# Patient Record
Sex: MALE | Race: WHITE | NOT HISPANIC OR LATINO | Employment: OTHER | URBAN - METROPOLITAN AREA
[De-identification: names, ages, dates, MRNs, and addresses within clinical notes are randomized per-mention and may not be internally consistent; named-entity substitution may affect disease eponyms.]

---

## 2018-02-01 ENCOUNTER — APPOINTMENT (OUTPATIENT)
Dept: GENERAL RADIOLOGY | Facility: HOSPITAL | Age: 73
End: 2018-02-01

## 2018-02-01 PROCEDURE — 73562 X-RAY EXAM OF KNEE 3: CPT | Performed by: GENERAL PRACTICE

## 2018-03-16 ENCOUNTER — ANESTHESIA (OUTPATIENT)
Dept: PERIOP | Facility: HOSPITAL | Age: 73
End: 2018-03-16

## 2018-03-16 ENCOUNTER — ANESTHESIA EVENT (OUTPATIENT)
Dept: PERIOP | Facility: HOSPITAL | Age: 73
End: 2018-03-16

## 2018-03-16 ENCOUNTER — HOSPITAL ENCOUNTER (OUTPATIENT)
Facility: HOSPITAL | Age: 73
Discharge: HOME | End: 2018-03-18
Attending: EMERGENCY MEDICINE | Admitting: INTERNAL MEDICINE

## 2018-03-16 ENCOUNTER — APPOINTMENT (OUTPATIENT)
Dept: GENERAL RADIOLOGY | Facility: HOSPITAL | Age: 73
End: 2018-03-16

## 2018-03-16 DIAGNOSIS — S42.91XA CLOSED FRACTURE DISLOCATION OF RIGHT SHOULDER, INITIAL ENCOUNTER: Primary | ICD-10-CM

## 2018-03-16 PROBLEM — Z66 DNR (DO NOT RESUSCITATE): Status: ACTIVE | Noted: 2018-03-16

## 2018-03-16 PROBLEM — D72.829 LEUKOCYTOSIS: Status: ACTIVE | Noted: 2018-03-16
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
## 2018-03-16 PROBLEM — R73.9 HYPERGLYCEMIA: Status: ACTIVE | Noted: 2018-03-16

## 2018-03-16 PROBLEM — K21.9 GASTROESOPHAGEAL REFLUX DISEASE WITHOUT ESOPHAGITIS: Status: ACTIVE | Noted: 2018-03-16

## 2018-03-16 LAB
ALBUMIN SERPL-MCNC: 3.7 G/DL (ref 3.5–5.2)
ALBUMIN/GLOB SERPL: 1.1 G/DL
ALP SERPL-CCNC: 89 U/L (ref 39–117)
ALT SERPL W P-5'-P-CCNC: 22 U/L (ref 1–41)
ANION GAP SERPL CALCULATED.3IONS-SCNC: 11.2 MMOL/L
ANION GAP SERPL CALCULATED.3IONS-SCNC: 14.1 MMOL/L
APTT PPP: 28.2 SECONDS (ref 22.7–35.4)
AST SERPL-CCNC: 26 U/L (ref 1–40)
BASOPHILS # BLD AUTO: 0.01 10*3/MM3 (ref 0–0.2)
BASOPHILS NFR BLD AUTO: 0.1 % (ref 0–1.5)
BILIRUB SERPL-MCNC: 0.2 MG/DL (ref 0.1–1.2)
BUN BLD-MCNC: 16 MG/DL (ref 8–23)
BUN BLD-MCNC: 20 MG/DL (ref 8–23)
BUN/CREAT SERPL: 17.8 (ref 7–25)
BUN/CREAT SERPL: 19.4 (ref 7–25)
CALCIUM SPEC-SCNC: 8.1 MG/DL (ref 8.6–10.5)
CALCIUM SPEC-SCNC: 9.1 MG/DL (ref 8.6–10.5)
CHLORIDE SERPL-SCNC: 103 MMOL/L (ref 98–107)
CHLORIDE SERPL-SCNC: 106 MMOL/L (ref 98–107)
CO2 SERPL-SCNC: 23.8 MMOL/L (ref 22–29)
CO2 SERPL-SCNC: 23.9 MMOL/L (ref 22–29)
CREAT BLD-MCNC: 0.9 MG/DL (ref 0.76–1.27)
CREAT BLD-MCNC: 1.03 MG/DL (ref 0.76–1.27)
DEPRECATED RDW RBC AUTO: 47.9 FL (ref 37–54)
EOSINOPHIL # BLD AUTO: 0 10*3/MM3 (ref 0–0.7)
EOSINOPHIL NFR BLD AUTO: 0 % (ref 0.3–6.2)
ERYTHROCYTE [DISTWIDTH] IN BLOOD BY AUTOMATED COUNT: 13.5 % (ref 11.5–14.5)
GFR SERPL CREATININE-BSD FRML MDRD: 70 ML/MIN/1.73
GFR SERPL CREATININE-BSD FRML MDRD: 82 ML/MIN/1.73
GLOBULIN UR ELPH-MCNC: 3.5 GM/DL
GLUCOSE BLD-MCNC: 168 MG/DL (ref 65–99)
GLUCOSE BLD-MCNC: 86 MG/DL (ref 65–99)
HBA1C MFR BLD: 5.58 % (ref 4.8–5.6)
HCT VFR BLD AUTO: 41.8 % (ref 40.4–52.2)
HGB BLD-MCNC: 13.6 G/DL (ref 13.7–17.6)
IMM GRANULOCYTES # BLD: 0.12 10*3/MM3 (ref 0–0.03)
IMM GRANULOCYTES NFR BLD: 1.1 % (ref 0–0.5)
INR PPP: 1.1 (ref 0.9–1.1)
LYMPHOCYTES # BLD AUTO: 1.52 10*3/MM3 (ref 0.9–4.8)
LYMPHOCYTES NFR BLD AUTO: 14.1 % (ref 19.6–45.3)
MAGNESIUM SERPL-MCNC: 1.8 MG/DL (ref 1.6–2.4)
MCH RBC QN AUTO: 31.9 PG (ref 27–32.7)
MCHC RBC AUTO-ENTMCNC: 32.5 G/DL (ref 32.6–36.4)
MCV RBC AUTO: 98.1 FL (ref 79.8–96.2)
MONOCYTES # BLD AUTO: 0.93 10*3/MM3 (ref 0.2–1.2)
MONOCYTES NFR BLD AUTO: 8.6 % (ref 5–12)
NEUTROPHILS # BLD AUTO: 8.19 10*3/MM3 (ref 1.9–8.1)
NEUTROPHILS NFR BLD AUTO: 76.1 % (ref 42.7–76)
PLATELET # BLD AUTO: 233 10*3/MM3 (ref 140–500)
PMV BLD AUTO: 10.3 FL (ref 6–12)
POTASSIUM BLD-SCNC: 4.2 MMOL/L (ref 3.5–5.2)
POTASSIUM BLD-SCNC: 4.3 MMOL/L (ref 3.5–5.2)
PROT SERPL-MCNC: 7.2 G/DL (ref 6–8.5)
PROTHROMBIN TIME: 14 SECONDS (ref 11.7–14.2)
RBC # BLD AUTO: 4.26 10*6/MM3 (ref 4.6–6)
SODIUM BLD-SCNC: 141 MMOL/L (ref 136–145)
SODIUM BLD-SCNC: 141 MMOL/L (ref 136–145)
WBC NRBC COR # BLD: 10.77 10*3/MM3 (ref 4.5–10.7)

## 2018-03-16 PROCEDURE — 99204 OFFICE O/P NEW MOD 45 MIN: CPT | Performed by: INTERNAL MEDICINE

## 2018-03-16 PROCEDURE — 63710000001: Performed by: ORTHOPAEDIC SURGERY

## 2018-03-16 PROCEDURE — 25010000002 MIDAZOLAM PER 1 MG: Performed by: ANESTHESIOLOGY

## 2018-03-16 PROCEDURE — A9270 NON-COVERED ITEM OR SERVICE: HCPCS | Performed by: ORTHOPAEDIC SURGERY

## 2018-03-16 PROCEDURE — 85025 COMPLETE CBC W/AUTO DIFF WBC: CPT | Performed by: PHYSICIAN ASSISTANT

## 2018-03-16 PROCEDURE — 25010000002 HYDROMORPHONE PER 4 MG: Performed by: HOSPITALIST

## 2018-03-16 PROCEDURE — 73030 X-RAY EXAM OF SHOULDER: CPT

## 2018-03-16 PROCEDURE — 93010 ELECTROCARDIOGRAM REPORT: CPT | Performed by: INTERNAL MEDICINE

## 2018-03-16 PROCEDURE — 96375 TX/PRO/DX INJ NEW DRUG ADDON: CPT

## 2018-03-16 PROCEDURE — 63710000001 DOCUSATE SODIUM 100 MG CAPSULE: Performed by: ORTHOPAEDIC SURGERY

## 2018-03-16 PROCEDURE — G0378 HOSPITAL OBSERVATION PER HR: HCPCS

## 2018-03-16 PROCEDURE — 96376 TX/PRO/DX INJ SAME DRUG ADON: CPT

## 2018-03-16 PROCEDURE — 96374 THER/PROPH/DIAG INJ IV PUSH: CPT

## 2018-03-16 PROCEDURE — 99284 EMERGENCY DEPT VISIT MOD MDM: CPT

## 2018-03-16 PROCEDURE — 99220 PR INITIAL OBSERVATION CARE/DAY 70 MINUTES: CPT | Performed by: ORTHOPAEDIC SURGERY

## 2018-03-16 PROCEDURE — 25010000002 PROPOFOL 10 MG/ML EMULSION: Performed by: ANESTHESIOLOGY

## 2018-03-16 PROCEDURE — 80053 COMPREHEN METABOLIC PANEL: CPT | Performed by: PHYSICIAN ASSISTANT

## 2018-03-16 PROCEDURE — 93005 ELECTROCARDIOGRAM TRACING: CPT | Performed by: PHYSICIAN ASSISTANT

## 2018-03-16 PROCEDURE — 85610 PROTHROMBIN TIME: CPT | Performed by: PHYSICIAN ASSISTANT

## 2018-03-16 PROCEDURE — 63710000001 OXYCODONE-ACETAMINOPHEN 7.5-325 MG TABLET: Performed by: ORTHOPAEDIC SURGERY

## 2018-03-16 PROCEDURE — 85730 THROMBOPLASTIN TIME PARTIAL: CPT | Performed by: PHYSICIAN ASSISTANT

## 2018-03-16 PROCEDURE — 83735 ASSAY OF MAGNESIUM: CPT | Performed by: INTERNAL MEDICINE

## 2018-03-16 PROCEDURE — 25010000002 ONDANSETRON PER 1 MG: Performed by: PHYSICIAN ASSISTANT

## 2018-03-16 PROCEDURE — 23605 CLTX PRX HMRL FX MNPJ+-TRACT: CPT | Performed by: ORTHOPAEDIC SURGERY

## 2018-03-16 PROCEDURE — 25010000002 HYDROMORPHONE HCL PF 500 MG/50ML SOLUTION: Performed by: HOSPITALIST

## 2018-03-16 PROCEDURE — 83036 HEMOGLOBIN GLYCOSYLATED A1C: CPT | Performed by: INTERNAL MEDICINE

## 2018-03-16 PROCEDURE — 25010000002 ONDANSETRON PER 1 MG: Performed by: HOSPITALIST

## 2018-03-16 PROCEDURE — 73020 X-RAY EXAM OF SHOULDER: CPT

## 2018-03-16 PROCEDURE — 25010000002 HYDROMORPHONE PER 4 MG: Performed by: EMERGENCY MEDICINE

## 2018-03-16 RX ORDER — FLUMAZENIL 0.1 MG/ML
0.2 INJECTION INTRAVENOUS AS NEEDED
Status: DISCONTINUED | OUTPATIENT
Start: 2018-03-16 | End: 2018-03-16 | Stop reason: HOSPADM

## 2018-03-16 RX ORDER — OXYCODONE AND ACETAMINOPHEN 7.5; 325 MG/1; MG/1
2 TABLET ORAL EVERY 4 HOURS PRN
Status: DISCONTINUED | OUTPATIENT
Start: 2018-03-16 | End: 2018-03-18 | Stop reason: HOSPADM

## 2018-03-16 RX ORDER — FAMOTIDINE 10 MG/ML
20 INJECTION, SOLUTION INTRAVENOUS ONCE
Status: COMPLETED | OUTPATIENT
Start: 2018-03-16 | End: 2018-03-16

## 2018-03-16 RX ORDER — NALOXONE HCL 0.4 MG/ML
0.1 VIAL (ML) INJECTION
Status: DISCONTINUED | OUTPATIENT
Start: 2018-03-16 | End: 2018-03-18 | Stop reason: HOSPADM

## 2018-03-16 RX ORDER — FENTANYL CITRATE 50 UG/ML
50 INJECTION, SOLUTION INTRAMUSCULAR; INTRAVENOUS
Status: DISCONTINUED | OUTPATIENT
Start: 2018-03-16 | End: 2018-03-16 | Stop reason: HOSPADM

## 2018-03-16 RX ORDER — SODIUM CHLORIDE 9 MG/ML
50 INJECTION, SOLUTION INTRAVENOUS CONTINUOUS
Status: DISCONTINUED | OUTPATIENT
Start: 2018-03-16 | End: 2018-03-18 | Stop reason: HOSPADM

## 2018-03-16 RX ORDER — DIFLUPREDNATE OPHTHALMIC 0.5 MG/ML
1 EMULSION OPHTHALMIC 4 TIMES DAILY
COMMUNITY
End: 2018-04-09

## 2018-03-16 RX ORDER — SODIUM CHLORIDE 0.9 % (FLUSH) 0.9 %
1-10 SYRINGE (ML) INJECTION AS NEEDED
Status: DISCONTINUED | OUTPATIENT
Start: 2018-03-16 | End: 2018-03-16 | Stop reason: HOSPADM

## 2018-03-16 RX ORDER — SODIUM CHLORIDE, SODIUM LACTATE, POTASSIUM CHLORIDE, CALCIUM CHLORIDE 600; 310; 30; 20 MG/100ML; MG/100ML; MG/100ML; MG/100ML
9 INJECTION, SOLUTION INTRAVENOUS CONTINUOUS
Status: DISCONTINUED | OUTPATIENT
Start: 2018-03-16 | End: 2018-03-18 | Stop reason: HOSPADM

## 2018-03-16 RX ORDER — FAMOTIDINE 10 MG/ML
20 INJECTION, SOLUTION INTRAVENOUS EVERY 12 HOURS SCHEDULED
Status: DISCONTINUED | OUTPATIENT
Start: 2018-03-16 | End: 2018-03-17

## 2018-03-16 RX ORDER — ONDANSETRON 2 MG/ML
4 INJECTION INTRAMUSCULAR; INTRAVENOUS ONCE
Status: COMPLETED | OUTPATIENT
Start: 2018-03-16 | End: 2018-03-16

## 2018-03-16 RX ORDER — OXYCODONE HYDROCHLORIDE AND ACETAMINOPHEN 5; 325 MG/1; MG/1
1 TABLET ORAL EVERY 4 HOURS PRN
Status: DISCONTINUED | OUTPATIENT
Start: 2018-03-16 | End: 2018-03-18 | Stop reason: HOSPADM

## 2018-03-16 RX ORDER — LABETALOL HYDROCHLORIDE 5 MG/ML
5 INJECTION, SOLUTION INTRAVENOUS
Status: DISCONTINUED | OUTPATIENT
Start: 2018-03-16 | End: 2018-03-16 | Stop reason: HOSPADM

## 2018-03-16 RX ORDER — PANTOPRAZOLE SODIUM 40 MG/1
40 TABLET, DELAYED RELEASE ORAL EVERY MORNING
Status: DISCONTINUED | OUTPATIENT
Start: 2018-03-17 | End: 2018-03-18 | Stop reason: HOSPADM

## 2018-03-16 RX ORDER — DIPHENHYDRAMINE HYDROCHLORIDE 50 MG/ML
12.5 INJECTION INTRAMUSCULAR; INTRAVENOUS
Status: DISCONTINUED | OUTPATIENT
Start: 2018-03-16 | End: 2018-03-16 | Stop reason: HOSPADM

## 2018-03-16 RX ORDER — NITROGLYCERIN 0.4 MG/1
0.4 TABLET SUBLINGUAL
Status: DISCONTINUED | OUTPATIENT
Start: 2018-03-16 | End: 2018-03-18 | Stop reason: HOSPADM

## 2018-03-16 RX ORDER — ONDANSETRON 2 MG/ML
4 INJECTION INTRAMUSCULAR; INTRAVENOUS ONCE AS NEEDED
Status: DISCONTINUED | OUTPATIENT
Start: 2018-03-16 | End: 2018-03-16 | Stop reason: HOSPADM

## 2018-03-16 RX ORDER — ACETAMINOPHEN 325 MG/1
650 TABLET ORAL EVERY 4 HOURS PRN
Status: DISCONTINUED | OUTPATIENT
Start: 2018-03-16 | End: 2018-03-18 | Stop reason: HOSPADM

## 2018-03-16 RX ORDER — HYDROMORPHONE HYDROCHLORIDE 1 MG/ML
0.5 INJECTION, SOLUTION INTRAMUSCULAR; INTRAVENOUS; SUBCUTANEOUS
Status: DISCONTINUED | OUTPATIENT
Start: 2018-03-16 | End: 2018-03-18 | Stop reason: HOSPADM

## 2018-03-16 RX ORDER — BISACODYL 10 MG
10 SUPPOSITORY, RECTAL RECTAL DAILY PRN
Status: DISCONTINUED | OUTPATIENT
Start: 2018-03-16 | End: 2018-03-18 | Stop reason: HOSPADM

## 2018-03-16 RX ORDER — DOCUSATE SODIUM 100 MG/1
100 CAPSULE, LIQUID FILLED ORAL 2 TIMES DAILY PRN
Status: DISCONTINUED | OUTPATIENT
Start: 2018-03-16 | End: 2018-03-18 | Stop reason: HOSPADM

## 2018-03-16 RX ORDER — MIDAZOLAM HYDROCHLORIDE 1 MG/ML
2 INJECTION INTRAMUSCULAR; INTRAVENOUS
Status: DISCONTINUED | OUTPATIENT
Start: 2018-03-16 | End: 2018-03-16 | Stop reason: HOSPADM

## 2018-03-16 RX ORDER — SODIUM CHLORIDE 9 MG/ML
100 INJECTION, SOLUTION INTRAVENOUS CONTINUOUS
Status: DISCONTINUED | OUTPATIENT
Start: 2018-03-16 | End: 2018-03-18 | Stop reason: HOSPADM

## 2018-03-16 RX ORDER — HYDROCODONE BITARTRATE AND ACETAMINOPHEN 7.5; 325 MG/1; MG/1
1 TABLET ORAL ONCE AS NEEDED
Status: DISCONTINUED | OUTPATIENT
Start: 2018-03-16 | End: 2018-03-16 | Stop reason: HOSPADM

## 2018-03-16 RX ORDER — HYDROMORPHONE HYDROCHLORIDE 1 MG/ML
0.5 INJECTION, SOLUTION INTRAMUSCULAR; INTRAVENOUS; SUBCUTANEOUS ONCE
Status: COMPLETED | OUTPATIENT
Start: 2018-03-16 | End: 2018-03-16

## 2018-03-16 RX ORDER — CYCLOPENTOLATE HYDROCHLORIDE 10 MG/ML
1 SOLUTION/ DROPS OPHTHALMIC 2 TIMES DAILY
COMMUNITY
End: 2018-03-27

## 2018-03-16 RX ORDER — MIDAZOLAM HYDROCHLORIDE 1 MG/ML
1 INJECTION INTRAMUSCULAR; INTRAVENOUS
Status: DISCONTINUED | OUTPATIENT
Start: 2018-03-16 | End: 2018-03-16 | Stop reason: HOSPADM

## 2018-03-16 RX ORDER — ONDANSETRON 4 MG/1
4 TABLET, ORALLY DISINTEGRATING ORAL EVERY 6 HOURS PRN
Status: DISCONTINUED | OUTPATIENT
Start: 2018-03-16 | End: 2018-03-18 | Stop reason: HOSPADM

## 2018-03-16 RX ORDER — SODIUM CHLORIDE 0.9 % (FLUSH) 0.9 %
10 SYRINGE (ML) INJECTION AS NEEDED
Status: DISCONTINUED | OUTPATIENT
Start: 2018-03-16 | End: 2018-03-18 | Stop reason: HOSPADM

## 2018-03-16 RX ORDER — ACETAMINOPHEN 325 MG/1
325 TABLET ORAL EVERY 4 HOURS PRN
Status: DISCONTINUED | OUTPATIENT
Start: 2018-03-16 | End: 2018-03-18 | Stop reason: HOSPADM

## 2018-03-16 RX ORDER — PROMETHAZINE HYDROCHLORIDE 25 MG/1
12.5 TABLET ORAL ONCE AS NEEDED
Status: DISCONTINUED | OUTPATIENT
Start: 2018-03-16 | End: 2018-03-16 | Stop reason: HOSPADM

## 2018-03-16 RX ORDER — FENTANYL CITRATE 50 UG/ML
INJECTION, SOLUTION INTRAMUSCULAR; INTRAVENOUS
Status: DISCONTINUED
Start: 2018-03-16 | End: 2018-03-16 | Stop reason: WASHOUT

## 2018-03-16 RX ORDER — HYDRALAZINE HYDROCHLORIDE 20 MG/ML
5 INJECTION INTRAMUSCULAR; INTRAVENOUS
Status: DISCONTINUED | OUTPATIENT
Start: 2018-03-16 | End: 2018-03-16 | Stop reason: HOSPADM

## 2018-03-16 RX ORDER — PROMETHAZINE HYDROCHLORIDE 25 MG/ML
12.5 INJECTION, SOLUTION INTRAMUSCULAR; INTRAVENOUS ONCE AS NEEDED
Status: DISCONTINUED | OUTPATIENT
Start: 2018-03-16 | End: 2018-03-16 | Stop reason: HOSPADM

## 2018-03-16 RX ORDER — HYDROMORPHONE HCL 110MG/55ML
0.5 PATIENT CONTROLLED ANALGESIA SYRINGE INTRAVENOUS
Status: DISCONTINUED | OUTPATIENT
Start: 2018-03-16 | End: 2018-03-16 | Stop reason: HOSPADM

## 2018-03-16 RX ORDER — ONDANSETRON 2 MG/ML
4 INJECTION INTRAMUSCULAR; INTRAVENOUS EVERY 6 HOURS PRN
Status: DISCONTINUED | OUTPATIENT
Start: 2018-03-16 | End: 2018-03-18 | Stop reason: HOSPADM

## 2018-03-16 RX ORDER — HYDROMORPHONE HCL 110MG/55ML
0.5 PATIENT CONTROLLED ANALGESIA SYRINGE INTRAVENOUS
Status: DISCONTINUED | OUTPATIENT
Start: 2018-03-16 | End: 2018-03-18 | Stop reason: HOSPADM

## 2018-03-16 RX ORDER — OXYCODONE AND ACETAMINOPHEN 7.5; 325 MG/1; MG/1
1 TABLET ORAL ONCE AS NEEDED
Status: DISCONTINUED | OUTPATIENT
Start: 2018-03-16 | End: 2018-03-16 | Stop reason: HOSPADM

## 2018-03-16 RX ORDER — PROMETHAZINE HYDROCHLORIDE 25 MG/1
25 SUPPOSITORY RECTAL ONCE AS NEEDED
Status: DISCONTINUED | OUTPATIENT
Start: 2018-03-16 | End: 2018-03-16 | Stop reason: HOSPADM

## 2018-03-16 RX ORDER — ONDANSETRON 4 MG/1
4 TABLET, FILM COATED ORAL EVERY 6 HOURS PRN
Status: DISCONTINUED | OUTPATIENT
Start: 2018-03-16 | End: 2018-03-18 | Stop reason: HOSPADM

## 2018-03-16 RX ORDER — PROPOFOL 10 MG/ML
VIAL (ML) INTRAVENOUS AS NEEDED
Status: DISCONTINUED | OUTPATIENT
Start: 2018-03-16 | End: 2018-03-16 | Stop reason: SURG

## 2018-03-16 RX ORDER — LIDOCAINE HYDROCHLORIDE 10 MG/ML
0.5 INJECTION, SOLUTION EPIDURAL; INFILTRATION; INTRACAUDAL; PERINEURAL ONCE AS NEEDED
Status: DISCONTINUED | OUTPATIENT
Start: 2018-03-16 | End: 2018-03-16 | Stop reason: HOSPADM

## 2018-03-16 RX ORDER — PREDNISONE 10 MG/1
40 TABLET ORAL DAILY
COMMUNITY
End: 2018-06-27

## 2018-03-16 RX ORDER — EPHEDRINE SULFATE 50 MG/ML
5 INJECTION, SOLUTION INTRAVENOUS ONCE AS NEEDED
Status: DISCONTINUED | OUTPATIENT
Start: 2018-03-16 | End: 2018-03-16 | Stop reason: HOSPADM

## 2018-03-16 RX ORDER — CYCLOPENTOLATE HYDROCHLORIDE 10 MG/ML
1 SOLUTION/ DROPS OPHTHALMIC 2 TIMES DAILY
Status: DISCONTINUED | OUTPATIENT
Start: 2018-03-16 | End: 2018-03-18 | Stop reason: HOSPADM

## 2018-03-16 RX ORDER — NALOXONE HCL 0.4 MG/ML
0.2 VIAL (ML) INJECTION AS NEEDED
Status: DISCONTINUED | OUTPATIENT
Start: 2018-03-16 | End: 2018-03-16 | Stop reason: HOSPADM

## 2018-03-16 RX ORDER — CEFAZOLIN SODIUM 2 G/100ML
2 INJECTION, SOLUTION INTRAVENOUS EVERY 8 HOURS
Status: DISCONTINUED | OUTPATIENT
Start: 2018-03-16 | End: 2018-03-16

## 2018-03-16 RX ORDER — PROMETHAZINE HYDROCHLORIDE 25 MG/1
25 TABLET ORAL ONCE AS NEEDED
Status: DISCONTINUED | OUTPATIENT
Start: 2018-03-16 | End: 2018-03-16 | Stop reason: HOSPADM

## 2018-03-16 RX ORDER — LIDOCAINE HYDROCHLORIDE 20 MG/ML
INJECTION, SOLUTION INFILTRATION; PERINEURAL AS NEEDED
Status: DISCONTINUED | OUTPATIENT
Start: 2018-03-16 | End: 2018-03-16 | Stop reason: SURG

## 2018-03-16 RX ADMIN — HYDROMORPHONE HYDROCHLORIDE 0.5 MG: 1 INJECTION, SOLUTION INTRAMUSCULAR; INTRAVENOUS; SUBCUTANEOUS at 03:12

## 2018-03-16 RX ADMIN — SODIUM CHLORIDE 50 ML/HR: 9 INJECTION, SOLUTION INTRAVENOUS at 05:16

## 2018-03-16 RX ADMIN — OXYCODONE HYDROCHLORIDE AND ACETAMINOPHEN 2 TABLET: 7.5; 325 TABLET ORAL at 21:44

## 2018-03-16 RX ADMIN — MIDAZOLAM 1 MG: 1 INJECTION INTRAMUSCULAR; INTRAVENOUS at 16:26

## 2018-03-16 RX ADMIN — OXYCODONE HYDROCHLORIDE AND ACETAMINOPHEN 1 TABLET: 5; 325 TABLET ORAL at 10:26

## 2018-03-16 RX ADMIN — ONDANSETRON 4 MG: 2 INJECTION INTRAMUSCULAR; INTRAVENOUS at 01:47

## 2018-03-16 RX ADMIN — SODIUM CHLORIDE, POTASSIUM CHLORIDE, SODIUM LACTATE AND CALCIUM CHLORIDE 9 ML/HR: 600; 310; 30; 20 INJECTION, SOLUTION INTRAVENOUS at 16:26

## 2018-03-16 RX ADMIN — HYDROMORPHONE HYDROCHLORIDE 0.5 MG: 1 INJECTION, SOLUTION INTRAMUSCULAR; INTRAVENOUS; SUBCUTANEOUS at 22:16

## 2018-03-16 RX ADMIN — HYDROMORPHONE HYDROCHLORIDE 0.5 MG: 1 INJECTION, SOLUTION INTRAMUSCULAR; INTRAVENOUS; SUBCUTANEOUS at 14:32

## 2018-03-16 RX ADMIN — LIDOCAINE HYDROCHLORIDE 100 MG: 20 INJECTION, SOLUTION INFILTRATION; PERINEURAL at 20:18

## 2018-03-16 RX ADMIN — HYDROMORPHONE HYDROCHLORIDE 0.5 MG: 1 INJECTION, SOLUTION INTRAMUSCULAR; INTRAVENOUS; SUBCUTANEOUS at 08:08

## 2018-03-16 RX ADMIN — ONDANSETRON 4 MG: 2 INJECTION INTRAMUSCULAR; INTRAVENOUS at 05:16

## 2018-03-16 RX ADMIN — HYDROMORPHONE HYDROCHLORIDE 0.5 MG: 1 INJECTION, SOLUTION INTRAMUSCULAR; INTRAVENOUS; SUBCUTANEOUS at 01:47

## 2018-03-16 RX ADMIN — FAMOTIDINE 20 MG: 10 INJECTION, SOLUTION INTRAVENOUS at 23:12

## 2018-03-16 RX ADMIN — FAMOTIDINE 20 MG: 10 INJECTION INTRAVENOUS at 16:26

## 2018-03-16 RX ADMIN — PROPOFOL 150 MG: 10 INJECTION, EMULSION INTRAVENOUS at 20:18

## 2018-03-16 RX ADMIN — DOCUSATE SODIUM 100 MG: 100 CAPSULE, LIQUID FILLED ORAL at 21:44

## 2018-03-16 RX ADMIN — OXYCODONE HYDROCHLORIDE AND ACETAMINOPHEN 1 TABLET: 5; 325 TABLET ORAL at 06:39

## 2018-03-16 RX ADMIN — HYDROMORPHONE HYDROCHLORIDE 0.5 MG: 1 INJECTION, SOLUTION INTRAMUSCULAR; INTRAVENOUS; SUBCUTANEOUS at 05:16

## 2018-03-16 RX ADMIN — CYCLOPENTOLATE HYDROCHLORIDE 1 DROP: 10 SOLUTION/ DROPS OPHTHALMIC at 22:28

## 2018-03-16 NOTE — ED PROVIDER NOTES
" EMERGENCY DEPARTMENT ENCOUNTER    CHIEF COMPLAINT  Chief Complaint: R shoulder injury  History given by: patient  History limited by: nothing  Room Number: 36/36  PMD: Domenica Merida MD      HPI:  Pt is a 73 y.o. male who presents complaining of R shoulder injury s/p fall down the stairs PTA. He denies hitting his head or numbness to his R hand.    Duration:  PTA  Onset: sudden  Timing: constant  Location: R shoulder  Radiation: none stated  Quality: \"pain\"  Intensity/Severity: moderate  Progression: unchanged  Associated Symptoms: none stated  Aggravating Factors: none stated  Alleviating Factors: none stated  Previous Episodes: Pt does not report previous episodes.  Treatment before arrival: Pt does not report treatment PTA.    PAST MEDICAL HISTORY  Active Ambulatory Problems     Diagnosis Date Noted   • Acute blood loss anemia 10/07/2014   • Arthritis 10/07/2014   • Fracture of fifth metacarpal bone of right hand 01/22/2016   • GI bleed 10/07/2014   • Melena 09/12/2014     Resolved Ambulatory Problems     Diagnosis Date Noted   • No Resolved Ambulatory Problems     Past Medical History:   Diagnosis Date   • GERD (gastroesophageal reflux disease)        PAST SURGICAL HISTORY  Past Surgical History:   Procedure Laterality Date   • HERNIA REPAIR         FAMILY HISTORY  History reviewed. No pertinent family history.    SOCIAL HISTORY  Social History     Social History   • Marital status:      Spouse name: N/A   • Number of children: N/A   • Years of education: N/A     Occupational History   • Not on file.     Social History Main Topics   • Smoking status: Never Smoker   • Smokeless tobacco: Never Used   • Alcohol use No   • Drug use: Unknown   • Sexual activity: Defer     Other Topics Concern   • Not on file     Social History Narrative   • No narrative on file       ALLERGIES  Review of patient's allergies indicates no known allergies.    REVIEW OF SYSTEMS  Review of Systems   Constitutional: Negative for " fever.   Respiratory: Negative for shortness of breath.    Cardiovascular: Negative for chest pain.   Musculoskeletal: Positive for arthralgias (R shoulder).   Neurological: Negative for numbness.       PHYSICAL EXAM  ED Triage Vitals   Temp Heart Rate Resp BP SpO2   03/16/18 0044 03/16/18 0044 03/16/18 0044 03/16/18 0055 03/16/18 0044   96.4 °F (35.8 °C) 61 18 (!) 192/80 98 %      Temp src Heart Rate Source Patient Position BP Location FiO2 (%)   03/16/18 0044 03/16/18 0044 -- -- --   Tympanic Monitor          Physical Exam   Constitutional: He is oriented to person, place, and time and well-developed, well-nourished, and in no distress. No distress.   HENT:   Head: Normocephalic and atraumatic.   Eyes: EOM are normal. Pupils are equal, round, and reactive to light.   Neck: Normal range of motion. Neck supple.   Cardiovascular: Normal rate, regular rhythm, normal heart sounds and intact distal pulses.    Normal radial pulses   Pulmonary/Chest: Effort normal and breath sounds normal. No respiratory distress.   Abdominal: Soft. There is no tenderness. There is no rebound and no guarding.   Musculoskeletal: Normal range of motion. He exhibits no edema.   Tenderness to R shoulder with decreased ROM secondary to pain.   Neurological: He is alert and oriented to person, place, and time. He has normal sensation and normal strength.   Skin: Skin is warm and dry.   Psychiatric: Mood and affect normal.   Nursing note and vitals reviewed.      LAB RESULTS  Lab Results (last 24 hours)     Procedure Component Value Units Date/Time    Comprehensive Metabolic Panel [[id number]]  (Abnormal) Collected:  03/16/18 0156    Specimen:  Blood from Arm, Left Updated:  03/16/18 0230     Glucose 168 (H) mg/dL      BUN 20 mg/dL      Creatinine 1.03 mg/dL      Sodium 141 mmol/L      Potassium 4.3 mmol/L      Chloride 103 mmol/L      CO2 23.9 mmol/L      Calcium 9.1 mg/dL      Total Protein 7.2 g/dL      Albumin 3.70 g/dL      ALT (SGPT) 22 U/L       AST (SGOT) 26 U/L      Comment: Specimen hemolyzed.  Results may be affected.        Alkaline Phosphatase 89 U/L      Total Bilirubin 0.2 mg/dL      eGFR Non African Amer 70 mL/min/1.73      Globulin 3.5 gm/dL      A/G Ratio 1.1 g/dL      BUN/Creatinine Ratio 19.4     Anion Gap 14.1 mmol/L     Narrative:       The MDRD GFR formula is only valid for adults with stable renal function between ages 18 and 70.    CBC & Differential [[id number]] Collected:  03/16/18 0213    Specimen:  Blood Updated:  03/16/18 0233    Narrative:       The following orders were created for panel order CBC & Differential.  Procedure                               Abnormality         Status                     ---------                               -----------         ------                     CBC Auto Differential[[id number]]        Abnormal            Final result                 Please view results for these tests on the individual orders.    Protime-INR [[id number]]  (Normal) Collected:  03/16/18 0213    Specimen:  Blood Updated:  03/16/18 0256     Protime 14.0 Seconds      INR 1.10    aPTT [[id number]]  (Normal) Collected:  03/16/18 0213    Specimen:  Blood Updated:  03/16/18 0256     PTT 28.2 seconds     CBC Auto Differential [[id number]]  (Abnormal) Collected:  03/16/18 0213    Specimen:  Blood Updated:  03/16/18 0233     WBC 10.77 (H) 10*3/mm3      RBC 4.26 (L) 10*6/mm3      Hemoglobin 13.6 (L) g/dL      Hematocrit 41.8 %      MCV 98.1 (H) fL      MCH 31.9 pg      MCHC 32.5 (L) g/dL      RDW 13.5 %      RDW-SD 47.9 fl      MPV 10.3 fL      Platelets 233 10*3/mm3      Neutrophil % 76.1 (H) %      Lymphocyte % 14.1 (L) %      Monocyte % 8.6 %      Eosinophil % 0.0 (L) %      Basophil % 0.1 %      Immature Grans % 1.1 (H) %      Neutrophils, Absolute 8.19 (H) 10*3/mm3      Lymphocytes, Absolute 1.52 10*3/mm3      Monocytes, Absolute 0.93 10*3/mm3      Eosinophils, Absolute 0.00 10*3/mm3      Basophils, Absolute 0.01 10*3/mm3       Immature Grans, Absolute 0.12 (H) 10*3/mm3           I ordered the above labs and reviewed the results    RADIOLOGY  XR Shoulder 2+ View Right   Final Result   1. Fracture dislocation as above.       This report was finalized on 3/16/2018 12:56 AM by Arash Granados MD.               I ordered the above noted radiological studies. Interpreted by radiologist. Reviewed by me in PACS.       PROCEDURES  Procedures  EKG           EKG time: 0159  Rhythm/Rate: NSR, 65  P waves and NH: 1st degree AV block  QRS, axis: normal   ST and T waves: normal     Interpreted Contemporaneously by me, independently viewed  No prior for comparison.      PROGRESS AND CONSULTS  ED Course   0120  Notified pt and family of XR R shoulder results, which show fractures and dislocation. Discussed plan to consult ortho and admit. Pt understands and agrees with the plan, all questions answered.    0128  Ordered dilaudid and zofran for pain, blood work, and EKG after bedside evaluation.    0214  Placed call to ortho for consult.    0228  Discussed pt's case and imaging results with Dr. Garcia (ortho) who says that he would like to replace pt's shoulder. Dr. Garcia states pt can be d/c home and will see pt in his office later today.    0231  Rechecked pt who is resting comfortably. Notified pt and wife of plan discussed with Dr. Garcia (ortho). Pt and wife report feeling more comfortable if pt could stay in the hospital until it is time to see Dr. Garcia. Pt and wife understands and agrees with the plan, all questions answered.    0241  Ordered sling for R arm.    0250  Ordered dilaudid for pain.    0347  After speaking with both Dr. Garcia (ortho) and Dr. Sierra ([facility]), Dr. Sierra agrees to admit pt.    0354  Discussed pt's case with Dr. Swan who agrees with plan of care.    MEDICAL DECISION MAKING  Results were reviewed/discussed with the patient and they were also made aware of online access. Pt also made aware that some labs, such as  cultures, will not be resulted during ER visit and follow up with PMD is necessary.     MDM  Number of Diagnoses or Management Options  Closed fracture dislocation of right shoulder, initial encounter:      Amount and/or Complexity of Data Reviewed  Clinical lab tests: ordered and reviewed (Glucose=168)  Tests in the radiology section of CPT®: ordered and reviewed (XR R shoulder shows fracture dislocation.)  Tests in the medicine section of CPT®: ordered and reviewed (See procedure note.)  Discuss the patient with other providers: yes (Dr. Garcia (ortho))  Independent visualization of images, tracings, or specimens: yes    Patient Progress  Patient progress: stable         DIAGNOSIS  Final diagnoses:   Closed fracture dislocation of right shoulder, initial encounter       DISPOSITION  ADMISSION    Discussed treatment plan and reason for admission with pt/family and admitting physician.  Pt/family voiced understanding of the plan for admission for further testing/treatment as needed.       Latest Documented Vital Signs:  As of 4:11 AM  BP- 157/70 HR- 69 Temp- 96.4 °F (35.8 °C) (Tympanic) O2 sat- 91%    --  Documentation assistance provided by rohini Naylor for Jordan Lewis PA-C.  Information recorded by the rohini was done at my direction and has been verified and validated by me.     Shelbi Naylor  03/16/18 0357       CALVIN Rosado  03/16/18 0411

## 2018-03-16 NOTE — ED PROVIDER NOTES
The patient presents complaining of right shoulder injury after falling down stairs. The pt states he was reaching for a light switch in the dark when a door hit him from behind, and he fell forward. The pt denies hitting his head during the fall. The pt is not taking blood thinners.    Physical Exam:  The pt is resting comfortably, in no distress, and without gross neurological deficit.  Back/extremities: The pt has mild tenderness to the right shoulder with a closed deformity present. The pt has palpable distal pulses and intact deltoid sensation.    Discussed the XR results with the pt that show a fracture and dislocation of the right shoulder. Discussed the plan to consult with an orthopedist for further evaluation. The pt understands and agrees with the plan.    MD ATTESTATION NOTE    The MANDI and I have discussed this patient's history, physical exam, and treatment plan.  I have reviewed the documentation and personally had a face to face interaction with the patient. I affirm the documentation and agree with the treatment and plan.  The attached note describes my personal findings.    Documentation assistance provided by rohini Zarate for Dr. Zarate. Information recorded by the rohini was done at my direction and has been verified and validated by me.                 Mikel Zarate  03/16/18 6075       Laurent Zarate MD  03/18/18 8946

## 2018-03-16 NOTE — ED NOTES
Pt awaiting bed assignment. Wife at bedside. Pt medicated for pain 5/10.      Ceci Gonzalez, RN  03/16/18 3496

## 2018-03-16 NOTE — CONSULTS
Orthopedic Consult      Patient: Rodolfo Grover    Date of Admission: 3/16/2018  1:14 AM    YOB: 1945    Medical Record Number: [id number]    Attending Physician: Arash Stoddard MD    Consulting Physician: Arash Stoddard MD    Chief Complaints: Right shoulder injury    History of Present Illness: 73 y.o. male admitted to [facility] to services of Arash Stoddard MD with a right shoulder injury.  He slipped and fell in his garage yesterday, falling down several stairs and landing on his right shoulder.  He was fairly doing some work in the garage when his wife accidentally turned off the lights causing him to stumble and fall.  He noticed immediate pain and deformity of the shoulder.  He was seen in the emergency room and diagnosed with a proximal humerus fracture dislocation.  He describes his current pain as moderate, constant, and aching.  The pain is better at rest.  He is right-hand-dominant.  He had good use and function of the shoulder prior to the injury.    Allergies: No Known Allergies    Home Medications:    Current Facility-Administered Medications:   •  acetaminophen (TYLENOL) tablet 650 mg, 650 mg, Oral, Q4H PRN, Ronaldo Sierra MD  •  famotidine (PEPCID) injection 20 mg, 20 mg, Intravenous, Q12H, Arash Stoddard MD  •  HYDROmorphone (DILAUDID) injection 0.5 mg, 0.5 mg, Intravenous, Q2H PRN, Ronaldo Sierra MD, 0.5 mg at 03/16/18 0808  •  ondansetron (ZOFRAN) tablet 4 mg, 4 mg, Oral, Q6H PRN **OR** ondansetron ODT (ZOFRAN-ODT) disintegrating tablet 4 mg, 4 mg, Oral, Q6H PRN **OR** ondansetron (ZOFRAN) injection 4 mg, 4 mg, Intravenous, Q6H PRN, Ronaldo Sierra MD, 4 mg at 03/16/18 0516  •  oxyCODONE-acetaminophen (PERCOCET) 5-325 MG per tablet 1 tablet, 1 tablet, Oral, Q4H PRN, Ronaldo Sierra MD, 1 tablet at 03/16/18 1026  •  pneumococcal polysaccharide 23-valent (PNEUMOVAX-23) vaccine 0.5 mL, 0.5 mL, Intramuscular, Once, Ronaldo Sierra MD  •  Insert  peripheral IV, , , Once **AND** sodium chloride 0.9 % flush 10 mL, 10 mL, Intravenous, PRN, CALVIN Rosado  •  sodium chloride 0.9 % infusion, 50 mL/hr, Intravenous, Continuous, Ronaldo Sierra MD, Last Rate: 50 mL/hr at 03/16/18 0516, 50 mL/hr at 03/16/18 0516    Current Medications:  Scheduled Meds:  famotidine 20 mg Intravenous Q12H   pneumococcal polysaccharide 23-valent 0.5 mL Intramuscular Once     Continuous Infusions:  sodium chloride 50 mL/hr Last Rate: 50 mL/hr (03/16/18 0516)     PRN Meds:.•  acetaminophen  •  HYDROmorphone  •  ondansetron **OR** ondansetron ODT **OR** ondansetron  •  oxyCODONE-acetaminophen  •  Insert peripheral IV **AND** sodium chloride    Past Medical History:   Diagnosis Date   • GERD (gastroesophageal reflux disease)        Past Surgical History:   Procedure Laterality Date   • HERNIA REPAIR         Social History     Occupational History   • Not on file.     Social History Main Topics   • Smoking status: Never Smoker   • Smokeless tobacco: Never Used   • Alcohol use No   • Drug use: Unknown   • Sexual activity: Defer    Social History     Social History Narrative   • No narrative on file       History reviewed. No pertinent family history.      Review of Systems:     Constitutional:  Denies fever, shaking or chills   Eyes:  Denies change in visual acuity   HEENT:  Denies nasal congestion or sore throat   Respiratory:  Denies cough or shortness of breath   Cardiovascular:  Denies chest pain or edema  Endocrine: Denies tremors, palpitations, intolerance of heat or cold, polyuria, polydipsia.  GI:  Denies abdominal pain, nausea, vomiting, bloody stools or diarrhea  :  Denies frequency, urgency, incontinence, retention, or nocturia.  Musculoskeletal:  Denies numbness tingling or loss of motor function except as above  Integument:  Denies rash, lesion or ulceration   Neurologic:  Denies headache or focal weakness, deficits  Heme:  Denies epistaxis, spontaneous or excessive  bleeding, epistaxis, hematuria, melena, fatigue, enlarged or tender lymph nodes.      All other pertinent positives and negatives as noted above in HPI.    Physical Exam: 73 y.o. male    General:  Awake, alert. No acute distress.      Head/Neck:  Normocephalic, atraumatic.  Conjunctiva and sclera clear.  Hearing adequate for the exam.  Neck is supple with normal ROM.    Psych:  Affect and demeanor appropriate.    CV:  Regular rate and rhythm.  Hemodynamically stable.    Lungs:  Good chest expansion, breathing unlabored.    Abdomen:  Soft.  Non-tender, non-distended.    Extremities:      Right upper extremity:  Skin appears benign without obvious lacerations, ulcerations or lesions.  There is anterior edema and prominence as compared to the left. Compartments soft without evidence for DVT or compartment syndrome.  No atrophy.  No palpable masses or adenopathy.  Focal tenderness over the anterior aspect of the shoulder and proximal humerus.  ROM of the shoulder is extremely limited and painful.   Absolutely could not assess stability of the shoulder due to his discomfort.  Strength well-preserved distally including wrist flexion and extension, , pinch, finger and thumb abduction.  Sensation to light touch grossly intact distally.  Axillary nerve sensation is intact and subjectively normal although I could not get him to fire his deltoid on exam due to discomfort.  Good skin turgor, brisk cap refill and good pulses distally.    All other extremities atraumatic without gross abnormality.       Diagnostic Tests:    Admission on 03/16/2018   Component Date Value Ref Range Status   • Glucose 03/16/2018 168* 65 - 99 mg/dL Final   • BUN 03/16/2018 20  8 - 23 mg/dL Final   • Creatinine 03/16/2018 1.03  0.76 - 1.27 mg/dL Final   • Sodium 03/16/2018 141  136 - 145 mmol/L Final   • Potassium 03/16/2018 4.3  3.5 - 5.2 mmol/L Final   • Chloride 03/16/2018 103  98 - 107 mmol/L Final   • CO2 03/16/2018 23.9  22.0 - 29.0 mmol/L  Final   • Calcium 03/16/2018 9.1  8.6 - 10.5 mg/dL Final   • Total Protein 03/16/2018 7.2  6.0 - 8.5 g/dL Final   • Albumin 03/16/2018 3.70  3.50 - 5.20 g/dL Final   • ALT (SGPT) 03/16/2018 22  1 - 41 U/L Final   • AST (SGOT) 03/16/2018 26  1 - 40 U/L Final   • Alkaline Phosphatase 03/16/2018 89  39 - 117 U/L Final   • Total Bilirubin 03/16/2018 0.2  0.1 - 1.2 mg/dL Final   • eGFR Non African Amer 03/16/2018 70  >60 mL/min/1.73 Final   • Globulin 03/16/2018 3.5  gm/dL Final   • A/G Ratio 03/16/2018 1.1  g/dL Final   • BUN/Creatinine Ratio 03/16/2018 19.4  7.0 - 25.0 Final   • Anion Gap 03/16/2018 14.1  mmol/L Final   • Protime 03/16/2018 14.0  11.7 - 14.2 Seconds Final   • INR 03/16/2018 1.10  0.90 - 1.10 Final   • PTT 03/16/2018 28.2  22.7 - 35.4 seconds Final   • WBC 03/16/2018 10.77* 4.50 - 10.70 10*3/mm3 Final   • RBC 03/16/2018 4.26* 4.60 - 6.00 10*6/mm3 Final   • Hemoglobin 03/16/2018 13.6* 13.7 - 17.6 g/dL Final   • Hematocrit 03/16/2018 41.8  40.4 - 52.2 % Final   • MCV 03/16/2018 98.1* 79.8 - 96.2 fL Final   • MCH 03/16/2018 31.9  27.0 - 32.7 pg Final   • MCHC 03/16/2018 32.5* 32.6 - 36.4 g/dL Final   • RDW 03/16/2018 13.5  11.5 - 14.5 % Final   • RDW-SD 03/16/2018 47.9  37.0 - 54.0 fl Final   • MPV 03/16/2018 10.3  6.0 - 12.0 fL Final   • Platelets 03/16/2018 233  140 - 500 10*3/mm3 Final   • Neutrophil % 03/16/2018 76.1* 42.7 - 76.0 % Final   • Lymphocyte % 03/16/2018 14.1* 19.6 - 45.3 % Final   • Monocyte % 03/16/2018 8.6  5.0 - 12.0 % Final   • Eosinophil % 03/16/2018 0.0* 0.3 - 6.2 % Final   • Basophil % 03/16/2018 0.1  0.0 - 1.5 % Final   • Immature Grans % 03/16/2018 1.1* 0.0 - 0.5 % Final   • Neutrophils, Absolute 03/16/2018 8.19* 1.90 - 8.10 10*3/mm3 Final   • Lymphocytes, Absolute 03/16/2018 1.52  0.90 - 4.80 10*3/mm3 Final   • Monocytes, Absolute 03/16/2018 0.93  0.20 - 1.20 10*3/mm3 Final   • Eosinophils, Absolute 03/16/2018 0.00  0.00 - 0.70 10*3/mm3 Final   • Basophils, Absolute 03/16/2018  0.01  0.00 - 0.20 10*3/mm3 Final   • Immature Grans, Absolute 03/16/2018 0.12* 0.00 - 0.03 10*3/mm3 Final   • Hemoglobin A1C 03/16/2018 5.58  4.80 - 5.60 % Final     Lab Results (last 24 hours)     Procedure Component Value Units Date/Time    Hemoglobin A1c [[id number]]  (Normal) Collected:  03/16/18 0213    Specimen:  Blood Updated:  03/16/18 1044     Hemoglobin A1C 5.58 %     Narrative:       Hemoglobin A1C Ranges:    Increased Risk for Diabetes  5.7% to 6.4%  Diabetes                     >= 6.5%  Diabetic Goal                < 7.0%    Protime-INR [[id number]]  (Normal) Collected:  03/16/18 0213    Specimen:  Blood Updated:  03/16/18 0256     Protime 14.0 Seconds      INR 1.10    aPTT [[id number]]  (Normal) Collected:  03/16/18 0213    Specimen:  Blood Updated:  03/16/18 0256     PTT 28.2 seconds     CBC & Differential [[id number]] Collected:  03/16/18 0213    Specimen:  Blood Updated:  03/16/18 0233    Narrative:       The following orders were created for panel order CBC & Differential.  Procedure                               Abnormality         Status                     ---------                               -----------         ------                     CBC Auto Differential[[id number]]        Abnormal            Final result                 Please view results for these tests on the individual orders.    CBC Auto Differential [[id number]]  (Abnormal) Collected:  03/16/18 0213    Specimen:  Blood Updated:  03/16/18 0233     WBC 10.77 (H) 10*3/mm3      RBC 4.26 (L) 10*6/mm3      Hemoglobin 13.6 (L) g/dL      Hematocrit 41.8 %      MCV 98.1 (H) fL      MCH 31.9 pg      MCHC 32.5 (L) g/dL      RDW 13.5 %      RDW-SD 47.9 fl      MPV 10.3 fL      Platelets 233 10*3/mm3      Neutrophil % 76.1 (H) %      Lymphocyte % 14.1 (L) %      Monocyte % 8.6 %      Eosinophil % 0.0 (L) %      Basophil % 0.1 %      Immature Grans % 1.1 (H) %      Neutrophils, Absolute 8.19 (H) 10*3/mm3      Lymphocytes, Absolute 1.52 10*3/mm3       Monocytes, Absolute 0.93 10*3/mm3      Eosinophils, Absolute 0.00 10*3/mm3      Basophils, Absolute 0.01 10*3/mm3      Immature Grans, Absolute 0.12 (H) 10*3/mm3     Comprehensive Metabolic Panel [[id number]]  (Abnormal) Collected:  03/16/18 0156    Specimen:  Blood from Arm, Left Updated:  03/16/18 0230     Glucose 168 (H) mg/dL      BUN 20 mg/dL      Creatinine 1.03 mg/dL      Sodium 141 mmol/L      Potassium 4.3 mmol/L      Chloride 103 mmol/L      CO2 23.9 mmol/L      Calcium 9.1 mg/dL      Total Protein 7.2 g/dL      Albumin 3.70 g/dL      ALT (SGPT) 22 U/L      AST (SGOT) 26 U/L      Comment: Specimen hemolyzed.  Results may be affected.        Alkaline Phosphatase 89 U/L      Total Bilirubin 0.2 mg/dL      eGFR Non African Amer 70 mL/min/1.73      Globulin 3.5 gm/dL      A/G Ratio 1.1 g/dL      BUN/Creatinine Ratio 19.4     Anion Gap 14.1 mmol/L     Narrative:       The MDRD GFR formula is only valid for adults with stable renal function between ages 18 and 70.          Imaging: AP and lateral views of the right shoulder are reviewed on the [employer] system along with the associated report.  He has what appears to be a comminuted fracture dislocation of the right proximal humerus.  This appears to be a valgus impacted 4 part fracture although the characterization is limited by the suboptimal positioning and limited views.    Assessment: Right shoulder fracture dislocation    Plan:  I had a long discussion with the patient and his wife.  I recommended an attempted closed reduction.  Although it is possible that we may not be to get the shoulder to reduce, this would hopefully minimize his risk of any axillary nerve injury and improve his comfort thereby enabling us to potentially delay any surgical intervention until next week.  I think it is likely that he will require an arthroplasty for this injury.  I discussed this with him and his wife in detail.    If we cannot get the shoulder to reduce, I would plan for  an arthroplasty tomorrow.  If we can get things reduced then I would hope to delay any intervention until next week.  The risks, benefits, and alternatives to a closed reduction were discussed with him and his wife in detail.  Specifically, we discussed the risk of increased displacement of the fracture fragments and potential need for an arthroplasty as well as iatrogenic injury to nearby nerves and/or blood vessels with the manipulation.  They acknowledged understanding of this information and consented to proceed.  We will plan to do that later today.    Date: 3/16/2018    Kj Garcia MD    CC: Arash Stoddard MD

## 2018-03-16 NOTE — H&P
Name: Rodolfo Grover ADMIT: 3/16/2018   : 1945  PCP: Domenica Merida MD    MRN: [id number] LOS: 0 days   AGE/SEX: 73 y.o. male  ROOM: P886/1     Chief Complaint   Patient presents with   • Fall   • Shoulder Injury       Subjective   Mr. Grover is a 73 y.o. fairly healthy male that presents to [facility] complaining of right shoulder pain following a fall yesterday evening.  He states he was walking into his garage in the dark and feeling for the light switch when he lost his balance and fell to the floor, hitting his right shoulder.  He was found to have a displaced right humeral head fracture in the ER.  He denies any sort of numbness, weakness, or tingling in the right hand.  There was no loss of consciousness or head injury.  He denies recent falls or fractures.  Overall the patient is quite healthy for his age.  He states he has been walking about 2 miles each day for several months without issues.  He denies any sort of chest pain or dyspnea with exertion.      History of Present Illness    Past Medical History:   Diagnosis Date   • GERD (gastroesophageal reflux disease)      Past Surgical History:   Procedure Laterality Date   • HERNIA REPAIR       History reviewed. No pertinent family history.  Social History   Substance Use Topics   • Smoking status: Never Smoker   • Smokeless tobacco: Never Used   • Alcohol use No     Prescriptions Prior to Admission   Medication Sig Dispense Refill Last Dose   • esomeprazole (nexIUM) 20 MG capsule Take 20 mg by mouth Every Morning Before Breakfast.   3/15/2018 at 0900     Allergies:  No Known Allergies    Review of Systems   Constitutional: Negative for appetite change, chills, fatigue and fever.   HENT: Negative for congestion, nosebleeds and trouble swallowing.    Eyes: Negative for redness and visual disturbance.   Respiratory: Negative for cough, chest tightness and shortness of breath.    Cardiovascular: Negative for chest pain, palpitations and leg  swelling.   Gastrointestinal: Negative for abdominal pain, blood in stool, constipation, diarrhea, nausea and vomiting.   Endocrine: Negative for cold intolerance and heat intolerance.   Genitourinary: Negative for difficulty urinating, dysuria and hematuria.   Musculoskeletal: Negative for arthralgias, gait problem, myalgias, neck pain and neck stiffness.   Skin: Negative for pallor and rash.   Neurological: Negative for dizziness, syncope, weakness, light-headedness, numbness and headaches.   Hematological: Negative for adenopathy. Does not bruise/bleed easily.   Psychiatric/Behavioral: Negative for confusion and decreased concentration.        Objective    Vital Signs  Temp:  [96.4 °F (35.8 °C)-97.8 °F (36.6 °C)] 97.4 °F (36.3 °C)  Heart Rate:  [61-81] 81  Resp:  [16-19] 16  BP: (137-196)/() 137/72  SpO2:  [91 %-98 %] 95 %  on   ;   Device (Oxygen Therapy): room air  Body mass index is 27.44 kg/m².    Physical Exam   Constitutional: He is oriented to person, place, and time. No distress.   HENT:   Head: Normocephalic and atraumatic.   Mouth/Throat: Mucous membranes are dry.   Eyes: Conjunctivae and EOM are normal. Pupils are equal, round, and reactive to light.   Neck: Normal range of motion. Neck supple. No JVD present.   Cardiovascular: Normal rate, regular rhythm and intact distal pulses.    No murmur heard.  Pulmonary/Chest: Effort normal and breath sounds normal. He has no wheezes. He has no rales.   Abdominal: Soft. Bowel sounds are normal. There is no tenderness.   Musculoskeletal: He exhibits no edema.   RUE internally rotated, neurovascularly intact   Neurological: He is alert and oriented to person, place, and time.   Skin: Skin is warm and dry. He is not diaphoretic.   Psychiatric: He has a normal mood and affect. His behavior is normal.   Nursing note and vitals reviewed.      Results Review:   I reviewed the patient's new clinical results.    Lab Results (last 24 hours)     Procedure Component  Value Units Date/Time    Comprehensive Metabolic Panel [[id number]]  (Abnormal) Collected:  03/16/18 0156    Specimen:  Blood from Arm, Left Updated:  03/16/18 0230     Glucose 168 (H) mg/dL      BUN 20 mg/dL      Creatinine 1.03 mg/dL      Sodium 141 mmol/L      Potassium 4.3 mmol/L      Chloride 103 mmol/L      CO2 23.9 mmol/L      Calcium 9.1 mg/dL      Total Protein 7.2 g/dL      Albumin 3.70 g/dL      ALT (SGPT) 22 U/L      AST (SGOT) 26 U/L      Comment: Specimen hemolyzed.  Results may be affected.        Alkaline Phosphatase 89 U/L      Total Bilirubin 0.2 mg/dL      eGFR Non African Amer 70 mL/min/1.73      Globulin 3.5 gm/dL      A/G Ratio 1.1 g/dL      BUN/Creatinine Ratio 19.4     Anion Gap 14.1 mmol/L     Narrative:       The MDRD GFR formula is only valid for adults with stable renal function between ages 18 and 70.    CBC & Differential [[id number]] Collected:  03/16/18 0213    Specimen:  Blood Updated:  03/16/18 0233    Narrative:       The following orders were created for panel order CBC & Differential.  Procedure                               Abnormality         Status                     ---------                               -----------         ------                     CBC Auto Differential[[id number]]        Abnormal            Final result                 Please view results for these tests on the individual orders.    Protime-INR [[id number]]  (Normal) Collected:  03/16/18 0213    Specimen:  Blood Updated:  03/16/18 0256     Protime 14.0 Seconds      INR 1.10    aPTT [[id number]]  (Normal) Collected:  03/16/18 0213    Specimen:  Blood Updated:  03/16/18 0256     PTT 28.2 seconds     CBC Auto Differential [[id number]]  (Abnormal) Collected:  03/16/18 0213    Specimen:  Blood Updated:  03/16/18 0233     WBC 10.77 (H) 10*3/mm3      RBC 4.26 (L) 10*6/mm3      Hemoglobin 13.6 (L) g/dL      Hematocrit 41.8 %      MCV 98.1 (H) fL      MCH 31.9 pg      MCHC 32.5 (L) g/dL      RDW 13.5 %      RDW-SD 47.9  fl      MPV 10.3 fL      Platelets 233 10*3/mm3      Neutrophil % 76.1 (H) %      Lymphocyte % 14.1 (L) %      Monocyte % 8.6 %      Eosinophil % 0.0 (L) %      Basophil % 0.1 %      Immature Grans % 1.1 (H) %      Neutrophils, Absolute 8.19 (H) 10*3/mm3      Lymphocytes, Absolute 1.52 10*3/mm3      Monocytes, Absolute 0.93 10*3/mm3      Eosinophils, Absolute 0.00 10*3/mm3      Basophils, Absolute 0.01 10*3/mm3      Immature Grans, Absolute 0.12 (H) 10*3/mm3           XR Shoulder 2+ View Right   Final Result   1. Fracture dislocation as above.       This report was finalized on 3/16/2018 12:56 AM by Arash Granados MD.            Assessment/Plan      Active Hospital Problems (** Indicates Principal Problem)    Diagnosis Date Noted   • Closed fracture dislocation of right shoulder [S42.91XA] 03/16/2018   • Gastroesophageal reflux disease without esophagitis [K21.9] 03/16/2018   • Leukocytosis [D72.829] 03/16/2018   • Hyperglycemia [R73.9] 03/16/2018   • Fall at home [W19.XXXA, Y92.099] 03/16/2018   • DNR (do not resuscitate) [Z66] 03/16/2018      Resolved Hospital Problems    Diagnosis Date Noted Date Resolved   No resolved problems to display.   Mechanical fall with Right shoulder fracture  - no syncope or head injury  - no evidence of neurovascular compromise of the RUE  - keep RUE immobile, provide pain control, IS  - NPO, consult orthopedic surgery    Cardiac Risk  - Able to achieve at least 4 mets  - no physical evidence of heart failure or valvular dysfunction  - RCRI is 0, corresponding to 0.4% risk of major cardiac event, patient would be a very reasonable surgical candidate from cardiac standpoint    Leukocytosis  - likely reactive, no fever, no pulmonary symptoms, no urinary symptoms  - will follow blood counts in AM    Hyperglycemia  - again likely reactive  - check A1C    GERD  - on Nexium at home  - will give IV famotidine while NPO    DVT Prophylaxis  - SCDs    Code Status  I discussed with the patient  and he is DNR with exception of intubation for surgery.  Healthcare surrogate is his daughter.    I discussed the patients findings and my recommendations with patient and nursing staff.      Arash Stoddard MD  [facility]  03/16/18  10:27 AM

## 2018-03-16 NOTE — NURSING NOTE
Patient having episodes of intermittent bigeminy.  Anesthesiologist Dr. Cesar notified.  Cardiac consult ordered and Dr. Jimena Singer paged.  Labs ordered per Dr. Singer drawn and sent to lab.  Awaiting Dr. Singer to see patient pre-op.

## 2018-03-16 NOTE — ED NOTES
"Pt declines sling application. Also declines taking off jeans. Pt has hospital gown on. States \"it doesn't hurt as long as I don't move. It hurts if I move it.\"     Ceci Gonzalez RN  03/16/18 0323    "

## 2018-03-16 NOTE — CONSULTS
[facility]  Consult Note                                                                              3/16/2018  Ronaldo Sierra MD    Patient Identification:  Rodolfo FRANCO Ray:   73 y.o.  male  1945     Date of Admission:3/16/2018    CC:  Consult requested by Dr. Stoddard for evaluation of ventricular bigeminy    History of Present Illness:  Patient is a 73-year-old gentleman with no prior cardiac history who fell and landed on his right shoulder with subsequent proximal humeral fracture with dislocation.  He was subsequently noted to have ventricular bigeminy in the preop holding area.  He has a vague history of arrhythmia in the past.  He is not on any medications for this at this time.  Potassium was 4.3 in the emergency room.  Magnesium is pending.  EKG reveals no acute changes.  He was hypertensive on arrival but since then with better pain control his pressures have improved significantly.    He says usually fairly active and denies any chest pain palpitations syncope near syncope prior to the fall.      Past Medical History:  Past Medical History:   Diagnosis Date   • GERD (gastroesophageal reflux disease)        Past Surgical History:  Past Surgical History:   Procedure Laterality Date   • HERNIA REPAIR         Allergies:  No Known Allergies    Home Meds:  Prescriptions Prior to Admission   Medication Sig Dispense Refill Last Dose   • difluprednate (DUREZOL) 0.05 % ophthalmic emulsion 1 drop 4 (Four) Times a Day.      • esomeprazole (nexIUM) 20 MG capsule Take 20 mg by mouth Every Morning Before Breakfast.   3/15/2018 at 0900   • predniSONE (DELTASONE) 10 MG tablet Take 60 mg by mouth Daily.          Current Meds  Scheduled Meds:  famotidine 20 mg Intravenous Q12H   [MAR Hold] pneumococcal polysaccharide 23-valent 0.5 mL Intramuscular Once     Continuous Infusions:  lactated ringers 9 mL/hr Last Rate: 9 mL/hr (03/16/18 1626)   sodium chloride 50 mL/hr Last Rate: 50 mL/hr (03/16/18 2798)     PRN  Meds:.•  [MAR Hold] acetaminophen  •  fentanyl  •  [MAR Hold] HYDROmorphone  •  lidocaine PF 1%  •  midazolam **OR** midazolam  •  [MAR Hold] ondansetron **OR** [MAR Hold] ondansetron ODT **OR** [MAR Hold] ondansetron  •  [MAR Hold] oxyCODONE-acetaminophen  •  sodium chloride  •  Insert peripheral IV **AND** [MAR Hold] sodium chloride    Social History:   Social History     Social History   • Marital status:      Spouse name: N/A   • Number of children: N/A   • Years of education: N/A     Occupational History   • Not on file.     Social History Main Topics   • Smoking status: Never Smoker   • Smokeless tobacco: Never Used   • Alcohol use No   • Drug use: Unknown   • Sexual activity: Defer     Other Topics Concern   • Not on file     Social History Narrative   • No narrative on file       Family History:  History reviewed. No pertinent family history.    REVIEW OF SYSTEMS:   CONSTITUTIONAL: No weight loss, fever, chills, weakness or fatigue.   HEENT: Eyes: No visual loss, blurred vision, double vision or yellow sclerae. Ears, Nose, Throat: No hearing loss, sneezing, congestion, runny nose or sore throat.   SKIN: No rash or itching.     RESPIRATORY: No shortness of breath, hemoptysis, cough or sputum.   GASTROINTESTINAL: No anorexia, nausea, vomiting or diarrhea. No abdominal pain, bright red blood per rectum or melena.  GENITOURINARY: No burning on urination, hematuria or increased frequency.  NEUROLOGICAL: No headache, dizziness, syncope, paralysis, ataxia, numbness or tingling in the extremities. No change in bowel or bladder control.   MUSCULOSKELETAL: No muscle, back pain, joint pain or stiffness.   HEMATOLOGIC: No anemia, bleeding or bruising.   LYMPHATICS: No enlarged nodes. No history of splenectomy.   PSYCHIATRIC: No history of depression, anxiety, hallucinations.   ENDOCRINOLOGIC: No reports of sweating, cold or heat intolerance. No polyuria or polydipsia.     Physical Exam    /60 (BP Location:  "Left arm, Patient Position: Lying)   Pulse 68   Temp 97.8 °F (36.6 °C) (Oral)   Resp 20   Ht 167.6 cm (66\")   Wt 77.1 kg (170 lb)   SpO2 94%   BMI 27.44 kg/m²     General Appearance Well developed, cooperative and well nourished and no acute distress   Head Normocephalic, without abnormality, atraumatic   Ears Ears appear intact with no abnormalities noted   Throat No oral lesions, no thrush, oral mucosa moist   Neck No adenopathy, supple, trachea midline, no thyromegaly, no carotid bruit, no JVD   Back No skin lesions, erythema or scars, no tenderness to percussion or palptaion,range of motion is normal   Lungs Clear to auscultation,respirations regular, even and unlabored   Heart Regular rhythm and normal rate, normal S1 and S2, no murmur, no gallop, no rub, no click   Chest wall No abnormalities observed   Abdomen Normal bowel sounds, no masses, no hepatomegaly,    Extremities Right upper extremity in a sling, no edema, no cyanosis, no redness   Pulses Pulses palpable and equal bilaterally. Normal radial, carotid, femoral, dorsalis pedis and posterior tibial pulses bilaterally. Normal abdominal aorta   Skin No bleeding, bruising or rash   Psyhiatric Alert and oriented x 3, normal mood and affect      Results from last 7 days  Lab Units 03/16/18  0156   SODIUM mmol/L 141   POTASSIUM mmol/L 4.3   CHLORIDE mmol/L 103   CO2 mmol/L 23.9   BUN mg/dL 20   CREATININE mg/dL 1.03   CALCIUM mg/dL 9.1   BILIRUBIN mg/dL 0.2   ALK PHOS U/L 89   ALT (SGPT) U/L 22   AST (SGOT) U/L 26   GLUCOSE mg/dL 168*         )  Results from last 7 days  Lab Units 03/16/18  0213   WBC 10*3/mm3 10.77*   HEMOGLOBIN g/dL 13.6*   HEMATOCRIT % 41.8   PLATELETS 10*3/mm3 233       Results from last 7 days  Lab Units 03/16/18  0213   INR  1.10   APTT seconds 28.2           I personally viewed and interpreted the patient's EKG/Telemetry data    Assessment and Plan    1.  Ventricular bigeminy.  Clinically stable with no intensive angina or heart " failure.  Would check an echocardiogram.  He is cleared to proceed with nonelective surgery.  Eventually would favor Lexiscan Cardiolite stress test as an outpatient but he will need to have his shoulder better healed prior to that time.  2.  Mechanical fall with fracture to the right shoulder, status post repair        Mini Singer  3/16/2018  6:01 PM    60min spent in reviewing records, discussion and examination of the patient and discussion with other members of the patient's medical team.     Dictated utilizing Dragon dictation

## 2018-03-16 NOTE — ED TRIAGE NOTES
"PT is alert and oriented in apparent discomfort. PT reports that he was in the garage and wife accidentally opened the door and knocked him down the steps injuring R shoulder. PT reports that he feels \"like its dislocated\". Distal CMS intact with deformity noted on posterior shoulder. PT vitals are stable at this time. Triage complete. Will continue to monitor.  "

## 2018-03-16 NOTE — NURSING NOTE
Dr. Singer seeing patient at bedside. No new orders given.  States patient is OK to go to procedure tonight and will need cardiac monitored floor post-op.

## 2018-03-16 NOTE — PLAN OF CARE
Problem: Patient Care Overview  Goal: Plan of Care Review  Outcome: Ongoing (interventions implemented as appropriate)   03/16/18 5226   Coping/Psychosocial   Plan of Care Reviewed With patient   OTHER   Outcome Summary PAIN WELL CONTROLLED WITH PO AND IV PAIN MEDS. HOME MEDS PUT IN AFTER FAMILY KRISTIAN THEM IN. PT AWAITING OR, IN NEED OF CARDIAC CLEARANCE.     Goal: Individualization and Mutuality  Outcome: Ongoing (interventions implemented as appropriate)    Goal: Discharge Needs Assessment  Outcome: Ongoing (interventions implemented as appropriate)    Goal: Interprofessional Rounds/Family Conf  Outcome: Ongoing (interventions implemented as appropriate)      Problem: Fall Risk (Adult)  Goal: Absence of Fall  Outcome: Ongoing (interventions implemented as appropriate)      Problem: Fracture Orthopaedic (Adult)  Goal: Signs and Symptoms of Listed Potential Problems Will be Absent, Minimized or Managed (Fracture Orthopaedic)  Outcome: Ongoing (interventions implemented as appropriate)

## 2018-03-16 NOTE — ANESTHESIA PREPROCEDURE EVALUATION
Anesthesia Evaluation     Patient summary reviewed and Nursing notes reviewed   NPO Solid Status: > 8 hours  NPO Liquid Status: > 8 hours           Airway   Mallampati: II  TM distance: >3 FB  Neck ROM: full  Dental - normal exam     Pulmonary - negative pulmonary ROS and normal exam   Cardiovascular - negative cardio ROS and normal exam    ECG reviewed        Neuro/Psych- negative ROS  GI/Hepatic/Renal/Endo    (+)  GERD well controlled, GI bleeding,     Musculoskeletal     Abdominal    Substance History      OB/GYN          Other   (+) arthritis                     Anesthesia Plan    ASA 2     general     Anesthetic plan and risks discussed with patient.

## 2018-03-16 NOTE — PLAN OF CARE
Problem: Patient Care Overview  Goal: Plan of Care Review  Outcome: Ongoing (interventions implemented as appropriate)   03/16/18 0531   Coping/Psychosocial   Plan of Care Reviewed With patient;friend   Plan of Care Review   Progress no change   OTHER   Outcome Summary client admit to unit from ED at 0445, friend accompanied. pain alleviated with prn dilaudid, intermittent nausea alleviated with prn zofran. client refuses to wear sling to R arm. urinal at bedside. orientation to unit, room, and plan of care discussed with client. discussed s/s GERD with client as it r/t nausea and HOB elevation r/t hx GERD      Goal: Individualization and Mutuality  Outcome: Ongoing (interventions implemented as appropriate)    Goal: Discharge Needs Assessment  Outcome: Ongoing (interventions implemented as appropriate)      Problem: Fall Risk (Adult)  Goal: Identify Related Risk Factors and Signs and Symptoms  Outcome: Outcome(s) achieved Date Met: 03/16/18    Goal: Absence of Fall  Outcome: Ongoing (interventions implemented as appropriate)      Problem: Fracture Orthopaedic (Adult)  Goal: Signs and Symptoms of Listed Potential Problems Will be Absent, Minimized or Managed (Fracture Orthopaedic)  Outcome: Ongoing (interventions implemented as appropriate)

## 2018-03-17 PROBLEM — I97.89 BRADYCARDIA FOLLOWING SURGERY: Status: ACTIVE | Noted: 2018-03-17

## 2018-03-17 PROBLEM — I49.3 FREQUENT PVCS: Status: ACTIVE | Noted: 2018-03-17

## 2018-03-17 PROBLEM — R00.1 BRADYCARDIA: Status: ACTIVE | Noted: 2018-03-17

## 2018-03-17 LAB
ANION GAP SERPL CALCULATED.3IONS-SCNC: 11.2 MMOL/L
BASOPHILS # BLD AUTO: 0.01 10*3/MM3 (ref 0–0.2)
BASOPHILS NFR BLD AUTO: 0.1 % (ref 0–1.5)
BUN BLD-MCNC: 16 MG/DL (ref 8–23)
BUN/CREAT SERPL: 14.5 (ref 7–25)
CALCIUM SPEC-SCNC: 8 MG/DL (ref 8.6–10.5)
CHLORIDE SERPL-SCNC: 104 MMOL/L (ref 98–107)
CO2 SERPL-SCNC: 25.8 MMOL/L (ref 22–29)
CREAT BLD-MCNC: 1.1 MG/DL (ref 0.76–1.27)
DEPRECATED RDW RBC AUTO: 50.6 FL (ref 37–54)
EOSINOPHIL # BLD AUTO: 0 10*3/MM3 (ref 0–0.7)
EOSINOPHIL NFR BLD AUTO: 0 % (ref 0.3–6.2)
ERYTHROCYTE [DISTWIDTH] IN BLOOD BY AUTOMATED COUNT: 13.8 % (ref 11.5–14.5)
GFR SERPL CREATININE-BSD FRML MDRD: 65 ML/MIN/1.73
GLUCOSE BLD-MCNC: 115 MG/DL (ref 65–99)
GLUCOSE BLDC GLUCOMTR-MCNC: 117 MG/DL (ref 70–130)
HCT VFR BLD AUTO: 39.3 % (ref 40.4–52.2)
HGB BLD-MCNC: 12.3 G/DL (ref 13.7–17.6)
IMM GRANULOCYTES # BLD: 0.1 10*3/MM3 (ref 0–0.03)
IMM GRANULOCYTES NFR BLD: 0.9 % (ref 0–0.5)
LYMPHOCYTES # BLD AUTO: 1.15 10*3/MM3 (ref 0.9–4.8)
LYMPHOCYTES NFR BLD AUTO: 10.5 % (ref 19.6–45.3)
MCH RBC QN AUTO: 31.4 PG (ref 27–32.7)
MCHC RBC AUTO-ENTMCNC: 31.3 G/DL (ref 32.6–36.4)
MCV RBC AUTO: 100.3 FL (ref 79.8–96.2)
MONOCYTES # BLD AUTO: 1.06 10*3/MM3 (ref 0.2–1.2)
MONOCYTES NFR BLD AUTO: 9.6 % (ref 5–12)
NEUTROPHILS # BLD AUTO: 8.67 10*3/MM3 (ref 1.9–8.1)
NEUTROPHILS NFR BLD AUTO: 78.9 % (ref 42.7–76)
PLATELET # BLD AUTO: 192 10*3/MM3 (ref 140–500)
PMV BLD AUTO: 10.1 FL (ref 6–12)
POTASSIUM BLD-SCNC: 4.6 MMOL/L (ref 3.5–5.2)
RBC # BLD AUTO: 3.92 10*6/MM3 (ref 4.6–6)
SODIUM BLD-SCNC: 141 MMOL/L (ref 136–145)
WBC NRBC COR # BLD: 10.99 10*3/MM3 (ref 4.5–10.7)

## 2018-03-17 PROCEDURE — A9270 NON-COVERED ITEM OR SERVICE: HCPCS | Performed by: ORTHOPAEDIC SURGERY

## 2018-03-17 PROCEDURE — 63710000001 OXYCODONE-ACETAMINOPHEN 7.5-325 MG TABLET: Performed by: ORTHOPAEDIC SURGERY

## 2018-03-17 PROCEDURE — 85025 COMPLETE CBC W/AUTO DIFF WBC: CPT | Performed by: INTERNAL MEDICINE

## 2018-03-17 PROCEDURE — G0378 HOSPITAL OBSERVATION PER HR: HCPCS

## 2018-03-17 PROCEDURE — 80048 BASIC METABOLIC PNL TOTAL CA: CPT | Performed by: INTERNAL MEDICINE

## 2018-03-17 PROCEDURE — 82962 GLUCOSE BLOOD TEST: CPT

## 2018-03-17 PROCEDURE — 63710000001 PREDNISONE PER 5 MG: Performed by: ORTHOPAEDIC SURGERY

## 2018-03-17 PROCEDURE — 99213 OFFICE O/P EST LOW 20 MIN: CPT | Performed by: INTERNAL MEDICINE

## 2018-03-17 PROCEDURE — 63710000001 PANTOPRAZOLE 40 MG TABLET DELAYED-RELEASE: Performed by: ORTHOPAEDIC SURGERY

## 2018-03-17 PROCEDURE — 25010000002 HYDROMORPHONE PER 4 MG: Performed by: HOSPITALIST

## 2018-03-17 PROCEDURE — 63710000001 DOCUSATE SODIUM 100 MG CAPSULE: Performed by: ORTHOPAEDIC SURGERY

## 2018-03-17 PROCEDURE — 25010000002 MAGNESIUM SULFATE IN D5W 1G/100ML (PREMIX) 1-5 GM/100ML-% SOLUTION: Performed by: INTERNAL MEDICINE

## 2018-03-17 RX ORDER — OXYCODONE AND ACETAMINOPHEN 7.5; 325 MG/1; MG/1
1-2 TABLET ORAL EVERY 6 HOURS PRN
Qty: 56 TABLET | Refills: 0 | Status: SHIPPED | OUTPATIENT
Start: 2018-03-17 | End: 2018-03-23 | Stop reason: HOSPADM

## 2018-03-17 RX ORDER — MAGNESIUM SULFATE 1 G/100ML
1 INJECTION INTRAVENOUS ONCE
Status: COMPLETED | OUTPATIENT
Start: 2018-03-17 | End: 2018-03-17

## 2018-03-17 RX ADMIN — FAMOTIDINE 20 MG: 10 INJECTION, SOLUTION INTRAVENOUS at 08:51

## 2018-03-17 RX ADMIN — DOCUSATE SODIUM 100 MG: 100 CAPSULE, LIQUID FILLED ORAL at 21:42

## 2018-03-17 RX ADMIN — CYCLOPENTOLATE HYDROCHLORIDE 1 DROP: 10 SOLUTION/ DROPS OPHTHALMIC at 08:51

## 2018-03-17 RX ADMIN — PANTOPRAZOLE SODIUM 40 MG: 40 TABLET, DELAYED RELEASE ORAL at 08:51

## 2018-03-17 RX ADMIN — OXYCODONE HYDROCHLORIDE AND ACETAMINOPHEN 2 TABLET: 7.5; 325 TABLET ORAL at 10:43

## 2018-03-17 RX ADMIN — OXYCODONE HYDROCHLORIDE AND ACETAMINOPHEN 2 TABLET: 7.5; 325 TABLET ORAL at 23:00

## 2018-03-17 RX ADMIN — MAGNESIUM SULFATE IN DEXTROSE 1 G: 10 INJECTION, SOLUTION INTRAVENOUS at 18:50

## 2018-03-17 RX ADMIN — PREDNISONE 60 MG: 50 TABLET ORAL at 08:51

## 2018-03-17 RX ADMIN — HYDROMORPHONE HYDROCHLORIDE 0.5 MG: 1 INJECTION, SOLUTION INTRAMUSCULAR; INTRAVENOUS; SUBCUTANEOUS at 00:53

## 2018-03-17 NOTE — PROGRESS NOTES
[facility]  Progress note: 3/17/2018    Patient Identification:  Name:Rodolfo Grover  Age:73 y.o.  Sex: male  :  1945  MRN: [id number]           CC:  Follow-up of PVCs.    Interval history:  Intermittent PVCs during the night.  He denies any chest pain.  Underwent deflation of right central shoulder with plans for probable shoulder replacement in the next several weeks    Vital Signs:   Temp:  [97.4 °F (36.3 °C)-98.7 °F (37.1 °C)] 98.5 °F (36.9 °C)  Heart Rate:  [48-79] 79  Resp:  [16-20] 16  BP: (110-163)/(60-94) 163/83    Intake/Output Summary (Last 24 hours) at 18 1707  Last data filed at 18 1100   Gross per 24 hour   Intake             1480 ml   Output              300 ml   Net             1180 ml       Physical Examination:    General Appearance No acute distress   Neck No adenopathy, supple, trachea midline, no thyromegaly, no carotid bruit, no JVD   Lungs Clear to auscultation,respirations regular, even and unlabored   Heart Regular rhythm and normal rate, normal S1 and S2, no murmur, no gallop, no rub, no click   Chest wall No abnormalities observed   Abdomen Normal bowel sounds, no masses, no hepatomegaly, soft   Extremities Moves all extremities well, no edema, no cyanosis, no redness   Neurological Alert and oriented x 3     Lab Review:  Personally reviewed the labs, radiology imaging and other cardiac procedures.   Results from last 7 days  Lab Units 18  0718  18  0156   SODIUM mmol/L 141  < > 141   POTASSIUM mmol/L 4.6  < > 4.3   CHLORIDE mmol/L 104  < > 103   CO2 mmol/L 25.8  < > 23.9   BUN mg/dL 16  < > 20   CREATININE mg/dL 1.10  < > 1.03   CALCIUM mg/dL 8.0*  < > 9.1   BILIRUBIN mg/dL  --   --  0.2   ALK PHOS U/L  --   --  89   ALT (SGPT) U/L  --   --  22   AST (SGOT) U/L  --   --  26   GLUCOSE mg/dL 115*  < > 168*   < > = values in this interval not displayed.      )  Results from last 7 days  Lab Units 18  0718 18  0213   WBC 10*3/mm3 10.99*  10.77*   HEMOGLOBIN g/dL 12.3* 13.6*   HEMATOCRIT % 39.3* 41.8   PLATELETS 10*3/mm3 192 233       Results from last 7 days  Lab Units 03/16/18  0213   INR  1.10   APTT seconds 28.2       Medication Review:   Meds reviewed  Scheduled Meds:  cyclopentolate 1 drop Both Eyes BID   magnesium sulfate 1 g Intravenous Once   pantoprazole 40 mg Oral QAM   pneumococcal polysaccharide 23-valent 0.5 mL Intramuscular Once   predniSONE 60 mg Oral Daily     Continuous Infusions:  lactated ringers 9 mL/hr Last Rate: 9 mL/hr (03/16/18 1626)   sodium chloride 50 mL/hr Last Rate: 50 mL/hr (03/16/18 0516)   sodium chloride 100 mL/hr Last Rate: 100 mL/hr (03/16/18 7649)       I personally viewed and interpreted the patient's EKG/Telemetry data    Assessment and Plan  1.  Frequent PVCs and echocardiogram pending.  Discussed possible stress testing with patient and may be able to do a Lexiscan perfusion study with right arm by his side.  We'll talk with radiology tomorrow and tentatively hold nothing by mouth after midnight  2.  Mechanical fall with humeral fracture  3.  Borderline hypokalemia and magnesium borderline low.  Magnesium to be given.  4.  Probable obstructive sleep apnea.  Consider outpatient home sleep study  5.  Hypertension.  If blood pressure remains elevated consider low-dose amlodipine at 2.5 mg daily      Mini Singer  3/17/71681:07 PM  25min spent in reviewing records, discussion and examination of the patient and discussion with other members of the patient's medical team.     Dictated utilizing Dragon dictation

## 2018-03-17 NOTE — OP NOTE
Orthopaedic Operative Note    Facility: [facility]    Patient: Rodolfo Grover    Medical Record Number: [id number]    YOB: 1945    Dictating Surgeon: jK Garcia M.D.*    Primary Care Physician: Domenica Merida MD    Date of Operation: 3/16/2018    Pre-Operative Diagnosis:  Right proximal humerus fracture dislocation    Post-Operative Diagnosis:  Right proximal humerus fracture dislocation     Procedure Performed:  Closed reduction under anesthesia of right proximal humerus fracture dislocation    Surgeon: Kj Garcia MD     Assistant: None    Anesthesia: Monitored anesthesia care    Complications: None.     Estimated Blood Loss: Less than 50 mL.     Implants: None    Specimens:   Order Name Source Comment Collection Info Order Time   MAGNESIUM Arm, Left  Collected By: Lynn Melendez RN 3/16/2018  5:15 PM   BASIC METABOLIC PANEL   Collected By: Lynn Melendez RN 3/16/2018  5:16 PM     Brief Operative Indication:  The patient sustained a fracture dislocation of the right proximal humerus in a fall.  The risks, benefits, and alternatives to a closed reduction under anesthesia were discussed with him in detail.  He acknowledged understanding of the information and consented to proceed.    Description of the procedure in detail:  The patient and operative site were identified in the preoperative holding area.  The surgical site was marked.  Following this, the patient was taken to the operating room.  Adequate monitored anesthesia care was administered.  A closed reduction of the right shoulder was performed without difficulty.  Postreduction x-rays show the head reduced back to the glenoid.  He did have a four-part valgus impacted fracture with the head rotated over 100° and facing superiorly and the comminuted greater tuberosity displaced posteriorly.  The arm was placed in a sling.  The patient was awakened and taken to the recovery room.  He tolerated the procedure well.  There  were no complications.    Kj Garcia MD  03/16/18

## 2018-03-17 NOTE — PROGRESS NOTES
Name: Rodolfo Grover ADMIT: 3/16/2018   : 1945  PCP: Domenica Merida MD    MRN: [id number] LOS: 0 days   AGE/SEX: 73 y.o. male  ROOM: [facility]   Subjective   CC: right shoulder pain  Patient had bradycardia to the 30s and frequent PVCs following surgery.  Transferred to telemetry.  No palpitations, CP, or light-headedness.  Pain is well-controlled.  Taking PO well, no n/v/d.      Objective   Vital Signs  Temp:  [97.4 °F (36.3 °C)-98.7 °F (37.1 °C)] 98.5 °F (36.9 °C)  Heart Rate:  [48-79] 79  Resp:  [16-20] 16  BP: (110-163)/(60-94) 163/83  SpO2:  [94 %-99 %] 96 %  on  Flow (L/min):  [4] 4;   Device (Oxygen Therapy): room air  Body mass index is 27.44 kg/m².    Physical Exam   Constitutional: He is oriented to person, place, and time. No distress.   HENT:   Head: Normocephalic and atraumatic.   Mouth/Throat: Oropharynx is clear and moist.   Eyes: Conjunctivae and EOM are normal. Pupils are equal, round, and reactive to light.   Neck: Normal range of motion. Neck supple.   Cardiovascular: Normal rate, regular rhythm and intact distal pulses.    Pulmonary/Chest: Effort normal and breath sounds normal. He has no rales.   Abdominal: Soft. Bowel sounds are normal.   Musculoskeletal: He exhibits no edema.   RUE in sling, neurovascularly intact   Neurological: He is alert and oriented to person, place, and time.   Skin: Skin is warm and dry. He is not diaphoretic.   Psychiatric: He has a normal mood and affect. His behavior is normal.   Nursing note and vitals reviewed.      Results Review:       I reviewed the patient's new clinical results.    Results from last 7 days  Lab Units 18  0718 18  0213   WBC 10*3/mm3 10.99* 10.77*   HEMOGLOBIN g/dL 12.3* 13.6*   PLATELETS 10*3/mm3 192 233     Results from last 7 days  Lab Units 18  0718 18  1718 18  0156   SODIUM mmol/L 141 141 141   POTASSIUM mmol/L 4.6 4.2 4.3   CHLORIDE mmol/L 104 106 103   CO2 mmol/L 25.8 23.8 23.9   BUN mg/dL 16 16 20    CREATININE mg/dL 1.10 0.90 1.03   GLUCOSE mg/dL 115* 86 168*   Estimated Creatinine Clearance: 56.7 mL/min (by C-G formula based on SCr of 1.1 mg/dL).  Results from last 7 days  Lab Units 03/17/18  0718 03/16/18  1718 03/16/18  0156   CALCIUM mg/dL 8.0* 8.1* 9.1   ALBUMIN g/dL  --   --  3.70   MAGNESIUM mg/dL  --  1.8  --          cyclopentolate 1 drop Both Eyes BID   famotidine 20 mg Intravenous Q12H   pantoprazole 40 mg Oral QAM   pneumococcal polysaccharide 23-valent 0.5 mL Intramuscular Once   predniSONE 60 mg Oral Daily       lactated ringers 9 mL/hr Last Rate: 9 mL/hr (03/16/18 1626)   sodium chloride 50 mL/hr Last Rate: 50 mL/hr (03/16/18 0516)   sodium chloride 100 mL/hr Last Rate: 100 mL/hr (03/16/18 2309)   Diet Regular      Assessment/Plan      Active Hospital Problems (** Indicates Principal Problem)    Diagnosis Date Noted   • Closed fracture dislocation of right shoulder [S42.91XA] 03/16/2018   • Gastroesophageal reflux disease without esophagitis [K21.9] 03/16/2018   • Leukocytosis [D72.829] 03/16/2018   • Hyperglycemia [R73.9] 03/16/2018   • Fall at home [W19.XXXA, Y92.099] 03/16/2018   • DNR (do not resuscitate) [Z66] 03/16/2018      Resolved Hospital Problems    Diagnosis Date Noted Date Resolved   No resolved problems to display.   Mechanical fall with Right shoulder fracture  - no syncope or head injury  - no evidence of neurovascular compromise of the RUE  - s/p closed reduction under anesthesia 3/16/18  - continue pain control, IS, short burst of steroids  - continue china, VIVEK RUE  - f/u with Dr. Garcia next week     Bradycardia/Frequent PVCs  - asymptomatic  - Mg is borderline low, will give MgSO4 1g  - check Echo, will need stress test eventually  - D/w Dr. Singer, appreciate recs    Leukocytosis  - likely reactive and now on steroids, no fever, no pulmonary symptoms, no urinary symptoms  - stable     Hyperglycemia  - again likely reactive  - A1C 5.58%     GERD  -continue nexium     DVT  Prophylaxis  - SCDs    Disposition  -home later today or tomorrow pending echo and further cardiology recs    Arash Stoddard MD  [facility]  03/17/18  3:34 PM

## 2018-03-17 NOTE — BRIEF OP NOTE
SHOULDER CLOSED REDUCTION  Progress Note    Rodolfo Grover  3/16/2018    Pre-op Diagnosis:   Right proximal humerus fracture dislocation       Post-Op Diagnosis Codes:   Same    Procedure/CPT® Codes:      Procedure(s):  RIGHT SHOULDER CLOSED REDUCTION    Surgeon(s):  Kj Garcia MD    Anesthesia: General    Staff:   Circulator: Shannon Spurling, RN  Radiology Technologist: Myrna Akhtar    Estimated Blood Loss: none    Urine Voided: * No values recorded between 3/16/2018  8:08 PM and 3/16/2018  8:24 PM *    Specimens:                None      Drains:      Findings: see dictation    Complications: none      Kj Garcia MD     Date: 3/16/2018  Time: 8:31 PM

## 2018-03-17 NOTE — PLAN OF CARE
Problem: Patient Care Overview  Goal: Plan of Care Review  Outcome: Ongoing (interventions implemented as appropriate)   03/17/18 5184   Coping/Psychosocial   Plan of Care Reviewed With patient;spouse   OTHER   Outcome Summary Medicated for pain X1 with dilaudid, goom CMS checks to right hand, right arm in sling, ice applied to shoulder per physician order, VSS, patient came to floor from ortho due to HR dropping into the 30s, patient has remained consistently in the 60s - 70s, patient in and out of bigeminy, physician aware, will continue to monitor.     Goal: Individualization and Mutuality  Outcome: Ongoing (interventions implemented as appropriate)    Goal: Discharge Needs Assessment  Outcome: Ongoing (interventions implemented as appropriate)      Problem: Fall Risk (Adult)  Goal: Absence of Fall  Outcome: Ongoing (interventions implemented as appropriate)      Problem: Fracture Orthopaedic (Adult)  Goal: Signs and Symptoms of Listed Potential Problems Will be Absent, Minimized or Managed (Fracture Orthopaedic)  Outcome: Ongoing (interventions implemented as appropriate)      Problem: Pain, Acute (Adult)  Goal: Identify Related Risk Factors and Signs and Symptoms  Outcome: Ongoing (interventions implemented as appropriate)    Goal: Acceptable Pain Control/Comfort Level  Outcome: Ongoing (interventions implemented as appropriate)

## 2018-03-17 NOTE — PROGRESS NOTES
Orthopedic Progress Note        Patient: Rodolfo Grover    Date of Admission: 3/16/2018  1:14 AM    YOB: 1945    Medical Record Number: [id number]    Attending Physician: Arash Stoddard MD    POD : 1    Systemic or Specific Complaints: Reports pain adequately controlled.  Tolerating po well.  No complaints or problems.  Patient underwent closed reduction of right shoulder fracture dislocation last night and is currently without complaints regarding pain other than some mild achiness in the shoulder.  He denies any numbness or tingling       No Known Allergies    Current Medications:  Scheduled Meds:  cyclopentolate 1 drop Both Eyes BID   famotidine 20 mg Intravenous Q12H   pantoprazole 40 mg Oral QAM   pneumococcal polysaccharide 23-valent 0.5 mL Intramuscular Once   predniSONE 60 mg Oral Daily     Continuous Infusions:  lactated ringers 9 mL/hr Last Rate: 9 mL/hr (03/16/18 1626)   sodium chloride 50 mL/hr Last Rate: 50 mL/hr (03/16/18 0516)   sodium chloride 100 mL/hr Last Rate: 100 mL/hr (03/16/18 2309)     PRN Meds:.•  acetaminophen  •  acetaminophen  •  bisacodyl  •  docusate sodium  •  HYDROmorphone  •  HYDROmorphone **AND** naloxone  •  nitroglycerin  •  ondansetron **OR** ondansetron ODT **OR** ondansetron  •  ondansetron **OR** ondansetron ODT **OR** ondansetron  •  oxyCODONE-acetaminophen  •  oxyCODONE-acetaminophen  •  Insert peripheral IV **AND** sodium chloride      Physical Exam: 73 y.o. male    General Appearance:   Awake and alert.  NAD.His wife is at bedside   Vitals:    03/16/18 2110 03/16/18 2144 03/17/18 0035 03/17/18 0710   BP: 148/78 163/72 145/75 157/94   BP Location:  Left arm Left arm Left arm   Patient Position:  Lying Lying Lying   Pulse: 61 (!) 48  76   Resp: 20 20 18 16   Temp: 98 °F (36.7 °C) 98.2 °F (36.8 °C) 97.4 °F (36.3 °C) 97.8 °F (36.6 °C)   TempSrc: Oral  Oral Oral   SpO2: 99%   99%   Weight:       Height:              Extremities:  Right upper extremity is in a  sling.  There is only mild swelling about the right shoulder and there is no clinical deformity to the right shoulder.  He was nontender at the wrist.  He was able to flex and extend the wrist and thumb well.  Palpable right radial pulse.  Grossly sensate light touch throughout all fingers of the right hand              Diagnostic Tests:   Lab Results (last 24 hours)     Procedure Component Value Units Date/Time    Basic Metabolic Panel [[id number]]  (Abnormal) Collected:  03/17/18 0718    Specimen:  Blood Updated:  03/17/18 0756     Glucose 115 (H) mg/dL      BUN 16 mg/dL      Creatinine 1.10 mg/dL      Sodium 141 mmol/L      Potassium 4.6 mmol/L      Chloride 104 mmol/L      CO2 25.8 mmol/L      Calcium 8.0 (L) mg/dL      eGFR Non African Amer 65 mL/min/1.73      BUN/Creatinine Ratio 14.5     Anion Gap 11.2 mmol/L     Narrative:       The MDRD GFR formula is only valid for adults with stable renal function between ages 18 and 70.    CBC & Differential [[id number]] Collected:  03/17/18 0718    Specimen:  Blood Updated:  03/17/18 0731    Narrative:       The following orders were created for panel order CBC & Differential.  Procedure                               Abnormality         Status                     ---------                               -----------         ------                     CBC Auto Differential[[id number]]        Abnormal            Final result                 Please view results for these tests on the individual orders.    CBC Auto Differential [[id number]]  (Abnormal) Collected:  03/17/18 0718    Specimen:  Blood Updated:  03/17/18 0731     WBC 10.99 (H) 10*3/mm3      RBC 3.92 (L) 10*6/mm3      Hemoglobin 12.3 (L) g/dL      Hematocrit 39.3 (L) %      .3 (H) fL      MCH 31.4 pg      MCHC 31.3 (L) g/dL      RDW 13.8 %      RDW-SD 50.6 fl      MPV 10.1 fL      Platelets 192 10*3/mm3      Neutrophil % 78.9 (H) %      Lymphocyte % 10.5 (L) %      Monocyte % 9.6 %      Eosinophil % 0.0 (L) %       Basophil % 0.1 %      Immature Grans % 0.9 (H) %      Neutrophils, Absolute 8.67 (H) 10*3/mm3      Lymphocytes, Absolute 1.15 10*3/mm3      Monocytes, Absolute 1.06 10*3/mm3      Eosinophils, Absolute 0.00 10*3/mm3      Basophils, Absolute 0.01 10*3/mm3      Immature Grans, Absolute 0.10 (H) 10*3/mm3     Magnesium [[id number]]  (Normal) Collected:  03/16/18 1718    Specimen:  Blood from Arm, Left Updated:  03/16/18 1804     Magnesium 1.8 mg/dL     Basic Metabolic Panel [[id number]]  (Abnormal) Collected:  03/16/18 1718    Specimen:  Blood Updated:  03/16/18 1804     Glucose 86 mg/dL      BUN 16 mg/dL      Creatinine 0.90 mg/dL      Sodium 141 mmol/L      Potassium 4.2 mmol/L      Chloride 106 mmol/L      CO2 23.8 mmol/L      Calcium 8.1 (L) mg/dL      eGFR Non African Amer 82 mL/min/1.73      BUN/Creatinine Ratio 17.8     Anion Gap 11.2 mmol/L     Narrative:       The MDRD GFR formula is only valid for adults with stable renal function between ages 18 and 70.    Hemoglobin A1c [[id number]]  (Normal) Collected:  03/16/18 0213    Specimen:  Blood Updated:  03/16/18 1044     Hemoglobin A1C 5.58 %     Narrative:       Hemoglobin A1C Ranges:    Increased Risk for Diabetes  5.7% to 6.4%  Diabetes                     >= 6.5%  Diabetic Goal                < 7.0%                  Assessment:Right shoulder fracture dislocation  Plan:    Patient underwent closed reduction last night and I spoke with Dr. Garcia.  Plans will be for him to be discharged when he is medically stable and he will continue with sling and will follow-up with Dr. Garcia this week regarding definitive treatment of his right shoulder.    Date: 3/17/2018  Time: 9:09 AM    Nik Burgess MD

## 2018-03-17 NOTE — PLAN OF CARE
Problem: Patient Care Overview  Goal: Plan of Care Review  Outcome: Ongoing (interventions implemented as appropriate)   03/17/18 1801   Coping/Psychosocial   Plan of Care Reviewed With patient   Plan of Care Review   Progress no change   OTHER   Outcome Summary c/o pain norco 7.5 2 tabs, pt up to bathroom, NPO after midnight, waiting 2D echo     Goal: Individualization and Mutuality  Outcome: Ongoing (interventions implemented as appropriate)    Goal: Discharge Needs Assessment  Outcome: Ongoing (interventions implemented as appropriate)    Goal: Interprofessional Rounds/Family Conf  Outcome: Ongoing (interventions implemented as appropriate)      Problem: Fall Risk (Adult)  Goal: Absence of Fall  Outcome: Ongoing (interventions implemented as appropriate)      Problem: Fracture Orthopaedic (Adult)  Goal: Signs and Symptoms of Listed Potential Problems Will be Absent, Minimized or Managed (Fracture Orthopaedic)  Outcome: Ongoing (interventions implemented as appropriate)      Problem: Pain, Acute (Adult)  Goal: Identify Related Risk Factors and Signs and Symptoms  Outcome: Ongoing (interventions implemented as appropriate)    Goal: Acceptable Pain Control/Comfort Level  Outcome: Ongoing (interventions implemented as appropriate)

## 2018-03-18 ENCOUNTER — APPOINTMENT (OUTPATIENT)
Dept: NUCLEAR MEDICINE | Facility: HOSPITAL | Age: 73
End: 2018-03-18

## 2018-03-18 ENCOUNTER — APPOINTMENT (OUTPATIENT)
Dept: CARDIOLOGY | Facility: HOSPITAL | Age: 73
End: 2018-03-18
Attending: INTERNAL MEDICINE

## 2018-03-18 VITALS
WEIGHT: 170 LBS | DIASTOLIC BLOOD PRESSURE: 73 MMHG | RESPIRATION RATE: 16 BRPM | HEART RATE: 79 BPM | TEMPERATURE: 97.9 F | OXYGEN SATURATION: 95 % | BODY MASS INDEX: 27.32 KG/M2 | SYSTOLIC BLOOD PRESSURE: 145 MMHG | HEIGHT: 66 IN

## 2018-03-18 DIAGNOSIS — I27.20 PULMONARY HYPERTENSION (HCC): ICD-10-CM

## 2018-03-18 DIAGNOSIS — R06.83 SNORING: Primary | ICD-10-CM

## 2018-03-18 DIAGNOSIS — G47.10 HYPERSOMNIA: ICD-10-CM

## 2018-03-18 DIAGNOSIS — R40.0 DAYTIME SOMNOLENCE: ICD-10-CM

## 2018-03-18 LAB
BH CV ECHO MEAS - ACS: 1.9 CM
BH CV ECHO MEAS - AI DEC SLOPE: 182.5 CM/SEC^2
BH CV ECHO MEAS - AI MAX PG: 22.1 MMHG
BH CV ECHO MEAS - AI MAX VEL: 235 CM/SEC
BH CV ECHO MEAS - AI P1/2T: 377.2 MSEC
BH CV ECHO MEAS - AO MAX PG (FULL): 3.6 MMHG
BH CV ECHO MEAS - AO MAX PG: 7.3 MMHG
BH CV ECHO MEAS - AO MEAN PG (FULL): 1 MMHG
BH CV ECHO MEAS - AO MEAN PG: 3 MMHG
BH CV ECHO MEAS - AO ROOT AREA (BSA CORRECTED): 1.8
BH CV ECHO MEAS - AO ROOT AREA: 8.6 CM^2
BH CV ECHO MEAS - AO ROOT DIAM: 3.3 CM
BH CV ECHO MEAS - AO V2 MAX: 135 CM/SEC
BH CV ECHO MEAS - AO V2 MEAN: 84.9 CM/SEC
BH CV ECHO MEAS - AO V2 VTI: 25.9 CM
BH CV ECHO MEAS - AVA(I,A): 2.6 CM^2
BH CV ECHO MEAS - AVA(I,D): 2.6 CM^2
BH CV ECHO MEAS - AVA(V,A): 2.2 CM^2
BH CV ECHO MEAS - AVA(V,D): 2.2 CM^2
BH CV ECHO MEAS - BSA(HAYCOCK): 1.9 M^2
BH CV ECHO MEAS - BSA: 1.9 M^2
BH CV ECHO MEAS - BZI_BMI: 27.4 KILOGRAMS/M^2
BH CV ECHO MEAS - BZI_METRIC_HEIGHT: 167.6 CM
BH CV ECHO MEAS - BZI_METRIC_WEIGHT: 77.1 KG
BH CV ECHO MEAS - CONTRAST EF (2CH): 57.5 ML/M^2
BH CV ECHO MEAS - CONTRAST EF 4CH: 65.8 ML/M^2
BH CV ECHO MEAS - EDV(CUBED): 110.6 ML
BH CV ECHO MEAS - EDV(MOD-SP2): 80 ML
BH CV ECHO MEAS - EDV(MOD-SP4): 114 ML
BH CV ECHO MEAS - EDV(TEICH): 107.5 ML
BH CV ECHO MEAS - EF(CUBED): 64.5 %
BH CV ECHO MEAS - EF(MOD-SP2): 57.5 %
BH CV ECHO MEAS - EF(MOD-SP4): 65.8 %
BH CV ECHO MEAS - EF(TEICH): 55.9 %
BH CV ECHO MEAS - ESV(CUBED): 39.3 ML
BH CV ECHO MEAS - ESV(MOD-SP2): 34 ML
BH CV ECHO MEAS - ESV(MOD-SP4): 39 ML
BH CV ECHO MEAS - ESV(TEICH): 47.4 ML
BH CV ECHO MEAS - FS: 29.2 %
BH CV ECHO MEAS - IVS/LVPW: 1.1
BH CV ECHO MEAS - IVSD: 1.2 CM
BH CV ECHO MEAS - LAT PEAK E' VEL: 7.7 CM/SEC
BH CV ECHO MEAS - LV DIASTOLIC VOL/BSA (35-75): 61.1 ML/M^2
BH CV ECHO MEAS - LV MASS(C)D: 206.4 GRAMS
BH CV ECHO MEAS - LV MASS(C)DI: 110.6 GRAMS/M^2
BH CV ECHO MEAS - LV MAX PG: 3.7 MMHG
BH CV ECHO MEAS - LV MEAN PG: 2 MMHG
BH CV ECHO MEAS - LV SYSTOLIC VOL/BSA (12-30): 20.9 ML/M^2
BH CV ECHO MEAS - LV V1 MAX: 96.1 CM/SEC
BH CV ECHO MEAS - LV V1 MEAN: 58.9 CM/SEC
BH CV ECHO MEAS - LV V1 VTI: 21.1 CM
BH CV ECHO MEAS - LVIDD: 4.8 CM
BH CV ECHO MEAS - LVIDS: 3.4 CM
BH CV ECHO MEAS - LVLD AP2: 7.1 CM
BH CV ECHO MEAS - LVLD AP4: 7.9 CM
BH CV ECHO MEAS - LVLS AP2: 5.8 CM
BH CV ECHO MEAS - LVLS AP4: 6.1 CM
BH CV ECHO MEAS - LVOT AREA (M): 3.1 CM^2
BH CV ECHO MEAS - LVOT AREA: 3.1 CM^2
BH CV ECHO MEAS - LVOT DIAM: 2 CM
BH CV ECHO MEAS - LVPWD: 1.1 CM
BH CV ECHO MEAS - MED PEAK E' VEL: 6 CM/SEC
BH CV ECHO MEAS - MV A DUR: 0.2 SEC
BH CV ECHO MEAS - MV A MAX VEL: 52.4 CM/SEC
BH CV ECHO MEAS - MV DEC SLOPE: 324 CM/SEC^2
BH CV ECHO MEAS - MV DEC TIME: 0.22 SEC
BH CV ECHO MEAS - MV E MAX VEL: 55.8 CM/SEC
BH CV ECHO MEAS - MV E/A: 1.1
BH CV ECHO MEAS - MV MAX PG: 2.1 MMHG
BH CV ECHO MEAS - MV MEAN PG: 1 MMHG
BH CV ECHO MEAS - MV P1/2T MAX VEL: 69.2 CM/SEC
BH CV ECHO MEAS - MV P1/2T: 62.6 MSEC
BH CV ECHO MEAS - MV V2 MAX: 71.7 CM/SEC
BH CV ECHO MEAS - MV V2 MEAN: 42.5 CM/SEC
BH CV ECHO MEAS - MV V2 VTI: 22.7 CM
BH CV ECHO MEAS - MVA P1/2T LCG: 3.2 CM^2
BH CV ECHO MEAS - MVA(P1/2T): 3.5 CM^2
BH CV ECHO MEAS - MVA(VTI): 2.9 CM^2
BH CV ECHO MEAS - PA ACC TIME: 0.07 SEC
BH CV ECHO MEAS - PA MAX PG (FULL): 2.1 MMHG
BH CV ECHO MEAS - PA MAX PG: 3.7 MMHG
BH CV ECHO MEAS - PA PR(ACCEL): 47.5 MMHG
BH CV ECHO MEAS - PA V2 MAX: 96.4 CM/SEC
BH CV ECHO MEAS - PI END-D VEL: 76 CM/SEC
BH CV ECHO MEAS - PULM A REVS DUR: 0.15 SEC
BH CV ECHO MEAS - PULM A REVS VEL: 22.8 CM/SEC
BH CV ECHO MEAS - PULM DIAS VEL: 22.8 CM/SEC
BH CV ECHO MEAS - PULM S/D: 1.3
BH CV ECHO MEAS - PULM SYS VEL: 29.1 CM/SEC
BH CV ECHO MEAS - PVA(V,A): 2.5 CM^2
BH CV ECHO MEAS - PVA(V,D): 2.5 CM^2
BH CV ECHO MEAS - QP/QS: 0.96
BH CV ECHO MEAS - RAP SYSTOLE: 3 MMHG
BH CV ECHO MEAS - RV MAX PG: 1.7 MMHG
BH CV ECHO MEAS - RV MEAN PG: 1 MMHG
BH CV ECHO MEAS - RV V1 MAX: 64.3 CM/SEC
BH CV ECHO MEAS - RV V1 MEAN: 46.2 CM/SEC
BH CV ECHO MEAS - RV V1 VTI: 16.7 CM
BH CV ECHO MEAS - RVOT AREA: 3.8 CM^2
BH CV ECHO MEAS - RVOT DIAM: 2.2 CM
BH CV ECHO MEAS - RVSP: 42.4 MMHG
BH CV ECHO MEAS - SI(AO): 118.7 ML/M^2
BH CV ECHO MEAS - SI(CUBED): 38.2 ML/M^2
BH CV ECHO MEAS - SI(LVOT): 35.5 ML/M^2
BH CV ECHO MEAS - SI(MOD-SP2): 24.6 ML/M^2
BH CV ECHO MEAS - SI(MOD-SP4): 40.2 ML/M^2
BH CV ECHO MEAS - SI(TEICH): 32.2 ML/M^2
BH CV ECHO MEAS - SV(AO): 221.5 ML
BH CV ECHO MEAS - SV(CUBED): 71.3 ML
BH CV ECHO MEAS - SV(LVOT): 66.3 ML
BH CV ECHO MEAS - SV(MOD-SP2): 46 ML
BH CV ECHO MEAS - SV(MOD-SP4): 75 ML
BH CV ECHO MEAS - SV(RVOT): 63.5 ML
BH CV ECHO MEAS - SV(TEICH): 60.1 ML
BH CV ECHO MEAS - TAPSE (>1.6): 1.9 CM2
BH CV ECHO MEAS - TR MAX VEL: 314 CM/SEC
BH CV XLRA - RV BASE: 4.5 CM
BH CV XLRA - RV LENGTH: 7.4 CM
BH CV XLRA - RV MID: 3.2 CM
BH CV XLRA - TDI S': 10.2 CM/SEC
E/E' RATIO: 8.3
LEFT ATRIUM VOLUME INDEX: 31 ML/M2
LV EF 2D ECHO EST: 66 %
MAXIMAL PREDICTED HEART RATE: 145 BPM
STRESS TARGET HR: 123 BPM

## 2018-03-18 PROCEDURE — 78452 HT MUSCLE IMAGE SPECT MULT: CPT

## 2018-03-18 PROCEDURE — 25010000002 REGADENOSON 0.4 MG/5ML SOLUTION: Performed by: INTERNAL MEDICINE

## 2018-03-18 PROCEDURE — 99213 OFFICE O/P EST LOW 20 MIN: CPT | Performed by: INTERNAL MEDICINE

## 2018-03-18 PROCEDURE — 93018 CV STRESS TEST I&R ONLY: CPT | Performed by: INTERNAL MEDICINE

## 2018-03-18 PROCEDURE — 63710000001 PANTOPRAZOLE 40 MG TABLET DELAYED-RELEASE: Performed by: ORTHOPAEDIC SURGERY

## 2018-03-18 PROCEDURE — 93306 TTE W/DOPPLER COMPLETE: CPT | Performed by: INTERNAL MEDICINE

## 2018-03-18 PROCEDURE — A9270 NON-COVERED ITEM OR SERVICE: HCPCS | Performed by: ORTHOPAEDIC SURGERY

## 2018-03-18 PROCEDURE — 93306 TTE W/DOPPLER COMPLETE: CPT

## 2018-03-18 PROCEDURE — G0378 HOSPITAL OBSERVATION PER HR: HCPCS

## 2018-03-18 PROCEDURE — 93017 CV STRESS TEST TRACING ONLY: CPT

## 2018-03-18 PROCEDURE — A9500 TC99M SESTAMIBI: HCPCS | Performed by: INTERNAL MEDICINE

## 2018-03-18 PROCEDURE — 78452 HT MUSCLE IMAGE SPECT MULT: CPT | Performed by: INTERNAL MEDICINE

## 2018-03-18 PROCEDURE — 63710000001: Performed by: ORTHOPAEDIC SURGERY

## 2018-03-18 PROCEDURE — 63710000001 OXYCODONE-ACETAMINOPHEN 7.5-325 MG TABLET: Performed by: ORTHOPAEDIC SURGERY

## 2018-03-18 PROCEDURE — 0 TECHNETIUM SESTAMIBI: Performed by: INTERNAL MEDICINE

## 2018-03-18 PROCEDURE — 25010000002 PERFLUTREN (DEFINITY) 8.476 MG IN SODIUM CHLORIDE 0.9 % 10 ML INJECTION: Performed by: INTERNAL MEDICINE

## 2018-03-18 RX ADMIN — TECHNETIUM TC 99M SESTAMIBI 1 DOSE: 1 INJECTION INTRAVENOUS at 13:29

## 2018-03-18 RX ADMIN — PERFLUTREN 3.5 ML: 6.52 INJECTION, SUSPENSION INTRAVENOUS at 17:21

## 2018-03-18 RX ADMIN — OXYCODONE HYDROCHLORIDE AND ACETAMINOPHEN 2 TABLET: 7.5; 325 TABLET ORAL at 14:37

## 2018-03-18 RX ADMIN — CYCLOPENTOLATE HYDROCHLORIDE 1 DROP: 10 SOLUTION/ DROPS OPHTHALMIC at 09:26

## 2018-03-18 RX ADMIN — REGADENOSON 0.4 MG: 0.08 INJECTION, SOLUTION INTRAVENOUS at 15:00

## 2018-03-18 RX ADMIN — PANTOPRAZOLE SODIUM 40 MG: 40 TABLET, DELAYED RELEASE ORAL at 06:47

## 2018-03-18 RX ADMIN — TECHNETIUM TC 99M SESTAMIBI 1 DOSE: 1 INJECTION INTRAVENOUS at 15:00

## 2018-03-18 NOTE — DISCHARGE SUMMARY
Date of Admission: 3/16/2018  Date of Discharge:  3/18/2018  Primary Care Physician: Domenica Merida MD     Discharge Diagnosis:  Principal Problem:    Closed fracture dislocation of right shoulder  Active Problems:    Gastroesophageal reflux disease without esophagitis    Leukocytosis    Hyperglycemia    Fall at home    DNR (do not resuscitate)    Bradycardia following surgery    Frequent PVCs      DETAILS OF HOSPITAL STAY     Pertinent Test Results and Procedures Performed    Right shoulder xray:  3 views of the right shoulder were obtained. There is a highly  comminuted fracture involving the humeral head and neck. There is mild  impaction. Fracture fragments are only mildly displaced. The humeral  head is dislocated anteriorly and slightly subluxed inferiorly also. The  glenoid process appears to be intact. There are some arthritic changes  in the acromioclavicular joint.  Soft tissue swelling noted with large  joint effusion.    Transthoracic ECHO:  · Mild tricuspid valve regurgitation is present.  · Left ventricular systolic function is normal. Calculated EF = 65.8%. Estimated EF was in agreement with the calculated EF. Estimated EF = 66%. Normal left ventricular cavity size and wall thickness noted. All left ventricular wall segments contract normally. Left ventricular diastolic function is normal.  · Left atrial volume is borderline increased. Saline study was performed and is suboptimal to assess for intracardiac shunting.  · There is mild thickening of the aortic valve. Mild aortic valve regurgitation is present.  · Trace mitral valve regurgitation is present.  · Mild tricuspid valve regurgitation is present. Estimated right ventricular systolic pressure from tricuspid regurgitation is mildly elevated (35-45 mmHg). Calculated right ventricular systolic pressure from tricuspid regurgitation is 42.4 mmHg.    Stress Testing negative    HPI  Mr. Grover is a 73 y.o. fairly healthy male that presents to [facility] complaining of right shoulder pain following a fall yesterday evening.  He states he was walking into his garage in the dark and feeling for the light switch when he lost his balance and fell to the floor, hitting his right shoulder.  He was found to have a displaced right humeral head fracture in the ER.  He denies any sort of numbness, weakness, or tingling in the right hand.  There was no loss of consciousness or head injury.  He denies recent falls or fractures.  Overall the patient is quite healthy for his age.  He states he has been walking about 2 miles each day for several months without issues.  He denies any sort of chest pain or dyspnea with exertion.      Hospital Course  The patient was admitted and evaluated by orthopedic surgery.  He underwent right shoulder reduction under general anesthesia and the arm was placed in a sling.  His pain is controlled and he will follow up with Dr. Garcia of ortho in 1 week.  Post-procedure he developed bradycardia and PVCs.  He was evaluated by cardiology who conducted additional testing with ECHO and cardiolite stress testing.  This workup was negative and they have cleared him for discharge.  The patient is feeling well today and anxious for release.  He will return home today in stable condition.      Physical Exam at Discharge:  General: No acute distress, AAOx3  HEENT: EOMI, PERRL  Cardiovascular: +s1 and s2, RRR  Lungs: No rhonchi or wheezing  Abdomen: soft, nontender  Ext:  Right arm in a sling    Consults:   Consults     Date and Time Order Name Status Description    3/16/2018 0423 Inpatient Orthopedic Surgery Consult      3/16/2018 0307 [facility] (on-call MD unless specified) Completed     3/16/2018 0213 Ortho (on-call MD unless specified) Completed             Condition on Discharge: Stable    Discharge Disposition  Home or Self Care    Discharge Medications   Rodolfo Grover   Home Medication Instructions LUCILA:[id number]    Printed on:03/18/18  1837   Medication Information                      cyclopentolate (CYCLOGYL) 1 % ophthalmic solution  Administer 1 drop to both eyes 2 (Two) Times a Day.             difluprednate (DUREZOL) 0.05 % ophthalmic emulsion  1 drop 4 (Four) Times a Day.             esomeprazole (nexIUM) 20 MG capsule  Take 20 mg by mouth Every Morning Before Breakfast.             oxyCODONE-acetaminophen (PERCOCET) 7.5-325 MG per tablet  Take 1-2 tablets by mouth Every 6 (Six) Hours As Needed for Moderate Pain  or Severe Pain  for up to 7 days.             predniSONE (DELTASONE) 10 MG tablet  Take 60 mg by mouth Daily.                 Discharge Diet:   Diet Instructions     Diet: Regular; Thin       Discharge Diet:  Regular    Fluid Consistency:  Thin          Activity at Discharge:   Activity Instructions     Activity as Tolerated         Keep right arm in sling until seen by Dr. Garcia    Follow-up Appointments  Future Appointments  Date Time Provider Department Center   3/27/2018 2:45 PM Domenica Merida MD [facility] None     Additional Instructions for the Follow-ups that You Need to Schedule     Discharge Follow-up with PCP    As directed      Follow Up Details:  1 week         Discharge Follow-up with Specified Provider: Dr. Garcia; 1 Week    As directed      To:  Dr. Garcia    Follow Up:  1 Week                 I have examined and discussed discharge planning with the patient today.     Edwardo Borrego MD  03/18/18  6:37 PM    Time: Discharge 25 min

## 2018-03-18 NOTE — PROGRESS NOTES
Orthopedic Progress Note        Patient: Rodolfo Grover    Date of Admission: 3/16/2018  1:14 AM    YOB: 1945    Medical Record Number: [id number]    Attending Physician: Edwardo Borrego*    POD : 2    Systemic or Specific Complaints: Reports pain adequately controlled.  Tolerating po well.  No complaints or problems other than some mild achiness in his right shoulder.  She denies any numbness or tingling       No Known Allergies    Current Medications:  Scheduled Meds:  cyclopentolate 1 drop Both Eyes BID   pantoprazole 40 mg Oral QAM   pneumococcal polysaccharide 23-valent 0.5 mL Intramuscular Once   predniSONE 60 mg Oral Daily     Continuous Infusions:  lactated ringers 9 mL/hr Last Rate: 9 mL/hr (03/16/18 1626)   sodium chloride 50 mL/hr Last Rate: 50 mL/hr (03/16/18 0516)   sodium chloride 100 mL/hr Last Rate: 100 mL/hr (03/16/18 2309)     PRN Meds:.•  acetaminophen  •  acetaminophen  •  bisacodyl  •  docusate sodium  •  HYDROmorphone  •  HYDROmorphone **AND** naloxone  •  influenza vaccine  •  nitroglycerin  •  ondansetron **OR** ondansetron ODT **OR** ondansetron  •  ondansetron **OR** ondansetron ODT **OR** ondansetron  •  oxyCODONE-acetaminophen  •  oxyCODONE-acetaminophen  •  Insert peripheral IV **AND** sodium chloride      Physical Exam: 73 y.o. male    General Appearance:   Awake and alert.  NAD.He is at bedside   Vitals:    03/17/18 1454 03/17/18 2035 03/17/18 2346 03/18/18 0736   BP: 163/83 148/70 137/68 129/79   BP Location: Left arm Left arm Left arm Left arm   Patient Position: Lying Lying Lying Lying   Pulse: 79 92 64 57   Resp: 16 16 16 16   Temp: 98.5 °F (36.9 °C) 98.7 °F (37.1 °C) 98.6 °F (37 °C) 97.4 °F (36.3 °C)   TempSrc: Oral Oral Oral Oral   SpO2: 96% 93% 93% 96%   Weight:       Height:              Extremities:   Right shoulder shows only mild swelling and no clinical deformity.  His sling was intact and I adjusted this to fit him more properly he was able to flex  and extend his right wrist well and was grossly sensate light touch throughout the right hand              Diagnostic Tests:   Lab Results (last 24 hours)     Procedure Component Value Units Date/Time    POC Glucose Once [[id number]]  (Normal) Collected:  03/17/18 1419    Specimen:  Blood Updated:  03/17/18 1422     Glucose 117 mg/dL     Narrative:       Meter: [id number] : [id number] Jah Barnes RN                  Assessment:Status post right proximal humerus fracture with a shoulder dislocation status post closed reduction by Dr. Garcia  Plan:    Continue efforts to mobilize  Continue pain control measures    Patient will continue with sling for now.  He may be discharged after cardiac workup is complete and cleared by cardiac and admitting services.  He will follow up with Dr. Garcia regarding definitive treatment    Date: 3/18/2018  Time: 9:26 AM    Nik Burgess MD

## 2018-03-18 NOTE — PROGRESS NOTES
Lexiscan Cardiolite completed. Ventricular bigeminy -> less frequent PVCs. Transient chest tightness at peak injection ,resolving within 1 min. Radionuclide images pending

## 2018-03-18 NOTE — PLAN OF CARE
Problem: Patient Care Overview  Goal: Plan of Care Review  Outcome: Ongoing (interventions implemented as appropriate)   03/18/18 0644   Coping/Psychosocial   Plan of Care Reviewed With patient   Plan of Care Review   Progress improving   OTHER   Outcome Summary Medicated for pain. Remain NPO for stress test and 2D echo. Sling to right arm patent. Nursing will continue to monitor.     Goal: Individualization and Mutuality  Outcome: Ongoing (interventions implemented as appropriate)    Goal: Discharge Needs Assessment  Outcome: Ongoing (interventions implemented as appropriate)    Goal: Interprofessional Rounds/Family Conf  Outcome: Ongoing (interventions implemented as appropriate)      Problem: Fall Risk (Adult)  Goal: Absence of Fall  Outcome: Ongoing (interventions implemented as appropriate)      Problem: Fracture Orthopaedic (Adult)  Goal: Signs and Symptoms of Listed Potential Problems Will be Absent, Minimized or Managed (Fracture Orthopaedic)  Outcome: Outcome(s) achieved Date Met: 03/18/18      Problem: Pain, Acute (Adult)  Goal: Identify Related Risk Factors and Signs and Symptoms  Outcome: Outcome(s) achieved Date Met: 03/18/18    Goal: Acceptable Pain Control/Comfort Level  Outcome: Ongoing (interventions implemented as appropriate)

## 2018-03-18 NOTE — PROGRESS NOTES
" LOS: 0 days     Name: Rodolfo Grover  Age: 73 y.o.  Sex: male  :  1945  MRN: [id number]         Primary Care Physician: Domenica Merida MD    Subjective   Subjective  Right shoulder pain controlled.  Denies chest pain or shortness of breath.  No new complaints today.    Objective   Vital Signs  Temp:  [97.4 °F (36.3 °C)-98.7 °F (37.1 °C)] 97.4 °F (36.3 °C)  Heart Rate:  [57-92] 57  Resp:  [16] 16  BP: (129-163)/(68-83) 129/79  Body mass index is 27.44 kg/m².    Objective:  General Appearance:  Comfortable and in no acute distress.    Vital signs: (most recent): Blood pressure 129/79, pulse 57, temperature 97.4 °F (36.3 °C), temperature source Oral, resp. rate 16, height 167.6 cm (66\"), weight 77.1 kg (170 lb), SpO2 96 %.    Lungs:  Normal effort and normal respiratory rate.    Heart: Normal rate.  Regular rhythm.    Abdomen: Abdomen is soft.  Bowel sounds are normal.   There is no abdominal tenderness.     Extremities: There is no dependent edema or local swelling.  (Right arm is in a sling)  Neurological: Patient is alert and oriented to person, place and time.    Skin:  Warm and dry.              Results Review:       I reviewed the patient's new clinical results.      Results from last 7 days  Lab Units 18  0718 18  0213   WBC 10*3/mm3 10.99* 10.77*   HEMOGLOBIN g/dL 12.3* 13.6*   PLATELETS 10*3/mm3 192 233       Results from last 7 days  Lab Units 18  0718 18  1718 18  0156   SODIUM mmol/L 141 141 141   POTASSIUM mmol/L 4.6 4.2 4.3   CHLORIDE mmol/L 104 106 103   CO2 mmol/L 25.8 23.8 23.9   BUN mg/dL 16 16 20   CREATININE mg/dL 1.10 0.90 1.03   CALCIUM mg/dL 8.0* 8.1* 9.1   GLUCOSE mg/dL 115* 86 168*       Results from last 7 days  Lab Units 18  0213   INR  1.10             Scheduled Meds:     cyclopentolate 1 drop Both Eyes BID   pantoprazole 40 mg Oral QAM   pneumococcal polysaccharide 23-valent 0.5 mL Intramuscular Once   predniSONE 60 mg Oral Daily     PRN Meds:   •  " acetaminophen  •  acetaminophen  •  bisacodyl  •  docusate sodium  •  HYDROmorphone  •  HYDROmorphone **AND** naloxone  •  influenza vaccine  •  nitroglycerin  •  ondansetron **OR** ondansetron ODT **OR** ondansetron  •  ondansetron **OR** ondansetron ODT **OR** ondansetron  •  oxyCODONE-acetaminophen  •  oxyCODONE-acetaminophen  •  Insert peripheral IV **AND** sodium chloride  Continuous Infusions:    lactated ringers 9 mL/hr Last Rate: 9 mL/hr (03/16/18 1626)   sodium chloride 50 mL/hr Last Rate: 50 mL/hr (03/16/18 0516)   sodium chloride 100 mL/hr Last Rate: 100 mL/hr (03/16/18 2309)       Assessment/Plan   Active Problems:    Closed fracture dislocation of right shoulder    Gastroesophageal reflux disease without esophagitis    Leukocytosis    Hyperglycemia    Fall at home    DNR (do not resuscitate)    Bradycardia following surgery    Frequent PVCs      Assessment & Plan    Mechanical fall with Right shoulder fracture  - no syncope or head injury  - no evidence of neurovascular compromise of the RUE  - s/p closed reduction under anesthesia 3/16/18  - continue pain control, IS, on short burst of steroids  - continue sling, NWB RUE  - f/u with Dr. Garcia next week     Bradycardia/Frequent PVCs  - asymptomatic  - check Echo, will need stress test   - Cardiology following     Leukocytosis  - likely reactive and now on steroids, no fever, no pulmonary symptoms, no urinary symptoms  - stable     Hyperglycemia  - again likely reactive  - A1C 5.58%     GERD  -continue nexium     DVT Prophylaxis  - SCDs     Disposition  -home later today or tomorrow pending echo and further cardiology recs    Edwardo Borrego MD  [facility]  03/18/18  12:14 PM

## 2018-03-18 NOTE — PROGRESS NOTES
[facility]  Progress note: 3/18/2018    Patient Identification:  Name:Rodolfo Grover  Age:73 y.o.  Sex: male  :  1945  MRN: [id number]           CC:  Follow-up of PVCs    Interval history:  For nuclear stress test today.  Telemetry with predominant sinus rhythm with occasional ventricular bigeminy.  Echocardiogram shows normal systolic function with mild aortic valve regurgitation without stenosis mild pulmonary hypertension with an RV systolic pressure of 42 mmHg. He denies any chest pain or shortness of breath.  Vital Signs:   Temp:  [97.4 °F (36.3 °C)-98.7 °F (37.1 °C)] 97.4 °F (36.3 °C)  Heart Rate:  [57-92] 57  Resp:  [16] 16  BP: (129-148)/(68-79) 129/79    Intake/Output Summary (Last 24 hours) at 18 1810  Last data filed at 18 0500   Gross per 24 hour   Intake                0 ml   Output              450 ml   Net             -450 ml       Physical Examination:    General Appearance No acute distress   Neck No adenopathy, supple, trachea midline, no thyromegaly, no carotid bruit, no JVD   Lungs Clear to auscultation,respirations regular, even and unlabored   Heart Regular rhythm and normal rate, normal S1 and S2, no murmur, no gallop, no rub, no click   Chest wall No abnormalities observed   Abdomen Normal bowel sounds, no masses, no hepatomegaly, soft   Extremities Moves all extremities well, no edema, no cyanosis, no redness   Neurological Alert and oriented x 3     Lab Review:  Personally reviewed the labs, radiology imaging and other cardiac procedures.   Results from last 7 days  Lab Units 18  0718  18  0156   SODIUM mmol/L 141  < > 141   POTASSIUM mmol/L 4.6  < > 4.3   CHLORIDE mmol/L 104  < > 103   CO2 mmol/L 25.8  < > 23.9   BUN mg/dL 16  < > 20   CREATININE mg/dL 1.10  < > 1.03   CALCIUM mg/dL 8.0*  < > 9.1   BILIRUBIN mg/dL  --   --  0.2   ALK PHOS U/L  --   --  89   ALT (SGPT) U/L  --   --  22   AST (SGOT) U/L  --   --  26   GLUCOSE mg/dL 115*  < > 168*   < >  = values in this interval not displayed.      )    Results from last 7 days  Lab Units 03/17/18  0718 03/16/18  0213   WBC 10*3/mm3 10.99* 10.77*   HEMOGLOBIN g/dL 12.3* 13.6*   HEMATOCRIT % 39.3* 41.8   PLATELETS 10*3/mm3 192 233       Results from last 7 days  Lab Units 03/16/18  0213   INR  1.10   APTT seconds 28.2       Medication Review:   Meds reviewed  Scheduled Meds:    cyclopentolate 1 drop Both Eyes BID   pantoprazole 40 mg Oral QAM   pneumococcal polysaccharide 23-valent 0.5 mL Intramuscular Once   [START ON 3/19/2018] predniSONE 60 mg Oral Daily     Continuous Infusions:    lactated ringers 9 mL/hr Last Rate: 9 mL/hr (03/16/18 1626)   sodium chloride 50 mL/hr Last Rate: 50 mL/hr (03/16/18 0516)   sodium chloride 100 mL/hr Last Rate: 100 mL/hr (03/16/18 2309)     I personally viewed and interpreted the patient's EKG/Telemetry data    Assessment and Plan  1.  Frequent PVCs and echocardiogram pending. Nuclear stress today was normal.  2.  Mechanical fall with humeral fracture  3.  Borderline hypokalemia and magnesium borderline low.  Magnesium to be given.  4.  Probable obstructive sleep apnea.  Consider outpatient home sleep study is recovered from shoulder surgery  5.  Hypertension.  Better since admission.  He will follow-up at home.     Okay to dismiss from CV standpoint.  We'll arrange for 6 week follow-up with me as well as a home sleep study in the interim    Mini Singer  3/18/51542:48 PM  25min spent in reviewing records, discussion and examination of the patient and discussion with other members of the patient's medical team.     Dictated utilizing Dragon dictation

## 2018-03-18 NOTE — PLAN OF CARE
Problem: Patient Care Overview  Goal: Plan of Care Review  Outcome: Ongoing (interventions implemented as appropriate)   03/18/18 1700   Coping/Psychosocial   Plan of Care Reviewed With patient   Plan of Care Review   Progress no change   OTHER   Outcome Summary c/o R shoulder pain, percocet x2 tabs, arm in sling, stress test today, still awaiting 2D echo, monitoring     Goal: Individualization and Mutuality  Outcome: Ongoing (interventions implemented as appropriate)    Goal: Discharge Needs Assessment  Outcome: Ongoing (interventions implemented as appropriate)    Goal: Interprofessional Rounds/Family Conf  Outcome: Ongoing (interventions implemented as appropriate)      Problem: Fall Risk (Adult)  Goal: Absence of Fall  Outcome: Ongoing (interventions implemented as appropriate)      Problem: Pain, Acute (Adult)  Goal: Acceptable Pain Control/Comfort Level  Outcome: Ongoing (interventions implemented as appropriate)

## 2018-03-19 LAB
BH CV NUCLEAR PRIOR STUDY: 3
BH CV STRESS COMMENTS STAGE 1: NORMAL
BH CV STRESS DOSE REGADENOSON STAGE 1: 0.4
BH CV STRESS DURATION MIN STAGE 1: 0
BH CV STRESS DURATION SEC STAGE 1: 10
BH CV STRESS PROTOCOL 1: NORMAL
BH CV STRESS RECOVERY BP: NORMAL MMHG
BH CV STRESS RECOVERY HR: 89 BPM
BH CV STRESS STAGE 1: 1
LV EF NUC BP: 50 %
MAXIMAL PREDICTED HEART RATE: 145 BPM
PERCENT MAX PREDICTED HR: 64.14 %
STRESS BASELINE BP: NORMAL MMHG
STRESS BASELINE HR: 72 BPM
STRESS O2 SAT REST: 99 %
STRESS PERCENT HR: 75 %
STRESS POST ESTIMATED WORKLOAD: 1 METS
STRESS POST EXERCISE DUR MIN: 4 MIN
STRESS POST EXERCISE DUR SEC: 0 SEC
STRESS POST PEAK BP: NORMAL MMHG
STRESS POST PEAK HR: 93 BPM
STRESS TARGET HR: 123 BPM

## 2018-03-19 RX ORDER — CEFAZOLIN SODIUM 2 G/100ML
2 INJECTION, SOLUTION INTRAVENOUS ONCE
Status: CANCELLED | OUTPATIENT
Start: 2018-03-22 | End: 2018-03-22

## 2018-03-19 RX ORDER — SODIUM CHLORIDE 0.9 % (FLUSH) 0.9 %
1-10 SYRINGE (ML) INJECTION AS NEEDED
Status: CANCELLED | OUTPATIENT
Start: 2018-03-22

## 2018-03-19 NOTE — PROGRESS NOTES
Case Management Discharge Note    Final Note: Plan home per MD notes.   RERE Dai    Destination     No service coordination in this encounter.      Durable Medical Equipment     No service coordination in this encounter.      Dialysis/Infusion     No service coordination in this encounter.      Home Medical Care     No service coordination in this encounter.      Social Care     No service coordination in this encounter.        Other: Other ([employer])    Final Discharge Disposition Code: 01 - home or self-care

## 2018-03-21 ENCOUNTER — APPOINTMENT (OUTPATIENT)
Dept: PREADMISSION TESTING | Facility: HOSPITAL | Age: 73
End: 2018-03-21

## 2018-03-21 VITALS
BODY MASS INDEX: 28.93 KG/M2 | RESPIRATION RATE: 16 BRPM | SYSTOLIC BLOOD PRESSURE: 140 MMHG | HEART RATE: 71 BPM | WEIGHT: 180 LBS | DIASTOLIC BLOOD PRESSURE: 54 MMHG | HEIGHT: 66 IN | TEMPERATURE: 97.8 F | OXYGEN SATURATION: 97 %

## 2018-03-21 LAB
BILIRUB UR QL STRIP: NEGATIVE
CLARITY UR: CLEAR
COLOR UR: YELLOW
GLUCOSE UR STRIP-MCNC: NEGATIVE MG/DL
HGB UR QL STRIP.AUTO: NEGATIVE
KETONES UR QL STRIP: NEGATIVE
LEUKOCYTE ESTERASE UR QL STRIP.AUTO: NEGATIVE
NITRITE UR QL STRIP: NEGATIVE
PH UR STRIP.AUTO: 6 [PH] (ref 5–8)
PROT UR QL STRIP: NEGATIVE
SP GR UR STRIP: 1.02 (ref 1–1.03)
UROBILINOGEN UR QL STRIP: NORMAL

## 2018-03-21 PROCEDURE — 81003 URINALYSIS AUTO W/O SCOPE: CPT | Performed by: ORTHOPAEDIC SURGERY

## 2018-03-21 RX ORDER — CHLORHEXIDINE GLUCONATE 500 MG/1
CLOTH TOPICAL
COMMUNITY
End: 2018-03-23 | Stop reason: HOSPADM

## 2018-03-21 NOTE — DISCHARGE INSTRUCTIONS
Take the following medications the morning of surgery with a small sip of water:  NEXIUM  PERCOCET    ARRIVE AT 0830.        General Instructions:  • Do not eat solid food after midnight the night before surgery.  • You may drink clear liquids day of surgery but must stop at least one hour before your hospital arrival time.  • It is beneficial for you to have a clear drink that contains carbohydrates the day of surgery.  We suggest a 12 to 20 ounce bottle of Gatorade or Powerade for non-diabetic patients or a 12 to 20 ounce bottle of G2 or Powerade Zero for diabetic patients. (Pediatric patients, are not advised to drink a 12 to 20 ounce carbohydrate drink)    Clear liquids are liquids you can see through.  Nothing red in color.     Plain water                               Sports drinks  Sodas                                   Gelatin (Jell-O)  Fruit juices without pulp such as white grape juice and apple juice  Popsicles that contain no fruit or yogurt  Tea or coffee (no cream or milk added)  Gatorade / Powerade  G2 / Powerade Zero    • Infants may have breast milk up to four hours before surgery.  • Infants drinking formula may drink formula up to six hours before surgery.   • Patients who avoid smoking, chewing tobacco and alcohol for 4 weeks prior to surgery have a reduced risk of post-operative complications.  Quit smoking as many days before surgery as you can.  • Do not smoke, use chewing tobacco or drink alcohol the day of surgery.   • If applicable bring your C-PAP/ BI-PAP machine.  • Bring any papers given to you in the doctor’s office.  • Wear clean comfortable clothes and socks.  • Do not wear contact lenses or make-up.  Bring a case for your glasses.   • Bring crutches or walker if applicable.  • Remove all piercings.  Leave jewelry and any other valuables at home.  • Hair extensions with metal clips must be removed prior to surgery.  • The Pre-Admission Testing nurse will instruct you to bring  medications if unable to obtain an accurate list in Pre-Admission Testing.        If you were given a blood bank ID arm band remember to bring it with you the day of surgery.    Preventing a Surgical Site Infection:  • For 2 to 3 days before surgery, avoid shaving with a razor because the razor can irritate skin and make it easier to develop an infection.  • The night prior to surgery sleep in a clean bed with clean clothing.  Do not allow pets to sleep with you.  • Shower on the morning of surgery using a fresh bar of anti-bacterial soap (such as Dial) and clean washcloth.  Dry with a clean towel and dress in clean clothing.  • Ask your surgeon if you will be receiving antibiotics prior to surgery.  • Make sure you, your family, and all healthcare providers clean their hands with soap and water or an alcohol based hand  before caring for you or your wound.    Day of surgery:  Upon arrival, a Pre-op nurse and Anesthesiologist will review your health history, obtain vital signs, and answer questions you may have.  The only belongings needed at this time will be your home medications and if applicable your C-PAP/BI-PAP machine.  If you are staying overnight your family can leave the rest of your belongings in the car and bring them to your room later.  A Pre-op nurse will start an IV and you may receive medication in preparation for surgery, including something to help you relax.  Your family will be able to see you in the Pre-op area.  While you are in surgery your family should notify the waiting room  if they leave the waiting room area and provide a contact phone number.    Please be aware that surgery does come with discomfort.  We want to make every effort to control your discomfort so please discuss any uncontrolled symptoms with your nurse.   Your doctor will most likely have prescribed pain medications.      If you are going home after surgery you will receive individualized written care  instructions before being discharged.  A responsible adult must drive you to and from the hospital on the day of your surgery and stay with you for 24 hours.    If you are staying overnight following surgery, you will be transported to your hospital room following the recovery period.  [facility] has all private rooms.    If you have any questions please call Pre-Admission Testing at [phone].  Deductibles and co-payments are collected on the day of service. Please be prepared to pay the required co-pay, deductible or deposit on the day of service as defined by your plan.    2% CHLORAHEXIDINE GLUCONATE* CLOTH  Preparing or “prepping” skin before surgery can reduce the risk of infection at the surgical site. To make the process easier, [facility] has chosen disposable cloths moistened with a rinse-free, 2% Chlorhexidine Gluconate (CHG) antiseptic solution. The steps below outline the prepping process and should be carefully followed.        Use the prep cloth on the area that is circled in the diagram             Directions Night before Surgery  1) Shower using a fresh bar of anti-bacterial soap (such as Dial) and clean washcloth.  Use a clean towel to completely dry your skin.  2) Do not use any lotions, oils or creams on your skin.  3) Open the package and remove 1 cloth, wipe your skin for 30 seconds in a circular motion.  Allow to dry for 3 minutes.  4) Repeat #3 with second cloth.  5) Do not touch your eyes, ears, or mouth with the prep cloth.  6) Allow the wet prep solution to air dry.  7) Discard the prep cloth and wash your hands with soap and water.   8) Dress in clean bed clothes and sleep on fresh clean bed sheets.   9) You may experience some temporary itching after the prep.    Directions Day of Surgery  1) Repeat steps 1,2,3,4,5,6,7, and 9.   2) Dress in clean clothes before coming to the hospital.    BACTROBAN NASAL OINTMENT  There are many germs normally in your nose.  Bactroban is an ointment that will help reduce these germs. Please follow these instructions for Bactroban use:        ____The day before surgery in the morning  Date________    ____The day before surgery in the evening              Date________    ____The day of surgery in the morning    Date________    **Squirt ½ package of Bactroban Ointment onto a cotton applicator and apply to inside of 1st nostril.  Squirt the remaining Bactroban and apply to the inside of the other nostril.

## 2018-03-22 ENCOUNTER — HOSPITAL ENCOUNTER (INPATIENT)
Facility: HOSPITAL | Age: 73
LOS: 1 days | Discharge: HOME | End: 2018-03-23
Attending: ORTHOPAEDIC SURGERY | Admitting: ORTHOPAEDIC SURGERY

## 2018-03-22 ENCOUNTER — APPOINTMENT (OUTPATIENT)
Dept: GENERAL RADIOLOGY | Facility: HOSPITAL | Age: 73
End: 2018-03-22

## 2018-03-22 ENCOUNTER — ANESTHESIA EVENT (OUTPATIENT)
Dept: PERIOP | Facility: HOSPITAL | Age: 73
End: 2018-03-22

## 2018-03-22 ENCOUNTER — ANESTHESIA (OUTPATIENT)
Dept: PERIOP | Facility: HOSPITAL | Age: 73
End: 2018-03-22

## 2018-03-22 DIAGNOSIS — S42.91XA CLOSED FRACTURE DISLOCATION OF RIGHT SHOULDER, INITIAL ENCOUNTER: Primary | ICD-10-CM

## 2018-03-22 PROBLEM — M19.90 OSTEOARTHRITIS: Status: ACTIVE | Noted: 2018-03-22

## 2018-03-22 PROBLEM — S42.209A PROXIMAL HUMERUS FRACTURE: Status: ACTIVE | Noted: 2018-03-22

## 2018-03-22 PROCEDURE — 25010000002 DEXAMETHASONE PER 1 MG: Performed by: NURSE ANESTHETIST, CERTIFIED REGISTERED

## 2018-03-22 PROCEDURE — C1776 JOINT DEVICE (IMPLANTABLE): HCPCS | Performed by: ORTHOPAEDIC SURGERY

## 2018-03-22 PROCEDURE — 25010000002 MIDAZOLAM PER 1 MG: Performed by: ANESTHESIOLOGY

## 2018-03-22 PROCEDURE — 25010000002 FENTANYL CITRATE (PF) 100 MCG/2ML SOLUTION: Performed by: ANESTHESIOLOGY

## 2018-03-22 PROCEDURE — 73020 X-RAY EXAM OF SHOULDER: CPT

## 2018-03-22 PROCEDURE — 25010000002 VANCOMYCIN 10 G RECONSTITUTED SOLUTION: Performed by: ORTHOPAEDIC SURGERY

## 2018-03-22 PROCEDURE — 25010000002 PROPOFOL 10 MG/ML EMULSION: Performed by: NURSE ANESTHETIST, CERTIFIED REGISTERED

## 2018-03-22 PROCEDURE — L3670 SO ACRO/CLAV CAN WEB PRE OTS: HCPCS | Performed by: ORTHOPAEDIC SURGERY

## 2018-03-22 PROCEDURE — 25010000003 CEFAZOLIN IN DEXTROSE 2-4 GM/100ML-% SOLUTION: Performed by: ORTHOPAEDIC SURGERY

## 2018-03-22 PROCEDURE — 25010000002 PHENYLEPHRINE PER 1 ML: Performed by: NURSE ANESTHETIST, CERTIFIED REGISTERED

## 2018-03-22 PROCEDURE — 63710000001 PREDNISONE PER 1 MG: Performed by: ORTHOPAEDIC SURGERY

## 2018-03-22 PROCEDURE — 23472 RECONSTRUCT SHOULDER JOINT: CPT | Performed by: ORTHOPAEDIC SURGERY

## 2018-03-22 PROCEDURE — 25010000002 ONDANSETRON PER 1 MG: Performed by: NURSE ANESTHETIST, CERTIFIED REGISTERED

## 2018-03-22 PROCEDURE — 0RRJ00Z REPLACEMENT OF RIGHT SHOULDER JOINT WITH REVERSE BALL AND SOCKET SYNTHETIC SUBSTITUTE, OPEN APPROACH: ICD-10-PCS | Performed by: ORTHOPAEDIC SURGERY

## 2018-03-22 DEVICE — IMPLANTABLE DEVICE: Type: IMPLANTABLE DEVICE | Site: SHOULDER | Status: FUNCTIONAL

## 2018-03-22 DEVICE — IMPLANTABLE DEVICE
Type: IMPLANTABLE DEVICE | Site: SHOULDER | Status: FUNCTIONAL
Brand: COMPREHENSIVE® REVERSE SHOULDER

## 2018-03-22 DEVICE — IMPLANTABLE DEVICE
Type: IMPLANTABLE DEVICE | Site: SHOULDER | Status: FUNCTIONAL
Brand: COMPREHENSIVE REVERSE SHOULDER

## 2018-03-22 DEVICE — IMPLANTABLE DEVICE
Type: IMPLANTABLE DEVICE | Site: SHOULDER | Status: FUNCTIONAL
Brand: COMPREHENSIVE SHOULDER SYSTEM

## 2018-03-22 DEVICE — IMPLANTABLE DEVICE
Type: IMPLANTABLE DEVICE | Site: SHOULDER | Status: FUNCTIONAL
Brand: INTRAMEDULLARY BONE PLUG

## 2018-03-22 DEVICE — IMPLANTABLE DEVICE
Type: IMPLANTABLE DEVICE | Site: SHOULDER | Status: FUNCTIONAL
Brand: RINGLOC + HIP SYSTEM

## 2018-03-22 DEVICE — CMT BONE PALACOS 120001: Type: IMPLANTABLE DEVICE | Site: SHOULDER | Status: FUNCTIONAL

## 2018-03-22 RX ORDER — EPHEDRINE SULFATE 50 MG/ML
INJECTION, SOLUTION INTRAVENOUS AS NEEDED
Status: DISCONTINUED | OUTPATIENT
Start: 2018-03-22 | End: 2018-03-22 | Stop reason: SURG

## 2018-03-22 RX ORDER — EPHEDRINE SULFATE 50 MG/ML
5 INJECTION, SOLUTION INTRAVENOUS ONCE AS NEEDED
Status: DISCONTINUED | OUTPATIENT
Start: 2018-03-22 | End: 2018-03-22 | Stop reason: HOSPADM

## 2018-03-22 RX ORDER — SODIUM CHLORIDE 0.9 % (FLUSH) 0.9 %
1-10 SYRINGE (ML) INJECTION AS NEEDED
Status: DISCONTINUED | OUTPATIENT
Start: 2018-03-22 | End: 2018-03-22 | Stop reason: HOSPADM

## 2018-03-22 RX ORDER — ROCURONIUM BROMIDE 10 MG/ML
INJECTION, SOLUTION INTRAVENOUS AS NEEDED
Status: DISCONTINUED | OUTPATIENT
Start: 2018-03-22 | End: 2018-03-22 | Stop reason: SURG

## 2018-03-22 RX ORDER — PROMETHAZINE HYDROCHLORIDE 25 MG/ML
12.5 INJECTION, SOLUTION INTRAMUSCULAR; INTRAVENOUS ONCE AS NEEDED
Status: DISCONTINUED | OUTPATIENT
Start: 2018-03-22 | End: 2018-03-22 | Stop reason: HOSPADM

## 2018-03-22 RX ORDER — MIDAZOLAM HYDROCHLORIDE 1 MG/ML
2 INJECTION INTRAMUSCULAR; INTRAVENOUS
Status: DISCONTINUED | OUTPATIENT
Start: 2018-03-22 | End: 2018-03-22 | Stop reason: HOSPADM

## 2018-03-22 RX ORDER — CYCLOPENTOLATE HYDROCHLORIDE 10 MG/ML
1 SOLUTION/ DROPS OPHTHALMIC 2 TIMES DAILY
Status: DISCONTINUED | OUTPATIENT
Start: 2018-03-22 | End: 2018-03-23 | Stop reason: HOSPADM

## 2018-03-22 RX ORDER — ONDANSETRON 2 MG/ML
INJECTION INTRAMUSCULAR; INTRAVENOUS AS NEEDED
Status: DISCONTINUED | OUTPATIENT
Start: 2018-03-22 | End: 2018-03-22 | Stop reason: SURG

## 2018-03-22 RX ORDER — CEFAZOLIN SODIUM 2 G/100ML
2 INJECTION, SOLUTION INTRAVENOUS ONCE
Status: COMPLETED | OUTPATIENT
Start: 2018-03-22 | End: 2018-03-22

## 2018-03-22 RX ORDER — DOCUSATE SODIUM 100 MG/1
100 CAPSULE, LIQUID FILLED ORAL 2 TIMES DAILY PRN
Status: DISCONTINUED | OUTPATIENT
Start: 2018-03-22 | End: 2018-03-23 | Stop reason: HOSPADM

## 2018-03-22 RX ORDER — FENTANYL CITRATE 50 UG/ML
50 INJECTION, SOLUTION INTRAMUSCULAR; INTRAVENOUS
Status: DISCONTINUED | OUTPATIENT
Start: 2018-03-22 | End: 2018-03-22 | Stop reason: HOSPADM

## 2018-03-22 RX ORDER — SODIUM CHLORIDE 9 MG/ML
100 INJECTION, SOLUTION INTRAVENOUS CONTINUOUS
Status: DISCONTINUED | OUTPATIENT
Start: 2018-03-22 | End: 2018-03-23 | Stop reason: HOSPADM

## 2018-03-22 RX ORDER — PROPOFOL 10 MG/ML
VIAL (ML) INTRAVENOUS AS NEEDED
Status: DISCONTINUED | OUTPATIENT
Start: 2018-03-22 | End: 2018-03-22 | Stop reason: SURG

## 2018-03-22 RX ORDER — NALOXONE HCL 0.4 MG/ML
0.2 VIAL (ML) INJECTION AS NEEDED
Status: DISCONTINUED | OUTPATIENT
Start: 2018-03-22 | End: 2018-03-22 | Stop reason: HOSPADM

## 2018-03-22 RX ORDER — HYDROMORPHONE HYDROCHLORIDE 1 MG/ML
0.5 INJECTION, SOLUTION INTRAMUSCULAR; INTRAVENOUS; SUBCUTANEOUS
Status: DISCONTINUED | OUTPATIENT
Start: 2018-03-22 | End: 2018-03-23 | Stop reason: HOSPADM

## 2018-03-22 RX ORDER — LABETALOL HYDROCHLORIDE 5 MG/ML
5 INJECTION, SOLUTION INTRAVENOUS
Status: DISCONTINUED | OUTPATIENT
Start: 2018-03-22 | End: 2018-03-22 | Stop reason: HOSPADM

## 2018-03-22 RX ORDER — CEFAZOLIN SODIUM 2 G/100ML
2 INJECTION, SOLUTION INTRAVENOUS EVERY 8 HOURS
Status: COMPLETED | OUTPATIENT
Start: 2018-03-22 | End: 2018-03-23

## 2018-03-22 RX ORDER — DEXAMETHASONE SODIUM PHOSPHATE 10 MG/ML
INJECTION INTRAMUSCULAR; INTRAVENOUS AS NEEDED
Status: DISCONTINUED | OUTPATIENT
Start: 2018-03-22 | End: 2018-03-22 | Stop reason: SURG

## 2018-03-22 RX ORDER — DIPHENHYDRAMINE HYDROCHLORIDE 50 MG/ML
12.5 INJECTION INTRAMUSCULAR; INTRAVENOUS
Status: DISCONTINUED | OUTPATIENT
Start: 2018-03-22 | End: 2018-03-22 | Stop reason: HOSPADM

## 2018-03-22 RX ORDER — PROMETHAZINE HYDROCHLORIDE 25 MG/ML
6.25 INJECTION, SOLUTION INTRAMUSCULAR; INTRAVENOUS
Status: DISCONTINUED | OUTPATIENT
Start: 2018-03-22 | End: 2018-03-22 | Stop reason: HOSPADM

## 2018-03-22 RX ORDER — LIDOCAINE HYDROCHLORIDE 20 MG/ML
INJECTION, SOLUTION INFILTRATION; PERINEURAL AS NEEDED
Status: DISCONTINUED | OUTPATIENT
Start: 2018-03-22 | End: 2018-03-22 | Stop reason: SURG

## 2018-03-22 RX ORDER — FENTANYL CITRATE 50 UG/ML
25 INJECTION, SOLUTION INTRAMUSCULAR; INTRAVENOUS
Status: DISCONTINUED | OUTPATIENT
Start: 2018-03-22 | End: 2018-03-22 | Stop reason: HOSPADM

## 2018-03-22 RX ORDER — PROMETHAZINE HYDROCHLORIDE 25 MG/1
12.5 TABLET ORAL ONCE AS NEEDED
Status: DISCONTINUED | OUTPATIENT
Start: 2018-03-22 | End: 2018-03-22 | Stop reason: HOSPADM

## 2018-03-22 RX ORDER — PANTOPRAZOLE SODIUM 40 MG/1
40 TABLET, DELAYED RELEASE ORAL EVERY MORNING
Status: DISCONTINUED | OUTPATIENT
Start: 2018-03-23 | End: 2018-03-23 | Stop reason: HOSPADM

## 2018-03-22 RX ORDER — ACETAMINOPHEN 325 MG/1
325 TABLET ORAL EVERY 4 HOURS PRN
Status: DISCONTINUED | OUTPATIENT
Start: 2018-03-22 | End: 2018-03-23 | Stop reason: HOSPADM

## 2018-03-22 RX ORDER — PROMETHAZINE HYDROCHLORIDE 25 MG/1
25 TABLET ORAL ONCE AS NEEDED
Status: DISCONTINUED | OUTPATIENT
Start: 2018-03-22 | End: 2018-03-22 | Stop reason: HOSPADM

## 2018-03-22 RX ORDER — PREDNISONE 20 MG/1
40 TABLET ORAL DAILY
Status: DISCONTINUED | OUTPATIENT
Start: 2018-03-22 | End: 2018-03-23 | Stop reason: HOSPADM

## 2018-03-22 RX ORDER — HYDRALAZINE HYDROCHLORIDE 20 MG/ML
5 INJECTION INTRAMUSCULAR; INTRAVENOUS
Status: DISCONTINUED | OUTPATIENT
Start: 2018-03-22 | End: 2018-03-22 | Stop reason: HOSPADM

## 2018-03-22 RX ORDER — FLUMAZENIL 0.1 MG/ML
0.2 INJECTION INTRAVENOUS AS NEEDED
Status: DISCONTINUED | OUTPATIENT
Start: 2018-03-22 | End: 2018-03-22 | Stop reason: HOSPADM

## 2018-03-22 RX ORDER — LIDOCAINE HYDROCHLORIDE 10 MG/ML
0.5 INJECTION, SOLUTION EPIDURAL; INFILTRATION; INTRACAUDAL; PERINEURAL ONCE AS NEEDED
Status: DISCONTINUED | OUTPATIENT
Start: 2018-03-22 | End: 2018-03-22 | Stop reason: HOSPADM

## 2018-03-22 RX ORDER — PROMETHAZINE HYDROCHLORIDE 25 MG/1
25 SUPPOSITORY RECTAL ONCE AS NEEDED
Status: DISCONTINUED | OUTPATIENT
Start: 2018-03-22 | End: 2018-03-22 | Stop reason: HOSPADM

## 2018-03-22 RX ORDER — TRANEXAMIC ACID 100 MG/ML
INJECTION, SOLUTION INTRAVENOUS AS NEEDED
Status: DISCONTINUED | OUTPATIENT
Start: 2018-03-22 | End: 2018-03-22 | Stop reason: SURG

## 2018-03-22 RX ORDER — NALOXONE HCL 0.4 MG/ML
0.1 VIAL (ML) INJECTION
Status: DISCONTINUED | OUTPATIENT
Start: 2018-03-22 | End: 2018-03-23 | Stop reason: HOSPADM

## 2018-03-22 RX ORDER — HYDROMORPHONE HCL 110MG/55ML
0.2 PATIENT CONTROLLED ANALGESIA SYRINGE INTRAVENOUS
Status: DISCONTINUED | OUTPATIENT
Start: 2018-03-22 | End: 2018-03-22 | Stop reason: HOSPADM

## 2018-03-22 RX ORDER — MIDAZOLAM HYDROCHLORIDE 1 MG/ML
1 INJECTION INTRAMUSCULAR; INTRAVENOUS
Status: DISCONTINUED | OUTPATIENT
Start: 2018-03-22 | End: 2018-03-22 | Stop reason: HOSPADM

## 2018-03-22 RX ORDER — ONDANSETRON 4 MG/1
4 TABLET, FILM COATED ORAL EVERY 6 HOURS PRN
Status: DISCONTINUED | OUTPATIENT
Start: 2018-03-22 | End: 2018-03-23 | Stop reason: HOSPADM

## 2018-03-22 RX ORDER — FAMOTIDINE 10 MG/ML
20 INJECTION, SOLUTION INTRAVENOUS ONCE
Status: COMPLETED | OUTPATIENT
Start: 2018-03-22 | End: 2018-03-22

## 2018-03-22 RX ORDER — MAGNESIUM HYDROXIDE 1200 MG/15ML
LIQUID ORAL AS NEEDED
Status: DISCONTINUED | OUTPATIENT
Start: 2018-03-22 | End: 2018-03-22 | Stop reason: HOSPADM

## 2018-03-22 RX ORDER — ONDANSETRON 2 MG/ML
4 INJECTION INTRAMUSCULAR; INTRAVENOUS ONCE AS NEEDED
Status: DISCONTINUED | OUTPATIENT
Start: 2018-03-22 | End: 2018-03-22 | Stop reason: HOSPADM

## 2018-03-22 RX ORDER — BISACODYL 10 MG
10 SUPPOSITORY, RECTAL RECTAL DAILY PRN
Status: DISCONTINUED | OUTPATIENT
Start: 2018-03-22 | End: 2018-03-23 | Stop reason: HOSPADM

## 2018-03-22 RX ORDER — SODIUM CHLORIDE, SODIUM LACTATE, POTASSIUM CHLORIDE, CALCIUM CHLORIDE 600; 310; 30; 20 MG/100ML; MG/100ML; MG/100ML; MG/100ML
9 INJECTION, SOLUTION INTRAVENOUS CONTINUOUS
Status: DISCONTINUED | OUTPATIENT
Start: 2018-03-22 | End: 2018-03-22 | Stop reason: HOSPADM

## 2018-03-22 RX ORDER — ONDANSETRON 2 MG/ML
4 INJECTION INTRAMUSCULAR; INTRAVENOUS EVERY 6 HOURS PRN
Status: DISCONTINUED | OUTPATIENT
Start: 2018-03-22 | End: 2018-03-23 | Stop reason: HOSPADM

## 2018-03-22 RX ORDER — OXYCODONE AND ACETAMINOPHEN 7.5; 325 MG/1; MG/1
1 TABLET ORAL ONCE AS NEEDED
Status: DISCONTINUED | OUTPATIENT
Start: 2018-03-22 | End: 2018-03-22 | Stop reason: HOSPADM

## 2018-03-22 RX ORDER — OXYCODONE AND ACETAMINOPHEN 7.5; 325 MG/1; MG/1
2 TABLET ORAL EVERY 4 HOURS PRN
Status: DISCONTINUED | OUTPATIENT
Start: 2018-03-22 | End: 2018-03-23 | Stop reason: HOSPADM

## 2018-03-22 RX ORDER — HYDROCODONE BITARTRATE AND ACETAMINOPHEN 7.5; 325 MG/1; MG/1
1 TABLET ORAL ONCE AS NEEDED
Status: DISCONTINUED | OUTPATIENT
Start: 2018-03-22 | End: 2018-03-22 | Stop reason: HOSPADM

## 2018-03-22 RX ORDER — ONDANSETRON 4 MG/1
4 TABLET, ORALLY DISINTEGRATING ORAL EVERY 6 HOURS PRN
Status: DISCONTINUED | OUTPATIENT
Start: 2018-03-22 | End: 2018-03-23 | Stop reason: HOSPADM

## 2018-03-22 RX ADMIN — ROCURONIUM BROMIDE 30 MG: 10 INJECTION INTRAVENOUS at 11:08

## 2018-03-22 RX ADMIN — VANCOMYCIN HYDROCHLORIDE 1250 MG: 10 INJECTION, POWDER, LYOPHILIZED, FOR SOLUTION INTRAVENOUS at 21:20

## 2018-03-22 RX ADMIN — CEFAZOLIN SODIUM 2 G: 2 INJECTION, SOLUTION INTRAVENOUS at 18:13

## 2018-03-22 RX ADMIN — OXYCODONE HYDROCHLORIDE AND ACETAMINOPHEN 2 TABLET: 7.5; 325 TABLET ORAL at 14:47

## 2018-03-22 RX ADMIN — PHENYLEPHRINE HYDROCHLORIDE 100 MCG: 10 INJECTION INTRAVENOUS at 11:55

## 2018-03-22 RX ADMIN — SUGAMMADEX 200 MG: 100 INJECTION, SOLUTION INTRAVENOUS at 12:39

## 2018-03-22 RX ADMIN — PHENYLEPHRINE HYDROCHLORIDE 100 MCG: 10 INJECTION INTRAVENOUS at 12:15

## 2018-03-22 RX ADMIN — SODIUM CHLORIDE, POTASSIUM CHLORIDE, SODIUM LACTATE AND CALCIUM CHLORIDE 9 ML/HR: 600; 310; 30; 20 INJECTION, SOLUTION INTRAVENOUS at 09:47

## 2018-03-22 RX ADMIN — FENTANYL CITRATE 50 MCG: 50 INJECTION, SOLUTION INTRAMUSCULAR; INTRAVENOUS at 10:23

## 2018-03-22 RX ADMIN — PHENYLEPHRINE HYDROCHLORIDE 100 MCG: 10 INJECTION INTRAVENOUS at 12:35

## 2018-03-22 RX ADMIN — PHENYLEPHRINE HYDROCHLORIDE 100 MCG: 10 INJECTION INTRAVENOUS at 11:48

## 2018-03-22 RX ADMIN — CYCLOPENTOLATE HYDROCHLORIDE 1 DROP: 10 SOLUTION/ DROPS OPHTHALMIC at 21:20

## 2018-03-22 RX ADMIN — MIDAZOLAM 1 MG: 1 INJECTION INTRAMUSCULAR; INTRAVENOUS at 10:23

## 2018-03-22 RX ADMIN — TRANEXAMIC ACID 1000 MG: 100 INJECTION, SOLUTION INTRAVENOUS at 12:00

## 2018-03-22 RX ADMIN — CEFAZOLIN SODIUM 2 G: 2 INJECTION, SOLUTION INTRAVENOUS at 11:09

## 2018-03-22 RX ADMIN — LIDOCAINE HYDROCHLORIDE 50 MG: 20 INJECTION, SOLUTION INFILTRATION; PERINEURAL at 11:08

## 2018-03-22 RX ADMIN — SODIUM CHLORIDE 100 ML/HR: 9 INJECTION, SOLUTION INTRAVENOUS at 18:13

## 2018-03-22 RX ADMIN — VANCOMYCIN HYDROCHLORIDE 1250 MG: 10 INJECTION, POWDER, LYOPHILIZED, FOR SOLUTION INTRAVENOUS at 09:47

## 2018-03-22 RX ADMIN — DEXAMETHASONE SODIUM PHOSPHATE 8 MG: 10 INJECTION INTRAMUSCULAR; INTRAVENOUS at 11:15

## 2018-03-22 RX ADMIN — EPHEDRINE SULFATE 10 MG: 50 INJECTION INTRAMUSCULAR; INTRAVENOUS; SUBCUTANEOUS at 11:14

## 2018-03-22 RX ADMIN — FAMOTIDINE 20 MG: 10 INJECTION INTRAVENOUS at 09:47

## 2018-03-22 RX ADMIN — PREDNISONE 40 MG: 20 TABLET ORAL at 18:12

## 2018-03-22 RX ADMIN — OXYCODONE HYDROCHLORIDE AND ACETAMINOPHEN 2 TABLET: 7.5; 325 TABLET ORAL at 23:02

## 2018-03-22 RX ADMIN — PROPOFOL 200 MG: 10 INJECTION, EMULSION INTRAVENOUS at 11:08

## 2018-03-22 RX ADMIN — TRANEXAMIC ACID 1000 MG: 100 INJECTION, SOLUTION INTRAVENOUS at 11:32

## 2018-03-22 RX ADMIN — MUPIROCIN: 20 OINTMENT TOPICAL at 21:20

## 2018-03-22 RX ADMIN — MIDAZOLAM 2 MG: 1 INJECTION INTRAMUSCULAR; INTRAVENOUS at 09:47

## 2018-03-22 RX ADMIN — ONDANSETRON 4 MG: 2 INJECTION INTRAMUSCULAR; INTRAVENOUS at 11:50

## 2018-03-22 NOTE — ANESTHESIA PROCEDURE NOTES
Airway  Urgency: elective    Airway not difficult    General Information and Staff    Patient location during procedure: OR  Anesthesiologist: CONNER HERNANDEZ  CRNA: ARTURO YOUNG    Indications and Patient Condition  Indications for airway management: airway protection    Preoxygenated: yes  MILS maintained throughout  Mask difficulty assessment: 1 - vent by mask    Final Airway Details  Final airway type: endotracheal airway      Successful airway: ETT  Cuffed: yes   Successful intubation technique: direct laryngoscopy  Facilitating devices/methods: intubating stylet  Endotracheal tube insertion site: oral  Blade: Zarate  Blade size: #2  ETT size: 7.5 mm  Cormack-Lehane Classification: grade I - full view of glottis  Placement verified by: chest auscultation   Measured from: lips  ETT to lips (cm): 21  Number of attempts at approach: 1

## 2018-03-22 NOTE — H&P (VIEW-ONLY)
Orthopedic Consult      Patient: Rodolfo Grover    Date of Admission: 3/16/2018  1:14 AM    YOB: 1945    Medical Record Number: [id number]    Attending Physician: Arash Stoddard MD    Consulting Physician: Arash Stoddard MD    Chief Complaints: Right shoulder injury    History of Present Illness: 73 y.o. male admitted to [facility] to services of Arash Stoddard MD with a right shoulder injury.  He slipped and fell in his garage yesterday, falling down several stairs and landing on his right shoulder.  He was fairly doing some work in the garage when his wife accidentally turned off the lights causing him to stumble and fall.  He noticed immediate pain and deformity of the shoulder.  He was seen in the emergency room and diagnosed with a proximal humerus fracture dislocation.  He describes his current pain as moderate, constant, and aching.  The pain is better at rest.  He is right-hand-dominant.  He had good use and function of the shoulder prior to the injury.    Allergies: No Known Allergies    Home Medications:    Current Facility-Administered Medications:   •  acetaminophen (TYLENOL) tablet 650 mg, 650 mg, Oral, Q4H PRN, Ronaldo Sierra MD  •  famotidine (PEPCID) injection 20 mg, 20 mg, Intravenous, Q12H, Arash Stoddard MD  •  HYDROmorphone (DILAUDID) injection 0.5 mg, 0.5 mg, Intravenous, Q2H PRN, Ronaldo Sierra MD, 0.5 mg at 03/16/18 0808  •  ondansetron (ZOFRAN) tablet 4 mg, 4 mg, Oral, Q6H PRN **OR** ondansetron ODT (ZOFRAN-ODT) disintegrating tablet 4 mg, 4 mg, Oral, Q6H PRN **OR** ondansetron (ZOFRAN) injection 4 mg, 4 mg, Intravenous, Q6H PRN, Ronaldo Sierra MD, 4 mg at 03/16/18 0516  •  oxyCODONE-acetaminophen (PERCOCET) 5-325 MG per tablet 1 tablet, 1 tablet, Oral, Q4H PRN, Ronaldo Sierra MD, 1 tablet at 03/16/18 1026  •  pneumococcal polysaccharide 23-valent (PNEUMOVAX-23) vaccine 0.5 mL, 0.5 mL, Intramuscular, Once, Ronaldo Sierra MD  •  Insert  peripheral IV, , , Once **AND** sodium chloride 0.9 % flush 10 mL, 10 mL, Intravenous, PRN, CALVIN Rosado  •  sodium chloride 0.9 % infusion, 50 mL/hr, Intravenous, Continuous, Ronaldo Sierra MD, Last Rate: 50 mL/hr at 03/16/18 0516, 50 mL/hr at 03/16/18 0516    Current Medications:  Scheduled Meds:  famotidine 20 mg Intravenous Q12H   pneumococcal polysaccharide 23-valent 0.5 mL Intramuscular Once     Continuous Infusions:  sodium chloride 50 mL/hr Last Rate: 50 mL/hr (03/16/18 0516)     PRN Meds:.•  acetaminophen  •  HYDROmorphone  •  ondansetron **OR** ondansetron ODT **OR** ondansetron  •  oxyCODONE-acetaminophen  •  Insert peripheral IV **AND** sodium chloride    Past Medical History:   Diagnosis Date   • GERD (gastroesophageal reflux disease)        Past Surgical History:   Procedure Laterality Date   • HERNIA REPAIR         Social History     Occupational History   • Not on file.     Social History Main Topics   • Smoking status: Never Smoker   • Smokeless tobacco: Never Used   • Alcohol use No   • Drug use: Unknown   • Sexual activity: Defer    Social History     Social History Narrative   • No narrative on file       History reviewed. No pertinent family history.      Review of Systems:     Constitutional:  Denies fever, shaking or chills   Eyes:  Denies change in visual acuity   HEENT:  Denies nasal congestion or sore throat   Respiratory:  Denies cough or shortness of breath   Cardiovascular:  Denies chest pain or edema  Endocrine: Denies tremors, palpitations, intolerance of heat or cold, polyuria, polydipsia.  GI:  Denies abdominal pain, nausea, vomiting, bloody stools or diarrhea  :  Denies frequency, urgency, incontinence, retention, or nocturia.  Musculoskeletal:  Denies numbness tingling or loss of motor function except as above  Integument:  Denies rash, lesion or ulceration   Neurologic:  Denies headache or focal weakness, deficits  Heme:  Denies epistaxis, spontaneous or excessive  bleeding, epistaxis, hematuria, melena, fatigue, enlarged or tender lymph nodes.      All other pertinent positives and negatives as noted above in HPI.    Physical Exam: 73 y.o. male    General:  Awake, alert. No acute distress.      Head/Neck:  Normocephalic, atraumatic.  Conjunctiva and sclera clear.  Hearing adequate for the exam.  Neck is supple with normal ROM.    Psych:  Affect and demeanor appropriate.    CV:  Regular rate and rhythm.  Hemodynamically stable.    Lungs:  Good chest expansion, breathing unlabored.    Abdomen:  Soft.  Non-tender, non-distended.    Extremities:      Right upper extremity:  Skin appears benign without obvious lacerations, ulcerations or lesions.  There is anterior edema and prominence as compared to the left. Compartments soft without evidence for DVT or compartment syndrome.  No atrophy.  No palpable masses or adenopathy.  Focal tenderness over the anterior aspect of the shoulder and proximal humerus.  ROM of the shoulder is extremely limited and painful.   Absolutely could not assess stability of the shoulder due to his discomfort.  Strength well-preserved distally including wrist flexion and extension, , pinch, finger and thumb abduction.  Sensation to light touch grossly intact distally.  Axillary nerve sensation is intact and subjectively normal although I could not get him to fire his deltoid on exam due to discomfort.  Good skin turgor, brisk cap refill and good pulses distally.    All other extremities atraumatic without gross abnormality.       Diagnostic Tests:    Admission on 03/16/2018   Component Date Value Ref Range Status   • Glucose 03/16/2018 168* 65 - 99 mg/dL Final   • BUN 03/16/2018 20  8 - 23 mg/dL Final   • Creatinine 03/16/2018 1.03  0.76 - 1.27 mg/dL Final   • Sodium 03/16/2018 141  136 - 145 mmol/L Final   • Potassium 03/16/2018 4.3  3.5 - 5.2 mmol/L Final   • Chloride 03/16/2018 103  98 - 107 mmol/L Final   • CO2 03/16/2018 23.9  22.0 - 29.0 mmol/L  Final   • Calcium 03/16/2018 9.1  8.6 - 10.5 mg/dL Final   • Total Protein 03/16/2018 7.2  6.0 - 8.5 g/dL Final   • Albumin 03/16/2018 3.70  3.50 - 5.20 g/dL Final   • ALT (SGPT) 03/16/2018 22  1 - 41 U/L Final   • AST (SGOT) 03/16/2018 26  1 - 40 U/L Final   • Alkaline Phosphatase 03/16/2018 89  39 - 117 U/L Final   • Total Bilirubin 03/16/2018 0.2  0.1 - 1.2 mg/dL Final   • eGFR Non African Amer 03/16/2018 70  >60 mL/min/1.73 Final   • Globulin 03/16/2018 3.5  gm/dL Final   • A/G Ratio 03/16/2018 1.1  g/dL Final   • BUN/Creatinine Ratio 03/16/2018 19.4  7.0 - 25.0 Final   • Anion Gap 03/16/2018 14.1  mmol/L Final   • Protime 03/16/2018 14.0  11.7 - 14.2 Seconds Final   • INR 03/16/2018 1.10  0.90 - 1.10 Final   • PTT 03/16/2018 28.2  22.7 - 35.4 seconds Final   • WBC 03/16/2018 10.77* 4.50 - 10.70 10*3/mm3 Final   • RBC 03/16/2018 4.26* 4.60 - 6.00 10*6/mm3 Final   • Hemoglobin 03/16/2018 13.6* 13.7 - 17.6 g/dL Final   • Hematocrit 03/16/2018 41.8  40.4 - 52.2 % Final   • MCV 03/16/2018 98.1* 79.8 - 96.2 fL Final   • MCH 03/16/2018 31.9  27.0 - 32.7 pg Final   • MCHC 03/16/2018 32.5* 32.6 - 36.4 g/dL Final   • RDW 03/16/2018 13.5  11.5 - 14.5 % Final   • RDW-SD 03/16/2018 47.9  37.0 - 54.0 fl Final   • MPV 03/16/2018 10.3  6.0 - 12.0 fL Final   • Platelets 03/16/2018 233  140 - 500 10*3/mm3 Final   • Neutrophil % 03/16/2018 76.1* 42.7 - 76.0 % Final   • Lymphocyte % 03/16/2018 14.1* 19.6 - 45.3 % Final   • Monocyte % 03/16/2018 8.6  5.0 - 12.0 % Final   • Eosinophil % 03/16/2018 0.0* 0.3 - 6.2 % Final   • Basophil % 03/16/2018 0.1  0.0 - 1.5 % Final   • Immature Grans % 03/16/2018 1.1* 0.0 - 0.5 % Final   • Neutrophils, Absolute 03/16/2018 8.19* 1.90 - 8.10 10*3/mm3 Final   • Lymphocytes, Absolute 03/16/2018 1.52  0.90 - 4.80 10*3/mm3 Final   • Monocytes, Absolute 03/16/2018 0.93  0.20 - 1.20 10*3/mm3 Final   • Eosinophils, Absolute 03/16/2018 0.00  0.00 - 0.70 10*3/mm3 Final   • Basophils, Absolute 03/16/2018  0.01  0.00 - 0.20 10*3/mm3 Final   • Immature Grans, Absolute 03/16/2018 0.12* 0.00 - 0.03 10*3/mm3 Final   • Hemoglobin A1C 03/16/2018 5.58  4.80 - 5.60 % Final     Lab Results (last 24 hours)     Procedure Component Value Units Date/Time    Hemoglobin A1c [[id number]]  (Normal) Collected:  03/16/18 0213    Specimen:  Blood Updated:  03/16/18 1044     Hemoglobin A1C 5.58 %     Narrative:       Hemoglobin A1C Ranges:    Increased Risk for Diabetes  5.7% to 6.4%  Diabetes                     >= 6.5%  Diabetic Goal                < 7.0%    Protime-INR [[id number]]  (Normal) Collected:  03/16/18 0213    Specimen:  Blood Updated:  03/16/18 0256     Protime 14.0 Seconds      INR 1.10    aPTT [[id number]]  (Normal) Collected:  03/16/18 0213    Specimen:  Blood Updated:  03/16/18 0256     PTT 28.2 seconds     CBC & Differential [[id number]] Collected:  03/16/18 0213    Specimen:  Blood Updated:  03/16/18 0233    Narrative:       The following orders were created for panel order CBC & Differential.  Procedure                               Abnormality         Status                     ---------                               -----------         ------                     CBC Auto Differential[[id number]]        Abnormal            Final result                 Please view results for these tests on the individual orders.    CBC Auto Differential [[id number]]  (Abnormal) Collected:  03/16/18 0213    Specimen:  Blood Updated:  03/16/18 0233     WBC 10.77 (H) 10*3/mm3      RBC 4.26 (L) 10*6/mm3      Hemoglobin 13.6 (L) g/dL      Hematocrit 41.8 %      MCV 98.1 (H) fL      MCH 31.9 pg      MCHC 32.5 (L) g/dL      RDW 13.5 %      RDW-SD 47.9 fl      MPV 10.3 fL      Platelets 233 10*3/mm3      Neutrophil % 76.1 (H) %      Lymphocyte % 14.1 (L) %      Monocyte % 8.6 %      Eosinophil % 0.0 (L) %      Basophil % 0.1 %      Immature Grans % 1.1 (H) %      Neutrophils, Absolute 8.19 (H) 10*3/mm3      Lymphocytes, Absolute 1.52 10*3/mm3       Monocytes, Absolute 0.93 10*3/mm3      Eosinophils, Absolute 0.00 10*3/mm3      Basophils, Absolute 0.01 10*3/mm3      Immature Grans, Absolute 0.12 (H) 10*3/mm3     Comprehensive Metabolic Panel [[id number]]  (Abnormal) Collected:  03/16/18 0156    Specimen:  Blood from Arm, Left Updated:  03/16/18 0230     Glucose 168 (H) mg/dL      BUN 20 mg/dL      Creatinine 1.03 mg/dL      Sodium 141 mmol/L      Potassium 4.3 mmol/L      Chloride 103 mmol/L      CO2 23.9 mmol/L      Calcium 9.1 mg/dL      Total Protein 7.2 g/dL      Albumin 3.70 g/dL      ALT (SGPT) 22 U/L      AST (SGOT) 26 U/L      Comment: Specimen hemolyzed.  Results may be affected.        Alkaline Phosphatase 89 U/L      Total Bilirubin 0.2 mg/dL      eGFR Non African Amer 70 mL/min/1.73      Globulin 3.5 gm/dL      A/G Ratio 1.1 g/dL      BUN/Creatinine Ratio 19.4     Anion Gap 14.1 mmol/L     Narrative:       The MDRD GFR formula is only valid for adults with stable renal function between ages 18 and 70.          Imaging: AP and lateral views of the right shoulder are reviewed on the [employer] system along with the associated report.  He has what appears to be a comminuted fracture dislocation of the right proximal humerus.  This appears to be a valgus impacted 4 part fracture although the characterization is limited by the suboptimal positioning and limited views.    Assessment: Right shoulder fracture dislocation    Plan:  I had a long discussion with the patient and his wife.  I recommended an attempted closed reduction.  Although it is possible that we may not be to get the shoulder to reduce, this would hopefully minimize his risk of any axillary nerve injury and improve his comfort thereby enabling us to potentially delay any surgical intervention until next week.  I think it is likely that he will require an arthroplasty for this injury.  I discussed this with him and his wife in detail.    If we cannot get the shoulder to reduce, I would plan for  an arthroplasty tomorrow.  If we can get things reduced then I would hope to delay any intervention until next week.  The risks, benefits, and alternatives to a closed reduction were discussed with him and his wife in detail.  Specifically, we discussed the risk of increased displacement of the fracture fragments and potential need for an arthroplasty as well as iatrogenic injury to nearby nerves and/or blood vessels with the manipulation.  They acknowledged understanding of this information and consented to proceed.  We will plan to do that later today.    Date: 3/16/2018    Kj Garcia MD    CC: Arash Stoddard MD

## 2018-03-22 NOTE — ANESTHESIA PROCEDURE NOTES
Peripheral Block    Patient location during procedure: holding area  Start time: 3/22/2018 10:19 AM  Stop time: 3/22/2018 10:32 AM  Reason for block: at surgeon's request and post-op pain management  Performed by  Anesthesiologist: STEVENSON FUENTES  Preanesthetic Checklist  Completed: patient identified, site marked, surgical consent, pre-op evaluation, timeout performed, IV checked, risks and benefits discussed and monitors and equipment checked  Prep:  Pt Position: sitting  Sterile barriers:gloves  Prep: ChloraPrep  Patient monitoring: continuous pulse oximetry, blood pressure monitoring and EKG  Procedure  Sedation:yes  Performed under: PNB  Guidance:ultrasound guided  ULTRASOUND INTERPRETATION.  Using ultrasound guidance a 20 G gauge needle was placed in close proximity to the brachial plexus nerve, at which point, under ultrasound guidance anesthetic was injected in the area of the nerve and spread of the anesthesia was seen on ultrasound in close proximity thereto.  There were no abnormalities seen on ultrasound; a digital image was taken; and the patient tolerated the procedure with no complications. Images:still images obtained    Laterality:left  Block Type:interscalene  Needle Gauge:20 G    Medications  Depomedrol:40 mg  Local Injected:ropivacaine 0.5% and lidocaine 2% with epinephrine Local Amount Injected:30mL  Post Assessment  Injection Assessment: negative aspiration for heme, no paresthesia on injection and incremental injection  Patient Tolerance:comfortable throughout block  Complications:no

## 2018-03-22 NOTE — PLAN OF CARE
Problem: Patient Care Overview  Goal: Plan of Care Review  Outcome: Ongoing (interventions implemented as appropriate)  Pt arrived to unit from PACU. C/O right shoulder pain. PO pain medication given x2. Pain is controlled. Family at bedside. Sling in place. Cap refill WNL. Pt is DTV by 2100.  IS block in effect, numbness noted to right arm. Pt able to wiggle fingers. IS demonstrated. Educated pt on the importance of IS use, and deep breathing with the block in effect. Pt verbalized understanding. Will monitor.    03/22/18 1410 03/22/18 1501   Plan of Care Review   Progress --  improving   Coping/Psychosocial   Plan of Care Reviewed With patient --      Goal: Individualization and Mutuality  Outcome: Ongoing (interventions implemented as appropriate)    Goal: Discharge Needs Assessment  Outcome: Ongoing (interventions implemented as appropriate)    Goal: Interprofessional Rounds/Family Conf  Outcome: Ongoing (interventions implemented as appropriate)      Problem: Fall Risk (Adult)  Goal: Absence of Fall  Outcome: Ongoing (interventions implemented as appropriate)    Goal: Identify Related Risk Factors and Signs and Symptoms  Outcome: Ongoing (interventions implemented as appropriate)    Goal: Absence of Fall  Outcome: Ongoing (interventions implemented as appropriate)      Problem: Shoulder Arthroplasty (Adult)  Goal: Signs and Symptoms of Listed Potential Problems Will be Absent, Minimized or Managed (Shoulder Arthroplasty)  Outcome: Ongoing (interventions implemented as appropriate)    Goal: Anesthesia/Sedation Recovery  Outcome: Ongoing (interventions implemented as appropriate)

## 2018-03-22 NOTE — BRIEF OP NOTE
TOTAL SHOULDER REVERSE ARTHROPLASTY  Progress Note    Rodolfo Grover  3/22/2018    Pre-op Diagnosis:   Closed fracture dislocation of right shoulder, initial encounter [S42.91XA]       Post-Op Diagnosis Codes:     * Closed fracture dislocation of right shoulder, initial encounter [S42.91XA]    Procedure/CPT® Codes:      Procedure(s):  Reverse Total Shoulder Arthroplasty, biceps tenodesis    Surgeon(s):  Kj Garcia MD    Anesthesia: General with Block    Staff:   Circulator: Jean Claude Wong RN; Destinee Rausch RN  Scrub Person: Neetu Hou  Assistant: Fercho Carrizales CSA  [place]: Sara Nelson RN    Estimated Blood Loss: 100ml    Urine Voided: * No values recorded between 3/22/2018 10:55 AM and 3/22/2018 11:20 AM *    Specimens:                None      Drains:      Findings: see dictation    Complications: none      Kj Garcia MD     Date: 3/22/2018  Time: 11:20 AM

## 2018-03-22 NOTE — SIGNIFICANT NOTE
03/22/18 1540   Rehab Time/Intention   Evaluation Not Performed other (see comments)  (Instructed pt. on TSR protocol (H.E.P. given in written, pictorial format; Pend. 5-6x's/day); Educated pt. on UE sling use and importance of ice.  Pt. with no further questions/concerns regarding HEP or functional mobility. Will sign off.)   Rehab Treatment   Discipline physical therapist

## 2018-03-22 NOTE — ANESTHESIA POSTPROCEDURE EVALUATION
"Patient: Rodolfo Grover    Procedure Summary     Date:  03/22/18 Room / Location:  [facility] OR [address] [place] [address] MAIN OR    Anesthesia Start:  1100 Anesthesia Stop:  1255    Procedure:  Reverse Total Shoulder Arthroplsty (Right Shoulder) Diagnosis:       Closed fracture dislocation of right shoulder, initial encounter      (Closed fracture dislocation of right shoulder, initial encounter [S42.91XA])    Surgeon:  Kj Garcia MD Provider:  Jose Cruz Huang MD    Anesthesia Type:  general ASA Status:  3          Anesthesia Type: general  Last vitals  BP   148/77 (03/22/18 1252)   Temp   36.7 °C (98 °F) (03/22/18 0911)   Pulse   96 (03/22/18 1252)   Resp   14 (03/22/18 1252)     SpO2   98 % (03/22/18 1252)     Post Anesthesia Care and Evaluation    Patient location during evaluation: bedside  Patient participation: complete - patient participated  Level of consciousness: awake  Pain score: 2  Pain management: adequate  Airway patency: patent  Anesthetic complications: No anesthetic complications    Cardiovascular status: acceptable  Respiratory status: acceptable  Hydration status: acceptable    Comments: /77 (BP Location: Left arm, Patient Position: Lying)   Pulse 96   Temp 36.7 °C (98 °F) (Oral)   Resp 14   Ht 167.6 cm (66\")   Wt 79.9 kg (176 lb 2 oz)   SpO2 98%   BMI 28.43 kg/m²         "

## 2018-03-22 NOTE — ANESTHESIA PREPROCEDURE EVALUATION
Anesthesia Evaluation                  Airway   Mallampati: II  TM distance: >3 FB  Neck ROM: full  No difficulty expected  Dental      Comment: Upper bridge-  All caps in front      Pulmonary - normal exam   Cardiovascular - normal exam      ROS comment: Bradycardia after surgery  Had cardiac workup      Neuro/Psych  GI/Hepatic/Renal/Endo    (+)  GERD, GI bleeding,     Musculoskeletal     Abdominal    Substance History      OB/GYN          Other   (+) arthritis                     Anesthesia Plan    ASA 3     general     intravenous induction   Anesthetic plan and risks discussed with patient.

## 2018-03-22 NOTE — OP NOTE
Orthopaedic Operative Note    Facility: [facility]    Patient: Rodolfo Grover    Medical Record Number: [id number]    YOB: 1945    Dictating Surgeon: Kj Garcia M.D.*    Primary Care Physician: Domenica Merida MD    Date of Operation: 3/22/2018    Pre-Operative Diagnosis:  Right proximal humerus fracture dislocation    Post-Operative Diagnosis:  Right proximal humerus fracture dislocation     Procedure Performed:  1.  Right reverse total shoulder arthroplasty  2.  Tenodesis of long head of biceps tendon    Surgeon: Kj Garcia MD     Assistant: Larry Carrizales    Anesthesia: Regional followed by Gen.    Complications: None.     Estimated Blood Loss: Less than 150 mL.     Implants: El Biomet size 6 fracture stem with 44 standard tray and 36+3 mm poly; 25 mm mini glenoid tray with 6.5 mm central compression screw and 4 peripheral 4.75 mm locking screws, size 36 standard glenosphere    Specimens: * No orders in the log *    Brief Operative Indication:  Mr. Grover sustained a right proximal humerus fracture dislocation.  A closed reduction was performed but given the significant displacement, I felt that surgical intervention was more predicable option for him to hopefully offer him optimal long-term function.  We had a thorough discussion regarding the risks, benefits and alternatives to an arthroplasty and non-surgical management versus surgery.  I explained that surgical risks include infection, hematoma, hardware related complications including failure of fixation, loosening, fracture, persistent pain and/or loss of motion, iatrogenic nerve and/or blood vessel injury resulting in permanent weakness, numbness or dysfunction, DVT, PE, positioning related neuropraxia, and anesthesia related complications resulting in death.  We discussed the indication for a reverse as opposed to a standard total shoulder and the risks inherent to the reverse including notching, glenoid loosening,  instability, and traction related neuropraxia, any of which could result in persistent pain or problems requiring further surgery.  Lastly, we discussed the rehab and all that will be expected of the patient post operatively to ensure an optimal outcome.  The patient voiced understanding of the risks, benefits, and alternative forms of treatment that were discussed and Mr. rGover consented to proceed with the reverse arthroplasty.      Description of the procedure in detail:  The patient and operative site were identified in the preoperative holding area.  Surgical site was marked.  Adequate regional anesthesia of the right upper extremity was administered.  The patient was then taken to the operating room.  He was placed in the supine position.  Adequate general anesthesia was administered and then he was repositioned on the operating table in the modified beach chair position.  The head and neck were placed in neutral alignment.  All bony prominences were carefully padded and protected.  Once we had him appropriately positioned, a timeout was taken and preoperative antibiotics administered.    The extremity was cleaned with an alcohol solution.  A Hibiclens scrub was then performed.  Last, the extremity was prepped with 2 ChloraPrep preps.  I allowed those to dry for 3 minutes before the draping procedure was carried out.  I began the procedure by fashioning an approximately 8 cm incision over the anterior aspect of the shoulder centered over the deltopectoral interval.  I dissected out the cephalic vein and retracted this structure laterally.  This structure was kept protected throughout the duration of the case.  The clavipectoral fascia was divided.  At this point, the humeral head extruded from the wound.  This was completely devoid of soft tissue attachment.  There was some comminution anteriorly of the lesser tuberosity as well as significant comminution of the greater tuberosity.  Some of the anterior  comminution was removed.  I did carefully dissect out the axillary nerve and made sure I had that structure identified and protected throughout the case.    2 #5 max braid sutures were placed at the bone tendon junction of a large fragment of the greater tuberosity was still had some posterior rotator cuff attached.  Once I had good control of this structure, I irrigated out the vault and axillary pouch with sterile saline mixed with bacitracin via pulsatile lavage.  I removed all the comminution as best as possible and then directed my attention to the glenoid sided preparations.    Again, I made sure the axillary nerve was dissected out and protected.  Anterior, posterior, and inferior glenoid retractors were then positioned.  The long head of the biceps was released from its attachment to the supraglenoid tubercle.  The intra-articular portion of biceps was frayed and pathologic.  This was removed.  The remaining portion of the long head of the biceps was tenodesis to the pectoralis tendon using 2 #2 Max braid sutures.  The centering guide for the baseplate was positioned.  The anterior, posterior, and inferior glenoid labral tissue were removed to allow for adequate access of the caudal rim of the glenoid.  The centering guide for the baseplate was placed in appropriate position and then the center pin drilled.  I checked the position and then reamed the glenoid in the typical fashion.  I did take care to tilt the glenoid inferiorly.  Once I completed the reaming process, the baseplate was impacted.  It seated well.  This was secured with a central compression screw which got excellent purchase followed by the 4 locking screws, all of which locked into the plate.  The baseplate seemed to be well fixed.  I trialed with a 36 standard glenosphere.  This fit well.  The trial component was removed.  The final component was impacted.  I did adjust the offset to allow for sufficient inferior overhang to hopefully  minimize his risk of notching.    I checked to make sure the glenosphere was secure.  I used a right angle clamp to pull up on the implant circumferentially.  When doing so, the entire scapula moved.  Next, I directed my attention back to the humerus.  The humerus was reamed and broached up to a 7.  He had a very tight distal canal and so I elected to undersize to a 6 mm implant.  A distal cement restrictor was placed.  The canal was irrigated out and cleaned in the typical fashion.  My assistant mixed one batch of Pal[place]os bone cement on the back table using current generation cement mixing technique and a centrifuge.  While she was preparing the cement, I made sure the canal was fully irrigated out and cleaned.  Once the cement was prepared, this was pressurized down into the canal and then the implant placed.  I held the implant in appropriate retroversion as referenced off of the forearm, approximately 15-20°.  The excess, extruded cement was removed with a [place] elevator.  The implant was held in position until the cement had fully cured.    At this point, the tuberosity was repaired to the stem using the 2 #5 max braid sutures.  I was able to get an excellent repair of the tuberosity.  Next I trialed off of the stem with multiple trial implants.  The +3 seemed to fit the best and restored good motion and stability.  The trial component was removed.  The final component was impacted.  I took care to make sure that the Sanchez taper was fully engaged before reducing the shoulder and caring the shoulder through range of motion.  Again, the shoulder demonstrated excellent motion and stability without any impingement.      At this point, the wound was irrigated out with 500 cc of a Betadine-containing saline solution followed by 3 L of sterile saline mixed with bacitracin via pulsatile lavage.  I made sure that we had good hemostasis.  A 10 Spanish Hemovac drain was placed.  The wound was sequentially closed in a  layered fashion using Vicryl for the deep tissues, a running subcuticular Monocryl stitch for the skin and then Dermabond.  Sterile dressings were applied.  The arm was placed in a shoulder immobilizer.  Mr. Grover was awakened and taken to the recovery room in good condition.    Kj Garcia MD  03/22/18

## 2018-03-23 VITALS
BODY MASS INDEX: 28.32 KG/M2 | SYSTOLIC BLOOD PRESSURE: 141 MMHG | OXYGEN SATURATION: 95 % | RESPIRATION RATE: 16 BRPM | HEIGHT: 66 IN | TEMPERATURE: 98.2 F | DIASTOLIC BLOOD PRESSURE: 69 MMHG | HEART RATE: 85 BPM | WEIGHT: 176.2 LBS

## 2018-03-23 LAB
HCT VFR BLD AUTO: 37.1 % (ref 40.4–52.2)
HGB BLD-MCNC: 11.6 G/DL (ref 13.7–17.6)

## 2018-03-23 PROCEDURE — 85014 HEMATOCRIT: CPT | Performed by: ORTHOPAEDIC SURGERY

## 2018-03-23 PROCEDURE — 63710000001 PREDNISONE PER 1 MG: Performed by: ORTHOPAEDIC SURGERY

## 2018-03-23 PROCEDURE — 85018 HEMOGLOBIN: CPT | Performed by: ORTHOPAEDIC SURGERY

## 2018-03-23 PROCEDURE — 25010000003 CEFAZOLIN IN DEXTROSE 2-4 GM/100ML-% SOLUTION: Performed by: ORTHOPAEDIC SURGERY

## 2018-03-23 RX ORDER — PSEUDOEPHEDRINE HCL 30 MG
100 TABLET ORAL 2 TIMES DAILY
Start: 2018-03-23 | End: 2018-06-27

## 2018-03-23 RX ORDER — OXYCODONE AND ACETAMINOPHEN 7.5; 325 MG/1; MG/1
TABLET ORAL
Qty: 60 TABLET | Refills: 0
Start: 2018-03-23 | End: 2018-06-27

## 2018-03-23 RX ADMIN — MUPIROCIN: 20 OINTMENT TOPICAL at 08:44

## 2018-03-23 RX ADMIN — PREDNISONE 40 MG: 20 TABLET ORAL at 08:44

## 2018-03-23 RX ADMIN — OXYCODONE HYDROCHLORIDE AND ACETAMINOPHEN 2 TABLET: 7.5; 325 TABLET ORAL at 11:26

## 2018-03-23 RX ADMIN — PANTOPRAZOLE SODIUM 40 MG: 40 TABLET, DELAYED RELEASE ORAL at 06:41

## 2018-03-23 RX ADMIN — OXYCODONE HYDROCHLORIDE AND ACETAMINOPHEN 2 TABLET: 7.5; 325 TABLET ORAL at 07:27

## 2018-03-23 RX ADMIN — OXYCODONE HYDROCHLORIDE AND ACETAMINOPHEN 2 TABLET: 7.5; 325 TABLET ORAL at 03:07

## 2018-03-23 RX ADMIN — CEFAZOLIN SODIUM 2 G: 2 INJECTION, SOLUTION INTRAVENOUS at 03:07

## 2018-03-23 NOTE — DISCHARGE SUMMARY
Date of Admission:  3/22/2018    Date of Discharge:  3/23/2018    Discharge Diagnosis: s/p Right reverse total shoulder arthroplasty    Admitting Physician: Kj Garcia    Consults: none    DETAILS OF HOSPITAL STAY:  Patient is a 73 y.o. male was admitted to the floor following a reverse total shoulder arthroplasty.  Post-operatively the patient was transferred to the post-operative floor where the patient underwent physical therapy including both active and passive ROM exercises. Opioids were titrated to achieve appropriate pain management to allow for participation in mobilization exercises. Vital signs are now stable. The incision is benign without signs or symptoms of infection. Operative extremity neurovascular status remains intact. Appropriate education re: incision care, activity levels, medications, and follow up visits was completed and all questions were answered. The patient is now deemed stable for discharge to home.    Condition on Discharge:  Stable    Discharge Medications   Rodolfo Grover   Home Medication Instructions LUCILA:[id number]    Printed on:03/23/18 3424   Medication Information                      cyclopentolate (CYCLOGYL) 1 % ophthalmic solution  Administer 1 drop to both eyes 2 (Two) Times a Day.             difluprednate (DUREZOL) 0.05 % ophthalmic emulsion  1 drop 4 (Four) Times a Day.             docusate sodium 100 MG capsule  Take 100 mg by mouth 2 (Two) Times a Day.             esomeprazole (nexIUM) 20 MG capsule  Take 20 mg by mouth Every Morning Before Breakfast.             oxyCODONE-acetaminophen (PERCOCET) 7.5-325 MG per tablet  Take 1-2 tabs po q 4 hours prn pain             predniSONE (DELTASONE) 10 MG tablet  Take 40 mg by mouth Daily.                 Discharge Diet: regular    Activity at Discharge: as tolerated    Discharge Instructions:   1)  Patient is to continue with physical therapy exercises daily and continue working with the physical therapist as ordered.  2)   Continue to follow precautions as instructed.   3)  Patient is instructed to continue use of the sling except when performing their physical therapy exercising and while dressing or showering.  4)  Continue NORBERTO hose daily and ice regularly.  Patient also instructed on incentive spirometer during hospitalization and encouraged to continue to use at home regularly.   5)  The dressing should be left in place. If waterproof dressing is intact the patient may shower immediately following discharge. If the dressing becomes disloged or saturated it should be changed and patient must wait until POD #5 to shower. If dressing is changed, apply dry sterile dressing after showering.  6)  Follow up appointment in 2 weeks - patient to call the office at [phone] to schedule.     Follow-up Appointments  2 weeks with Dr Radha Garcia MD  03/23/18  1:05 PM

## 2018-03-23 NOTE — PLAN OF CARE
Problem: Patient Care Overview  Goal: Plan of Care Review  Outcome: Ongoing (interventions implemented as appropriate)   03/23/18 0140   Plan of Care Review   Progress improving   Coping/Psychosocial   Plan of Care Reviewed With patient   OTHER   Outcome Summary Pt is a post op of a rt total shoulder. Dressing is clean dry and intact. Pt continues with the bulky dressing. Pt continues with PO pain meds that provide relief. Pt has been voiding via the urinal, but has refused to get out of bed. Pt is very needy and particular. Pt continues with the do not change dressing. Pt educated on the importance of monitoring his heart rate related to history of bradycardia., Pt voiced understanding. Pt using the incentive spirometer effectively. Pt possibly going home in AM. Will continue to monitor.     Goal: Individualization and Mutuality  Outcome: Ongoing (interventions implemented as appropriate)    Goal: Discharge Needs Assessment  Outcome: Ongoing (interventions implemented as appropriate)    Goal: Interprofessional Rounds/Family Conf  Outcome: Ongoing (interventions implemented as appropriate)      Problem: Fall Risk (Adult)  Goal: Absence of Fall  Outcome: Ongoing (interventions implemented as appropriate)    Goal: Absence of Fall  Outcome: Ongoing (interventions implemented as appropriate)      Problem: Shoulder Arthroplasty (Adult)  Goal: Signs and Symptoms of Listed Potential Problems Will be Absent, Minimized or Managed (Shoulder Arthroplasty)  Outcome: Ongoing (interventions implemented as appropriate)    Goal: Anesthesia/Sedation Recovery  Outcome: Ongoing (interventions implemented as appropriate)

## 2018-03-23 NOTE — DISCHARGE INSTR - ACTIVITY
You are non weight bearing to your right arm  Keep sling in place, able to remove to shower  No lifting, pulling with RUE

## 2018-03-23 NOTE — PLAN OF CARE
Problem: Patient Care Overview  Goal: Plan of Care Review  Outcome: Ongoing (interventions implemented as appropriate)  Pt doing well. Refuses to get up and ambulate. Worked with therapy and they have signed off. Sling in place. VSS. No Change Dressing CDI. HV D/C'd.  NVI. Pain is controlled with PO Pain medication. Uses IS, demonstrated understanding. Educated on the importance of infection prevention with surgery. Verbalized understanding. No co-morbidities to report at this time. Pt to be d/c'd today home. Will monitor.    03/23/18 0140 03/23/18 0844   Plan of Care Review   Progress improving --    Coping/Psychosocial   Plan of Care Reviewed With --  patient      Goal: Discharge Needs Assessment  Outcome: Ongoing (interventions implemented as appropriate)    Goal: Interprofessional Rounds/Family Conf  Outcome: Ongoing (interventions implemented as appropriate)      Problem: Fall Risk (Adult)  Goal: Identify Related Risk Factors and Signs and Symptoms  Outcome: Ongoing (interventions implemented as appropriate)    Goal: Absence of Fall  Outcome: Ongoing (interventions implemented as appropriate)      Problem: Shoulder Arthroplasty (Adult)  Goal: Signs and Symptoms of Listed Potential Problems Will be Absent, Minimized or Managed (Shoulder Arthroplasty)  Outcome: Ongoing (interventions implemented as appropriate)

## 2018-03-27 ENCOUNTER — OFFICE VISIT (OUTPATIENT)
Dept: FAMILY MEDICINE CLINIC | Facility: CLINIC | Age: 73
End: 2018-03-27

## 2018-03-27 VITALS
HEIGHT: 66 IN | WEIGHT: 176 LBS | HEART RATE: 88 BPM | SYSTOLIC BLOOD PRESSURE: 138 MMHG | BODY MASS INDEX: 28.28 KG/M2 | DIASTOLIC BLOOD PRESSURE: 78 MMHG | OXYGEN SATURATION: 98 %

## 2018-03-27 DIAGNOSIS — B37.0 ORAL THRUSH: ICD-10-CM

## 2018-03-27 DIAGNOSIS — I49.3 FREQUENT PVCS: ICD-10-CM

## 2018-03-27 DIAGNOSIS — M19.90 ARTHRITIS: ICD-10-CM

## 2018-03-27 DIAGNOSIS — K21.9 GASTROESOPHAGEAL REFLUX DISEASE WITHOUT ESOPHAGITIS: ICD-10-CM

## 2018-03-27 DIAGNOSIS — Z09 HOSPITAL DISCHARGE FOLLOW-UP: Primary | ICD-10-CM

## 2018-03-27 PROCEDURE — 99214 OFFICE O/P EST MOD 30 MIN: CPT | Performed by: FAMILY MEDICINE

## 2018-03-27 NOTE — PROGRESS NOTES
Subjective   Rodolfo Grover is a 73 y.o. male.     Chief Complaint   Patient presents with   • Establish Care        History of Present Illness    Recent right shoulder/humerus fracture this month, after fall on shoulder in garage, had replacement with ortho Dr. Garcia.    Has not been to PCP for 3 years.   He has hx of HLA -B27, and along with that inflatmmation. He does have arthritis. Affects most joints intermittently, get hot to the touch, even his chest bone. 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 Mason for this problem and is being treated. Taking oral prednisone, one more day and eye drops. Also sees Dr. Hill for his retina. Noted better well being with exercise, weaker, not feeling as good     Cards  Following his shoulder surgery he had bradycardia and was noted to have frequent PVCs. He was seen by cardiology in the hospital for further evaluation and clearance prior to the next surgery. Echo stress was performed and noted overall normal, just mild TVR and RV pressure at upper limits of normal. Cardiology cleared for surgery and recommended he have sleep study.    GI  Sees Rafael with GI, has had scopes. GERD for 25 years has been on therapy with PPI with good results. Does poorly with significant symptoms when off of the PPI. Lots of discomfort.    Vertigo  Does recur but less often and much less intense. He does have vomiting with episodes. Helps to have meclizine then sleeps.     The following portions of the patient's history were reviewed and updated as appropriate: allergies, current medications, past family history, past medical history, past social history, past surgical history and problem list.    Review of Systems   Constitutional: Negative for activity change, appetite change, fever and unexpected weight change.   HENT: Negative for sore throat and trouble swallowing.    Respiratory: Negative for cough and shortness of breath.    Cardiovascular: Positive for leg swelling.  "Negative for chest pain and palpitations.        Mild leg swelling improves overnight   Gastrointestinal: Negative for constipation and diarrhea.   Musculoskeletal: Negative for gait problem and joint swelling.       Objective   Blood pressure 138/78, pulse 88, height 167.6 cm (66\"), weight 79.8 kg (176 lb), SpO2 98 %.  Physical Exam   Constitutional: He is oriented to person, place, and time. He appears well-nourished. No distress.   HENT:   Right Ear: External ear normal.   Left Ear: External ear normal.   Nose: Nose normal.   Has white, reticulated plaques that do not scrape off on oral mucosa from right side of uvula, oropharynx across to buccal mucosa on the right side only. Over erythematous base.    Eyes: Conjunctivae are normal. Right eye exhibits no discharge. Left eye exhibits no discharge. No scleral icterus.   Neck: Neck supple. No thyromegaly present.   Cardiovascular: Normal rate, regular rhythm, normal heart sounds and intact distal pulses.  Exam reveals no gallop and no friction rub.    No murmur heard.  Pulmonary/Chest: Effort normal and breath sounds normal. No respiratory distress. He has no wheezes.   Musculoskeletal: He exhibits no edema.   Lymphadenopathy:     He has no cervical adenopathy.   Neurological: He is alert and oriented to person, place, and time.   Psychiatric: He has a normal mood and affect. His behavior is normal.   Vitals reviewed.      Assessment/Plan   Rodolfo was seen today for establish care.    Diagnoses and all orders for this visit:    Hospital discharge follow-up  Doing well, no longer on narcotics and pain is controlled. Still moving slowly but overall well.  Frequent PVCs  Being evaluated by cardiology, echo done, going to have sleep study.  Gastroesophageal reflux disease without esophagitis  Controlled on PPI, continue current therapy  Arthritis  Unchanged, HLA-B27 genotype affecting condition, seen by Rheum  Oral thrush  Probable given pt on long course of steroids, " unique in that it does not scrape off and unilateral. Will treat like thrush but also consider other etiology if not improved.  Other orders  -     nystatin (MYCOSTATIN) 820114 UNIT/ML suspension; Swish and swallow 5 mL 4 (Four) Times a Day.  -     esomeprazole (nexIUM) 20 MG capsule; Take 1 capsule by mouth Every Morning Before Breakfast.

## 2018-04-09 ENCOUNTER — OFFICE VISIT (OUTPATIENT)
Dept: ORTHOPEDIC SURGERY | Facility: CLINIC | Age: 73
End: 2018-04-09

## 2018-04-09 VITALS — HEIGHT: 67 IN | WEIGHT: 176 LBS | BODY MASS INDEX: 27.62 KG/M2 | TEMPERATURE: 97.6 F

## 2018-04-09 DIAGNOSIS — Z09 SURGERY FOLLOW-UP: ICD-10-CM

## 2018-04-09 DIAGNOSIS — Z96.611 STATUS POST REPLACEMENT OF RIGHT SHOULDER JOINT: Primary | ICD-10-CM

## 2018-04-09 PROCEDURE — 99024 POSTOP FOLLOW-UP VISIT: CPT | Performed by: ORTHOPAEDIC SURGERY

## 2018-04-09 PROCEDURE — 73030 X-RAY EXAM OF SHOULDER: CPT | Performed by: ORTHOPAEDIC SURGERY

## 2018-04-09 RX ORDER — OXYCODONE AND ACETAMINOPHEN 7.5; 325 MG/1; MG/1
1 TABLET ORAL EVERY 4 HOURS PRN
Qty: 50 TABLET | Refills: 0 | Status: SHIPPED | OUTPATIENT
Start: 2018-04-09 | End: 2018-06-27

## 2018-04-10 NOTE — PROGRESS NOTES
"Rodolfo Grover : 1945 MRN: [id number] DATE: 2018    DIAGNOSIS:  2 week follow up right shoulder arthroplasty      SUBJECTIVE:  Patient returns today for 2 week follow up of right shoulder replacement. Patient reports doing well with no unusual complaints.      OBJECTIVE:    Temp 97.6 °F (36.4 °C) (Temporal Artery )   Ht 170.2 cm (67\")   Wt 79.8 kg (176 lb)   BMI 27.57 kg/m²     Exam:. The incision is well approximated. No erythema or drainage. Shoulder moves fluidly with pendulums . The arm is soft and nontender.  Good strength in hand and wrist.  Distal sensation intact.  Palpable radial pulse.    DIAGNOSTIC STUDIES    Xrays: 2 views of the right shoulder (AP, scapular Y) were ordered and reviewed for evaluation of shoulder replacement.  They demonstrate a well positioned, well aligned replacement without complicating factors noted.  In comparison with previous films, there has been no change.    ASSESSMENT: 2 week follow up right shoulder replacement.    PLAN:   1.  Begin PT per protocol--prescription given along with 2 copies of my protocol.  2.  Continue sling until next visit.  3.  Counseled patient about appropriate activity modifications and restrictions, including no driving at this point.  4.  I did agree to refill his Percocet and risks were discussed.  I will see him back in 1 month.    Kj Garcia MD    "

## 2018-04-11 ENCOUNTER — HOSPITAL ENCOUNTER (OUTPATIENT)
Dept: PHYSICAL THERAPY | Facility: HOSPITAL | Age: 73
Setting detail: THERAPIES SERIES
Discharge: HOME | End: 2018-04-11

## 2018-04-11 DIAGNOSIS — Z47.89 ORTHOPEDIC AFTERCARE: Primary | ICD-10-CM

## 2018-04-11 DIAGNOSIS — Z74.09 IMPAIRED MOBILITY: ICD-10-CM

## 2018-04-11 DIAGNOSIS — Z47.1 AFTERCARE FOLLOWING RIGHT SHOULDER JOINT REPLACEMENT SURGERY: ICD-10-CM

## 2018-04-11 DIAGNOSIS — Z96.611 AFTERCARE FOLLOWING RIGHT SHOULDER JOINT REPLACEMENT SURGERY: ICD-10-CM

## 2018-04-11 PROCEDURE — 97161 PT EVAL LOW COMPLEX 20 MIN: CPT | Performed by: PHYSICAL THERAPIST

## 2018-04-11 PROCEDURE — G8984 CARRY CURRENT STATUS: HCPCS | Performed by: PHYSICAL THERAPIST

## 2018-04-11 PROCEDURE — 97140 MANUAL THERAPY 1/> REGIONS: CPT | Performed by: PHYSICAL THERAPIST

## 2018-04-11 PROCEDURE — 97110 THERAPEUTIC EXERCISES: CPT | Performed by: PHYSICAL THERAPIST

## 2018-04-11 PROCEDURE — G8985 CARRY GOAL STATUS: HCPCS | Performed by: PHYSICAL THERAPIST

## 2018-04-11 NOTE — THERAPY EVALUATION
Outpatient Physical Therapy Ortho Initial Evaluation  [facility]     Patient Name: Rodolfo Grover  : 1945  MRN: [id number]  Today's Date: 2018      Visit Date: 2018    Patient Active Problem List   Diagnosis   • Acute blood loss anemia   • Arthritis   • Fracture of fifth metacarpal bone of right hand   • GI bleed   • Melena   • Closed fracture dislocation of right shoulder   • Gastroesophageal reflux disease without esophagitis   • Leukocytosis   • Hyperglycemia   • Fall at home   • DNR (do not resuscitate)   • Bradycardia following surgery   • Frequent PVCs   • Proximal humerus fracture   • Osteoarthritis        Past Medical History:   Diagnosis Date   • GERD (gastroesophageal reflux disease)    • Iritis of right eye    • Rheumatic fever    • Shoulder fracture, right         Past Surgical History:   Procedure Laterality Date   • HERNIA REPAIR     • SHOULDER CLOSED REDUCTION Right 3/16/2018    Procedure: RIGHT SHOULDER CLOSED REDUCTION;  Surgeon: Kj Garcia MD;  Location: [facility];  Service: Orthopedics   • TONSILLECTOMY     • TOTAL SHOULDER ARTHROPLASTY W/ DISTAL CLAVICLE EXCISION Right 3/22/2018    Procedure: Reverse Total Shoulder Arthroplsty;  Surgeon: Kj Garcia MD;  Location: [facility];  Service: Orthopedics       Visit Dx:     ICD-10-CM ICD-9-CM   1. Orthopedic aftercare Z47.89 V54.9   2. Aftercare following right shoulder joint replacement surgery Z47.1 V54.81    Z96.611 V43.61   3. Impaired mobility Z74.09 799.89             Patient History     Row Name 18 1000             History    Chief Complaint Difficulty with daily activities;Pain  -GJ      Type of Pain Shoulder pain   R  -GJ      Date Current Problem(s) Began 18  -GJ      Brief Description of Current Complaint Mr. Grover is a 74 y/o R handed male.  He reports falling in his garage after losing his balance 3 days prior to his reverse R shoulder TSA (3/26/18).  He is 3 weeks s/p R shoulder reverse  TSA.  Of note, he reports dizziness when laying down and turning his head, resolves in </= 2 min (will have him set up for BPPV eval).  He denies pain, denies N/T of RUE, denies sleep difficulty.  He has been wearing sling.   -GJ      Previous treatment for THIS PROBLEM Surgery  -GJ      Surgery Date: 03/22/18  -GJ      Patient/Caregiver Goals Relieve pain;Return to prior level of function;Improve mobility;Know what to do to help the symptoms;Improve strength  -GJ      Hand Dominance right-handed  -GJ      Occupation/sports/leisure activities real estate, traveling  -GJ         Pain     Pain Location Shoulder   R  -GJ      Pain at Present 0  -GJ      Pain at Best 0  -GJ      Pain at Worst 0  -GJ         Fall Risk Assessment    Any falls in the past year: Yes  -GJ      Number of falls reported in the last 12 months 1  -GJ      Factors that contributed to the fall: Lost balance  -GJ         Daily Activities    Primary Language English  -GJ      How does patient learn best? Reading  -GJ      Teaching needs identified Home Exercise Program;Management of Condition  -GJ      Barriers to learning None  -GJ      Recommended Referrals --   Erik for eval for BPPV  -GJ      Pt Participated in POC and Goals Yes  -GJ         Safety    Are you being hurt, hit, or frightened by anyone at home or in your life? No  -GJ      Are you being neglected by a caregiver No  -GJ        User Key  (r) = Recorded By, (t) = Taken By, (c) = Cosigned By    Initials Name Provider Type    KURTIS Palencia PT Physical Therapist                PT Ortho     Row Name 04/11/18 1300       Posture/Observations    Alignment Options Forward head;Rounded shoulders;Scapular winging  -GJ    Forward Head Mild;Moderate  -GJ    Rounded Shoulders Moderate  -GJ    Scapular winging Right:;Mild  -GJ    Observations Ecchymosis/bruising;Incision healing  -GJ       General ROM    Head/Neck/Trunk --   c spine AROM WFL all planes  -GJ    RT Upper Ext Rt Shoulder  ABduction;Rt Shoulder Flexion;Rt Shoulder External Rotation;Rt Shoulder Internal Rotation;Rt Elbow Supination;Rt Elbow Pronation;Rt Wrist Flexion;Rt Wrist Extension  -GJ    LT Upper Ext Lt Shoulder ABduction;Lt Shoulder Flexion;Lt Shoulder External Rotation;Lt Shoulder Internal Rotation  -GJ       Right Upper Ext    Rt Shoulder Abduction AROM NT secondary to surgery  -GJ    Rt Shoulder Abduction PROM grossly 40 degrees  -GJ    Rt Shoulder Flexion AROM NT secondary to surgery  -GJ    Rt Shoulder Flexion PROM grossly 90  -GJ    Rt Shoulder External Rotation AROM NT secondary to surgery  -GJ    Rt Shoulder External Rotation PROM grossly 25  -GJ    Rt Elbow Supination AROM grossly full  -GJ    Rt Elbow Pronation AROM grossly full  -GJ    Rt Wrist Flexion AROM grossly WFL  -GJ    Rt Wrist Extension AROM grossly WFL  -GJ       Left Upper Ext    Lt Shoulder Abduction AROM 160 seated  -GJ    Lt Shoulder Flexion AROM 140 seated  -GJ    Lt Shoulder Internal Rotation AROM FIR Midline T5  -GJ       MMT (Manual Muscle Testing)    Additional Documentation --   RUE not tested, noted difficulty with R elbow flexion.    -GJ       Flexibility    Flexibility Tested? Upper Extremity  -GJ      User Key  (r) = Recorded By, (t) = Taken By, (c) = Cosigned By    Initials Name Provider Type    KURTIS Palencia, PT Physical Therapist                      Therapy Education  Education Details: discussed dx, px, poc, discussed anatomy of the shoulder girdle, and physiology of healing, radha. after fall/reverse TSA.  Discussed realistic time frames and expectations including ROM excpectations.  Reviewed activity modifications per surgeon.  Discussed not getting in pool/hot tub/bath until 6 weeks out for infeciton protection (pt. going to [place] this week).  Also discussed taking care of incision and avoiding sun on his incision.  Cautioned him about his trip and his shoulder.   Given: HEP, Symptoms/condition management, Pain management, Posture/body  mechanics, Mobility training, Edema management, Fall prevention and home safety  Program: New  How Provided: Verbal, Written, Demonstration  Provided to: Patient, Other (comment)  Level of Understanding: Teach back education performed, Verbalized, Demonstrated           PT OP Goals     Row Name 04/11/18 1200          PT Short Term Goals    STG Date to Achieve 05/16/18  -     STG 1 pt. to be I with initial HEP to facilitate self management of their condition  -GJ     STG 1 Progress New  -GJ     STG 2 pt. to be educated in/verbalize understanding of the importance of posture/ergonomics in association with their condition to facilitate self management of their condition  -GJ     STG 2 Progress New  -GJ     STG 3 pt to demonstrate R shoulder PROM flexion >/= 120 to facilitate ease of performing self care activities  -GJ     STG 3 Progress New  -        Long Term Goals    LTG Date to Achieve 07/11/18  -     LTG 1 pt. to be I with advanced HEP to facilitate self management of their condition  -GJ     LTG 1 Progress New  -     LTG 2 pt. to report a DASH score </= 30 to demonstrate decreased level of perceived disability  -GJ     LTG 2 Progress New  -     LTG 3 pt. to demonstrate R shoulder flexion AROM >/= 0-120 to facilitate ease of performing self care/dressing activities  -GJ     LTG 3 Progress New  -     LTG 4 pt. to demonstrate placing/retrieving small object from an above the shoulder level shelf with her LUE to facilitate ease of performing household/work related activities  -     LTG 4 Progress New  [facility]        Time Calculation    PT Goal Re-Cert Due Date 07/11/18  -       User Key  (r) = Recorded By, (t) = Taken By, (c) = Cosigned By    Initials Name Provider Type    KURTIS Palencia, PT Physical Therapist                PT Assessment/Plan     Row Name 04/11/18 1255          PT Assessment    Functional Limitations Limitation in home management;Limitations in community activities;Performance in work  activities;Performance in self-care ADL;Performance in leisure activities;Limitations in functional capacity and performance  -     Impairments Range of motion;Impaired muscle endurance;Muscle strength;Impaired muscle length;Pain;Edema;Impaired flexibility;Joint mobility  -GJ     Assessment Comments Mr. Grover is a 72 y/o R handed male.  He reports falling in his garage after losing his balance 3 days prior to his reverse R shoulder TSA (3/26/18).  He is 3 weeks s/p R shoulder reverse TSA.  Of note, he reports dizziness when laying down and turning his head, resolves in </= 2 min (will have him set up for BPPV eval).  He denies pain, denies N/T of RUE, denies sleep difficulty.  He has been wearing sling.  Mr. Grover presents to the clinic today wearing sling on RUE.  He demonstrates well healing incision no s/s of infection, and guarding of his RUE (ecchymosis noted).  He has difficulty with AROM elbow flexion (however full range), full elbow supination/pronation and wrist AROM. He demonstrates PROM R shoulder to 90 degrees.  He reports DASH score of 75% (scored 0-100, 0 represents no perceived disability).  Mr. Grover demonstrates stable s/s consistent with the above injury, limiting his participation in self care, household mobility, community and work related activities. He will benefit from skilled physical therapy intervention to address the above impairments.   -GJ     Please refer to paper survey for additional self-reported information Yes  -GJ     Rehab Potential Excellent  -GJ     Patient/caregiver participated in establishment of treatment plan and goals Yes  -GJ     Patient would benefit from skilled therapy intervention Yes  -GJ        PT Plan    PT Frequency 2x/week;3x/week  -GJ     Predicted Duration of Therapy Intervention (OT Eval) 6-8 weeks  -GJ     Planned CPT's? PT EVAL LOW COMPLEXITY: 70568;PT RE-EVAL: 11754;PT THER PROC EA 15 MIN: 00249;PT MANUAL THERAPY EA 15 MIN: 71045;PT HOT OR COLD PACK TREAT  MERRICK;PT ELECTRICAL STIM UNATTEND: ;PT ULTRASOUND EA 15 MIN: 73313  -GJ     PT Plan Comments per protocol in chart, progress as appropriate.  Pt. is going out of town for about a week, will resume when he returns.  warm up with shrugs, scap retraction.  Depending when he returns, may initiate gentle deltoid isometrics.  PROM R shoulder, SL scapular work.  Modalities prn.   -GJ       User Key  (r) = Recorded By, (t) = Taken By, (c) = Cosigned By    Initials Name Provider Type    KURTIS Palencia, PT Physical Therapist                  Exercises     Row Name 04/11/18 1000             Exercise 1    Exercise Name 1 shoulder shrugs  -GJ      Cueing 1 Demo;Verbal  -GJ      Reps 1 15  -GJ         Exercise 2    Exercise Name 2 scap retraction  -GJ      Cueing 2 Verbal;Demo  -GJ      Reps 2 15  -GJ      Time 2 5 seconds  -GJ         Exercise 3    Exercise Name 3 AROM elbow flexion/ext  -GJ      Cueing 3 Demo  -GJ      Reps 3 15  -GJ         Exercise 4    Exercise Name 4 AROM supintaion/pronation, squeeze ball  -GJ      Cueing 4 Demo;Verbal  -GJ      Reps 4 15  -GJ         Exercise 5    Exercise Name 5 supine AAROM flexion (to 90) with self  -GJ      Cueing 5 Verbal;Tactile  -GJ      Reps 5 15  -GJ        User Key  (r) = Recorded By, (t) = Taken By, (c) = Cosigned By    Initials Name Provider Type    KURTIS Palencia, PT Physical Therapist           Manual Rx (last 36 hours)      Manual Treatments     Row Name 04/11/18 0900             Manual Rx 1    Manual Rx 1 Location PROM R shoulder into flexion (90), ER (20). Pt suupine  -GJ         Manual Rx 2    Manual Rx 2 Location gentle oscillations for relaxation  -GJ        User Key  (r) = Recorded By, (t) = Taken By, (c) = Cosigned By    Initials Name Provider Type    KURTIS Palencia PT Physical Therapist                      Outcome Measure Options: Disabilities of the Arm, Shoulder, and Hand (DASH)         Time Calculation:   Start Time: 0915  Stop Time: 1000  Time  Calculation (min): 45 min     Therapy Charges for Today     Code Description Service Date Service Provider Modifiers Qty    33547972931 HC PT CARRY MOV HAND OBJ CURRENT 4/11/2018 Silvano Palencia, PT GP, CL 1    64612603126 HC PT CARRY MOV HAND OBJ PROJECTED 4/11/2018 Silvano Palencia, PT GP, CJ 1    77946451359 HC PT MANUAL THERAPY EA 15 MIN 4/11/2018 Silvano Palencia, PT GP 1    69225339620 HC PT THER PROC EA 15 MIN 4/11/2018 Silvano Palencia, PT GP 1    90625513753 HC PT EVAL LOW COMPLEXITY 1 4/11/2018 Silvano Palencia, PT GP 1          PT G-Codes  PT Professional Judgement Used?: Yes  Outcome Measure Options: Disabilities of the Arm, Shoulder, and Hand (DASH)  Score: 75  Functional Limitation: Carrying, moving and handling objects  Carrying, Moving and Handling Objects Current Status (): At least 60 percent but less than 80 percent impaired, limited or restricted  Carrying, Moving and Handling Objects Goal Status (): At least 20 percent but less than 40 percent impaired, limited or restricted         Silvano Palencia, PT  4/11/2018

## 2018-04-27 ENCOUNTER — HOSPITAL ENCOUNTER (OUTPATIENT)
Dept: PHYSICAL THERAPY | Facility: HOSPITAL | Age: 73
Setting detail: THERAPIES SERIES
Discharge: HOME | End: 2018-04-27

## 2018-04-27 DIAGNOSIS — Z47.89 ORTHOPEDIC AFTERCARE: Primary | ICD-10-CM

## 2018-04-27 DIAGNOSIS — Z74.09 IMPAIRED MOBILITY: ICD-10-CM

## 2018-04-27 DIAGNOSIS — Z96.611 AFTERCARE FOLLOWING RIGHT SHOULDER JOINT REPLACEMENT SURGERY: ICD-10-CM

## 2018-04-27 DIAGNOSIS — Z47.1 AFTERCARE FOLLOWING RIGHT SHOULDER JOINT REPLACEMENT SURGERY: ICD-10-CM

## 2018-04-27 PROCEDURE — 97140 MANUAL THERAPY 1/> REGIONS: CPT | Performed by: PHYSICAL THERAPIST

## 2018-04-27 PROCEDURE — 97110 THERAPEUTIC EXERCISES: CPT | Performed by: PHYSICAL THERAPIST

## 2018-04-27 NOTE — THERAPY TREATMENT NOTE
Outpatient Physical Therapy Ortho Treatment Note  [facility]     Patient Name: Rodolfo Grover  : 1945  MRN: [id number]  Today's Date: 2018      Visit Date: 2018    Visit Dx:    ICD-10-CM ICD-9-CM   1. Orthopedic aftercare Z47.89 V54.9   2. Aftercare following right shoulder joint replacement surgery Z47.1 V54.81    Z96.611 V43.61   3. Impaired mobility Z74.09 799.89       Patient Active Problem List   Diagnosis   • Acute blood loss anemia   • Arthritis   • Fracture of fifth metacarpal bone of right hand   • GI bleed   • Melena   • Closed fracture dislocation of right shoulder   • Gastroesophageal reflux disease without esophagitis   • Leukocytosis   • Hyperglycemia   • Fall at home   • DNR (do not resuscitate)   • Bradycardia following surgery   • Frequent PVCs   • Proximal humerus fracture   • Osteoarthritis        Past Medical History:   Diagnosis Date   • GERD (gastroesophageal reflux disease)    • Iritis of right eye    • Rheumatic fever    • Shoulder fracture, right         Past Surgical History:   Procedure Laterality Date   • HERNIA REPAIR     • SHOULDER CLOSED REDUCTION Right 3/16/2018    Procedure: RIGHT SHOULDER CLOSED REDUCTION;  Surgeon: Kj Garcia MD;  Location: [facility];  Service: Orthopedics   • TONSILLECTOMY     • TOTAL SHOULDER ARTHROPLASTY W/ DISTAL CLAVICLE EXCISION Right 3/22/2018    Procedure: Reverse Total Shoulder Arthroplsty;  Surgeon: Kj Garcia MD;  Location: [facility];  Service: Orthopedics                             PT Assessment/Plan     Row Name 18 1106          PT Assessment    Assessment Comments Patient noncompliant with sling use. Despite this, his pain is well controlled and ROM progressing within guidelines of post op protocol.  Patient may be able to add isometrics to home next week pending his carryover from today's session.    -GR        PT Plan    PT Plan Comments Continue per MD protocol.  At patient's request will evaluate  "vertigo if and when scipt received.  -GR       User Key  (r) = Recorded By, (t) = Taken By, (c) = Cosigned By    Initials Name Provider Type    JOVITA Cortez, [place] Physical Therapist                Modalities     Row Name 04/27/18 1000             Ice    Ice Applied Yes  -GR      Location R shoulder supine (wedge)  -GR      Rx Minutes 10 mins  -GR      Ice S/P Rx Yes  -GR        User Key  (r) = Recorded By, (t) = Taken By, (c) = Cosigned By    Initials Name Provider Type    JOVITA Cortez, [place] Physical Therapist                Exercises     Row Name 04/27/18 1000             Subjective Comments    Subjective Comments Patient arrives out of ing. States he discontinued on his own because it \"got in the way.\" Does not see Dr. Garcia till 5/9.  Reports compliance to HEP daily while away in [place], returned this past Sunday.  Denies pain and states he has not had to use his prescription pain medication.  -GR         Subjective Pain    Able to rate subjective pain? yes  -GR      Pre-Treatment Pain Level 0  -GR      Post-Treatment Pain Level 0  -GR         Exercise 1    Exercise Name 1 shoulder shrugs  -GR      Cueing 1 Demo;Verbal  -GR      Sets 1 2  -GR      Reps 1 10  -GR         Exercise 2    Exercise Name 2 scap retraction  -GR      Cueing 2 Verbal;Demo  -GR      Sets 2 2  -GR      Reps 2 10  -GR      Time 2 5 seconds  -GR         Exercise 3    Exercise Name 3 AROM elbow flexion/ext  -GR      Cueing 3 Demo  -GR      Sets 3 2  -GR      Reps 3 10  -GR         Exercise 4    Exercise Name 4 AROM supination/pronation, squeeze ball  -GR      Cueing 4 Demo;Verbal  -GR      Sets 4 2  -GR      Reps 4 10  -GR      Additional Comments elbow supported  -GR         Exercise 5    Exercise Name 5 supine AAROM flexion (to 90) with self  -GR      Cueing 5 Verbal;Tactile  -GR      Sets 5 2  -GR      Reps 5 10  -GR        User Key  (r) = Recorded By, (t) = Taken By, (c) = Cosigned By    Initials Name Provider Type    JOVITA CADE" Diego, PT Physical Therapist                        Manual Rx (last 36 hours)      Manual Treatments     Row Name 04/27/18 0900             Manual Rx 1    Manual Rx 1 Location PROM R shoulder into flexion (140), ER (20). Pt supine with wedge  -GR      Manual Rx 1 Type Intermittent gentle oscillations/distraction  -GR      Manual Rx 1 Duration 20 minutes  -GR         Manual Rx 2    Manual Rx 2 Location manually resisted isometrics - 4 way -  -GR      Manual Rx 2 Grade 5 second holds 10 reps each direction  -GR      Manual Rx 2 Duration no active shoulder extension   -GR        User Key  (r) = Recorded By, (t) = Taken By, (c) = Cosigned By    Initials Name Provider Type    GR Gaurav CADE Diego, PT Physical Therapist                PT OP Goals     Row Name 04/27/18 1100          PT Short Term Goals    STG Date to Achieve 05/16/18  -GR     STG 1 pt. to be I with initial HEP to facilitate self management of their condition  -GR     STG 1 Progress Ongoing  -GR     STG 2 pt. to be educated in/verbalize understanding of the importance of posture/ergonomics in association with their condition to facilitate self management of their condition  -GR     STG 2 Progress Ongoing  -GR     STG 3 pt to demonstrate R shoulder PROM flexion >/= 120 to facilitate ease of performing self care activities  -GR     STG 3 Progress Ongoing  -GR        Long Term Goals    LTG Date to Achieve 07/11/18  -GR     LTG 1 pt. to be I with advanced HEP to facilitate self management of their condition  -GR     LTG 1 Progress Ongoing  -GR     LTG 2 pt. to report a DASH score </= 30 to demonstrate decreased level of perceived disability  -GR     LTG 2 Progress Ongoing  -GR     LTG 3 pt. to demonstrate R shoulder flexion AROM >/= 0-120 to facilitate ease of performing self care/dressing activities  -GR     LTG 3 Progress Ongoing  -GR     LTG 4 pt. to demonstrate placing/retrieving small object from an above the shoulder level shelf with her LUE to facilitate  ease of performing household/work related activities  -GR     LTG 4 Progress Ongoing  -GR       User Key  (r) = Recorded By, (t) = Taken By, (c) = Cosigned By    Initials Name Provider Type    GR Gaurav Cortez PT Physical Therapist          Therapy Education  Education Details: reviewed post op protocol including no AROM until 6 weeks out and sling recommendation  Given: Symptoms/condition management  Program: Reinforced  How Provided: Verbal  Provided to: Patient  Level of Understanding: Teach back education performed              Time Calculation:   Start Time: 1005  Stop Time: 1055  Time Calculation (min): 50 min  Total Timed Code Minutes- PT: 40 minute(s)    Therapy Charges for Today     Code Description Service Date Service Provider Modifiers Qty    07582110754 HC PT HOT OR COLD PACK TREAT MCARE 4/27/2018 Gaurav Cortez, PT GP 1    56773242595 HC PT MANUAL THERAPY EA 15 MIN 4/27/2018 Gaurav Cortze, PT GP 2    22105165349 HC PT THER PROC EA 15 MIN 4/27/2018 Gaurav Cortez, PT GP 1                    Gaurav Cortez PT  4/27/2018

## 2018-05-01 ENCOUNTER — HOSPITAL ENCOUNTER (OUTPATIENT)
Dept: PHYSICAL THERAPY | Facility: HOSPITAL | Age: 73
Setting detail: THERAPIES SERIES
Discharge: HOME | End: 2018-05-01

## 2018-05-01 DIAGNOSIS — Z47.1 AFTERCARE FOLLOWING RIGHT SHOULDER JOINT REPLACEMENT SURGERY: ICD-10-CM

## 2018-05-01 DIAGNOSIS — Z74.09 IMPAIRED MOBILITY: ICD-10-CM

## 2018-05-01 DIAGNOSIS — Z96.611 AFTERCARE FOLLOWING RIGHT SHOULDER JOINT REPLACEMENT SURGERY: ICD-10-CM

## 2018-05-01 DIAGNOSIS — Z47.89 ORTHOPEDIC AFTERCARE: Primary | ICD-10-CM

## 2018-05-01 PROCEDURE — 97140 MANUAL THERAPY 1/> REGIONS: CPT | Performed by: PHYSICAL THERAPIST

## 2018-05-01 PROCEDURE — 97110 THERAPEUTIC EXERCISES: CPT | Performed by: PHYSICAL THERAPIST

## 2018-05-01 NOTE — THERAPY TREATMENT NOTE
Outpatient Physical Therapy Ortho Treatment Note   [facility]     Patient Name: Rodolfo Grover  : 1945  MRN: [id number]  Today's Date: 2018      Visit Date: 2018    Visit Dx:    ICD-10-CM ICD-9-CM   1. Orthopedic aftercare Z47.89 V54.9   2. Aftercare following right shoulder joint replacement surgery Z47.1 V54.81    Z96.611 V43.61   3. Impaired mobility Z74.09 799.89       Patient Active Problem List   Diagnosis   • Acute blood loss anemia   • Arthritis   • Fracture of fifth metacarpal bone of right hand   • GI bleed   • Melena   • Closed fracture dislocation of right shoulder   • Gastroesophageal reflux disease without esophagitis   • Leukocytosis   • Hyperglycemia   • Fall at home   • DNR (do not resuscitate)   • Bradycardia following surgery   • Frequent PVCs   • Proximal humerus fracture   • Osteoarthritis        Past Medical History:   Diagnosis Date   • GERD (gastroesophageal reflux disease)    • Iritis of right eye    • Rheumatic fever    • Shoulder fracture, right         Past Surgical History:   Procedure Laterality Date   • HERNIA REPAIR     • SHOULDER CLOSED REDUCTION Right 3/16/2018    Procedure: RIGHT SHOULDER CLOSED REDUCTION;  Surgeon: Kj Garcia MD;  Location: [facility];  Service: Orthopedics   • TONSILLECTOMY     • TOTAL SHOULDER ARTHROPLASTY W/ DISTAL CLAVICLE EXCISION Right 3/22/2018    Procedure: Reverse Total Shoulder Arthroplsty;  Surgeon: Kj Garcia MD;  Location: [facility];  Service: Orthopedics             PT Ortho     Row Name 18 1000       Right Upper Ext    Rt Shoulder Flexion PROM 130  -GJ      User Key  (r) = Recorded By, (t) = Taken By, (c) = Cosigned By    Initials Name Provider Type    KURTIS Palencia PT Physical Therapist                            PT Assessment/Plan     Row Name 18 1998          PT Assessment    Assessment Comments Mr. Grover is 6 weeks s/p R shoulder TSA.  He has attended 3 total sessions.  He is wearing his  sling today. He is able to progress PROm to 140 flexin/scaption and we were able to obtain 130 degrees of R shoulder flexion PROM. We issued 3 way R deltoid isometrics (flexion/abd/ext) for home.  He is progressing appropriately per protocol and is to see MD next week.   -GJ        PT Plan    PT Plan Comments continue to progress per protocol.  monitor form. may consider SL trunk scapula rhythmic initiation/slow reversal (elevation/depression, protraction/retraction)  -GJ       User Key  (r) = Recorded By, (t) = Taken By, (c) = Cosigned By    Initials Name Provider Type    KURTIS Palencia, PT Physical Therapist                Modalities     Row Name 05/01/18 1000             Ice    Ice Applied Yes  -GJ      Location R shoulder, pt. seated, RUE rested on pillow in lose pack  -GJ      Rx Minutes 10 mins  -GJ      Ice S/P Rx Yes  -GJ        User Key  (r) = Recorded By, (t) = Taken By, (c) = Cosigned By    Initials Name Provider Type    KURTIS Palencia, PT Physical Therapist                Exercises     Row Name 05/01/18 1000             Subjective Comments    Subjective Comments my arm is a little sore.  I think I can see where this therapy is helping  -GJ         Exercise 1    Exercise Name 1 shoulder shrugs  -GJ      Cueing 1 Demo;Verbal  -GJ      Sets 1 2  -GJ      Reps 1 10  -GJ      Additional Comments 1#  -GJ         Exercise 2    Exercise Name 2 scap retraction  -GJ      Cueing 2 Verbal;Demo  -GJ      Sets 2 2  -GJ      Reps 2 10  -GJ      Time 2 5 seconds  -GJ         Exercise 3    Exercise Name 3 AROM elbow flexion/ext  -GJ      Cueing 3 Demo  -GJ      Sets 3 2  -GJ      Reps 3 10  -GJ      Additional Comments 1#  -GJ         Exercise 4    Exercise Name 4 AROM supination/pronation, squeeze ball  -GJ      Cueing 4 Demo;Verbal  -GJ      Sets 4 2  -GJ      Reps 4 10  -GJ         Exercise 5    Exercise Name 5 supine AAROM flexion (to 140 with self  -GJ      Sets 5 2  -GJ      Reps 5 10  -GJ      Additional  Comments .  -GJ         Exercise 6    Exercise Name 6 chest press with cane  -GJ      Cueing 6 Demo  -GJ      Reps 6 15  -GJ         Exercise 7    Exercise Name 7 R shoulder isometrics (flexion, abd, ext)  -GJ      Cueing 7 Verbal;Tactile;Demo  -GJ      Reps 7 10  -GJ      Time 7 5  -GJ         Exercise 8    Exercise Name 8 roll ball out (AAROM flexion) (blue ball on elevated mat table, to grossly 90 degrees flexion)  -GJ      Cueing 8 Verbal;Demo  -GJ      Reps 8 15  -GJ        User Key  (r) = Recorded By, (t) = Taken By, (c) = Cosigned By    Initials Name Provider Type    KURTIS Palencia, PT Physical Therapist                        Manual Rx (last 36 hours)      Manual Treatments     Row Name 05/01/18 1100             Manual Rx 1    Manual Rx 1 Location PROM R shoulder into flexion (140), ER (20). Pt supine with wedge  -GJ      Manual Rx 1 Type Intermittent gentle oscillations/distraction  -GJ      Manual Rx 1 Duration total manual time 17 min  -GJ        User Key  (r) = Recorded By, (t) = Taken By, (c) = Cosigned By    Initials Name Provider Type     Silvano Palencia, PT Physical Therapist                PT OP Goals     Row Name 05/01/18 1000          PT Short Term Goals    STG Date to Achieve 05/16/18  -     STG 1 pt. to be I with initial HEP to facilitate self management of their condition  -GJ     STG 1 Progress Partially Met  -GJ     STG 1 Progress Comments reviwed,, cues given (both physical/verbal)  -     STG 2 pt. to be educated in/verbalize understanding of the importance of posture/ergonomics in association with their condition to facilitate self management of their condition  -GJ     STG 2 Progress Ongoing  -GJ     STG 2 Progress Comments reviwed protocol/restrictions  -GJ     STG 3 pt to demonstrate R shoulder PROM flexion >/= 120 to facilitate ease of performing self care activities  -GJ     STG 3 Progress Met  -GJ     STG 3 Progress Comments PROM R shoulder flexion 130  -GJ        Long Term  Goals    LTG Date to Achieve 07/11/18  -     LTG 1 pt. to be I with advanced HEP to facilitate self management of their condition  -GJ     LTG 1 Progress Ongoing  -GJ     LTG 2 pt. to report a DASH score </= 30 to demonstrate decreased level of perceived disability  -GJ     LTG 2 Progress Ongoing  -GJ     LTG 3 pt. to demonstrate R shoulder flexion AROM >/= 0-120 to facilitate ease of performing self care/dressing activities  -GJ     LTG 3 Progress Ongoing  -GJ     LTG 4 pt. to demonstrate placing/retrieving small object from an above the shoulder level shelf with her LUE to facilitate ease of performing household/work related activities  -     LTG 4 Progress Ongoing  -       User Key  (r) = Recorded By, (t) = Taken By, (c) = Cosigned By    Initials Name Provider Type    KURTIS Palencia PT Physical Therapist          Therapy Education  Education Details: reviewed restrictions per protocol (ok for AAROM to 140, no AROM), reviewed activity modifications.    Given: HEP, Symptoms/condition management, Pain management, Posture/body mechanics, Edema management, Mobility training  Program: New  How Provided: Verbal, Demonstration, Written  Provided to: Patient  Level of Understanding: Teach back education performed, Verbalized, Demonstrated              Time Calculation:   Start Time: 1046  Stop Time: 1130  Time Calculation (min): 44 min    Therapy Charges for Today     Code Description Service Date Service Provider Modifiers Qty    03802497975 HC PT THER PROC EA 15 MIN 5/1/2018 Silvano Palencia, PT GP 1    50680314802 HC PT MANUAL THERAPY EA 15 MIN 5/1/2018 Silvano Palencia, PT GP 1    56707152081 HC PT HOT OR COLD PACK TREAT MCARE 5/1/2018 Silvano Palencia, PT GP 1                    Silvano Palencia, PT  5/1/2018

## 2018-05-03 ENCOUNTER — HOSPITAL ENCOUNTER (OUTPATIENT)
Dept: PHYSICAL THERAPY | Facility: HOSPITAL | Age: 73
Setting detail: THERAPIES SERIES
Discharge: HOME | End: 2018-05-03

## 2018-05-03 DIAGNOSIS — Z47.1 AFTERCARE FOLLOWING RIGHT SHOULDER JOINT REPLACEMENT SURGERY: ICD-10-CM

## 2018-05-03 DIAGNOSIS — Z74.09 IMPAIRED MOBILITY: ICD-10-CM

## 2018-05-03 DIAGNOSIS — Z47.89 ORTHOPEDIC AFTERCARE: Primary | ICD-10-CM

## 2018-05-03 DIAGNOSIS — Z96.611 AFTERCARE FOLLOWING RIGHT SHOULDER JOINT REPLACEMENT SURGERY: ICD-10-CM

## 2018-05-03 PROCEDURE — 97140 MANUAL THERAPY 1/> REGIONS: CPT | Performed by: PHYSICAL THERAPIST

## 2018-05-03 PROCEDURE — 97110 THERAPEUTIC EXERCISES: CPT | Performed by: PHYSICAL THERAPIST

## 2018-05-03 NOTE — THERAPY TREATMENT NOTE
Outpatient Physical Therapy Ortho Treatment Note   [facility]     Patient Name: Rodolfo Grover  : 1945  MRN: [id number]  Today's Date: 5/3/2018      Visit Date: 2018    Visit Dx:    ICD-10-CM ICD-9-CM   1. Orthopedic aftercare Z47.89 V54.9   2. Aftercare following right shoulder joint replacement surgery Z47.1 V54.81    Z96.611 V43.61   3. Impaired mobility Z74.09 799.89       Patient Active Problem List   Diagnosis   • Acute blood loss anemia   • Arthritis   • Fracture of fifth metacarpal bone of right hand   • GI bleed   • Melena   • Closed fracture dislocation of right shoulder   • Gastroesophageal reflux disease without esophagitis   • Leukocytosis   • Hyperglycemia   • Fall at home   • DNR (do not resuscitate)   • Bradycardia following surgery   • Frequent PVCs   • Proximal humerus fracture   • Osteoarthritis        Past Medical History:   Diagnosis Date   • GERD (gastroesophageal reflux disease)    • Iritis of right eye    • Rheumatic fever    • Shoulder fracture, right         Past Surgical History:   Procedure Laterality Date   • HERNIA REPAIR     • SHOULDER CLOSED REDUCTION Right 3/16/2018    Procedure: RIGHT SHOULDER CLOSED REDUCTION;  Surgeon: Kj Garcai MD;  Location: [facility];  Service: Orthopedics   • TONSILLECTOMY     • TOTAL SHOULDER ARTHROPLASTY W/ DISTAL CLAVICLE EXCISION Right 3/22/2018    Procedure: Reverse Total Shoulder Arthroplsty;  Surgeon: Kj Garcia MD;  Location: [facility];  Service: Orthopedics             PT Ortho     Row Name 18 1000       Right Upper Ext    Rt Shoulder Flexion PROM 130  -GJ      User Key  (r) = Recorded By, (t) = Taken By, (c) = Cosigned By    Initials Name Provider Type    KURTIS Palencia PT Physical Therapist                            PT Assessment/Plan     Row Name 18 1340          PT Assessment    Assessment Comments Patient with c/o DOMS today, receptive to education.  He continues with compensatory patterns  and requires tactile cueing for correct return of shoulder shrugs and scapular retraction.  ROM progressing as expected post operatively. Encouraged home icing regimen.  -GR        PT Plan    PT Plan Comments Continue as per protocol. SL scapular rhythmic initiation PRN.  -GR       User Key  (r) = Recorded By, (t) = Taken By, (c) = Cosigned By    Initials Name Provider Type    JOVITA Cortez, PT Physical Therapist                Modalities     Row Name 05/03/18 0900             Ice    Ice Applied Yes  -GR      Location R shoulder, pt. supine, shoulder rested in loose pack  -GR      Rx Minutes 10 mins  -GR      Ice S/P Rx Yes  -GR        User Key  (r) = Recorded By, (t) = Taken By, (c) = Cosigned By    Initials Name Provider Type    JOVITA Cortez, PT Physical Therapist                Exercises     Row Name 05/03/18 0900             Exercise 1    Exercise Name 1 shoulder shrugs  -GR      Cueing 1 Demo;Verbal;Tactile  -GR      Sets 1 2  -GR      Reps 1 10  -GR      Time 1 --  -GR      Additional Comments 1#  -GR         Exercise 2    Exercise Name 2 scap retraction  -GR      Cueing 2 Verbal;Demo;Tactile  -GR      Sets 2 2  -GR      Reps 2 10  -GR      Time 2 5 seconds  -GR         Exercise 3    Exercise Name 3 AROM elbow flexion/ext  -GR      Cueing 3 Demo  -GR      Sets 3 2  -GR      Reps 3 10  -GR      Additional Comments 1#  -GR         Exercise 4    Exercise Name 4 AROM supination/pronation, squeeze ball  -GR      Cueing 4 Demo;Verbal  -GR      Sets 4 2  -GR      Reps 4 10  -GR      Additional Comments standing elbow supported  -GR         Exercise 5    Exercise Name 5 supine AAROM flexion (to 140 with dowel)  -GR      Sets 5 2  -GR      Reps 5 10  -GR         Exercise 6    Exercise Name 6 chest press with cane supine  -GR      Cueing 6 Demo  -GR      Reps 6 15  -GR         Exercise 7    Exercise Name 7 R shoulder isometrics (flexion, abd, ext)  -GR      Cueing 7 Verbal;Tactile;Demo  -GR      Reps 7 10   -GR      Time 7 5  -GR         Exercise 8    Exercise Name 8 roll ball out (AAROM flexion) (blue ball on elevated mat table, to grossly 90 degrees flexion)  -GR      Cueing 8 Verbal;Demo  -GR      Reps 8 15  -GR         Exercise 9    Exercise Name 9 R shoulder isometrics IR, ER  -GR      Cueing 9 Demo;Tactile  -GR      Sets 9 1  -GR      Reps 9 10  -GR      Time 9 5 seconds  -GR         Exercise 10    Exercise Name 10 Reverse shoulder rolls  -GR      Cueing 10 Verbal;Tactile;Demo  -GR      Sets 10 2  -GR      Reps 10 10  -GR      Additional Comments 1# focus on elevation retraction depression  -GR        User Key  (r) = Recorded By, (t) = Taken By, (c) = Cosigned By    Initials Name Provider Type    JOVITA Cortez PT Physical Therapist                        Manual Rx (last 36 hours)      Manual Treatments     Row Name 05/03/18 0900             Manual Rx 1    Manual Rx 1 Location PROM R shoulder into flexion (140), ER (20). Pt supine with wedge  -GR      Manual Rx 1 Type Intermittent gentle oscillations/distraction  -GR      Manual Rx 1 Duration total manual time 15 min  -GR        User Key  (r) = Recorded By, (t) = Taken By, (c) = Cosigned By    Initials Name Provider Type    JOVITA Cortez PT Physical Therapist                PT OP Goals     Row Name 05/03/18 1000          PT Short Term Goals    STG Date to Achieve 05/16/18  -GR     STG 1 pt. to be I with initial HEP to facilitate self management of their condition  -GR     STG 1 Progress Partially Met  -GR     STG 2 pt. to be educated in/verbalize understanding of the importance of posture/ergonomics in association with their condition to facilitate self management of their condition  -GR     STG 2 Progress Ongoing  -GR     STG 3 pt to demonstrate R shoulder PROM flexion >/= 120 to facilitate ease of performing self care activities  -GR     STG 3 Progress Met  -GR        Long Term Goals    LTG Date to Achieve 07/11/18  -GR     LTG 1 pt. to be I with  advanced HEP to facilitate self management of their condition  -GR     LTG 1 Progress Ongoing  -GR     LTG 2 pt. to report a DASH score </= 30 to demonstrate decreased level of perceived disability  -GR     LTG 2 Progress Ongoing  -GR     LTG 3 pt. to demonstrate R shoulder flexion AROM >/= 0-120 to facilitate ease of performing self care/dressing activities  -GR     LTG 3 Progress Ongoing  -GR     LTG 4 pt. to demonstrate placing/retrieving small object from an above the shoulder level shelf with her LUE to facilitate ease of performing household/work related activities  -GR     LTG 4 Progress Ongoing  -GR       User Key  (r) = Recorded By, (t) = Taken By, (c) = Cosigned By    Initials Name Provider Type    GR Gaurav Cortez PT Physical Therapist          Therapy Education  Education Details: importance of posturing/body mechanics; wk 6 today; can begin weaning out of sling (again) wk 7  Given: HEP, Symptoms/condition management  Program: Reinforced  How Provided: Verbal  Provided to: Patient  Level of Understanding: Teach back education performed              Time Calculation:   Start Time: 0930  Stop Time: 1025  Time Calculation (min): 55 min  Total Timed Code Minutes- PT: 45 minute(s)    Therapy Charges for Today     Code Description Service Date Service Provider Modifiers Qty    30307359625 HC PT MANUAL THERAPY EA 15 MIN 5/3/2018 Gaurav Cortez, PT GP 1    32851134231 HC PT THER PROC EA 15 MIN 5/3/2018 Gaurav Cortez, PT GP 2                    Gaurav Cortez PT  5/3/2018

## 2018-05-06 ENCOUNTER — TELEPHONE (OUTPATIENT)
Dept: CARDIOLOGY | Facility: CLINIC | Age: 73
End: 2018-05-06

## 2018-05-07 DIAGNOSIS — G47.33 OSA (OBSTRUCTIVE SLEEP APNEA): Primary | ICD-10-CM

## 2018-05-08 ENCOUNTER — HOSPITAL ENCOUNTER (OUTPATIENT)
Dept: PHYSICAL THERAPY | Facility: HOSPITAL | Age: 73
Setting detail: THERAPIES SERIES
Discharge: HOME | End: 2018-05-08

## 2018-05-08 DIAGNOSIS — Z96.611 AFTERCARE FOLLOWING RIGHT SHOULDER JOINT REPLACEMENT SURGERY: ICD-10-CM

## 2018-05-08 DIAGNOSIS — Z47.89 ORTHOPEDIC AFTERCARE: Primary | ICD-10-CM

## 2018-05-08 DIAGNOSIS — Z74.09 IMPAIRED MOBILITY: ICD-10-CM

## 2018-05-08 DIAGNOSIS — Z47.1 AFTERCARE FOLLOWING RIGHT SHOULDER JOINT REPLACEMENT SURGERY: ICD-10-CM

## 2018-05-08 PROCEDURE — 97140 MANUAL THERAPY 1/> REGIONS: CPT | Performed by: PHYSICAL THERAPIST

## 2018-05-08 PROCEDURE — 97110 THERAPEUTIC EXERCISES: CPT | Performed by: PHYSICAL THERAPIST

## 2018-05-08 NOTE — THERAPY TREATMENT NOTE
Outpatient Physical Therapy Ortho Treatment Note   [facility]     Patient Name: Rodolfo Grover  : 1945  MRN: [id number]  Today's Date: 2018      Visit Date: 2018    Visit Dx:    ICD-10-CM ICD-9-CM   1. Orthopedic aftercare Z47.89 V54.9   2. Aftercare following right shoulder joint replacement surgery Z47.1 V54.81    Z96.611 V43.61   3. Impaired mobility Z74.09 799.89       Patient Active Problem List   Diagnosis   • Acute blood loss anemia   • Arthritis   • Fracture of fifth metacarpal bone of right hand   • GI bleed   • Melena   • Closed fracture dislocation of right shoulder   • Gastroesophageal reflux disease without esophagitis   • Leukocytosis   • Hyperglycemia   • Fall at home   • DNR (do not resuscitate)   • Bradycardia following surgery   • Frequent PVCs   • Proximal humerus fracture   • Osteoarthritis        Past Medical History:   Diagnosis Date   • GERD (gastroesophageal reflux disease)    • Iritis of right eye    • Rheumatic fever    • Shoulder fracture, right         Past Surgical History:   Procedure Laterality Date   • HERNIA REPAIR     • SHOULDER CLOSED REDUCTION Right 3/16/2018    Procedure: RIGHT SHOULDER CLOSED REDUCTION;  Surgeon: Kj Garcia MD;  Location: [facility];  Service: Orthopedics   • TONSILLECTOMY     • TOTAL SHOULDER ARTHROPLASTY W/ DISTAL CLAVICLE EXCISION Right 3/22/2018    Procedure: Reverse Total Shoulder Arthroplsty;  Surgeon: Kj Garcia MD;  Location: [facility];  Service: Orthopedics             PT Ortho     Row Name 18 1000       Right Upper Ext    Rt Shoulder Flexion PROM 135  -GJ    Rt Shoulder External Rotation PROM 45  -GJ      User Key  (r) = Recorded By, (t) = Taken By, (c) = Cosigned By    Initials Name Provider Type    KURTIS Palencia PT Physical Therapist                            PT Assessment/Plan     Row Name 18 1526          PT Assessment    Assessment Comments Mr. Grover is ~ 7 weeks s/p R reverse TSA.  He  reports soreness of R shoulder and presents not wearing sling.  He demonstrated improved performance/awareness of scapular mobility exercises today requiring significantly less tactile/verbal cuing.  Continues to demonstrate easily fatigued scapular girdle tissues with AAROM activities. We will see him one additional session prior to him leaving for vacation.  He is to see MD this week as well.   -GJ        PT Plan    PT Plan Comments Assess MD visit.  warm up on pulleys, consider wash the banister, continue manual work.  May consider prone shoulder ext to neutral.   -GJ       User Key  (r) = Recorded By, (t) = Taken By, (c) = Cosigned By    Initials Name Provider Type    KURTIS Palencia, PT Physical Therapist                Modalities     Row Name 05/08/18 1000             Ice    Location ice bag to go for R luxler.   -GJ        User Key  (r) = Recorded By, (t) = Taken By, (c) = Cosigned By    Initials Name Provider Type    KURTIS Palencia, PT Physical Therapist                Exercises     Row Name 05/08/18 1000             Subjective Comments    Subjective Comments I'm doing ok. My shoulder is sore, is that normal?  I see the doctor this week.  I leave for vacation   -GJ         Exercise 1    Exercise Name 1 shoulder shrugs  -GJ      Cueing 1 Demo;Verbal;Tactile  -GJ      Sets 1 2  -GJ      Reps 1 10  -GJ      Additional Comments 2#  -GJ         Exercise 2    Exercise Name 2 scap retraction  -GJ      Cueing 2 Verbal;Demo;Tactile  -GJ      Sets 2 2  -GJ      Reps 2 10  -GJ      Time 2 5 seconds  -GJ         Exercise 3    Exercise Name 3 AROM elbow flexion/ext  -GJ      Cueing 3 Demo  -GJ      Sets 3 2  -GJ      Reps 3 10  -GJ      Additional Comments 2#  -GJ         Exercise 4    Exercise Name 4 AROM supination/pronation, squeeze ball  -GJ      Cueing 4 Demo;Verbal  -GJ      Sets 4 2  -GJ      Reps 4 10  -GJ         Exercise 5    Exercise Name 5 supine AAROM flexion wth self  -GJ      Cueing 5 Verbal  -GJ       Reps 5 20  -GJ         Exercise 6    Exercise Name 6 chest press with cane supine  -GJ      Cueing 6 Verbal  -GJ      Reps 6 20  -GJ         Exercise 7    Exercise Name 7 performed isometrics manually today  -GJ         Exercise 8    Exercise Name 8 roll ball out (AAROM flexion) (blue ball on elevated mat table, to grossly 90 degrees flexion)  -GJ      Cueing 8 Verbal;Demo  -GJ      Reps 8 20  -GJ         Exercise 9    Exercise Name 9 isometrics performed manually  -GJ         Exercise 10    Exercise Name 10 Reverse shoulder rolls  -GJ      Cueing 10 Verbal;Tactile;Demo  -GJ      Sets 10 2  -GJ      Reps 10 10  -GJ      Additional Comments 2#  -GJ         Exercise 11    Exercise Name 11 pulley, scaption  -GJ      Cueing 11 Demo;Verbal  -GJ      Reps 11 20  -GJ        User Key  (r) = Recorded By, (t) = Taken By, (c) = Cosigned By    Initials Name Provider Type    KURTIS Palencia, PT Physical Therapist                        Manual Rx (last 36 hours)      Manual Treatments     Row Name 05/08/18 1100             Manual Rx 1    Manual Rx 1 Location PROM R shoulder into flexion (140), ER (45). Pt supine with wedge  -GJ      Manual Rx 1 Type Intermittent gentle oscillations/distraction  -GJ         Manual Rx 2    Manual Rx 2 Location gentle rhythmic stabilization R shoulder, pt. supine, ,RUE flexed to 90  -GJ         Manual Rx 3    Manual Rx 3 Location gentle isometrics R shoulder ER/IR, POS,   -GJ      Manual Rx 3 Duration total manual time 18 min  -GJ        User Key  (r) = Recorded By, (t) = Taken By, (c) = Cosigned By    Initials Name Provider Type    KURTIS Palencia, PT Physical Therapist                PT OP Goals     Row Name 05/08/18 1000          PT Short Term Goals    STG Date to Achieve 05/16/18  -     STG 1 pt. to be I with initial HEP to facilitate self management of their condition  -GJ     STG 1 Progress Met  -GJ     STG 2 pt. to be educated in/verbalize understanding of the importance of  posture/ergonomics in association with their condition to facilitate self management of their condition  -     STG 2 Progress Met  -     STG 3 pt to demonstrate R shoulder PROM flexion >/= 120 to facilitate ease of performing self care activities  -     STG 3 Progress Met  -        Long Term Goals    LTG Date to Achieve 07/11/18  -     LTG 1 pt. to be I with advanced HEP to facilitate self management of their condition  -     LTG 1 Progress Ongoing  -     LTG 2 pt. to report a DASH score </= 30 to demonstrate decreased level of perceived disability  -     LTG 2 Progress Ongoing  -     LTG 3 pt. to demonstrate R shoulder flexion AROM >/= 0-120 to facilitate ease of performing self care/dressing activities  -     LTG 3 Progress Ongoing  -     LTG 4 pt. to demonstrate placing/retrieving small object from an above the shoulder level shelf with her LUE to facilitate ease of performing household/work related activities  -     LTG 4 Progress Ongoing  -       User Key  (r) = Recorded By, (t) = Taken By, (c) = Cosigned By    Initials Name Provider Type     Silvano Palencia, PT Physical Therapist          Therapy Education  Education Details: reviewed precatuions per protocol, realisitic expectations following his procedure  Given: HEP, Symptoms/condition management, Pain management, Posture/body mechanics, Mobility training, Edema management  Program: Progressed  How Provided: Verbal  Provided to: Patient  Level of Understanding: Verbalized              Time Calculation:   Start Time: 1047  Stop Time: 1127  Time Calculation (min): 40 min    Therapy Charges for Today     Code Description Service Date Service Provider Modifiers Qty    80884080486  PT THER PROC EA 15 MIN 5/8/2018 Silvano Palencia PT GP 2    [id number]  PT MANUAL THERAPY EA 15 MIN 5/8/2018 Silvano Palencia PT GP 1                    Silvano Palencia, PT  5/8/2018

## 2018-05-09 ENCOUNTER — OFFICE VISIT (OUTPATIENT)
Dept: ORTHOPEDIC SURGERY | Facility: CLINIC | Age: 73
End: 2018-05-09

## 2018-05-09 VITALS — TEMPERATURE: 97.7 F | BODY MASS INDEX: 27.47 KG/M2 | HEIGHT: 67 IN | WEIGHT: 175 LBS

## 2018-05-09 DIAGNOSIS — Z96.611 STATUS POST REPLACEMENT OF RIGHT SHOULDER JOINT: Primary | ICD-10-CM

## 2018-05-09 DIAGNOSIS — Z09 SURGERY FOLLOW-UP: ICD-10-CM

## 2018-05-09 PROCEDURE — 99024 POSTOP FOLLOW-UP VISIT: CPT | Performed by: ORTHOPAEDIC SURGERY

## 2018-05-09 PROCEDURE — 73030 X-RAY EXAM OF SHOULDER: CPT | Performed by: ORTHOPAEDIC SURGERY

## 2018-05-10 ENCOUNTER — HOSPITAL ENCOUNTER (OUTPATIENT)
Dept: PHYSICAL THERAPY | Facility: HOSPITAL | Age: 73
Setting detail: THERAPIES SERIES
Discharge: HOME | End: 2018-05-10

## 2018-05-10 DIAGNOSIS — Z47.89 ORTHOPEDIC AFTERCARE: Primary | ICD-10-CM

## 2018-05-10 DIAGNOSIS — Z96.611 AFTERCARE FOLLOWING RIGHT SHOULDER JOINT REPLACEMENT SURGERY: ICD-10-CM

## 2018-05-10 DIAGNOSIS — Z74.09 IMPAIRED MOBILITY: ICD-10-CM

## 2018-05-10 DIAGNOSIS — Z47.1 AFTERCARE FOLLOWING RIGHT SHOULDER JOINT REPLACEMENT SURGERY: ICD-10-CM

## 2018-05-10 PROCEDURE — 97140 MANUAL THERAPY 1/> REGIONS: CPT

## 2018-05-10 PROCEDURE — 97110 THERAPEUTIC EXERCISES: CPT

## 2018-05-10 NOTE — PROGRESS NOTES
"Rodolfo Grover : 1945 MRN: [id number] DATE: 5/10/2018    DIAGNOSIS: 6 week follow up right shoulder arthroplasty      SUBJECTIVE:  Patient returns today for 6 week follow up of right shoulder replacement. Patient reports doing well with no unusual complaints.     OBJECTIVE:    Temp 97.7 °F (36.5 °C) (Temporal Artery )   Ht 170.2 cm (67\")   Wt 79.4 kg (175 lb)   BMI 27.41 kg/m²     Exam:. The incision is well healed. No erythema or drainage. Shoulder moves fluidly with pendulums.  Motion is on track per protocol.  The arm is soft and nontender.  Good motor and sensory function.  Palpable distal pulses.     DIAGNOSTIC STUDIES    Xrays: 2 views of the right shoulder (AP, scapular Y) were ordered and reviewed for evaluation of shoulder replacement. They demonstrate a well positioned, well aligned replacement without complicating factors noted. In comparison with previous films there has been no change.    ASSESSMENT: 6 week follow up right shoulder replacement.    PLAN:   1.  Continue PT per protocol.  2.  Discontinue sling and begin working on progressing ROM as tolerated.  3.  Counseled patient about appropriate activity modifications and restrictions--released to drive at this point.  4.  Follow up in 6 months    Kj Garcia MD    "

## 2018-05-10 NOTE — THERAPY TREATMENT NOTE
Outpatient Physical Therapy Ortho Treatment Note   [facility]     Patient Name: Rodolfo Grover  : 1945  MRN: [id number]  Today's Date: 5/10/2018      Visit Date: 05/10/2018    Visit Dx:    ICD-10-CM ICD-9-CM   1. Orthopedic aftercare Z47.89 V54.9   2. Aftercare following right shoulder joint replacement surgery Z47.1 V54.81    Z96.611 V43.61   3. Impaired mobility Z74.09 799.89       Patient Active Problem List   Diagnosis   • Acute blood loss anemia   • Arthritis   • Fracture of fifth metacarpal bone of right hand   • GI bleed   • Melena   • Closed fracture dislocation of right shoulder   • Gastroesophageal reflux disease without esophagitis   • Leukocytosis   • Hyperglycemia   • Fall at home   • DNR (do not resuscitate)   • Bradycardia following surgery   • Frequent PVCs   • Proximal humerus fracture   • Osteoarthritis        Past Medical History:   Diagnosis Date   • GERD (gastroesophageal reflux disease)    • Iritis of right eye    • Rheumatic fever    • Shoulder fracture, right         Past Surgical History:   Procedure Laterality Date   • HERNIA REPAIR     • SHOULDER CLOSED REDUCTION Right 3/16/2018    Procedure: RIGHT SHOULDER CLOSED REDUCTION;  Surgeon: Kj Garcia MD;  Location: [facility];  Service: Orthopedics   • TONSILLECTOMY     • TOTAL SHOULDER ARTHROPLASTY W/ DISTAL CLAVICLE EXCISION Right 3/22/2018    Procedure: Reverse Total Shoulder Arthroplsty;  Surgeon: Kj Garcia MD;  Location: [facility];  Service: Orthopedics             PT Ortho     Row Name 18 1000       Right Upper Ext    Rt Shoulder Flexion PROM 135  -GJ    Rt Shoulder External Rotation PROM 45  -GJ      User Key  (r) = Recorded By, (t) = Taken By, (c) = Cosigned By    Initials Name Provider Type    KURTIS Palencia PT Physical Therapist                            PT Assessment/Plan     Row Name 05/10/18 0958          PT Assessment    Assessment Comments Patient not wearing sling. Reminded patient if  shoulder gets too sore he may put it in the sling to rest. Added banister flexion with towel today. Strength/ROM progressing well.  -WS       User Key  (r) = Recorded By, (t) = Taken By, (c) = Cosigned By    Initials Name Provider Type    WS Oumar AyalaMARIBEL valladares Physical Therapy Assistant                Modalities     Row Name 05/10/18 0930             Ice    Ice Applied Yes  -WS      Location right shoulder supine  -WS      Rx Minutes 10 mins  -WS        User Key  (r) = Recorded By, (t) = Taken By, (c) = Cosigned By    Initials Name Provider Type    WS Oumar Ayalajacquelyn MARIBEL Physical Therapy Assistant                Exercises     Row Name 05/10/18 0930             Subjective Comments    Subjective Comments Shoulder a little sore.   -WS         Subjective Pain    Subjective Pain Comment MD d/c sling  -WS         Exercise 1    Exercise Name 1 shoulder shrugs  -WS      Cueing 1 Demo;Verbal;Tactile  -WS      Sets 1 2  -WS      Reps 1 10  -WS      Additional Comments 2#  -WS         Exercise 2    Exercise Name 2 scap retraction  -WS      Cueing 2 Verbal;Demo;Tactile  -WS      Sets 2 2  -WS      Reps 2 10  -WS      Time 2 5 seconds  -WS         Exercise 3    Exercise Name 3 AROM elbow flexion/ext  -WS      Cueing 3 Demo  -WS      Sets 3 2  -WS      Reps 3 10  -WS      Additional Comments 2#  -WS         Exercise 4    Exercise Name 4 AROM supination/pronation, squeeze ball  -WS      Cueing 4 Demo;Verbal  -WS      Sets 4 2  -WS      Reps 4 10  -WS         Exercise 5    Exercise Name 5 supine AAROM flexion wth self  -WS      Cueing 5 Verbal  -WS      Reps 5 20  -WS         Exercise 6    Exercise Name 6 chest press with cane supine  -WS      Cueing 6 Verbal  -WS      Reps 6 20  -WS         Exercise 7    Exercise Name 7 performed isometrics manually today  -WS      Reps 7 10  -WS      Time 7 5  -WS         Exercise 8    Exercise Name 8 banister slide  -WS      Reps 8 10  -WS         Exercise 9    Exercise Name 9 isometrics  performed manually  -WS      Reps 9 10  -WS         Exercise 10    Exercise Name 10 Reverse shoulder rolls  -WS      Cueing 10 Verbal;Tactile;Demo  -WS      Sets 10 2  -WS      Reps 10 10  -WS      Additional Comments 2#  -WS         Exercise 11    Exercise Name 11 pulley, scaption  -WS      Cueing 11 Demo;Verbal  -WS      Reps 11 20  -WS        User Key  (r) = Recorded By, (t) = Taken By, (c) = Cosigned By    Initials Name Provider Type     Oumar Rg PTA Physical Therapy Assistant                        Manual Rx (last 36 hours)      Manual Treatments     Row Name 05/10/18 0900             Manual Rx 1    Manual Rx 1 Location PROM R shoulder into flexion (140), ER (45). Pt supine with wedge  -      Manual Rx 1 Type Intermittent gentle oscillations/distraction  -         Manual Rx 2    Manual Rx 2 Location gentle rhythmic stabilization R shoulder, pt. supine, ,RUE flexed to 90  -         Manual Rx 3    Manual Rx 3 Location gentle isometrics R shoulder ER/IR, POS,   -WS        User Key  (r) = Recorded By, (t) = Taken By, (c) = Cosigned By    Initials Name Provider Type     Oumar Rg PTA Physical Therapy Assistant                PT OP Goals     Row Name 05/10/18 0900          PT Short Term Goals    STG Date to Achieve 05/16/18  -     STG 1 pt. to be I with initial HEP to facilitate self management of their condition  -WS     STG 1 Progress Met  -     STG 2 pt. to be educated in/verbalize understanding of the importance of posture/ergonomics in association with their condition to facilitate self management of their condition  -     STG 2 Progress Met  -     STG 3 pt to demonstrate R shoulder PROM flexion >/= 120 to facilitate ease of performing self care activities  -     STG 3 Progress Met  -        Long Term Goals    LTG Date to Achieve 07/11/18  -     LTG 1 pt. to be I with advanced HEP to facilitate self management of their condition  -     LTG 1 Progress Ongoing  -      LTG 2 pt. to report a DASH score </= 30 to demonstrate decreased level of perceived disability  -     LTG 2 Progress Ongoing  -     LTG 3 pt. to demonstrate R shoulder flexion AROM >/= 0-120 to facilitate ease of performing self care/dressing activities  -     LTG 3 Progress Ongoing  -     LTG 4 pt. to demonstrate placing/retrieving small object from an above the shoulder level shelf with her LUE to facilitate ease of performing household/work related activities  -     LTG 4 Progress Ongoing  -       User Key  (r) = Recorded By, (t) = Taken By, (c) = Cosigned By    Initials Name Provider Type    TAY Rg PTA Physical Therapy Assistant          Therapy Education  Given: HEP, Symptoms/condition management, Pain management, Edema management  Program: Reinforced  How Provided: Verbal  Provided to: Patient              Time Calculation:   Start Time: 0930  Stop Time: 1010  Time Calculation (min): 40 min    Therapy Charges for Today     Code Description Service Date Service Provider Modifiers Qty    34062933220 HC PT MANUAL THERAPY EA 15 MIN 5/10/2018 Oumar Rg PTA GP 1    25862038270 HC PT HOT OR COLD PACK TREAT MCARE 5/10/2018 Oumar Rg PTA GP 1    98058214921 HC PT THER PROC EA 15 MIN 5/10/2018 Oumar Rg PTA GP 1                    Oumar Rg PTA  5/10/2018

## 2018-05-15 ENCOUNTER — APPOINTMENT (OUTPATIENT)
Dept: PHYSICAL THERAPY | Facility: HOSPITAL | Age: 73
End: 2018-05-15

## 2018-05-29 ENCOUNTER — HOSPITAL ENCOUNTER (OUTPATIENT)
Dept: PHYSICAL THERAPY | Facility: HOSPITAL | Age: 73
Setting detail: THERAPIES SERIES
Discharge: HOME | End: 2018-05-29

## 2018-05-29 DIAGNOSIS — Z96.611 AFTERCARE FOLLOWING RIGHT SHOULDER JOINT REPLACEMENT SURGERY: ICD-10-CM

## 2018-05-29 DIAGNOSIS — Z47.1 AFTERCARE FOLLOWING RIGHT SHOULDER JOINT REPLACEMENT SURGERY: ICD-10-CM

## 2018-05-29 DIAGNOSIS — Z47.89 ORTHOPEDIC AFTERCARE: Primary | ICD-10-CM

## 2018-05-29 DIAGNOSIS — Z74.09 IMPAIRED MOBILITY: ICD-10-CM

## 2018-05-29 PROCEDURE — 97110 THERAPEUTIC EXERCISES: CPT | Performed by: PHYSICAL THERAPIST

## 2018-05-29 PROCEDURE — 97140 MANUAL THERAPY 1/> REGIONS: CPT | Performed by: PHYSICAL THERAPIST

## 2018-05-29 NOTE — THERAPY PROGRESS REPORT/RE-CERT
Outpatient Physical Therapy Ortho Progress Note   [facility]     Patient Name: Rodolfo Grover  : 1945  MRN: [id number]  Today's Date: 2018      Visit Date: 2018    Visit Dx:    ICD-10-CM ICD-9-CM   1. Orthopedic aftercare Z47.89 V54.9   2. Aftercare following right shoulder joint replacement surgery Z47.1 V54.81    Z96.611 V43.61   3. Impaired mobility Z74.09 799.89       Patient Active Problem List   Diagnosis   • Acute blood loss anemia   • Arthritis   • Fracture of fifth metacarpal bone of right hand   • GI bleed   • Melena   • Closed fracture dislocation of right shoulder   • Gastroesophageal reflux disease without esophagitis   • Leukocytosis   • Hyperglycemia   • Fall at home   • DNR (do not resuscitate)   • Bradycardia following surgery   • Frequent PVCs   • Proximal humerus fracture   • Osteoarthritis        Past Medical History:   Diagnosis Date   • GERD (gastroesophageal reflux disease)    • Iritis of right eye    • Rheumatic fever    • Shoulder fracture, right         Past Surgical History:   Procedure Laterality Date   • HERNIA REPAIR     • SHOULDER CLOSED REDUCTION Right 3/16/2018    Procedure: RIGHT SHOULDER CLOSED REDUCTION;  Surgeon: Kj Garcia MD;  Location: [facility];  Service: Orthopedics   • TONSILLECTOMY     • TOTAL SHOULDER ARTHROPLASTY W/ DISTAL CLAVICLE EXCISION Right 3/22/2018    Procedure: Reverse Total Shoulder Arthroplsty;  Surgeon: Kj Garcia MD;  Location: [facility];  Service: Orthopedics             PT Ortho     Row Name 18 1400       Right Upper Ext    Rt Shoulder Abduction AROM 70  -GJ    Rt Shoulder Abduction PROM 130  -GJ    Rt Shoulder Flexion AROM 80  -GJ    Rt Shoulder Flexion PROM 135  -GJ    Rt Shoulder External Rotation PROM 70  -GJ      User Key  (r) = Recorded By, (t) = Taken By, (c) = Cosigned By    Initials Name Provider Type    KURTIS Palencia PT Physical Therapist                            PT Assessment/Plan     Row  Name 05/29/18 1559          PT Assessment    Functional Limitations Limitation in home management;Limitations in community activities;Performance in work activities;Performance in self-care ADL;Performance in leisure activities;Limitations in functional capacity and performance  -GJ     Impairments Range of motion;Impaired muscle endurance;Muscle strength;Impaired muscle length;Pain;Edema;Impaired flexibility;Joint mobility  -GJ     Assessment Comments Mr. Grover is ~ 10 weeks s/p R reverse TSA. HE has attended 7 sessions of physical therapy to date.  He returns to the clinic following brief vacation. He is out of the sling. He demonstrates limited AROM R shoulder, mild improvements in PROM L shoulder (see specific joints). Today, we progressed his program, working on scapular girdle strengthening/ROM.  He is progressing appropriately per protocol, especially given that he has been out of town twice during his recovery.  Mr. Grover demonstrates stable s/s consistent with Reverse TSA which limit his full paricipation in household, community , leisure, work activities.  He will benenfit from continued skilled physical therapy to address current impairments.   -GJ     Please refer to paper survey for additional self-reported information Yes  -GJ     Rehab Potential Excellent  -GJ     Patient would benefit from skilled therapy intervention Yes  -GJ        PT Plan    PT Frequency 2x/week  -GJ     Predicted Duration of Therapy Intervention (OT Eval) 4-6 weeks  -GJ     Planned CPT's? PT RE-EVAL: 16018;PT THER PROC EA 15 MIN: 63017;PT MANUAL THERAPY EA 15 MIN: 67643;PT HOT OR COLD PACK TREAT MCARE;PT ELECTRICAL STIM UNATTEND: ;PT ULTRASOUND EA 15 MIN: 42429  -GJ     PT Plan Comments assess response to progression of exercises.  Warm up on UBE, prone shoulder ext to neutral. Work on scapular girdle strengthening/ROM.    -GJ       User Key  (r) = Recorded By, (t) = Taken By, (c) = Cosigned By    Initials Name Provider Type     GJ Silvano Palencia, PT Physical Therapist                Modalities     Row Name 05/29/18 1400             Ice    Ice Applied Yes  -GJ      Location R shoulder, pt. seated, RUE rested on pillows  -GJ      Rx Minutes 10 mins  -GJ      Ice S/P Rx Yes  -GJ        User Key  (r) = Recorded By, (t) = Taken By, (c) = Cosigned By    Initials Name Provider Type    GJ Silvano Palencia, PT Physical Therapist                Exercises     Row Name 05/29/18 1400             Subjective Comments    Subjective Comments I know my shoulder is there  -GJ         Exercise 1    Exercise Name 1 shoulder shrugs  -GJ      Cueing 1 Demo;Verbal;Tactile  -GJ      Sets 1 2  -GJ      Reps 1 10  -GJ      Additional Comments 3#  -GJ         Exercise 2    Exercise Name 2 scap retraction  -GJ      Cueing 2 Verbal;Demo;Tactile  -GJ      Sets 2 2  -GJ      Reps 2 10  -GJ      Additional Comments RTB  -GJ         Exercise 3    Exercise Name 3 AROM elbow flexion/ext  -GJ      Cueing 3 Demo  -GJ      Sets 3 2  -GJ      Reps 3 10  -GJ      Additional Comments 3#  -GJ         Exercise 4    Exercise Name 4 home  -GJ         Exercise 5    Exercise Name 5 AAROM flexion with self, HOB 45 degrees  -GJ      Cueing 5 Verbal;Demo  -GJ      Reps 5 20  -GJ         Exercise 6    Exercise Name 6 chest press with cane supine  -GJ      Cueing 6 Verbal  -GJ      Reps 6 20  -GJ      Additional Comments 2#  -GJ         Exercise 8    Exercise Name 8 wash the wall, both hands  -GJ      Cueing 8 Verbal;Demo  -GJ      Reps 8 15  -GJ         Exercise 10    Exercise Name 10 supine alphabet  -GJ      Cueing 10 Verbal  -GJ      Sets 10 --  -GJ      Reps 10 1  -GJ      Additional Comments --  -GJ         Exercise 11    Exercise Name 11 pulley, scaption  -GJ      Cueing 11 Demo;Verbal  -GJ      Reps 11 20  -GJ         Exercise 12    Exercise Name 12 UBE  -GJ      Cueing 12 Verbal  -GJ      Time 12 4 min  -GJ         Exercise 13    Exercise Name 13 supine horizonantal abd  -GJ       Cueing 13 Demo;Verbal;Tactile  -GJ      Reps 13 12  -GJ      Additional Comments RTB  -GJ        User Key  (r) = Recorded By, (t) = Taken By, (c) = Cosigned By    Initials Name Provider Type    KURTIS Palencia, PT Physical Therapist                        Manual Rx (last 36 hours)      Manual Treatments     Row Name 05/29/18 1500             Manual Rx 1    Manual Rx 1 Location PROM R shoulder into flexion (140), ER (45). Pt supine with wedge  -GJ         Manual Rx 2    Manual Rx 2 Location rhythmic stabilization R shoulder, pt. supine, ,RUE flexed to 90  -GJ      Manual Rx 2 Duration total manual time 13 min   -GJ        User Key  (r) = Recorded By, (t) = Taken By, (c) = Cosigned By    Initials Name Provider Type    KURTIS Palencia, PT Physical Therapist                PT OP Goals     Row Name 05/29/18 1400          PT Short Term Goals    STG Date to Achieve 05/16/18  -GJ     STG 1 pt. to be I with initial HEP to facilitate self management of their condition  -GJ     STG 1 Progress Met  -GJ     STG 2 pt. to be educated in/verbalize understanding of the importance of posture/ergonomics in association with their condition to facilitate self management of their condition  -GJ     STG 2 Progress Met  -GJ     STG 3 pt to demonstrate R shoulder PROM flexion >/= 120 to facilitate ease of performing self care activities  -GJ     STG 3 Progress Met  -GJ        Long Term Goals    LTG Date to Achieve 07/11/18  -GJ     LTG 1 pt. to be I with advanced HEP to facilitate self management of their condition  -GJ     LTG 1 Progress Ongoing  -GJ     LTG 2 pt. to report a DASH score </= 30 to demonstrate decreased level of perceived disability  -GJ     LTG 2 Progress Ongoing  -GJ     LTG 3 pt. to demonstrate R shoulder flexion AROM >/= 0-120 to facilitate ease of performing self care/dressing activities  -GJ     LTG 3 Progress Ongoing  -GJ     LTG 3 Progress Comments 00-80  -GJ     LTG 4 pt. to demonstrate placing/retrieving small  object from an above the shoulder level shelf with her LUE to facilitate ease of performing household/work related activities  -GJ     LTG 4 Progress Ongoing  -GJ     LTG 4 Progress Comments unable  -GJ       User Key  (r) = Recorded By, (t) = Taken By, (c) = Cosigned By    Initials Name Provider Type    KURTIS Palencia, PT Physical Therapist          Therapy Education  Education Details: reviewed precautions per protocol, discussed activity modifications  Given: HEP, Symptoms/condition management, Pain management, Posture/body mechanics, Mobility training  Program: New  How Provided: Verbal, Demonstration, Written  Provided to: Patient  Level of Understanding: Teach back education performed, Verbalized, Demonstrated              Time Calculation:   Start Time: 1408  Stop Time: 1505  Time Calculation (min): 57 min    Therapy Charges for Today     Code Description Service Date Service Provider Modifiers Qty    61839815066 HC PT HOT OR COLD PACK TREAT MCARE 5/29/2018 Silvano Palencia, PT GP 1    01819419972 HC PT MANUAL THERAPY EA 15 MIN 5/29/2018 Silvano Palencia, PT GP 1    39370914067 HC PT THER PROC EA 15 MIN 5/29/2018 Silvano Palencia, PT GP 2                    Silvano Palencia, PT  5/29/2018

## 2018-05-31 ENCOUNTER — HOSPITAL ENCOUNTER (OUTPATIENT)
Dept: PHYSICAL THERAPY | Facility: HOSPITAL | Age: 73
Setting detail: THERAPIES SERIES
Discharge: HOME | End: 2018-05-31

## 2018-05-31 DIAGNOSIS — Z74.09 IMPAIRED MOBILITY: ICD-10-CM

## 2018-05-31 DIAGNOSIS — Z47.1 AFTERCARE FOLLOWING RIGHT SHOULDER JOINT REPLACEMENT SURGERY: ICD-10-CM

## 2018-05-31 DIAGNOSIS — Z96.611 AFTERCARE FOLLOWING RIGHT SHOULDER JOINT REPLACEMENT SURGERY: ICD-10-CM

## 2018-05-31 DIAGNOSIS — Z47.89 ORTHOPEDIC AFTERCARE: Primary | ICD-10-CM

## 2018-05-31 PROCEDURE — 97140 MANUAL THERAPY 1/> REGIONS: CPT

## 2018-05-31 PROCEDURE — 97110 THERAPEUTIC EXERCISES: CPT

## 2018-06-05 ENCOUNTER — HOSPITAL ENCOUNTER (OUTPATIENT)
Dept: PHYSICAL THERAPY | Facility: HOSPITAL | Age: 73
Setting detail: THERAPIES SERIES
Discharge: HOME | End: 2018-06-05

## 2018-06-05 DIAGNOSIS — Z47.89 ORTHOPEDIC AFTERCARE: ICD-10-CM

## 2018-06-05 DIAGNOSIS — Z96.611 AFTERCARE FOLLOWING RIGHT SHOULDER JOINT REPLACEMENT SURGERY: Primary | ICD-10-CM

## 2018-06-05 DIAGNOSIS — Z47.1 AFTERCARE FOLLOWING RIGHT SHOULDER JOINT REPLACEMENT SURGERY: Primary | ICD-10-CM

## 2018-06-05 DIAGNOSIS — Z74.09 IMPAIRED MOBILITY: ICD-10-CM

## 2018-06-05 PROCEDURE — 97140 MANUAL THERAPY 1/> REGIONS: CPT | Performed by: PHYSICAL THERAPIST

## 2018-06-05 PROCEDURE — 97110 THERAPEUTIC EXERCISES: CPT | Performed by: PHYSICAL THERAPIST

## 2018-06-05 NOTE — THERAPY TREATMENT NOTE
Outpatient Physical Therapy Ortho Treatment Note  [facility]     Patient Name: Rodolfo Grover  : 1945  MRN: [id number]  Today's Date: 2018      Visit Date: 2018    Visit Dx:    ICD-10-CM ICD-9-CM   1. Aftercare following right shoulder joint replacement surgery Z47.1 V54.81    Z96.611 V43.61   2. Orthopedic aftercare Z47.89 V54.9   3. Impaired mobility Z74.09 799.89       Patient Active Problem List   Diagnosis   • Acute blood loss anemia   • Arthritis   • Fracture of fifth metacarpal bone of right hand   • GI bleed   • Melena   • Closed fracture dislocation of right shoulder   • Gastroesophageal reflux disease without esophagitis   • Leukocytosis   • Hyperglycemia   • Fall at home   • DNR (do not resuscitate)   • Bradycardia following surgery   • Frequent PVCs   • Proximal humerus fracture   • Osteoarthritis        Past Medical History:   Diagnosis Date   • GERD (gastroesophageal reflux disease)    • Iritis of right eye    • Rheumatic fever    • Shoulder fracture, right         Past Surgical History:   Procedure Laterality Date   • HERNIA REPAIR     • SHOULDER CLOSED REDUCTION Right 3/16/2018    Procedure: RIGHT SHOULDER CLOSED REDUCTION;  Surgeon: Kj Garcia MD;  Location: [facility];  Service: Orthopedics   • TONSILLECTOMY     • TOTAL SHOULDER ARTHROPLASTY W/ DISTAL CLAVICLE EXCISION Right 3/22/2018    Procedure: Reverse Total Shoulder Arthroplsty;  Surgeon: Kj Garcia MD;  Location: [facility];  Service: Orthopedics             PT Ortho     Row Name 18 1300       Right Upper Ext    Rt Shoulder Abduction AROM 75 seated  -GJ    Rt Shoulder Flexion AROM 85 seated  -GJ      User Key  (r) = Recorded By, (t) = Taken By, (c) = Cosigned By    Initials Name Provider Type    KURTIS Palencia PT Physical Therapist                            PT Assessment/Plan     Row Name 18 8880          PT Assessment    Assessment Comments Mr. Grover returns to the clinic today 11  weeks s/p R reverse TSA.  We reviewed realistic expectations of rehab concerning reverse TSA particularly FIR ROM.  He demonstrates slowly progressing ROM (see specific joints) with noted UT/trunk compensation.  We progressed AAROM flexoin with HOB to allow pt. to perform eccentric phase indepndently.  He continues to be a good candidate for [facility] physical therapy to address ROM/strength.   -GJ        PT Plan    PT Plan Comments warm up on UBE, continue to work on scapular girdle strengthening/ROM, consider SL abduction if appropriate.  Modalities prn  -GJ       User Key  (r) = Recorded By, (t) = Taken By, (c) = Cosigned By    Initials Name Provider Type    KURTIS Palencia, PT Physical Therapist                Modalities     Row Name 06/05/18 1300             Ice    Ice Applied Yes  -GJ      Location R shoulder, pt. seated, RUE rested on pillows  -GJ      Rx Minutes 10 mins  -GJ      Ice S/P Rx Yes  -GJ        User Key  (r) = Recorded By, (t) = Taken By, (c) = Cosigned By    Initials Name Provider Type    KURTIS Palencia, PT Physical Therapist                Exercises     Row Name 06/05/18 1300             Subjective Comments    Subjective Comments appt time 1315, arrival 1323.  I only have one more appt. scheduled., I think I need more. The hardest thing I do is reaching for my back pocket, and shaving withy my R hand  -GJ         Exercise 1    Exercise Name 1 shoulder shrugs  -GJ      Cueing 1 Verbal  -GJ      Sets 1 2  -GJ      Reps 1 10  -GJ      Additional Comments 3#  -GJ         Exercise 2    Exercise Name 2 scap retraction  -GJ      Cueing 2 Verbal;Demo;Tactile  -GJ      Sets 2 2  -GJ      Reps 2 10  -GJ      Additional Comments GTB  -GJ         Exercise 3    Exercise Name 3 AROM elbow flexion/ext  -GJ      Cueing 3 Demo  -GJ      Sets 3 2  -GJ      Reps 3 10  -GJ      Additional Comments 3#  -GJ         Exercise 4    Exercise Name 4 prone ext to neutral  -GJ      Sets 4 --  -GJ      Reps 4 15  -GJ       Additional Comments 1#  -GJ         Exercise 5    Exercise Name 5 AAROM flexion with self, HOB 45 degrees  -GJ      Cueing 5 Verbal;Demo  -GJ      Reps 5 20  -GJ      Additional Comments independent control of eccentric phase, no assistance from LUE  -GJ         Exercise 6    Exercise Name 6 chest press with cane supine  -GJ      Cueing 6 Verbal  -GJ      Reps 6 20  -GJ      Additional Comments 4#  -GJ         Exercise 8    Exercise Name 8 wash the wall, both hands  -GJ      Cueing 8 Verbal;Demo  -GJ      Reps 8 20  -GJ         Exercise 9    Exercise Name 9 lat pull, tuff stuff  -GJ      Cueing 9 Verbal;Demo;Tactile  -GJ      Reps 9 15  -GJ      Additional Comments 3 plates  -GJ         Exercise 10    Exercise Name 10 supine alphabet  -GJ      Cueing 10 Verbal  -GJ      Reps 10 1  -GJ         Exercise 11    Exercise Name 11 pulley, scaption  -GJ      Cueing 11 Demo;Verbal  -GJ      Reps 11 20  -GJ         Exercise 12    Exercise Name 12 UBE  -GJ      Time 12 4 min  -GJ         Exercise 13    Exercise Name 13 supine horizonantal abd  -GJ      Cueing 13 Demo;Verbal;Tactile  -GJ      Reps 13 20  -GJ      Additional Comments RTB  -GJ        User Key  (r) = Recorded By, (t) = Taken By, (c) = Cosigned By    Initials Name Provider Type    KURTIS Palencia, PT Physical Therapist                        Manual Rx (last 36 hours)      Manual Treatments     Row Name 06/05/18 1300             Manual Rx 1    Manual Rx 1 Location PROM R shoulder flexion, abd  -GJ         Manual Rx 2    Manual Rx 2 Location rhythmic stabilization R shoulder, pt. supine, ,RUE flexed to 90  -GJ      Manual Rx 2 Duration total manual time 12 min.   -GJ        User Key  (r) = Recorded By, (t) = Taken By, (c) = Cosigned By    Initials Name Provider Type    KURTIS Palencia, PT Physical Therapist                PT OP Goals     Row Name 06/05/18 1300          PT Short Term Goals    STG Date to Achieve 05/16/18  -GJ     STG 1 pt. to be I with initial  HEP to facilitate self management of their condition  -GJ     STG 1 Progress Met  -GJ     STG 2 pt. to be educated in/verbalize understanding of the importance of posture/ergonomics in association with their condition to facilitate self management of their condition  -GJ     STG 2 Progress Met  -GJ     STG 3 pt to demonstrate R shoulder PROM flexion >/= 120 to facilitate ease of performing self care activities  -GJ     STG 3 Progress Met  -GJ        Long Term Goals    LTG Date to Achieve 07/11/18  -GJ     LTG 1 pt. to be I with advanced HEP to facilitate self management of their condition  -GJ     LTG 1 Progress Ongoing  -GJ     LTG 2 pt. to report a DASH score </= 30 to demonstrate decreased level of perceived disability  -GJ     LTG 2 Progress Ongoing  -GJ     LTG 3 pt. to demonstrate R shoulder flexion AROM >/= 0-120 to facilitate ease of performing self care/dressing activities  -GJ     LTG 3 Progress Ongoing;Progressing  -GJ     LTG 3 Progress Comments see specific joints for updated ROM, pt. is progressing toward goal  -GJ     LTG 4 pt. to demonstrate placing/retrieving small object from an above the shoulder level shelf with her LUE to facilitate ease of performing household/work related activities  -GJ     LTG 4 Progress Ongoing  -GJ     LTG 4 Progress Comments unable to at this time  -       User Key  (r) = Recorded By, (t) = Taken By, (c) = Cosigned By    Initials Name Provider Type    KURTIS Palencia, PT Physical Therapist          Therapy Education  Education Details: reviewed expectations post reverse TSA, including FIR ROM.   Given: HEP, Symptoms/condition management, Pain management, Posture/body mechanics, Mobility training  Program: New  How Provided: Verbal, Demonstration  Provided to: Patient  Level of Understanding: Teach back education performed, Verbalized              Time Calculation:   Start Time: 1323  Stop Time: 1415  Time Calculation (min): 52 min    Therapy Charges for Today     Code  Description Service Date Service Provider Modifiers Qty    11471172681 HC PT HOT OR COLD PACK TREAT MCARE 6/5/2018 Silvano Palencia, PT GP 1    56863673071 HC PT MANUAL THERAPY EA 15 MIN 6/5/2018 Silvano Palencia, PT GP 1    01095465968 HC PT THER PROC EA 15 MIN 6/5/2018 Silvano Palencia, PT GP 2                    Silvano Palencia, PT  6/5/2018

## 2018-06-07 ENCOUNTER — HOSPITAL ENCOUNTER (OUTPATIENT)
Dept: PHYSICAL THERAPY | Facility: HOSPITAL | Age: 73
Setting detail: THERAPIES SERIES
Discharge: HOME | End: 2018-06-07

## 2018-06-07 DIAGNOSIS — Z96.611 AFTERCARE FOLLOWING RIGHT SHOULDER JOINT REPLACEMENT SURGERY: Primary | ICD-10-CM

## 2018-06-07 DIAGNOSIS — Z47.1 AFTERCARE FOLLOWING RIGHT SHOULDER JOINT REPLACEMENT SURGERY: Primary | ICD-10-CM

## 2018-06-07 DIAGNOSIS — Z47.89 ORTHOPEDIC AFTERCARE: ICD-10-CM

## 2018-06-07 PROCEDURE — 97140 MANUAL THERAPY 1/> REGIONS: CPT

## 2018-06-07 PROCEDURE — 97110 THERAPEUTIC EXERCISES: CPT

## 2018-06-07 NOTE — THERAPY TREATMENT NOTE
Outpatient Physical Therapy Ortho Treatment Note  [facility]     Patient Name: Rodolfo Grover  : 1945  MRN: [id number]  Today's Date: 2018      Visit Date: 2018    Visit Dx:    ICD-10-CM ICD-9-CM   1. Aftercare following right shoulder joint replacement surgery Z47.1 V54.81    Z96.611 V43.61   2. Orthopedic aftercare Z47.89 V54.9       Patient Active Problem List   Diagnosis   • Acute blood loss anemia   • Arthritis   • Fracture of fifth metacarpal bone of right hand   • GI bleed   • Melena   • Closed fracture dislocation of right shoulder   • Gastroesophageal reflux disease without esophagitis   • Leukocytosis   • Hyperglycemia   • Fall at home   • DNR (do not resuscitate)   • Bradycardia following surgery   • Frequent PVCs   • Proximal humerus fracture   • Osteoarthritis        Past Medical History:   Diagnosis Date   • GERD (gastroesophageal reflux disease)    • Iritis of right eye    • Rheumatic fever    • Shoulder fracture, right         Past Surgical History:   Procedure Laterality Date   • HERNIA REPAIR     • SHOULDER CLOSED REDUCTION Right 3/16/2018    Procedure: RIGHT SHOULDER CLOSED REDUCTION;  Surgeon: Kj Garcia MD;  Location: [facility];  Service: Orthopedics   • TONSILLECTOMY     • TOTAL SHOULDER ARTHROPLASTY W/ DISTAL CLAVICLE EXCISION Right 3/22/2018    Procedure: Reverse Total Shoulder Arthroplsty;  Surgeon: Kj Garcia MD;  Location: [facility];  Service: Orthopedics             PT Ortho     Row Name 18 1300       Right Upper Ext    Rt Shoulder Abduction AROM 75 seated  -GJ    Rt Shoulder Flexion AROM 85 seated  -GJ      User Key  (r) = Recorded By, (t) = Taken By, (c) = Cosigned By    Initials Name Provider Type    KURTIS Palencia PT Physical Therapist                            PT Assessment/Plan     Row Name 18 9499          PT Assessment    Assessment Comments Pain is well controlled. Added and tolerated S/L abd with 1#. Progressing with  ROM/strength. May consider adding S/L ER no weight if appropriate  -WS       User Key  (r) = Recorded By, (t) = Taken By, (c) = Cosigned By    Initials Name Provider Type    WS Oumar Rg PTA Physical Therapy Assistant                    Exercises     Row Name 06/07/18 4274             Subjective Comments    Subjective Comments No pain starting therapy  -WS         Subjective Pain    Able to rate subjective pain? yes  -WS      Pre-Treatment Pain Level 0  -WS         Exercise 1    Exercise Name 1 shoulder shrugs  -WS      Cueing 1 Verbal  -WS      Sets 1 2  -WS      Reps 1 10  -WS      Additional Comments 3#  -WS         Exercise 2    Exercise Name 2 scap retraction  -WS      Cueing 2 Verbal;Demo;Tactile  -WS      Sets 2 2  -WS      Reps 2 10  -WS      Additional Comments GTB  -WS         Exercise 3    Exercise Name 3 AROM elbow flexion/ext  -WS      Cueing 3 Demo  -WS      Sets 3 2  -WS      Reps 3 10  -WS      Additional Comments 3#  -WS         Exercise 4    Exercise Name 4 prone ext to neutral  -WS      Reps 4 15  -WS      Additional Comments 1#  -WS         Exercise 5    Exercise Name 5 AAROM flexion with self, HOB 45 degrees  -WS      Cueing 5 Verbal;Demo  -WS      Reps 5 20  -WS         Exercise 6    Exercise Name 6 chest press with cane supine  -WS      Cueing 6 Verbal  -WS      Reps 6 20  -WS      Additional Comments 4#  -WS         Exercise 8    Exercise Name 8 wash the wall, both hands  -WS      Cueing 8 Verbal;Demo  -WS      Reps 8 20  -WS         Exercise 9    Exercise Name 9 lat pull, tuff stuff  -WS      Cueing 9 Verbal;Demo;Tactile  -WS      Reps 9 15  -WS      Additional Comments 3 plates  -WS         Exercise 10    Exercise Name 10 supine alphabet  -WS      Reps 10 1  -WS         Exercise 11    Exercise Name 11 pulley, scaption  -WS      Cueing 11 Demo;Verbal  -WS      Reps 11 20  -WS         Exercise 12    Exercise Name 12 UBE  -WS      Time 12 4 min  -WS         Exercise 13    Exercise Name  13 supine horizonantal abd  -WS      Cueing 13 Demo;Verbal;Tactile  -WS      Reps 13 20  -WS      Additional Comments RTB  -WS         Exercise 14    Exercise Name 14 S/L shoulder abd  -WS      Reps 14 10  -WS      Additional Comments 1#  -WS        User Key  (r) = Recorded By, (t) = Taken By, (c) = Cosigned By    Initials Name Provider Type     Oumar RgMARIBEL Physical Therapy Assistant                        Manual Rx (last 36 hours)      Manual Treatments     Row Name 06/07/18 1300             Manual Rx 1    Manual Rx 1 Location PROM R shoulder flexion, abd  -WS         Manual Rx 2    Manual Rx 2 Location rhythmic stabilization R shoulder, pt. supine, ,RUE flexed to 90  -WS         Manual Rx 3    Manual Rx 3 Location manual gentle ER/IR isometric  x 10  -WS        User Key  (r) = Recorded By, (t) = Taken By, (c) = Cosigned By    Initials Name Provider Type     Oumar DonovanMARIBEL pierre Physical Therapy Assistant                PT OP Goals     Row Name 06/07/18 1300          PT Short Term Goals    STG Date to Achieve 05/16/18  -     STG 1 pt. to be I with initial HEP to facilitate self management of their condition  -WS     STG 1 Progress Met  -WS     STG 2 pt. to be educated in/verbalize understanding of the importance of posture/ergonomics in association with their condition to facilitate self management of their condition  -WS     STG 2 Progress Met  -WS     STG 3 pt to demonstrate R shoulder PROM flexion >/= 120 to facilitate ease of performing self care activities  -     STG 3 Progress Met  -        Long Term Goals    LTG Date to Achieve 07/11/18  -     LTG 1 pt. to be I with advanced HEP to facilitate self management of their condition  -WS     LTG 1 Progress Ongoing  -WS     LTG 2 pt. to report a DASH score </= 30 to demonstrate decreased level of perceived disability  -WS     LTG 2 Progress Ongoing  -WS     LTG 3 pt. to demonstrate R shoulder flexion AROM >/= 0-120 to facilitate ease of  performing self care/dressing activities  -     LTG 3 Progress Ongoing;Progressing  -     LTG 4 pt. to demonstrate placing/retrieving small object from an above the shoulder level shelf with her LUE to facilitate ease of performing household/work related activities  -     LTG 4 Progress Ongoing  -       User Key  (r) = Recorded By, (t) = Taken By, (c) = Cosigned By    Initials Name Provider Type     Oumar Rg PTA Physical Therapy Assistant          Therapy Education  Given: Symptoms/condition management, HEP, Pain management, Posture/body mechanics, Edema management  Program: Reinforced  How Provided: Verbal  Provided to: Patient              Time Calculation:   Start Time: 1325  Stop Time: 1415  Time Calculation (min): 50 min    Therapy Charges for Today     Code Description Service Date Service Provider Modifiers Qty    63573958738 HC PT THER PROC EA 15 MIN 6/7/2018 Oumar Rg PTA GP 2    72982304120 HC PT HOT OR COLD PACK TREAT MCARE 6/7/2018 Oumar Rg PTA GP 1    58568350625 HC PT MANUAL THERAPY EA 15 MIN 6/7/2018 Oumar Rg PTA GP 1                    Oumar Rg PTA  6/7/2018

## 2018-06-13 ENCOUNTER — HOSPITAL ENCOUNTER (OUTPATIENT)
Dept: PHYSICAL THERAPY | Facility: HOSPITAL | Age: 73
Setting detail: THERAPIES SERIES
Discharge: HOME | End: 2018-06-13

## 2018-06-13 DIAGNOSIS — Z96.611 AFTERCARE FOLLOWING RIGHT SHOULDER JOINT REPLACEMENT SURGERY: Primary | ICD-10-CM

## 2018-06-13 DIAGNOSIS — Z47.1 AFTERCARE FOLLOWING RIGHT SHOULDER JOINT REPLACEMENT SURGERY: Primary | ICD-10-CM

## 2018-06-13 DIAGNOSIS — Z74.09 IMPAIRED MOBILITY: ICD-10-CM

## 2018-06-13 DIAGNOSIS — Z47.89 ORTHOPEDIC AFTERCARE: ICD-10-CM

## 2018-06-13 PROCEDURE — 97110 THERAPEUTIC EXERCISES: CPT | Performed by: PHYSICAL THERAPIST

## 2018-06-13 PROCEDURE — G8985 CARRY GOAL STATUS: HCPCS | Performed by: PHYSICAL THERAPIST

## 2018-06-13 PROCEDURE — G8984 CARRY CURRENT STATUS: HCPCS | Performed by: PHYSICAL THERAPIST

## 2018-06-13 PROCEDURE — 97140 MANUAL THERAPY 1/> REGIONS: CPT | Performed by: PHYSICAL THERAPIST

## 2018-06-13 NOTE — THERAPY RE-EVALUATION
Outpatient Physical Therapy Ortho Re-Assessment  [facility]     Patient Name: Rodolfo Grover  : 1945  MRN: [id number]  Today's Date: 2018      Visit Date: 2018    Patient Active Problem List   Diagnosis   • Acute blood loss anemia   • Arthritis   • Fracture of fifth metacarpal bone of right hand   • GI bleed   • Melena   • Closed fracture dislocation of right shoulder   • Gastroesophageal reflux disease without esophagitis   • Leukocytosis   • Hyperglycemia   • Fall at home   • DNR (do not resuscitate)   • Bradycardia following surgery   • Frequent PVCs   • Proximal humerus fracture   • Osteoarthritis        Past Medical History:   Diagnosis Date   • GERD (gastroesophageal reflux disease)    • Iritis of right eye    • Rheumatic fever    • Shoulder fracture, right         Past Surgical History:   Procedure Laterality Date   • HERNIA REPAIR     • SHOULDER CLOSED REDUCTION Right 3/16/2018    Procedure: RIGHT SHOULDER CLOSED REDUCTION;  Surgeon: Kj Garcia MD;  Location: [facility];  Service: Orthopedics   • TONSILLECTOMY     • TOTAL SHOULDER ARTHROPLASTY W/ DISTAL CLAVICLE EXCISION Right 3/22/2018    Procedure: Reverse Total Shoulder Arthroplsty;  Surgeon: Kj Garcia MD;  Location: [facility];  Service: Orthopedics       Visit Dx:     ICD-10-CM ICD-9-CM   1. Aftercare following right shoulder joint replacement surgery Z47.1 V54.81    Z96.611 V43.61   2. Orthopedic aftercare Z47.89 V54.9   3. Impaired mobility Z74.09 799.89                 PT Ortho     Row Name 18 1400       Right Upper Ext    Rt Shoulder Abduction AROM 80seated  -GJ    Rt Shoulder Abduction PROM 140 supine  -GJ    Rt Shoulder Flexion AROM 105  seated  -GJ    Rt Shoulder Flexion PROM 145 supine  -GJ    Rt Shoulder Internal Rotation AROM FIR ipsi hip pocket/belt line  -      User Key  (r) = Recorded By, (t) = Taken By, (c) = Cosigned By    Initials Name Provider Type    KURTIS Palencia PT Physical  Therapist                      Therapy Education  Education Details: reviewed realistic expectations following reverse TSA procedure, radha. FIR.   Given: HEP, Symptoms/condition management, Pain management, Posture/body mechanics, Mobility training  Program: New  How Provided: Verbal, Demonstration, Written  Provided to: Patient  Level of Understanding: Teach back education performed, Verbalized, Demonstrated           PT OP Goals     Row Name 06/13/18 1400          PT Short Term Goals    STG Date to Achieve 05/16/18  -GJ     STG 1 pt. to be I with initial HEP to facilitate self management of their condition  -GJ     STG 1 Progress Met  -GJ     STG 2 pt. to be educated in/verbalize understanding of the importance of posture/ergonomics in association with their condition to facilitate self management of their condition  -GJ     STG 2 Progress Met  -GJ     STG 3 pt to demonstrate R shoulder PROM flexion >/= 120 to facilitate ease of performing self care activities  -GJ     STG 3 Progress Met  -GJ        Long Term Goals    LTG Date to Achieve 07/11/18  -GJ     LTG 1 pt. to be I with advanced HEP to facilitate self management of their condition  -GJ     LTG 1 Progress Progressing  -GJ     LTG 2 pt. to report a DASH score </= 30 to demonstrate decreased level of perceived disability  -GJ     LTG 2 Progress Ongoing  -GJ     LTG 2 Progress Comments 41%  -GJ     LTG 3 pt. to demonstrate R shoulder flexion AROM >/= 0-120 to facilitate ease of performing self care/dressing activities  -GJ     LTG 3 Progress Ongoing;Progressing  -GJ     LTG 3 Progress Comments see specific joints  -GJ     LTG 4 pt. to demonstrate placing/retrieving small object from an above the shoulder level shelf with her LUE to facilitate ease of performing household/work related activities  -GJ     LTG 4 Progress Ongoing  -GJ     LTG 4 Progress Comments demosntrates compenstion  -       User Key  (r) = Recorded By, (t) = Taken By, (c) = Cosigned By     Initials Name Provider Type     Silvano Palencia, PT Physical Therapist                PT Assessment/Plan     Row Name 06/13/18 5326          PT Assessment    Functional Limitations Limitation in home management;Limitations in community activities;Performance in work activities;Performance in self-care ADL;Performance in leisure activities;Limitations in functional capacity and performance  -     Impairments Range of motion;Impaired muscle endurance;Muscle strength;Impaired muscle length;Pain;Edema;Impaired flexibility;Joint mobility  -     Assessment Comments Mr. Grover is a 74 y/o R handed male.  He is 12 weeks s/p R reverse TSA.  Mr. Grover has attended 11 sessions of physical therapy to date, several weeks absence during vacation periods.  He demonstrates improving A/PROM in all planes, he reports between 80-90% improvement in his condiition, with primary complaint being FIR (we discussed expected ROM retrun).  See specific joints for ROM.  Mr. Grover reports a DASH score of 41%, scored 0-100, 100 represents no perceived disability.  Mr. Grover demonstraes stable and improving s/s following R shoulder reverse TSA which limits his full participation in household, work, leisure activities  HE continues to be  a good candidate for skilled physical therapy to address ROM/strength/return to functional mobility.    -GJ     Please refer to paper survey for additional self-reported information Yes  -GJ     Rehab Potential Excellent  -GJ     Patient/caregiver participated in establishment of treatment plan and goals Yes  -GJ     Patient would benefit from skilled therapy intervention Yes  -GJ        PT Plan    PT Frequency 2x/week  -GJ     Predicted Duration of Therapy Intervention (Therapy Eval) 4-6 weeks  -GJ     Planned CPT's? PT RE-EVAL: 63977;PT THER PROC EA 15 MIN: 87632;PT MANUAL THERAPY EA 15 MIN: 04853;PT HOT OR COLD PACK TREAT MCARE  -GJ     PT Plan Comments warm up on UBE, continue to work on scapular  girdle/strengtheing/ROM, conisder AAROM at 45 abd with independent eccentric phase, ? very gentle D2 slow reversal in upper range  -GJ       User Key  (r) = Recorded By, (t) = Taken By, (c) = Cosigned By    Initials Name Provider Type    GJ Silvano Palencia, PT Physical Therapist                Modalities     Row Name 06/13/18 1400             Ice    Ice Applied Yes  -GJ      Location R shoulder, pt. seated, RUE rested on pillows  -GJ      Rx Minutes 10 mins  -GJ      Ice S/P Rx Yes  -GJ        User Key  (r) = Recorded By, (t) = Taken By, (c) = Cosigned By    Initials Name Provider Type    GJ Silvano Palencia, PT Physical Therapist              Exercises     Row Name 06/13/18 1446 06/13/18 1400          Subjective Comments    Subjective Comments  -- doing well, shoulder is getting better, no pain  -GJ        Subjective Pain    Pre-Treatment Pain Level  -- 0  -GJ        Total Minutes    73977 - PT Therapeutic Exercise Minutes 32  -GJ  --     39901 - PT Manual Therapy Minutes 14  -GJ  --        Exercise 1    Exercise Name 1  -- shoulder shrugs  -GJ     Cueing 1  -- Verbal  -GJ     Sets 1  -- 2  -GJ     Reps 1  -- 10  -GJ     Additional Comments  -- 3#  -GJ        Exercise 2    Exercise Name 2  -- scap retraction  -GJ     Cueing 2  -- Verbal;Demo;Tactile  -GJ     Sets 2  -- 2  -GJ     Reps 2  -- 10  -GJ     Additional Comments  -- GTB  -GJ        Exercise 3    Exercise Name 3  -- AROM elbow flexion/ext  -GJ     Cueing 3  -- Demo  -GJ     Sets 3  -- 2  -GJ     Reps 3  -- 10  -GJ     Additional Comments  -- 3#  -GJ        Exercise 4    Exercise Name 4  -- prone ext to neutral  -GJ     Cueing 4  -- Demo;Verbal  -GJ     Reps 4  -- 15  -GJ     Additional Comments  -- 1#  -GJ        Exercise 5    Exercise Name 5  -- AAROM flexion with self, HOB 45 degrees  -GJ     Cueing 5  -- Verbal;Demo  -GJ     Reps 5  -- 20  -GJ        Exercise 6    Exercise Name 6  -- chest press with cane supine  -GJ     Cueing 6  -- Verbal  -GJ     Reps 6   -- 20  -GJ     Additional Comments  -- 4#  -GJ        Exercise 8    Exercise Name 8  -- wash the wall, both hands  -GJ     Cueing 8  -- Verbal;Demo  -GJ     Reps 8  -- 20  -GJ        Exercise 9    Exercise Name 9  -- lat pull, tuff stuff  -GJ     Cueing 9  -- Verbal;Demo;Tactile  -GJ     Sets 9  -- 2  -GJ     Reps 9  -- 10  -GJ     Additional Comments  -- 4 plates  -GJ        Exercise 10    Exercise Name 10  -- supine alphabet  -GJ     Cueing 10  -- Verbal  -GJ     Reps 10  -- 1  -GJ        Exercise 11    Exercise Name 11  -- pulley, scaption  -GJ     Cueing 11  -- Demo;Verbal  -GJ     Reps 11  -- 20  -GJ        Exercise 12    Exercise Name 12  -- UBE  -GJ     Time 12  -- 4 min  -GJ        Exercise 13    Exercise Name 13  -- supine horizonantal abd  -GJ     Cueing 13  -- Demo;Verbal;Tactile  -GJ     Reps 13  -- 20  -GJ     Additional Comments  -- RTB  -GJ        Exercise 14    Exercise Name 14  -- S/L shoulder abd  -GJ     Cueing 14  -- Verbal;Demo  -GJ     Sets 14  -- 2  -GJ     Reps 14  -- 10  -GJ     Additional Comments  -- 1#  -GJ        Exercise 15    Exercise Name 15  -- prone rows, RUE  -GJ     Cueing 15  -- Verbal;Demo  -GJ     Reps 15  -- 20  -GJ     Additional Comments  -- 1#  -GJ        Exercise 16    Exercise Name 16  -- standing FIR with cane stretch  -GJ     Cueing 16  -- Demo;Verbal  -GJ     Reps 16  -- 5  -GJ     Time 16  -- 10 s  -GJ       User Key  (r) = Recorded By, (t) = Taken By, (c) = Cosigned By    Initials Name Provider Type    KURTIS Palencia, PT Physical Therapist           Manual Rx (last 36 hours)      Manual Treatments     Row Name 06/13/18 1500             Manual Rx 1    Manual Rx 1 Location PROM R shoulder flexion, abd  -GJ         Manual Rx 2    Manual Rx 2 Location rhythmic stabilization R shoulder, pt. supine, ,RUE flexed to 90  -GJ         Manual Rx 3    Manual Rx 3 Location manual gentle ER/IR isometric  x 10  -GJ        User Key  (r) = Recorded By, (t) = Taken By, (c) =  Cosigned By    Initials Name Provider Type    GJ Silvano Palencia, PT Physical Therapist                      Outcome Measure Options: Disabilities of the Arm, Shoulder, and Hand (DASH) (41%)         Time Calculation:     Therapy Suggested Charges     Code   Minutes Charges    29913 (CPT®) Hc Pt Neuromusc Re Education Ea 15 Min      02465 (CPT®) Hc Pt Ther Proc Ea 15 Min 32 2    87336 (CPT®) Hc Gait Training Ea 15 Min      95753 (CPT®) Hc Pt Therapeutic Act Ea 15 Min      22184 (CPT®) Hc Pt Manual Therapy Ea 15 Min 14 1    36729 (CPT®) Hc Pt Ther Massage- Per 15 Min      04747 (CPT®) Hc Pt Iontophoresis Ea 15 Min      48398 (CPT®) Hc Pt Elec Stim Ea-Per 15 Min      14954 (CPT®) Hc Pt Ultrasound Ea 15 Min      85687 (CPT®) Hc Pt Self Care/Mgmt/Train Ea 15 Min      Total  46 3          Start Time: 1446  Stop Time: 1545  Time Calculation (min): 59 min     Therapy Charges for Today     Code Description Service Date Service Provider Modifiers Qty    19123643133 HC PT THER PROC EA 15 MIN 6/13/2018 Silvano Palencia, PT GP 2    25224314011 HC PT MANUAL THERAPY EA 15 MIN 6/13/2018 Silvano Palencia, PT GP 1    04144738114 HC PT HOT OR COLD PACK TREAT MCARE 6/13/2018 Silvano Palencia, PT GP 1    14339614733 HC PT CARRY MOV HAND OBJ CURRENT 6/13/2018 Silvano Palencia, PT GP, CJ 1    92589283559 HC PT CARRY MOV HAND OBJ PROJECTED 6/13/2018 Silvano Palencia, PT GP, CI 1    18742541568 HC PT HOT OR COLD PACK TREAT MCARE 6/13/2018 Silvano Palencia, PT GP 1    48461168127 HC PT MANUAL THERAPY EA 15 MIN 6/13/2018 Silvano Palencia, PT GP 1    53009138084 HC PT THER PROC EA 15 MIN 6/13/2018 Silvano Palencia, PT GP 2          PT G-Codes  Outcome Measure Options: Disabilities of the Arm, Shoulder, and Hand (DASH) (41%)  Score: 75  Functional Limitation: Carrying, moving and handling objects  Carrying, Moving and Handling Objects Current Status (): At least 20 percent but less than 40 percent impaired, limited or restricted  Carrying, Moving and  Handling Objects Goal Status (): At least 1 percent but less than 20 percent impaired, limited or restricted         Silvano Palencia, PT  6/13/2018

## 2018-06-15 ENCOUNTER — HOSPITAL ENCOUNTER (OUTPATIENT)
Dept: PHYSICAL THERAPY | Facility: HOSPITAL | Age: 73
Setting detail: THERAPIES SERIES
Discharge: HOME | End: 2018-06-15

## 2018-06-15 DIAGNOSIS — Z74.09 IMPAIRED MOBILITY: ICD-10-CM

## 2018-06-15 DIAGNOSIS — Z47.89 ORTHOPEDIC AFTERCARE: ICD-10-CM

## 2018-06-15 DIAGNOSIS — Z47.1 AFTERCARE FOLLOWING RIGHT SHOULDER JOINT REPLACEMENT SURGERY: Primary | ICD-10-CM

## 2018-06-15 DIAGNOSIS — Z96.611 AFTERCARE FOLLOWING RIGHT SHOULDER JOINT REPLACEMENT SURGERY: Primary | ICD-10-CM

## 2018-06-15 PROCEDURE — 97140 MANUAL THERAPY 1/> REGIONS: CPT | Performed by: PHYSICAL THERAPIST

## 2018-06-15 PROCEDURE — 97110 THERAPEUTIC EXERCISES: CPT | Performed by: PHYSICAL THERAPIST

## 2018-06-15 NOTE — THERAPY TREATMENT NOTE
Outpatient Physical Therapy Ortho Treatment Note  [facility]     Patient Name: Rodolfo Grover  : 1945  MRN: [id number]  Today's Date: 6/15/2018      Visit Date: 06/15/2018    Visit Dx:    ICD-10-CM ICD-9-CM   1. Aftercare following right shoulder joint replacement surgery Z47.1 V54.81    Z96.611 V43.61   2. Orthopedic aftercare Z47.89 V54.9   3. Impaired mobility Z74.09 799.89       Patient Active Problem List   Diagnosis   • Acute blood loss anemia   • Arthritis   • Fracture of fifth metacarpal bone of right hand   • GI bleed   • Melena   • Closed fracture dislocation of right shoulder   • Gastroesophageal reflux disease without esophagitis   • Leukocytosis   • Hyperglycemia   • Fall at home   • DNR (do not resuscitate)   • Bradycardia following surgery   • Frequent PVCs   • Proximal humerus fracture   • Osteoarthritis        Past Medical History:   Diagnosis Date   • GERD (gastroesophageal reflux disease)    • Iritis of right eye    • Rheumatic fever    • Shoulder fracture, right         Past Surgical History:   Procedure Laterality Date   • HERNIA REPAIR     • SHOULDER CLOSED REDUCTION Right 3/16/2018    Procedure: RIGHT SHOULDER CLOSED REDUCTION;  Surgeon: Kj Garcia MD;  Location: [facility];  Service: Orthopedics   • TONSILLECTOMY     • TOTAL SHOULDER ARTHROPLASTY W/ DISTAL CLAVICLE EXCISION Right 3/22/2018    Procedure: Reverse Total Shoulder Arthroplsty;  Surgeon: Kj Garcia MD;  Location: [facility];  Service: Orthopedics             PT Ortho     Row Name 18 1400       Right Upper Ext    Rt Shoulder Abduction AROM 80seated  -GJ    Rt Shoulder Abduction PROM 140 supine  -GJ    Rt Shoulder Flexion AROM 105  seated  -GJ    Rt Shoulder Flexion PROM 145 supine  -GJ    Rt Shoulder Internal Rotation AROM FIR ipsi hip pocket/belt line  -      User Key  (r) = Recorded By, (t) = Taken By, (c) = Cosigned By    Initials Name Provider Type    KURTIS Palencia PT Physical  Therapist                            PT Assessment/Plan     Row Name 06/15/18 1222          PT Assessment    Assessment Comments Patient continues to require extensive tactile cueing for decreased UT compensation and quickly loses focus of form importance. His ROM is progressing fairly but with substitution.  -GR        PT Plan    PT Plan Comments Continue POC.  -GR       User Key  (r) = Recorded By, (t) = Taken By, (c) = Cosigned By    Initials Name Provider Type    JOVITA Cortez PT Physical Therapist                Modalities     Row Name 06/15/18 0800             Ice    Ice Applied Yes  -GR      Location R shoulder, pt. seated, RUE rested on pillows  -GR      Rx Minutes 10 mins  -GR      Ice S/P Rx Yes  -GR        User Key  (r) = Recorded By, (t) = Taken By, (c) = Cosigned By    Initials Name Provider Type    JOVITA Cortez, PT Physical Therapist                Exercises     Row Name 06/15/18 0900 06/15/18 0800          Subjective Comments    Subjective Comments  -- Shoulder still stiff but no complaints. Gets a little better each day.  -GR        Subjective Pain    Able to rate subjective pain?  -- yes  -GR     Pre-Treatment Pain Level  -- 0  -GR        Total Minutes    38935 - PT Therapeutic Exercise Minutes  -- 30  -GR     75722 - PT Manual Therapy Minutes 15  -GR  --        Exercise 1    Exercise Name 1  -- shoulder shrugs  -GR     Cueing 1  -- Verbal  -GR     Sets 1  -- 2  -GR     Reps 1  -- 10  -GR     Additional Comments  -- 4#  -GR        Exercise 2    Exercise Name 2  -- scap retraction - rows and extension  -GR     Cueing 2  -- Verbal;Demo;Tactile  -GR     Sets 2  -- 2  -GR     Reps 2  -- 10  -GR     Additional Comments  -- BlueTB  -GR        Exercise 3    Exercise Name 3  -- AROM elbow flexion/ext  -GR     Cueing 3  -- Demo  -GR     Sets 3  -- 2  -GR     Reps 3  -- 10  -GR     Additional Comments  -- 4#  -GR        Exercise 4    Exercise Name 4  -- prone ext to neutral  -GR     Cueing 4  --  Demo;Verbal  -GR     Sets 4  -- 2  -GR     Reps 4  -- 10  -GR     Additional Comments  -- 1#  -GR        Exercise 5    Exercise Name 5  -- AAROM flexion with self, HOB 45 degrees  -GR     Cueing 5  -- Verbal;Demo  -GR     Sets 5  -- 1  -GR     Reps 5  -- 10  -GR     Time 5  -- 5 seconds  -GR        Exercise 6    Exercise Name 6  -- chest press with cane supine  -GR     Cueing 6  -- Verbal  -GR     Sets 6  -- 2  -GR     Reps 6  -- 10  -GR     Additional Comments  -- 4#  -GR        Exercise 8    Exercise Name 8  -- wash the wall, both hands  -GR     Cueing 8  -- Verbal;Demo  -GR     Reps 8  -- 20  -GR        Exercise 9    Exercise Name 9  -- lat pull, tuff stuff  -GR     Cueing 9  -- Verbal;Demo;Tactile  -GR     Sets 9  -- 2  -GR     Reps 9  -- 10  -GR     Additional Comments  -- 4 plates  -GR        Exercise 10    Exercise Name 10  -- supine alphabet  -GR     Cueing 10  -- Verbal  -GR     Reps 10  -- 1  -GR     Additional Comments  -- 1#  -GR        Exercise 11    Exercise Name 11  -- pulley, scaption  -GR     Cueing 11  -- Demo;Verbal  -GR     Reps 11  -- 20  -GR        Exercise 12    Exercise Name 12  -- UBE  -GR     Time 12  -- 4 min  -GR     Additional Comments  -- L1  -GR        Exercise 13    Exercise Name 13  -- supine horizonantal abd  -GR     Cueing 13  -- Demo;Verbal;Tactile  -GR     Reps 13  -- 20  -GR     Additional Comments  -- RTB  -GR        Exercise 15    Exercise Name 15  -- prone rows, RUE  -GR     Cueing 15  -- Verbal;Demo  -GR     Sets 15  -- 2  -GR     Reps 15  -- 10  -GR     Additional Comments  -- 1#  -GR        Exercise 16    Exercise Name 16  -- standing FIR with cane stretch  -GR     Cueing 16  -- Demo;Verbal  -GR     Reps 16  -- 5  -GR     Time 16  -- 10 s  -GR       User Key  (r) = Recorded By, (t) = Taken By, (c) = Cosigned By    Initials Name Provider Type    GR Gaurav Cortez PT Physical Therapist                        Manual Rx (last 36 hours)      Manual Treatments     Row Name  06/15/18 0900             Total Minutes    40506 - PT Manual Therapy Minutes 15  -GR         Manual Rx 1    Manual Rx 1 Location PROM R shoulder flexion, abd  -GR      Manual Rx 1 Duration 10 min  -GR         Manual Rx 2    Manual Rx 2 Location rhythmic stabilization R shoulder, pt. supine, ,RUE flexed to 90  -GR      Manual Rx 2 Grade 3 reps  -GR         Manual Rx 3    Manual Rx 3 Location manual gentle ER/IR isometric  x 10  -GR      Manual Rx 3 Grade 5 reps  -GR        User Key  (r) = Recorded By, (t) = Taken By, (c) = Cosigned By    Initials Name Provider Type    GR Gaurav CADE Diego, PT Physical Therapist                PT OP Goals     Row Name 06/15/18 0900          PT Short Term Goals    STG Date to Achieve 05/16/18  -GR     STG 1 pt. to be I with initial HEP to facilitate self management of their condition  -GR     STG 1 Progress Met  -GR     STG 2 pt. to be educated in/verbalize understanding of the importance of posture/ergonomics in association with their condition to facilitate self management of their condition  -GR     STG 2 Progress Met  -GR     STG 3 pt to demonstrate R shoulder PROM flexion >/= 120 to facilitate ease of performing self care activities  -GR     STG 3 Progress Met  -GR        Long Term Goals    LTG Date to Achieve 07/11/18  -GR     LTG 1 pt. to be I with advanced HEP to facilitate self management of their condition  -GR     LTG 1 Progress Progressing  -GR     LTG 2 pt. to report a DASH score </= 30 to demonstrate decreased level of perceived disability  -GR     LTG 2 Progress Ongoing  -GR     LTG 3 pt. to demonstrate R shoulder flexion AROM >/= 0-120 to facilitate ease of performing self care/dressing activities  -GR     LTG 3 Progress Ongoing;Progressing  -GR     LTG 4 pt. to demonstrate placing/retrieving small object from an above the shoulder level shelf with her LUE to facilitate ease of performing household/work related activities  -GR     LTG 4 Progress Ongoing  -GR       User  Key  (r) = Recorded By, (t) = Taken By, (c) = Cosigned By    Initials Name Provider Type    GR Gaurav Cortez PT Physical Therapist          Therapy Education  Education Details: progress thus far   Given: Posture/body mechanics  Program: Reinforced  How Provided: Verbal  Provided to: Patient  Level of Understanding: Teach back education performed              Time Calculation:   Start Time: 0830  Stop Time: 0925  Time Calculation (min): 55 min  Total Timed Code Minutes- PT: 45 minute(s)  Therapy Suggested Charges     Code   Minutes Charges    92751 (CPT®) Hc Pt Neuromusc Re Education Ea 15 Min      18446 (CPT®) Hc Pt Ther Proc Ea 15 Min 30 2    84538 (CPT®) Hc Gait Training Ea 15 Min      33388 (CPT®) Hc Pt Therapeutic Act Ea 15 Min      76423 (CPT®) Hc Pt Manual Therapy Ea 15 Min 15 1    58663 (CPT®) Hc Pt Ther Massage- Per 15 Min      49279 (CPT®) Hc Pt Iontophoresis Ea 15 Min      71904 (CPT®) Hc Pt Elec Stim Ea-Per 15 Min      48294 (CPT®) Hc Pt Ultrasound Ea 15 Min      93492 (CPT®) Hc Pt Self Care/Mgmt/Train Ea 15 Min      Total  45 3        Therapy Charges for Today     Code Description Service Date Service Provider Modifiers Qty    12911882591 HC PT THER PROC EA 15 MIN 6/15/2018 Gaurav Cortez, PT GP 2    56986153324 HC PT MANUAL THERAPY EA 15 MIN 6/15/2018 Gaurav Cortez, PT GP 1    41005153620 HC PT HOT OR COLD PACK TREAT MCARE 6/15/2018 Gaurav Cortez, PT GP 1                    Gaurav Cortez PT  6/15/2018

## 2018-06-20 ENCOUNTER — HOSPITAL ENCOUNTER (OUTPATIENT)
Dept: PHYSICAL THERAPY | Facility: HOSPITAL | Age: 73
Setting detail: THERAPIES SERIES
Discharge: HOME | End: 2018-06-20

## 2018-06-20 DIAGNOSIS — Z47.1 AFTERCARE FOLLOWING RIGHT SHOULDER JOINT REPLACEMENT SURGERY: Primary | ICD-10-CM

## 2018-06-20 DIAGNOSIS — Z96.611 AFTERCARE FOLLOWING RIGHT SHOULDER JOINT REPLACEMENT SURGERY: Primary | ICD-10-CM

## 2018-06-20 DIAGNOSIS — Z74.09 IMPAIRED MOBILITY: ICD-10-CM

## 2018-06-20 DIAGNOSIS — Z47.89 ORTHOPEDIC AFTERCARE: ICD-10-CM

## 2018-06-20 PROCEDURE — 97140 MANUAL THERAPY 1/> REGIONS: CPT | Performed by: PHYSICAL THERAPIST

## 2018-06-20 PROCEDURE — 97110 THERAPEUTIC EXERCISES: CPT | Performed by: PHYSICAL THERAPIST

## 2018-06-20 NOTE — THERAPY TREATMENT NOTE
Outpatient Physical Therapy Ortho Treatment Note  [facility]     Patient Name: Rodolfo Grover  : 1945  MRN: [id number]  Today's Date: 2018      Visit Date: 2018    Visit Dx:    ICD-10-CM ICD-9-CM   1. Aftercare following right shoulder joint replacement surgery Z47.1 V54.81    Z96.611 V43.61   2. Orthopedic aftercare Z47.89 V54.9   3. Impaired mobility Z74.09 799.89       Patient Active Problem List   Diagnosis   • Acute blood loss anemia   • Arthritis   • Fracture of fifth metacarpal bone of right hand   • GI bleed   • Melena   • Closed fracture dislocation of right shoulder   • Gastroesophageal reflux disease without esophagitis   • Leukocytosis   • Hyperglycemia   • Fall at home   • DNR (do not resuscitate)   • Bradycardia following surgery   • Frequent PVCs   • Proximal humerus fracture   • Osteoarthritis        Past Medical History:   Diagnosis Date   • GERD (gastroesophageal reflux disease)    • Iritis of right eye    • Rheumatic fever    • Shoulder fracture, right         Past Surgical History:   Procedure Laterality Date   • HERNIA REPAIR     • SHOULDER CLOSED REDUCTION Right 3/16/2018    Procedure: RIGHT SHOULDER CLOSED REDUCTION;  Surgeon: Kj Garcia MD;  Location: [facility];  Service: Orthopedics   • TONSILLECTOMY     • TOTAL SHOULDER ARTHROPLASTY W/ DISTAL CLAVICLE EXCISION Right 3/22/2018    Procedure: Reverse Total Shoulder Arthroplsty;  Surgeon: Kj Garcia MD;  Location: [facility];  Service: Orthopedics             PT Ortho     Row Name 18 0930       Subjective Comments    Subjective Comments Continued soreness R shoulder. Notes decreased strength.  -JS       Subjective Pain    Able to rate subjective pain? yes  -JS    Pre-Treatment Pain Level 2  -JS    Post-Treatment Pain Level 1  -JS      User Key  (r) = Recorded By, (t) = Taken By, (c) = Cosigned By    Initials Name Provider Type    YADIRA Travis PT Physical Therapist                            PT  Assessment/Plan     Row Name 06/20/18 0930          PT Assessment    Assessment Comments Continue to emphasize postural awareness & scapular stabilization during exercises.  Pain present end-range PROM.  Difficulty maintaining neutral position during rhythmic stabilization exercises.  Pt responds with decreased pain following application of ice.  -JS        PT Plan    PT Plan Comments Continue to progress ROM, strengthening, Continue ice for pain management.  -JS       User Key  (r) = Recorded By, (t) = Taken By, (c) = Cosigned By    Initials Name Provider Type    YADIRA Travis PT Physical Therapist                Modalities     Row Name 06/20/18 0930             Ice    Ice Applied Yes  -JS      Location R shoulder, pt. seated, RUE rested on pillows  -JS      Rx Minutes 10 mins  -JS      Ice S/P Rx Yes  -JS        User Key  (r) = Recorded By, (t) = Taken By, (c) = Cosigned By    Initials Name Provider Type    YADIRA Travis PT Physical Therapist                Exercises     Row Name 06/20/18 0930             Subjective Comments    Subjective Comments Continued soreness R shoulder. Notes decreased strength.  -JS         Subjective Pain    Able to rate subjective pain? yes  -JS      Pre-Treatment Pain Level 2  -JS      Post-Treatment Pain Level 1  -JS         Total Minutes    99542 - PT Therapeutic Exercise Minutes 25  -JS      27095 - PT Manual Therapy Minutes 10  -JS         Exercise 1    Exercise Name 1 shoulder shrugs  -JS      Cueing 1 Verbal  -JS      Sets 1 2  -JS      Reps 1 10  -JS      Additional Comments 4#  -JS         Exercise 2    Exercise Name 2 scap retraction - rows and extension  -JS      Cueing 2 Verbal;Demo;Tactile  -JS      Sets 2 2  -JS      Reps 2 10  -JS      Additional Comments Blue  -JS         Exercise 3    Exercise Name 3 AROM elbow flexion/ext  -JS      Cueing 3 Demo  -JS      Sets 3 2  -JS      Reps 3 10  -JS      Additional Comments 4#  -JS         Exercise 4    Exercise Name 4 prone  ext to neutral  -JS      Cueing 4 Demo;Verbal  -JS      Sets 4 2  -JS      Reps 4 10  -JS         Exercise 5    Exercise Name 5 AAROM flexion with self, HOB 45 degrees  -JS      Cueing 5 Verbal;Demo  -JS      Sets 5 1  -JS      Reps 5 10  -JS      Time 5 5 seconds  -JS         Exercise 6    Exercise Name 6 chest press with cane supine  -JS      Cueing 6 Verbal  -JS      Sets 6 2  -JS      Reps 6 10  -JS      Additional Comments 4#  -JS         Exercise 8    Exercise Name 8 wash the wall, both hands  -JS      Cueing 8 Verbal;Demo  -JS      Reps 8 20  -JS         Exercise 10    Exercise Name 10 supine alphabet  -JS      Cueing 10 Verbal  -JS      Reps 10 1  -JS      Additional Comments 1#  -JS         Exercise 12    Exercise Name 12 UBE  -JS      Time 12 4 min  -JS         Exercise 13    Exercise Name 13 supine horizonantal abd  -JS      Cueing 13 Demo;Verbal;Tactile  -JS      Reps 13 20  -JS      Additional Comments RTB  -JS        User Key  (r) = Recorded By, (t) = Taken By, (c) = Cosigned By    Initials Name Provider Type    YADIRA Travis PT Physical Therapist                        Manual Rx (last 36 hours)      Manual Treatments     Row Name 06/20/18 0930             Total Minutes    62467 - PT Manual Therapy Minutes 10  -JS         Manual Rx 1    Manual Rx 1 Location PROM R shoulder flexion, abd  -JS         Manual Rx 2    Manual Rx 2 Location rhythmic stabilization R shoulder, pt. supine, ,RUE flexed to 90  -JS      Manual Rx 2 Grade 5 reps  -JS         Manual Rx 3    Manual Rx 3 Location manual gentle ER/IR isometric  x 10  -JS      Manual Rx 3 Grade 5 reps  -JS        User Key  (r) = Recorded By, (t) = Taken By, (c) = Cosigned By    Initials Name Provider Type    YADIRA Travis PT Physical Therapist                             Time Calculation:   Start Time: 0930  Stop Time: 1015  Time Calculation (min): 45 min  Therapy Suggested Charges     Code   Minutes Charges    82342 (CPT®)  Pt Neuromusc Re Education  Ea 15 Min      08343 (CPT®) Hc Pt Ther Proc Ea 15 Min 25 2    62838 (CPT®) Hc Gait Training Ea 15 Min      31834 (CPT®) Hc Pt Therapeutic Act Ea 15 Min      18360 (CPT®) Hc Pt Manual Therapy Ea 15 Min 10     27935 (CPT®) Hc Pt Ther Massage- Per 15 Min      40362 (CPT®) Hc Pt Iontophoresis Ea 15 Min      88608 (CPT®) Hc Pt Elec Stim Ea-Per 15 Min      26412 (CPT®) Hc Pt Ultrasound Ea 15 Min      84098 (CPT®) Hc Pt Self Care/Mgmt/Train Ea 15 Min      Total  35 2        Therapy Charges for Today     Code Description Service Date Service Provider Modifiers Qty    30642654983 HC PT THER PROC EA 15 MIN 6/20/2018 Nancy Travis, PT GP 1    62307738008 HC PT MANUAL THERAPY EA 15 MIN 6/20/2018 Nancy Travis, PT GP 1    87358321830 HC PT HOT OR COLD PACK TREAT MCARE 6/20/2018 Nancy Travis PT GP 1                    Nancy Travis PT  6/20/2018

## 2018-06-22 ENCOUNTER — HOSPITAL ENCOUNTER (OUTPATIENT)
Dept: PHYSICAL THERAPY | Facility: HOSPITAL | Age: 73
Setting detail: THERAPIES SERIES
Discharge: HOME | End: 2018-06-22

## 2018-06-22 DIAGNOSIS — Z47.89 ORTHOPEDIC AFTERCARE: ICD-10-CM

## 2018-06-22 DIAGNOSIS — Z74.09 IMPAIRED MOBILITY: ICD-10-CM

## 2018-06-22 DIAGNOSIS — Z47.1 AFTERCARE FOLLOWING RIGHT SHOULDER JOINT REPLACEMENT SURGERY: Primary | ICD-10-CM

## 2018-06-22 DIAGNOSIS — Z96.611 AFTERCARE FOLLOWING RIGHT SHOULDER JOINT REPLACEMENT SURGERY: Primary | ICD-10-CM

## 2018-06-22 PROCEDURE — 97110 THERAPEUTIC EXERCISES: CPT | Performed by: PHYSICAL THERAPIST

## 2018-06-22 PROCEDURE — 97140 MANUAL THERAPY 1/> REGIONS: CPT | Performed by: PHYSICAL THERAPIST

## 2018-06-22 NOTE — THERAPY TREATMENT NOTE
Outpatient Physical Therapy Ortho Treatment Note  [facility]     Patient Name: Rodolfo Grover  : 1945  MRN: [id number]  Today's Date: 2018      Visit Date: 2018    Visit Dx:    ICD-10-CM ICD-9-CM   1. Aftercare following right shoulder joint replacement surgery Z47.1 V54.81    Z96.611 V43.61   2. Orthopedic aftercare Z47.89 V54.9   3. Impaired mobility Z74.09 799.89       Patient Active Problem List   Diagnosis   • Acute blood loss anemia   • Arthritis   • Fracture of fifth metacarpal bone of right hand   • GI bleed   • Melena   • Closed fracture dislocation of right shoulder   • Gastroesophageal reflux disease without esophagitis   • Leukocytosis   • Hyperglycemia   • Fall at home   • DNR (do not resuscitate)   • Bradycardia following surgery   • Frequent PVCs   • Proximal humerus fracture   • Osteoarthritis        Past Medical History:   Diagnosis Date   • GERD (gastroesophageal reflux disease)    • Iritis of right eye    • Rheumatic fever    • Shoulder fracture, right         Past Surgical History:   Procedure Laterality Date   • HERNIA REPAIR     • SHOULDER CLOSED REDUCTION Right 3/16/2018    Procedure: RIGHT SHOULDER CLOSED REDUCTION;  Surgeon: Kj Garcia MD;  Location: [facility];  Service: Orthopedics   • TONSILLECTOMY     • TOTAL SHOULDER ARTHROPLASTY W/ DISTAL CLAVICLE EXCISION Right 3/22/2018    Procedure: Reverse Total Shoulder Arthroplsty;  Surgeon: Kj Garcia MD;  Location: [facility];  Service: Orthopedics             PT Ortho     Row Name 18 0930       Subjective Comments    Subjective Comments Continued soreness R shoulder. Notes decreased strength.  -JS       Subjective Pain    Able to rate subjective pain? yes  -JS    Pre-Treatment Pain Level 2  -JS    Post-Treatment Pain Level 1  -JS      User Key  (r) = Recorded By, (t) = Taken By, (c) = Cosigned By    Initials Name Provider Type    YADIRA Travis PT Physical Therapist                            PT  Assessment/Plan     Row Name 06/22/18 1241          PT Assessment    Assessment Comments Improved scapular positioning in open chain today.   Continues to c/o OH reaching limitations although functional range >120 degrees observed.  -GR        PT Plan    PT Plan Comments Continue POC.  -GR       User Key  (r) = Recorded By, (t) = Taken By, (c) = Cosigned By    Initials Name Provider Type    JOVITA Cortez, PT Physical Therapist                Modalities     Row Name 06/22/18 0900             Ice    Location R shoulder, pt. seated, RUE rested on pillows  -GR      Rx Minutes 10 mins  -GR      Ice S/P Rx Yes  -GR        User Key  (r) = Recorded By, (t) = Taken By, (c) = Cosigned By    Initials Name Provider Type    JOVITA Cortez, PT Physical Therapist                Exercises     Row Name 06/22/18 1100 06/22/18 0900          Subjective Comments    Subjective Comments  -- Reports no new changes, getting stronger. C/o difficulty reaching into OH cabinet  -GR        Subjective Pain    Able to rate subjective pain?  -- yes  -GR     Pre-Treatment Pain Level  -- 0  -GR        Total Minutes    13575 - PT Therapeutic Exercise Minutes  -- 25  -GR     02138 - PT Manual Therapy Minutes 15  -GR  --        Exercise 1    Exercise Name 1  -- shoulder shrugs  -GR     Cueing 1  -- Verbal  -GR     Sets 1  -- 2  -GR     Reps 1  -- 10  -GR     Additional Comments  -- 4#  -GR        Exercise 2    Exercise Name 2  -- scap retraction - rows and extension  -GR     Cueing 2  -- Verbal;Demo;Tactile  -GR     Sets 2  -- 2  -GR     Reps 2  -- 10  -GR     Additional Comments  -- BlueTB  -GR        Exercise 3    Exercise Name 3  -- AROM elbow flexion/ext  -GR     Cueing 3  -- Demo  -GR     Sets 3  -- 2  -GR     Reps 3  -- 10  -GR     Additional Comments  -- 4#  -GR        Exercise 4    Exercise Name 4  -- prone ext to neutral  -GR     Cueing 4  -- Demo;Verbal  -GR     Sets 4  -- 2  -GR     Reps 4  -- 10  -GR     Additional Comments  -- 1#   -GR        Exercise 5    Exercise Name 5  -- AAROM flexion with self, HOB 45 degrees  -GR     Cueing 5  -- Verbal;Demo  -GR     Sets 5  -- 1  -GR     Reps 5  -- 10  -GR     Time 5  -- 5 seconds  -GR        Exercise 6    Exercise Name 6  -- chest press with cane supine  -GR     Cueing 6  -- Verbal  -GR     Sets 6  -- 2  -GR     Reps 6  -- 10  -GR     Additional Comments  -- 4#  -GR        Exercise 8    Exercise Name 8  -- wash the wall, both hands  -GR     Cueing 8  -- Verbal;Demo  -GR     Reps 8  -- 20  -GR        Exercise 10    Exercise Name 10  -- supine alphabet  -GR     Cueing 10  -- Verbal  -GR     Reps 10  -- 1  -GR     Additional Comments  -- 1#  -GR        Exercise 11    Exercise Name 11  -- pulley, scaption  -GR     Cueing 11  -- Demo;Verbal  -GR     Reps 11  -- 20  -GR        Exercise 12    Exercise Name 12  -- UBE  -GR     Time 12  -- 5 min  -GR     Additional Comments  -- L2  -GR        Exercise 13    Exercise Name 13  -- supine horizonantal abd  -GR     Cueing 13  -- Demo;Verbal;Tactile  -GR     Reps 13  -- 20  -GR     Additional Comments  -- GTB  -GR        Exercise 15    Exercise Name 15  -- prone rows, RUE  -GR     Cueing 15  -- Verbal;Demo  -GR     Sets 15  -- 2  -GR     Reps 15  -- 10  -GR     Additional Comments  -- 1#  -GR       User Key  (r) = Recorded By, (t) = Taken By, (c) = Cosigned By    Initials Name Provider Type    GR Gaurav Cortez, PT Physical Therapist                        Manual Rx (last 36 hours)      Manual Treatments     Row Name 06/22/18 1100             Total Minutes    08294 - PT Manual Therapy Minutes 15  -GR         Manual Rx 1    Manual Rx 1 Location PROM R shoulder flexion, abd  -GR      Manual Rx 1 Type gr I-II PA mob GHJ  -GR      Manual Rx 1 Duration 10 min  -GR         Manual Rx 2    Manual Rx 2 Location rhythmic stabilization R shoulder, pt. supine, ,RUE flexed to 90  -GR      Manual Rx 2 Grade 5 reps  -GR         Manual Rx 4    Manual Rx 4 Location MRE PNF D2  supine  -GR      Manual Rx 4 Grade 5 reps  -GR        User Key  (r) = Recorded By, (t) = Taken By, (c) = Cosigned By    Initials Name Provider Type    JOVITA Cortez PT Physical Therapist                PT OP Goals     Row Name 06/22/18 1200          PT Short Term Goals    STG Date to Achieve 05/16/18  -GR     STG 1 pt. to be I with initial HEP to facilitate self management of their condition  -GR     STG 1 Progress Met  -GR     STG 2 pt. to be educated in/verbalize understanding of the importance of posture/ergonomics in association with their condition to facilitate self management of their condition  -GR     STG 2 Progress Met  -GR     STG 3 pt to demonstrate R shoulder PROM flexion >/= 120 to facilitate ease of performing self care activities  -GR     STG 3 Progress Met  -GR        Long Term Goals    LTG Date to Achieve 07/11/18  -GR     LTG 1 pt. to be I with advanced HEP to facilitate self management of their condition  -GR     LTG 1 Progress Progressing  -GR     LTG 2 pt. to report a DASH score </= 30 to demonstrate decreased level of perceived disability  -GR     LTG 2 Progress Ongoing  -GR     LTG 3 pt. to demonstrate R shoulder flexion AROM >/= 0-120 to facilitate ease of performing self care/dressing activities  -GR     LTG 3 Progress Ongoing;Progressing  -GR     LTG 4 pt. to demonstrate placing/retrieving small object from an above the shoulder level shelf with her LUE to facilitate ease of performing household/work related activities  -GR     LTG 4 Progress Ongoing  -GR       User Key  (r) = Recorded By, (t) = Taken By, (c) = Cosigned By    Initials Name Provider Type    JOVITA Cortez PT Physical Therapist          Therapy Education  Education Details: 1# cabinet raises at home  Given: HEP  Program: New  How Provided: Verbal  Provided to: Patient  Level of Understanding: Teach back education performed, Verbalized              Time Calculation:   Start Time: 0915  Stop Time: 1005  Time  Calculation (min): 50 min  Total Timed Code Minutes- PT: 40 minute(s)  Therapy Suggested Charges     Code   Minutes Charges    72988 (CPT®) Hc Pt Neuromusc Re Education Ea 15 Min      03006 (CPT®) Hc Pt Ther Proc Ea 15 Min      23675 (CPT®) Hc Gait Training Ea 15 Min      73366 (CPT®) Hc Pt Therapeutic Act Ea 15 Min      18329 (CPT®) Hc Pt Manual Therapy Ea 15 Min 15 1    92719 (CPT®) Hc Pt Ther Massage- Per 15 Min      31713 (CPT®) Hc Pt Iontophoresis Ea 15 Min      90159 (CPT®) Hc Pt Elec Stim Ea-Per 15 Min      70857 (CPT®) Hc Pt Ultrasound Ea 15 Min      94758 (CPT®) Hc Pt Self Care/Mgmt/Train Ea 15 Min      Total  15 1        Therapy Charges for Today     Code Description Service Date Service Provider Modifiers Qty    64181700886 HC PT MANUAL THERAPY EA 15 MIN 6/22/2018 Gaurav Cortez, PT GP 1    99626302121 HC PT THER PROC EA 15 MIN 6/22/2018 Gaurav Cortez, PT GP 2                    Gaurav Cortez, PT  6/22/2018

## 2018-06-27 ENCOUNTER — OFFICE VISIT (OUTPATIENT)
Dept: FAMILY MEDICINE CLINIC | Facility: CLINIC | Age: 73
End: 2018-06-27

## 2018-06-27 ENCOUNTER — HOSPITAL ENCOUNTER (OUTPATIENT)
Dept: PHYSICAL THERAPY | Facility: HOSPITAL | Age: 73
Setting detail: THERAPIES SERIES
Discharge: HOME | End: 2018-06-27

## 2018-06-27 VITALS
DIASTOLIC BLOOD PRESSURE: 70 MMHG | WEIGHT: 180 LBS | HEART RATE: 88 BPM | OXYGEN SATURATION: 98 % | BODY MASS INDEX: 28.25 KG/M2 | HEIGHT: 67 IN | SYSTOLIC BLOOD PRESSURE: 132 MMHG

## 2018-06-27 DIAGNOSIS — E78.5 HYPERLIPIDEMIA, UNSPECIFIED HYPERLIPIDEMIA TYPE: ICD-10-CM

## 2018-06-27 DIAGNOSIS — Z96.611 AFTERCARE FOLLOWING RIGHT SHOULDER JOINT REPLACEMENT SURGERY: Primary | ICD-10-CM

## 2018-06-27 DIAGNOSIS — Z47.89 ORTHOPEDIC AFTERCARE: ICD-10-CM

## 2018-06-27 DIAGNOSIS — Z74.09 IMPAIRED MOBILITY: ICD-10-CM

## 2018-06-27 DIAGNOSIS — Z12.5 SCREENING FOR PROSTATE CANCER: ICD-10-CM

## 2018-06-27 DIAGNOSIS — R73.9 HYPERGLYCEMIA: ICD-10-CM

## 2018-06-27 DIAGNOSIS — I49.3 FREQUENT PVCS: ICD-10-CM

## 2018-06-27 DIAGNOSIS — Z47.1 AFTERCARE FOLLOWING RIGHT SHOULDER JOINT REPLACEMENT SURGERY: Primary | ICD-10-CM

## 2018-06-27 DIAGNOSIS — M19.90 ARTHRITIS: Primary | ICD-10-CM

## 2018-06-27 DIAGNOSIS — D64.9 ANEMIA, UNSPECIFIED TYPE: ICD-10-CM

## 2018-06-27 DIAGNOSIS — Z20.2 SEXUALLY TRANSMITTED DISEASE EXPOSURE: ICD-10-CM

## 2018-06-27 PROCEDURE — 99214 OFFICE O/P EST MOD 30 MIN: CPT | Performed by: FAMILY MEDICINE

## 2018-06-27 PROCEDURE — 97140 MANUAL THERAPY 1/> REGIONS: CPT | Performed by: PHYSICAL THERAPIST

## 2018-06-27 PROCEDURE — 97110 THERAPEUTIC EXERCISES: CPT | Performed by: PHYSICAL THERAPIST

## 2018-06-27 NOTE — PROGRESS NOTES
Subjective   Rodolfo Grover is a 73 y.o. male.     Chief Complaint   Patient presents with   • Arthritis        History of Present Illness    Arthritis   Ongoing, had for years, moves around. Aggravated by trauma, stress, decrease in sleep and being tired. Joint pain is in every location, changes time to time. Feels it in his back. Takes an aspirin occasionally for pain relief and this helps, 325 mg. He has seen rheumatology during a flare and was having pain. Was going to get shots if did not clear up but did not sound like something pt wanted to do. Not really interested in going back to rheumatology.   Has also had uveitis and was treated and improved. Was on vacation and acted up again in the eye. Called doctor and got script, this happened 4/2018 and he improved again. He is seeing a retina specialist and given prescription to keep on hand for his eye symptoms because pt can tell when going to start before outward symptoms.     Severe COLEMAN  Recent sleep study. Will see sleep 6/29. Does not have his equipment at this time. He is keeping a log as recommended. He does have long spells of apnea. Tracking how often he is waking.    Concern for STI  New problem to me. Reported that his female sex partner was sick in May and had testing done was told she had an infection that was passed on by having sex. She had a UTI and the culture grew out an infection she got from sex. She was treated. Pt does not know what the bacteria was or the abx that she took. He is wondering if he needs to be tested.     The following portions of the patient's history were reviewed and updated as appropriate: allergies, current medications and problem list.    Review of Systems   Constitutional: Positive for fatigue. Negative for activity change and appetite change.   Genitourinary: Negative for discharge, genital sores, penile swelling, scrotal swelling and testicular pain.   Musculoskeletal: Positive for arthralgias.   Psychiatric/Behavioral:  "Positive for sleep disturbance.       Objective   Blood pressure 132/70, pulse 88, height 170.2 cm (67\"), weight 81.6 kg (180 lb), SpO2 98 %.  Physical Exam   Constitutional: He is oriented to person, place, and time. He appears well-nourished. No distress.   Pulmonary/Chest: Effort normal. No respiratory distress.   Musculoskeletal:   Has limited ROM of RUE cannot reach all the way back full range limited to about 45 degrees and cannot reach up the middle of the back.   Neurological: He is alert and oriented to person, place, and time.   Psychiatric: He has a normal mood and affect. His behavior is normal.   Vitals reviewed.      Assessment/Plan   Rodolfo was seen today for arthritis.    Diagnoses and all orders for this visit:    Arthritis    Sexually transmitted disease exposure  -     Chlamydia trachomatis, Neisseria gonorrhoeae, PCR - Urine, Urine, Clean Catch    Screening for prostate cancer  -     PSA SCREENING; Future    Anemia, unspecified type  -     CBC & Differential; Future    Frequent PVCs  -     Comprehensive Metabolic Panel; Future    Hyperglycemia  -     Comprehensive Metabolic Panel; Future    Hyperlipidemia, unspecified hyperlipidemia type  -     Lipid Panel; Future    will get labs and next visit will be for physical           "

## 2018-06-27 NOTE — THERAPY TREATMENT NOTE
Outpatient Physical Therapy Ortho Treatment Note  [facility]     Patient Name: Rodolfo Grover  : 1945  MRN: [id number]  Today's Date: 2018      Visit Date: 2018    Visit Dx:    ICD-10-CM ICD-9-CM   1. Aftercare following right shoulder joint replacement surgery Z47.1 V54.81    Z96.611 V43.61   2. Orthopedic aftercare Z47.89 V54.9   3. Impaired mobility Z74.09 799.89       Patient Active Problem List   Diagnosis   • Acute blood loss anemia   • Arthritis   • Fracture of fifth metacarpal bone of right hand   • GI bleed   • Melena   • Closed fracture dislocation of right shoulder   • Gastroesophageal reflux disease without esophagitis   • Leukocytosis   • Hyperglycemia   • Fall at home   • DNR (do not resuscitate)   • Bradycardia following surgery   • Frequent PVCs   • Proximal humerus fracture   • Osteoarthritis        Past Medical History:   Diagnosis Date   • GERD (gastroesophageal reflux disease)    • Iritis of right eye    • Rheumatic fever    • Shoulder fracture, right         Past Surgical History:   Procedure Laterality Date   • HERNIA REPAIR     • SHOULDER CLOSED REDUCTION Right 3/16/2018    Procedure: RIGHT SHOULDER CLOSED REDUCTION;  Surgeon: Kj Garcia MD;  Location: [facility];  Service: Orthopedics   • TONSILLECTOMY     • TOTAL SHOULDER ARTHROPLASTY W/ DISTAL CLAVICLE EXCISION Right 3/22/2018    Procedure: Reverse Total Shoulder Arthroplsty;  Surgeon: Kj Garcia MD;  Location: [facility];  Service: Orthopedics                             PT Assessment/Plan     Row Name 18 1401          PT Assessment    Assessment Comments Progressing with ROM and strengthening with llittle pain.  Still reports some functional limitations but can see improvement from day to day.    -RA        PT Plan    PT Plan Comments Continue skilled therapy s/p  R reverse TSA for progression of ROM, strength, and function.  -RA       User Key  (r) = Recorded By, (t) = Taken By, (c) =  Cosigned By    Initials Name Provider Type    RA Arleth Sun, PT Physical Therapist                Modalities     Row Name 06/27/18 1300             Subjective Pain    Pre-Treatment Pain Level 0  -RA         Ice    Patient denies application of Ice Yes   has an appt. across town, will ice at home later prn  -RA        User Key  (r) = Recorded By, (t) = Taken By, (c) = Cosigned By    Initials Name Provider Type    IMELDA Sun, PT Physical Therapist                Exercises     Row Name 06/27/18 1300             Subjective Comments    Subjective Comments Reports soreness/tightness and weakness more than pain - reports noticing little things day to day that are getting a little easier.  Still difficulty with OH/reaching activity but was able to put hange up on bar in closet - was a reach but did it.  Also still limited with reaching behind back like with putting belt on but I know with a reverse shoulder, that it may not get a lot better.  Still have to put R arm in shirts/jackets first with dressing.    -RA         Subjective Pain    Able to rate subjective pain? yes  -RA      Pre-Treatment Pain Level 0  -RA         Total Minutes    10357 - PT Therapeutic Exercise Minutes 25  -RA      95055 - PT Manual Therapy Minutes 16  -RA         Exercise 1    Exercise Name 1 shoulder shrugs  -RA      Cueing 1 Verbal  -RA      Sets 1 2  -RA      Reps 1 10  -RA      Additional Comments 4#  -RA         Exercise 2    Exercise Name 2 scap retraction - rows and extension  -RA      Cueing 2 Verbal;Demo;Tactile  -RA      Sets 2 2  -RA      Reps 2 10  -RA      Additional Comments blue TB  -RA         Exercise 3    Exercise Name 3 AROM elbow flexion/ext  -RA      Cueing 3 Demo  -RA      Sets 3 2  -RA      Reps 3 10  -RA      Additional Comments 4#  -RA         Exercise 4    Exercise Name 4 prone ext to neutral  -RA      Cueing 4 Demo;Verbal  -RA      Sets 4 2  -RA      Reps 4 10  -RA      Additional Comments 1#  -RA          Exercise 5    Exercise Name 5 AAROM flexion with self, HOB 45 degrees  -RA      Cueing 5 Verbal;Demo  -RA      Sets 5 1  -RA      Reps 5 10  -RA      Time 5 5 seconds  -RA         Exercise 6    Exercise Name 6 chest press with cane supine  -RA      Cueing 6 Verbal  -RA      Sets 6 2  -RA      Reps 6 10  -RA      Additional Comments 4#  -RA         Exercise 8    Exercise Name 8 wash the wall, both hands  -RA      Cueing 8 Verbal;Demo  -RA      Reps 8 20  -RA         Exercise 10    Exercise Name 10 supine alphabet  -RA      Cueing 10 Verbal  -RA      Reps 10 1  -RA      Additional Comments 1#  -RA         Exercise 11    Exercise Name 11 pulley, scaption  -RA      Cueing 11 Demo;Verbal  -RA      Reps 11 20  -RA         Exercise 12    Exercise Name 12 UBE  -RA      Time 12 5 min  -RA      Additional Comments L2  -RA         Exercise 13    Exercise Name 13 supine horizonantal abd  -RA      Cueing 13 Demo;Verbal;Tactile  -RA      Reps 13 20  -RA      Additional Comments green TB  -RA         Exercise 15    Exercise Name 15 prone rows, RUE  -RA      Cueing 15 Verbal;Demo  -RA      Sets 15 2  -RA      Reps 15 10  -RA      Additional Comments 1#  -RA        User Key  (r) = Recorded By, (t) = Taken By, (c) = Cosigned By    Initials Name Provider Type    RA Arleth Sun, PT Physical Therapist                        Manual Rx (last 36 hours)      Manual Treatments     Row Name 06/27/18 1300             Total Minutes    12727 - PT Manual Therapy Minutes 16  -RA         Manual Rx 1    Manual Rx 1 Location PROM R shoulder flexion, abd  -RA      Manual Rx 1 Type gr I-II PA mob GHJ  -RA      Manual Rx 1 Duration 10 min  -RA         Manual Rx 2    Manual Rx 2 Location rhythmic stabilization R shoulder, pt. supine, ,RUE flexed to 90  -RA      Manual Rx 2 Grade 5 reps  -RA         Manual Rx 4    Manual Rx 4 Location MRE PNF D2 supine  -RA      Manual Rx 4 Grade 8 reps  -RA        User Key  (r) = Recorded By, (t) = Taken By, (c) =  Cosigned By    Initials Name Provider Type    RA Arleth Sun, PT Physical Therapist              Therapy Education  Given: Symptoms/condition management, Posture/body mechanics  Program: Reinforced  How Provided: Verbal, Demonstration  Provided to: Patient  Level of Understanding: Teach back education performed, Verbalized              Time Calculation:   Start Time: 1315  Stop Time: 1356  Time Calculation (min): 41 min  Therapy Suggested Charges     Code   Minutes Charges    49185 (CPT®) Hc Pt Neuromusc Re Education Ea 15 Min      37201 (CPT®) Hc Pt Ther Proc Ea 15 Min 25 2    38338 (CPT®) Hc Gait Training Ea 15 Min      68856 (CPT®) Hc Pt Therapeutic Act Ea 15 Min      91785 (CPT®) Hc Pt Manual Therapy Ea 15 Min 16 1    91492 (CPT®) Hc Pt Ther Massage- Per 15 Min      48351 (CPT®) Hc Pt Iontophoresis Ea 15 Min      64153 (CPT®) Hc Pt Elec Stim Ea-Per 15 Min      94832 (CPT®) Hc Pt Ultrasound Ea 15 Min      38505 (CPT®) Hc Pt Self Care/Mgmt/Train Ea 15 Min      Total  41 3        Therapy Charges for Today     Code Description Service Date Service Provider Modifiers Qty    59113902805 HC PT THER PROC EA 15 MIN 6/27/2018 Arleth Sun, PT GP 2    57481015492 HC PT MANUAL THERAPY EA 15 MIN 6/27/2018 Arleth Sun, PT GP 1                    Arleth Sun, PT  6/27/2018

## 2018-06-27 NOTE — THERAPY TREATMENT NOTE
Outpatient Physical Therapy Ortho Treatment Note  [facility]     Patient Name: Rodolfo Grover  : 1945  MRN: [id number]  Today's Date: 2018      Visit Date: 2018    Visit Dx:    ICD-10-CM ICD-9-CM   1. Aftercare following right shoulder joint replacement surgery Z47.1 V54.81    Z96.611 V43.61   2. Orthopedic aftercare Z47.89 V54.9   3. Impaired mobility Z74.09 799.89       Patient Active Problem List   Diagnosis   • Acute blood loss anemia   • Arthritis   • Fracture of fifth metacarpal bone of right hand   • GI bleed   • Melena   • Closed fracture dislocation of right shoulder   • Gastroesophageal reflux disease without esophagitis   • Leukocytosis   • Hyperglycemia   • Fall at home   • DNR (do not resuscitate)   • Bradycardia following surgery   • Frequent PVCs   • Proximal humerus fracture   • Osteoarthritis        Past Medical History:   Diagnosis Date   • GERD (gastroesophageal reflux disease)    • Iritis of right eye    • Rheumatic fever    • Shoulder fracture, right         Past Surgical History:   Procedure Laterality Date   • HERNIA REPAIR     • SHOULDER CLOSED REDUCTION Right 3/16/2018    Procedure: RIGHT SHOULDER CLOSED REDUCTION;  Surgeon: Kj Garcia MD;  Location: [facility];  Service: Orthopedics   • TONSILLECTOMY     • TOTAL SHOULDER ARTHROPLASTY W/ DISTAL CLAVICLE EXCISION Right 3/22/2018    Procedure: Reverse Total Shoulder Arthroplsty;  Surgeon: Kj Garcia MD;  Location: [facility];  Service: Orthopedics                             PT Assessment/Plan     Row Name 18 1401          PT Assessment    Assessment Comments Progressing with ROM and strengthening with llittle pain.  Still reports some functional limitations but can see improvement from day to day.    -RA        PT Plan    PT Plan Comments Continue skilled therapy s/p  R reverse TSA for progression of ROM, strength, and function.  -RA       User Key  (r) = Recorded By, (t) = Taken By, (c) =  Cosigned By    Initials Name Provider Type    RA Arleth Sun, PT Physical Therapist                Modalities     Row Name 06/27/18 1300             Subjective Pain    Pre-Treatment Pain Level 0  -RA         Ice    Patient denies application of Ice Yes   has an appt. across town, will ice at home later prn  -RA        User Key  (r) = Recorded By, (t) = Taken By, (c) = Cosigned By    Initials Name Provider Type    IMELDA Sun, PT Physical Therapist                Exercises     Row Name 06/27/18 1300             Subjective Comments    Subjective Comments Reports soreness/tightness and weakness more than pain - reports noticing little things day to day that are getting a little easier.  Still difficulty with OH/reaching activity but was able to put hange up on bar in closet - was a reach but did it.  Also still limited with reaching behind back like with putting belt on but I know with a reverse shoulder, that it may not get a lot better.  Still have to put R arm in shirts/jackets first with dressing.    -RA         Subjective Pain    Able to rate subjective pain? yes  -RA      Pre-Treatment Pain Level 0  -RA         Total Minutes    83631 - PT Therapeutic Exercise Minutes 25  -RA      53760 - PT Manual Therapy Minutes 16  -RA         Exercise 1    Exercise Name 1 shoulder shrugs  -RA      Cueing 1 Verbal  -RA      Sets 1 2  -RA      Reps 1 10  -RA      Additional Comments 4#  -RA         Exercise 2    Exercise Name 2 scap retraction - rows and extension  -RA      Cueing 2 Verbal;Demo;Tactile  -RA      Sets 2 2  -RA      Reps 2 10  -RA      Additional Comments blue TB  -RA         Exercise 3    Exercise Name 3 AROM elbow flexion/ext  -RA      Cueing 3 Demo  -RA      Sets 3 2  -RA      Reps 3 10  -RA      Additional Comments 4#  -RA         Exercise 4    Exercise Name 4 prone ext to neutral  -RA      Cueing 4 Demo;Verbal  -RA      Sets 4 2  -RA      Reps 4 10  -RA      Additional Comments 1#  -RA          Exercise 5    Exercise Name 5 AAROM flexion with self, HOB 45 degrees  -RA      Cueing 5 Verbal;Demo  -RA      Sets 5 1  -RA      Reps 5 10  -RA      Time 5 5 seconds  -RA         Exercise 6    Exercise Name 6 chest press with cane supine  -RA      Cueing 6 Verbal  -RA      Sets 6 2  -RA      Reps 6 10  -RA      Additional Comments 4#  -RA         Exercise 8    Exercise Name 8 wash the wall, both hands  -RA      Cueing 8 Verbal;Demo  -RA      Reps 8 20  -RA         Exercise 10    Exercise Name 10 supine alphabet  -RA      Cueing 10 Verbal  -RA      Reps 10 1  -RA      Additional Comments 1#  -RA         Exercise 11    Exercise Name 11 pulley, scaption  -RA      Cueing 11 Demo;Verbal  -RA      Reps 11 20  -RA         Exercise 12    Exercise Name 12 UBE  -RA      Time 12 5 min  -RA      Additional Comments L2  -RA         Exercise 13    Exercise Name 13 supine horizonantal abd  -RA      Cueing 13 Demo;Verbal;Tactile  -RA      Reps 13 20  -RA      Additional Comments green TB  -RA         Exercise 15    Exercise Name 15 prone rows, RUE  -RA      Cueing 15 Verbal;Demo  -RA      Sets 15 2  -RA      Reps 15 10  -RA      Additional Comments 1#  -RA        User Key  (r) = Recorded By, (t) = Taken By, (c) = Cosigned By    Initials Name Provider Type    RA Arleth Sun, PT Physical Therapist                        Manual Rx (last 36 hours)      Manual Treatments     Row Name 06/27/18 1300             Total Minutes    15992 - PT Manual Therapy Minutes 16  -RA         Manual Rx 1    Manual Rx 1 Location PROM R shoulder flexion, abd  -RA      Manual Rx 1 Type gr I-II PA mob GHJ  -RA      Manual Rx 1 Duration 10 min  -RA         Manual Rx 2    Manual Rx 2 Location rhythmic stabilization R shoulder, pt. supine, ,RUE flexed to 90  -RA      Manual Rx 2 Grade 5 reps  -RA         Manual Rx 4    Manual Rx 4 Location MRE PNF D2 supine  -RA      Manual Rx 4 Grade 8 reps  -RA        User Key  (r) = Recorded By, (t) = Taken By, (c) =  Cosigned By    Initials Name Provider Type    RA Arleth Sun, PT Physical Therapist              Therapy Education  Given: Symptoms/condition management, Posture/body mechanics  How Provided: Verbal, Demonstration  Provided to: Patient  Level of Understanding: Teach back education performed, Verbalized              Time Calculation:   Start Time: 1315  Stop Time: 1356  Time Calculation (min): 41 min  Therapy Suggested Charges     Code   Minutes Charges    89936 (CPT®) Hc Pt Neuromusc Re Education Ea 15 Min      78859 (CPT®) Hc Pt Ther Proc Ea 15 Min 25 2    32661 (CPT®) Hc Gait Training Ea 15 Min      05244 (CPT®) Hc Pt Therapeutic Act Ea 15 Min      71546 (CPT®) Hc Pt Manual Therapy Ea 15 Min 16 1    77018 (CPT®) Hc Pt Ther Massage- Per 15 Min      09355 (CPT®) Hc Pt Iontophoresis Ea 15 Min      18947 (CPT®) Hc Pt Elec Stim Ea-Per 15 Min      58539 (CPT®) Hc Pt Ultrasound Ea 15 Min      95521 (CPT®) Hc Pt Self Care/Mgmt/Train Ea 15 Min      Total  41 3        Therapy Charges for Today     Code Description Service Date Service Provider Modifiers Qty    96703590269 HC PT THER PROC EA 15 MIN 6/27/2018 Arleth Sun, PT GP 2    61287633363 HC PT MANUAL THERAPY EA 15 MIN 6/27/2018 Arleth Sun, PT GP 1                    Arleth Sun, PT  6/27/2018

## 2018-06-29 ENCOUNTER — HOSPITAL ENCOUNTER (OUTPATIENT)
Dept: PHYSICAL THERAPY | Facility: HOSPITAL | Age: 73
Setting detail: THERAPIES SERIES
Discharge: HOME | End: 2018-06-29

## 2018-06-29 ENCOUNTER — TELEPHONE (OUTPATIENT)
Dept: SLEEP MEDICINE | Facility: HOSPITAL | Age: 73
End: 2018-06-29

## 2018-06-29 ENCOUNTER — OFFICE VISIT (OUTPATIENT)
Dept: SLEEP MEDICINE | Facility: HOSPITAL | Age: 73
End: 2018-06-29
Attending: INTERNAL MEDICINE

## 2018-06-29 VITALS
DIASTOLIC BLOOD PRESSURE: 78 MMHG | OXYGEN SATURATION: 96 % | BODY MASS INDEX: 28.06 KG/M2 | WEIGHT: 178.8 LBS | SYSTOLIC BLOOD PRESSURE: 151 MMHG | HEIGHT: 67 IN | HEART RATE: 70 BPM

## 2018-06-29 DIAGNOSIS — Z74.09 IMPAIRED MOBILITY: ICD-10-CM

## 2018-06-29 DIAGNOSIS — Z96.611 AFTERCARE FOLLOWING RIGHT SHOULDER JOINT REPLACEMENT SURGERY: Primary | ICD-10-CM

## 2018-06-29 DIAGNOSIS — Z47.1 AFTERCARE FOLLOWING RIGHT SHOULDER JOINT REPLACEMENT SURGERY: Primary | ICD-10-CM

## 2018-06-29 DIAGNOSIS — Z47.89 ORTHOPEDIC AFTERCARE: ICD-10-CM

## 2018-06-29 DIAGNOSIS — G47.34 NOCTURNAL HYPOXEMIA: ICD-10-CM

## 2018-06-29 DIAGNOSIS — G47.33 OSA (OBSTRUCTIVE SLEEP APNEA): Primary | ICD-10-CM

## 2018-06-29 PROCEDURE — 97110 THERAPEUTIC EXERCISES: CPT | Performed by: PHYSICAL THERAPIST

## 2018-06-29 PROCEDURE — G0463 HOSPITAL OUTPT CLINIC VISIT: HCPCS

## 2018-06-29 PROCEDURE — 97140 MANUAL THERAPY 1/> REGIONS: CPT | Performed by: PHYSICAL THERAPIST

## 2018-06-29 NOTE — PROGRESS NOTES
"[facility]  Telephone: [phone] / Fax: [phone] [place]  Telephone: [phone] / Fax: [phone] Lauren Ruiz    Referring Physician: Dr. Singer  PCP: Domenica Merida MD    Reason for consult:  sleep apnea    Rodolfo Grover is a 73 y.o.male  was seen in the [facility] today for evaluation of sleep apnea.  He reports EDS and fatigue for many years. He has PVCs and recently had [facility] HST. It showed severe COLEMAN with AHI 40 and lowest desaturation to 70%. He is here today to discuss COLEMAN treatment.    He reports loud snoring, witnessed apneas and EDS for many years. He also has PVCs.  His sleep schedule is 9:30pm-6am. He falls asleep within 10min and wakes up feeling tired.     SH-2 coffee a day, no tobacco, no alc    ROS- post nasal gtt, myalgias, swollen ankles, dizziness. Rest is negative.      Rodolfo Grover  has a past medical history of GERD (gastroesophageal reflux disease); Iritis of right eye; Rheumatic fever; and Shoulder fracture, right.    Current Medications:    Current Outpatient Prescriptions:   •  esomeprazole (nexIUM) 20 MG capsule, Take 1 capsule by mouth Every Morning Before Breakfast., Disp: 90 capsule, Rfl: 1    I have reviewed Past Medical History, Past Surgical History, Medication List, Social History and Family History as entered in Sleep Questionnaire and EPIC.    ESS  9   Vital Signs /78 (BP Location: Left arm, Patient Position: Sitting)   Pulse 70   Ht 170.2 cm (67\")   Wt 81.1 kg (178 lb 12.8 oz)   SpO2 96%   BMI 28.00 kg/m²  Body mass index is 28 kg/m².    General Alert and oriented. No acute distress noted   Pharynx/Throat Class IV Mallampati airway, large tongue, no evidence of redundant lateral pharyngeal tissue. No oral lesions. No thrush. Moist mucous membranes..   Head Normocephalic. Symmetrical. Atraumatic.    Nose No septal deviation. No drainage   Chest Wall Normal shape. Symmetric expansion with respiration. No tenderness.   Neck " Trachea midline, no thyromegaly or adenopathy    Lungs Clear to auscultation bilaterally. No wheezes. No rhonchi. No rales. Respirations regular, even and unlabored.   Heart Regular rhythm and normal rate. Normal S1 and S2. No murmur   Abdomen Soft, non-tender and non-distended. Normal bowel sounds. No masses.   Extremities Moves all extremities well. No edema   Psychiatric Normal mood and affect.      Testing    [facility] HST- AHI 40, lowest desaturation 70s; 68min of sats <90%             Impression:  1. COLEMAN (obstructive sleep apnea)    2. Nocturnal hypoxemia          Plan:  I discussed the pathophysiology of obstructive sleep apnea with the patient.  We discussed the adverse outcomes associated with untreated sleep-disordered breathing.  We discussed treatment modalities of obstructive sleep apnea including CPAP device. I reviewed results of HST together with the patient today. He agrees to start COLEMAN treatment with CPAP device. I will send order to DME to get him set up. I will also order overnight oximetry on CPAP RA by DME to be done few weeks after set up to make sure CPAP corrects the desaturations.    Thank you for allowing me to participate in your patient's care.    The patient will follow up with Dr. Mendiola in 6-8 weeks.    NKECHI Swanson  [facility]  Phone: [phone]      Part of this note may be an electronic transcription/translation of spoken language to printed text using the Dragon Dictation System. Some errors may exist even though the document was edited.

## 2018-06-29 NOTE — TELEPHONE ENCOUNTER
----- Message from Mely Randall sent at 6/29/2018  1:02 PM EDT -----  Regarding: RE: F/u appt  appt made at checkout for 08/17/18 and informed pt about ovo that will be done 2 weeks after starting cpap. He acknowledge understanding.  ----- Message -----  From: NKECHI Swanson  Sent: 6/29/2018  12:13 PM  To: Mely Randall  Subject: F/u appt                                         Alicia please ask patient to call us and schedule f/u appt with Dr. Mendiola day 31-90 of set up with CPAP machine. Please also let him know that I ordered an overnight oximetry on CPAP RA to be done by his DME few weeks after set up to make sure his O2 levels are within normal range on CPAP device.

## 2018-06-29 NOTE — THERAPY TREATMENT NOTE
Outpatient Physical Therapy Ortho Treatment Note  Psychiatric     Patient Name: Rodolfo Grover  : 1945  MRN: [id number]  Today's Date: 2018      Visit Date: 2018    Visit Dx:    ICD-10-CM ICD-9-CM   1. Aftercare following right shoulder joint replacement surgery Z47.1 V54.81    Z96.611 V43.61   2. Orthopedic aftercare Z47.89 V54.9   3. Impaired mobility Z74.09 799.89       Patient Active Problem List   Diagnosis   • Acute blood loss anemia   • Arthritis   • Fracture of fifth metacarpal bone of right hand   • GI bleed   • Melena   • Closed fracture dislocation of right shoulder   • Gastroesophageal reflux disease without esophagitis   • Leukocytosis   • Hyperglycemia   • Fall at home   • DNR (do not resuscitate)   • Bradycardia following surgery   • Frequent PVCs   • Proximal humerus fracture   • Osteoarthritis        Past Medical History:   Diagnosis Date   • GERD (gastroesophageal reflux disease)    • Iritis of right eye    • Rheumatic fever    • Shoulder fracture, right         Past Surgical History:   Procedure Laterality Date   • HERNIA REPAIR     • SHOULDER CLOSED REDUCTION Right 3/16/2018    Procedure: RIGHT SHOULDER CLOSED REDUCTION;  Surgeon: Kj Garcia MD;  Location: [facility];  Service: Orthopedics   • TONSILLECTOMY     • TOTAL SHOULDER ARTHROPLASTY W/ DISTAL CLAVICLE EXCISION Right 3/22/2018    Procedure: Reverse Total Shoulder Arthroplsty;  Surgeon: Kj Garcia MD;  Location: [facility];  Service: Orthopedics                             PT Assessment/Plan     Row Name 18 1111          PT Assessment    Assessment Comments Progressed weights reaching fatigue and denying pain.  Encouraged rest over weekend secondary to likely DOMS.  Continues with [employer] compensation although is improved from start of care.  Reaching end range passive restrictions with manual.  -GR       User Key  (r) = Recorded By, (t) = Taken By, (c) = Cosigned By    Initials Name Provider Type     GR Gaurav Cortez, PT Physical Therapist                Modalities     Row Name 06/29/18 1000             Ice    Ice Applied Yes  -GR      Location R shoulder, pt. seated, RUE rested on pillows  -GR      Rx Minutes 10 mins  -GR      Ice S/P Rx Yes  -GR        User Key  (r) = Recorded By, (t) = Taken By, (c) = Cosigned By    Initials Name Provider Type    GR Gaurav CADE Diego, PT Physical Therapist                Exercises     Row Name 06/29/18 1100 06/29/18 1000          Subjective Comments    Subjective Comments  -- No new changes, soreness and stiffness remains present especially in AM.  -GR        Subjective Pain    Able to rate subjective pain?  -- yes  -GR     Pre-Treatment Pain Level  -- 0  -GR        Total Minutes    97777 - PT Therapeutic Exercise Minutes  -- 27  -GR     26889 - PT Manual Therapy Minutes 13  -GR  --        Exercise 1    Exercise Name 1  -- shoulder shrugs  -GR     Cueing 1  -- Verbal  -GR     Sets 1  -- 2  -GR     Reps 1  -- 10  -GR     Additional Comments  -- 5#  -GR        Exercise 2    Exercise Name 2  -- scap retraction - rows and extension  -GR     Cueing 2  -- Verbal;Demo;Tactile  -GR     Sets 2  -- 2  -GR     Reps 2  -- 10  -GR     Additional Comments  -- tuff stuff 2 plates  -GR        Exercise 3    Exercise Name 3  -- AROM elbow flexion/ext  -GR     Cueing 3  -- Demo  -GR     Sets 3  -- 2  -GR     Reps 3  -- 10  -GR     Additional Comments  -- 5#  -GR        Exercise 4    Exercise Name 4  -- prone ext to neutral  -GR     Cueing 4  -- Demo;Verbal  -GR     Sets 4  -- 2  -GR     Reps 4  -- 10  -GR     Additional Comments  -- 2#  -GR        Exercise 5    Exercise Name 5  -- --  -GR     Cueing 5  -- --  -GR     Sets 5  -- --  -GR     Reps 5  -- --  -GR     Time 5  -- --  -GR        Exercise 6    Exercise Name 6  -- --  -GR     Cueing 6  -- --  -GR     Sets 6  -- --  -GR     Reps 6  -- --  -GR     Additional Comments  -- --  -GR        Exercise 8    Exercise Name 8  -- wash the wall,  both hands  -GR     Cueing 8  -- Verbal;Demo  -GR     Reps 8  -- 20  -GR        Exercise 9    Exercise Name 9  -- lat pull, tuff stuff  -GR     Cueing 9  -- Verbal;Demo;Tactile  -GR     Sets 9  -- 2  -GR     Reps 9  -- 10  -GR     Additional Comments  -- 4 plates  -GR        Exercise 10    Exercise Name 10  -- supine alphabet  -GR     Cueing 10  -- Verbal  -GR     Reps 10  -- 1  -GR     Additional Comments  -- 2#  -GR        Exercise 11    Exercise Name 11  -- pulley, scaption  -GR     Cueing 11  -- Demo;Verbal  -GR     Reps 11  -- 20  -GR        Exercise 12    Exercise Name 12  -- UBE  -GR     Time 12  -- 5 min  -GR     Additional Comments  -- L2  -GR        Exercise 13    Exercise Name 13  -- supine horizonantal abd  -GR     Cueing 13  -- Demo;Verbal;Tactile  -GR     Reps 13  -- 20  -GR     Additional Comments  -- GTB  -GR        Exercise 14    Exercise Name 14  -- S/L shoulder ER and abd  -GR     Cueing 14  -- Verbal;Demo  -GR     Sets 14  -- 2  -GR     Reps 14  -- 10  -GR     Additional Comments  -- 1#  -GR        Exercise 15    Exercise Name 15  -- prone rows, RUE  -GR     Cueing 15  -- Verbal;Demo  -GR     Sets 15  -- 2  -GR     Reps 15  -- 10  -GR     Additional Comments  -- 2#  -GR       User Key  (r) = Recorded By, (t) = Taken By, (c) = Cosigned By    Initials Name Provider Type    JOVITA Cortez, PT Physical Therapist                        Manual Rx (last 36 hours)      Manual Treatments     Row Name 06/29/18 1100             Total Minutes    29397 - PT Manual Therapy Minutes 13  -GR         Manual Rx 1    Manual Rx 1 Location PROM R shoulder flexion, abd  -GR      Manual Rx 1 Type gr I-II PA mob GHJ  -GR      Manual Rx 1 Duration 13 min  -GR        User Key  (r) = Recorded By, (t) = Taken By, (c) = Cosigned By    Initials Name Provider Type    JOVITA Cortez, PT Physical Therapist              Therapy Education  Education Details: progression of weights and rest over weekend  Given:  Symptoms/condition management  Program: Reinforced  How Provided: Verbal  Provided to: Patient  Level of Understanding: Teach back education performed, Verbalized              Time Calculation:   Start Time: 1030  Stop Time: 1120  Time Calculation (min): 50 min  Total Timed Code Minutes- PT: 40 minute(s)  Therapy Suggested Charges     Code   Minutes Charges    43481 (CPT®) Hc Pt Neuromusc Re Education Ea 15 Min      69605 (CPT®) Hc Pt Ther Proc Ea 15 Min 27 2    75003 (CPT®) Hc Gait Training Ea 15 Min      96658 (CPT®) Hc Pt Therapeutic Act Ea 15 Min      58251 (CPT®) Hc Pt Manual Therapy Ea 15 Min 13 1    17834 (CPT®) Hc Pt Ther Massage- Per 15 Min      36503 (CPT®) Hc Pt Iontophoresis Ea 15 Min      44900 (CPT®) Hc Pt Elec Stim Ea-Per 15 Min      95637 (CPT®) Hc Pt Ultrasound Ea 15 Min      39145 (CPT®) Hc Pt Self Care/Mgmt/Train Ea 15 Min      Total  40 3        Therapy Charges for Today     Code Description Service Date Service Provider Modifiers Qty    16820914847 HC PT THER PROC EA 15 MIN 6/29/2018 Gaurav Cortez, PT GP 2    17971614889 HC PT MANUAL THERAPY EA 15 MIN 6/29/2018 Gaurav Cortez, PT GP 1    14472104271 HC PT HOT OR COLD PACK TREAT MCARE 6/29/2018 Gaurav Cortez, PT GP 1                    Gaurav Cortez, PT  6/29/2018

## 2018-06-30 LAB
C TRACH RRNA SPEC QL NAA+PROBE: NEGATIVE
N GONORRHOEA RRNA SPEC QL NAA+PROBE: NEGATIVE

## 2018-07-02 ENCOUNTER — TELEPHONE (OUTPATIENT)
Dept: SLEEP MEDICINE | Facility: HOSPITAL | Age: 73
End: 2018-07-02

## 2018-07-02 ENCOUNTER — RESULTS ENCOUNTER (OUTPATIENT)
Dept: FAMILY MEDICINE CLINIC | Facility: CLINIC | Age: 73
End: 2018-07-02

## 2018-07-02 DIAGNOSIS — Z12.5 SCREENING FOR PROSTATE CANCER: ICD-10-CM

## 2018-07-02 DIAGNOSIS — D64.9 ANEMIA, UNSPECIFIED TYPE: ICD-10-CM

## 2018-07-02 DIAGNOSIS — R73.9 HYPERGLYCEMIA: ICD-10-CM

## 2018-07-02 DIAGNOSIS — I49.3 FREQUENT PVCS: ICD-10-CM

## 2018-07-02 DIAGNOSIS — E78.5 HYPERLIPIDEMIA, UNSPECIFIED HYPERLIPIDEMIA TYPE: ICD-10-CM

## 2018-07-02 LAB
ALBUMIN SERPL-MCNC: 3.9 G/DL (ref 3.5–5.2)
ALBUMIN/GLOB SERPL: 1.5 G/DL
ALP SERPL-CCNC: 75 U/L (ref 39–117)
ALT SERPL-CCNC: 17 U/L (ref 1–41)
AST SERPL-CCNC: 19 U/L (ref 1–40)
BASOPHILS # BLD AUTO: 0.03 10*3/MM3 (ref 0–0.2)
BASOPHILS NFR BLD AUTO: 0.4 % (ref 0–1.5)
BILIRUB SERPL-MCNC: 0.4 MG/DL (ref 0.1–1.2)
BUN SERPL-MCNC: 11 MG/DL (ref 8–23)
BUN/CREAT SERPL: 8.9 (ref 7–25)
CALCIUM SERPL-MCNC: 9.3 MG/DL (ref 8.6–10.5)
CHLORIDE SERPL-SCNC: 105 MMOL/L (ref 98–107)
CHOLEST SERPL-MCNC: 232 MG/DL (ref 0–200)
CO2 SERPL-SCNC: 25.5 MMOL/L (ref 22–29)
CREAT SERPL-MCNC: 1.24 MG/DL (ref 0.76–1.27)
EOSINOPHIL # BLD AUTO: 0.23 10*3/MM3 (ref 0–0.7)
EOSINOPHIL NFR BLD AUTO: 3.1 % (ref 0.3–6.2)
ERYTHROCYTE [DISTWIDTH] IN BLOOD BY AUTOMATED COUNT: 13.9 % (ref 11.5–14.5)
GLOBULIN SER CALC-MCNC: 2.6 GM/DL
GLUCOSE SERPL-MCNC: 104 MG/DL (ref 65–99)
HCT VFR BLD AUTO: 44.3 % (ref 40.4–52.2)
HDLC SERPL-MCNC: 64 MG/DL (ref 40–60)
HGB BLD-MCNC: 14.2 G/DL (ref 13.7–17.6)
IMM GRANULOCYTES # BLD: 0.11 10*3/MM3 (ref 0–0.03)
IMM GRANULOCYTES NFR BLD: 1.5 % (ref 0–0.5)
LDLC SERPL CALC-MCNC: 151 MG/DL (ref 0–100)
LYMPHOCYTES # BLD AUTO: 1.94 10*3/MM3 (ref 0.9–4.8)
LYMPHOCYTES NFR BLD AUTO: 25.7 % (ref 19.6–45.3)
MCH RBC QN AUTO: 31.8 PG (ref 27–32.7)
MCHC RBC AUTO-ENTMCNC: 32.1 G/DL (ref 32.6–36.4)
MCV RBC AUTO: 99.1 FL (ref 79.8–96.2)
MONOCYTES # BLD AUTO: 0.64 10*3/MM3 (ref 0.2–1.2)
MONOCYTES NFR BLD AUTO: 8.5 % (ref 5–12)
NEUTROPHILS # BLD AUTO: 4.7 10*3/MM3 (ref 1.9–8.1)
NEUTROPHILS NFR BLD AUTO: 62.3 % (ref 42.7–76)
PLATELET # BLD AUTO: 221 10*3/MM3 (ref 140–500)
POTASSIUM SERPL-SCNC: 4.2 MMOL/L (ref 3.5–5.2)
PROT SERPL-MCNC: 6.5 G/DL (ref 6–8.5)
PSA SERPL-MCNC: 1.16 NG/ML (ref 0–4)
RBC # BLD AUTO: 4.47 10*6/MM3 (ref 4.6–6)
SODIUM SERPL-SCNC: 143 MMOL/L (ref 136–145)
TRIGL SERPL-MCNC: 86 MG/DL (ref 0–150)
VLDLC SERPL CALC-MCNC: 17.2 MG/DL (ref 5–40)
WBC # BLD AUTO: 7.54 10*3/MM3 (ref 4.5–10.7)

## 2018-07-02 NOTE — TELEPHONE ENCOUNTER
Pt was in clinic last Friday to review HST from [facility] with nurse practitioner.  Pt is amenable to CPAP trial.  Pt will have NocOx testing on PAP after set up.  Set up sent to [facility]. puma

## 2018-07-03 ENCOUNTER — HOSPITAL ENCOUNTER (OUTPATIENT)
Dept: PHYSICAL THERAPY | Facility: HOSPITAL | Age: 73
Setting detail: THERAPIES SERIES
Discharge: HOME | End: 2018-07-03

## 2018-07-03 DIAGNOSIS — Z74.09 IMPAIRED MOBILITY: ICD-10-CM

## 2018-07-03 DIAGNOSIS — Z47.1 AFTERCARE FOLLOWING RIGHT SHOULDER JOINT REPLACEMENT SURGERY: Primary | ICD-10-CM

## 2018-07-03 DIAGNOSIS — Z47.89 ORTHOPEDIC AFTERCARE: ICD-10-CM

## 2018-07-03 DIAGNOSIS — Z96.611 AFTERCARE FOLLOWING RIGHT SHOULDER JOINT REPLACEMENT SURGERY: Primary | ICD-10-CM

## 2018-07-03 PROCEDURE — 97140 MANUAL THERAPY 1/> REGIONS: CPT | Performed by: PHYSICAL THERAPIST

## 2018-07-03 PROCEDURE — 97110 THERAPEUTIC EXERCISES: CPT | Performed by: PHYSICAL THERAPIST

## 2018-07-03 NOTE — THERAPY TREATMENT NOTE
Outpatient Physical Therapy Ortho Treatment Note  [facility]     Patient Name: Rodolfo Grover  : 1945  MRN: [id number]  Today's Date: 7/3/2018      Visit Date: 2018    Visit Dx:    ICD-10-CM ICD-9-CM   1. Aftercare following right shoulder joint replacement surgery Z47.1 V54.81    Z96.611 V43.61   2. Orthopedic aftercare Z47.89 V54.9   3. Impaired mobility Z74.09 799.89       Patient Active Problem List   Diagnosis   • Acute blood loss anemia   • Arthritis   • Fracture of fifth metacarpal bone of right hand   • GI bleed   • Melena   • Closed fracture dislocation of right shoulder   • Gastroesophageal reflux disease without esophagitis   • Leukocytosis   • Hyperglycemia   • Fall at home   • DNR (do not resuscitate)   • Bradycardia following surgery   • Frequent PVCs   • Proximal humerus fracture   • Osteoarthritis        Past Medical History:   Diagnosis Date   • GERD (gastroesophageal reflux disease)    • Iritis of right eye    • Rheumatic fever    • Shoulder fracture, right         Past Surgical History:   Procedure Laterality Date   • HERNIA REPAIR     • SHOULDER CLOSED REDUCTION Right 3/16/2018    Procedure: RIGHT SHOULDER CLOSED REDUCTION;  Surgeon: Kj Garcia MD;  Location: [facility];  Service: Orthopedics   • TONSILLECTOMY     • TOTAL SHOULDER ARTHROPLASTY W/ DISTAL CLAVICLE EXCISION Right 3/22/2018    Procedure: Reverse Total Shoulder Arthroplsty;  Surgeon: Kj Garcia MD;  Location: [facility];  Service: Orthopedics                             PT Assessment/Plan     Row Name 18 0957          PT Assessment    Assessment Comments Adapting to increased weights, decreased [employer] compensation, improved scapular stability. Plan to reassess next visit for additional needs.  -GR        PT Plan    PT Plan Comments Reassess. Possible decrease in frequency to 1x/week.  -GR       User Key  (r) = Recorded By, (t) = Taken By, (c) = Cosigned By    Initials Name Provider Type    GR  "Gaurav Cortez, PT Physical Therapist                Modalities     Row Name 07/03/18 0900             Ice    Ice Applied Yes  -GR      Location R shoulder, pt. seated, RUE rested on pillows  -GR      Rx Minutes 10 mins  -GR      Ice S/P Rx Yes  -GR        User Key  (r) = Recorded By, (t) = Taken By, (c) = Cosigned By    Initials Name Provider Type    GR Gaurav Cortez, PT Physical Therapist                Exercises     Row Name 07/03/18 0900             Subjective Comments    Subjective Comments Was sore after last visit \"a good sore.\"  States he likes  being pushed some in therapy.  -GR         Subjective Pain    Able to rate subjective pain? yes  -GR      Pre-Treatment Pain Level 0  -GR         Total Minutes    23377 - PT Therapeutic Exercise Minutes 35  -GR      62075 - PT Manual Therapy Minutes 10  -GR         Exercise 1    Exercise Name 1 shoulder shrugs  -GR      Cueing 1 Verbal  -GR      Sets 1 2  -GR      Reps 1 10  -GR      Additional Comments 5#  -GR         Exercise 2    Exercise Name 2 scap retraction - rows and extension  -GR      Cueing 2 Verbal;Demo;Tactile  -GR      Sets 2 2  -GR      Reps 2 10  -GR      Additional Comments tuff stuff 4 plates rows; 2 plates extension  -GR         Exercise 3    Exercise Name 3 AROM elbow flexion/ext  -GR      Cueing 3 Demo  -GR      Sets 3 2  -GR      Reps 3 10  -GR      Additional Comments 5#  -GR         Exercise 4    Exercise Name 4 prone ext to neutral  -GR      Cueing 4 Demo;Verbal  -GR      Sets 4 2  -GR      Reps 4 10  -GR      Additional Comments 2#  -GR         Exercise 8    Exercise Name 8 wash the wall, both hands  -GR      Cueing 8 Verbal;Demo  -GR      Reps 8 20  -GR         Exercise 9    Exercise Name 9 lat pull, tuff stuff  -GR      Cueing 9 Verbal;Demo;Tactile  -GR      Sets 9 2  -GR      Reps 9 10  -GR      Additional Comments 4 plates  -GR         Exercise 10    Exercise Name 10 supine alphabet  -GR      Cueing 10 Verbal  -GR      Reps 10 1  " -GR      Additional Comments 2#  -GR         Exercise 11    Exercise Name 11 pulley, scaption  -GR      Cueing 11 Demo;Verbal  -GR      Reps 11 20  -GR         Exercise 12    Exercise Name 12 UBE  -GR      Time 12 5 min  -GR      Additional Comments L2  -GR         Exercise 13    Exercise Name 13 supine horizonantal abd  -GR      Cueing 13 Demo;Verbal;Tactile  -GR      Reps 13 20  -GR      Additional Comments GTB  -GR         Exercise 14    Exercise Name 14 S/L shoulder ER and abd  -GR      Cueing 14 Verbal;Demo  -GR      Sets 14 2  -GR      Reps 14 10  -GR      Additional Comments 1#  -GR         Exercise 15    Exercise Name 15 prone rows, RUE  -GR      Cueing 15 Verbal;Demo  -GR      Sets 15 2  -GR      Reps 15 10  -GR      Additional Comments 2#  -GR        User Key  (r) = Recorded By, (t) = Taken By, (c) = Cosigned By    Initials Name Provider Type    JOVITA Cortez, PT Physical Therapist                        Manual Rx (last 36 hours)      Manual Treatments     Row Name 07/03/18 0900             Total Minutes    86179 - PT Manual Therapy Minutes 10  -GR         Manual Rx 1    Manual Rx 1 Location PROM R shoulder flexion, abd  -GR      Manual Rx 1 Type gr I-II PA mob GHJ  -GR      Manual Rx 1 Duration 10 min  -GR        User Key  (r) = Recorded By, (t) = Taken By, (c) = Cosigned By    Initials Name Provider Type    JOVITA Cortez, PT Physical Therapist                PT OP Goals     Row Name 07/03/18 0900          PT Short Term Goals    STG Date to Achieve 05/16/18  -GR     STG 1 pt. to be I with initial HEP to facilitate self management of their condition  -GR     STG 1 Progress Met  -GR     STG 2 pt. to be educated in/verbalize understanding of the importance of posture/ergonomics in association with their condition to facilitate self management of their condition  -GR     STG 2 Progress Met  -GR     STG 3 pt to demonstrate R shoulder PROM flexion >/= 120 to facilitate ease of performing self care  activities  -GR     STG 3 Progress Met  -GR        Long Term Goals    LTG Date to Achieve 07/11/18  -GR     LTG 1 pt. to be I with advanced HEP to facilitate self management of their condition  -GR     LTG 1 Progress Progressing  -GR     LTG 2 pt. to report a DASH score </= 30 to demonstrate decreased level of perceived disability  -GR     LTG 2 Progress Ongoing  -GR     LTG 3 pt. to demonstrate R shoulder flexion AROM >/= 0-120 to facilitate ease of performing self care/dressing activities  -GR     LTG 3 Progress Ongoing;Progressing  -GR     LTG 4 pt. to demonstrate placing/retrieving small object from an above the shoulder level shelf with her LUE to facilitate ease of performing household/work related activities  -GR     LTG 4 Progress Ongoing  -GR       User Key  (r) = Recorded By, (t) = Taken By, (c) = Cosigned By    Initials Name Provider Type    JOVITA Cortez, PT Physical Therapist          Therapy Education  Education Details: plan to reassess next visit and likely decrease frequency to 1x/week  Given: Symptoms/condition management  Program: Reinforced  How Provided: Verbal  Provided to: Patient  Level of Understanding: Verbalized              Time Calculation:   Start Time: 0930  Stop Time: 1025  Time Calculation (min): 55 min  Total Timed Code Minutes- PT: 45 minute(s)  Therapy Suggested Charges     Code   Minutes Charges    14520 (CPT®) Hc Pt Neuromusc Re Education Ea 15 Min      44732 (CPT®) Hc Pt Ther Proc Ea 15 Min 35 2    86820 (CPT®) Hc Gait Training Ea 15 Min      68287 (CPT®) Hc Pt Therapeutic Act Ea 15 Min      94633 (CPT®) Hc Pt Manual Therapy Ea 15 Min 10 1    46433 (CPT®) Hc Pt Ther Massage- Per 15 Min      04586 (CPT®) Hc Pt Iontophoresis Ea 15 Min      52842 (CPT®) Hc Pt Elec Stim Ea-Per 15 Min      22751 (CPT®) Hc Pt Ultrasound Ea 15 Min      64529 (CPT®) Hc Pt Self Care/Mgmt/Train Ea 15 Min      Total  45 3        Therapy Charges for Today     Code Description Service Date Service  Provider Modifiers Qty    15754830155  PT THER PROC EA 15 MIN 7/3/2018 Gaurav Cortez, PT GP 2    51211892546 HC PT MANUAL THERAPY EA 15 MIN 7/3/2018 Gaurav Cortez, PT GP 1    57564068010  PT HOT OR COLD PACK TREAT MCARE 7/3/2018 Gaurav Cortez, PT GP 1                    Gaurav Cortez, PT  7/3/2018

## 2018-07-06 ENCOUNTER — HOSPITAL ENCOUNTER (OUTPATIENT)
Dept: PHYSICAL THERAPY | Facility: HOSPITAL | Age: 73
Setting detail: THERAPIES SERIES
Discharge: HOME | End: 2018-07-06

## 2018-07-06 DIAGNOSIS — Z96.611 AFTERCARE FOLLOWING RIGHT SHOULDER JOINT REPLACEMENT SURGERY: Primary | ICD-10-CM

## 2018-07-06 DIAGNOSIS — Z47.1 AFTERCARE FOLLOWING RIGHT SHOULDER JOINT REPLACEMENT SURGERY: Primary | ICD-10-CM

## 2018-07-06 DIAGNOSIS — Z47.89 ORTHOPEDIC AFTERCARE: ICD-10-CM

## 2018-07-06 DIAGNOSIS — Z74.09 IMPAIRED MOBILITY: ICD-10-CM

## 2018-07-06 PROCEDURE — 97110 THERAPEUTIC EXERCISES: CPT | Performed by: PHYSICAL THERAPIST

## 2018-07-06 PROCEDURE — 97140 MANUAL THERAPY 1/> REGIONS: CPT | Performed by: PHYSICAL THERAPIST

## 2018-07-06 NOTE — THERAPY PROGRESS REPORT/RE-CERT
Outpatient Physical Therapy Ortho Re-Assessment  [facility]     Patient Name: Rodolfo Grover  : 1945  MRN: [id number]  Today's Date: 2018      Visit Date: 2018    Patient Active Problem List   Diagnosis   • Acute blood loss anemia   • Arthritis   • Fracture of fifth metacarpal bone of right hand   • GI bleed   • Melena   • Closed fracture dislocation of right shoulder   • Gastroesophageal reflux disease without esophagitis   • Leukocytosis   • Hyperglycemia   • Fall at home   • DNR (do not resuscitate)   • Bradycardia following surgery   • Frequent PVCs   • Proximal humerus fracture   • Osteoarthritis        Past Medical History:   Diagnosis Date   • GERD (gastroesophageal reflux disease)    • Iritis of right eye    • Rheumatic fever    • Shoulder fracture, right         Past Surgical History:   Procedure Laterality Date   • HERNIA REPAIR     • SHOULDER CLOSED REDUCTION Right 3/16/2018    Procedure: RIGHT SHOULDER CLOSED REDUCTION;  Surgeon: Kj Garcia MD;  Location: [facility];  Service: Orthopedics   • TONSILLECTOMY     • TOTAL SHOULDER ARTHROPLASTY W/ DISTAL CLAVICLE EXCISION Right 3/22/2018    Procedure: Reverse Total Shoulder Arthroplsty;  Surgeon: Kj Garcia MD;  Location: [facility];  Service: Orthopedics       Visit Dx:     ICD-10-CM ICD-9-CM   1. Aftercare following right shoulder joint replacement surgery Z47.1 V54.81    Z96.611 V43.61   2. Orthopedic aftercare Z47.89 V54.9   3. Impaired mobility Z74.09 799.89                 PT Ortho     Row Name 18 0900       Right Upper Ext    Rt Shoulder Abduction AROM 103  -GR    Rt Shoulder Flexion AROM 120  -GR    Rt Shoulder External Rotation AROM functional C7  -GR    Rt Shoulder Internal Rotation AROM functional L3  -GR       MMT (Manual Muscle Testing)    Additional Documentation General Assessment (Manual Muscle Testing) (Group)  -GR       General Assessment (Manual Muscle Testing)    General Manual Muscle Testing  (MMT) Assessment upper extremity strength deficits identified  -GR       Upper Extremity (Manual Muscle Testing)    Upper Extremity: Manual Muscle Testing (MMT) right shoulder strength deficit  -GR       Right Shoulder (Manual Muscle Testing)    Right Shoulder Manual Muscle Testing (MMT) flexion;abduction;external rotation;internal rotation  -GR    MMT: Flexion, Right Shoulder flexion  -GR    MMT, Gross Movement: Right Shoulder Flexion (4/5) good  -GR    MMT: ABduction, Right Shoulder abduction  -GR    MMT, Gross Movement: Right Shoulder ABduction (4-/5) good minus  -GR    MMT: Internal Rotation, Right Shoulder internal rotation  -GR    MMT, Gross Movement: Right Shoulder Internal Rotation (4-/5) good minus  -GR    MMT: External Rotation, Right Shoulder external rotation  -GR    MMT, Gross Movement: Right Shoulder External Rotation (4-/5) good minus  -GR      User Key  (r) = Recorded By, (t) = Taken By, (c) = Cosigned By    Initials Name Provider Type    GR Gaurav Cortez, PT Physical Therapist                      Therapy Education  Education Details: update on progress and POC, issued updated handouts  Given: HEP, Symptoms/condition management  Program: Progressed  How Provided: Verbal  Provided to: Patient  Level of Understanding: Teach back education performed, Verbalized           PT OP Goals     Row Name 07/06/18 0900          PT Short Term Goals    STG Date to Achieve 05/16/18  -GR     STG 1 pt. to be I with initial HEP to facilitate self management of their condition  -GR     STG 1 Progress Met  -GR     STG 2 pt. to be educated in/verbalize understanding of the importance of posture/ergonomics in association with their condition to facilitate self management of their condition  -GR     STG 2 Progress Met  -GR     STG 3 pt to demonstrate R shoulder PROM flexion >/= 120 to facilitate ease of performing self care activities  -GR     STG 3 Progress Met  -GR        Long Term Goals    LTG Date to Achieve  07/11/18  -GR     LTG 1 pt. to be I with advanced HEP to facilitate self management of their condition  -GR     LTG 1 Progress Progressing  -GR     LTG 2 pt. to report a DASH score </= 30 to demonstrate decreased level of perceived disability  -GR     LTG 2 Progress Ongoing  -GR     LTG 2 Progress Comments 45%  -GR     LTG 3 pt. to demonstrate R shoulder flexion AROM >/= 0-120 to facilitate ease of performing self care/dressing activities  -GR     LTG 3 Progress Ongoing;Progressing  -GR     LTG 4 pt. to demonstrate placing/retrieving small object from an above the shoulder level shelf with her LUE to facilitate ease of performing household/work related activities  -GR     LTG 4 Progress Met  -GR     LTG 4 Progress Comments placing clothes on  at home  -GR       User Key  (r) = Recorded By, (t) = Taken By, (c) = Cosigned By    Initials Name Provider Type    JOVITA Cortez PT Physical Therapist                PT Assessment/Plan     Row Name 07/06/18 0951          PT Assessment    Assessment Comments Mr. Grover has attended 18 visits of skilled PT s/p reverse R TSA.  Perceived disability as per the DASH is 45% where 100% = complete disability.  AROM of R shoulder has improved all directions and strength is progressing but with continued limitation and compensatory motion  Patient would benefit from continuing PT. Will trial decreasing frequency to 1x/week and increasing home program for further strength gains which are expected to occur over the course of the post operative year.  -GR        PT Plan    PT Plan Comments Decrease to 1x/week and home strengthening 2 days, stretches daily.  -GR       User Key  (r) = Recorded By, (t) = Taken By, (c) = Cosigned By    Initials Name Provider Type    JOVITA Cortez PT Physical Therapist                Modalities     Row Name 07/06/18 0900             Ice    Ice Applied Yes  -GR      Location R shoulder, pt. seated, RUE rested on pillows  -GR      Rx Minutes 10  mins  -GR      Ice S/P Rx Yes  -GR        User Key  (r) = Recorded By, (t) = Taken By, (c) = Cosigned By    Initials Name Provider Type    GR Gaurav Cortez, PT Physical Therapist              Exercises     Row Name 07/06/18 0900             Subjective Comments    Subjective Comments Doing better.  Wants to continue with PT.  -GR         Subjective Pain    Able to rate subjective pain? yes  -GR      Pre-Treatment Pain Level 0  -GR         Total Minutes    87097 - PT Therapeutic Exercise Minutes 35  -GR      43220 - PT Manual Therapy Minutes 10  -GR         Exercise 1    Exercise Name 1 shoulder shrugs  -GR      Cueing 1 Verbal  -GR      Sets 1 2  -GR      Reps 1 10  -GR      Additional Comments 5#  -GR         Exercise 2    Exercise Name 2 tuff stuff - rows and extension  -GR      Cueing 2 Verbal;Demo;Tactile  -GR      Sets 2 2  -GR      Reps 2 10  -GR      Additional Comments 4 plates rows, 2 plates ext  -GR         Exercise 3    Exercise Name 3 AROM elbow flexion/ext  -GR      Cueing 3 Demo  -GR      Sets 3 2  -GR      Reps 3 10  -GR      Additional Comments 5#  -GR         Exercise 8    Exercise Name 8 wash the wall, both hands  -GR      Cueing 8 Verbal;Demo  -GR      Reps 8 20  -GR         Exercise 9    Exercise Name 9 lat pull, tuff stuff  -GR      Cueing 9 Verbal;Demo;Tactile  -GR      Sets 9 2  -GR      Reps 9 10  -GR      Additional Comments  3 plates  -GR         Exercise 10    Exercise Name 10 supine alphabet  -GR      Cueing 10 Verbal  -GR      Reps 10 1  -GR      Additional Comments 2#  -GR         Exercise 11    Exercise Name 11 pulley, scaption  -GR      Cueing 11 Demo;Verbal  -GR      Reps 11 20  -GR         Exercise 12    Exercise Name 12 UBE  -GR      Time 12 5 min  -GR         Exercise 13    Exercise Name 13 supine horizonantal abd  -GR      Cueing 13 Demo;Verbal;Tactile  -GR      Reps 13 20  -GR      Additional Comments GTB  -GR         Exercise 14    Exercise Name 14 S/L shoulder ER and abd   -GR      Cueing 14 Verbal;Demo  -GR      Sets 14 2  -GR      Reps 14 10  -GR      Additional Comments 1#  -GR        User Key  (r) = Recorded By, (t) = Taken By, (c) = Cosigned By    Initials Name Provider Type    GR Gaurav Cortez PT Physical Therapist           Manual Rx (last 36 hours)      Manual Treatments     Row Name 07/06/18 0900             Total Minutes    92039 - PT Manual Therapy Minutes 10  -GR         Manual Rx 1    Manual Rx 1 Location PROM R shoulder flexion, abd  -GR      Manual Rx 1 Type gr I-II PA mob GHJ  -GR      Manual Rx 1 Duration 8 min  -GR         Manual Rx 2    Manual Rx 2 Location rhythmic stabilization R shoulder, pt. supine, ,RUE flexed to 90  -GR      Manual Rx 2 Grade 5 reps  -GR        User Key  (r) = Recorded By, (t) = Taken By, (c) = Cosigned By    Initials Name Provider Type    GR Gaurav Cortez PT Physical Therapist                      Outcome Measure Options: Disabilities of the Arm, Shoulder, and Hand (DASH) (45%)         Time Calculation:     Therapy Suggested Charges     Code   Minutes Charges    05339 (CPT®) Hc Pt Neuromusc Re Education Ea 15 Min      14197 (CPT®) Hc Pt Ther Proc Ea 15 Min 35 2    25345 (CPT®) Hc Gait Training Ea 15 Min      30971 (CPT®) Hc Pt Therapeutic Act Ea 15 Min      35542 (CPT®) Hc Pt Manual Therapy Ea 15 Min 10 1    67357 (CPT®) Hc Pt Ther Massage- Per 15 Min      23364 (CPT®) Hc Pt Iontophoresis Ea 15 Min      77512 (CPT®) Hc Pt Elec Stim Ea-Per 15 Min      47263 (CPT®) Hc Pt Ultrasound Ea 15 Min      92770 (CPT®) Hc Pt Self Care/Mgmt/Train Ea 15 Min      Total  45 3          Start Time: 0915  Stop Time: 1010  Time Calculation (min): 55 min     Therapy Charges for Today     Code Description Service Date Service Provider Modifiers Qty    64265355826 HC PT THER PROC EA 15 MIN 7/6/2018 Gaurav Cortez PT GP 2    05963641421 HC PT MANUAL THERAPY EA 15 MIN 7/6/2018 Gaurav Cortez PT GP 1          PT G-Codes  Outcome Measure Options:  Disabilities of the Arm, Shoulder, and Hand (DASH) (45%)         Gaurav Cortez, PT  7/6/2018

## 2018-07-12 ENCOUNTER — HOSPITAL ENCOUNTER (OUTPATIENT)
Dept: PHYSICAL THERAPY | Facility: HOSPITAL | Age: 73
Setting detail: THERAPIES SERIES
Discharge: HOME | End: 2018-07-12

## 2018-07-12 DIAGNOSIS — Z47.89 ORTHOPEDIC AFTERCARE: ICD-10-CM

## 2018-07-12 DIAGNOSIS — Z47.1 AFTERCARE FOLLOWING RIGHT SHOULDER JOINT REPLACEMENT SURGERY: Primary | ICD-10-CM

## 2018-07-12 DIAGNOSIS — Z96.611 AFTERCARE FOLLOWING RIGHT SHOULDER JOINT REPLACEMENT SURGERY: Primary | ICD-10-CM

## 2018-07-12 DIAGNOSIS — Z74.09 IMPAIRED MOBILITY: ICD-10-CM

## 2018-07-12 PROCEDURE — 97140 MANUAL THERAPY 1/> REGIONS: CPT | Performed by: PHYSICAL THERAPIST

## 2018-07-12 PROCEDURE — 97110 THERAPEUTIC EXERCISES: CPT | Performed by: PHYSICAL THERAPIST

## 2018-07-12 NOTE — THERAPY TREATMENT NOTE
Outpatient Physical Therapy Ortho Treatment Note  [facility]     Patient Name: Rodolfo Grover  : 1945  MRN: [id number]  Today's Date: 2018      Visit Date: 2018    Visit Dx:    ICD-10-CM ICD-9-CM   1. Aftercare following right shoulder joint replacement surgery Z47.1 V54.81    Z96.611 V43.61   2. Orthopedic aftercare Z47.89 V54.9   3. Impaired mobility Z74.09 799.89       Patient Active Problem List   Diagnosis   • Acute blood loss anemia   • Arthritis   • Fracture of fifth metacarpal bone of right hand   • GI bleed   • Melena   • Closed fracture dislocation of right shoulder   • Gastroesophageal reflux disease without esophagitis   • Leukocytosis   • Hyperglycemia   • Fall at home   • DNR (do not resuscitate)   • Bradycardia following surgery   • Frequent PVCs   • Proximal humerus fracture   • Osteoarthritis        Past Medical History:   Diagnosis Date   • GERD (gastroesophageal reflux disease)    • Iritis of right eye    • Rheumatic fever    • Shoulder fracture, right         Past Surgical History:   Procedure Laterality Date   • HERNIA REPAIR     • SHOULDER CLOSED REDUCTION Right 3/16/2018    Procedure: RIGHT SHOULDER CLOSED REDUCTION;  Surgeon: Kj Garcia MD;  Location: [facility];  Service: Orthopedics   • TONSILLECTOMY     • TOTAL SHOULDER ARTHROPLASTY W/ DISTAL CLAVICLE EXCISION Right 3/22/2018    Procedure: Reverse Total Shoulder Arthroplsty;  Surgeon: Kj Garcia MD;  Location: [facility];  Service: Orthopedics                             PT Assessment/Plan     Row Name 18 1506          PT Assessment    Assessment Comments Improved vigor with manually resisted PNF patterns and increased end range flexion with manual today.  -GR        PT Plan    PT Plan Comments Continue with current POC.  -GR       User Key  (r) = Recorded By, (t) = Taken By, (c) = Cosigned By    Initials Name Provider Type    JOVITA Cortez PT Physical Therapist               "  Modalities     Row Name 07/12/18 1400             Ice    Ice Applied Yes  -GR      Location R shoulder, pt. seated, RUE rested on pillows  -GR      Rx Minutes 10 mins  -GR      Ice S/P Rx Yes  -GR        User Key  (r) = Recorded By, (t) = Taken By, (c) = Cosigned By    Initials Name Provider Type    GR Gaurav Cortez, PT Physical Therapist                Exercises     Row Name 07/12/18 1500 07/12/18 1400          Subjective Comments    Subjective Comments  -- Lifted a case of 40 water bottle Monday or Tuesday and was sore after. \" I probably shouldn't have done that.\"  -GR        Subjective Pain    Able to rate subjective pain?  -- yes  -GR     Pre-Treatment Pain Level  -- 0  -GR        Total Minutes    66833 - PT Therapeutic Exercise Minutes  -- 35  -GR     15400 - PT Manual Therapy Minutes 15  -GR  --        Exercise 1    Exercise Name 1  -- shoulder shrugs  -GR     Cueing 1  -- Verbal  -GR     Sets 1  -- 2  -GR     Reps 1  -- 10  -GR     Additional Comments  -- 6#  -GR        Exercise 2    Exercise Name 2  -- tuff stuff - rows and extension  -GR     Cueing 2  -- Verbal;Demo;Tactile  -GR     Sets 2  -- 2  -GR     Reps 2  -- 10  -GR     Additional Comments  -- 4 plates rows, 2 plates extension  -GR        Exercise 3    Exercise Name 3  -- AROM elbow flexion/ext  -GR     Cueing 3  -- Demo  -GR     Sets 3  -- 2  -GR     Reps 3  -- 10  -GR     Additional Comments  -- 6#  -GR        Exercise 8    Exercise Name 8  -- wash the wall, both hands  -GR     Cueing 8  -- Verbal;Demo  -GR     Reps 8  -- 20  -GR        Exercise 9    Exercise Name 9  -- lat pull, tuff stuff  -GR     Cueing 9  -- Verbal;Demo;Tactile  -GR     Sets 9  -- 2  -GR     Reps 9  -- 10  -GR     Additional Comments  -- 3 plates  -GR        Exercise 10    Exercise Name 10  -- supine alphabet  -GR     Cueing 10  -- Verbal  -GR     Reps 10  -- 1  -GR     Additional Comments  -- 2#  -GR        Exercise 11    Exercise Name 11  -- pulley, scaption  -GR     " Cueing 11  -- Demo;Verbal  -GR     Reps 11  -- 20  -GR        Exercise 12    Exercise Name 12  -- UBE  -GR     Time 12  -- 5 min  -GR     Additional Comments  -- L3  -GR        Exercise 13    Exercise Name 13  -- supine horizonantal abd  -GR     Cueing 13  -- Demo;Verbal;Tactile  -GR     Reps 13  -- 15  -GR     Additional Comments  -- BlueTB  -GR        Exercise 14    Exercise Name 14  -- S/L shoulder ER and abd  -GR     Cueing 14  -- Verbal;Demo  -GR     Sets 14  -- 1  -GR     Reps 14  -- 15  -GR     Additional Comments  -- 2#  -GR       User Key  (r) = Recorded By, (t) = Taken By, (c) = Cosigned By    Initials Name Provider Type    JOVITA Cortez, PT Physical Therapist                        Manual Rx (last 36 hours)      Manual Treatments     Row Name 07/12/18 1500             Total Minutes    23351 - PT Manual Therapy Minutes 15  -GR         Manual Rx 1    Manual Rx 1 Location PROM R shoulder flexion, abd  -GR      Manual Rx 1 Type gr I-II PA mob GHJ  -GR      Manual Rx 1 Duration 10 min  -GR         Manual Rx 2    Manual Rx 2 Location rhythmic stabilization R shoulder, pt. supine, ,RUE flexed to 90  -GR      Manual Rx 2 Grade 5 reps  -GR         Manual Rx 4    Manual Rx 4 Location MRE PNF D2 supine  -GR      Manual Rx 4 Grade 5 reps  -GR        User Key  (r) = Recorded By, (t) = Taken By, (c) = Cosigned By    Initials Name Provider Type    JOVITA Cortez, PT Physical Therapist                PT OP Goals     Row Name 07/12/18 1500          PT Short Term Goals    STG Date to Achieve 05/16/18  -GR     STG 1 pt. to be I with initial HEP to facilitate self management of their condition  -GR     STG 1 Progress Met  -GR     STG 2 pt. to be educated in/verbalize understanding of the importance of posture/ergonomics in association with their condition to facilitate self management of their condition  -GR     STG 2 Progress Met  -GR     STG 3 pt to demonstrate R shoulder PROM flexion >/= 120 to facilitate ease  of performing self care activities  -GR     STG 3 Progress Met  -GR        Long Term Goals    LTG Date to Achieve 07/11/18  -GR     LTG 1 pt. to be I with advanced HEP to facilitate self management of their condition  -GR     LTG 1 Progress Progressing  -GR     LTG 2 pt. to report a DASH score </= 30 to demonstrate decreased level of perceived disability  -GR     LTG 2 Progress Ongoing  -GR     LTG 3 pt. to demonstrate R shoulder flexion AROM >/= 0-120 to facilitate ease of performing self care/dressing activities  -GR     LTG 3 Progress Ongoing;Progressing  -GR     LTG 4 pt. to demonstrate placing/retrieving small object from an above the shoulder level shelf with her LUE to facilitate ease of performing household/work related activities  -GR     LTG 4 Progress Met  -GR       User Key  (r) = Recorded By, (t) = Taken By, (c) = Cosigned By    Initials Name Provider Type    GR Gaurav Cortez, PT Physical Therapist                         Time Calculation:   Start Time: 1415  Stop Time: 1510  Time Calculation (min): 55 min  Total Timed Code Minutes- PT: 45 minute(s)  Therapy Suggested Charges     Code   Minutes Charges    67433 (CPT®) Hc Pt Neuromusc Re Education Ea 15 Min      80515 (CPT®) Hc Pt Ther Proc Ea 15 Min 35 2    14350 (CPT®) Hc Gait Training Ea 15 Min      99941 (CPT®) Hc Pt Therapeutic Act Ea 15 Min      81974 (CPT®) Hc Pt Manual Therapy Ea 15 Min 15 1    45629 (CPT®) Hc Pt Ther Massage- Per 15 Min      01259 (CPT®) Hc Pt Iontophoresis Ea 15 Min      26331 (CPT®) Hc Pt Elec Stim Ea-Per 15 Min      43098 (CPT®) Hc Pt Ultrasound Ea 15 Min      94512 (CPT®) Hc Pt Self Care/Mgmt/Train Ea 15 Min      Total  50 3        Therapy Charges for Today     Code Description Service Date Service Provider Modifiers Qty    27898482248 HC PT THER PROC EA 15 MIN 7/12/2018 Gaurav Cortez, PT GP 2    69250070961 HC PT MANUAL THERAPY EA 15 MIN 7/12/2018 Gaurav Cortez PT GP 1    43121623021 HC PT HOT OR COLD PACK TREAT  MERRICK 7/12/2018 Gaurav Cortez, PT GP 1                    Gaurav Cortez, PT  7/12/2018

## 2018-07-20 ENCOUNTER — APPOINTMENT (OUTPATIENT)
Dept: PHYSICAL THERAPY | Facility: HOSPITAL | Age: 73
End: 2018-07-20

## 2018-07-27 ENCOUNTER — HOSPITAL ENCOUNTER (OUTPATIENT)
Dept: PHYSICAL THERAPY | Facility: HOSPITAL | Age: 73
Setting detail: THERAPIES SERIES
Discharge: HOME | End: 2018-07-27

## 2018-07-27 DIAGNOSIS — Z47.1 AFTERCARE FOLLOWING RIGHT SHOULDER JOINT REPLACEMENT SURGERY: Primary | ICD-10-CM

## 2018-07-27 DIAGNOSIS — Z74.09 IMPAIRED MOBILITY: ICD-10-CM

## 2018-07-27 DIAGNOSIS — Z96.611 AFTERCARE FOLLOWING RIGHT SHOULDER JOINT REPLACEMENT SURGERY: Primary | ICD-10-CM

## 2018-07-27 DIAGNOSIS — Z47.89 ORTHOPEDIC AFTERCARE: ICD-10-CM

## 2018-07-27 PROCEDURE — 97110 THERAPEUTIC EXERCISES: CPT | Performed by: PHYSICAL THERAPIST

## 2018-07-27 PROCEDURE — 97140 MANUAL THERAPY 1/> REGIONS: CPT | Performed by: PHYSICAL THERAPIST

## 2018-07-27 NOTE — THERAPY TREATMENT NOTE
Outpatient Physical Therapy Ortho Treatment Note  [facility]     Patient Name: Rodolfo Grover  : 1945  MRN: [id number]  Today's Date: 2018      Visit Date: 2018    Visit Dx:    ICD-10-CM ICD-9-CM   1. Aftercare following right shoulder joint replacement surgery Z47.1 V54.81    Z96.611 V43.61   2. Orthopedic aftercare Z47.89 V54.9   3. Impaired mobility Z74.09 799.89       Patient Active Problem List   Diagnosis   • Acute blood loss anemia   • Arthritis   • Fracture of fifth metacarpal bone of right hand   • GI bleed   • Melena   • Closed fracture dislocation of right shoulder   • Gastroesophageal reflux disease without esophagitis   • Leukocytosis   • Hyperglycemia   • Fall at home   • DNR (do not resuscitate)   • Bradycardia following surgery   • Frequent PVCs   • Proximal humerus fracture   • Osteoarthritis        Past Medical History:   Diagnosis Date   • GERD (gastroesophageal reflux disease)    • Iritis of right eye    • Rheumatic fever    • Shoulder fracture, right         Past Surgical History:   Procedure Laterality Date   • HERNIA REPAIR     • SHOULDER CLOSED REDUCTION Right 3/16/2018    Procedure: RIGHT SHOULDER CLOSED REDUCTION;  Surgeon: Kj Garcia MD;  Location: [facility];  Service: Orthopedics   • TONSILLECTOMY     • TOTAL SHOULDER ARTHROPLASTY W/ DISTAL CLAVICLE EXCISION Right 3/22/2018    Procedure: Reverse Total Shoulder Arthroplsty;  Surgeon: Kj Garcia MD;  Location: [facility];  Service: Orthopedics                             PT Assessment/Plan     Row Name 18 0945          PT Assessment    Assessment Comments Patient required regression in weights and demonstrated quicker fatigue today. Questionable HEP compliance secondary to copious cueing required for previously familiar therex.  -GR        PT Plan    PT Plan Comments Continue x 1 visit then plan to d/c to I HEP.  -GR       User Key  (r) = Recorded By, (t) = Taken By, (c) = Cosigned By     Initials Name Provider Type    GR Gaurav ALYSSIA Cortez, PT Physical Therapist                Modalities     Row Name 07/27/18 0900             Ice    Ice Applied Yes  -GR      Location R shoulder, pt. 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  -GR         Subjective Pain    Able to rate subjective pain? yes  -GR      Pre-Treatment Pain Level 0  -GR         Total Minutes    23802 - PT Therapeutic Exercise Minutes 30  -GR      73155 - PT Manual Therapy Minutes 15  -GR         Exercise 1    Exercise Name 1 shoulder shrugs  -GR      Cueing 1 Verbal  -GR      Sets 1 2  -GR      Reps 1 10  -GR      Additional Comments 6#  -GR         Exercise 2    Exercise Name 2 tuff stuff - rows and extension  -GR      Cueing 2 Verbal;Demo;Tactile  -GR      Sets 2 2  -GR      Reps 2 10  -GR      Additional Comments 4 plates/3 plates  -GR         Exercise 3    Exercise Name 3 AROM elbow flexion/ext  -GR      Cueing 3 Demo  -GR      Sets 3 2  -GR      Reps 3 10  -GR      Additional Comments 6#  -GR         Exercise 8    Exercise Name 8 wash the wall, both hands  -GR      Cueing 8 Verbal;Demo  -GR      Reps 8 20  -GR         Exercise 9    Exercise Name 9 lat pull, tuff stuff  -GR      Cueing 9 Verbal;Demo;Tactile  -GR      Sets 9 2  -GR      Reps 9 10  -GR      Additional Comments 3 plates  -GR         Exercise 10    Exercise Name 10 wall alphabet  -GR      Cueing 10 Verbal  -GR      Reps 10 1  -GR      Additional Comments small ball  -GR         Exercise 11    Exercise Name 11 pulley, scaption  -GR      Cueing 11 Demo;Verbal  -GR      Reps 11 20  -GR         Exercise 12     Exercise Name 12 UBE  -GR      Time 12 5 min  -GR      Additional Comments L3  -GR         Exercise 13    Exercise Name 13 supine horizonantal abd  -GR      Cueing 13 Demo;Verbal;Tactile  -GR      Reps 13 20  -GR      Additional Comments BlueTB  -GR         Exercise 14    Exercise Name 14 S/L shoulder ER and abd  -GR      Cueing 14 Verbal;Demo  -GR      Sets 14 2  -GR      Reps 14 10  -GR      Additional Comments 2#  -GR         Exercise 15    Exercise Name 15 OH AAROM flex supine wand  -GR      Cueing 15 Demo  -GR      Sets 15 1  -GR      Reps 15 10  -GR      Time 15 10 sec  -GR        User Key  (r) = Recorded By, (t) = Taken By, (c) = Cosigned By    Initials Name Provider Type    JOVITA Cortez, PT Physical Therapist                        Manual Rx (last 36 hours)      Manual Treatments     Row Name 07/27/18 0900             Total Minutes    28059 - PT Manual Therapy Minutes 15  -GR         Manual Rx 1    Manual Rx 1 Location PROM R shoulder flexion, abd  -GR      Manual Rx 1 Type gr I-II PA mob GHJ  -GR      Manual Rx 1 Duration 15 min  -GR        User Key  (r) = Recorded By, (t) = Taken By, (c) = Cosigned By    Initials Name Provider Type    GR Gaurav Cortez, PT Physical Therapist                PT OP Goals     Row Name 07/27/18 0900          PT Short Term Goals    STG Date to Achieve 05/16/18  -GR     STG 1 pt. to be I with initial HEP to facilitate self management of their condition  -GR     STG 1 Progress Met  -GR     STG 2 pt. to be educated in/verbalize understanding of the importance of posture/ergonomics in association with their condition to facilitate self management of their condition  -GR     STG 2 Progress Met  -GR     STG 3 pt to demonstrate R shoulder PROM flexion >/= 120 to facilitate ease of performing self care activities  -GR     STG 3 Progress Met  -GR        Long Term Goals    LTG Date to Achieve 07/11/18  -GR     LTG 1 pt. to be I with advanced HEP to facilitate self management of  their condition  -GR     LTG 1 Progress Progressing  -GR     LTG 2 pt. to report a DASH score </= 30 to demonstrate decreased level of perceived disability  -GR     LTG 2 Progress Ongoing  -GR     LTG 3 pt. to demonstrate R shoulder flexion AROM >/= 0-120 to facilitate ease of performing self care/dressing activities  -GR     LTG 3 Progress Ongoing;Progressing  -GR     LTG 4 pt. to demonstrate placing/retrieving small object from an above the shoulder level shelf with her LUE to facilitate ease of performing household/work related activities  -GR     LTG 4 Progress Met  -GR       User Key  (r) = Recorded By, (t) = Taken By, (c) = Cosigned By    Initials Name Provider Type    GR Gaurav Cortez PT Physical Therapist          Therapy Education  Education Details: posture and HEp compliance  Given: HEP, Posture/body mechanics  Program: Reinforced  How Provided: Verbal  Provided to: Patient  Level of Understanding: Teach back education performed, Verbalized              Time Calculation:   Start Time: 0945  Stop Time: 1040  Time Calculation (min): 55 min  Total Timed Code Minutes- PT: 45 minute(s)  Therapy Suggested Charges     Code   Minutes Charges    29735 (CPT®) Hc Pt Neuromusc Re Education Ea 15 Min      39922 (CPT®) Hc Pt Ther Proc Ea 15 Min 30 2    63347 (CPT®) Hc Gait Training Ea 15 Min      38211 (CPT®) Hc Pt Therapeutic Act Ea 15 Min      20328 (CPT®) Hc Pt Manual Therapy Ea 15 Min 15 1    32281 (CPT®) Hc Pt Ther Massage- Per 15 Min      61820 (CPT®) Hc Pt Iontophoresis Ea 15 Min      26932 (CPT®) Hc Pt Elec Stim Ea-Per 15 Min      66733 (CPT®) Hc Pt Ultrasound Ea 15 Min      03161 (CPT®) Hc Pt Self Care/Mgmt/Train Ea 15 Min      Total  45 3        Therapy Charges for Today     Code Description Service Date Service Provider Modifiers Qty    86569274310 HC PT THER PROC EA 15 MIN 7/27/2018 Gaurav Cortez, PT GP 2    15311795311 HC PT MANUAL THERAPY EA 15 MIN 7/27/2018 Gaurav Cortez, PT GP 1    42234209269  HC PT HOT OR COLD PACK TREAT MCARE 7/27/2018 Gaurav Cortez, PT GP 1                    Gaurav Cortez, PT  7/27/2018

## 2018-08-03 ENCOUNTER — HOSPITAL ENCOUNTER (OUTPATIENT)
Dept: PHYSICAL THERAPY | Facility: HOSPITAL | Age: 73
Setting detail: THERAPIES SERIES
Discharge: HOME | End: 2018-08-03

## 2018-08-03 DIAGNOSIS — Z47.1 AFTERCARE FOLLOWING RIGHT SHOULDER JOINT REPLACEMENT SURGERY: Primary | ICD-10-CM

## 2018-08-03 DIAGNOSIS — Z47.89 ORTHOPEDIC AFTERCARE: ICD-10-CM

## 2018-08-03 DIAGNOSIS — Z96.611 AFTERCARE FOLLOWING RIGHT SHOULDER JOINT REPLACEMENT SURGERY: Primary | ICD-10-CM

## 2018-08-03 DIAGNOSIS — Z74.09 IMPAIRED MOBILITY: ICD-10-CM

## 2018-08-03 PROCEDURE — 97110 THERAPEUTIC EXERCISES: CPT | Performed by: PHYSICAL THERAPIST

## 2018-08-03 PROCEDURE — G8985 CARRY GOAL STATUS: HCPCS | Performed by: PHYSICAL THERAPIST

## 2018-08-03 PROCEDURE — 97140 MANUAL THERAPY 1/> REGIONS: CPT | Performed by: PHYSICAL THERAPIST

## 2018-08-03 PROCEDURE — G8984 CARRY CURRENT STATUS: HCPCS | Performed by: PHYSICAL THERAPIST

## 2018-08-03 PROCEDURE — G8986 CARRY D/C STATUS: HCPCS | Performed by: PHYSICAL THERAPIST

## 2018-08-03 NOTE — THERAPY DISCHARGE NOTE
Outpatient Physical Therapy Ortho Treatment Note/Discharge Summary  [facility]     Patient Name: Rodolfo Grover  : 1945  MRN: [id number]  Today's Date: 8/3/2018      Visit Date: 2018    Visit Dx:    ICD-10-CM ICD-9-CM   1. Aftercare following right shoulder joint replacement surgery Z47.1 V54.81    Z96.611 V43.61   2. Orthopedic aftercare Z47.89 V54.9   3. Impaired mobility Z74.09 799.89       Patient Active Problem List   Diagnosis   • Acute blood loss anemia   • Arthritis   • Fracture of fifth metacarpal bone of right hand   • GI bleed   • Melena   • Closed fracture dislocation of right shoulder   • Gastroesophageal reflux disease without esophagitis   • Leukocytosis   • Hyperglycemia   • Fall at home   • DNR (do not resuscitate)   • Bradycardia following surgery   • Frequent PVCs   • Proximal humerus fracture   • Osteoarthritis        Past Medical History:   Diagnosis Date   • GERD (gastroesophageal reflux disease)    • Iritis of right eye    • Rheumatic fever    • Shoulder fracture, right         Past Surgical History:   Procedure Laterality Date   • HERNIA REPAIR     • SHOULDER CLOSED REDUCTION Right 3/16/2018    Procedure: RIGHT SHOULDER CLOSED REDUCTION;  Surgeon: Kj Garcia MD;  Location: [facility];  Service: Orthopedics   • TONSILLECTOMY     • TOTAL SHOULDER ARTHROPLASTY W/ DISTAL CLAVICLE EXCISION Right 3/22/2018    Procedure: Reverse Total Shoulder Arthroplsty;  Surgeon: Kj Garcia MD;  Location: [facility];  Service: Orthopedics             PT Ortho     Row Name 18 0900       Subjective Pain    Able to rate subjective pain? yes  -GR    Pre-Treatment Pain Level 0  -GR       Right Upper Ext    Rt Shoulder Abduction AROM 105  -GR    Rt Shoulder Flexion AROM 130  -GR    Rt Shoulder External Rotation AROM functional C7  -GR    Rt Shoulder Internal Rotation AROM functional L1  -GR       Right Shoulder (Manual Muscle Testing)    MMT: Flexion, Right Shoulder flexion   "-GR    MMT, Gross Movement: Right Shoulder Flexion (4/5) good  -GR    MMT: ABduction, Right Shoulder abduction  -GR    MMT, Gross Movement: Right Shoulder ABduction (4/5) good  -GR    MMT: Internal Rotation, Right Shoulder internal rotation  -GR    MMT, Gross Movement: Right Shoulder Internal Rotation (4/5) good  -GR    MMT: External Rotation, Right Shoulder external rotation  -GR    MMT, Gross Movement: Right Shoulder External Rotation (4-/5) good minus  -GR      User Key  (r) = Recorded By, (t) = Taken By, (c) = Cosigned By    Initials Name Provider Type    Gaurav Borges, PT Physical Therapist                            PT Assessment/Plan     Row Name 08/03/18 0945          PT Assessment    Assessment Comments Mr. Grover has attended 21 sessions of skilled PT s/p reverse R TSA April 2018.  He has progressed to functional range and improved strength appropriate for basic ADL completion and light object management. He has reached a plateau with skilled PT and is equipped with the ability to continue an independent home program. Perceived disability as per the DASH is 40% where 100% = complete disabililty and was 75% at time of IE. Thank you for this referral.d  -GR        PT Plan    PT Plan Comments D/C to I HEP.  -GR       User Key  (r) = Recorded By, (t) = Taken By, (c) = Cosigned By    Initials Name Provider Type    Gaurav Borges, PT Physical Therapist                Modalities     Row Name 08/03/18 0900             Ice    Ice Applied Yes  -GR      Location R shoulder, pt. seated, RUE rested on pillows  -GR      Rx Minutes 10 mins  -GR      Ice S/P Rx Yes  -GR        User Key  (r) = Recorded By, (t) = Taken By, (c) = Cosigned By    Initials Name Provider Type    Gaurav Borges, PT Physical Therapist                Exercises     Row Name 08/03/18 0900             Subjective Comments    Subjective Comments \"I'm just impatient.\"  -GR         Subjective Pain    Able to rate subjective pain? yes  -GR  "     Pre-Treatment Pain Level 0  -GR         Total Minutes    64158 - PT Therapeutic Exercise Minutes 30  -GR      96867 - PT Manual Therapy Minutes 10  -GR         Exercise 1    Exercise Name 1 shoulder shrugs  -GR      Cueing 1 Verbal  -GR      Sets 1 2  -GR      Reps 1 10  -GR      Additional Comments 6#  -GR         Exercise 2    Exercise Name 2 tuff stuff - rows and extension  -GR      Cueing 2 Verbal;Demo;Tactile  -GR      Sets 2 2  -GR      Reps 2 10  -GR      Additional Comments 4 plates  -GR         Exercise 3    Exercise Name 3 AROM elbow flexion/ext  -GR      Cueing 3 Demo  -GR      Sets 3 2  -GR      Reps 3 10  -GR      Additional Comments 6#  -GR         Exercise 8    Exercise Name 8 wash the wall, both hands  -GR      Cueing 8 Verbal;Demo  -GR      Reps 8 20  -GR         Exercise 9    Exercise Name 9 lat pull, tuff stuff  -GR      Cueing 9 Verbal;Demo;Tactile  -GR      Sets 9 2  -GR      Reps 9 10  -GR      Additional Comments 3 plates  -GR         Exercise 10    Exercise Name 10 wall alphabet  -GR      Cueing 10 Verbal  -GR      Reps 10 1  -GR      Additional Comments small ball  -GR         Exercise 11    Exercise Name 11 pulley, scaption  -GR      Cueing 11 Demo;Verbal  -GR      Reps 11 20  -GR         Exercise 12    Exercise Name 12 UBE  -GR      Time 12 5 min  -GR      Additional Comments L3  -GR         Exercise 13    Exercise Name 13 supine horizonantal abd  -GR      Cueing 13 Demo;Verbal;Tactile  -GR      Reps 13 20  -GR      Additional Comments BlueTB  -GR         Exercise 14    Exercise Name 14 S/L shoulder ER and abd  -GR      Cueing 14 Verbal;Demo  -GR      Sets 14 2  -GR      Reps 14 10  -GR      Additional Comments 2#  -GR        User Key  (r) = Recorded By, (t) = Taken By, (c) = Cosigned By    Initials Name Provider Type    GR Gaurav Cortez, PT Physical Therapist                        Manual Rx (last 36 hours)      Manual Treatments     Row Name 08/03/18 0900             Total  Minutes    79635 - PT Manual Therapy Minutes 10  -GR         Manual Rx 1    Manual Rx 1 Location PROM R shoulder flexion, abd  -GR      Manual Rx 1 Type gr I-II PA mob GHJ  -GR      Manual Rx 1 Duration 10 min  -GR        User Key  (r) = Recorded By, (t) = Taken By, (c) = Cosigned By    Initials Name Provider Type    Gaurav Borges, PT Physical Therapist                PT OP Goals     Row Name 08/03/18 1000          PT Short Term Goals    STG Date to Achieve 05/16/18  -GR     STG 1 pt. to be I with initial HEP to facilitate self management of their condition  -GR     STG 1 Progress Met  -GR     STG 2 pt. to be educated in/verbalize understanding of the importance of posture/ergonomics in association with their condition to facilitate self management of their condition  -GR     STG 2 Progress Met  -GR     STG 3 pt to demonstrate R shoulder PROM flexion >/= 120 to facilitate ease of performing self care activities  -GR     STG 3 Progress Met  -GR        Long Term Goals    LTG Date to Achieve 07/11/18  -GR     LTG 1 pt. to be I with advanced HEP to facilitate self management of their condition  -GR     LTG 1 Progress Met  -GR     LTG 2 pt. to report a DASH score </= 30 to demonstrate decreased level of perceived disability  -GR     LTG 2 Progress Not Met  -GR     LTG 2 Progress Comments 40%  -GR     LTG 3 pt. to demonstrate R shoulder flexion AROM >/= 0-120 to facilitate ease of performing self care/dressing activities  -GR     LTG 3 Progress Met  -GR     LTG 3 Progress Comments 0-130  -GR     LTG 4 pt. to demonstrate placing/retrieving small object from an above the shoulder level shelf with her LUE to facilitate ease of performing household/work related activities  -GR     LTG 4 Progress Met  -GR       User Key  (r) = Recorded By, (t) = Taken By, (c) = Cosigned By    Initials Name Provider Type    Gaurav Borges, PT Physical Therapist               Outcome Measure Options: Disabilities of the Arm,  Shoulder, and Hand (DASH)  DASH  Open a tight or new jar.: Unable  Write: No Difficulty  Turn a key: No Difficulty  Prepare a meal: Mild Difficulty  Push open a heavy door: Severe Difficulty  Place an object on a shelf above your head: Moderate Difficulty  Do heavy household chores (e.g., wash walls, wash floors): Moderate Difficulty  Garden or do yard work: Moderate Difficulty  Make a bed: Mild Difficulty  Carry a shopping bag or briefcase: Mild Difficulty  Carry a heavy object (over 10 lbs): Severe Difficulty  Change a lightbulb overhead: Severe Difficulty  Wash or blow dry your hair: No Difficulty  Wash your back: Moderate Difficulty  Put on a pullover sweater: Moderate Difficulty  Use a knife to cut food: Moderate Difficulty  Recreational activities in which require little effort (e.g., cardplaying, knitting, etc.): No Difficulty  Recreational activities in which you take some force or impact through your arm, should or hand (e.g. golf, hammering, tennis, etc.): Moderate Difficulty  Recreational Activities in which you move your arm freely (e.g., frisbee, badminton, etc.): Severe Difficulty  Manage transportation needs (getting from one place to another): No Difficulty  Sexual Activities: No Difficulty  During the past week, to what extent has your arm, shoulder, or hand problem interfered with your normal social activites with family, friends, neighbors or groups?: Not at all  During the past week, were you limited in your work or other regular daily activities as a result of your arm, shoulder or hand problem?: Moderately Limited  Arm, Shoulder, or hand pain: Moderate  Arm, shoulder or hand pain when you performed any specific activity: Moderate  Tingling (pins and needles) in your arm, shoulder, or hand: None  Weakness in your arm, shoulder or hand: Severe  Stiffness in your arm, shoulder or hand: Severe  During the past week, how much difficulty have you had sleeping because of the pain in your arm, shoulder  or hand?: Mild Difficulty  I feel less capable, less confident or less useful because of my arm, shoulder or hand problem: Agree  DASH Sum : 79  Number of Questions Answered: 30  DASH Score: 40.83         Time Calculation:      Therapy Suggested Charges     Code   Minutes Charges    63859 (CPT®) Hc Pt Neuromusc Re Education Ea 15 Min      99652 (CPT®) Hc Pt Ther Proc Ea 15 Min 30 2    35490 (CPT®) Hc Gait Training Ea 15 Min      47319 (CPT®) Hc Pt Therapeutic Act Ea 15 Min      94817 (CPT®) Hc Pt Manual Therapy Ea 15 Min 10 1    99134 (CPT®) Hc Pt Ther Massage- Per 15 Min      89688 (CPT®) Hc Pt Iontophoresis Ea 15 Min      99186 (CPT®) Hc Pt Elec Stim Ea-Per 15 Min      40435 (CPT®) Hc Pt Ultrasound Ea 15 Min      67890 (CPT®) Hc Pt Self Care/Mgmt/Train Ea 15 Min      Total  40 3        Therapy Charges for Today     Code Description Service Date Service Provider Modifiers Qty    22659060922 HC PT CARRY MOV HAND OBJ CURRENT 8/3/2018 Gaurav Cortez, PT GP, CJ 1    76155133586 HC PT CARRY MOV HAND OBJ PROJECTED 8/3/2018 Gaurav Cortez, PT GP, CI 1    26854751925 HC PT CARRY MOV HAND OBJ DISCHARGE 8/3/2018 Gaurav Cortez, PT GP, CJ 1    75942520567 HC PT MANUAL THERAPY EA 15 MIN 8/3/2018 Gaurav Cortez, PT GP, KX 1    03581287882 HC PT THER PROC EA 15 MIN 8/3/2018 Gaurav Cortez, PT GP, KX 2          PT G-Codes  PT Professional Judgement Used?: Yes  Outcome Measure Options: Disabilities of the Arm, Shoulder, and Hand (DASH)  Score: 40%  Functional Limitation: Carrying, moving and handling objects  Carrying, Moving and Handling Objects Current Status (): At least 20 percent but less than 40 percent impaired, limited or restricted  Carrying, Moving and Handling Objects Goal Status (): At least 1 percent but less than 20 percent impaired, limited or restricted  Carrying, Moving and Handling Objects Discharge Status (): At least 20 percent but less than 40 percent impaired, limited or  restricted     OP PT Discharge Summary  Date of Discharge: 08/03/18  Reason for Discharge: Maximum functional potential achieved  Outcomes Achieved: Patient able to partially acheive established goals  Discharge Destination: Home with home program      Gaurav Cortez, PT  8/3/2018

## 2018-08-08 ENCOUNTER — OFFICE VISIT (OUTPATIENT)
Dept: ORTHOPEDIC SURGERY | Facility: CLINIC | Age: 73
End: 2018-08-08

## 2018-08-08 VITALS — BODY MASS INDEX: 27.47 KG/M2 | HEIGHT: 67 IN | WEIGHT: 175 LBS

## 2018-08-08 DIAGNOSIS — Z96.611 S/P SHOULDER REPLACEMENT, RIGHT: Primary | ICD-10-CM

## 2018-08-08 PROCEDURE — 73030 X-RAY EXAM OF SHOULDER: CPT | Performed by: ORTHOPAEDIC SURGERY

## 2018-08-08 PROCEDURE — 99212 OFFICE O/P EST SF 10 MIN: CPT | Performed by: ORTHOPAEDIC SURGERY

## 2018-08-08 NOTE — PROGRESS NOTES
"Rodolfo Grover : 1945 MRN: [id number] DATE: 2018    DIAGNOSIS: 4 month follow up right shoulder arthroplasty      SUBJECTIVE:  Patient returns today for 3 month follow up of right shoulder replacement. Patient reports doing well with no unusual complaints.  Denies any pain.  He is happy with his progress.    OBJECTIVE:    Ht 170.2 cm (67\")   Wt 79.4 kg (175 lb)   BMI 27.41 kg/m²     Review of Systems negative except as above    Exam:. The incision is well healed. No effusion.  Range of motion is measured at °, external rotation 30°, internal rotation to back pocket. The arm is soft and nontender.  Good strength with elevation and abduction.  Sensation intact distally.  Brisk cap refill.    DIAGNOSTIC STUDIES    Xrays: 2 views of the right shoulder (AP, scapular Y) were ordered and reviewed for evaluation of shoulder replacement. They demonstrate a well positioned, well aligned replacement without complicating factors noted. In comparison with previous films there has been no change.    ASSESSMENT: 4 month follow up right shoulder replacement.    PLAN:   1. Continue activities as tolerated.  2.  Continue PT per protocol.  3.  I explained that one can expect continued improvement in motion, function, and any residual discomfort for up to 1 year after surgery.  4.  Follow up at 1 year unless experiencing any new or concerning symptoms.    Kj Garcia MD    "

## 2018-08-15 ENCOUNTER — HOSPITAL ENCOUNTER (OUTPATIENT)
Facility: HOSPITAL | Age: 73
Discharge: HOME | End: 2018-08-17
Attending: EMERGENCY MEDICINE | Admitting: NURSE PRACTITIONER

## 2018-08-15 DIAGNOSIS — K92.2 GASTROINTESTINAL HEMORRHAGE, UNSPECIFIED GASTROINTESTINAL HEMORRHAGE TYPE: Primary | ICD-10-CM

## 2018-08-15 DIAGNOSIS — K92.1 MELENA: ICD-10-CM

## 2018-08-15 LAB
ABO GROUP BLD: NORMAL
ALBUMIN SERPL-MCNC: 3.8 G/DL (ref 3.5–5.2)
ALBUMIN/GLOB SERPL: 1.4 G/DL
ALP SERPL-CCNC: 68 U/L (ref 39–117)
ALT SERPL W P-5'-P-CCNC: 15 U/L (ref 1–41)
ANION GAP SERPL CALCULATED.3IONS-SCNC: 10.8 MMOL/L
AST SERPL-CCNC: 14 U/L (ref 1–40)
BASOPHILS # BLD AUTO: 0.02 10*3/MM3 (ref 0–0.2)
BASOPHILS NFR BLD AUTO: 0.2 % (ref 0–1.5)
BILIRUB SERPL-MCNC: 0.2 MG/DL (ref 0.1–1.2)
BLD GP AB SCN SERPL QL: NEGATIVE
BUN BLD-MCNC: 18 MG/DL (ref 8–23)
BUN/CREAT SERPL: 15.8 (ref 7–25)
CALCIUM SPEC-SCNC: 8.7 MG/DL (ref 8.6–10.5)
CHLORIDE SERPL-SCNC: 104 MMOL/L (ref 98–107)
CO2 SERPL-SCNC: 29.2 MMOL/L (ref 22–29)
CREAT BLD-MCNC: 1.14 MG/DL (ref 0.76–1.27)
DEPRECATED RDW RBC AUTO: 48.6 FL (ref 37–54)
EOSINOPHIL # BLD AUTO: 0.24 10*3/MM3 (ref 0–0.7)
EOSINOPHIL NFR BLD AUTO: 2.5 % (ref 0.3–6.2)
ERYTHROCYTE [DISTWIDTH] IN BLOOD BY AUTOMATED COUNT: 13.5 % (ref 11.5–14.5)
GFR SERPL CREATININE-BSD FRML MDRD: 63 ML/MIN/1.73
GLOBULIN UR ELPH-MCNC: 2.7 GM/DL
GLUCOSE BLD-MCNC: 132 MG/DL (ref 65–99)
HCT VFR BLD AUTO: 39.4 % (ref 40.4–52.2)
HGB BLD-MCNC: 12.5 G/DL (ref 13.7–17.6)
IMM GRANULOCYTES # BLD: 0.14 10*3/MM3 (ref 0–0.03)
IMM GRANULOCYTES NFR BLD: 1.4 % (ref 0–0.5)
INR PPP: 1.11 (ref 0.9–1.1)
LYMPHOCYTES # BLD AUTO: 2.16 10*3/MM3 (ref 0.9–4.8)
LYMPHOCYTES NFR BLD AUTO: 22.2 % (ref 19.6–45.3)
MCH RBC QN AUTO: 31.4 PG (ref 27–32.7)
MCHC RBC AUTO-ENTMCNC: 31.7 G/DL (ref 32.6–36.4)
MCV RBC AUTO: 99 FL (ref 79.8–96.2)
MONOCYTES # BLD AUTO: 0.75 10*3/MM3 (ref 0.2–1.2)
MONOCYTES NFR BLD AUTO: 7.7 % (ref 5–12)
NEUTROPHILS # BLD AUTO: 6.54 10*3/MM3 (ref 1.9–8.1)
NEUTROPHILS NFR BLD AUTO: 67.4 % (ref 42.7–76)
PLATELET # BLD AUTO: 200 10*3/MM3 (ref 140–500)
PMV BLD AUTO: 10.1 FL (ref 6–12)
POTASSIUM BLD-SCNC: 3.9 MMOL/L (ref 3.5–5.2)
PROT SERPL-MCNC: 6.5 G/DL (ref 6–8.5)
PROTHROMBIN TIME: 14.1 SECONDS (ref 11.7–14.2)
RBC # BLD AUTO: 3.98 10*6/MM3 (ref 4.6–6)
RH BLD: POSITIVE
SODIUM BLD-SCNC: 144 MMOL/L (ref 136–145)
T&S EXPIRATION DATE: NORMAL
WBC NRBC COR # BLD: 9.71 10*3/MM3 (ref 4.5–10.7)

## 2018-08-15 PROCEDURE — 96374 THER/PROPH/DIAG INJ IV PUSH: CPT

## 2018-08-15 PROCEDURE — 85610 PROTHROMBIN TIME: CPT | Performed by: NURSE PRACTITIONER

## 2018-08-15 PROCEDURE — 80053 COMPREHEN METABOLIC PANEL: CPT | Performed by: NURSE PRACTITIONER

## 2018-08-15 PROCEDURE — 99284 EMERGENCY DEPT VISIT MOD MDM: CPT

## 2018-08-15 PROCEDURE — 86901 BLOOD TYPING SEROLOGIC RH(D): CPT | Performed by: NURSE PRACTITIONER

## 2018-08-15 PROCEDURE — 85025 COMPLETE CBC W/AUTO DIFF WBC: CPT | Performed by: NURSE PRACTITIONER

## 2018-08-15 PROCEDURE — 96360 HYDRATION IV INFUSION INIT: CPT

## 2018-08-15 PROCEDURE — 86850 RBC ANTIBODY SCREEN: CPT | Performed by: NURSE PRACTITIONER

## 2018-08-15 PROCEDURE — 36415 COLL VENOUS BLD VENIPUNCTURE: CPT

## 2018-08-15 PROCEDURE — 85014 HEMATOCRIT: CPT | Performed by: INTERNAL MEDICINE

## 2018-08-15 PROCEDURE — G0378 HOSPITAL OBSERVATION PER HR: HCPCS

## 2018-08-15 PROCEDURE — 86900 BLOOD TYPING SEROLOGIC ABO: CPT | Performed by: NURSE PRACTITIONER

## 2018-08-15 PROCEDURE — 85018 HEMOGLOBIN: CPT | Performed by: INTERNAL MEDICINE

## 2018-08-15 RX ORDER — HYDROCODONE BITARTRATE AND ACETAMINOPHEN 7.5; 325 MG/1; MG/1
1 TABLET ORAL EVERY 4 HOURS PRN
Status: DISCONTINUED | OUTPATIENT
Start: 2018-08-15 | End: 2018-08-17 | Stop reason: HOSPADM

## 2018-08-15 RX ORDER — ASPIRIN 325 MG
325 TABLET ORAL AS NEEDED
COMMUNITY
End: 2018-08-17 | Stop reason: HOSPADM

## 2018-08-15 RX ORDER — SODIUM CHLORIDE 0.9 % (FLUSH) 0.9 %
1-10 SYRINGE (ML) INJECTION AS NEEDED
Status: DISCONTINUED | OUTPATIENT
Start: 2018-08-15 | End: 2018-08-17 | Stop reason: HOSPADM

## 2018-08-15 RX ORDER — PANTOPRAZOLE SODIUM 40 MG/10ML
40 INJECTION, POWDER, LYOPHILIZED, FOR SOLUTION INTRAVENOUS EVERY 12 HOURS SCHEDULED
Status: DISCONTINUED | OUTPATIENT
Start: 2018-08-15 | End: 2018-08-16

## 2018-08-15 RX ORDER — ACETAMINOPHEN 325 MG/1
650 TABLET ORAL EVERY 4 HOURS PRN
Status: DISCONTINUED | OUTPATIENT
Start: 2018-08-15 | End: 2018-08-17 | Stop reason: HOSPADM

## 2018-08-15 RX ADMIN — PANTOPRAZOLE SODIUM 40 MG: 40 INJECTION, POWDER, FOR SOLUTION INTRAVENOUS at 21:28

## 2018-08-15 RX ADMIN — SODIUM CHLORIDE 1000 ML: 9 INJECTION, SOLUTION INTRAVENOUS at 14:58

## 2018-08-15 NOTE — ED PROVIDER NOTES
EMERGENCY DEPARTMENT ENCOUNTER    CHIEF COMPLAINT  Chief Complaint: Black stools  History given by: Patient  History limited by: Nothing  Time Seen: 1425  Room Number: 29/29  PMD: Domenica Merida MD      HPI:  Pt is a 73 y.o. male who presents with dark stools and fatigue ×2 days.  Patient denies abdominal pain, back pain, fever, chills, nausea, vomiting, diarrhea, constipation or any other concerns.  Patient states that he had similar symptoms 2 years ago was diagnosed with an ulcer secondary to Indocin and aspirin use.    Duration: x 2 days  Location:rectal  Radiation:denies  Quality:dark stools  Intensity/Severity:mild  Progression:consistent  Associated Symptoms:fatigue  Aggravating Factors:denies  Alleviating Factors:denies  Previous Episodes:ulcer 4 years ago  Treatment before arrival:none    PAST MEDICAL HISTORY  Active Ambulatory Problems     Diagnosis Date Noted   • Acute blood loss anemia 10/07/2014   • Arthritis 10/07/2014   • Fracture of fifth metacarpal bone of right hand 01/22/2016   • GI bleed 10/07/2014   • Melena 09/12/2014   • Closed fracture dislocation of right shoulder 03/16/2018   • Gastroesophageal reflux disease without esophagitis 03/16/2018   • Leukocytosis 03/16/2018   • Hyperglycemia 03/16/2018   • Fall at home 03/16/2018   • DNR (do not resuscitate) 03/16/2018   • Bradycardia following surgery 03/17/2018   • Frequent PVCs 03/17/2018   • Proximal humerus fracture 03/22/2018   • Osteoarthritis 03/22/2018     Resolved Ambulatory Problems     Diagnosis Date Noted   • No Resolved Ambulatory Problems     Past Medical History:   Diagnosis Date   • GERD (gastroesophageal reflux disease)    • Iritis of right eye    • Rheumatic fever    • Shoulder fracture, right        PAST SURGICAL HISTORY  Past Surgical History:   Procedure Laterality Date   • HERNIA REPAIR     • SHOULDER CLOSED REDUCTION Right 3/16/2018    Procedure: RIGHT SHOULDER CLOSED REDUCTION;  Surgeon: Kj Garcia MD;  Location:   [facility];  Service: Orthopedics   • TONSILLECTOMY     • TOTAL SHOULDER ARTHROPLASTY W/ DISTAL CLAVICLE EXCISION Right 3/22/2018    Procedure: Reverse Total Shoulder Arthroplsty;  Surgeon: Kj Garcia MD;  Location:  [facility];  Service: Orthopedics       FAMILY HISTORY  Family History   Problem Relation Age of Onset   • Malig Hyperthermia Neg Hx        SOCIAL HISTORY  Social History     Social History   • Marital status:      Spouse name: N/A   • Number of children: N/A   • Years of education: N/A     Occupational History   • Not on file.     Social History Main Topics   • Smoking status: Never Smoker   • Smokeless tobacco: Never Used   • Alcohol use Yes      Comment: occasional   • Drug use: No   • Sexual activity: Defer     Other Topics Concern   • Not on file     Social History Narrative   • No narrative on file         ALLERGIES  Patient has no known allergies.    REVIEW OF SYSTEMS  Review of Systems   Constitutional: Negative.  Negative for activity change, appetite change ( decreased), chills and fever.   HENT: Negative for congestion, ear pain, rhinorrhea, sinus pressure and sore throat.    Eyes: Negative.    Respiratory: Negative.  Negative for cough and shortness of breath.    Cardiovascular: Negative.  Negative for chest pain, palpitations and leg swelling ( pedal).   Gastrointestinal: Positive for blood in stool. Negative for abdominal pain, diarrhea, nausea and vomiting.   Endocrine: Negative.    Genitourinary: Negative.  Negative for decreased urine volume, difficulty urinating, dysuria, frequency and urgency.   Musculoskeletal: Negative.  Negative for back pain.   Skin: Negative.  Negative for rash.   Allergic/Immunologic: Negative.    Neurological: Negative.  Negative for dizziness, weakness, light-headedness, numbness and headaches.   Hematological: Negative.    Psychiatric/Behavioral: Negative.  The patient is not nervous/anxious.    All other systems reviewed and are  negative.      PHYSICAL EXAM  ED Triage Vitals [08/15/18 1417]   Temp Heart Rate Resp BP SpO2   98 °F (36.7 °C) 79 18 -- 98 %      Temp src Heart Rate Source Patient Position BP Location FiO2 (%)   Tympanic -- -- -- --       Physical Exam   Constitutional: He is well-developed, well-nourished, and in no distress. No distress.   HENT:   Head: Normocephalic.   Mouth/Throat: Oropharynx is clear and moist and mucous membranes are normal.   Eyes: Pupils are equal, round, and reactive to light.   Neck: Normal range of motion.   Cardiovascular: Normal rate, regular rhythm and normal heart sounds.    Pulmonary/Chest: Effort normal and breath sounds normal. He has no wheezes.   Abdominal: Soft. Bowel sounds are normal. There is no tenderness.   Genitourinary: Rectal exam shows guaiac positive stool.   Musculoskeletal: Normal range of motion. He exhibits no edema.   Neurological: He is alert.   Skin: Skin is warm and dry. No rash noted.   Psychiatric: Mood, memory, affect and judgment normal.   Nursing note and vitals reviewed.      LAB RESULTS  Recent Results (from the past 24 hour(s))   Comprehensive Metabolic Panel    Collection Time: 08/15/18  2:52 PM   Result Value Ref Range    Glucose 132 (H) 65 - 99 mg/dL    BUN 18 8 - 23 mg/dL    Creatinine 1.14 0.76 - 1.27 mg/dL    Sodium 144 136 - 145 mmol/L    Potassium 3.9 3.5 - 5.2 mmol/L    Chloride 104 98 - 107 mmol/L    CO2 29.2 (H) 22.0 - 29.0 mmol/L    Calcium 8.7 8.6 - 10.5 mg/dL    Total Protein 6.5 6.0 - 8.5 g/dL    Albumin 3.80 3.50 - 5.20 g/dL    ALT (SGPT) 15 1 - 41 U/L    AST (SGOT) 14 1 - 40 U/L    Alkaline Phosphatase 68 39 - 117 U/L    Total Bilirubin 0.2 0.1 - 1.2 mg/dL    eGFR Non African Amer 63 >60 mL/min/1.73    Globulin 2.7 gm/dL    A/G Ratio 1.4 g/dL    BUN/Creatinine Ratio 15.8 7.0 - 25.0    Anion Gap 10.8 mmol/L   CBC Auto Differential    Collection Time: 08/15/18  2:52 PM   Result Value Ref Range    WBC 9.71 4.50 - 10.70 10*3/mm3    RBC 3.98 (L) 4.60 -  "6.00 10*6/mm3    Hemoglobin 12.5 (L) 13.7 - 17.6 g/dL    Hematocrit 39.4 (L) 40.4 - 52.2 %    MCV 99.0 (H) 79.8 - 96.2 fL    MCH 31.4 27.0 - 32.7 pg    MCHC 31.7 (L) 32.6 - 36.4 g/dL    RDW 13.5 11.5 - 14.5 %    RDW-SD 48.6 37.0 - 54.0 fl    MPV 10.1 6.0 - 12.0 fL    Platelets 200 140 - 500 10*3/mm3    Neutrophil % 67.4 42.7 - 76.0 %    Lymphocyte % 22.2 19.6 - 45.3 %    Monocyte % 7.7 5.0 - 12.0 %    Eosinophil % 2.5 0.3 - 6.2 %    Basophil % 0.2 0.0 - 1.5 %    Immature Grans % 1.4 (H) 0.0 - 0.5 %    Neutrophils, Absolute 6.54 1.90 - 8.10 10*3/mm3    Lymphocytes, Absolute 2.16 0.90 - 4.80 10*3/mm3    Monocytes, Absolute 0.75 0.20 - 1.20 10*3/mm3    Eosinophils, Absolute 0.24 0.00 - 0.70 10*3/mm3    Basophils, Absolute 0.02 0.00 - 0.20 10*3/mm3    Immature Grans, Absolute 0.14 (H) 0.00 - 0.03 10*3/mm3   Protime-INR    Collection Time: 08/15/18  2:52 PM   Result Value Ref Range    Protime 14.1 11.7 - 14.2 Seconds    INR 1.11 (H) 0.90 - 1.10       I ordered the above labs and reviewed the results    PROGRESS AND CONSULTS    Reviewed pt's history and workup with Dr. Soler.   After a bedside evaluation; Dr Soler agrees with the plan of care    CONSULTS  Time: 1600  Discussed case with Dr. Romero  Reviewed history, exam, results and treatments.  Discussed concerns and plan of care.  Dr. Romero accepts pt to be admitted to med/surg      COURSE & MEDICAL DECISION MAKING  Pertinent Labs and Imaging studies that were ordered and reviewed are noted above.  Results were reviewed/discussed with the patient and they were also made aware of online assess.   Pt also made aware that some labs, such as cultures, will not be resulted during ER visit and follow up with PMD is necessary.     MEDICATIONS GIVEN IN ER  Medications   sodium chloride 0.9 % bolus 1,000 mL (1,000 mL Intravenous New Bag 8/15/18 1458)       /74   Pulse 64   Temp 98 °F (36.7 °C) (Tympanic)   Resp 18   Ht 170.2 cm (67\")   Wt 81.6 kg (180 lb)   SpO2 96%  "  BMI 28.19 kg/m²     ADMISSION    Discussed treatment plan and reason for admission with pt/family and admitting physician.  Pt/family voiced understanding of the plan for admission for further testing/treatment as needed.      DIAGNOSIS  Final diagnoses:   Gastrointestinal hemorrhage, unspecified gastrointestinal hemorrhage type        Brandy Duncan, APRN  08/15/18 1607

## 2018-08-15 NOTE — ED PROVIDER NOTES
MD ATTESTATION NOTE    Pt with a history of gastric ulcers, presents to the ED complaining of fatigue and dark stools for the last 2 days. Pt's hemoglobin has decreased to 12.5 from 14.2 one month ago. On exam, the pt's abdomen is soft and non-tender. Pt had heme positive stool on rectal exam performed by ALFONSO Duncan.  I agree with the plan to admit the pt for further evaluation.     The MANDI and I have discussed this patient's history, physical exam, and treatment plan.  I have reviewed the documentation and personally had a face to face interaction with the patient. I affirm the documentation and agree with the treatment and plan.  The attached note describes my personal findings.    Documentation assistance provided by rohini Mena for Dr. Soler.  Information recorded by the rohini was done at my direction and has been verified and validated by me.       Tiffany Mena  08/15/18 2067       Vern Soler MD  08/15/18 9993

## 2018-08-15 NOTE — PLAN OF CARE
Problem: Patient Care Overview  Goal: Plan of Care Review  Outcome: Ongoing (interventions implemented as appropriate)   08/15/18 7717   Coping/Psychosocial   Plan of Care Reviewed With patient   Plan of Care Review   Progress no change   OTHER   Outcome Summary Pt admitted from ER with dx of GI bleed. Per pt he noted black stools today and knew he needed to have it evaluated. Up ad yaritza. Wallet and money sent to security. Awaiting admission orders. Will continue to monitor     Goal: Individualization and Mutuality  Outcome: Ongoing (interventions implemented as appropriate)      Problem: Gastrointestinal Bleeding (Adult)  Goal: Signs and Symptoms of Listed Potential Problems Will be Absent, Minimized or Managed (Gastrointestinal Bleeding)  Outcome: Ongoing (interventions implemented as appropriate)

## 2018-08-16 ENCOUNTER — ANESTHESIA EVENT (OUTPATIENT)
Dept: GASTROENTEROLOGY | Facility: HOSPITAL | Age: 73
End: 2018-08-16

## 2018-08-16 ENCOUNTER — ANESTHESIA (OUTPATIENT)
Dept: GASTROENTEROLOGY | Facility: HOSPITAL | Age: 73
End: 2018-08-16

## 2018-08-16 ENCOUNTER — HOSPITAL ENCOUNTER (OUTPATIENT)
Facility: HOSPITAL | Age: 73
Setting detail: HOSPITAL OUTPATIENT SURGERY
End: 2018-08-16
Attending: INTERNAL MEDICINE | Admitting: INTERNAL MEDICINE

## 2018-08-16 PROBLEM — K29.01 GASTROINTESTINAL HEMORRHAGE ASSOCIATED WITH ACUTE GASTRITIS: Status: ACTIVE | Noted: 2018-08-15

## 2018-08-16 LAB
ALBUMIN SERPL-MCNC: 3.5 G/DL (ref 3.5–5.2)
ALBUMIN/GLOB SERPL: 1.3 G/DL
ALP SERPL-CCNC: 66 U/L (ref 39–117)
ALT SERPL W P-5'-P-CCNC: 15 U/L (ref 1–41)
ANION GAP SERPL CALCULATED.3IONS-SCNC: 10.9 MMOL/L
AST SERPL-CCNC: 14 U/L (ref 1–40)
BASOPHILS # BLD AUTO: 0.04 10*3/MM3 (ref 0–0.2)
BASOPHILS NFR BLD AUTO: 0.5 % (ref 0–1.5)
BILIRUB SERPL-MCNC: 0.5 MG/DL (ref 0.1–1.2)
BUN BLD-MCNC: 14 MG/DL (ref 8–23)
BUN/CREAT SERPL: 14 (ref 7–25)
CALCIUM SPEC-SCNC: 8.4 MG/DL (ref 8.6–10.5)
CHLORIDE SERPL-SCNC: 107 MMOL/L (ref 98–107)
CO2 SERPL-SCNC: 26.1 MMOL/L (ref 22–29)
CREAT BLD-MCNC: 1 MG/DL (ref 0.76–1.27)
D-LACTATE SERPL-SCNC: 1.3 MMOL/L (ref 0.5–2)
DEPRECATED RDW RBC AUTO: 48 FL (ref 37–54)
EOSINOPHIL # BLD AUTO: 0.31 10*3/MM3 (ref 0–0.7)
EOSINOPHIL NFR BLD AUTO: 4.1 % (ref 0.3–6.2)
ERYTHROCYTE [DISTWIDTH] IN BLOOD BY AUTOMATED COUNT: 13.4 % (ref 11.5–14.5)
GFR SERPL CREATININE-BSD FRML MDRD: 73 ML/MIN/1.73
GLOBULIN UR ELPH-MCNC: 2.6 GM/DL
GLUCOSE BLD-MCNC: 97 MG/DL (ref 65–99)
HBA1C MFR BLD: 5.5 % (ref 4.8–5.6)
HCT VFR BLD AUTO: 38.6 % (ref 40.4–52.2)
HCT VFR BLD AUTO: 40.4 % (ref 40.4–52.2)
HCT VFR BLD AUTO: 40.4 % (ref 40.4–52.2)
HCT VFR BLD AUTO: 42.3 % (ref 40.4–52.2)
HGB BLD-MCNC: 11.9 G/DL (ref 13.7–17.6)
HGB BLD-MCNC: 12.6 G/DL (ref 13.7–17.6)
HGB BLD-MCNC: 12.6 G/DL (ref 13.7–17.6)
HGB BLD-MCNC: 13.4 G/DL (ref 13.7–17.6)
IMM GRANULOCYTES # BLD: 0.08 10*3/MM3 (ref 0–0.03)
IMM GRANULOCYTES NFR BLD: 1.1 % (ref 0–0.5)
LYMPHOCYTES # BLD AUTO: 1.62 10*3/MM3 (ref 0.9–4.8)
LYMPHOCYTES NFR BLD AUTO: 21.7 % (ref 19.6–45.3)
MCH RBC QN AUTO: 30.8 PG (ref 27–32.7)
MCHC RBC AUTO-ENTMCNC: 31.2 G/DL (ref 32.6–36.4)
MCV RBC AUTO: 98.8 FL (ref 79.8–96.2)
MONOCYTES # BLD AUTO: 0.61 10*3/MM3 (ref 0.2–1.2)
MONOCYTES NFR BLD AUTO: 8.2 % (ref 5–12)
NEUTROPHILS # BLD AUTO: 4.81 10*3/MM3 (ref 1.9–8.1)
NEUTROPHILS NFR BLD AUTO: 64.4 % (ref 42.7–76)
PLATELET # BLD AUTO: 197 10*3/MM3 (ref 140–500)
PMV BLD AUTO: 10.3 FL (ref 6–12)
POTASSIUM BLD-SCNC: 3.8 MMOL/L (ref 3.5–5.2)
PROT SERPL-MCNC: 6.1 G/DL (ref 6–8.5)
RBC # BLD AUTO: 4.09 10*6/MM3 (ref 4.6–6)
SODIUM BLD-SCNC: 144 MMOL/L (ref 136–145)
TROPONIN T SERPL-MCNC: <0.01 NG/ML (ref 0–0.03)
TSH SERPL DL<=0.05 MIU/L-ACNC: 4.96 MIU/ML (ref 0.27–4.2)
WBC NRBC COR # BLD: 7.47 10*3/MM3 (ref 4.5–10.7)

## 2018-08-16 PROCEDURE — 88305 TISSUE EXAM BY PATHOLOGIST: CPT | Performed by: INTERNAL MEDICINE

## 2018-08-16 PROCEDURE — 85014 HEMATOCRIT: CPT | Performed by: INTERNAL MEDICINE

## 2018-08-16 PROCEDURE — 83605 ASSAY OF LACTIC ACID: CPT | Performed by: INTERNAL MEDICINE

## 2018-08-16 PROCEDURE — 96376 TX/PRO/DX INJ SAME DRUG ADON: CPT

## 2018-08-16 PROCEDURE — 88312 SPECIAL STAINS GROUP 1: CPT | Performed by: INTERNAL MEDICINE

## 2018-08-16 PROCEDURE — 84484 ASSAY OF TROPONIN QUANT: CPT | Performed by: INTERNAL MEDICINE

## 2018-08-16 PROCEDURE — G0378 HOSPITAL OBSERVATION PER HR: HCPCS

## 2018-08-16 PROCEDURE — 85025 COMPLETE CBC W/AUTO DIFF WBC: CPT | Performed by: INTERNAL MEDICINE

## 2018-08-16 PROCEDURE — A9270 NON-COVERED ITEM OR SERVICE: HCPCS | Performed by: INTERNAL MEDICINE

## 2018-08-16 PROCEDURE — 25010000002 PROPOFOL 10 MG/ML EMULSION: Performed by: ANESTHESIOLOGY

## 2018-08-16 PROCEDURE — 43239 EGD BIOPSY SINGLE/MULTIPLE: CPT | Performed by: INTERNAL MEDICINE

## 2018-08-16 PROCEDURE — 83036 HEMOGLOBIN GLYCOSYLATED A1C: CPT | Performed by: INTERNAL MEDICINE

## 2018-08-16 PROCEDURE — 99203 OFFICE O/P NEW LOW 30 MIN: CPT | Performed by: INTERNAL MEDICINE

## 2018-08-16 PROCEDURE — 63710000001 PANTOPRAZOLE 40 MG TABLET DELAYED-RELEASE: Performed by: INTERNAL MEDICINE

## 2018-08-16 PROCEDURE — 80053 COMPREHEN METABOLIC PANEL: CPT | Performed by: INTERNAL MEDICINE

## 2018-08-16 PROCEDURE — 85018 HEMOGLOBIN: CPT | Performed by: INTERNAL MEDICINE

## 2018-08-16 PROCEDURE — 84443 ASSAY THYROID STIM HORMONE: CPT | Performed by: INTERNAL MEDICINE

## 2018-08-16 RX ORDER — SODIUM CHLORIDE 0.9 % (FLUSH) 0.9 %
3 SYRINGE (ML) INJECTION AS NEEDED
Status: DISCONTINUED | OUTPATIENT
Start: 2018-08-16 | End: 2018-08-16

## 2018-08-16 RX ORDER — LIDOCAINE HYDROCHLORIDE 20 MG/ML
INJECTION, SOLUTION INFILTRATION; PERINEURAL AS NEEDED
Status: DISCONTINUED | OUTPATIENT
Start: 2018-08-16 | End: 2018-08-16 | Stop reason: SURG

## 2018-08-16 RX ORDER — PROPOFOL 10 MG/ML
VIAL (ML) INTRAVENOUS AS NEEDED
Status: DISCONTINUED | OUTPATIENT
Start: 2018-08-16 | End: 2018-08-16 | Stop reason: SURG

## 2018-08-16 RX ORDER — SODIUM CHLORIDE, SODIUM LACTATE, POTASSIUM CHLORIDE, CALCIUM CHLORIDE 600; 310; 30; 20 MG/100ML; MG/100ML; MG/100ML; MG/100ML
1000 INJECTION, SOLUTION INTRAVENOUS CONTINUOUS
Status: DISCONTINUED | OUTPATIENT
Start: 2018-08-16 | End: 2018-08-16

## 2018-08-16 RX ORDER — PANTOPRAZOLE SODIUM 40 MG/1
40 TABLET, DELAYED RELEASE ORAL
Status: DISCONTINUED | OUTPATIENT
Start: 2018-08-16 | End: 2018-08-17 | Stop reason: HOSPADM

## 2018-08-16 RX ADMIN — SODIUM CHLORIDE, POTASSIUM CHLORIDE, SODIUM LACTATE AND CALCIUM CHLORIDE 1000 ML: 600; 310; 30; 20 INJECTION, SOLUTION INTRAVENOUS at 18:37

## 2018-08-16 RX ADMIN — PANTOPRAZOLE SODIUM 40 MG: 40 INJECTION, POWDER, FOR SOLUTION INTRAVENOUS at 08:22

## 2018-08-16 RX ADMIN — LIDOCAINE HYDROCHLORIDE 40 MG: 20 INJECTION, SOLUTION INFILTRATION; PERINEURAL at 15:34

## 2018-08-16 RX ADMIN — SODIUM CHLORIDE, POTASSIUM CHLORIDE, SODIUM LACTATE AND CALCIUM CHLORIDE 1000 ML: 600; 310; 30; 20 INJECTION, SOLUTION INTRAVENOUS at 13:52

## 2018-08-16 RX ADMIN — PROPOFOL 150 MG: 10 INJECTION, EMULSION INTRAVENOUS at 15:34

## 2018-08-16 RX ADMIN — PANTOPRAZOLE SODIUM 40 MG: 40 TABLET, DELAYED RELEASE ORAL at 17:30

## 2018-08-16 RX ADMIN — SODIUM CHLORIDE, POTASSIUM CHLORIDE, SODIUM LACTATE AND CALCIUM CHLORIDE: 600; 310; 30; 20 INJECTION, SOLUTION INTRAVENOUS at 15:34

## 2018-08-16 NOTE — CONSULTS
[facility]  Initial Inpatient Consult Note    Referring Provider: Dr. SHAYY Grover    Reason for Consultation: melena, h/o gastritis in past    Subjective     History of present illness:    73 y.o. male who was admitted 8/15/2018 with a 2 day history of darker bowel movements.  His stool was found to be heme positive in the emergency room.  The patient has no abdominal pain or chest pain.  He has no nausea or vomiting.  He does have a history of gastroesophageal reflux disease and does take one over-the-counter Nexium tablet per day.  He has no rectal bleeding.  He's had no diarrhea or constipation.  The patient has been on aspirin 3-4 at a time a few times a day for arthritis.  4-years ago (10/14) the patient was on Indocin and had similar complaints.  He went to [facility] at that time and Dr. Edmund Hall did an EGD that showed irritation in the stomach.  He also did a colonoscopy.  Patient does have a history of colon polyps.  Dr. Edmund Hall told him to come back for another colonoscopy in approximately 3 years.  Patient states he is it is time for him to have another colonoscopy.  The patient is a nonsmoker.  He denies alcohol use.  Patient is .  He has 2 children.  His weight is increasing.  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  He has no dysphagia.    Past Medical History:  Past Medical History:   Diagnosis Date   • GERD (gastroesophageal reflux disease)    • Iritis of right eye    • Rheumatic fever    • Shoulder fracture, right      Past Surgical History:  Past Surgical History:   Procedure Laterality Date   • HERNIA REPAIR     • SHOULDER CLOSED REDUCTION Right 3/16/2018    Procedure: RIGHT SHOULDER CLOSED REDUCTION;  Surgeon: Kj Garcia MD;  Location: [facility];  Service: Orthopedics   • TONSILLECTOMY     • TOTAL SHOULDER ARTHROPLASTY W/ DISTAL CLAVICLE EXCISION Right 3/22/2018    Procedure: Reverse Total Shoulder Arthroplsty;  Surgeon: Kj Garcia  MD;  Location: [facility];  Service: Orthopedics      Social History:   Social History   Substance Use Topics   • Smoking status: Never Smoker   • Smokeless tobacco: Never Used   • Alcohol use Yes      Comment: occasional      Family History:  Family History   Problem Relation Age of Onset   • Malig Hyperthermia Neg Hx        Home Meds:  Prescriptions Prior to Admission   Medication Sig Dispense Refill Last Dose   • aspirin 325 MG tablet Take 325 mg by mouth As Needed for Mild Pain .   Past Week at Unknown time   • esomeprazole (nexIUM) 20 MG capsule Take 1 capsule by mouth Every Morning Before Breakfast. 90 capsule 1 8/15/2018 at Unknown time     Current Meds:     pantoprazole 40 mg Intravenous Q12H     Allergies:  No Known Allergies  Review of Systems  The following systems were reviewed and negative;  respiratory, cardiovascular, musculoskeletal and neurological     Objective     Vital Signs  Temp:  [96.7 °F (35.9 °C)-98.7 °F (37.1 °C)] 97.3 °F (36.3 °C)  Heart Rate:  [64-79] 74  Resp:  [16-20] 18  BP: (126-151)/(55-78) 149/77  Physical Exam:  General Appearance:    Alert, cooperative, in no acute distress   Head:    Normocephalic, without obvious abnormality, atraumatic   Eyes:            Lids and lashes normal, conjunctivae and sclerae normal, no   icterus   Throat:   No oral lesions, no thrush, oral mucosa moist   Neck:   No adenopathy, supple, trachea midline, no thyromegaly, no   carotid bruit, no JVD   Lungs:     Clear to auscultation,respirations regular, even and                   unlabored    Heart:    Regular rhythm and normal rate, normal S1 and S2, no            murmur, no gallop, no rub, no click   Chest Wall:    No abnormalities observed   Abdomen:     Normal bowel sounds, no masses, no organomegaly, soft        non-tender, non-distended, no guarding, no rebound                 tenderness   Rectal:     Deferred   Extremities:   no edema, no cyanosis, no redness   Skin:   No bleeding, bruising or  rash   Lymph nodes:   No palpable adenopathy   Psychiatric:  Judgement and insight: normal   Orientation to person place and time: normal   Mood and affect: normal   Results Review:   I reviewed the patient's new clinical results.      Results from last 7 days  Lab Units 08/16/18  0752 08/15/18  2351 08/15/18  1452   WBC 10*3/mm3 7.47  --  9.71   HEMOGLOBIN g/dL 12.6*  12.6* 11.9* 12.5*   HEMATOCRIT % 40.4  40.4 38.6* 39.4*   PLATELETS 10*3/mm3 197  --  200       Results from last 7 days  Lab Units 08/16/18  0752 08/15/18  1452   SODIUM mmol/L 144 144   POTASSIUM mmol/L 3.8 3.9   CHLORIDE mmol/L 107 104   CO2 mmol/L 26.1 29.2*   BUN mg/dL 14 18   CREATININE mg/dL 1.00 1.14   CALCIUM mg/dL 8.4* 8.7   BILIRUBIN mg/dL 0.5 0.2   ALK PHOS U/L 66 68   ALT (SGPT) U/L 15 15   AST (SGOT) U/L 14 14   GLUCOSE mg/dL 97 132*       Results from last 7 days  Lab Units 08/15/18  1452   INR  1.11*     No results found for: LIPASE    Radiology:  No orders to display       Assessment/Plan   Patient Active Problem List   Diagnosis   • Acute blood loss anemia   • Arthritis   • Fracture of fifth metacarpal bone of right hand   • GI bleed   • Melena   • Closed fracture dislocation of right shoulder   • Gastroesophageal reflux disease without esophagitis   • Leukocytosis   • Hyperglycemia   • Fall at home   • DNR (do not resuscitate)   • Bradycardia following surgery   • Frequent PVCs   • Proximal humerus fracture   • Osteoarthritis   • Gastrointestinal hemorrhage       I discussed the patients findings and my recommendations with patient.    Assessment:  1) this 73-year-old gentleman has a 2 day history of recurrent melena with heme positive stool.  This is similar to what happened in 10 of 2014 when on EGD was found to have a focal area of gastritis.  The patient's H/H has remained stable.   2.  Patient does have a history of colon polyps.  His last colonoscopy was in 10 of 2014.  It was recommended by Dr. Edmund Hall at that time that  he have a repeat colonoscopy in 3 years.    Recommendations:  1.  I agree with continuing checking serial H&H's and transfuse as necessary.  2.  I agree with continuing IV Protonix.  3.  Dr. Radhika Márquez will perform an EGD on him today.  4.  He should have a colonoscopy eventually.  This could be done as an outpatient.    Edgar Allen MD

## 2018-08-16 NOTE — PROGRESS NOTES
Discharge Planning Assessment  [facility]     Patient Name: Rodolfo Grover  MRN: [id number]  Today's Date: 8/16/2018    Admit Date: 8/15/2018          Discharge Needs Assessment     Row Name 08/16/18 1111       Living Environment    Lives With alone    Current Living Arrangements home/apartment/condo       Discharge Needs Assessment    Readmission Within the Last 30 Days no previous admission in last 30 days    Concerns to be Addressed denies needs/concerns at this time            Discharge Plan     Row Name 08/16/18 1117       Plan    Plan TBD/Home with no needs    Patient/Family in Agreement with Plan yes    Plan Comments Spoke with pt, verified correct information on facesheet and explained the role of CCP. Pt states he lives in a multi level home alone. Pt reports there are 3 steps to enter the house and 12-16 step to the 2nd floor. Pt states he is completely independent and does not use any DME. Pt states his wife passed away recently and she had some DME so if needed he knows she had a walker. Pt confirmed PCP and states his pharmacy is the [facility] in [place]. Pts daughter is a realtor and he states it is easiest to reach her by text if needed. Christina (daughter cell) [phone]. At this time pt denies the need for RH/HH/DME. CCP to follow for any additional needs.        Destination     No service coordination in this encounter.      Durable Medical Equipment     No service coordination in this encounter.      Dialysis/Infusion     No service coordination in this encounter.      Home Medical Care     No service coordination in this encounter.      Social Care     No service coordination in this encounter.                Demographic Summary    No documentation.           Functional Status     Row Name 08/16/18 1117       Functional Status, IADL    Medications independent    Meal Preparation independent    Housekeeping independent    Laundry independent    Shopping independent            Psychosocial    No  documentation.           Abuse/Neglect    No documentation.           Legal    No documentation.           Substance Abuse    No documentation.           Patient Forms    No documentation.         Dennise Verma RN

## 2018-08-16 NOTE — PLAN OF CARE
Problem: Patient Care Overview  Goal: Plan of Care Review  Outcome: Ongoing (interventions implemented as appropriate)   08/16/18 6562   Coping/Psychosocial   Plan of Care Reviewed With patient   Plan of Care Review   Progress improving   OTHER   Outcome Summary Pt had a good day. Had an EGD done in ENDO that went well. H&H being collected every 8 hours. Up ad yaritza. VSS.      Goal: Individualization and Mutuality  Outcome: Ongoing (interventions implemented as appropriate)      Problem: Fall Risk (Adult)  Goal: Absence of Fall  Outcome: Ongoing (interventions implemented as appropriate)      Problem: Gastrointestinal Bleeding (Adult)  Goal: Signs and Symptoms of Listed Potential Problems Will be Absent, Minimized or Managed (Gastrointestinal Bleeding)  Outcome: Ongoing (interventions implemented as appropriate)

## 2018-08-16 NOTE — H&P
Internal medicine history and physical    INTERNAL MEDICINE   [facility]       Patient Identification:  Name: Rodolfo Grover  Age: 73 y.o.  Sex: male  :  1945  MRN: [id number]                   Primary Care Physician: Domenica Merida MD                                   Chief Complaint:  Significant dark stool for the last 2 days but has change in the color of stool for the last 1 week.    History of Present Illness:   Patient is a 73-year-old male with past medical history as noted below has history of GI bleed 2 years ago when he had similar symptoms of passing dark stools and at that time he was using NSAIDs for his rheumatoid arthritis.  Patient was noted to have NSAID-induced gastritis and after avoiding NSAIDs and using proton pump inhibitors his symptoms resolved.  He has not have any more melenic stool.  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  He denies any nausea vomiting.  Denies any dizziness and weakness.      Past Medical History:  Past Medical History:   Diagnosis Date   • GERD (gastroesophageal reflux disease)    • Iritis of right eye    • Rheumatic fever    • Shoulder fracture, right      Past Surgical History:  Past Surgical History:   Procedure Laterality Date   • HERNIA REPAIR     • SHOULDER CLOSED REDUCTION Right 3/16/2018    Procedure: RIGHT SHOULDER CLOSED REDUCTION;  Surgeon: Kj Garcia MD;  Location: [facility];  Service: Orthopedics   • TONSILLECTOMY     • TOTAL SHOULDER ARTHROPLASTY W/ DISTAL CLAVICLE EXCISION Right 3/22/2018    Procedure: Reverse Total Shoulder Arthroplsty;  Surgeon: Kj Garcia MD;  Location: [facility];  Service: Orthopedics      Home Meds:  Prescriptions Prior to Admission   Medication Sig Dispense Refill Last Dose   • aspirin 325 MG tablet Take 325 mg by mouth As Needed for Mild Pain .   Past Week at Unknown time   • esomeprazole (nexIUM) 20 MG capsule Take 1 capsule by mouth Every Morning Before Breakfast. 90  "capsule 1 8/15/2018 at Unknown time     Current Meds:     Current Facility-Administered Medications:   •  pantoprazole (PROTONIX) injection 40 mg, 40 mg, Intravenous, Q12H, Pam Romero MD, 40 mg at 08/15/18 2121  Allergies:  No Known Allergies  Social History:   Social History   Substance Use Topics   • Smoking status: Never Smoker   • Smokeless tobacco: Never Used   • Alcohol use Yes      Comment: occasional      Family History:  Family History   Problem Relation Age of Onset   • Malig Hyperthermia Neg Hx           Review of Systems  See history of present illness and past medical history.  Constitutional: Negative.  Negative for activity change, appetite change ( decreased), chills and fever.   HENT: Negative for congestion, ear pain, rhinorrhea, sinus pressure and sore throat.    Eyes: Negative.    Respiratory: Negative.  Negative for cough and shortness of breath.    Cardiovascular: Negative.  Negative for chest pain, palpitations and leg swelling ( pedal).   Gastrointestinal: Positive for blood in stool. Negative for abdominal pain, diarrhea, nausea and vomiting.   Endocrine: Negative.    Genitourinary: Negative.  Negative for decreased urine volume, difficulty urinating, dysuria, frequency and urgency.   Musculoskeletal: Negative.  Negative for back pain.   Skin: Negative.  Negative for rash.   Allergic/Immunologic: Negative.    Neurological: Negative.  Negative for dizziness, weakness, light-headedness, numbness and headaches.   Hematological: Negative.    Psychiatric/Behavioral: Negative.  The patient is not nervous/anxious.      Vitals:   /63 (BP Location: Left arm, Patient Position: Lying)   Pulse 65   Temp 98.7 °F (37.1 °C) (Oral)   Resp 18   Ht 170.2 cm (67\")   Wt 81.6 kg (180 lb)   SpO2 100%   BMI 28.19 kg/m²   I/O: No intake or output data in the 24 hours ending 08/15/18 2318  Exam:  General Appearance:    Alert, cooperative, no distress, appears stated age   Head:    Normocephalic, " without obvious abnormality, atraumatic   Eyes:    PERRL, conjunctiva/corneas clear, EOM's intact, both eyes   Ears:    Normal external ear canals, both ears   Nose:   Nares normal, septum midline, mucosa normal, no drainage    or sinus tenderness   Throat:   Lips, tongue, gums normal; oral mucosa pink and moist   Neck:   Supple, symmetrical, trachea midline, no adenopathy;     thyroid:  no enlargement/tenderness/nodules; no carotid    bruit or JVD   Back:     Symmetric, no curvature, ROM normal, no CVA tenderness   Lungs:     Clear to auscultation bilaterally, respirations unlabored   Chest Wall:    No tenderness or deformity    Heart:    Regular rate and rhythm, S1 and S2 normal, no murmur, rub   or gallop   Abdomen:     Soft, non-tender, bowel sounds active all four quadrants,     no masses, no hepatomegaly, no splenomegaly, in the ER patient had a rectal examination which revealed guaiac positive stool.     Extremities:   Extremities normal, atraumatic, no cyanosis or edema   Pulses:   Pulses palpable in all extremities; symmetric all extremities   Skin:   Skin color normal, Skin is warm and dry,  no rashes or palpable lesions   Neurologic:   CNII-XII intact, motor strength grossly intact, sensation grossly intact to light touch, no focal deficits noted       Data Review:      I reviewed the patient's new clinical results.    Results from last 7 days  Lab Units 08/15/18  1452   WBC 10*3/mm3 9.71   HEMOGLOBIN g/dL 12.5*   PLATELETS 10*3/mm3 200       Results from last 7 days  Lab Units 08/15/18  1452   SODIUM mmol/L 144   POTASSIUM mmol/L 3.9   CHLORIDE mmol/L 104   CO2 mmol/L 29.2*   BUN mg/dL 18   CREATININE mg/dL 1.14   CALCIUM mg/dL 8.7   GLUCOSE mg/dL 132*     Xr Shoulder 2+ View Right    Result Date: 8/8/2018  Ordering physician's impression is located in the Encounter Note dated 08/08/18. X-ray performed in the DR room.       Assessment:  Active Hospital Problems    Diagnosis Date Noted   •  Gastrointestinal hemorrhage [K92.2] 08/15/2018   • Proximal humerus fracture [S42.209A] 03/22/2018   • DNR (do not resuscitate) [Z66] 03/16/2018   • Hyperglycemia [R73.9] 03/16/2018   • Leukocytosis [D72.829] 03/16/2018   • Melena [K92.1] 09/12/2014       Plan:  Melena likely due to upper GI bleed.  Continue with IV Protonix and GI consultation.  Avoid agents, medications and NSAIDs class.  Transfuse packed RBCs if hemoglobin is 7 or less  Mild anemia likely due to above patient is currently maintained with serial H&H and transfusion as needed.          Pam Romero MD   8/15/2018  9:45 PM  Much of this encounter note is an electronic transcription/translation of spoken language to printed text. The electronic translation of spoken language may permit erroneous, or at times, nonsensical words or phrases to be inadvertently transcribed; Although I have reviewed the note for such errors, some may still exist

## 2018-08-16 NOTE — PLAN OF CARE
Problem: Patient Care Overview  Goal: Plan of Care Review  Outcome: Ongoing (interventions implemented as appropriate)   08/16/18 0511   Coping/Psychosocial   Plan of Care Reviewed With patient   Plan of Care Review   Progress no change   OTHER   Outcome Summary Hemoglobin continues to decrease, next level at 0800; GI consult called; denies pain; protonix IV intermittent initiated       Problem: Fall Risk (Adult)  Goal: Absence of Fall  Outcome: Ongoing (interventions implemented as appropriate)      Problem: Gastrointestinal Bleeding (Adult)  Goal: Signs and Symptoms of Listed Potential Problems Will be Absent, Minimized or Managed (Gastrointestinal Bleeding)  Outcome: Ongoing (interventions implemented as appropriate)

## 2018-08-16 NOTE — ANESTHESIA POSTPROCEDURE EVALUATION
Patient: Rodolfo Grover    Procedure Summary     Date:  08/16/18 Room / Location:  [facility] ENDOSCOPY 1 / [facility] ENDOSCOPY    Anesthesia Start:  1534 Anesthesia Stop:  1559    Procedure:  ESOPHAGOGASTRODUODENOSCOPY with bx (N/A Esophagus) Diagnosis:       Melena      (Melena [K92.1])    Surgeon:  Radhika Valadez MD Provider:  Bry Esparza MD    Anesthesia Type:  MAC ASA Status:  1          Anesthesia Type: MAC  Last vitals  BP   140/75 (08/16/18 1558)   Temp   36.7 °C (98 °F) (08/16/18 1558)   Pulse   53 (08/16/18 1558)   Resp   18 (08/16/18 1558)     SpO2   99 % (08/16/18 1558)     Post Anesthesia Care and Evaluation    Patient location during evaluation: PACU  Patient participation: complete - patient participated  Level of consciousness: awake and alert  Pain management: adequate  Airway patency: patent  Anesthetic complications: No anesthetic complications    Cardiovascular status: acceptable  Respiratory status: acceptable  Hydration status: acceptable    Comments: -------------------------              08/16/18 1558        -------------------------   BP:         140/75        Pulse:        53          Resp:         18          Temp:   36.7 °C (98 °F)   SpO2:         99%        -------------------------

## 2018-08-17 ENCOUNTER — OFFICE VISIT (OUTPATIENT)
Dept: SLEEP MEDICINE | Facility: HOSPITAL | Age: 73
End: 2018-08-17
Attending: INTERNAL MEDICINE

## 2018-08-17 VITALS
WEIGHT: 180 LBS | RESPIRATION RATE: 16 BRPM | HEART RATE: 66 BPM | DIASTOLIC BLOOD PRESSURE: 68 MMHG | BODY MASS INDEX: 28.25 KG/M2 | HEIGHT: 67 IN | OXYGEN SATURATION: 96 % | TEMPERATURE: 97.2 F | SYSTOLIC BLOOD PRESSURE: 118 MMHG

## 2018-08-17 VITALS
BODY MASS INDEX: 28.25 KG/M2 | SYSTOLIC BLOOD PRESSURE: 146 MMHG | HEART RATE: 47 BPM | WEIGHT: 180 LBS | DIASTOLIC BLOOD PRESSURE: 67 MMHG | HEIGHT: 67 IN

## 2018-08-17 DIAGNOSIS — G47.33 OSA (OBSTRUCTIVE SLEEP APNEA): Primary | ICD-10-CM

## 2018-08-17 DIAGNOSIS — E66.3 OVERWEIGHT (BMI 25.0-29.9): ICD-10-CM

## 2018-08-17 PROBLEM — T39.395A ADVERSE EFFECT OF NON-STEROIDAL ANTI-INFLAMMATORY DRUG (NSAID): Status: ACTIVE | Noted: 2018-08-17

## 2018-08-17 PROBLEM — K29.01 GASTROINTESTINAL HEMORRHAGE ASSOCIATED WITH ACUTE GASTRITIS: Status: RESOLVED | Noted: 2018-08-15 | Resolved: 2018-08-17

## 2018-08-17 PROBLEM — T39.395A ADVERSE EFFECT OF NON-STEROIDAL ANTI-INFLAMMATORY DRUG (NSAID): Status: RESOLVED | Noted: 2018-08-17 | Resolved: 2018-08-17

## 2018-08-17 PROBLEM — R73.9 HYPERGLYCEMIA: Status: RESOLVED | Noted: 2018-03-16 | Resolved: 2018-08-17

## 2018-08-17 LAB
BASOPHILS # BLD AUTO: 0.02 10*3/MM3 (ref 0–0.2)
BASOPHILS NFR BLD AUTO: 0.2 % (ref 0–1.5)
CYTO UR: NORMAL
DEPRECATED RDW RBC AUTO: 48.7 FL (ref 37–54)
EOSINOPHIL # BLD AUTO: 0.34 10*3/MM3 (ref 0–0.7)
EOSINOPHIL NFR BLD AUTO: 4.1 % (ref 0.3–6.2)
ERYTHROCYTE [DISTWIDTH] IN BLOOD BY AUTOMATED COUNT: 13.3 % (ref 11.5–14.5)
HCT VFR BLD AUTO: 38 % (ref 40.4–52.2)
HCT VFR BLD AUTO: 40.2 % (ref 40.4–52.2)
HGB BLD-MCNC: 12.1 G/DL (ref 13.7–17.6)
HGB BLD-MCNC: 12.4 G/DL (ref 13.7–17.6)
IMM GRANULOCYTES # BLD: 0.07 10*3/MM3 (ref 0–0.03)
IMM GRANULOCYTES NFR BLD: 0.8 % (ref 0–0.5)
LAB AP CASE REPORT: NORMAL
LYMPHOCYTES # BLD AUTO: 1.85 10*3/MM3 (ref 0.9–4.8)
LYMPHOCYTES NFR BLD AUTO: 22.1 % (ref 19.6–45.3)
MCH RBC QN AUTO: 30.6 PG (ref 27–32.7)
MCHC RBC AUTO-ENTMCNC: 30.8 G/DL (ref 32.6–36.4)
MCV RBC AUTO: 99.3 FL (ref 79.8–96.2)
MONOCYTES # BLD AUTO: 0.82 10*3/MM3 (ref 0.2–1.2)
MONOCYTES NFR BLD AUTO: 9.8 % (ref 5–12)
NEUTROPHILS # BLD AUTO: 5.27 10*3/MM3 (ref 1.9–8.1)
NEUTROPHILS NFR BLD AUTO: 63 % (ref 42.7–76)
PATH REPORT.FINAL DX SPEC: NORMAL
PATH REPORT.GROSS SPEC: NORMAL
PLATELET # BLD AUTO: 187 10*3/MM3 (ref 140–500)
PMV BLD AUTO: 10.3 FL (ref 6–12)
RBC # BLD AUTO: 4.05 10*6/MM3 (ref 4.6–6)
WBC NRBC COR # BLD: 8.37 10*3/MM3 (ref 4.5–10.7)

## 2018-08-17 PROCEDURE — A9270 NON-COVERED ITEM OR SERVICE: HCPCS | Performed by: INTERNAL MEDICINE

## 2018-08-17 PROCEDURE — 85025 COMPLETE CBC W/AUTO DIFF WBC: CPT | Performed by: INTERNAL MEDICINE

## 2018-08-17 PROCEDURE — 63710000001 PANTOPRAZOLE 40 MG TABLET DELAYED-RELEASE: Performed by: INTERNAL MEDICINE

## 2018-08-17 PROCEDURE — G0463 HOSPITAL OUTPT CLINIC VISIT: HCPCS

## 2018-08-17 PROCEDURE — G0378 HOSPITAL OBSERVATION PER HR: HCPCS

## 2018-08-17 PROCEDURE — 99214 OFFICE O/P EST MOD 30 MIN: CPT | Performed by: INTERNAL MEDICINE

## 2018-08-17 RX ORDER — PANTOPRAZOLE SODIUM 40 MG/1
40 TABLET, DELAYED RELEASE ORAL
Qty: 60 TABLET | Refills: 1 | Status: SHIPPED | OUTPATIENT
Start: 2018-08-17 | End: 2019-08-07

## 2018-08-17 RX ORDER — ACETAMINOPHEN 500 MG
1000 TABLET ORAL 3 TIMES DAILY
Start: 2018-08-17

## 2018-08-17 RX ADMIN — PANTOPRAZOLE SODIUM 40 MG: 40 TABLET, DELAYED RELEASE ORAL at 06:43

## 2018-08-17 NOTE — PROGRESS NOTES
[facility] [place], operated by [facility]  Inpatient Progress Note    Reason for Follow Up:  Melena    Subjective     Interval History:   No new issues.  Reviewed results of EGD from yesterday with pt.      Current Facility-Administered Medications:   •  acetaminophen (TYLENOL) tablet 650 mg, 650 mg, Oral, Q4H PRN, Pam Romero MD  •  HYDROcodone-acetaminophen (NORCO) 7.5-325 MG per tablet 1 tablet, 1 tablet, Oral, Q4H PRN, Pam Romero MD  •  pantoprazole (PROTONIX) EC tablet 40 mg, 40 mg, Oral, BID Allyson HAQUE Lauren C., MD, 40 mg at 08/17/18 0643  •  sodium chloride 0.9 % flush 1-10 mL, 1-10 mL, Intravenous, PRN, Pam Romero MD  Review of Systems:    The following systems were reviewed and negative;  constitution and gastrointestinal    Objective     Vital Signs  Temp:  [96.8 °F (36 °C)-98 °F (36.7 °C)] 97.2 °F (36.2 °C)  Heart Rate:  [53-72] 66  Resp:  [12-18] 16  BP: (101-157)/(51-77) 118/68  Body mass index is 28.19 kg/m².    Intake/Output Summary (Last 24 hours) at 08/17/18 1026  Last data filed at 08/16/18 1551   Gross per 24 hour   Intake              300 ml   Output                0 ml   Net              300 ml     No intake/output data recorded.     Physical Exam:   General: patient awake, alert and cooperative   Abdomen: soft, nontender, nondistended; normal bowel sounds   Rectal: deferred   Extremities: no rash or edema   Psychiatric: Normal mood and behavior; memory intact     Results Review:     I reviewed the patient's new clinical results.      Results from last 7 days  Lab Units 08/17/18  0704 08/16/18  2357 08/16/18  1955 08/16/18  0752  08/15/18  1452   WBC 10*3/mm3 8.37  --   --  7.47  --  9.71   HEMOGLOBIN g/dL 12.4* 12.1* 13.4* 12.6*  12.6*  < > 12.5*   HEMATOCRIT % 40.2* 38.0* 42.3 40.4  40.4  < > 39.4*   PLATELETS 10*3/mm3 187  --   --  197  --  200   < > = values in this interval not displayed.    Results from last 7 days  Lab Units 08/16/18  0752 08/15/18  1452   SODIUM mmol/L 144 144    POTASSIUM mmol/L 3.8 3.9   CHLORIDE mmol/L 107 104   CO2 mmol/L 26.1 29.2*   BUN mg/dL 14 18   CREATININE mg/dL 1.00 1.14   CALCIUM mg/dL 8.4* 8.7   BILIRUBIN mg/dL 0.5 0.2   ALK PHOS U/L 66 68   ALT (SGPT) U/L 15 15   AST (SGOT) U/L 14 14   GLUCOSE mg/dL 97 132*       Results from last 7 days  Lab Units 08/15/18  1452   INR  1.11*     No results found for: LIPASE    Assessment/Plan   Assessment:   1.  Melena  2.  Erosive gastritis    Plan:   Await EGD path  Hb stable  Continue protonix 40mg BID x 8 weeks  Will need outpt colonoscopy in near future given hx of polyps  Pt given office information to call for f/u appointment with Dr. Allen or nurse practitioner.  OK for DC from GI standpoint    I discussed the patients findings and my recommendations with patient.         Jono Laboy M.D.  [facility]  [address] [place] [address]  Office: [phone]

## 2018-08-17 NOTE — DISCHARGE SUMMARY
Name: Rodolfo Grover  Age: 73 y.o.  Sex: male  :  1945  MRN: [id number]         Primary Care Physician: Domenica Merida MD      Date of Admission:  8/15/2018  Date of Discharge:  2018      CHIEF COMPLAINT  Black or Bloody Stool (DARK STOOLS FOR THE PAST 2 DAYS)      DISCHARGE DIAGNOSIS  Active Hospital Problems    Diagnosis Date Noted   • **Gastrointestinal hemorrhage associated with acute gastritis [K29.01] 08/15/2018   • Adverse effect of non-steroidal anti-inflammatory drug (NSAID) [T39.395A] 2018   • DNR (do not resuscitate) [Z66] 2018   • Hyperglycemia [R73.9] 2018   • Melena [K92.1] 2014      Resolved Hospital Problems    Diagnosis Date Noted Date Resolved   No resolved problems to display.       SECONDARY DIAGNOSES  Past Medical History:   Diagnosis Date   • GERD (gastroesophageal reflux disease)    • Iritis of right eye    • Rheumatic fever    • Shoulder fracture, right        CONSULTS   Consult Orders (all)     Start     Ordered    08/15/18 2147  Inpatient Gastroenterology Consult  Once     Specialty:  Gastroenterology  Provider:  Jono Michael MD    08/15/18 2147            PROCEDURES PERFORMED  EGD   2018  Impression:  - Normal esophagus.  - Erosive gastritis. Biopsied.  - Non-bleeding duodenal diverticulum.  - The examination was otherwise normal.  Recommendation:  - Await pathology results.  - If you have not received your pathology results in 14 days, please call the office at [phone] to discuss your results.  - Use Protonix (pantoprazole) 40 mg PO BID for 8 weeks.  - Avoid NSAIDs.  - If hemoglobin stable overnight, could be discharged in am. Will need outpatient colonoscopy due to history of polyps          HOSPITAL COURSE  Mr. Grover is a 73 y.o. non-smoker with a history of GERD and prior GI bleed due to gastritis who presented to [facility] initially complaining of dark stools. Please see the admitting history and physical for further  details. He was found to have melena and was admitted to the hospital for further evaluation and treatment. He was started on IV Protonix and had serial H&H monitoring. He was seen in consultation by gastroenterology and underwent EGD with results outlined above. Hemoglobin is remained relatively stable. He has been cleared for discharge by GI who recommends twice daily Protonix for 8 weeks. They will follow up with him in regards to pathology results and scheduling outpatient colonoscopy. He was taking multiple full dose aspirin daily prior to admission for generalized osteoarthritis. He was instructed to discontinue taking this and instead try Tylenol.           PHYSICAL EXAM  Temp:  [96.8 °F (36 °C)-98 °F (36.7 °C)] 97.2 °F (36.2 °C)  Heart Rate:  [53-72] 66  Resp:  [12-18] 16  BP: (101-157)/(51-77) 118/68  Body mass index is 28.19 kg/m².  Physical Exam   Constitutional: He is oriented to person, place, and time. No distress.   Cardiovascular: Normal rate and regular rhythm.    No murmur heard.  Pulmonary/Chest: Effort normal and breath sounds normal.   Abdominal: Soft. Bowel sounds are normal. He exhibits no distension. There is no tenderness.   Musculoskeletal: Normal range of motion. He exhibits no edema.   Neurological: He is alert and oriented to person, place, and time.   Skin: Skin is warm and dry. He is not diaphoretic.         CONDITION ON DISCHARGE  Stable.      DISCHARGE DISPOSITION   Home      ALLERGIES  No Known Allergies      DISCHARGE MEDICATIONS     Your medication list      START taking these medications      Instructions Last Dose Given Next Dose Due   acetaminophen 500 MG tablet  Commonly known as:  TYLENOL      Take 2 tablets by mouth 3 (Three) Times a Day.       pantoprazole 40 MG EC tablet  Commonly known as:  PROTONIX      Take 1 tablet by mouth 2 (Two) Times a Day Before Meals.          STOP taking these medications    aspirin 325 MG tablet        esomeprazole 20 MG capsule  Commonly known  as:  nexIUM              Where to Get Your Medications      These medications were sent to [facility] [address] [place] [address] - [address] [facility] - [phone]  - [phone]   [address] [facility] [address]    Phone:  [phone]   · pantoprazole 40 MG EC tablet     Information about where to get these medications is not yet available    Ask your nurse or doctor about these medications  · acetaminophen 500 MG tablet       Diet Instructions     Diet: Regular       Discharge Diet:  Regular       Activity Instructions     Activity as Tolerated         Future Appointments  Date Time Provider Department Center   8/17/2018 2:30 PM Mitch Mendiola MD [facility] None   1/17/2019 8:30 AM Domenica Merida MD [facility]  [facility] None   8/7/2019 8:30 AM Kj Garcia MD [facility]     Follow-up Information     Domenica Merida MD Follow up in 2 week(s).    Specialty:  Family Medicine  Contact information:  [address] [facility]  [address]  [place] [id number] [phone]             Edgar Allen MD Follow up.    Specialty:  Gastroenterology  Contact information:  [address] [facility] [address]  [facility] [id number] [phone]                   TEST  RESULTS PENDING AT DISCHARGE   Order Current Status    Tissue Pathology Exam In process             CODE STATUS  Code Status and Medical Interventions:   Ordered at: 08/15/18 2148     Limited Support to NOT Include:    Intubation    Cardioversion/Defibrillation     Code Status:    No CPR     Medical Interventions (Level of Support Prior to Arrest):    Limited           Martin Grover MD  [facility]  08/17/18  10:52 AM

## 2018-08-17 NOTE — PLAN OF CARE
Problem: Patient Care Overview  Goal: Plan of Care Review  Outcome: Ongoing (interventions implemented as appropriate)   08/17/18 0528   Coping/Psychosocial   Plan of Care Reviewed With patient   Plan of Care Review   Progress improving   OTHER   Outcome Summary no c/o pain; hemoglobin stable; probable discharge 8/17       Problem: Fall Risk (Adult)  Goal: Absence of Fall  Outcome: Ongoing (interventions implemented as appropriate)      Problem: Gastrointestinal Bleeding (Adult)  Goal: Signs and Symptoms of Listed Potential Problems Will be Absent, Minimized or Managed (Gastrointestinal Bleeding)  Outcome: Ongoing (interventions implemented as appropriate)

## 2018-08-17 NOTE — PROGRESS NOTES
"[facility] SLEEP MEDICINE  [address] [facility]  3rd Floor  [facility] [address]  [phone]    PCP: Domenica Merida MD    Reason for visit:  Sleep disorders: COLEMAN    Rodolfo is a 73 y.o.male who was seen in the Sleep Disorders Center today. He was setup with CPAP after HST done through [facility] showing severe COLEMAN. He feels that the CPAP device is helping him. He is using FFM.  He has occasional air leaks but is able to adjust the mask. Has some dry mouth. Sleeps from 10p to 6a. He reports nasal congestion. He is not taking meds for same. He was recently admitted for gi bleed due to aspirin.    Mont[place] Sleepiness Scale is 2. Caffeine 1-2 per day. Alcohol - per week.    Rodolfo  reports that he has never smoked. He has never used smokeless tobacco.    Pertinent Positive Review of Systems of PND, acid reflux  Rest of Review of Systems was negative as recorded in Sleep Questionnaire.    Patient  has a past medical history of GERD (gastroesophageal reflux disease); Iritis of right eye; Rheumatic fever; and Shoulder fracture, right.     Current Medications:    Current Outpatient Prescriptions:   •  acetaminophen (TYLENOL) 500 MG tablet, Take 2 tablets by mouth 3 (Three) Times a Day., Disp: , Rfl:   •  pantoprazole (PROTONIX) 40 MG EC tablet, Take 1 tablet by mouth 2 (Two) Times a Day Before Meals., Disp: 60 tablet, Rfl: 1   also entered in Sleep Questionnaire         Vital Signs: /67   Pulse (!) 47   Ht 170.2 cm (67\")   Wt 81.6 kg (180 lb)   BMI 28.19 kg/m²     Body mass index is 28.19 kg/m².       Tongue: large      Dentition: good       Pharynx: Posterior pharyngeal pillars are wide   Mallampatti: III (soft and hard palate and base of uvula visible)        General: Alert. Cooperative. Well developed. No acute distress.             Head:  Normocephalic. Symmetrical. Atraumatic.              Nose: No septal deviation. No drainage.          Throat: No oral lesions. No thrush. Moist mucous membranes.    Chest " Wall:  Normal shape. Symmetric expansion with respiration. No tenderness.             Neck:  Trachea midline.           Lungs:  Clear to auscultation bilaterally. No wheezes. No rhonchi. No rales. Respirations regular, even and unlabored.            Heart:  Regular rhythm and normal rate. Normal S1 and S2. No murmur.     Abdomen:  Soft, non-tender and non-distended. Normal bowel sounds. No masses.  Extremities:  Moves all extremities well. No edema.    Psychiatric: Normal mood and affect.    Study:  Shaan HST- AHI 40, lowest desaturation 70s; 68min of sats <90%        Testing:  Overnight oximetry on CPAP RA-August 2018- no desaturation    Download- 7/16/18-8/14/18, 87% use with average nightly use of 5 hours and 23 min on auto CPAP 5-20cm with avg pressures of 14.9, AHI 1.7    DME Company: [employer]    Impression:  1. COLEMAN (obstructive sleep apnea)    2. Overweight (BMI 25.0-29.9)        Plan:  Rodolfo is doing very well on CPAP device. He is compliant and benefits from treatment. CPAP corrects his AHI as well as nocturnal desaturations. He was advised to try Flonase to assist with dry mouth. He has chin strap at home.    I reiterated the importance of effective treatment of obstructive sleep apnea with PAP machine.  Cardiovascular health risks of untreated sleep apnea were again reviewed.  Patient was asked to remain cautious if there is persistent hypersomnolence. The benefit of weight loss in reducing severity of obstructive sleep apnea was discussed.  Patient would benefit from adhering to a strict diet to achieve ideal BMI.     Change of PAP supplies regularly is important for effective use.  Change of cushion on the mask or plugs on nasal pillows along with disposable filters once every month and change of mask frame, tubing, headgear and Velcro straps every 6 months at the minimum was reiterated.    This patient is compliant with PAP machine and benefits from its use.  Apnea hypopneas index is  corrected/improved.  Daytime hypersomnolence has resolved.     Patient will follow up in this clinic in 6 months  NKECHI    Thank you for allowing me to participate in your patient's care.    Mitch Mendiola MD    Part of this note may be an electronic transcription/translation of spoken language to printed text using the Dragon Dictation System.

## 2018-08-20 ENCOUNTER — TELEPHONE (OUTPATIENT)
Dept: GASTROENTEROLOGY | Facility: CLINIC | Age: 73
End: 2018-08-20

## 2018-08-20 NOTE — TELEPHONE ENCOUNTER
Gastric bx were normal- he needs f.u with Dr Allen or se to discuss further w/u (hx polyps-needs op c/s)

## 2018-08-21 NOTE — TELEPHONE ENCOUNTER
Called pt and advised per Dr Valadez that the stomach bx were normal and he needs to f/u with Ann Marie Hamilton or Dr Allen to discuss further eval fue to hx of colon polyps and need for c/s.  Pt verb understanding and reports he has an appt for 09/19 with Ann Marie HAMILTON

## 2019-01-17 ENCOUNTER — OFFICE VISIT (OUTPATIENT)
Dept: FAMILY MEDICINE CLINIC | Facility: CLINIC | Age: 74
End: 2019-01-17

## 2019-01-17 VITALS
SYSTOLIC BLOOD PRESSURE: 132 MMHG | RESPIRATION RATE: 18 BRPM | WEIGHT: 182.7 LBS | OXYGEN SATURATION: 98 % | TEMPERATURE: 97.5 F | HEIGHT: 62 IN | HEART RATE: 76 BPM | DIASTOLIC BLOOD PRESSURE: 60 MMHG | BODY MASS INDEX: 33.62 KG/M2
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
## 2019-01-17 DIAGNOSIS — E78.00 PURE HYPERCHOLESTEROLEMIA: ICD-10-CM

## 2019-01-17 DIAGNOSIS — R79.89 ELEVATED TSH: ICD-10-CM

## 2019-01-17 DIAGNOSIS — Z12.11 SCREENING FOR COLON CANCER: ICD-10-CM

## 2019-01-17 DIAGNOSIS — R53.1 WEAKNESS: ICD-10-CM

## 2019-01-17 DIAGNOSIS — M19.91 PRIMARY OSTEOARTHRITIS, UNSPECIFIED SITE: ICD-10-CM

## 2019-01-17 DIAGNOSIS — G47.33 SEVERE OBSTRUCTIVE SLEEP APNEA: ICD-10-CM

## 2019-01-17 DIAGNOSIS — Z23 NEED FOR IMMUNIZATION AGAINST INFLUENZA: ICD-10-CM

## 2019-01-17 DIAGNOSIS — D62 ACUTE BLOOD LOSS ANEMIA: ICD-10-CM

## 2019-01-17 DIAGNOSIS — Z00.00 MEDICARE ANNUAL WELLNESS VISIT, INITIAL: Primary | ICD-10-CM

## 2019-01-17 DIAGNOSIS — I49.8 VENTRICULAR BIGEMINY: ICD-10-CM
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
## 2019-01-17 PROCEDURE — G0008 ADMIN INFLUENZA VIRUS VAC: HCPCS | Performed by: FAMILY MEDICINE

## 2019-01-17 PROCEDURE — G0438 PPPS, INITIAL VISIT: HCPCS | Performed by: FAMILY MEDICINE

## 2019-01-17 PROCEDURE — 90662 IIV NO PRSV INCREASED AG IM: CPT | Performed by: FAMILY MEDICINE

## 2019-01-17 RX ORDER — MULTIPLE VITAMINS W/ MINERALS TAB 9MG-400MCG
1 TAB ORAL DAILY
COMMUNITY
End: 2021-03-09

## 2019-01-18 ENCOUNTER — TELEPHONE (OUTPATIENT)
Dept: FAMILY MEDICINE CLINIC | Facility: CLINIC | Age: 74
End: 2019-01-18

## 2019-01-18 RX ORDER — AMOXICILLIN 500 MG/1
2000 TABLET, FILM COATED ORAL ONCE
Qty: 4 TABLET | Refills: 0 | Status: SHIPPED | OUTPATIENT
Start: 2019-01-18 | End: 2019-01-18

## 2019-01-18 NOTE — TELEPHONE ENCOUNTER
PT CAME IN TODAY AND SAID THAT HIS HYGENTIST WANTS HIM TO TAKE AN ANTIBIOTIC BEFORE A CLEANING ITS AMOXICILLIN 2000MG.  HE HAS HAD A SHOULDER REPLACEMENT AND THE HYGENTIST TOLD HIM THAT A LOT OF BACTERIA BREAKS LOOSE DURING A CLEANING. SO HE WANTS TO KNOW IF DR DELGADO WILL WRITE THE SCRIPT?  (OR SHOULD THE DENTIST WRITE IT)?

## 2019-01-19 LAB
ALBUMIN SERPL-MCNC: 3.6 G/DL (ref 3.5–5.2)
ALBUMIN/GLOB SERPL: 1.4 G/DL
ALP SERPL-CCNC: 65 U/L (ref 39–117)
ALT SERPL-CCNC: 18 U/L (ref 1–41)
AST SERPL-CCNC: 17 U/L (ref 1–40)
BASOPHILS # BLD AUTO: 0.03 10*3/MM3 (ref 0–0.2)
BASOPHILS NFR BLD AUTO: 0.4 % (ref 0–1.5)
BILIRUB SERPL-MCNC: 0.6 MG/DL (ref 0.1–1.2)
BUN SERPL-MCNC: 15 MG/DL (ref 8–23)
BUN/CREAT SERPL: 13.5 (ref 7–25)
CALCIUM SERPL-MCNC: 9.1 MG/DL (ref 8.6–10.5)
CHLORIDE SERPL-SCNC: 102 MMOL/L (ref 98–107)
CHOLEST SERPL-MCNC: 214 MG/DL (ref 0–200)
CO2 SERPL-SCNC: 28.3 MMOL/L (ref 22–29)
CREAT SERPL-MCNC: 1.11 MG/DL (ref 0.76–1.27)
EOSINOPHIL # BLD AUTO: 0.25 10*3/MM3 (ref 0–0.7)
EOSINOPHIL NFR BLD AUTO: 3.7 % (ref 0.3–6.2)
ERYTHROCYTE [DISTWIDTH] IN BLOOD BY AUTOMATED COUNT: 14.6 % (ref 11.5–14.5)
GLOBULIN SER CALC-MCNC: 2.6 GM/DL
GLUCOSE SERPL-MCNC: 92 MG/DL (ref 65–99)
HCT VFR BLD AUTO: 43.2 % (ref 40.4–52.2)
HDLC SERPL-MCNC: 58 MG/DL (ref 40–60)
HGB BLD-MCNC: 13.3 G/DL (ref 13.7–17.6)
IMM GRANULOCYTES # BLD AUTO: 0.05 10*3/MM3 (ref 0–0.03)
IMM GRANULOCYTES NFR BLD AUTO: 0.7 % (ref 0–0.5)
LDLC SERPL CALC-MCNC: 138 MG/DL (ref 0–100)
LYMPHOCYTES # BLD AUTO: 1.27 10*3/MM3 (ref 0.9–4.8)
LYMPHOCYTES NFR BLD AUTO: 18.6 % (ref 19.6–45.3)
MCH RBC QN AUTO: 31.4 PG (ref 27–32.7)
MCHC RBC AUTO-ENTMCNC: 30.8 G/DL (ref 32.6–36.4)
MCV RBC AUTO: 102.1 FL (ref 79.8–96.2)
MONOCYTES # BLD AUTO: 0.75 10*3/MM3 (ref 0.2–1.2)
MONOCYTES NFR BLD AUTO: 11 % (ref 5–12)
NEUTROPHILS # BLD AUTO: 4.48 10*3/MM3 (ref 1.9–8.1)
NEUTROPHILS NFR BLD AUTO: 65.6 % (ref 42.7–76)
PLATELET # BLD AUTO: 207 10*3/MM3 (ref 140–500)
POTASSIUM SERPL-SCNC: 4.5 MMOL/L (ref 3.5–5.2)
PROT SERPL-MCNC: 6.2 G/DL (ref 6–8.5)
RBC # BLD AUTO: 4.23 10*6/MM3 (ref 4.6–6)
SODIUM SERPL-SCNC: 139 MMOL/L (ref 136–145)
TRIGL SERPL-MCNC: 92 MG/DL (ref 0–150)
TSH SERPL DL<=0.005 MIU/L-ACNC: 4.14 MIU/ML (ref 0.27–4.2)
VLDLC SERPL CALC-MCNC: 18.4 MG/DL (ref 5–40)
WBC # BLD AUTO: 6.83 10*3/MM3 (ref 4.5–10.7)

## 2019-01-22 DIAGNOSIS — D53.9 MACROCYTIC ANEMIA: Primary | ICD-10-CM

## 2019-01-23 ENCOUNTER — HOSPITAL ENCOUNTER (OUTPATIENT)
Dept: PHYSICAL THERAPY | Facility: HOSPITAL | Age: 74
Setting detail: THERAPIES SERIES
Discharge: HOME | End: 2019-01-23

## 2019-01-23 DIAGNOSIS — R53.1 GENERALIZED WEAKNESS: ICD-10-CM

## 2019-01-23 DIAGNOSIS — R26.89 IMPAIRED GAIT AND MOBILITY: Primary | ICD-10-CM

## 2019-01-23 PROCEDURE — 97110 THERAPEUTIC EXERCISES: CPT | Performed by: PHYSICAL THERAPIST

## 2019-01-23 PROCEDURE — 97161 PT EVAL LOW COMPLEX 20 MIN: CPT | Performed by: PHYSICAL THERAPIST

## 2019-01-24 NOTE — THERAPY EVALUATION
Outpatient Physical Therapy Ortho Initial Evaluation  [facility]     Patient Name: Rodolfo Grover  : 1945  MRN: [id number]  Today's Date: 2019      Visit Date: 2019    Patient Active Problem List   Diagnosis   • Acute blood loss anemia   • Fracture of fifth metacarpal bone of right hand   • Closed fracture dislocation of right shoulder   • Gastroesophageal reflux disease without esophagitis   • DNR (do not resuscitate)   • Bradycardia following surgery   • Frequent PVCs   • Proximal humerus fracture   • Osteoarthritis   • Severe obstructive sleep apnea   • Pure hypercholesterolemia        Past Medical History:   Diagnosis Date   • GERD (gastroesophageal reflux disease)    • Iritis of right eye    • Rheumatic fever    • Shoulder fracture, right         Past Surgical History:   Procedure Laterality Date   • ENDOSCOPY N/A 2018    Erosive gastritis, diverticulosis   • HERNIA REPAIR     • SHOULDER CLOSED REDUCTION Right 3/16/2018    Procedure: RIGHT SHOULDER CLOSED REDUCTION;  Surgeon: Kj Garcia MD;  Location: [facility];  Service: Orthopedics   • TONSILLECTOMY     • TOTAL SHOULDER ARTHROPLASTY W/ DISTAL CLAVICLE EXCISION Right 3/22/2018    Procedure: Reverse Total Shoulder Arthroplsty;  Surgeon: Kj Garcia MD;  Location: [facility];  Service: Orthopedics       Visit Dx:     ICD-10-CM ICD-9-CM   1. Impaired gait and mobility R26.89 781.2   2. Generalized weakness R53.1 780.79       Patient History     Row Name 19 0600             History    Chief Complaint  Balance Problems;Difficulty Walking;Difficulty with daily activities  -GJ      Date Current Problem(s) Began  -- , progressive  -GJ      Brief Description of Current Complaint  Mr. Grover is a 72 y/o male.  He presents to the clinic today, reporting sensation of decreased balance and bilateral knee pain, particularly with ascending stairs.  He denies falls since 3/2018.  He denies red flags.  He reports he just  doesn't feel as agile as he once was or as capable as he use to be.  No particular activity in which he feels off balance per se.  His kness bother him with ascending stairs and with getting in/out of cars.  No imaging studies, no treatment to date.  He reports he walks several times a week up to a mile.   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  Reading  -GJ      Teaching needs identified  Home Exercise Program;Management of Condition  -GJ      Barriers to learning  None  -GJ         Safety    Are you being hurt, hit, or frightened by anyone at home or in your life?  No  -GJ      Are you being neglected by a caregiver  No  -GJ        User Key  (r) = Recorded By, (t) = Taken By, (c) = Cosigned By    Initials Name Provider Type    Silvano Regan, PT Physical Therapist          PT Ortho     Row Name 01/24/19 0600       Posture/Observations    Alignment Options  Forward head;Rounded shoulders;Lumbar lordosis  -GJ    Forward Head  Mild;Moderate  -GJ    Rounded Shoulders  Mild;Moderate  -GJ    Lumbar lordosis  Decreased  -GJ       Quarter Clearing    Quarter Clearing  Lower Quarter Clearing  -GJ       DTR- Lower Quarter Clearing    Patellar tendon (L2-4)  Bilateral:;2- Normal response  -GJ    Achilles tendon (S1-2)  Bilateral:;2- Normal response  -GJ       Neural Tension Signs-  Lower Quarter Clearing    Slump  Bilateral:;Negative  -GJ    SLR  Bilateral:;Negative  -GJ    Prone knee flexion  Bilateral:;Negative  -GJ       Myotomal Screen- Lower Quarter Clearing    Hip flexion (L2)  Bilateral:;4+ (Good +)  -GJ    Knee extension (L3)  Bilateral:;4+ (Good +)  -GJ    Ankle DF (L4)  Bilateral:;5 (Normal)  -GJ    Ankle PF (S1)  Right:;3- (Fair -);Left:;4 (Good)  -GJ       Lumbar ROM Screen- Lower Quarter Clearing    Lumbar Flexion  Impaired by 50%  -GJ    Lumbar Extension  Impaired by 50%  -GJ       General ROM    GENERAL ROM COMMENTS  bilateral knees demonstrate grossly 5 degres short of full neutral extension, flexion grossly WFL  -GJ       MMT (Manual Muscle Testing)    Rt Lower Ext  Rt Hip Extension;Rt Hip ABduction  -GJ    Lt Lower Ext  Lt Hip Extension;Lt Hip ABduction  -GJ       MMT Right Lower Ext    Rt Hip Extension MMT, Gross Movement  (4+/5) good plus  -GJ    Rt Hip ABduction MMT, Gross Movement  (4+/5) good plus  -GJ       MMT Left Lower Ext    Lt Hip Extension MMT, Gross Movement  (4+/5) good plus  -GJ    Lt Hip ABduction MMT, Gross Movement  (4+/5) good plus  -GJ       Flexibility    Flexibility Tested?  Lower Extremity  -GJ       Lower Extremity Flexibility    Hamstrings  Bilateral:;Moderately limited  -GJ    Hip Flexors  Bilateral:;Moderately limited  -GJ    Quadriceps  Bilateral:;Moderately limited  -GJ    Hip External Rotators  Bilateral:;Moderately limited  -GJ    Hip Internal Rotators  Bilateral:;Moderately limited  -GJ       Balance Skills Training    SLS  unable bilateral but tries  -GJ    Rhomberg  20 seconds EO/20 EC  -GJ    Sharpened Rhomberg  unable with R in front, L in front 15 seconds  -GJ    Hip Strategy Assessment (Balance)  slightly increased with tandem stance  -GJ      User Key  (r) = Recorded By, (t) = Taken By, (c) = Cosigned By    Initials Name Provider Type    Silvano Regan PT Physical Therapist                      Therapy Education  Education Details:  discussed dx, px, poc, discussed anatomy of knees, LE's, balance and physiology of healing, discussed realistic expectations and time frames for therapy, icnludin expected soreness.  Discussed importance of adherence to HEP  Given: Symptoms/condition management, HEP, Pain management, Mobility training, Fall prevention and home safety, Posture/body mechanics  Program: New  How Provided: Verbal, Demonstration, Written  Provided to: Patient  Level of Understanding: Teach back education performed, Demonstrated, Verbalized     PT OP Goals     Row Name 01/24/19 0600          PT Short Term Goals    STG Date to Achieve  02/22/19  -GJ     STG 1  pt. to be I with initial HEP to facilitate self management of their condition  -GJ     STG 1 Progress  New  -GJ     STG 2  pt. to be educated in/verbalize understanding of the importance of posture/ergonomics in association with their condition to facilitate self management of their condition  -GJ     STG 2 Progress  New  -GJ        Long Term Goals    LTG Date to Achieve  03/22/19  -GJ     LTG 1  pt. to be I with advanced HEP to facilitate self management of their condition  -GJ     LTG 1 Progress  New  -GJ     LTG 2  pt. to report an LEFS >/= 60% to demonstrate decreased level of perceived disability  -GJ     LTG 2 Progress  New  -GJ     LTG 3  pt to ascend/desdcend full flight of stairs reciprocally with </= 1 rail to facilitate ease/safety of household/community mobility  -GJ     LTG 3 Progress  New  -GJ     LTG 4  pt to demontrate SLS (bilatearlly) >/= 15 seconds to facilitate improved safety with gait/household/dressing activities  -GJ     LTG 4 Progress  New  -GJ     LTG 5  pt to be able to perform STS x 14 in 30 seconds to facilitate improved strength/endurance.  -GJ     LTG 5 Progress  New  -GJ     LTG 6  pt. to demonstrate ability to rise on R toes x 5 to facilitate improved strength/safety with gait  -GJ     LTG 6 Progress  New  -GJ        Time Calculation    PT Goal  Re-Cert Due Date  03/22/19  -       User Key  (r) = Recorded By, (t) = Taken By, (c) = Cosigned By    Initials Name Provider Type    Silvano Regan, PT Physical Therapist          PT Assessment/Plan     Row Name 01/24/19 0648          PT Assessment    Functional Limitations  Limitations in community activities;Performance in leisure activities;Limitation in home management;Performance in work activities  -     Impairments  Range of motion;Pain;Joint mobility;Impaired postural alignment;Muscle strength;Posture;Poor body mechanics;Impaired flexibility  -     Assessment Comments  Mr. Grover is a 74 y/o male.  He presents to the clinic today, reporting sensation of decreased balance and bilateral knee pain, particularly with ascending stairs.  He denies falls since 3/2018.  He denies red flags.  He reports he just doesn't feel as agile as he once was or as capable as he use to be.  No particular activity in which he feels off balance per se.  His kness bother him with ascending stairs and with getting in/out of cars.  No imaging studies, no treatment to date.  He reports he walks several times a week up to a mile. Mr. Grover demonstrates unremarkable gait pattern.  He has difficulty rising on the toes on the R.  He is able to complete 7 sit to/from stands in 30 seconds (norm for his age is 14).  He is hesitant and inconsistent with his pattern with traversing stairs. He demonstrates tightentened HS, hip flexor, hip rotator, gastroc tissues.  Mr. Grover reports an LEFS score of 40%, scored 0-100, 100 represents no perceived disability.  Mr. Grover demonstrates s/s consistent with generalized weakness/disuse which limits his full participation in household, community mobility.  He may benefit from skilled physical therapy intervention to address the above impairments.   -GJ     Please refer to paper survey for additional self-reported information  Yes  -GJ     Rehab Potential  Excellent  -GJ     Patient/caregiver  participated in establishment of treatment plan and goals  Yes  -GJ     Patient would benefit from skilled therapy intervention  Yes  -GJ        PT Plan    PT Frequency  2x/week  -GJ     Predicted Duration of Therapy Intervention (Therapy Eval)  4-6 weeks   -GJ     Planned CPT's?  PT EVAL LOW COMPLEXITY: 30170;PT RE-EVAL: 16178;PT THER ACT EA 15 MIN: 14375;PT NEUROMUSC RE-EDUCATION EA 15 MIN: 26583;PT MANUAL THERAPY EA 15 MIN: 50931;PT THER PROC EA 15 MIN: 22217;PT GAIT TRAINING EA 15 MIN: 73050;PT HOT OR COLD PACK TREAT MCARE  -GJ     PT Plan Comments  warm up on Nustep, UE/LE, HR, LAQ, HS curl, lateral stepping (advance to monster walk), shoulder ext with TA (progress to on blue foam), bridges.  LIkely hold on MS secondary to knee pain.  Stretch LE's  -GJ       User Key  (r) = Recorded By, (t) = Taken By, (c) = Cosigned By    Initials Name Provider Type    Silvano Regan, PT Physical Therapist            Exercises     Row Name 01/24/19 0600             Exercise 1    Exercise Name 1  LTR  -GJ      Cueing 1  Verbal;Demo  -GJ      Reps 1  10  -GJ      Time 1  5 s  -GJ         Exercise 2    Exercise Name 2  piriformis stretch  -GJ      Cueing 2  Verbal;Demo  -GJ      Reps 2  3  -GJ      Time 2  20 s  -GJ         Exercise 3    Exercise Name 3  seated EOB HS stretch  -GJ      Cueing 3  Verbal;Demo  -GJ      Reps 3  3  -GJ      Time 3  20 s  -GJ         Exercise 4    Exercise Name 4  gastroc stretch from step  -GJ      Cueing 4  Verbal;Demo  -GJ      Reps 4  3  -GJ      Time 4  20 s  -GJ        User Key  (r) = Recorded By, (t) = Taken By, (c) = Cosigned By    Initials Name Provider Type    Silvano Regan, PT Physical Therapist                        Outcome Measure Options: Lower Extremity Functional Scale (LEFS), 30 Second Chair Stand Test, 2 Minute Walk Test(40%)  2 Minute Walk Test  Gait, Assistive Device: (none)  Distance Ambulated in 2 Minutes: 466  30 Second Chair Stand Test  30 Second Chair Stand Test: 7          Time Calculation:     Therapy Suggested Charges     Code   Minutes Charges    64821 (CPT®) Hc Pt Neuromusc Re Education Ea 15 Min      96838 (CPT®) Hc Pt Ther Proc Ea 15 Min 10 1    54956 (CPT®) Hc Gait Training Ea 15 Min      28016 (CPT®) Hc Pt Therapeutic Act Ea 15 Min      81279 (CPT®) Hc Pt Manual Therapy Ea 15 Min      37532 (CPT®) Hc Pt Ther Massage- Per 15 Min      62718 (CPT®) Hc Pt Iontophoresis Ea 15 Min      70670 (CPT®) Hc Pt Elec Stim Ea-Per 15 Min      60425 (CPT®) Hc Pt Ultrasound Ea 15 Min      63409 (CPT®) Hc Pt Self Care/Mgmt/Train Ea 15 Min      89159 (CPT®) Hc Pt Prosthetic (S) Train Initial Encounter, Each 15 Min      00293 (CPT®) Hc Orthotic(S) Mgmt/Train Initial Encounter, Each 15min      89451 (CPT®) Hc Pt Aquatic Therapy Ea 15 Min      87503 (CPT®) Hc Pt Orthotic(S)/Prosthetic(S) Encounter, Each 15 Min       (CPT®) Hc Pt Electrical Stim Unattended      Total  10 1          Start Time: 1400  Stop Time: 1445  Time Calculation (min): 45 min     Therapy Charges for Today     Code Description Service Date Service Provider Modifiers Qty    52975507881 HC PT THER PROC EA 15 MIN 1/23/2019 Silvano Palencia, PT GP 1    38461094040 HC PT EVAL LOW COMPLEXITY 2 1/23/2019 Silvano Palencia, PT GP 1          PT G-Codes  Outcome Measure Options: Lower Extremity Functional Scale (LEFS), 30 Second Chair Stand Test, 2 Minute Walk Test(40%)         Silvano Palencia, PT  1/24/2019

## 2019-01-25 ENCOUNTER — TELEPHONE (OUTPATIENT)
Dept: FAMILY MEDICINE CLINIC | Facility: CLINIC | Age: 74
End: 2019-01-25

## 2019-01-25 NOTE — TELEPHONE ENCOUNTER
Pt came in for lab work and wanted to talk about the current labs he had, no review of HIS specific lab work, just education on what the values mean and break down of major CBC components.     I sat down in Harriet's office with him and explained his questions, he wanted advice on starting Iron (has hx of anemia), I advised that he wait for the additional labs and speak with Dr. Merida  About any new medications, even if OTC.     He will follow up with PCP after results come back from add on labs.     CM

## 2019-01-26 LAB
FOLATE SERPL-MCNC: 19.5 NG/ML (ref 4.78–24.2)
VIT B12 SERPL-MCNC: 785 PG/ML (ref 211–946)

## 2019-01-27 ENCOUNTER — RESULTS ENCOUNTER (OUTPATIENT)
Dept: FAMILY MEDICINE CLINIC | Facility: CLINIC | Age: 74
End: 2019-01-27

## 2019-01-27 DIAGNOSIS — D53.9 MACROCYTIC ANEMIA: ICD-10-CM

## 2019-01-31 ENCOUNTER — HOSPITAL ENCOUNTER (OUTPATIENT)
Dept: PHYSICAL THERAPY | Facility: HOSPITAL | Age: 74
Setting detail: THERAPIES SERIES
Discharge: HOME | End: 2019-01-31

## 2019-01-31 DIAGNOSIS — R26.89 IMPAIRED GAIT AND MOBILITY: Primary | ICD-10-CM

## 2019-01-31 DIAGNOSIS — R53.1 GENERALIZED WEAKNESS: ICD-10-CM

## 2019-01-31 PROCEDURE — 97110 THERAPEUTIC EXERCISES: CPT

## 2019-02-04 ENCOUNTER — HOSPITAL ENCOUNTER (OUTPATIENT)
Dept: PHYSICAL THERAPY | Facility: HOSPITAL | Age: 74
Setting detail: THERAPIES SERIES
Discharge: HOME | End: 2019-02-04

## 2019-02-04 DIAGNOSIS — R26.89 IMPAIRED GAIT AND MOBILITY: Primary | ICD-10-CM

## 2019-02-04 DIAGNOSIS — R53.1 GENERALIZED WEAKNESS: ICD-10-CM

## 2019-02-04 PROCEDURE — 97110 THERAPEUTIC EXERCISES: CPT | Performed by: PHYSICAL THERAPIST

## 2019-02-04 NOTE — THERAPY TREATMENT NOTE
Outpatient Physical Therapy Ortho Treatment Note  [facility]     Patient Name: Rodolfo Grover  : 1945  MRN: [id number]  Today's Date: 2019      Visit Date: 2019    Visit Dx:    ICD-10-CM ICD-9-CM   1. Impaired gait and mobility R26.89 781.2   2. Generalized weakness R53.1 780.79       Patient Active Problem List   Diagnosis   • Acute blood loss anemia   • Fracture of fifth metacarpal bone of right hand   • Closed fracture dislocation of right shoulder   • Gastroesophageal reflux disease without esophagitis   • DNR (do not resuscitate)   • Bradycardia following surgery   • Frequent PVCs   • Proximal humerus fracture   • Osteoarthritis   • Severe obstructive sleep apnea   • Pure hypercholesterolemia        Past Medical History:   Diagnosis Date   • GERD (gastroesophageal reflux disease)    • Iritis of right eye    • Rheumatic fever    • Shoulder fracture, right         Past Surgical History:   Procedure Laterality Date   • ENDOSCOPY N/A 2018    Erosive gastritis, diverticulosis   • HERNIA REPAIR     • SHOULDER CLOSED REDUCTION Right 3/16/2018    Procedure: RIGHT SHOULDER CLOSED REDUCTION;  Surgeon: Kj Garcia MD;  Location: [facility];  Service: Orthopedics   • TONSILLECTOMY     • TOTAL SHOULDER ARTHROPLASTY W/ DISTAL CLAVICLE EXCISION Right 3/22/2018    Procedure: Reverse Total Shoulder Arthroplsty;  Surgeon: Kj Garcia MD;  Location: [facility];  Service: Orthopedics                       PT Assessment/Plan     Row Name 19 1358          PT Assessment    Assessment Comments  Mr. Grover returns today, reporting mild soreness following previous session.  We progressed his exercises today with resistance and increased reps.   We initiated work on sit to/from stand with a focus on eccentric control, including verbal cues for appropriate technique (nose over toes).  We also added severeal new exercises focusing on hip girdle/core strengthing as well as balance. Mr. Grover is  progressing towards all functional goals.   -GJ        PT Plan    PT Plan Comments  assess response to new exercises, continue to strengthen bilateral lower quarter and balance while monitoring soreness.   -GJ       User Key  (r) = Recorded By, (t) = Taken By, (c) = Cosigned By    Initials Name Provider Type    Silvano Regan, PT Physical Therapist          Modalities     Row Name 02/04/19 1300             Ice    Ice Applied  Yes  -GJ      Location  cornelio knees, pt supine, knees rested over bolster  -GJ      Rx Minutes  10 mins  -GJ      Ice S/P Rx  Yes  -GJ        User Key  (r) = Recorded By, (t) = Taken By, (c) = Cosigned By    Initials Name Provider Type    Silvano Regan, PT Physical Therapist          Exercises     Row Name 02/04/19 1318 02/04/19 1300          Subjective Comments    Subjective Comments  --  I was a little sore after last session.  I am not sure if the ice helped on my knees or not.  I am trying to walk. I think I'm feeling stronger.   -GJ        Subjective Pain    Pre-Treatment Pain Level  --  2  -GJ        Total Minutes    07418 - PT Therapeutic Exercise Minutes  41  -GJ  --        Exercise 1    Exercise Name 1  --  LTR  -GJ     Cueing 1  --  Verbal;Demo  -GJ     Reps 1  --  10  -GJ     Time 1  --  5 s  -GJ        Exercise 2    Exercise Name 2  --  piriformis stretch  -GJ     Cueing 2  --  Verbal;Demo  -GJ     Reps 2  --  3  -GJ     Time 2  --  20 s  -GJ        Exercise 3    Exercise Name 3  --  seated EOB HS stretch  -GJ     Cueing 3  --  Verbal;Demo  -GJ     Reps 3  --  3  -GJ     Time 3  --  20 s  -GJ        Exercise 4    Exercise Name 4  --  gastroc stretch from step  -GJ     Cueing 4  --  Verbal;Demo  -GJ     Reps 4  --  3  -GJ     Time 4  --  20 s  -GJ        Exercise 5    Exercise Name 5  --  calf raise, from step  -GJ     Cueing 5  --  Verbal;Demo  -GJ     Reps 5  --  15  -GJ        Exercise 6    Exercise Name 6  --  Nu step UE/LE  -GJ     Time 6  --  L5  5 min  -GJ         Exercise 7    Exercise Name 7  --  sidestep  -GJ     Cueing 7  --  Verbal;Demo  -GJ     Reps 7  --  10 ft,, 3 laps  -GJ     Additional Comments  --  RTB  -GJ        Exercise 8    Exercise Name 8  --   HS curl B  -GJ     Cueing 8  --  Verbal;Demo  -GJ     Reps 8  --  15  -GJ     Additional Comments  --  2#  -GJ        Exercise 9    Exercise Name 9  --  LAQ B  -GJ     Cueing 9  --  Verbal;Demo  -GJ     Reps 9  --  15  -GJ     Additional Comments  --  3#  -GJ        Exercise 10    Exercise Name 10  --  SAQ B  -GJ     Cueing 10  --  Verbal;Demo  -GJ     Reps 10  --  15  -GJ     Additional Comments  --  3#  -GJ        Exercise 11    Exercise Name 11  --  Tandem stance (both feet in front)  -GJ     Cueing 11  --  Verbal;Demo  -GJ     Reps 11  --  3  -GJ     Time 11  --  20 s  -GJ        Exercise 12    Exercise Name 12  --  standing shoulder ext with TA  -GJ     Cueing 12  --  Verbal;Demo  -GJ     Reps 12  --  15  -GJ     Additional Comments  --  RTB  -GJ        Exercise 13    Exercise Name 13  --  sit to/from stand (from slightly elevated mat table) with main focus on eccentric control  -GJ     Cueing 13  --  Verbal;Demo  -GJ     Reps 13  --  10  -GJ     Additional Comments  --  cues for nose over toes, sitting edge of table, feet behind knees  -GJ       User Key  (r) = Recorded By, (t) = Taken By, (c) = Cosigned By    Initials Name Provider Type    GJ Silvano Palencia W, PT Physical Therapist                         PT OP Goals     Row Name 02/04/19 1300          PT Short Term Goals    STG Date to Achieve  02/22/19  -GJ     STG 1  pt. to be I with initial HEP to facilitate self management of their condition  -GJ     STG 1 Progress  Ongoing  -GJ     STG 1 Progress Comments  reviewed, intermittent cues given   -GJ     STG 2  pt. to be educated in/verbalize understanding of the importance of posture/ergonomics in association with their condition to facilitate self management of their condition  -GJ     STG 2 Progress  Ongoing   -GJ     STG 2 Progress Comments  reviewed  -        Long Term Goals    LTG Date to Achieve  03/22/19  -GJ     LTG 1  pt. to be I with advanced HEP to facilitate self management of their condition  -GJ     LTG 1 Progress  Ongoing  -GJ     LTG 2  pt. to report an LEFS >/= 60% to demonstrate decreased level of perceived disability  -GJ     LTG 2 Progress  Ongoing  -GJ     LTG 3  pt to ascend/desdcend full flight of stairs reciprocally with </= 1 rail to facilitate ease/safety of household/community mobility  -GJ     LTG 3 Progress  Ongoing  -GJ     LTG 4  pt to demontrate SLS (bilatearlly) >/= 15 seconds to facilitate improved safety with gait/household/dressing activities  -GJ     LTG 4 Progress  Ongoing  -GJ     LTG 5  pt to be able to perform STS x 14 in 30 seconds to facilitate improved strength/endurance.  -GJ     LTG 5 Progress  Ongoing  -GJ     LTG 5 Progress Comments  initatited work on sit garret/from stand today  -GJ     LTG 6  pt. to demonstrate ability to rise on R toes x 5 to facilitate improved strength/safety with gait  -GJ     LTG 6 Progress  Ongoing  -GJ     LTG 6 Progress Comments  working on gastroc strength with heel raises from step  -       User Key  (r) = Recorded By, (t) = Taken By, (c) = Cosigned By    Initials Name Provider Type    GJ Silvano Palencia, PT Physical Therapist          Therapy Education  Given: HEP, Symptoms/condition management, Pain management, Mobility training, Posture/body mechanics  Program: New, Reinforced, Progressed  How Provided: Verbal, Written, Demonstration  Provided to: Patient  Level of Understanding: Teach back education performed, Verbalized, Demonstrated              Time Calculation:   Start Time: 1318  Stop Time: 1410  Time Calculation (min): 52 min  Therapy Suggested Charges     Code   Minutes Charges    04843 (CPT®) Hc Pt Neuromusc Re Education Ea 15 Min      27682 (CPT®) Hc Pt Ther Proc Ea 15 Min 41 3    32879 (CPT®) Hc Gait Training Ea 15 Min      83914  (CPT®) Hc Pt Therapeutic Act Ea 15 Min      37869 (CPT®) Hc Pt Manual Therapy Ea 15 Min      74258 (CPT®) Hc Pt Ther Massage- Per 15 Min      17803 (CPT®) Hc Pt Iontophoresis Ea 15 Min      34867 (CPT®) Hc Pt Elec Stim Ea-Per 15 Min      13635 (CPT®) Hc Pt Ultrasound Ea 15 Min      68194 (CPT®) Hc Pt Self Care/Mgmt/Train Ea 15 Min      04443 (CPT®) Hc Pt Prosthetic (S) Train Initial Encounter, Each 15 Min      02390 (CPT®) Hc Orthotic(S) Mgmt/Train Initial Encounter, Each 15min      75766 (CPT®) Hc Pt Aquatic Therapy Ea 15 Min      92431 (CPT®) Hc Pt Orthotic(S)/Prosthetic(S) Encounter, Each 15 Min       (CPT®) Hc Pt Electrical Stim Unattended      Total  41 3        Therapy Charges for Today     Code Description Service Date Service Provider Modifiers Qty    88800687590 HC PT THER PROC EA 15 MIN 2/4/2019 Silvano Palencia, PT GP 3    90179642566 HC PT HOT OR COLD PACK TREAT MCARE 2/4/2019 Silvano Palencia, PT GP 1                    Silvano Palencia, PT  2/4/2019

## 2019-02-06 ENCOUNTER — OFFICE VISIT (OUTPATIENT)
Dept: CARDIOLOGY | Facility: CLINIC | Age: 74
End: 2019-02-06

## 2019-02-06 VITALS
HEIGHT: 67 IN | DIASTOLIC BLOOD PRESSURE: 70 MMHG | WEIGHT: 181 LBS | SYSTOLIC BLOOD PRESSURE: 112 MMHG | HEART RATE: 71 BPM | BODY MASS INDEX: 28.41 KG/M2

## 2019-02-06 DIAGNOSIS — I49.3 FREQUENT PVCS: ICD-10-CM

## 2019-02-06 DIAGNOSIS — I49.3 PVC (PREMATURE VENTRICULAR CONTRACTION): Primary | ICD-10-CM

## 2019-02-06 DIAGNOSIS — G47.33 SEVERE OBSTRUCTIVE SLEEP APNEA: ICD-10-CM

## 2019-02-06 PROCEDURE — 99214 OFFICE O/P EST MOD 30 MIN: CPT | Performed by: INTERNAL MEDICINE

## 2019-02-06 PROCEDURE — 93000 ELECTROCARDIOGRAM COMPLETE: CPT | Performed by: INTERNAL MEDICINE

## 2019-02-06 NOTE — PROGRESS NOTES
Date of Office Visit: 2019  Encounter Provider: Jimena Singer MD  Place of Service: [facility] CARDIOLOGY  Patient Name: Rodolfo Grover  :1945    Chief complaint  Followup of PVCs, pulmonary hypertension and edema    History of Present Illness  Patient is a 73-year-old gentleman with history of rheumatic fever, GE reflux disease who was seen at [facility] in 2018 after humeral fracture following a fall.  At that time he was noted to have ventricular bigeminy with normal potassium and magnesium levels.  He was hypertensive at the time.  He had an echocardiogram that showed normal systolic function with normal diastolic function, mild left atrial enlargement saline study was indeterminate.  There was aortic valve sclerosis with mild aortic regurgitation and trivial mitral and tricuspid regurgitation with mild pulmonary hypertension with an RV systolic pressure 42 mmHg.  A stress perfusion study was negative for ischemia.  He receives are noted during the study.  Diaphragmatic attenuation was noted.  He was subsequently found to have severe sleep apnea    Since last visit he denies any chest pain, shortness of breath, palpitations, syncope near syncope.  He has had edema that is unchanged. He spends a lot of time in the dependent position    Past Medical History:   Diagnosis Date   • GERD (gastroesophageal reflux disease)    • Iritis of right eye    • Rheumatic fever    • Shoulder fracture, right      Past Surgical History:   Procedure Laterality Date   • ENDOSCOPY N/A 2018    Erosive gastritis, diverticulosis   • HERNIA REPAIR     • SHOULDER CLOSED REDUCTION Right 3/16/2018    Procedure: RIGHT SHOULDER CLOSED REDUCTION;  Surgeon: Kj Garcia MD;  Location: [facility];  Service: Orthopedics   • TONSILLECTOMY     • TOTAL SHOULDER ARTHROPLASTY W/ DISTAL CLAVICLE EXCISION Right 3/22/2018    Procedure: Reverse Total Shoulder Arthroplsty;  Surgeon: Kj HUNG  MD Radha;  Location: [facility] OR;  Service: Orthopedics     Outpatient Medications Prior to Visit   Medication Sig Dispense Refill   • acetaminophen (TYLENOL) 500 MG tablet Take 2 tablets by mouth 3 (Three) Times a Day. (Patient taking differently: Take 1,000 mg by mouth 3 (Three) Times a Day As Needed.)     • Multiple Vitamins-Minerals (EYE VITAMINS PO) Take 1 tablet by mouth Daily.     • Multiple Vitamins-Minerals (MULTIVITAMIN WITH MINERALS) tablet tablet Take 1 tablet by mouth Daily.     • pantoprazole (PROTONIX) 40 MG EC tablet Take 1 tablet by mouth 2 (Two) Times a Day Before Meals. 60 tablet 1     No facility-administered medications prior to visit.        Allergies as of 02/06/2019   • (No Known Allergies)     Social History     Socioeconomic History   • Marital status:      Spouse name: Not on file   • Number of children: Not on file   • Years of education: Not on file   • Highest education level: Not on file   Social Needs   • Financial resource strain: Not on file   • Food insecurity - worry: Not on file   • Food insecurity - inability: Not on file   • Transportation needs - medical: Not on file   • Transportation needs - non-medical: Not on file   Occupational History   • Not on file   Tobacco Use   • Smoking status: Never Smoker   • Smokeless tobacco: Never Used   Substance and Sexual Activity   • Alcohol use: Yes     Frequency: Monthly or less     Drinks per session: 1 or 2     Comment: occasional   • Drug use: No   • Sexual activity: Defer   Other Topics Concern   • Not on file   Social History Narrative   • Not on file     Family History   Problem Relation Age of Onset   • Malig Hyperthermia Neg Hx      Review of Systems   Constitution: Negative for fever, malaise/fatigue, weight gain and weight loss.   HENT: Positive for hearing loss. Negative for ear pain, nosebleeds and sore throat.    Eyes: Negative for double vision, pain, vision loss in left eye and vision loss in right eye.  "  Cardiovascular: Positive for leg swelling.        See history of present illness.   Respiratory: Negative for cough, shortness of breath, sleep disturbances due to breathing, snoring and wheezing.    Endocrine: Negative for cold intolerance, heat intolerance and polyuria.   Skin: Negative for itching, poor wound healing and rash.   Musculoskeletal: Negative for joint pain, joint swelling and myalgias.   Gastrointestinal: Negative for abdominal pain, diarrhea, hematochezia, nausea and vomiting.   Genitourinary: Negative for hematuria and hesitancy.   Neurological: Negative for numbness, paresthesias and seizures.   Psychiatric/Behavioral: Negative for depression. The patient is not nervous/anxious.         Objective:     Vitals:    02/06/19 0954   BP: 112/70   Pulse: 71   Weight: 82.1 kg (181 lb)   Height: 170.2 cm (67\")     Body mass index is 28.35 kg/m².    Physical Exam   Constitutional: He is oriented to person, place, and time. He appears well-developed and well-nourished.   HENT:   Head: Normocephalic.   Nose: Nose normal.   Mouth/Throat: Oropharynx is clear and moist.   Eyes: Conjunctivae and EOM are normal. Pupils are equal, round, and reactive to light. Right eye exhibits no discharge. No scleral icterus.   Neck: Normal range of motion. Neck supple. No JVD present. No thyromegaly present.   Cardiovascular: Normal rate, regular rhythm, normal heart sounds and intact distal pulses. Exam reveals no gallop and no friction rub.   No murmur heard.  Pulses:       Carotid pulses are 2+ on the right side, and 2+ on the left side.       Radial pulses are 2+ on the right side, and 2+ on the left side.        Femoral pulses are 2+ on the right side, and 2+ on the left side.       Popliteal pulses are 2+ on the right side, and 2+ on the left side.        Dorsalis pedis pulses are 2+ on the right side, and 2+ on the left side.        Posterior tibial pulses are 2+ on the right side, and 2+ on the left side. "   Pulmonary/Chest: Effort normal and breath sounds normal. No respiratory distress. He has no wheezes. He has no rales.   Abdominal: Soft. Bowel sounds are normal. He exhibits no distension. There is no hepatosplenomegaly. There is no tenderness. There is no rebound.   Musculoskeletal: Normal range of motion. He exhibits no edema or tenderness.   Neurological: He is alert and oriented to person, place, and time.   Skin: Skin is warm and dry. No rash noted. No erythema.   Psychiatric: He has a normal mood and affect. His behavior is normal. Judgment and thought content normal.   Vitals reviewed.    Lab Review:     ECG 12 Lead  Date/Time: 2/6/2019 10:11 AM  Performed by: Jimena Singer MD  Authorized by: Jimena Singer MD   Comparison: compared with previous ECG   Similar to previous ECG  Rhythm: sinus rhythm  Ectopy: PVCs  Conduction: 1st degree  Clinical impression: abnormal ECG          Assessment:       Diagnosis Plan   1. PVC (premature ventricular contraction)  ECG 12 Lead   2. Frequent PVCs     3. Severe obstructive sleep apnea       Plan:       1.  Hypertension, controlled  2.  Frequent asymptomatic PVC's improved with treatment of sleep apnea  3.  Severe sleep apnea, regulated on CPAP use.  He still has a leak which he plans to address further with his sleep physicians.  4.  Pulmonary hypertension. Recheck on echo after 3 months of adequate sleep apnea without leaks.  He will call once this has been achieved         Your medication list           Accurate as of 2/6/19 11:59 PM. If you have any questions, ask your nurse or doctor.               CHANGE how you take these medications      Instructions Last Dose Given Next Dose Due   acetaminophen 500 MG tablet  Commonly known as:  TYLENOL  What changed:    · when to take this  · reasons to take this      Take 2 tablets by mouth 3 (Three) Times a Day.          CONTINUE taking these medications      Instructions Last Dose Given Next Dose Due   multivitamin with  minerals tablet tablet      Take 1 tablet by mouth Daily.       EYE VITAMINS PO      Take 1 tablet by mouth Daily.       pantoprazole 40 MG EC tablet  Commonly known as:  PROTONIX      Take 1 tablet by mouth 2 (Two) Times a Day Before Meals.              Patient is no longer taking -.  I corrected the med list to reflect this.  I did not stop these medications.    Dictated utilizing Dragon dictation

## 2019-02-07 ENCOUNTER — HOSPITAL ENCOUNTER (OUTPATIENT)
Dept: PHYSICAL THERAPY | Facility: HOSPITAL | Age: 74
Setting detail: THERAPIES SERIES
Discharge: HOME | End: 2019-02-07

## 2019-02-07 DIAGNOSIS — Z47.1 AFTERCARE FOLLOWING RIGHT SHOULDER JOINT REPLACEMENT SURGERY: ICD-10-CM

## 2019-02-07 DIAGNOSIS — R53.1 GENERALIZED WEAKNESS: ICD-10-CM

## 2019-02-07 DIAGNOSIS — Z96.611 AFTERCARE FOLLOWING RIGHT SHOULDER JOINT REPLACEMENT SURGERY: ICD-10-CM

## 2019-02-07 DIAGNOSIS — R26.89 IMPAIRED GAIT AND MOBILITY: Primary | ICD-10-CM

## 2019-02-07 PROCEDURE — 97110 THERAPEUTIC EXERCISES: CPT

## 2019-02-07 NOTE — THERAPY TREATMENT NOTE
Outpatient Physical Therapy Ortho Treatment Note  [facility]     Patient Name: Rodolfo Grover  : 1945  MRN: [id number]  Today's Date: 2019      Visit Date: 2019    Visit Dx:    ICD-10-CM ICD-9-CM   1. Impaired gait and mobility R26.89 781.2   2. Generalized weakness R53.1 780.79   3. Aftercare following right shoulder joint replacement surgery Z47.1 V54.81    Z96.611 V43.61       Patient Active Problem List   Diagnosis   • Acute blood loss anemia   • Fracture of fifth metacarpal bone of right hand   • Closed fracture dislocation of right shoulder   • Gastroesophageal reflux disease without esophagitis   • DNR (do not resuscitate)   • Bradycardia following surgery   • Frequent PVCs   • Proximal humerus fracture   • Osteoarthritis   • Severe obstructive sleep apnea   • Pure hypercholesterolemia        Past Medical History:   Diagnosis Date   • GERD (gastroesophageal reflux disease)    • Iritis of right eye    • Rheumatic fever    • Shoulder fracture, right         Past Surgical History:   Procedure Laterality Date   • ENDOSCOPY N/A 2018    Erosive gastritis, diverticulosis   • HERNIA REPAIR     • SHOULDER CLOSED REDUCTION Right 3/16/2018    Procedure: RIGHT SHOULDER CLOSED REDUCTION;  Surgeon: Kj Garcia MD;  Location: [facility];  Service: Orthopedics   • TONSILLECTOMY     • TOTAL SHOULDER ARTHROPLASTY W/ DISTAL CLAVICLE EXCISION Right 3/22/2018    Procedure: Reverse Total Shoulder Arthroplsty;  Surgeon: Kj Garcia MD;  Location: [facility];  Service: Orthopedics                       PT Assessment/Plan     Row Name 19 6038          PT Assessment    Assessment Comments  Decreased pain and improving balance. Consider adding PPT for core strength  -WS       User Key  (r) = Recorded By, (t) = Taken By, (c) = Cosigned By    Initials Name Provider Type    Oumar Hughes PTA Physical Therapy Assistant              Exercises     Row Name 19 0731              Subjective Comments    Subjective Comments  no pain just stiff  -WS         Total Minutes    37873 - PT Therapeutic Exercise Minutes  40  -WS         Exercise 1    Exercise Name 1  LTR  -WS      Cueing 1  Verbal;Demo  -WS      Reps 1  10  -WS      Time 1  5 s  -WS         Exercise 2    Exercise Name 2  piriformis stretch  -WS      Cueing 2  Verbal;Demo  -WS      Reps 2  3  -WS      Time 2  20 s  -WS         Exercise 3    Exercise Name 3  seated EOB HS stretch  -WS      Cueing 3  Verbal;Demo  -WS      Reps 3  3  -WS      Time 3  20 s  -WS         Exercise 4    Exercise Name 4  gastroc stretch from step  -WS      Cueing 4  Verbal;Demo  -WS      Reps 4  3  -WS      Time 4  20 s  -WS         Exercise 5    Exercise Name 5  calf raise, from step  -WS      Cueing 5  Verbal;Demo  -WS      Reps 5  15  -WS         Exercise 6    Exercise Name 6  Nu step UE/LE  -WS      Time 6  L5  5 min  -WS         Exercise 7    Exercise Name 7  sidestep  -WS      Cueing 7  Verbal;Demo  -WS      Reps 7  10 ft,, 3 laps  -WS      Additional Comments  RTB  -WS         Exercise 8    Exercise Name 8   HS curl B  -WS      Cueing 8  Verbal;Demo  -WS      Reps 8  15  -WS      Additional Comments  3#  -WS         Exercise 9    Exercise Name 9  LAQ B  -WS      Cueing 9  Verbal;Demo  -WS      Reps 9  15  -WS      Additional Comments  4#  -WS         Exercise 10    Exercise Name 10  SAQ B  -WS      Cueing 10  Verbal;Demo  -WS      Reps 10  15  -WS      Additional Comments  4#  -WS         Exercise 11    Exercise Name 11  Tandem stance (both feet in front)  -WS      Cueing 11  Verbal;Demo  -WS      Reps 11  3  -WS      Time 11  20 s  -WS         Exercise 12    Exercise Name 12  standing shoulder ext with TA  -WS      Cueing 12  Verbal;Demo  -WS      Reps 12  15  -WS      Additional Comments  RTB  -WS         Exercise 13    Exercise Name 13  sit to/from stand (from slightly elevated mat table) with main focus on eccentric control  -WS      Cueing 13   Verbal;Demo  -WS      Reps 13  10  -WS      Additional Comments  cues for nose over toes  -WS         Exercise 14    Exercise Name 14  add PPT  -WS        User Key  (r) = Recorded By, (t) = Taken By, (c) = Cosigned By    Initials Name Provider Type    Oumar Hughes PTA Physical Therapy Assistant                         PT OP Goals     Row Name 02/07/19 0900          PT Short Term Goals    STG Date to Achieve  02/22/19  -WS     STG 1  pt. to be I with initial HEP to facilitate self management of their condition  -WS     STG 1 Progress  Ongoing  -WS     STG 2  pt. to be educated in/verbalize understanding of the importance of posture/ergonomics in association with their condition to facilitate self management of their condition  -WS     STG 2 Progress  Ongoing  -WS        Long Term Goals    LTG Date to Achieve  03/22/19  -WS     LTG 1  pt. to be I with advanced HEP to facilitate self management of their condition  -WS     LTG 1 Progress  Ongoing  -WS     LTG 2  pt. to report an LEFS >/= 60% to demonstrate decreased level of perceived disability  -WS     LTG 2 Progress  Ongoing  -WS     LTG 3  pt to ascend/desdcend full flight of stairs reciprocally with </= 1 rail to facilitate ease/safety of household/community mobility  -WS     LTG 3 Progress  Ongoing  -WS     LTG 4  pt to demontrate SLS (bilatearlly) >/= 15 seconds to facilitate improved safety with gait/household/dressing activities  -WS     LTG 4 Progress  Ongoing  -WS     LTG 5  pt to be able to perform STS x 14 in 30 seconds to facilitate improved strength/endurance.  -WS     LTG 5 Progress  Ongoing  -WS     LTG 6  pt. to demonstrate ability to rise on R toes x 5 to facilitate improved strength/safety with gait  -WS     LTG 6 Progress  Ongoing  -WS       User Key  (r) = Recorded By, (t) = Taken By, (c) = Cosigned By    Initials Name Provider Type    Oumar Hughes PTA Physical Therapy Assistant          Therapy Education  Given: HEP,  Symptoms/condition management, Posture/body mechanics  Program: Reinforced  How Provided: Verbal  Provided to: Patient              Time Calculation:   Start Time: 0820  Stop Time: 0900  Time Calculation (min): 40 min  Therapy Suggested Charges     Code   Minutes Charges    68182 (CPT®) Hc Pt Neuromusc Re Education Ea 15 Min      69481 (CPT®) Hc Pt Ther Proc Ea 15 Min 40 3    38699 (CPT®) Hc Gait Training Ea 15 Min      35069 (CPT®) Hc Pt Therapeutic Act Ea 15 Min      34518 (CPT®) Hc Pt Manual Therapy Ea 15 Min      41207 (CPT®) Hc Pt Ther Massage- Per 15 Min      88520 (CPT®) Hc Pt Iontophoresis Ea 15 Min      09325 (CPT®) Hc Pt Elec Stim Ea-Per 15 Min      29014 (CPT®) Hc Pt Ultrasound Ea 15 Min      63583 (CPT®) Hc Pt Self Care/Mgmt/Train Ea 15 Min      21948 (CPT®) Hc Pt Prosthetic (S) Train Initial Encounter, Each 15 Min      73112 (CPT®) Hc Orthotic(S) Mgmt/Train Initial Encounter, Each 15min      45954 (CPT®) Hc Pt Aquatic Therapy Ea 15 Min      70520 (CPT®) Hc Pt Orthotic(S)/Prosthetic(S) Encounter, Each 15 Min       (CPT®) Hc Pt Electrical Stim Unattended      Total  40 3        Therapy Charges for Today     Code Description Service Date Service Provider Modifiers Qty    06069393347 HC PT THER PROC EA 15 MIN 2/7/2019 Oumar Rg, PTA GP 3                    Oumar Rg PTA  2/7/2019

## 2019-02-08 ENCOUNTER — OFFICE VISIT (OUTPATIENT)
Dept: SLEEP MEDICINE | Facility: HOSPITAL | Age: 74
End: 2019-02-08

## 2019-02-08 VITALS
DIASTOLIC BLOOD PRESSURE: 69 MMHG | OXYGEN SATURATION: 96 % | WEIGHT: 182.4 LBS | BODY MASS INDEX: 28.63 KG/M2 | HEART RATE: 64 BPM | HEIGHT: 67 IN | SYSTOLIC BLOOD PRESSURE: 161 MMHG

## 2019-02-08 DIAGNOSIS — G47.33 SEVERE OBSTRUCTIVE SLEEP APNEA: Primary | ICD-10-CM

## 2019-02-08 DIAGNOSIS — E66.3 OVERWEIGHT (BMI 25.0-29.9): ICD-10-CM

## 2019-02-08 PROCEDURE — G0463 HOSPITAL OUTPT CLINIC VISIT: HCPCS

## 2019-02-08 NOTE — PROGRESS NOTES
"[facility]  Telephone: [phone] / Fax: [phone] [place]  Telephone: [phone] / Fax: [phone] Lauren Ruiz    PCP: Domenica Merida MD    Reason for visit: [employer] f/u    Rodolfo Grover is a 73 y.o.male  was last seen at [facility] sleep lab in August 2018. He remains on the CPAP device and denies snoring or gasping respirations. His mask is leaking now and he ordered new supplies from [employer].  He reports mild dry mouth. He has FFM but due to air leak it does not fit well. His sleep schedule is 10pm-5:30am. His ESS is 1.     SH- no tobacco, 1 alcohol, 0 energy drinks, 1 coffee.    ROS- negative    Current Medications:    Current Outpatient Medications:   •  acetaminophen (TYLENOL) 500 MG tablet, Take 2 tablets by mouth 3 (Three) Times a Day. (Patient taking differently: Take 1,000 mg by mouth 3 (Three) Times a Day As Needed.), Disp: , Rfl:   •  Multiple Vitamins-Minerals (EYE VITAMINS PO), Take 1 tablet by mouth Daily., Disp: , Rfl:   •  Multiple Vitamins-Minerals (MULTIVITAMIN WITH MINERALS) tablet tablet, Take 1 tablet by mouth Daily., Disp: , Rfl:   •  pantoprazole (PROTONIX) 40 MG EC tablet, Take 1 tablet by mouth 2 (Two) Times a Day Before Meals., Disp: 60 tablet, Rfl: 1   also entered in Sleep Questionnaire    Patient  has a past medical history of GERD (gastroesophageal reflux disease), Iritis of right eye, Rheumatic fever, and Shoulder fracture, right.    I have reviewed the Past Medical History, Past Surgical History, Social History and Family History.            ESS  1   Vital Signs /69 (BP Location: Left arm, Patient Position: Sitting)   Pulse 64   Ht 170.2 cm (67\")   Wt 82.7 kg (182 lb 6.4 oz)   SpO2 96%   BMI 28.57 kg/m²  Body mass index is 28.57 kg/m².    General Alert and oriented. No acute distress noted   Pharynx/Throat Class IV Mallampati airway, large tongue, no evidence of redundant lateral pharyngeal tissue. No oral lesions. No thrush. Moist mucous " membranes.   Head Normocephalic. Symmetrical. Atraumatic.    Nose No septal deviation. No drainage   Chest Wall Normal shape. Symmetric expansion with respiration. No tenderness.   Neck Trachea midline, no thyromegaly or adenopathy    Lungs Clear to auscultation bilaterally. No wheezes. No rhonchi. No rales. Respirations regular, even and unlabored.   Heart Regular rhythm and normal rate. Normal S1 and S2. No murmur   Abdomen Soft, non-tender and non-distended. Normal bowel sounds. No masses.   Extremities Moves all extremities well. No edema   Psychiatric Normal mood and affect.     Testing:  Download 11/10/18-2/7/19, 89% use with avg nightly use of 6 hours and 2 min on auto CPAP 18-20 with avg pressure of 17m AHI 0.5.    Study:  Shaan HST- AHI 40, lowest desaturation 70s; 68min of sats <90%         Testing:  Overnight oximetry on CPAP RA-August 2018- no desaturation      Impression:  1. Severe obstructive sleep apnea    2. Overweight (BMI 25.0-29.9)          Plan  He was asked to change his mask and monitor leak. If persists, he was given instructions to call us so that we reduce the CPAP pressures. His avg pressure is 17cm. He is doing very well on the CPAP subjectively and was asked to see us in 1 year if he continues to do well.    He uses the CPAP device and benefits from its use in terms of reduction of hypersomnia and snoring.Weight loss will be strongly beneficial to reduce the severity of sleep-disordered breathing.  Caution during activities that require prolonged concentration is strongly advised if sleepiness returns. Changing of PAP supplies regularly is important for effective use. Patient needs to change cushion on the mask or plugs on nasal pillows along with disposable filters once every month and change mask frame, tubing, headgear and Velcro straps every 6 months at the minimum.       Thank you for allowing me to participate in your patient's care.      NKECHI Swanson  [place]  [facility]  Phone: [phone]      Part of this note may be an electronic transcription/translation of spoken language to printed text using the Dragon Dictation System.

## 2019-02-09 PROBLEM — I27.20 PULMONARY HYPERTENSION: Status: ACTIVE | Noted: 2019-02-09

## 2019-02-11 ENCOUNTER — HOSPITAL ENCOUNTER (OUTPATIENT)
Dept: PHYSICAL THERAPY | Facility: HOSPITAL | Age: 74
Setting detail: THERAPIES SERIES
Discharge: HOME | End: 2019-02-11

## 2019-02-11 DIAGNOSIS — R53.1 GENERALIZED WEAKNESS: ICD-10-CM

## 2019-02-11 DIAGNOSIS — R26.89 IMPAIRED GAIT AND MOBILITY: Primary | ICD-10-CM

## 2019-02-11 PROCEDURE — 97110 THERAPEUTIC EXERCISES: CPT | Performed by: PHYSICAL THERAPIST

## 2019-02-11 NOTE — THERAPY TREATMENT NOTE
Outpatient Physical Therapy Ortho Treatment Note  [facility]     Patient Name: Rodolfo Grover  : 1945  MRN: [id number]  Today's Date: 2019      Visit Date: 2019    Visit Dx:    ICD-10-CM ICD-9-CM   1. Impaired gait and mobility R26.89 781.2   2. Generalized weakness R53.1 780.79       Patient Active Problem List   Diagnosis   • Acute blood loss anemia   • Fracture of fifth metacarpal bone of right hand   • Closed fracture dislocation of right shoulder   • Gastroesophageal reflux disease without esophagitis   • DNR (do not resuscitate)   • Bradycardia following surgery   • PVC (premature ventricular contraction)   • Proximal humerus fracture   • Osteoarthritis   • Severe obstructive sleep apnea   • Pure hypercholesterolemia   • Pulmonary hypertension (CMS/HCC)        Past Medical History:   Diagnosis Date   • GERD (gastroesophageal reflux disease)    • Iritis of right eye    • Rheumatic fever    • Shoulder fracture, right         Past Surgical History:   Procedure Laterality Date   • ENDOSCOPY N/A 2018    Erosive gastritis, diverticulosis   • HERNIA REPAIR     • SHOULDER CLOSED REDUCTION Right 3/16/2018    Procedure: RIGHT SHOULDER CLOSED REDUCTION;  Surgeon: Kj Garcia MD;  Location: [facility];  Service: Orthopedics   • TONSILLECTOMY     • TOTAL SHOULDER ARTHROPLASTY W/ DISTAL CLAVICLE EXCISION Right 3/22/2018    Procedure: Reverse Total Shoulder Arthroplsty;  Surgeon: Kj Garcia MD;  Location: [facility];  Service: Orthopedics                       PT Assessment/Plan     Row Name 19 0959          PT Assessment    Assessment Comments  Mr. Grover returns and reports/demonstrates improvement in his overall condition. We progressed his strengthening/balancing program without exacerbation of his symptoms.  He demosntrates improved balancing and improved ability to traverse stairs.  Mr. Grover is progressing toward all functional goals at this time.   -GJ        PT Plan     PT Plan Comments  continue to work on BLE strength/core strength/balance.    -GJ       User Key  (r) = Recorded By, (t) = Taken By, (c) = Cosigned By    Initials Name Provider Type    Silvano Regan, PT Physical Therapist          Modalities     Row Name 02/11/19 0700             Ice    Ice Applied  Yes  -GJ      Location  bilateral knees, pt. seated  -GJ      Rx Minutes  10 mins  -GJ      Ice S/P Rx  Yes  -GJ        User Key  (r) = Recorded By, (t) = Taken By, (c) = Cosigned By    Initials Name Provider Type    Silvano Regan, PT Physical Therapist          Exercises     Row Name 02/11/19 0751 02/11/19 0700          Subjective Comments    Subjective Comments  --  I was sore after last time.   -GJ        Subjective Pain    Pre-Treatment Pain Level  --  0  -GJ     Subjective Pain Comment  --  no pain, just stiffness  -GJ        Total Minutes    91242 - PT Therapeutic Exercise Minutes  44  -GJ  --        Exercise 1    Exercise Name 1  --  LTR  -GJ     Cueing 1  --  Verbal;Demo  -GJ     Reps 1  --  10  -GJ     Time 1  --  5 s  -GJ        Exercise 2    Exercise Name 2  --  piriformis stretch  -GJ     Cueing 2  --  Verbal;Demo  -GJ     Reps 2  --  3  -GJ     Time 2  --  20 s  -GJ        Exercise 3    Exercise Name 3  --  seated EOB HS stretch  -GJ     Cueing 3  --  Verbal;Demo  -GJ     Reps 3  --  3  -GJ     Time 3  --  20 s  -GJ        Exercise 4    Exercise Name 4  --  gastroc stretch from step  -GJ     Cueing 4  --  Verbal;Demo  -GJ     Reps 4  --  3  -GJ     Time 4  --  20 s  -GJ        Exercise 5    Exercise Name 5  --  calf raise, from step  -GJ     Cueing 5  --  Verbal;Demo  -GJ     Reps 5  --  20  -GJ        Exercise 6    Exercise Name 6  --  Nu step UE/LE  -GJ     Time 6  --  L5  5 min  -GJ        Exercise 7    Exercise Name 7  --  sidestep  -GJ     Cueing 7  --  Verbal;Demo  -GJ     Reps 7  --  10 ft,, 3 laps  -GJ     Additional Comments  --  RTB  -GJ        Exercise 8    Exercise Name 8  --   HS curl B   -GJ     Cueing 8  --  Verbal;Demo  -GJ     Sets 8  --  2  -GJ     Reps 8  --  10  -GJ     Additional Comments  --  3#  -GJ        Exercise 9    Exercise Name 9  --  LAQ B  -GJ     Cueing 9  --  Verbal;Demo  -GJ     Reps 9  --  20  -GJ     Additional Comments  --  5#  -GJ        Exercise 10    Exercise Name 10  --  SAQ B  -GJ     Cueing 10  --  Verbal;Demo  -GJ     Reps 10  --  20  -GJ     Additional Comments  --  5#  -GJ        Exercise 11    Exercise Name 11  --  Tandem stance (both feet in front)  -GJ     Cueing 11  --  Verbal;Demo  -GJ     Reps 11  --  3  -GJ     Time 11  --  20 s  -GJ        Exercise 12    Exercise Name 12  --  standing shoulder ext with TA  -GJ     Cueing 12  --  Verbal;Demo  -GJ     Reps 12  --  2  -GJ     Additional Comments  --  RTB, standingn on blue pad  -GJ        Exercise 13    Exercise Name 13  --  sit to/from stand (from slightly elevated mat table) with main focus on eccentric control  -GJ     Cueing 13  --  Verbal;Demo  -GJ     Reps 13  --  10  -GJ        Exercise 14    Exercise Name 14  --  PPT  -GJ     Cueing 14  --  Verbal;Tactile;Demo  -GJ     Reps 14  --  15  -GJ     Time 14  --  5  s  -GJ        Exercise 15    Exercise Name 15  --  standing hip abd, bilaterally , 1 hand  -GJ     Cueing 15  --  Verbal;Demo  -GJ     Reps 15  --  10  -GJ     Additional Comments  --  3#  -GJ        Exercise 16    Exercise Name 16  --  SLS, bilaterally   -GJ     Cueing 16  --  Verbal;Demo  -GJ     Reps 16  --  3  -GJ     Time 16  --  10 s  -GJ       User Key  (r) = Recorded By, (t) = Taken By, (c) = Cosigned By    Initials Name Provider Type    GJ Silvano Palencia W, PT Physical Therapist                         PT OP Goals     Row Name 02/11/19 0700          PT Short Term Goals    STG Date to Achieve  02/22/19  -GJ     STG 1  pt. to be I with initial HEP to facilitate self management of their condition  -GJ     STG 1 Progress  Met  -GJ     STG 2  pt. to be educated in/verbalize understanding of the  importance of posture/ergonomics in association with their condition to facilitate self management of their condition  -GJ     STG 2 Progress  Met  -GJ        Long Term Goals    LTG Date to Achieve  03/22/19  -GJ     LTG 1  pt. to be I with advanced HEP to facilitate self management of their condition  -GJ     LTG 1 Progress  Ongoing  -GJ     LTG 2  pt. to report an LEFS >/= 60% to demonstrate decreased level of perceived disability  -GJ     LTG 2 Progress  Ongoing  -GJ     LTG 3  pt to ascend/desdcend full flight of stairs reciprocally with </= 1 rail to facilitate ease/safety of household/community mobility  -GJ     LTG 3 Progress  Ongoing;Progressing  -GJ     LTG 3 Progress Comments  pt reports reciprocal pattern at home  -GJ     LTG 4  pt to demontrate SLS (bilatearlly) >/= 15 seconds to facilitate improved safety with gait/household/dressing activities  -GJ     LTG 4 Progress  Ongoing;Progressing  -GJ     LTG 4 Progress Comments  8 seconds, bilaterally. issued for home  -GJ     LTG 5  pt to be able to perform STS x 14 in 30 seconds to facilitate improved strength/endurance.  -GJ     LTG 5 Progress  Ongoing  -GJ     LTG 6  pt. to demonstrate ability to rise on R toes x 5 to facilitate improved strength/safety with gait  -GJ     LTG 6 Progress  Ongoing  -GJ       User Key  (r) = Recorded By, (t) = Taken By, (c) = Cosigned By    Initials Name Provider Type    Silvano Regan, PT Physical Therapist          Therapy Education  Given: HEP, Symptoms/condition management, Pain management, Mobility training, Posture/body mechanics  Program: New, Reinforced, Progressed  How Provided: Verbal, Demonstration, Written  Provided to: Patient  Level of Understanding: Teach back education performed, Demonstrated, Verbalized              Time Calculation:   Start Time: 0751  Stop Time: 0850  Time Calculation (min): 59 min  Therapy Suggested Charges     Code   Minutes Charges    12800 (CPT®) Hc Pt Neuromusc Re Education Ea 15  Min      70312 (CPT®) Hc Pt Ther Proc Ea 15 Min 44 3    34142 (CPT®) Hc Gait Training Ea 15 Min      94123 (CPT®) Hc Pt Therapeutic Act Ea 15 Min      60846 (CPT®) Hc Pt Manual Therapy Ea 15 Min      57989 (CPT®) Hc Pt Ther Massage- Per 15 Min      51776 (CPT®) Hc Pt Iontophoresis Ea 15 Min      28209 (CPT®) Hc Pt Elec Stim Ea-Per 15 Min      03453 (CPT®) Hc Pt Ultrasound Ea 15 Min      05439 (CPT®) Hc Pt Self Care/Mgmt/Train Ea 15 Min      01975 (CPT®) Hc Pt Prosthetic (S) Train Initial Encounter, Each 15 Min      40746 (CPT®) Hc Orthotic(S) Mgmt/Train Initial Encounter, Each 15min      28527 (CPT®) Hc Pt Aquatic Therapy Ea 15 Min      54489 (CPT®) Hc Pt Orthotic(S)/Prosthetic(S) Encounter, Each 15 Min       (CPT®) Hc Pt Electrical Stim Unattended      Total  44 3        Therapy Charges for Today     Code Description Service Date Service Provider Modifiers Qty    43219973567 HC PT THER PROC EA 15 MIN 2/11/2019 Silvano Palencia, PT GP 3    09728215748 HC PT HOT OR COLD PACK TREAT MCARE 2/11/2019 Silvano Palencia, PT GP 1                    Silvano Palencia, PT  2/11/2019

## 2019-02-14 ENCOUNTER — HOSPITAL ENCOUNTER (OUTPATIENT)
Dept: PHYSICAL THERAPY | Facility: HOSPITAL | Age: 74
Setting detail: THERAPIES SERIES
Discharge: HOME | End: 2019-02-14

## 2019-02-14 DIAGNOSIS — R26.89 IMPAIRED GAIT AND MOBILITY: Primary | ICD-10-CM

## 2019-02-14 DIAGNOSIS — R53.1 GENERALIZED WEAKNESS: ICD-10-CM

## 2019-02-14 PROCEDURE — 97110 THERAPEUTIC EXERCISES: CPT | Performed by: PHYSICAL THERAPIST

## 2019-02-14 NOTE — THERAPY TREATMENT NOTE
Outpatient Physical Therapy Ortho Treatment Note  [facility]     Patient Name: Rodolfo Grover  : 1945  MRN: [id number]  Today's Date: 2019      Visit Date: 2019    Visit Dx:    ICD-10-CM ICD-9-CM   1. Impaired gait and mobility R26.89 781.2   2. Generalized weakness R53.1 780.79       Patient Active Problem List   Diagnosis   • Acute blood loss anemia   • Fracture of fifth metacarpal bone of right hand   • Closed fracture dislocation of right shoulder   • Gastroesophageal reflux disease without esophagitis   • DNR (do not resuscitate)   • Bradycardia following surgery   • PVC (premature ventricular contraction)   • Proximal humerus fracture   • Osteoarthritis   • Severe obstructive sleep apnea   • Pure hypercholesterolemia   • Pulmonary hypertension (CMS/HCC)        Past Medical History:   Diagnosis Date   • GERD (gastroesophageal reflux disease)    • Iritis of right eye    • Rheumatic fever    • Shoulder fracture, right         Past Surgical History:   Procedure Laterality Date   • ENDOSCOPY N/A 2018    Erosive gastritis, diverticulosis   • HERNIA REPAIR     • SHOULDER CLOSED REDUCTION Right 3/16/2018    Procedure: RIGHT SHOULDER CLOSED REDUCTION;  Surgeon: Kj Garcia MD;  Location: [facility];  Service: Orthopedics   • TONSILLECTOMY     • TOTAL SHOULDER ARTHROPLASTY W/ DISTAL CLAVICLE EXCISION Right 3/22/2018    Procedure: Reverse Total Shoulder Arthroplsty;  Surgeon: Kj Garcia MD;  Location: [facility];  Service: Orthopedics                       PT Assessment/Plan     Row Name 19 1027          PT Assessment    Assessment Comments  Mr. Grover returns reporting mild soreness in R HS, we determined it was likely from holding R knee in flexion during SLS activities. Otherwise, he is progressing quite well and able to tolerate increase in difficulty in several exercises today without immediate adverese response.  He demosntrats improving SLS balance bilaterally and  improved tandem balance. He is progressing toward all functional goals at this time.   -GJ        PT Plan    PT Plan Comments  continue to work on BLE strength//core strength. Add step up to 6 inch step next session.   -GJ       User Key  (r) = Recorded By, (t) = Taken By, (c) = Cosigned By    Initials Name Provider Type     Silvano Palencia, PT Physical Therapist              Exercises     Row Name 02/14/19 0758 02/14/19 0700          Subjective Comments    Subjective Comments  --  i was sore for about a day or so.   -GJ        Total Minutes    19740 - PT Therapeutic Exercise Minutes  43  -GJ  --        Exercise 1    Exercise Name 1  --  LTR  -GJ     Cueing 1  --  Verbal;Demo  -GJ     Reps 1  --  10  -GJ     Time 1  --  5 s  -GJ        Exercise 2    Exercise Name 2  --  piriformis stretch  -GJ     Cueing 2  --  Verbal;Demo  -GJ     Reps 2  --  3  -GJ     Time 2  --  20 s  -GJ        Exercise 3    Exercise Name 3  --  seated EOB HS stretch  -GJ     Cueing 3  --  Verbal;Demo  -GJ     Reps 3  --  3  -GJ     Time 3  --  20 s  -GJ        Exercise 4    Exercise Name 4  --  gastroc stretch from step  -GJ     Cueing 4  --  Verbal;Demo  -GJ     Reps 4  --  3  -GJ     Time 4  --  20 s  -GJ        Exercise 5    Exercise Name 5  --  calf raise, from step  -GJ     Cueing 5  --  Verbal;Demo  -GJ     Reps 5  --  20  -GJ        Exercise 6    Exercise Name 6  --  Nu step UE/LE  -GJ     Cueing 6  --  Verbal  -GJ     Time 6  --  L5  5 min  -GJ        Exercise 7    Exercise Name 7  --  sidestep  -GJ     Cueing 7  --  Verbal;Demo  -GJ     Reps 7  --  10 ft,, 3 laps  -GJ     Additional Comments  --  RTB  -GJ        Exercise 8    Exercise Name 8  --   HS curl B  -GJ     Cueing 8  --  Verbal;Demo  -GJ     Sets 8  --  2  -GJ     Reps 8  --  10  -GJ     Additional Comments  --  3#, blue foam  -GJ        Exercise 9    Exercise Name 9  --  LAQ B  -GJ     Cueing 9  --  Verbal;Demo  -GJ     Reps 9  --  20  -GJ     Additional Comments  --  5#   -GJ        Exercise 10    Exercise Name 10  --  SAQ B  -GJ     Cueing 10  --  Verbal;Demo  -GJ     Reps 10  --  20  -GJ     Additional Comments  --  5#  -GJ        Exercise 11    Exercise Name 11  --  Tandem stance (both feet in front)  -GJ     Cueing 11  --  Verbal;Demo  -GJ     Reps 11  --  3  -GJ     Time 11  --  30 s  -GJ        Exercise 12    Exercise Name 12  --  standing shoulder ext with TA  -GJ     Cueing 12  --  Verbal;Demo  -GJ     Reps 12  --  20  -GJ     Additional Comments  --  GTB, standingn on blue pad  -GJ        Exercise 15    Exercise Name 15  --  standing hip abd, bilaterally , 1 hand  -GJ     Cueing 15  --  Verbal;Demo  -GJ     Reps 15  --  15  -GJ     Additional Comments  --  3#, blue foam  -GJ        Exercise 16    Exercise Name 16  --  SLS, bilaterally   -GJ     Cueing 16  --  Verbal;Demo  -GJ     Reps 16  --  3  -GJ     Time 16  --  10 s  -GJ       User Key  (r) = Recorded By, (t) = Taken By, (c) = Cosigned By    Initials Name Provider Type    Silvano Regan, PT Physical Therapist                         PT OP Goals     Row Name 02/14/19 0700          PT Short Term Goals    STG Date to Achieve  02/22/19  -GJ     STG 1  pt. to be I with initial HEP to facilitate self management of their condition  -GJ     STG 1 Progress  Met  -GJ     STG 2  pt. to be educated in/verbalize understanding of the importance of posture/ergonomics in association with their condition to facilitate self management of their condition  -GJ     STG 2 Progress  Met  -GJ        Long Term Goals    LTG Date to Achieve  03/22/19  -GJ     LTG 1  pt. to be I with advanced HEP to facilitate self management of their condition  -GJ     LTG 1 Progress  Ongoing  -GJ     LTG 2  pt. to report an LEFS >/= 60% to demonstrate decreased level of perceived disability  -GJ     LTG 2 Progress  Ongoing  -GJ     LTG 3  pt to ascend/desdcend full flight of stairs reciprocally with </= 1 rail to facilitate ease/safety of household/community  mobility  -GJ     LTG 3 Progress  Ongoing;Progressing  -GJ     LTG 4  pt to demontrate SLS (bilatearlly) >/= 15 seconds to facilitate improved safety with gait/household/dressing activities  -GJ     LTG 4 Progress  Ongoing;Progressing  -GJ     LTG 4 Progress Comments  improving, 5-8 seconds bilaterally   -GJ     LTG 5  pt to be able to perform STS x 14 in 30 seconds to facilitate improved strength/endurance.  -GJ     LTG 5 Progress  Ongoing  -GJ     LTG 6  pt. to demonstrate ability to rise on R toes x 5 to facilitate improved strength/safety with gait  -GJ     LTG 6 Progress  Ongoing  -GJ       User Key  (r) = Recorded By, (t) = Taken By, (c) = Cosigned By    Initials Name Provider Type    Silvano Regan, PT Physical Therapist          Therapy Education  Given: HEP, Symptoms/condition management, Pain management, Mobility training, Posture/body mechanics  Program: Reinforced, Progressed  How Provided: Verbal, Demonstration  Provided to: Patient  Level of Understanding: Teach back education performed, Verbalized, Demonstrated              Time Calculation:   Start Time: 0753  Stop Time: 0840  Time Calculation (min): 47 min  Therapy Suggested Charges     Code   Minutes Charges    91910 (CPT®) Hc Pt Neuromusc Re Education Ea 15 Min      54224 (CPT®) Hc Pt Ther Proc Ea 15 Min 43 3    22155 (CPT®) Hc Gait Training Ea 15 Min      27686 (CPT®) Hc Pt Therapeutic Act Ea 15 Min      25285 (CPT®) Hc Pt Manual Therapy Ea 15 Min      50817 (CPT®) Hc Pt Ther Massage- Per 15 Min      47636 (CPT®) Hc Pt Iontophoresis Ea 15 Min      69205 (CPT®) Hc Pt Elec Stim Ea-Per 15 Min      02413 (CPT®) Hc Pt Ultrasound Ea 15 Min      42762 (CPT®) Hc Pt Self Care/Mgmt/Train Ea 15 Min      22852 (CPT®) Hc Pt Prosthetic (S) Train Initial Encounter, Each 15 Min      73775 (CPT®) Hc Orthotic(S) Mgmt/Train Initial Encounter, Each 15min      97121 (CPT®) Hc Pt Aquatic Therapy Ea 15 Min      76884 (CPT®) Hc Pt Orthotic(S)/Prosthetic(S)  Encounter, Each 15 Min       (CPT®) Hc Pt Electrical Stim Unattended      Total  43 3        Therapy Charges for Today     Code Description Service Date Service Provider Modifiers Qty    01157390164 HC PT THER PROC EA 15 MIN 2/14/2019 Silvano Palencia, PT GP 3                    Silvano Palencia, PT  2/14/2019

## 2019-02-18 ENCOUNTER — HOSPITAL ENCOUNTER (OUTPATIENT)
Dept: PHYSICAL THERAPY | Facility: HOSPITAL | Age: 74
Setting detail: THERAPIES SERIES
Discharge: HOME | End: 2019-02-18

## 2019-02-18 DIAGNOSIS — R26.89 IMPAIRED GAIT AND MOBILITY: Primary | ICD-10-CM

## 2019-02-18 DIAGNOSIS — R53.1 GENERALIZED WEAKNESS: ICD-10-CM

## 2019-02-18 PROCEDURE — 97110 THERAPEUTIC EXERCISES: CPT

## 2019-02-18 NOTE — THERAPY TREATMENT NOTE
Outpatient Physical Therapy Ortho Treatment Note  [facility]     Patient Name: Rodolfo Grover  : 1945  MRN: [id number]  Today's Date: 2019      Visit Date: 2019    Visit Dx:    ICD-10-CM ICD-9-CM   1. Impaired gait and mobility R26.89 781.2   2. Generalized weakness R53.1 780.79       Patient Active Problem List   Diagnosis   • Acute blood loss anemia   • Fracture of fifth metacarpal bone of right hand   • Closed fracture dislocation of right shoulder   • Gastroesophageal reflux disease without esophagitis   • DNR (do not resuscitate)   • Bradycardia following surgery   • PVC (premature ventricular contraction)   • Proximal humerus fracture   • Osteoarthritis   • Severe obstructive sleep apnea   • Pure hypercholesterolemia   • Pulmonary hypertension (CMS/HCC)        Past Medical History:   Diagnosis Date   • GERD (gastroesophageal reflux disease)    • Iritis of right eye    • Rheumatic fever    • Shoulder fracture, right         Past Surgical History:   Procedure Laterality Date   • ENDOSCOPY N/A 2018    Erosive gastritis, diverticulosis   • HERNIA REPAIR     • SHOULDER CLOSED REDUCTION Right 3/16/2018    Procedure: RIGHT SHOULDER CLOSED REDUCTION;  Surgeon: Kj Garcia MD;  Location: [facility];  Service: Orthopedics   • TONSILLECTOMY     • TOTAL SHOULDER ARTHROPLASTY W/ DISTAL CLAVICLE EXCISION Right 3/22/2018    Procedure: Reverse Total Shoulder Arthroplsty;  Surgeon: Kj Garcia MD;  Location: [facility];  Service: Orthopedics                       PT Assessment/Plan     Row Name 19 0857          PT Assessment    Assessment Comments  Patient progressing with strength and balance. Continue approx 1 visit  -TAY       User Key  (r) = Recorded By, (t) = Taken By, (c) = Cosigned By    Initials Name Provider Type    Oumar Hughes PTA Physical Therapy Assistant              Exercises     Row Name 19 5090             Subjective Comments    Subjective  Comments  No pain starting therapy. Macrina be leaving out of town for 2 weeks. Then will return for last visit  -WS         Subjective Pain    Able to rate subjective pain?  yes  -WS      Pre-Treatment Pain Level  0  -WS         Total Minutes    76650 - PT Therapeutic Exercise Minutes  45  -WS         Exercise 1    Exercise Name 1  LTR  -WS      Cueing 1  Verbal;Demo  -WS      Reps 1  10  -WS      Time 1  5 s  -WS         Exercise 2    Exercise Name 2  piriformis stretch  -WS      Cueing 2  Verbal;Demo  -WS      Reps 2  3  -WS      Time 2  20 s  -WS         Exercise 3    Exercise Name 3  seated EOB HS stretch  -WS      Cueing 3  Verbal;Demo  -WS      Reps 3  3  -WS      Time 3  20 s  -WS         Exercise 4    Exercise Name 4  gastroc stretch from step  -WS      Cueing 4  Verbal;Demo  -WS      Reps 4  3  -WS      Time 4  20 s  -WS         Exercise 5    Exercise Name 5  calf raise, from step  -WS      Cueing 5  Verbal;Demo  -WS      Reps 5  20  -WS         Exercise 6    Exercise Name 6  Nu step UE/LE  -WS      Cueing 6  Verbal  -WS      Time 6  L5  5 min  -WS         Exercise 7    Exercise Name 7  sidestep  -WS      Cueing 7  Verbal;Demo  -WS      Reps 7  10 ft,, 3 laps  -WS      Additional Comments  RTB  -WS         Exercise 8    Exercise Name 8   HS curl B  -WS      Cueing 8  Verbal;Demo  -WS      Sets 8  2  -WS      Reps 8  10  -WS      Additional Comments  3# blue foam  -WS         Exercise 9    Exercise Name 9  LAQ B  -WS      Cueing 9  Verbal;Demo  -WS      Reps 9  20  -WS      Additional Comments  5#  -WS         Exercise 10    Exercise Name 10  SAQ B  -WS      Cueing 10  Verbal;Demo  -WS      Reps 10  20  -WS      Additional Comments  5#  -WS         Exercise 11    Exercise Name 11  Tandem stance (both feet in front)  -WS      Cueing 11  Verbal;Demo  -WS      Reps 11  3  -WS      Time 11  30 s  -WS         Exercise 12    Exercise Name 12  standing shoulder ext with TA  -WS      Cueing 12  Verbal;Demo  -WS       "Reps 12  20  -WS      Time 12  on blue foam  -WS      Additional Comments  GTB  -WS         Exercise 13    Exercise Name 13  6\" step up  -WS      Reps 13  10 each  -WS         Exercise 15    Exercise Name 15  standing hip abd, bilaterally , 1 hand  -WS      Cueing 15  Verbal;Demo  -WS      Reps 15  15  -WS      Additional Comments  3# on blue foam  -WS         Exercise 16    Exercise Name 16  SLS, bilaterally   -WS      Cueing 16  Verbal;Demo  -WS      Reps 16  3  -WS      Time 16  10 s  -WS        User Key  (r) = Recorded By, (t) = Taken By, (c) = Cosigned By    Initials Name Provider Type    Oumar Hughes PTA Physical Therapy Assistant                         PT OP Goals     Row Name 02/18/19 0800          PT Short Term Goals    STG Date to Achieve  02/22/19  -WS     STG 1  pt. to be I with initial HEP to facilitate self management of their condition  -WS     STG 1 Progress  Met  -WS     STG 2  pt. to be educated in/verbalize understanding of the importance of posture/ergonomics in association with their condition to facilitate self management of their condition  -WS     STG 2 Progress  Met  -WS        Long Term Goals    LTG Date to Achieve  03/22/19  -WS     LTG 1  pt. to be I with advanced HEP to facilitate self management of their condition  -WS     LTG 1 Progress  Ongoing  -WS     LTG 2  pt. to report an LEFS >/= 60% to demonstrate decreased level of perceived disability  -WS     LTG 2 Progress  Ongoing  -WS     LTG 3  pt to ascend/desdcend full flight of stairs reciprocally with </= 1 rail to facilitate ease/safety of household/community mobility  -WS     LTG 3 Progress  Ongoing;Progressing  -WS     LTG 4  pt to demontrate SLS (bilatearlly) >/= 15 seconds to facilitate improved safety with gait/household/dressing activities  -WS     LTG 4 Progress  Ongoing;Progressing  -WS     LTG 5  pt to be able to perform STS x 14 in 30 seconds to facilitate improved strength/endurance.  -WS     LTG 5 Progress  " Ongoing  -WS     LTG 6  pt. to demonstrate ability to rise on R toes x 5 to facilitate improved strength/safety with gait  -WS     LTG 6 Progress  Ongoing  -       User Key  (r) = Recorded By, (t) = Taken By, (c) = Cosigned By    Initials Name Provider Type    Oumar Hughes PTA Physical Therapy Assistant          Therapy Education  Given: HEP, Symptoms/condition management, Pain management, Posture/body mechanics  Program: Reinforced  How Provided: Verbal  Provided to: Patient              Time Calculation:   Start Time: 0815  Stop Time: 0900  Time Calculation (min): 45 min  Therapy Suggested Charges     Code   Minutes Charges    98459 (CPT®) Hc Pt Neuromusc Re Education Ea 15 Min      78822 (CPT®) Hc Pt Ther Proc Ea 15 Min 45 3    03425 (CPT®) Hc Gait Training Ea 15 Min      71110 (CPT®) Hc Pt Therapeutic Act Ea 15 Min      60406 (CPT®) Hc Pt Manual Therapy Ea 15 Min      74420 (CPT®) Hc Pt Ther Massage- Per 15 Min      76258 (CPT®) Hc Pt Iontophoresis Ea 15 Min      70870 (CPT®) Hc Pt Elec Stim Ea-Per 15 Min      54096 (CPT®) Hc Pt Ultrasound Ea 15 Min      97489 (CPT®) Hc Pt Self Care/Mgmt/Train Ea 15 Min      45061 (CPT®) Hc Pt Prosthetic (S) Train Initial Encounter, Each 15 Min      20370 (CPT®) Hc Orthotic(S) Mgmt/Train Initial Encounter, Each 15min      49800 (CPT®) Hc Pt Aquatic Therapy Ea 15 Min      58826 (CPT®) Hc Pt Orthotic(S)/Prosthetic(S) Encounter, Each 15 Min       (CPT®) Hc Pt Electrical Stim Unattended      Total  45 3        Therapy Charges for Today     Code Description Service Date Service Provider Modifiers Qty    34519428260 HC PT THER PROC EA 15 MIN 2/18/2019 Oumar Rg PTA GP 3                    Oumar Rg PTA  2/18/2019

## 2019-03-04 ENCOUNTER — HOSPITAL ENCOUNTER (OUTPATIENT)
Dept: PHYSICAL THERAPY | Facility: HOSPITAL | Age: 74
Setting detail: THERAPIES SERIES
Discharge: HOME | End: 2019-03-04

## 2019-03-04 DIAGNOSIS — R53.1 GENERALIZED WEAKNESS: ICD-10-CM

## 2019-03-04 DIAGNOSIS — R26.89 IMPAIRED GAIT AND MOBILITY: Primary | ICD-10-CM

## 2019-03-04 PROCEDURE — 97110 THERAPEUTIC EXERCISES: CPT

## 2019-03-04 NOTE — THERAPY TREATMENT NOTE
Outpatient Physical Therapy Ortho Treatment Note  [facility]     Patient Name: Rodolfo Grover  : 1945  MRN: [id number]  Today's Date: 3/4/2019      Visit Date: 2019    Visit Dx:    ICD-10-CM ICD-9-CM   1. Impaired gait and mobility R26.89 781.2   2. Generalized weakness R53.1 780.79       Patient Active Problem List   Diagnosis   • Acute blood loss anemia   • Fracture of fifth metacarpal bone of right hand   • Closed fracture dislocation of right shoulder   • Gastroesophageal reflux disease without esophagitis   • DNR (do not resuscitate)   • Bradycardia following surgery   • PVC (premature ventricular contraction)   • Proximal humerus fracture   • Osteoarthritis   • Severe obstructive sleep apnea   • Pure hypercholesterolemia   • Pulmonary hypertension (CMS/HCC)        Past Medical History:   Diagnosis Date   • GERD (gastroesophageal reflux disease)    • Iritis of right eye    • Rheumatic fever    • Shoulder fracture, right         Past Surgical History:   Procedure Laterality Date   • ENDOSCOPY N/A 2018    Erosive gastritis, diverticulosis   • HERNIA REPAIR     • SHOULDER CLOSED REDUCTION Right 3/16/2018    Procedure: RIGHT SHOULDER CLOSED REDUCTION;  Surgeon: Kj Garcia MD;  Location: [facility];  Service: Orthopedics   • TONSILLECTOMY     • TOTAL SHOULDER ARTHROPLASTY W/ DISTAL CLAVICLE EXCISION Right 3/22/2018    Procedure: Reverse Total Shoulder Arthroplsty;  Surgeon: Kj Garcia MD;  Location: [facility];  Service: Orthopedics                       PT Assessment/Plan     Row Name 19 1839          PT Assessment    Assessment Comments  Patient reports improved abilty to perfrom steps. Balance is improved. Will see one more amado and d/c to HEP  -WS       User Key  (r) = Recorded By, (t) = Taken By, (c) = Cosigned By    Initials Name Provider Type    Oumar Hughes PTA Physical Therapy Assistant              Exercises     Row Name 19 3232              Subjective Comments    Subjective Comments  no pain  -WS         Subjective Pain    Able to rate subjective pain?  yes  -WS      Pre-Treatment Pain Level  0  -WS         Total Minutes    20210 - PT Therapeutic Exercise Minutes  45  -WS         Exercise 1    Exercise Name 1  LTR  -WS      Cueing 1  Verbal;Demo  -WS      Reps 1  10  -WS      Time 1  5 s  -WS         Exercise 2    Exercise Name 2  piriformis stretch  -WS      Cueing 2  Verbal;Demo  -WS      Reps 2  3  -WS      Time 2  20 s  -WS         Exercise 3    Exercise Name 3  seated EOB HS stretch  -WS      Cueing 3  Verbal;Demo  -WS      Reps 3  3  -WS      Time 3  20 s  -WS         Exercise 4    Exercise Name 4  gastroc stretch from step  -WS      Cueing 4  Verbal;Demo  -WS      Reps 4  3  -WS      Time 4  20 s  -WS         Exercise 5    Exercise Name 5  calf raise, from step  -WS      Cueing 5  Verbal;Demo  -WS      Reps 5  20  -WS         Exercise 6    Exercise Name 6  Nu step UE/LE  -WS      Cueing 6  Verbal  -WS      Time 6  L5  5 min  -WS         Exercise 7    Exercise Name 7  sidestep  -WS      Cueing 7  Verbal;Demo  -WS      Reps 7  10 ft,, 3 laps  -WS      Additional Comments  RTB  -WS         Exercise 8    Exercise Name 8   HS curl B  -WS      Cueing 8  Verbal;Demo  -WS      Sets 8  2  -WS      Reps 8  10  -WS      Additional Comments  3# on blue foam  -WS         Exercise 9    Exercise Name 9  LAQ B  -WS      Cueing 9  Verbal;Demo  -WS      Reps 9  20  -WS      Additional Comments  5#  -WS         Exercise 10    Exercise Name 10  SAQ B  -WS      Cueing 10  Verbal;Demo  -WS      Reps 10  20  -WS      Additional Comments  5#  -WS         Exercise 11    Exercise Name 11  Tandem stance (both feet in front)  -WS      Cueing 11  Verbal;Demo  -WS      Reps 11  3  -WS      Time 11  30 s  -WS         Exercise 12    Exercise Name 12  standing shoulder ext with TA  -WS      Cueing 12  Verbal;Demo  -WS      Reps 12  20  -WS      Time 12  on blue foam  -WS       "Additional Comments  GTB  -WS         Exercise 13    Exercise Name 13  6\" step up  -WS      Reps 13  10 each  -WS         Exercise 15    Exercise Name 15  standing hip abd, bilaterally , 1 hand  -WS      Cueing 15  Verbal;Demo  -WS      Reps 15  15  -WS      Additional Comments  3# on blue foam  -WS         Exercise 16    Exercise Name 16  SLS, bilaterally   -WS      Cueing 16  Verbal;Demo  -WS      Reps 16  3  -WS      Time 16  10 s  -WS        User Key  (r) = Recorded By, (t) = Taken By, (c) = Cosigned By    Initials Name Provider Type    Oumar Hughes PTA Physical Therapy Assistant                         PT OP Goals     Row Name 03/04/19 1500          PT Short Term Goals    STG Date to Achieve  02/22/19  -WS     STG 1  pt. to be I with initial HEP to facilitate self management of their condition  -WS     STG 1 Progress  Met  -WS     STG 2  pt. to be educated in/verbalize understanding of the importance of posture/ergonomics in association with their condition to facilitate self management of their condition  -WS     STG 2 Progress  Met  -        Long Term Goals    LTG Date to Achieve  03/22/19  -WS     LTG 1  pt. to be I with advanced HEP to facilitate self management of their condition  -WS     LTG 1 Progress  Ongoing  -WS     LTG 2  pt. to report an LEFS >/= 60% to demonstrate decreased level of perceived disability  -WS     LTG 2 Progress  Ongoing  -WS     LTG 3  pt to ascend/desdcend full flight of stairs reciprocally with </= 1 rail to facilitate ease/safety of household/community mobility  -WS     LTG 3 Progress  Met  -WS     LTG 4  pt to demontrate SLS (bilatearlly) >/= 15 seconds to facilitate improved safety with gait/household/dressing activities  -WS     LTG 4 Progress  Ongoing;Progressing  -WS     LTG 5  pt to be able to perform STS x 14 in 30 seconds to facilitate improved strength/endurance.  -WS     LTG 5 Progress  Ongoing  -WS     LTG 6  pt. to demonstrate ability to rise on R toes x 5 " to facilitate improved strength/safety with gait  -WS     LTG 6 Progress  Ongoing  -WS       User Key  (r) = Recorded By, (t) = Taken By, (c) = Cosigned By    Initials Name Provider Type    Oumar Hughes PTA Physical Therapy Assistant          Therapy Education  Given: HEP, Symptoms/condition management, Pain management, Posture/body mechanics  Program: Reinforced  How Provided: Verbal  Provided to: Patient              Time Calculation:   Start Time: 1445  Stop Time: 1530  Time Calculation (min): 45 min  Therapy Suggested Charges     Code   Minutes Charges    82840 (CPT®) Hc Pt Neuromusc Re Education Ea 15 Min      51122 (CPT®) Hc Pt Ther Proc Ea 15 Min 45 3    93451 (CPT®) Hc Gait Training Ea 15 Min      76795 (CPT®) Hc Pt Therapeutic Act Ea 15 Min      89232 (CPT®) Hc Pt Manual Therapy Ea 15 Min      70710 (CPT®) Hc Pt Ther Massage- Per 15 Min      15619 (CPT®) Hc Pt Iontophoresis Ea 15 Min      52712 (CPT®) Hc Pt Elec Stim Ea-Per 15 Min      50251 (CPT®) Hc Pt Ultrasound Ea 15 Min      30603 (CPT®) Hc Pt Self Care/Mgmt/Train Ea 15 Min      16734 (CPT®) Hc Pt Prosthetic (S) Train Initial Encounter, Each 15 Min      57266 (CPT®) Hc Orthotic(S) Mgmt/Train Initial Encounter, Each 15min      38010 (CPT®) Hc Pt Aquatic Therapy Ea 15 Min      63718 (CPT®) Hc Pt Orthotic(S)/Prosthetic(S) Encounter, Each 15 Min       (CPT®) Hc Pt Electrical Stim Unattended      Total  45 3        Therapy Charges for Today     Code Description Service Date Service Provider Modifiers Qty    68956592898 HC PT THER PROC EA 15 MIN 3/4/2019 Oumar Rg PTA GP 3                    Oumar Rg PTA  3/4/2019

## 2019-03-06 ENCOUNTER — HOSPITAL ENCOUNTER (OUTPATIENT)
Dept: PHYSICAL THERAPY | Facility: HOSPITAL | Age: 74
Setting detail: THERAPIES SERIES
Discharge: HOME | End: 2019-03-06

## 2019-03-06 DIAGNOSIS — R53.1 GENERALIZED WEAKNESS: ICD-10-CM

## 2019-03-06 DIAGNOSIS — R26.89 IMPAIRED GAIT AND MOBILITY: Primary | ICD-10-CM

## 2019-03-06 PROCEDURE — 97110 THERAPEUTIC EXERCISES: CPT | Performed by: PHYSICAL THERAPIST

## 2019-03-06 NOTE — THERAPY DISCHARGE NOTE
Outpatient Physical Therapy Ortho Treatment Note/Discharge Summary  [facility]     Patient Name: Rodolfo Grover  : 1945  MRN: [id number]  Today's Date: 3/6/2019      Visit Date: 2019    Visit Dx:    ICD-10-CM ICD-9-CM   1. Impaired gait and mobility R26.89 781.2   2. Generalized weakness R53.1 780.79       Patient Active Problem List   Diagnosis   • Acute blood loss anemia   • Fracture of fifth metacarpal bone of right hand   • Closed fracture dislocation of right shoulder   • Gastroesophageal reflux disease without esophagitis   • DNR (do not resuscitate)   • Bradycardia following surgery   • PVC (premature ventricular contraction)   • Proximal humerus fracture   • Osteoarthritis   • Severe obstructive sleep apnea   • Pure hypercholesterolemia   • Pulmonary hypertension (CMS/HCC)        Past Medical History:   Diagnosis Date   • GERD (gastroesophageal reflux disease)    • Iritis of right eye    • Rheumatic fever    • Shoulder fracture, right         Past Surgical History:   Procedure Laterality Date   • ENDOSCOPY N/A 2018    Erosive gastritis, diverticulosis   • HERNIA REPAIR     • SHOULDER CLOSED REDUCTION Right 3/16/2018    Procedure: RIGHT SHOULDER CLOSED REDUCTION;  Surgeon: Kj Garcia MD;  Location: [facility];  Service: Orthopedics   • TONSILLECTOMY     • TOTAL SHOULDER ARTHROPLASTY W/ DISTAL CLAVICLE EXCISION Right 3/22/2018    Procedure: Reverse Total Shoulder Arthroplsty;  Surgeon: Kj Garcia MD;  Location: [facility];  Service: Orthopedics                       PT Assessment/Plan     Row Name 19 1458          PT Assessment    Assessment Comments  Mr. Grover attended 9 sessions of physical therapy from 19-3/6/19 for his knee pain.  He is indepndent with his HEP, demontrates ability to ascend/descend stairs reciprocally, adn demonstrate improvement in sit to/from stand transition.  He verbalizes a good understanding of his condition.  Mr. Grover is discharged  from outpatient physcial therapy to an independent program. HEP to be 2-3 x's per day, he is encoruaged to call with questions.    -GJ        PT Plan    PT Plan Comments  DC to HEP, call with questions  -GJ       User Key  (r) = Recorded By, (t) = Taken By, (c) = Cosigned By    Initials Name Provider Type    Silvano Regan, PT Physical Therapist              Exercises     Row Name 03/06/19 1400             Subjective Comments    Subjective Comments  I really am feeling better. I joined a wellness like center focussing on sleep, nutrition, exercise  -GJ         Total Minutes    58365 - PT Therapeutic Exercise Minutes  40  -GJ         Exercise 1    Exercise Name 1  LTR  -GJ      Cueing 1  Verbal;Demo  -GJ      Reps 1  10  -GJ      Time 1  5 s  -GJ         Exercise 2    Exercise Name 2  piriformis stretch  -GJ      Cueing 2  Verbal;Demo  -GJ      Reps 2  3  -GJ      Time 2  20 s  -GJ         Exercise 4    Exercise Name 4  gastroc stretch from step  -GJ      Cueing 4  Verbal;Demo  -GJ      Reps 4  3  -GJ      Time 4  20 s  -GJ         Exercise 5    Exercise Name 5  calf raise, from step  -GJ      Cueing 5  Verbal;Demo  -GJ      Reps 5  20  -GJ         Exercise 6    Exercise Name 6  Nu step UE/LE  -GJ      Time 6  L5  5 min  -GJ         Exercise 7    Exercise Name 7  sidestep  -GJ      Cueing 7  Verbal;Demo  -GJ      Reps 7  10 ft,, 3 laps  -GJ      Additional Comments  RTB  -GJ         Exercise 8    Exercise Name 8   HS curl B  -GJ      Cueing 8  Verbal;Demo  -GJ      Sets 8  2  -GJ      Reps 8  10  -GJ      Additional Comments  3# on blue foam  -GJ         Exercise 9    Exercise Name 9  LAQ B  -GJ      Cueing 9  Verbal;Demo  -GJ      Reps 9  20  -GJ      Additional Comments  5#  -GJ         Exercise 12    Exercise Name 12  standing shoulder ext with TA  -GJ      Cueing 12  Verbal;Demo  -GJ      Reps 12  20  -GJ      Time 12  on blue foam  -GJ      Additional Comments  GTB  -GJ         Exercise 13    Exercise Name  "13  6\" step up  -GJ      Cueing 13  Verbal;Demo  -GJ      Reps 13  15 each  -GJ         Exercise 15    Exercise Name 15  standing hip abd, bilaterally , 1 hand  -GJ      Cueing 15  Verbal;Demo  -GJ      Reps 15  15  -GJ      Additional Comments  3# on blue foam  -GJ         Exercise 16    Exercise Name 16  SLS, bilaterally   -GJ      Cueing 16  Verbal;Demo  -GJ      Reps 16  3  -GJ      Time 16  10 s  -GJ        User Key  (r) = Recorded By, (t) = Taken By, (c) = Cosigned By    Initials Name Provider Type    Silvano Regan, PT Physical Therapist                         PT OP Goals     Row Name 03/06/19 1400          PT Short Term Goals    STG Date to Achieve  02/22/19  -GJ     STG 1  pt. to be I with initial HEP to facilitate self management of their condition  -GJ     STG 1 Progress  Met  -GJ     STG 2  pt. to be educated in/verbalize understanding of the importance of posture/ergonomics in association with their condition to facilitate self management of their condition  -GJ     STG 2 Progress  Met  -GJ        Long Term Goals    LTG Date to Achieve  03/22/19  -GJ     LTG 1  pt. to be I with advanced HEP to facilitate self management of their condition  -GJ     LTG 1 Progress  Met  -GJ     LTG 2  pt. to report an LEFS >/= 60% to demonstrate decreased level of perceived disability  -GJ     LTG 2 Progress  Met  -GJ     LTG 2 Progress Comments  60%  -GJ     LTG 3  pt to ascend/desdcend full flight of stairs reciprocally with </= 1 rail to facilitate ease/safety of household/community mobility  -GJ     LTG 3 Progress  Met  -GJ     LTG 4  pt to demontrate SLS (bilatearlly) >/= 15 seconds to facilitate improved safety with gait/household/dressing activities  -GJ     LTG 4 Progress  Partially Met  -GJ     LTG 4 Progress Comments  L 20 seconds, R 8 seconds  -GJ     LTG 5  pt to be able to perform STS x 14 in 30 seconds to facilitate improved strength/endurance.  -GJ     LTG 5 Progress  Not Met  -GJ     LTG 5 Progress " Comments  12, initial score was 7 reps  -GJ     LTG 6  pt. to demonstrate ability to rise on R toes x 5 to facilitate improved strength/safety with gait  -GJ     LTG 6 Progress  Met  -       User Key  (r) = Recorded By, (t) = Taken By, (c) = Cosigned By    Initials Name Provider Type    Silvano Regan, PT Physical Therapist          Therapy Education  Education Details: encouraged to continue HEP 2-3 x's per day, encouraged pt. to call with questions  Given: HEP, Symptoms/condition management, Pain management, Mobility training, Posture/body mechanics  Program: Reinforced  Provided to: Patient  Level of Understanding: Teach back education performed, Verbalized, Demonstrated    Outcome Measure Options: Lower Extremity Functional Scale (LEFS), 30 Second Chair Stand Test, 2 Minute Walk Test(60%)  30 Second Chair Stand Test  30 Second Chair Stand Test: 12         Time Calculation:   Start Time: 1400  Stop Time: 1446  Time Calculation (min): 46 min  Therapy Suggested Charges     Code   Minutes Charges    34609 (CPT®) Hc Pt Neuromusc Re Education Ea 15 Min      75326 (CPT®) Hc Pt Ther Proc Ea 15 Min 40 3    91144 (CPT®) Hc Gait Training Ea 15 Min      53261 (CPT®) Hc Pt Therapeutic Act Ea 15 Min      87146 (CPT®) Hc Pt Manual Therapy Ea 15 Min      43448 (CPT®) Hc Pt Ther Massage- Per 15 Min      85474 (CPT®) Hc Pt Iontophoresis Ea 15 Min      07674 (CPT®) Hc Pt Elec Stim Ea-Per 15 Min      27155 (CPT®) Hc Pt Ultrasound Ea 15 Min      27386 (CPT®) Hc Pt Self Care/Mgmt/Train Ea 15 Min      48093 (CPT®) Hc Pt Prosthetic (S) Train Initial Encounter, Each 15 Min      75019 (CPT®) Hc Orthotic(S) Mgmt/Train Initial Encounter, Each 15min      50804 (CPT®) Hc Pt Aquatic Therapy Ea 15 Min      95857 (CPT®) Hc Pt Orthotic(S)/Prosthetic(S) Encounter, Each 15 Min       (CPT®) Hc Pt Electrical Stim Unattended      Total  40 3        Therapy Charges for Today     Code Description Service Date Service Provider Modifiers Qty     83952102398  PT THER PROC EA 15 MIN 3/6/2019 Silvano Palencia, PT GP 3          PT G-Codes  Outcome Measure Options: Lower Extremity Functional Scale (LEFS), 30 Second Chair Stand Test, 2 Minute Walk Test(60%)     OP PT Discharge Summary  Date of Discharge: 03/06/19  Reason for Discharge: Independent  Outcomes Achieved: Patient able to partially acheive established goals  Discharge Destination: Home with home program  Discharge Instructions/Additional Comments: call with questions, HEP 2-3 x's per week       Silvano Palencia, PT  3/6/2019

## 2019-05-23 ENCOUNTER — TELEPHONE (OUTPATIENT)
Dept: SLEEP MEDICINE | Facility: HOSPITAL | Age: 74
End: 2019-05-23

## 2019-05-23 NOTE — TELEPHONE ENCOUNTER
Patient called into sleep center, stated he would like his pressure decreased. Stated when he last seen Chetna Z they talked about reducing pressure if he was still having issues with mask leaks. Patient stated he has received new masks and tried diff variations and still having leak issues. Informed patient would speak with NP and call back with outcome. MAB

## 2019-05-24 ENCOUNTER — DOCUMENTATION (OUTPATIENT)
Dept: SLEEP MEDICINE | Facility: HOSPITAL | Age: 74
End: 2019-05-24

## 2019-05-24 NOTE — PROGRESS NOTES
Patient called in complaining of pressure intolerance, per last ov patient was instructed to call in if was still having issues after mask change and working with mask. Per Chetna z was given verbal order to change patient from auto setting of 18-20cm to new auto setting of 15-17cm. Changes made in [employer] through patients active modem. MAB

## 2019-08-07 ENCOUNTER — TELEPHONE (OUTPATIENT)
Dept: CARDIOLOGY | Facility: CLINIC | Age: 74
End: 2019-08-07

## 2019-08-07 ENCOUNTER — OFFICE VISIT (OUTPATIENT)
Dept: ORTHOPEDIC SURGERY | Facility: CLINIC | Age: 74
End: 2019-08-07

## 2019-08-07 ENCOUNTER — OFFICE VISIT (OUTPATIENT)
Dept: CARDIOLOGY | Facility: CLINIC | Age: 74
End: 2019-08-07

## 2019-08-07 VITALS — HEIGHT: 67 IN | TEMPERATURE: 97.9 F | WEIGHT: 174 LBS | BODY MASS INDEX: 27.31 KG/M2

## 2019-08-07 VITALS
HEART RATE: 74 BPM | DIASTOLIC BLOOD PRESSURE: 68 MMHG | WEIGHT: 177 LBS | BODY MASS INDEX: 27.78 KG/M2 | HEIGHT: 67 IN | SYSTOLIC BLOOD PRESSURE: 152 MMHG

## 2019-08-07 DIAGNOSIS — R60.0 LOCALIZED EDEMA: ICD-10-CM

## 2019-08-07 DIAGNOSIS — Z96.611 S/P SHOULDER REPLACEMENT, RIGHT: Primary | ICD-10-CM

## 2019-08-07 DIAGNOSIS — R06.09 DOE (DYSPNEA ON EXERTION): Primary | ICD-10-CM

## 2019-08-07 DIAGNOSIS — I49.3 FREQUENT PVCS: ICD-10-CM

## 2019-08-07 DIAGNOSIS — I27.20 PULMONARY HYPERTENSION (HCC): ICD-10-CM

## 2019-08-07 PROCEDURE — 99214 OFFICE O/P EST MOD 30 MIN: CPT | Performed by: INTERNAL MEDICINE

## 2019-08-07 PROCEDURE — 93000 ELECTROCARDIOGRAM COMPLETE: CPT | Performed by: INTERNAL MEDICINE

## 2019-08-07 PROCEDURE — 73030 X-RAY EXAM OF SHOULDER: CPT | Performed by: ORTHOPAEDIC SURGERY

## 2019-08-07 PROCEDURE — 99212 OFFICE O/P EST SF 10 MIN: CPT | Performed by: ORTHOPAEDIC SURGERY

## 2019-08-07 RX ORDER — CELECOXIB 200 MG/1
200 CAPSULE ORAL 2 TIMES DAILY PRN
Qty: 60 CAPSULE | Refills: 2 | Status: SHIPPED | OUTPATIENT
Start: 2019-08-07 | End: 2019-08-07

## 2019-08-07 NOTE — PROGRESS NOTES
"Rodolfo Grover : 1945 MRN: [id number] DATE: 2019      DIAGNOSIS:  Annual follow up right reverse total shoulder arthroplasty for fracture    SUBJECTIVE: Patient returns today for 1 year follow up of his right reverse total shoulder replacement. Patient reports doing well with no unusual complaints. Denies any limitations due to the shoulder.  He does report occasional soreness.    OBJECTIVE:    Temp 97.9 °F (36.6 °C)   Ht 170.2 cm (67\")   Wt 78.9 kg (174 lb)   BMI 27.25 kg/m²   Family History   Problem Relation Age of Onset   • Malig Hyperthermia Neg Hx      Past Medical History:   Diagnosis Date   • GERD (gastroesophageal reflux disease)    • Iritis of right eye    • PVC (premature ventricular contraction)    • Rheumatic fever    • Severe obstructive sleep apnea    • Shoulder fracture, right      Past Surgical History:   Procedure Laterality Date   • ENDOSCOPY N/A 2018    Erosive gastritis, diverticulosis   • HERNIA REPAIR     • SHOULDER CLOSED REDUCTION Right 3/16/2018    Procedure: RIGHT SHOULDER CLOSED REDUCTION;  Surgeon: Kj Garcia MD;  Location: [facility];  Service: Orthopedics   • TONSILLECTOMY     • TOTAL SHOULDER ARTHROPLASTY W/ DISTAL CLAVICLE EXCISION Right 3/22/2018    Procedure: Reverse Total Shoulder Arthroplsty;  Surgeon: Kj Garcia MD;  Location: [facility];  Service: Orthopedics     Social History     Socioeconomic History   • Marital status:      Spouse name: Not on file   • Number of children: Not on file   • Years of education: Not on file   • Highest education level: Not on file   Tobacco Use   • Smoking status: Never Smoker   • Smokeless tobacco: Never Used   Substance and Sexual Activity   • Alcohol use: Yes     Frequency: Monthly or less     Drinks per session: 1 or 2     Comment: occasional   • Drug use: No   • Sexual activity: Defer     Review of Systems - Negative except as above    Exam:. The incision is well healed. No effusion.  Range of " motion is measured at , ER 50, IR T10. The arm is soft and nontender.  Good strength with elevation and abduction. Intact to light touch with palpable distal pulses.     DIAGNOSTIC STUDIES  Xrays: 3 views of the right shoulder (AP, scapular Y and axillary) were ordered and reviewed for evaluation of shoulder replacement. They demonstrate a well positioned, well aligned replacement without complicating factors noted. In comparison with previous films there has been no change.    ASSESSMENT: Annual follow up right reverse total shoulder replacement.    PLAN:  Continue activities as tolerated    Follow up in 1 year    I gave him a Rx for Celebrex to take as needed.  Risks were discussed.

## 2019-08-07 NOTE — PROGRESS NOTES
Date of Office Visit: 2019  Encounter Provider: Jimena Singer MD  Place of Service: [facility] CARDIOLOGY  Patient Name: Rodolfo Grover  :1945    Chief complaint  Followup of PVCs, pulmonary hypertension and edema    History of Present Illness  Patient is a 74-year-old gentleman with history of rheumatic fever, GE reflux disease who was seen at [facility] in 2018 after humeral fracture following a fall.  At that time he was noted to have ventricular bigeminy with normal potassium and magnesium levels.  He was hypertensive at the time.  He had an echocardiogram that showed normal systolic function with normal diastolic function, mild left atrial enlargement saline study was indeterminate.  There was aortic valve sclerosis with mild aortic regurgitation and trivial mitral and tricuspid regurgitation with mild pulmonary hypertension with an RV systolic pressure 42 mmHg.  A stress perfusion study was negative for ischemia.  He receives are noted during the study.  Diaphragmatic attenuation was noted.  He was subsequently found to have severe sleep apnea    Since last visit he has had no palpitations chest pain or dizziness.  He has had dyspnea on exertion.  This is unchanged.  He states he has mild dependent edema towards the end of the day.  Since May he has had lower blood pressures at home and is walking 1 to 2 miles 5 days a week.  He has been using CPAP for the past year    Past Medical History:   Diagnosis Date   • GERD (gastroesophageal reflux disease)    • Iritis of right eye    • PVC (premature ventricular contraction)    • Rheumatic fever    • Severe obstructive sleep apnea    • Shoulder fracture, right      Past Surgical History:   Procedure Laterality Date   • ENDOSCOPY N/A 2018    Erosive gastritis, diverticulosis   • HERNIA REPAIR     • SHOULDER CLOSED REDUCTION Right 3/16/2018    Procedure: RIGHT SHOULDER CLOSED REDUCTION;  Surgeon: Kj Garcia MD;   Location: [facility];  Service: Orthopedics   • TONSILLECTOMY     • TOTAL SHOULDER ARTHROPLASTY W/ DISTAL CLAVICLE EXCISION Right 3/22/2018    Procedure: Reverse Total Shoulder Arthroplsty;  Surgeon: Kj Garcia MD;  Location: [facility];  Service: Orthopedics     Outpatient Medications Prior to Visit   Medication Sig Dispense Refill   • acetaminophen (TYLENOL) 500 MG tablet Take 2 tablets by mouth 3 (Three) Times a Day. (Patient taking differently: Take 1,000 mg by mouth 3 (Three) Times a Day As Needed.)     • Multiple Vitamins-Minerals (EYE VITAMINS PO) Take 1 tablet by mouth Daily.     • Multiple Vitamins-Minerals (MULTIVITAMIN WITH MINERALS) tablet tablet Take 1 tablet by mouth Daily.     • azithromycin (ZITHROMAX) 250 MG tablet Take 2 tablets the first day, then 1 tablet daily for 4 days. 6 tablet 0   • benzonatate (TESSALON) 100 MG capsule Take 1 capsule by mouth 3 (Three) Times a Day As Needed for Cough. 30 capsule 0   • celecoxib (CeleBREX) 200 MG capsule Take 1 capsule by mouth 2 (Two) Times a Day As Needed for Mild Pain  or Moderate Pain  (Take as directed with food.). 60 capsule 2   • fluticasone (FLONASE) 50 MCG/ACT nasal spray 2 sprays into the nostril(s) as directed by provider Daily. 2 puffs each nostril 1 bottle 0   • pantoprazole (PROTONIX) 40 MG EC tablet Take 1 tablet by mouth 2 (Two) Times a Day Before Meals. 60 tablet 1     No facility-administered medications prior to visit.        Allergies as of 08/07/2019   • (No Known Allergies)     Social History     Socioeconomic History   • Marital status:      Spouse name: Not on file   • Number of children: Not on file   • Years of education: Not on file   • Highest education level: Not on file   Tobacco Use   • Smoking status: Never Smoker   • Smokeless tobacco: Never Used   Substance and Sexual Activity   • Alcohol use: Yes     Frequency: Monthly or less     Drinks per session: 1 or 2     Comment: occasional   • Drug use: No   •  "Sexual activity: Defer     Family History   Problem Relation Age of Onset   • Malig Hyperthermia Neg Hx      Review of Systems   Constitution: Negative for fever, malaise/fatigue, weight gain and weight loss.   HENT: Negative for ear pain, hearing loss, nosebleeds and sore throat.    Eyes: Negative for double vision, pain, vision loss in left eye and vision loss in right eye.   Cardiovascular: Positive for leg swelling.        See history of present illness.   Respiratory: Negative for cough, shortness of breath, sleep disturbances due to breathing, snoring and wheezing.    Endocrine: Negative for cold intolerance, heat intolerance and polyuria.   Skin: Negative for itching, poor wound healing and rash.   Musculoskeletal: Negative for joint pain, joint swelling and myalgias.   Gastrointestinal: Negative for abdominal pain, diarrhea, hematochezia, nausea and vomiting.   Genitourinary: Negative for hematuria and hesitancy.   Neurological: Negative for numbness, paresthesias and seizures.   Psychiatric/Behavioral: Negative for depression. The patient is not nervous/anxious.         Objective:     Vitals:    08/07/19 1045   BP: 152/68   Pulse: 74   Weight: 80.3 kg (177 lb)   Height: 170.2 cm (67\")     Body mass index is 27.72 kg/m².    Physical Exam   Constitutional: He is oriented to person, place, and time. He appears well-developed and well-nourished.   HENT:   Head: Normocephalic.   Nose: Nose normal.   Mouth/Throat: Oropharynx is clear and moist.   Eyes: Conjunctivae and EOM are normal. Pupils are equal, round, and reactive to light. Right eye exhibits no discharge. No scleral icterus.   Neck: Normal range of motion. Neck supple. No JVD present. No thyromegaly present.   Cardiovascular: Normal rate, regular rhythm, normal heart sounds and intact distal pulses.  Occasional extrasystoles are present. Exam reveals no gallop and no friction rub.   No murmur heard.  Pulses:       Carotid pulses are 2+ on the right side, " and 2+ on the left side.       Radial pulses are 2+ on the right side, and 2+ on the left side.        Femoral pulses are 2+ on the right side, and 2+ on the left side.       Popliteal pulses are 2+ on the right side, and 2+ on the left side.        Dorsalis pedis pulses are 2+ on the right side, and 2+ on the left side.        Posterior tibial pulses are 2+ on the right side, and 2+ on the left side.   1+edema to above ankles   Pulmonary/Chest: Effort normal and breath sounds normal. No respiratory distress. He has no wheezes. He has no rales.   Abdominal: Soft. Bowel sounds are normal. He exhibits no distension. There is no hepatosplenomegaly. There is no tenderness. There is no rebound.   Musculoskeletal: Normal range of motion. He exhibits edema. He exhibits no tenderness.   Neurological: He is alert and oriented to person, place, and time.   Skin: Skin is warm and dry. No rash noted. No erythema.   Psychiatric: He has a normal mood and affect. His behavior is normal. Judgment and thought content normal.   Vitals reviewed.    Lab Review:     ECG 12 Lead  Date/Time: 8/7/2019 10:47 AM  Performed by: Jimena Singer MD  Authorized by: Jimena Singer MD   Comparison: compared with previous ECG   Comparison to previous ECG: Ventricular trigeminy present today.  Rhythm: sinus rhythm  Ectopy: trigeminy  Conduction: 1st degree AV block    Clinical impression: abnormal EKG          Assessment:       Diagnosis Plan   1. LARA (dyspnea on exertion)  ECG 12 Lead   2. Frequent PVCs  Adult Transthoracic Echo Complete W/ Cont if Necessary Per Protocol    Holter Monitor - 24 Hour   3. Localized edema  Adult Transthoracic Echo Complete W/ Cont if Necessary Per Protocol   4. Pulmonary hypertension (CMS/HCC)       Plan:       1.  Hypertension, he has not been checking his pressure closely at home.  He will bring in his device for comparison and keep a log for the next week  2.  Frequent PVC's.  These are asymptomatic though more frequent  on EKG today and  with associated worsening edema in the past week.  Will check an echocardiogram and 24 Holter  3.  Severe sleep apnea, regulated on CPAP use.  CPAP settings were decreased 2 months ago due to air leak  4.  Pulmonary hypertension  Reassess by echocardiography  5.  Edema.  Check for PVCs and pulmonary pressures.  Depending on these results may need a low-dose diuretic         Your medication list           Accurate as of 8/7/19 11:59 PM. If you have any questions, ask your nurse or doctor.               CHANGE how you take these medications      Instructions Last Dose Given Next Dose Due   acetaminophen 500 MG tablet  Commonly known as:  TYLENOL  What changed:    · when to take this  · reasons to take this      Take 2 tablets by mouth 3 (Three) Times a Day.          CONTINUE taking these medications      Instructions Last Dose Given Next Dose Due   multivitamin with minerals tablet tablet      Take 1 tablet by mouth Daily.       EYE VITAMINS PO      Take 1 tablet by mouth Daily.          STOP taking these medications    azithromycin 250 MG tablet  Commonly known as:  ZITHROMAX  Stopped by:  Jimena Singer MD        benzonatate 100 MG capsule  Commonly known as:  TESSALON  Stopped by:  Jimena Singer MD        fluticasone 50 MCG/ACT nasal spray  Commonly known as:  FLONASE  Stopped by:  Jimena Singer MD        pantoprazole 40 MG EC tablet  Commonly known as:  PROTONIX  Stopped by:  Jimena Singer MD               Patient is no longer taking -.  I corrected the med list to reflect this.  I did not stop these medications.    Dictated utilizing Dragon dictation

## 2019-08-13 NOTE — PROGRESS NOTES
Pt presents today for bp check.  Pt is not currently taking any bp meds.  Reviewed all other medications.  Pt denies any symptoms ( palps, soa).  He did bring in his bp machine to check against our manual cuff.     Today his bp:  124/62 L   122/64 R HR85    HBP Cuff:   134/65 hr 60  BP Readings from Last 3 Encounters:   08/07/19 152/68   06/10/19 146/63   02/28/19 174/68     BP within parameters, pt released from office.  Please contact pt with any instructions or recommendations if needed.

## 2019-08-14 ENCOUNTER — CLINICAL SUPPORT (OUTPATIENT)
Dept: CARDIOLOGY | Facility: CLINIC | Age: 74
End: 2019-08-14

## 2019-08-14 VITALS — SYSTOLIC BLOOD PRESSURE: 122 MMHG | DIASTOLIC BLOOD PRESSURE: 64 MMHG | HEART RATE: 85 BPM

## 2019-08-15 PROBLEM — R06.09 DOE (DYSPNEA ON EXERTION): Status: ACTIVE | Noted: 2019-08-15

## 2019-08-15 PROBLEM — R60.0 LOCALIZED EDEMA: Status: ACTIVE | Noted: 2019-08-15

## 2019-08-16 ENCOUNTER — TELEPHONE (OUTPATIENT)
Dept: SLEEP MEDICINE | Facility: HOSPITAL | Age: 74
End: 2019-08-16

## 2019-08-16 NOTE — TELEPHONE ENCOUNTER
Tech called pt in regards to previous phone conversation. Patient had called into sleep center after seeign his cardiologist. Per pt he was noted to be having some more cardiac events and his B/P was noted being elevated per his visit. Patient wanted to inquire on how he was doing. Tech downloaded last 90 days of usage via [employer]. Printed out and left for Dr. lopez to review.      Tech spoke to patient and informed per Maury that everything looked very good as far as usage and control of events. Dr. Lopez stated that if patient was still having issues and tiredness through out the day that we could have him come back in overnight to do a re-titration study. Tech also faxed download to his cardiologist for them to review as well. Patient stated he will follow up with the Cardiologist and depending what they plan on will depend if he comes back in for re-titration. MAB

## 2019-08-22 ENCOUNTER — HOSPITAL ENCOUNTER (OUTPATIENT)
Dept: CARDIOLOGY | Facility: HOSPITAL | Age: 74
Discharge: HOME | End: 2019-08-22
Admitting: INTERNAL MEDICINE

## 2019-08-22 VITALS
SYSTOLIC BLOOD PRESSURE: 168 MMHG | DIASTOLIC BLOOD PRESSURE: 70 MMHG | HEART RATE: 53 BPM | HEIGHT: 67 IN | BODY MASS INDEX: 27.78 KG/M2 | WEIGHT: 177 LBS

## 2019-08-22 DIAGNOSIS — I49.3 FREQUENT PVCS: ICD-10-CM

## 2019-08-22 DIAGNOSIS — R60.0 LOCALIZED EDEMA: ICD-10-CM

## 2019-08-22 LAB
AORTIC ROOT ANNULUS: 2 CM
ASCENDING AORTA: 2.7 CM
BH CV ECHO MEAS - ACS: 2 CM
BH CV ECHO MEAS - AI DEC SLOPE: 145.2 CM/SEC^2
BH CV ECHO MEAS - AI MAX PG: 40.7 MMHG
BH CV ECHO MEAS - AI MAX VEL: 319 CM/SEC
BH CV ECHO MEAS - AI P1/2T: 643.5 MSEC
BH CV ECHO MEAS - AO MAX PG (FULL): 1.9 MMHG
BH CV ECHO MEAS - AO MAX PG: 4.8 MMHG
BH CV ECHO MEAS - AO MEAN PG (FULL): 0.97 MMHG
BH CV ECHO MEAS - AO MEAN PG: 2.5 MMHG
BH CV ECHO MEAS - AO V2 MAX: 109.9 CM/SEC
BH CV ECHO MEAS - AO V2 MEAN: 74.4 CM/SEC
BH CV ECHO MEAS - AO V2 VTI: 23 CM
BH CV ECHO MEAS - AVA(I,A): 2.7 CM^2
BH CV ECHO MEAS - AVA(I,D): 2.7 CM^2
BH CV ECHO MEAS - AVA(V,A): 2.5 CM^2
BH CV ECHO MEAS - AVA(V,D): 2.5 CM^2
BH CV ECHO MEAS - BSA(HAYCOCK): 2 M^2
BH CV ECHO MEAS - BSA: 1.9 M^2
BH CV ECHO MEAS - BZI_BMI: 27.7 KILOGRAMS/M^2
BH CV ECHO MEAS - BZI_METRIC_HEIGHT: 170.2 CM
BH CV ECHO MEAS - BZI_METRIC_WEIGHT: 80.3 KG
BH CV ECHO MEAS - EDV(MOD-SP2): 125 ML
BH CV ECHO MEAS - EDV(MOD-SP4): 118 ML
BH CV ECHO MEAS - EDV(TEICH): 111.9 ML
BH CV ECHO MEAS - EF(CUBED): 60.6 %
BH CV ECHO MEAS - EF(MOD-SP2): 61.6 %
BH CV ECHO MEAS - EF(MOD-SP4): 61.9 %
BH CV ECHO MEAS - EF(TEICH): 52 %
BH CV ECHO MEAS - ESV(MOD-SP2): 48 ML
BH CV ECHO MEAS - ESV(MOD-SP4): 45 ML
BH CV ECHO MEAS - ESV(TEICH): 53.7 ML
BH CV ECHO MEAS - FS: 26.7 %
BH CV ECHO MEAS - IVS/LVPW: 1.2
BH CV ECHO MEAS - IVSD: 1.2 CM
BH CV ECHO MEAS - LAT PEAK E' VEL: 8 CM/SEC
BH CV ECHO MEAS - LV DIASTOLIC VOL/BSA (35-75): 61.5 ML/M^2
BH CV ECHO MEAS - LV MASS(C)D: 198.9 GRAMS
BH CV ECHO MEAS - LV MASS(C)DI: 103.6 GRAMS/M^2
BH CV ECHO MEAS - LV MAX PG: 2.9 MMHG
BH CV ECHO MEAS - LV MEAN PG: 1.5 MMHG
BH CV ECHO MEAS - LV SYSTOLIC VOL/BSA (12-30): 23.4 ML/M^2
BH CV ECHO MEAS - LV V1 MAX: 85.4 CM/SEC
BH CV ECHO MEAS - LV V1 MEAN: 57.9 CM/SEC
BH CV ECHO MEAS - LV V1 VTI: 19.6 CM
BH CV ECHO MEAS - LVIDD: 4.9 CM
BH CV ECHO MEAS - LVIDS: 3.6 CM
BH CV ECHO MEAS - LVLD AP2: 8.1 CM
BH CV ECHO MEAS - LVLD AP4: 8.3 CM
BH CV ECHO MEAS - LVLS AP2: 6.8 CM
BH CV ECHO MEAS - LVLS AP4: 7.3 CM
BH CV ECHO MEAS - LVOT AREA (M): 3.1 CM^2
BH CV ECHO MEAS - LVOT AREA: 3.2 CM^2
BH CV ECHO MEAS - LVOT DIAM: 2 CM
BH CV ECHO MEAS - LVPWD: 1 CM
BH CV ECHO MEAS - MED PEAK E' VEL: 7 CM/SEC
BH CV ECHO MEAS - MR MAX PG: 33.4 MMHG
BH CV ECHO MEAS - MR MAX VEL: 289.1 CM/SEC
BH CV ECHO MEAS - MV A DUR: 0.15 SEC
BH CV ECHO MEAS - MV A MAX VEL: 45.6 CM/SEC
BH CV ECHO MEAS - MV DEC SLOPE: 205.7 CM/SEC^2
BH CV ECHO MEAS - MV DEC TIME: 0.25 SEC
BH CV ECHO MEAS - MV E MAX VEL: 54.8 CM/SEC
BH CV ECHO MEAS - MV E/A: 1.2
BH CV ECHO MEAS - MV MAX PG: 2.4 MMHG
BH CV ECHO MEAS - MV MEAN PG: 0.69 MMHG
BH CV ECHO MEAS - MV P1/2T MAX VEL: 54.8 CM/SEC
BH CV ECHO MEAS - MV P1/2T: 78 MSEC
BH CV ECHO MEAS - MV V2 MAX: 77.4 CM/SEC
BH CV ECHO MEAS - MV V2 MEAN: 38.3 CM/SEC
BH CV ECHO MEAS - MV V2 VTI: 22.4 CM
BH CV ECHO MEAS - MVA P1/2T LCG: 4 CM^2
BH CV ECHO MEAS - MVA(P1/2T): 2.8 CM^2
BH CV ECHO MEAS - MVA(VTI): 2.8 CM^2
BH CV ECHO MEAS - PA ACC TIME: 0.11 SEC
BH CV ECHO MEAS - PA MAX PG (FULL): 3.3 MMHG
BH CV ECHO MEAS - PA MAX PG: 5.6 MMHG
BH CV ECHO MEAS - PA PR(ACCEL): 31.5 MMHG
BH CV ECHO MEAS - PA V2 MAX: 118.1 CM/SEC
BH CV ECHO MEAS - PULM A REVS DUR: 0.13 SEC
BH CV ECHO MEAS - PULM A REVS VEL: 21.8 CM/SEC
BH CV ECHO MEAS - PULM DIAS VEL: 40.3 CM/SEC
BH CV ECHO MEAS - PULM S/D: 1
BH CV ECHO MEAS - PULM SYS VEL: 41.2 CM/SEC
BH CV ECHO MEAS - PVA(V,A): 1.5 CM^2
BH CV ECHO MEAS - PVA(V,D): 1.5 CM^2
BH CV ECHO MEAS - QP/QS: 0.72
BH CV ECHO MEAS - RAP SYSTOLE: 3 MMHG
BH CV ECHO MEAS - RV MAX PG: 2.3 MMHG
BH CV ECHO MEAS - RV MEAN PG: 1.2 MMHG
BH CV ECHO MEAS - RV V1 MAX: 76.2 CM/SEC
BH CV ECHO MEAS - RV V1 MEAN: 51.7 CM/SEC
BH CV ECHO MEAS - RV V1 VTI: 18.7 CM
BH CV ECHO MEAS - RVOT AREA: 2.4 CM^2
BH CV ECHO MEAS - RVOT DIAM: 1.7 CM
BH CV ECHO MEAS - RVSP: 41.3 MMHG
BH CV ECHO MEAS - SI(CUBED): 36.7 ML/M^2
BH CV ECHO MEAS - SI(LVOT): 32.2 ML/M^2
BH CV ECHO MEAS - SI(MOD-SP2): 40.1 ML/M^2
BH CV ECHO MEAS - SI(MOD-SP4): 38 ML/M^2
BH CV ECHO MEAS - SI(TEICH): 30.3 ML/M^2
BH CV ECHO MEAS - SUP REN AO DIAM: 1.8 CM
BH CV ECHO MEAS - SV(CUBED): 70.5 ML
BH CV ECHO MEAS - SV(LVOT): 61.9 ML
BH CV ECHO MEAS - SV(MOD-SP2): 77 ML
BH CV ECHO MEAS - SV(MOD-SP4): 73 ML
BH CV ECHO MEAS - SV(RVOT): 44.8 ML
BH CV ECHO MEAS - SV(TEICH): 58.1 ML
BH CV ECHO MEAS - TAPSE (>1.6): 1.6 CM2
BH CV ECHO MEAS - TR MAX VEL: 309.5 CM/SEC
BH CV ECHO MEASUREMENTS AVERAGE E/E' RATIO: 7.31
BH CV XLRA - RV BASE: 3.6 CM
BH CV XLRA - TDI S': 15 CM/SEC
LEFT ATRIUM VOLUME INDEX: 22 ML/M2
SINUS: 2.8 CM
STJ: 2.6 CM

## 2019-08-22 PROCEDURE — 93306 TTE W/DOPPLER COMPLETE: CPT | Performed by: INTERNAL MEDICINE

## 2019-08-22 PROCEDURE — 93306 TTE W/DOPPLER COMPLETE: CPT

## 2019-08-27 ENCOUNTER — TELEPHONE (OUTPATIENT)
Dept: CARDIOLOGY | Facility: CLINIC | Age: 74
End: 2019-08-27

## 2019-08-28 ENCOUNTER — TELEPHONE (OUTPATIENT)
Dept: CARDIOLOGY | Facility: CLINIC | Age: 74
End: 2019-08-28

## 2019-08-28 RX ORDER — METOPROLOL SUCCINATE 25 MG/1
25 TABLET, EXTENDED RELEASE ORAL DAILY
Qty: 30 TABLET | Refills: 11 | Status: SHIPPED | OUTPATIENT
Start: 2019-08-28 | End: 2020-02-05 | Stop reason: SDUPTHER

## 2019-08-29 ENCOUNTER — TELEPHONE (OUTPATIENT)
Dept: CARDIOLOGY | Facility: CLINIC | Age: 74
End: 2019-08-29

## 2019-10-28 ENCOUNTER — TELEPHONE (OUTPATIENT)
Dept: FAMILY MEDICINE CLINIC | Facility: CLINIC | Age: 74
End: 2019-10-28

## 2019-10-28 NOTE — TELEPHONE ENCOUNTER
Representative from this patients insurance co called office to report that they did an in home PVD scan on this patient and he was reported to have mild to moderate change. Please advise.

## 2019-10-30 NOTE — TELEPHONE ENCOUNTER
Let's request the results pt had from the study. We will see if we need to see him in the office, repeat the study, or refer him to a specialist. Thanks.

## 2019-12-31 NOTE — TELEPHONE ENCOUNTER
Please call pt to schedule followup.  I dont see anything was scheduled when the pt left.  Is he on a wait list?

## 2020-01-20 ENCOUNTER — OFFICE VISIT (OUTPATIENT)
Dept: FAMILY MEDICINE CLINIC | Facility: CLINIC | Age: 75
End: 2020-01-20

## 2020-01-20 VITALS
RESPIRATION RATE: 12 BRPM | HEIGHT: 67 IN | TEMPERATURE: 97.8 F | WEIGHT: 180.1 LBS | DIASTOLIC BLOOD PRESSURE: 68 MMHG | HEART RATE: 60 BPM | BODY MASS INDEX: 28.27 KG/M2 | OXYGEN SATURATION: 98 % | SYSTOLIC BLOOD PRESSURE: 138 MMHG

## 2020-01-20 DIAGNOSIS — Z12.5 SCREENING FOR PROSTATE CANCER: ICD-10-CM

## 2020-01-20 DIAGNOSIS — D64.9 ANEMIA, UNSPECIFIED TYPE: ICD-10-CM

## 2020-01-20 DIAGNOSIS — L98.9 SKIN LESIONS: ICD-10-CM

## 2020-01-20 DIAGNOSIS — Z00.00 MEDICARE ANNUAL WELLNESS VISIT, SUBSEQUENT: Primary | ICD-10-CM

## 2020-01-20 DIAGNOSIS — E78.00 PURE HYPERCHOLESTEROLEMIA: ICD-10-CM

## 2020-01-20 PROCEDURE — G0439 PPPS, SUBSEQ VISIT: HCPCS | Performed by: FAMILY MEDICINE

## 2020-01-20 PROCEDURE — 90653 IIV ADJUVANT VACCINE IM: CPT | Performed by: FAMILY MEDICINE

## 2020-01-20 PROCEDURE — G0008 ADMIN INFLUENZA VIRUS VAC: HCPCS | Performed by: FAMILY MEDICINE

## 2020-01-20 RX ORDER — CELECOXIB 200 MG/1
CAPSULE ORAL
COMMUNITY
Start: 2019-11-29 | End: 2020-09-09

## 2020-01-20 RX ORDER — FLUTICASONE PROPIONATE 0.05 %
CREAM (GRAM) TOPICAL SEE ADMIN INSTRUCTIONS
COMMUNITY
Start: 2019-10-19 | End: 2020-02-05

## 2020-01-20 RX ORDER — TRIAMCINOLONE ACETONIDE 0.1 %
PASTE (GRAM) DENTAL SEE ADMIN INSTRUCTIONS
COMMUNITY
Start: 2019-10-19 | End: 2021-03-03 | Stop reason: HOSPADM

## 2020-01-20 NOTE — PROGRESS NOTES
The ABCs of the Annual Wellness Visit  Subsequent Medicare Wellness Visit    Chief Complaint   Patient presents with   • Medicare Wellness-subsequent       Subjective   History of Present Illness:  Rodolfo Grover is a 74 y.o. male who presents for a Subsequent Medicare Wellness Visit.    HEALTH RISK ASSESSMENT    Recent Hospitalizations:  No hospitalization(s) within the last year.    Current Medical Providers:  Patient Care Team:  Domenica Merida MD as PCP - General (Family Medicine)  Mitch Mendiola MD as Consulting Physician (Pulmonary Disease)    Smoking Status:  Social History     Tobacco Use   Smoking Status Never Smoker   Smokeless Tobacco Never Used       Alcohol Consumption:  Social History     Substance and Sexual Activity   Alcohol Use Yes   • Frequency: Monthly or less   • Drinks per session: 1 or 2    Comment: occasional       Depression Screen:   PHQ-2/PHQ-9 Depression Screening 1/20/2020   Little interest or pleasure in doing things 0   Feeling down, depressed, or hopeless 0   Total Score 0       Fall Risk Screen:  KARINA Fall Risk Assessment was completed, and patient is at MODERATE risk for falls. Assessment completed on:1/20/2020    Health Habits and Functional and Cognitive Screening:  Functional & Cognitive Status 1/20/2020   Do you have difficulty preparing food and eating? No   Do you have difficulty bathing yourself, getting dressed or grooming yourself? No   Do you have difficulty using the toilet? No   Do you have difficulty moving around from place to place? No   Do you have trouble with steps or getting out of a bed or a chair? No   Current Diet Healthy    Dental Exam Up to date   Eye Exam Up to date   Exercise (times per week) 3 times per week   Current Exercise Activities Include Walking   Do you need help using the phone?  No   Are you deaf or do you have serious difficulty hearing?  No   Do you need help with transportation? No   Do you need help shopping? No   Do you need help preparing  meals?  No   Do you need help with housework?  No   Do you need help with laundry? No   Do you need help taking your medications? No   Do you need help managing money? No   Do you ever drive or ride in a car without wearing a seat belt? No   Have you felt unusual stress, anger or loneliness in the last month? No   Who do you live with? Alone   If you need help, do you have trouble finding someone available to you? No   Have you been bothered in the last four weeks by sexual problems? No   Do you have difficulty concentrating, remembering or making decisions? No         Does the patient have evidence of cognitive impairment? No    Asprin use counseling:Does not need ASA (and currently is not on it)    Age-appropriate Screening Schedule:  Refer to the list below for future screening recommendations based on patient's age, sex and/or medical conditions. Orders for these recommended tests are listed in the plan section. The patient has been provided with a written plan.    Health Maintenance   Topic Date Due   • TDAP/TD VACCINES (1 - Tdap) 02/12/1956   • ZOSTER VACCINE (1 of 2) 01/25/2012   • COLONOSCOPY  03/18/2018   • INFLUENZA VACCINE  08/01/2019   • LIPID PANEL  01/18/2020          The following portions of the patient's history were reviewed and updated as appropriate: allergies, current medications, past family history, past medical history, past social history, past surgical history and problem list.    Outpatient Medications Prior to Visit   Medication Sig Dispense Refill   • acetaminophen (TYLENOL) 500 MG tablet Take 2 tablets by mouth 3 (Three) Times a Day. (Patient taking differently: Take 1,000 mg by mouth 3 (Three) Times a Day As Needed.)     • celecoxib (CeleBREX) 200 MG capsule TAKE 1 CAPSULE BY MOUTH TWICE DAILY AS NEEDED FOR MILD OR MODERATE PAIN AS DIRECTED WITH FOOD     • metoprolol succinate XL (TOPROL-XL) 25 MG 24 hr tablet Take 1 tablet by mouth Daily. 30 tablet 11   • Multiple Vitamins-Minerals  (EYE VITAMINS PO) Take 1 tablet by mouth Daily.     • Multiple Vitamins-Minerals (MULTIVITAMIN WITH MINERALS) tablet tablet Take 1 tablet by mouth Daily.     • fluticasone (CUTIVATE) 0.05 % cream See Admin Instructions.     • triamcinolone (KENALOG) 0.1 % paste See Admin Instructions.       No facility-administered medications prior to visit.        Patient Active Problem List   Diagnosis   • Acute blood loss anemia   • Fracture of fifth metacarpal bone of right hand   • Closed fracture dislocation of right shoulder   • Gastroesophageal reflux disease without esophagitis   • DNR (do not resuscitate)   • Bradycardia following surgery   • Frequent PVCs   • Proximal humerus fracture   • Osteoarthritis   • Severe obstructive sleep apnea   • Pure hypercholesterolemia   • Pulmonary hypertension (CMS/HCC)   • LARA (dyspnea on exertion)   • Localized edema       Advanced Care Planning:  Patient has an advance directive - a copy has been provided and is visible in patient header    Review of Systems   Respiratory: Negative.    Cardiovascular: Negative.    Neurological: Negative.    loose stools not diarrhea intermittently since starting metoprolol. He follows with cardiology twice per year.     Compared to one year ago, the patient feels his physical health is the same.  Compared to one year ago, the patient feels his mental health is the same.  Says in some ways he feels older than he has in the past, not sure if it is his memory or what. Says he is more aware of things, after fall more aware of balance. He still travels by himself, working in the office, less to take care of but still active and doing what he wants.     Reviewed chart for potential of high risk medication in the elderly: yes  Reviewed chart for potential of harmful drug interactions in the elderly:yes  He is taking celebrex as needed, says not too often, but finds it works well for his joint pain.     Objective         Vitals:    01/20/20 0941   BP: 138/68  "  BP Location: Left arm   Patient Position: Sitting   Cuff Size: Adult   Pulse: 60   Resp: 12   Temp: 97.8 °F (36.6 °C)   TempSrc: Oral   SpO2: 98%   Weight: 81.7 kg (180 lb 1.6 oz)   Height: 170.2 cm (67\")       Body mass index is 28.21 kg/m².  Discussed the patient's BMI with him. The BMI is above average; BMI management plan is completed.    Physical Exam   Constitutional: He is oriented to person, place, and time. He appears well-nourished. No distress.   HENT:   Right Ear: External ear normal.   Left Ear: External ear normal.   Nose: Nose normal.   Mouth/Throat: Oropharynx is clear and moist. No oropharyngeal exudate.   Bilateral ear canals and tympanic membranes appear normal.   Eyes: Conjunctivae and EOM are normal. Right eye exhibits no discharge. Left eye exhibits no discharge. No scleral icterus.   Neck: Neck supple. No thyromegaly present.   Cardiovascular: Normal rate, regular rhythm, normal heart sounds and intact distal pulses. Exam reveals no gallop and no friction rub.   No murmur heard.  No carotid bruit bilaterally.    Pulmonary/Chest: Effort normal and breath sounds normal. No respiratory distress. He has no wheezes. He has no rales. He exhibits no tenderness.   Abdominal: Soft. Bowel sounds are normal. He exhibits no distension and no mass. There is no tenderness. There is no guarding.   Musculoskeletal: He exhibits no edema or deformity.   Lymphadenopathy:     He has no cervical adenopathy.   Neurological: He is alert and oriented to person, place, and time. He exhibits normal muscle tone. Coordination normal.   Skin: Skin is warm and dry.   Psychiatric: He has a normal mood and affect. His behavior is normal.   Vitals reviewed.            Assessment/Plan   Medicare Risks and Personalized Health Plan  CMS Preventative Services Quick Reference  Advance Directive Discussion  Colon Cancer Screening  Dementia/Memory   Immunizations Discussed/Encouraged (specific immunizations; Shingrix )    The " above risks/problems have been discussed with the patient.  Pertinent information has been shared with the patient in the After Visit Summary.  Follow up plans and orders are seen below in the Assessment/Plan Section.    Diagnoses and all orders for this visit:    1. Medicare annual wellness visit, subsequent (Primary)    2. Pure hypercholesterolemia  -     Comprehensive Metabolic Panel  -     Lipid Panel    3. Anemia, unspecified type  -     CBC & Differential  -     Comprehensive Metabolic Panel    4. Screening for prostate cancer  -     PSA Screen    5. Skin lesions  -     Ambulatory Referral to Dermatology    Other orders  -     Fluad Tri 65yr+      Follow Up:  No follow-ups on file.     An After Visit Summary and PPPS were given to the patient.       Contact Dr. Allen about need for colonoscopy. He had EGD only in 8/2018, I think per chart last colonoscopy was with Dr. Hall 2014 and had one hyperplastic polyp on path, hemorrhoids, diverticulosis.   He will check with pharmacy for shingrix.   He would like to continue to get PSA despite recommendation against for his age. We discussed this may not be reliable test with age.

## 2020-01-21 LAB
ALBUMIN SERPL-MCNC: 4 G/DL (ref 3.5–5.2)
ALBUMIN/GLOB SERPL: 1.9 G/DL
ALP SERPL-CCNC: 76 U/L (ref 39–117)
ALT SERPL-CCNC: 18 U/L (ref 1–41)
AST SERPL-CCNC: 16 U/L (ref 1–40)
BASOPHILS # BLD AUTO: 0.03 10*3/MM3 (ref 0–0.2)
BASOPHILS NFR BLD AUTO: 0.4 % (ref 0–1.5)
BILIRUB SERPL-MCNC: 0.4 MG/DL (ref 0.2–1.2)
BUN SERPL-MCNC: 14 MG/DL (ref 8–23)
BUN/CREAT SERPL: 11.7 (ref 7–25)
CALCIUM SERPL-MCNC: 8.7 MG/DL (ref 8.6–10.5)
CHLORIDE SERPL-SCNC: 103 MMOL/L (ref 98–107)
CHOLEST SERPL-MCNC: 214 MG/DL (ref 0–200)
CO2 SERPL-SCNC: 27.2 MMOL/L (ref 22–29)
CREAT SERPL-MCNC: 1.2 MG/DL (ref 0.76–1.27)
EOSINOPHIL # BLD AUTO: 0.22 10*3/MM3 (ref 0–0.4)
EOSINOPHIL NFR BLD AUTO: 2.7 % (ref 0.3–6.2)
ERYTHROCYTE [DISTWIDTH] IN BLOOD BY AUTOMATED COUNT: 11.7 % (ref 12.3–15.4)
GLOBULIN SER CALC-MCNC: 2.1 GM/DL
GLUCOSE SERPL-MCNC: 89 MG/DL (ref 65–99)
HCT VFR BLD AUTO: 40.4 % (ref 37.5–51)
HDLC SERPL-MCNC: 63 MG/DL (ref 40–60)
HGB BLD-MCNC: 13.6 G/DL (ref 13–17.7)
IMM GRANULOCYTES # BLD AUTO: 0.2 10*3/MM3 (ref 0–0.05)
IMM GRANULOCYTES NFR BLD AUTO: 2.5 % (ref 0–0.5)
LDLC SERPL CALC-MCNC: 137 MG/DL (ref 0–100)
LYMPHOCYTES # BLD AUTO: 1.53 10*3/MM3 (ref 0.7–3.1)
LYMPHOCYTES NFR BLD AUTO: 18.8 % (ref 19.6–45.3)
Lab: NORMAL
MCH RBC QN AUTO: 32.4 PG (ref 26.6–33)
MCHC RBC AUTO-ENTMCNC: 33.7 G/DL (ref 31.5–35.7)
MCV RBC AUTO: 96.2 FL (ref 79–97)
MONOCYTES # BLD AUTO: 0.75 10*3/MM3 (ref 0.1–0.9)
MONOCYTES NFR BLD AUTO: 9.2 % (ref 5–12)
NEUTROPHILS # BLD AUTO: 5.43 10*3/MM3 (ref 1.7–7)
NEUTROPHILS NFR BLD AUTO: 66.4 % (ref 42.7–76)
NRBC BLD AUTO-RTO: 0 /100 WBC (ref 0–0.2)
PLATELET # BLD AUTO: 204 10*3/MM3 (ref 140–450)
POTASSIUM SERPL-SCNC: 4.2 MMOL/L (ref 3.5–5.2)
PROT SERPL-MCNC: 6.1 G/DL (ref 6–8.5)
PSA SERPL-MCNC: 1.25 NG/ML (ref 0–4)
RBC # BLD AUTO: 4.2 10*6/MM3 (ref 4.14–5.8)
SODIUM SERPL-SCNC: 141 MMOL/L (ref 136–145)
TRIGL SERPL-MCNC: 70 MG/DL (ref 0–150)
VLDLC SERPL CALC-MCNC: 14 MG/DL
WBC # BLD AUTO: 8.16 10*3/MM3 (ref 3.4–10.8)

## 2020-02-05 ENCOUNTER — OFFICE VISIT (OUTPATIENT)
Dept: CARDIOLOGY | Facility: CLINIC | Age: 75
End: 2020-02-05

## 2020-02-05 VITALS
WEIGHT: 179 LBS | SYSTOLIC BLOOD PRESSURE: 134 MMHG | HEIGHT: 67 IN | BODY MASS INDEX: 28.09 KG/M2 | DIASTOLIC BLOOD PRESSURE: 76 MMHG | HEART RATE: 60 BPM

## 2020-02-05 DIAGNOSIS — R06.09 DOE (DYSPNEA ON EXERTION): ICD-10-CM

## 2020-02-05 DIAGNOSIS — I97.89 BRADYCARDIA FOLLOWING SURGERY: ICD-10-CM

## 2020-02-05 DIAGNOSIS — E78.00 PURE HYPERCHOLESTEROLEMIA: ICD-10-CM

## 2020-02-05 DIAGNOSIS — G47.33 SEVERE OBSTRUCTIVE SLEEP APNEA: ICD-10-CM

## 2020-02-05 DIAGNOSIS — I49.3 FREQUENT PVCS: ICD-10-CM

## 2020-02-05 DIAGNOSIS — I27.20 PULMONARY HYPERTENSION (HCC): Primary | ICD-10-CM

## 2020-02-05 PROCEDURE — 93000 ELECTROCARDIOGRAM COMPLETE: CPT | Performed by: INTERNAL MEDICINE

## 2020-02-05 PROCEDURE — 99214 OFFICE O/P EST MOD 30 MIN: CPT | Performed by: INTERNAL MEDICINE

## 2020-02-05 RX ORDER — METOPROLOL SUCCINATE 50 MG/1
50 TABLET, EXTENDED RELEASE ORAL DAILY
Qty: 90 TABLET | Refills: 3 | Status: SHIPPED | OUTPATIENT
Start: 2020-02-05 | End: 2021-02-19 | Stop reason: SDUPTHER

## 2020-02-05 NOTE — PROGRESS NOTES
Date of Office Visit: 2020  Encounter Provider: Jimena Singer MD  Place of Service: [facility] CARDIOLOGY  Patient Name: Rodolfo Grover  :1945    Chief complaint  Ventricular ectopy    History of Present Illness  Patient is a 74-year-old gentleman with history of rheumatic fever, GE reflux disease who was seen at [facility] in 2018 after humeral fracture following a fall.  At that time he was noted to have ventricular bigeminy with normal potassium and magnesium levels.  He was hypertensive at the time.  He had an echocardiogram that showed normal systolic function with normal diastolic function, mild left atrial enlargement saline study was indeterminate.  There was aortic valve sclerosis with mild aortic regurgitation and trivial mitral and tricuspid regurgitation with mild pulmonary hypertension with an RV systolic pressure 42 mmHg.  A stress perfusion study was negative for ischemia.  He receives are noted during the aubree in 2019 showed worsening edema.  An echocardiogram showed normal systolic function mild pulmonary hypertension no significant valvular heart disease.   He was subsequently found to have severe sleep apnea for which she is receiving treatment.  The RV systolic pressure was 41mmHg    Since the last visit he is walking 2 miles daily the last in the past week.  He denies any palpitation shortness of breath chest pain syncope has mild dependent edema.  Since 2019 he has been treating sleep apnea.  With the use of the so clean device and has been more consistent in its use.    Past Medical History:   Diagnosis Date   • GERD (gastroesophageal reflux disease)    • Iritis of right eye    • PVC (premature ventricular contraction)    • Rheumatic fever    • Severe obstructive sleep apnea    • Shoulder fracture, right      Past Surgical History:   Procedure Laterality Date   • ENDOSCOPY N/A 2018    Erosive gastritis, diverticulosis   •  HERNIA REPAIR     • SHOULDER CLOSED REDUCTION Right 3/16/2018    Procedure: RIGHT SHOULDER CLOSED REDUCTION;  Surgeon: Kj Garcia MD;  Location: [facility] OR;  Service: Orthopedics   • TONSILLECTOMY     • TOTAL SHOULDER ARTHROPLASTY W/ DISTAL CLAVICLE EXCISION Right 3/22/2018    Procedure: Reverse Total Shoulder Arthroplsty;  Surgeon: Kj Garcia MD;  Location: [facility] OR;  Service: Orthopedics     Outpatient Medications Prior to Visit   Medication Sig Dispense Refill   • acetaminophen (TYLENOL) 500 MG tablet Take 2 tablets by mouth 3 (Three) Times a Day. (Patient taking differently: Take 1,000 mg by mouth 3 (Three) Times a Day As Needed.)     • celecoxib (CeleBREX) 200 MG capsule TAKE 1 CAPSULE BY MOUTH TWICE DAILY AS NEEDED FOR MILD OR MODERATE PAIN AS DIRECTED WITH FOOD     • Multiple Vitamins-Minerals (EYE VITAMINS PO) Take 1 tablet by mouth Daily.     • Multiple Vitamins-Minerals (MULTIVITAMIN WITH MINERALS) tablet tablet Take 1 tablet by mouth Daily.     • triamcinolone (KENALOG) 0.1 % paste See Admin Instructions.     • metoprolol succinate XL (TOPROL-XL) 25 MG 24 hr tablet Take 1 tablet by mouth Daily. 30 tablet 11   • fluticasone (CUTIVATE) 0.05 % cream See Admin Instructions.       No facility-administered medications prior to visit.        Allergies as of 02/05/2020   • (No Known Allergies)     Social History     Socioeconomic History   • Marital status:      Spouse name: Not on file   • Number of children: Not on file   • Years of education: Not on file   • Highest education level: Not on file   Tobacco Use   • Smoking status: Never Smoker   • Smokeless tobacco: Never Used   Substance and Sexual Activity   • Alcohol use: Yes     Frequency: Monthly or less     Drinks per session: 1 or 2     Comment: occasional   • Drug use: No   • Sexual activity: Defer     Family History   Problem Relation Age of Onset   • Malig Hyperthermia Neg Hx      Review of Systems   Constitution: Negative  "for fever, malaise/fatigue, weight gain and weight loss.   HENT: Negative for ear pain, hearing loss, nosebleeds and sore throat.    Eyes: Negative for double vision, pain, vision loss in left eye and vision loss in right eye.   Cardiovascular:        See history of present illness.   Respiratory: Negative for cough, shortness of breath, sleep disturbances due to breathing, snoring and wheezing.    Endocrine: Negative for cold intolerance, heat intolerance and polyuria.   Skin: Negative for itching, poor wound healing and rash.   Musculoskeletal: Positive for joint pain. Negative for joint swelling and myalgias.   Gastrointestinal: Negative for abdominal pain, diarrhea, hematochezia, nausea and vomiting.   Genitourinary: Negative for hematuria and hesitancy.   Neurological: Negative for numbness, paresthesias and seizures.   Psychiatric/Behavioral: Negative for depression. The patient is not nervous/anxious.         Objective:     Vitals:    02/05/20 1139   BP: 134/76   BP Location: Right arm   Patient Position: Sitting   Pulse: 60   Weight: 81.2 kg (179 lb)   Height: 170.2 cm (67\")     Body mass index is 28.04 kg/m².    Physical Exam   Constitutional: He is oriented to person, place, and time. He appears well-developed and well-nourished.   HENT:   Head: Normocephalic.   Nose: Nose normal.   Mouth/Throat: Oropharynx is clear and moist.   Eyes: Pupils are equal, round, and reactive to light. Conjunctivae and EOM are normal. Right eye exhibits no discharge. No scleral icterus.   Neck: Normal range of motion. Neck supple. No JVD present. No thyromegaly present.   Cardiovascular: Normal rate, regular rhythm, normal heart sounds and intact distal pulses. Exam reveals no gallop and no friction rub.   No murmur heard.  Pulses:       Carotid pulses are 2+ on the right side, and 2+ on the left side.       Radial pulses are 2+ on the right side, and 2+ on the left side.        Femoral pulses are 2+ on the right side, and 2+ " on the left side.       Popliteal pulses are 2+ on the right side, and 2+ on the left side.        Dorsalis pedis pulses are 2+ on the right side, and 2+ on the left side.        Posterior tibial pulses are 2+ on the right side, and 2+ on the left side.   Pulmonary/Chest: Effort normal and breath sounds normal. No respiratory distress. He has no wheezes. He has no rales.   Abdominal: Soft. Bowel sounds are normal. He exhibits no distension. There is no hepatosplenomegaly. There is no tenderness. There is no rebound.   Musculoskeletal: Normal range of motion. He exhibits no edema or tenderness.   Neurological: He is alert and oriented to person, place, and time.   Skin: Skin is warm and dry. No rash noted. No erythema.   Psychiatric: He has a normal mood and affect. His behavior is normal. Judgment and thought content normal.   Vitals reviewed.    Lab Review:     ECG 12 Lead  Date/Time: 2/5/2020 12:01 AM  Performed by: Jimena Singer MD  Authorized by: Jimena Singer MD   Comparison: compared with previous ECG   Comparison to previous ECG: PVC's have resolved. PAC is present  Rhythm: sinus rhythm  Conduction: 1st degree AV block  Other findings: left ventricular hypertrophy    Clinical impression: abnormal EKG          Assessment:       Diagnosis Plan   1. Pulmonary hypertension (CMS/HCC)  Adult Transthoracic Echo Complete W/ Cont if Necessary Per Protocol   2. Bradycardia following surgery     3. Frequent PVCs     4. Pure hypercholesterolemia     5. Severe obstructive sleep apnea     6. LARA (dyspnea on exertion)       Plan:       1.  Hypertension, dated today.  2.  Frequent PVC's.  LV function is strong with no evidence of ischemic heart disease at this point. Improved with beta-blockers3.  Severe sleep apnea, regulated on CPAP use.  CPAP settings were decreased 2 months ago due to air leak  4.  Pulmonary hypertension   5.  Edema.  Minimal       Your medication list           Accurate as of February 5, 2020 11:59 PM. If  you have any questions, ask your nurse or doctor.               CHANGE how you take these medications      Instructions Last Dose Given Next Dose Due   acetaminophen 500 MG tablet  Commonly known as:  TYLENOL  What changed:    · when to take this  · reasons to take this      Take 2 tablets by mouth 3 (Three) Times a Day.       metoprolol succinate XL 50 MG 24 hr tablet  Commonly known as:  TOPROL-XL  What changed:    · medication strength  · how much to take  Changed by:  Jimena Singer MD      Take 1 tablet by mouth Daily.          CONTINUE taking these medications      Instructions Last Dose Given Next Dose Due   celecoxib 200 MG capsule  Commonly known as:  CeleBREX      TAKE 1 CAPSULE BY MOUTH TWICE DAILY AS NEEDED FOR MILD OR MODERATE PAIN AS DIRECTED WITH FOOD       multivitamin with minerals tablet tablet      Take 1 tablet by mouth Daily.       EYE VITAMINS PO      Take 1 tablet by mouth Daily.       triamcinolone 0.1 % paste  Commonly known as:  KENALOG      See Admin Instructions.          STOP taking these medications    fluticasone 0.05 % cream  Commonly known as:  CUTIVATE  Stopped by:  Jimena Singer MD              Where to Get Your Medications      These medications were sent to [facility] [address] [place] [address] [facility] [phone]  - [phone]   [id number] [place] [id number]    Phone:  [phone]   · metoprolol succinate XL 50 MG 24 hr tablet         Patient is no longer taking -.  I corrected the med list to reflect this.  I did not stop these medications.    Dictated utilizing Dragon dictation

## 2020-02-07 ENCOUNTER — OFFICE VISIT (OUTPATIENT)
Dept: SLEEP MEDICINE | Facility: HOSPITAL | Age: 75
End: 2020-02-07

## 2020-02-07 VITALS
HEIGHT: 67 IN | HEART RATE: 49 BPM | WEIGHT: 178.8 LBS | SYSTOLIC BLOOD PRESSURE: 145 MMHG | DIASTOLIC BLOOD PRESSURE: 63 MMHG | OXYGEN SATURATION: 97 % | BODY MASS INDEX: 28.06 KG/M2

## 2020-02-07 DIAGNOSIS — Z78.9 DIFFICULTY WITH CPAP USE: ICD-10-CM

## 2020-02-07 DIAGNOSIS — G47.33 OBSTRUCTIVE SLEEP APNEA: Primary | ICD-10-CM

## 2020-02-07 DIAGNOSIS — R68.2 DRY MOUTH: ICD-10-CM

## 2020-02-07 DIAGNOSIS — E66.3 OVERWEIGHT (BMI 25.0-29.9): ICD-10-CM

## 2020-02-07 PROCEDURE — G0463 HOSPITAL OUTPT CLINIC VISIT: HCPCS

## 2020-02-07 RX ORDER — ZOLPIDEM TARTRATE 5 MG/1
TABLET ORAL
Qty: 2 TABLET | Refills: 0 | Status: SHIPPED | OUTPATIENT
Start: 2020-02-07 | End: 2020-09-09

## 2020-02-07 NOTE — PROGRESS NOTES
[facility] SLEEP MEDICINE  [address] [facility]  3RD FLOOR  [facility] [address]  [phone]    PCP: Domenica Merida MD    Reason for visit:  Sleep disorders: COLEMAN    Rodolfo is a 75 y.o.male who was seen in the [facility] today. Annual fu. He sleeps from 10pm to 6am. He is using a ffm. It fits well and does not leak. He has dry mouth every morning - he has adjusted the humidifier. He wakes up rested and refreshed. He denies daytime sleepiness. Recently cardiology eval - no arrhythmia.  [place] Sleepiness Scale is dnc. Caffeine 1 per day. Alcohol 0 per week.    Rodolfo  reports that he has never smoked. He has never used smokeless tobacco.    Pertinent Positive Review of Systems of pnd, acid reflux  Rest of Review of Systems was negative as recorded in Sleep Questionnaire.    Patient  has a past medical history of GERD (gastroesophageal reflux disease), Iritis of right eye, PVC (premature ventricular contraction), Rheumatic fever, Severe obstructive sleep apnea, and Shoulder fracture, right.     Current Medications:    Current Outpatient Medications:   •  acetaminophen (TYLENOL) 500 MG tablet, Take 2 tablets by mouth 3 (Three) Times a Day. (Patient taking differently: Take 1,000 mg by mouth 3 (Three) Times a Day As Needed.), Disp: , Rfl:   •  celecoxib (CeleBREX) 200 MG capsule, TAKE 1 CAPSULE BY MOUTH TWICE DAILY AS NEEDED FOR MILD OR MODERATE PAIN AS DIRECTED WITH FOOD, Disp: , Rfl:   •  metoprolol succinate XL (TOPROL-XL) 50 MG 24 hr tablet, Take 1 tablet by mouth Daily., Disp: 90 tablet, Rfl: 3  •  Multiple Vitamins-Minerals (EYE VITAMINS PO), Take 1 tablet by mouth Daily., Disp: , Rfl:   •  Multiple Vitamins-Minerals (MULTIVITAMIN WITH MINERALS) tablet tablet, Take 1 tablet by mouth Daily., Disp: , Rfl:   •  triamcinolone (KENALOG) 0.1 % paste, See Admin Instructions., Disp: , Rfl:   •  zolpidem (AMBIEN) 5 MG tablet, Bring to sleep lab DO NOT use at home. PLEASE take if not asleep within 30  "mins of start of study., Disp: 2 tablet, Rfl: 0   also entered in Sleep Questionnaire         Vital Signs: /63   Pulse (!) 49   Ht 170.2 cm (67\")   Wt 81.1 kg (178 lb 12.8 oz)   SpO2 97%   BMI 28.00 kg/m²     Body mass index is 28 kg/m².       Tongue: large      Dentition: good       Pharynx: Posterior pharyngeal pillars are wide   Mallampatti: III (soft and hard palate and base of uvula visible)        General: Alert. Cooperative. Well developed. No acute distress.             Head:  Normocephalic. Symmetrical. Atraumatic.              Nose: No septal deviation. No drainage.          Throat: No oral lesions. No thrush. Moist mucous membranes.    Chest Wall:  Normal shape. Symmetric expansion with respiration. No tenderness.             Neck:  Trachea midline.           Lungs:  Clear to auscultation bilaterally. No wheezes. No rhonchi. No rales. Respirations regular, even and unlabored.            Heart:  Regular rhythm and normal rate. Normal S1 and S2. No murmur.     Abdomen:  Soft, non-tender and non-distended. Normal bowel sounds. No masses.  Extremities:  Moves all extremities well. No edema.    Psychiatric: Normal mood and affect.    Study:  · [place] HST- AHI 40, lowest desaturation 70s; 68min of sats <90%    · Overnight oximetry on CPAP RA-August 2018- no desaturation    Testing:  · 99% compliance average usage 6 hours 30 minutes AHI 0.1 average pressure 16.9 auto CPAP between 15 and 17 cm.    [facility] [employer]: [id number]    Impression:  1. Obstructive sleep apnea    2. Overweight (BMI 25.0-29.9)    3. Difficulty with CPAP use    4. Dry mouth        Plan:  Rodolfo is compliant.  We have made multiple changes to pressures.  Despite this he has a high AHI and needs optimal pr since history of cardiac arrhythmias. Titration study ordered.      Has dry mouth. Use flonase for PND and nasal congestion.    I reiterated the importance of effective treatment of obstructive sleep apnea with PAP machine.  " Cardiovascular health risks of untreated sleep apnea were again reviewed.  Patient was asked to remain cautious if there is persistent hypersomnolence. The benefit of weight loss in reducing severity of obstructive sleep apnea was discussed.  Patient would benefit from adhering to a strict diet to achieve ideal BMI.     Change of PAP supplies regularly is important for effective use.  Change of cushion on the mask or plugs on nasal pillows along with disposable filters once every month and change of mask frame, tubing, headgear and Velcro straps every 6 months at the minimum was reiterated.    This patient is compliant with PAP machine and benefits from its use.  Apnea hypopneas index is corrected/improved.  Daytime hypersomnolence has resolved.     Patient will follow up in this clinic in pending test results or 4 months.    Thank you for allowing me to participate in your patient's care.    Mitch Mendiola MD    Part of this note may be an electronic transcription/translation of spoken language to printed text using the Dragon Dictation System.

## 2020-04-21 ENCOUNTER — APPOINTMENT (OUTPATIENT)
Dept: SLEEP MEDICINE | Facility: HOSPITAL | Age: 75
End: 2020-04-21

## 2020-05-11 ENCOUNTER — TELEPHONE (OUTPATIENT)
Dept: FAMILY MEDICINE CLINIC | Facility: CLINIC | Age: 75
End: 2020-05-11

## 2020-05-11 NOTE — TELEPHONE ENCOUNTER
PT STATES THAT HE HAS BEEN AROUND SOMEONE WHO HAS TESTED POSITIVE FOR HAVING THE ANTI-BODY OF COVID-19. PT REQUESTED TO GET TESTED. PLEASE ADVISE.

## 2020-05-11 NOTE — TELEPHONE ENCOUNTER
PATIENT CALLED IN AND STATED HE WAS IN CONTACT WITH SOMEONE WHO TESTED POSITIVE FOR  THE COVID -19 ANTIBIOI TEST . PATIENT WOULD LIKE A CALL BACK AND ADVISE -[phone].

## 2020-05-28 ENCOUNTER — TRANSCRIBE ORDERS (OUTPATIENT)
Dept: SLEEP MEDICINE | Facility: HOSPITAL | Age: 75
End: 2020-05-28

## 2020-05-28 DIAGNOSIS — Z01.818 OTHER SPECIFIED PRE-OPERATIVE EXAMINATION: Primary | ICD-10-CM

## 2020-05-29 ENCOUNTER — APPOINTMENT (OUTPATIENT)
Dept: SLEEP MEDICINE | Facility: HOSPITAL | Age: 75
End: 2020-05-29

## 2020-06-01 ENCOUNTER — LAB (OUTPATIENT)
Dept: LAB | Facility: HOSPITAL | Age: 75
End: 2020-06-01

## 2020-06-01 DIAGNOSIS — Z01.818 OTHER SPECIFIED PRE-OPERATIVE EXAMINATION: ICD-10-CM

## 2020-06-01 PROCEDURE — U0004 COV-19 TEST NON-CDC HGH THRU: HCPCS

## 2020-06-02 LAB
REF LAB TEST METHOD: NORMAL
SARS-COV-2 RNA RESP QL NAA+PROBE: NOT DETECTED

## 2020-06-03 ENCOUNTER — HOSPITAL ENCOUNTER (OUTPATIENT)
Dept: SLEEP MEDICINE | Facility: HOSPITAL | Age: 75
Discharge: HOME | End: 2020-06-03
Admitting: INTERNAL MEDICINE

## 2020-06-03 DIAGNOSIS — G47.33 OBSTRUCTIVE SLEEP APNEA: ICD-10-CM

## 2020-06-03 PROCEDURE — 95811 POLYSOM 6/>YRS CPAP 4/> PARM: CPT

## 2020-06-10 ENCOUNTER — TELEPHONE (OUTPATIENT)
Dept: SLEEP MEDICINE | Facility: HOSPITAL | Age: 75
End: 2020-06-10

## 2020-06-10 NOTE — TELEPHONE ENCOUNTER
Spoke with patient about sleep study results. Pressure changed via modem 10-15 per Dr Mendiola, orders for resmed airtouch full face mask sent to [facility]

## 2020-08-05 ENCOUNTER — HOSPITAL ENCOUNTER (OUTPATIENT)
Dept: CARDIOLOGY | Facility: HOSPITAL | Age: 75
Discharge: HOME | End: 2020-08-05
Admitting: INTERNAL MEDICINE

## 2020-08-05 VITALS
HEIGHT: 67 IN | OXYGEN SATURATION: 98 % | WEIGHT: 178 LBS | SYSTOLIC BLOOD PRESSURE: 146 MMHG | DIASTOLIC BLOOD PRESSURE: 62 MMHG | HEART RATE: 57 BPM | BODY MASS INDEX: 27.94 KG/M2

## 2020-08-05 DIAGNOSIS — I27.20 PULMONARY HYPERTENSION (HCC): ICD-10-CM

## 2020-08-05 PROCEDURE — 93306 TTE W/DOPPLER COMPLETE: CPT

## 2020-08-05 PROCEDURE — 93306 TTE W/DOPPLER COMPLETE: CPT | Performed by: INTERNAL MEDICINE

## 2020-08-06 LAB
ASCENDING AORTA: 2.6 CM
BH CV ECHO MEAS - ACS: 1.9 CM
BH CV ECHO MEAS - AI DEC SLOPE: 119 CM/SEC^2
BH CV ECHO MEAS - AI MAX PG: 41.2 MMHG
BH CV ECHO MEAS - AI MAX VEL: 321 CM/SEC
BH CV ECHO MEAS - AI P1/2T: 790.1 MSEC
BH CV ECHO MEAS - AO MAX PG (FULL): 5.4 MMHG
BH CV ECHO MEAS - AO MAX PG: 8.9 MMHG
BH CV ECHO MEAS - AO MEAN PG (FULL): 2 MMHG
BH CV ECHO MEAS - AO MEAN PG: 4 MMHG
BH CV ECHO MEAS - AO ROOT AREA (BSA CORRECTED): 1.7
BH CV ECHO MEAS - AO ROOT AREA: 8.6 CM^2
BH CV ECHO MEAS - AO ROOT DIAM: 3.3 CM
BH CV ECHO MEAS - AO V2 MAX: 149 CM/SEC
BH CV ECHO MEAS - AO V2 MEAN: 96.4 CM/SEC
BH CV ECHO MEAS - AO V2 VTI: 36.8 CM
BH CV ECHO MEAS - AVA(I,A): 1.8 CM^2
BH CV ECHO MEAS - AVA(I,D): 1.8 CM^2
BH CV ECHO MEAS - AVA(V,A): 1.8 CM^2
BH CV ECHO MEAS - AVA(V,D): 1.8 CM^2
BH CV ECHO MEAS - BSA(HAYCOCK): 2 M^2
BH CV ECHO MEAS - BSA: 1.9 M^2
BH CV ECHO MEAS - BZI_BMI: 27.9 KILOGRAMS/M^2
BH CV ECHO MEAS - BZI_METRIC_HEIGHT: 170.2 CM
BH CV ECHO MEAS - BZI_METRIC_WEIGHT: 80.7 KG
BH CV ECHO MEAS - EDV(MOD-SP2): 98 ML
BH CV ECHO MEAS - EDV(MOD-SP4): 102 ML
BH CV ECHO MEAS - EDV(TEICH): 148.7 ML
BH CV ECHO MEAS - EF(CUBED): 61.9 %
BH CV ECHO MEAS - EF(MOD-BP): 55 %
BH CV ECHO MEAS - EF(MOD-SP2): 53.1 %
BH CV ECHO MEAS - EF(MOD-SP4): 55.9 %
BH CV ECHO MEAS - EF(TEICH): 52.9 %
BH CV ECHO MEAS - ESV(MOD-SP2): 46 ML
BH CV ECHO MEAS - ESV(MOD-SP4): 45 ML
BH CV ECHO MEAS - ESV(TEICH): 70 ML
BH CV ECHO MEAS - FS: 27.5 %
BH CV ECHO MEAS - IVS/LVPW: 0.9
BH CV ECHO MEAS - IVSD: 0.83 CM
BH CV ECHO MEAS - LAT PEAK E' VEL: 8.3 CM/SEC
BH CV ECHO MEAS - LV DIASTOLIC VOL/BSA (35-75): 53 ML/M^2
BH CV ECHO MEAS - LV MASS(C)D: 178.6 GRAMS
BH CV ECHO MEAS - LV MASS(C)DI: 92.8 GRAMS/M^2
BH CV ECHO MEAS - LV MAX PG: 3.5 MMHG
BH CV ECHO MEAS - LV MEAN PG: 2 MMHG
BH CV ECHO MEAS - LV SYSTOLIC VOL/BSA (12-30): 23.4 ML/M^2
BH CV ECHO MEAS - LV V1 MAX: 93.8 CM/SEC
BH CV ECHO MEAS - LV V1 MEAN: 59 CM/SEC
BH CV ECHO MEAS - LV V1 VTI: 23 CM
BH CV ECHO MEAS - LVIDD: 5.5 CM
BH CV ECHO MEAS - LVIDS: 4 CM
BH CV ECHO MEAS - LVLD AP2: 7 CM
BH CV ECHO MEAS - LVLD AP4: 7.4 CM
BH CV ECHO MEAS - LVLS AP2: 6.2 CM
BH CV ECHO MEAS - LVLS AP4: 6.3 CM
BH CV ECHO MEAS - LVOT AREA (M): 2.8 CM^2
BH CV ECHO MEAS - LVOT AREA: 2.8 CM^2
BH CV ECHO MEAS - LVOT DIAM: 1.9 CM
BH CV ECHO MEAS - LVPWD: 0.91 CM
BH CV ECHO MEAS - MED PEAK E' VEL: 4.8 CM/SEC
BH CV ECHO MEAS - MR MAX PG: 61.2 MMHG
BH CV ECHO MEAS - MR MAX VEL: 391 CM/SEC
BH CV ECHO MEAS - MV A DUR: 0.14 SEC
BH CV ECHO MEAS - MV A MAX VEL: 63.5 CM/SEC
BH CV ECHO MEAS - MV DEC SLOPE: 280 CM/SEC^2
BH CV ECHO MEAS - MV DEC TIME: 0.21 SEC
BH CV ECHO MEAS - MV E MAX VEL: 65 CM/SEC
BH CV ECHO MEAS - MV E/A: 1
BH CV ECHO MEAS - MV P1/2T MAX VEL: 65 CM/SEC
BH CV ECHO MEAS - MV P1/2T: 68 MSEC
BH CV ECHO MEAS - MVA P1/2T LCG: 3.4 CM^2
BH CV ECHO MEAS - MVA(P1/2T): 3.2 CM^2
BH CV ECHO MEAS - PA ACC TIME: 0.11 SEC
BH CV ECHO MEAS - PA MAX PG (FULL): 4.1 MMHG
BH CV ECHO MEAS - PA MAX PG: 5.7 MMHG
BH CV ECHO MEAS - PA PR(ACCEL): 28.2 MMHG
BH CV ECHO MEAS - PA V2 MAX: 119 CM/SEC
BH CV ECHO MEAS - PULM A REVS DUR: 0.12 SEC
BH CV ECHO MEAS - PULM A REVS VEL: 27.6 CM/SEC
BH CV ECHO MEAS - PULM DIAS VEL: 83.4 CM/SEC
BH CV ECHO MEAS - PULM S/D: 0.58
BH CV ECHO MEAS - PULM SYS VEL: 48 CM/SEC
BH CV ECHO MEAS - PVA(V,A): 1.6 CM^2
BH CV ECHO MEAS - PVA(V,D): 1.6 CM^2
BH CV ECHO MEAS - QP/QS: 0.72
BH CV ECHO MEAS - RAP SYSTOLE: 3 MMHG
BH CV ECHO MEAS - RV MAX PG: 1.5 MMHG
BH CV ECHO MEAS - RV MEAN PG: 1 MMHG
BH CV ECHO MEAS - RV V1 MAX: 62.1 CM/SEC
BH CV ECHO MEAS - RV V1 MEAN: 41.1 CM/SEC
BH CV ECHO MEAS - RV V1 VTI: 14.9 CM
BH CV ECHO MEAS - RVOT AREA: 3.1 CM^2
BH CV ECHO MEAS - RVOT DIAM: 2 CM
BH CV ECHO MEAS - RVSP: 48 MMHG
BH CV ECHO MEAS - SI(AO): 163.6 ML/M^2
BH CV ECHO MEAS - SI(CUBED): 54.1 ML/M^2
BH CV ECHO MEAS - SI(LVOT): 33.9 ML/M^2
BH CV ECHO MEAS - SI(MOD-SP2): 27 ML/M^2
BH CV ECHO MEAS - SI(MOD-SP4): 29.6 ML/M^2
BH CV ECHO MEAS - SI(TEICH): 40.9 ML/M^2
BH CV ECHO MEAS - SUP REN AO DIAM: 1.9 CM
BH CV ECHO MEAS - SV(AO): 314.8 ML
BH CV ECHO MEAS - SV(CUBED): 104.2 ML
BH CV ECHO MEAS - SV(LVOT): 65.2 ML
BH CV ECHO MEAS - SV(MOD-SP2): 52 ML
BH CV ECHO MEAS - SV(MOD-SP4): 57 ML
BH CV ECHO MEAS - SV(RVOT): 46.8 ML
BH CV ECHO MEAS - SV(TEICH): 78.7 ML
BH CV ECHO MEAS - TAPSE (>1.6): 1.9 CM2
BH CV ECHO MEAS - TR MAX VEL: 334 CM/SEC
BH CV ECHO MEASUREMENTS AVERAGE E/E' RATIO: 9.92
BH CV XLRA - RV BASE: 3.7 CM
BH CV XLRA - RV LENGTH: 7.1 CM
BH CV XLRA - RV MID: 3.2 CM
BH CV XLRA - TDI S': 10.3 CM/SEC
LEFT ATRIUM VOLUME INDEX: 32 ML/M2
LV EF 2D ECHO EST: 55 %
MAXIMAL PREDICTED HEART RATE: 143 BPM
SINUS: 2.8 CM
STJ: 2.6 CM
STRESS TARGET HR: 122 BPM

## 2020-08-08 ENCOUNTER — TELEPHONE (OUTPATIENT)
Dept: CARDIOLOGY | Facility: CLINIC | Age: 75
End: 2020-08-08

## 2020-08-08 NOTE — TELEPHONE ENCOUNTER
Please let the patient know that the heart is strong pressures in the lungs slightly higher likely due to sleep apnea which is not been well treated.  I see he has had some recent adjustments and to see Dr. Mendiola again in October.  Agree with these plans and keep an eye on blood pressure.  Otherwise keep routine follow-up in September.

## 2020-08-10 NOTE — TELEPHONE ENCOUNTER
Notified patient of results and recommendations. He verbalized understanding.    Lizeth Cabrera, RN  Triage [facility]

## 2020-09-09 ENCOUNTER — OFFICE VISIT (OUTPATIENT)
Dept: CARDIOLOGY | Facility: CLINIC | Age: 75
End: 2020-09-09

## 2020-09-09 VITALS
SYSTOLIC BLOOD PRESSURE: 160 MMHG | BODY MASS INDEX: 28.41 KG/M2 | WEIGHT: 181 LBS | DIASTOLIC BLOOD PRESSURE: 70 MMHG | HEART RATE: 55 BPM | HEIGHT: 67 IN

## 2020-09-09 DIAGNOSIS — I49.3 FREQUENT PVCS: ICD-10-CM

## 2020-09-09 DIAGNOSIS — I27.20 PULMONARY HYPERTENSION (HCC): Primary | ICD-10-CM

## 2020-09-09 DIAGNOSIS — G47.33 SEVERE OBSTRUCTIVE SLEEP APNEA: ICD-10-CM

## 2020-09-09 PROCEDURE — 99214 OFFICE O/P EST MOD 30 MIN: CPT | Performed by: INTERNAL MEDICINE

## 2020-09-09 PROCEDURE — 93000 ELECTROCARDIOGRAM COMPLETE: CPT | Performed by: INTERNAL MEDICINE

## 2020-09-09 RX ORDER — AMLODIPINE BESYLATE 2.5 MG/1
2.5 TABLET ORAL DAILY
Qty: 30 TABLET | Refills: 11 | Status: SHIPPED | OUTPATIENT
Start: 2020-09-09 | End: 2021-03-03 | Stop reason: HOSPADM

## 2020-09-09 NOTE — PROGRESS NOTES
Date of Office Visit: 2020  Encounter Provider: Jimena Singer MD  Place of Service: [facility] CARDIOLOGY  Patient Name: Rodolfo Grover  :1945    Chief complaint  Pulmonary hypertension, ventricular ectopy    History of Present Illness  Patient is a 74-year-old gentleman with history of rheumatic fever, GE reflux disease who was seen at [facility] in 2018 after humeral fracture following a fall.  At that time he was noted to have ventricular bigeminy with normal potassium and magnesium levels.  He was hypertensive at the time.  He had an echocardiogram that showed normal systolic function with normal diastolic function, mild left atrial enlargement saline study was indeterminate.  There was aortic valve sclerosis with mild aortic regurgitation and trivial mitral and tricuspid regurgitation with mild pulmonary hypertension with an RV systolic pressure 42 mmHg.  A stress perfusion study was negative for ischemia.  Follow-up echocardiogram in 2020 showed an ejection fraction of 55% with normal diastolic function, mild aortic mitral valve regurgitation, mild to moderate tricuspid regurgitation with an RV systolic pressure increased further to 48 mmHg.    Since last visit he has been using his CPAP consistently.  His dyspnea on exertion and edema are unchanged.  He denies any palpitations dizziness chest pain.  He is walking 2 miles a day at a slower pace to accommodate a friend.  It is typically in 45 minutes.  He is on a low-salt diet.  He had a follow-up sleep study and pressure settings were changed by Dr. Mendiola in 2020 and he feels he is sleeping better with this.  He remains hypertensive with readings at home still in the 140s.  Pulse is been in the 50s.    Past Medical History:   Diagnosis Date   • GERD (gastroesophageal reflux disease)    • Iritis of right eye    • PVC (premature ventricular contraction)    • Rheumatic fever    • Severe obstructive sleep  apnea    • Shoulder fracture, right      Past Surgical History:   Procedure Laterality Date   • ENDOSCOPY N/A 8/16/2018    Erosive gastritis, diverticulosis   • HERNIA REPAIR     • SHOULDER CLOSED REDUCTION Right 3/16/2018    Procedure: RIGHT SHOULDER CLOSED REDUCTION;  Surgeon: Kj Garcia MD;  Location: [facility];  Service: Orthopedics   • TONSILLECTOMY     • TOTAL SHOULDER ARTHROPLASTY W/ DISTAL CLAVICLE EXCISION Right 3/22/2018    Procedure: Reverse Total Shoulder Arthroplsty;  Surgeon: Kj Garcia MD;  Location: [facility];  Service: Orthopedics     Outpatient Medications Prior to Visit   Medication Sig Dispense Refill   • acetaminophen (TYLENOL) 500 MG tablet Take 2 tablets by mouth 3 (Three) Times a Day. (Patient taking differently: Take 1,000 mg by mouth 3 (Three) Times a Day As Needed.)     • metoprolol succinate XL (TOPROL-XL) 50 MG 24 hr tablet Take 1 tablet by mouth Daily. 90 tablet 3   • Multiple Vitamins-Minerals (EYE VITAMINS PO) Take 1 tablet by mouth Daily.     • Multiple Vitamins-Minerals (MULTIVITAMIN WITH MINERALS) tablet tablet Take 1 tablet by mouth Daily.     • triamcinolone (KENALOG) 0.1 % paste See Admin Instructions.     • celecoxib (CeleBREX) 200 MG capsule TAKE 1 CAPSULE BY MOUTH TWICE DAILY AS NEEDED FOR MILD OR MODERATE PAIN AS DIRECTED WITH FOOD     • zolpidem (AMBIEN) 5 MG tablet Bring to sleep lab DO NOT use at home. PLEASE take if not asleep within 30 mins of start of study. 2 tablet 0     No facility-administered medications prior to visit.        Allergies as of 09/09/2020   • (No Known Allergies)     Social History     Socioeconomic History   • Marital status:      Spouse name: Not on file   • Number of children: Not on file   • Years of education: Not on file   • Highest education level: Not on file   Tobacco Use   • Smoking status: Never Smoker   • Smokeless tobacco: Never Used   Substance and Sexual Activity   • Alcohol use: Yes     Frequency: Monthly  "or less     Drinks per session: 1 or 2     Comment: occasional   • Drug use: No   • Sexual activity: Defer     Family History   Problem Relation Age of Onset   • Malig Hyperthermia Neg Hx      Review of Systems   Constitution: Negative for fever, malaise/fatigue, weight gain and weight loss.   HENT: Negative for ear pain, hearing loss, nosebleeds and sore throat.    Eyes: Negative for double vision, pain, vision loss in left eye and vision loss in right eye.   Cardiovascular:        See history of present illness.   Respiratory: Negative for cough, shortness of breath, sleep disturbances due to breathing, snoring and wheezing.    Endocrine: Negative for cold intolerance, heat intolerance and polyuria.   Skin: Negative for itching, poor wound healing and rash.   Musculoskeletal: Negative for joint pain, joint swelling and myalgias.   Gastrointestinal: Negative for abdominal pain, diarrhea, hematochezia, nausea and vomiting.   Genitourinary: Negative for hematuria and hesitancy.   Neurological: Negative for numbness, paresthesias and seizures.   Psychiatric/Behavioral: Negative for depression. The patient is not nervous/anxious.         Objective:     Vitals:    09/09/20 0947   BP: 160/70   Pulse: 55   Weight: 82.1 kg (181 lb)   Height: 170.2 cm (67\")     Body mass index is 28.35 kg/m².    Physical Exam   Constitutional: He is oriented to person, place, and time. He appears well-developed and well-nourished.   HENT:   Head: Normocephalic.   Nose: Nose normal.   Mouth/Throat: Oropharynx is clear and moist.   Eyes: Pupils are equal, round, and reactive to light. Conjunctivae and EOM are normal. Right eye exhibits no discharge. No scleral icterus.   Neck: Normal range of motion. Neck supple. No JVD present. No thyromegaly present.   Cardiovascular: Normal rate, regular rhythm, normal heart sounds and intact distal pulses. Exam reveals no gallop and no friction rub.   No murmur heard.  Pulses:       Carotid pulses are 2+ " on the right side, and 2+ on the left side.       Radial pulses are 2+ on the right side, and 2+ on the left side.        Femoral pulses are 2+ on the right side, and 2+ on the left side.       Popliteal pulses are 2+ on the right side, and 2+ on the left side.        Dorsalis pedis pulses are 2+ on the right side, and 2+ on the left side.        Posterior tibial pulses are 2+ on the right side, and 2+ on the left side.   Pulmonary/Chest: Effort normal and breath sounds normal. No respiratory distress. He has no wheezes. He has no rales.   Abdominal: Soft. Bowel sounds are normal. He exhibits no distension. There is no hepatosplenomegaly. There is no tenderness. There is no rebound.   Musculoskeletal: Normal range of motion. He exhibits no edema or tenderness.   Neurological: He is alert and oriented to person, place, and time.   Skin: Skin is warm and dry. No rash noted. No erythema.   Psychiatric: He has a normal mood and affect. His behavior is normal. Judgment and thought content normal.   Vitals reviewed.    Lab Review:     ECG 12 Lead  Date/Time: 9/9/2020 10:16 AM  Performed by: Jimena Singer MD  Authorized by: Jimena Singer MD   Comparison: compared with previous ECG   Similar to previous ECG  Rhythm: sinus rhythm  Conduction: 1st degree AV block and non-specific intraventricular conduction delay    Clinical impression: abnormal EKG          Assessment:       Diagnosis Plan   1. Pulmonary hypertension (CMS/HCC)  ECG 12 Lead   2. Frequent PVCs     3. Severe obstructive sleep apnea       Plan:       1.  Hypertension, still elevated.  Likely contributing to pulmonary hypertension.  Will add amlodipine 2.5 mg day to his regimen.  He will watch for constipation and edema.  He did have constipation prior to starting metoprolol and is aware that amlodipine may also aggravate this but wishes to proceed with this.  2.  Frequent PVC's.  Appears to be fairly well controlled with treatment of sleep apnea and on  beta-blocker therapy.  Prior stress test did not show ischemia  3.  Severe sleep apnea, regulated on CPAP use.  He is to follow-up with Dr. Mendiola after recent titration study and changes were made.  4.  Pulmonary hypertension.  Pulmonary pressures worse.  Plans as above.  He is to see Dr. Mendiola in October and if his CPAP is felt to be functioning well would plan on repeat echocardiographic imaging in 3 to 6 months with further blood pressure control.  If her pressures continue to worsen may need further invasive investigation.       Your medication list          Accurate as of September 9, 2020 11:59 PM. If you have any questions, ask your nurse or doctor.            START taking these medications      Instructions Last Dose Given Next Dose Due   amLODIPine 2.5 MG tablet  Commonly known as: NORVASC  Started by: Jimena Singer MD      Take 1 tablet by mouth Daily.          CHANGE how you take these medications      Instructions Last Dose Given Next Dose Due   acetaminophen 500 MG tablet  Commonly known as: TYLENOL  What changed:   · when to take this  · reasons to take this      Take 2 tablets by mouth 3 (Three) Times a Day.          CONTINUE taking these medications      Instructions Last Dose Given Next Dose Due   metoprolol succinate XL 50 MG 24 hr tablet  Commonly known as: TOPROL-XL      Take 1 tablet by mouth Daily.       multivitamin with minerals tablet tablet      Take 1 tablet by mouth Daily.       EYE VITAMINS PO      Take 1 tablet by mouth Daily.       triamcinolone 0.1 % paste  Commonly known as: KENALOG      See Admin Instructions.          STOP taking these medications    celecoxib 200 MG capsule  Commonly known as: CeleBREX  Stopped by: Jimena Singer MD        zolpidem 5 MG tablet  Commonly known as: Ambien  Stopped by: Jimena Singer MD              Where to Get Your Medications      These medications were sent to [address] [address] [facility] [phone]  -  [phone] FX  [address] [address] [address]    Phone: [phone]   · amLODIPine 2.5 MG tablet         Patient is no longer taking ambien, celebrex.  I corrected the med list to reflect this.  I did not stop these medications.    Dictated utilizing Dragon dictation

## 2020-10-02 ENCOUNTER — APPOINTMENT (OUTPATIENT)
Dept: SLEEP MEDICINE | Facility: HOSPITAL | Age: 75
End: 2020-10-02

## 2020-10-16 ENCOUNTER — OFFICE VISIT (OUTPATIENT)
Dept: SLEEP MEDICINE | Facility: HOSPITAL | Age: 75
End: 2020-10-16

## 2020-10-16 VITALS
WEIGHT: 177 LBS | HEIGHT: 67 IN | DIASTOLIC BLOOD PRESSURE: 56 MMHG | BODY MASS INDEX: 27.78 KG/M2 | OXYGEN SATURATION: 96 % | SYSTOLIC BLOOD PRESSURE: 150 MMHG | HEART RATE: 66 BPM

## 2020-10-16 DIAGNOSIS — G47.33 OBSTRUCTIVE SLEEP APNEA: Primary | ICD-10-CM

## 2020-10-16 DIAGNOSIS — I10 ESSENTIAL HYPERTENSION: ICD-10-CM

## 2020-10-16 DIAGNOSIS — I27.20 PHT (PULMONARY HYPERTENSION) (HCC): ICD-10-CM

## 2020-10-16 PROCEDURE — G0463 HOSPITAL OUTPT CLINIC VISIT: HCPCS

## 2020-10-16 NOTE — PROGRESS NOTES
[facility]  [address] [facility]  3RD FLOOR  [facility] [address]  [phone]    PCP: Domenica Merida MD    Reason for visit:  Sleep disorders: COLEMAN    Rodolfo is a 75 y.o.male who was [facility] in [facility] today. He had a titration study and we reduced his pressures. He got a new mask (AirTouch) that fits better. He is sleeping better and wakes up rested. He sleeps from 10pm to 6am. He reports some occasional dry mouth. His BP remains elevated despite recent adjustment by Dr. Singer. Has PHT and reports some soa with exertion however can walk up 3 flights of stairs without soa - unclear if any significance.  [place] Sleepiness Scale is 5. Caffeine 1 per day. Alcohol 0 per week.    Rodolfo  reports that he has never smoked. He has never used smokeless tobacco.    Pertinent Positive Review of Systems of PND  Rest of Review of Systems was negative as recorded in Sleep Questionnaire.    Patient  has a past medical history of GERD (gastroesophageal reflux disease), Iritis of right eye, PVC (premature ventricular contraction), Rheumatic fever, Severe obstructive sleep apnea, and Shoulder fracture, right.     Current Medications:    Current Outpatient Medications:   •  acetaminophen (TYLENOL) 500 MG tablet, Take 2 tablets by mouth 3 (Three) Times a Day. (Patient taking differently: Take 1,000 mg by mouth 3 (Three) Times a Day As Needed.), Disp: , Rfl:   •  amLODIPine (NORVASC) 2.5 MG tablet, Take 1 tablet by mouth Daily., Disp: 30 tablet, Rfl: 11  •  metoprolol succinate XL (TOPROL-XL) 50 MG 24 hr tablet, Take 1 tablet by mouth Daily., Disp: 90 tablet, Rfl: 3  •  Multiple Vitamins-Minerals (EYE VITAMINS PO), Take 1 tablet by mouth Daily., Disp: , Rfl:   •  Multiple Vitamins-Minerals (MULTIVITAMIN WITH MINERALS) tablet tablet, Take 1 tablet by mouth Daily., Disp: , Rfl:   •  triamcinolone (KENALOG) 0.1 % paste, See Admin Instructions., Disp: , Rfl:    also entered in Sleep Questionnaire        "  Vital Signs: /56   Pulse 66   Ht 170.2 cm (67\")   Wt 80.3 kg (177 lb)   SpO2 96%   BMI 27.72 kg/m²     Body mass index is 27.72 kg/m².       Tongue: Normal       Dentition: good       Pharynx: Posterior pharyngeal pillars are wide   Mallampatti: III (soft and hard palate and base of uvula visible)        General: Alert. Cooperative. Well developed. No acute distress.             Head:  Normocephalic. Symmetrical. Atraumatic.              Nose: No septal deviation. No drainage.          Throat: No oral lesions. No thrush. Moist mucous membranes.    Chest Wall:  Normal shape. Symmetric expansion with respiration. No tenderness.             Neck:  Trachea midline.           Lungs:  Clear to auscultation bilaterally. No wheezes. No rhonchi. No rales. Respirations regular, even and unlabored.            Heart:  Regular rhythm and normal rate. Normal S1 and S2. No murmur.     Abdomen:  Soft, non-tender and non-distended. Normal bowel sounds. No masses.  Extremities:  Moves all extremities well. No edema.    Psychiatric: Normal mood and affect.    Study:  · Shaan HST- AHI 40, lowest desaturation 70s; 68min of sats <90%    · Overnight oximetry on CPAP RA-August 2018- no desaturation  · 6/3/20  Overnight diagnostic polysomnogram study from 10:45 PM to 5:02 AM.  Sleep efficiency 87.5% with 5.51 hours total sleep time.  Sleep distribution shows minimal slow-wave sleep at 1.2%.  Reduced REM sleep at 15.1%.  Oxygen saturation remained above 88%.  Arousal index 10.3.  PLM index 9.4 with PLM arousal index 0.5.  CPAP was increased from 5 to 17 cm water pressure.  Bilevel also tried at 18/14.  Maximum sleep recorded at 11 cm water pressure with AHI less than 5.  Patient slept supine for the entire night.    Testing:  · 99% compliance average usage 7 hours AHI 0.2 average pressure 14.7 auto CPAP between 10 and 15 cm    DME Company: [employer]    Impression:  1. Obstructive sleep apnea    2. Essential hypertension  "   3. PHT (pulmonary hypertension) (CMS/[facility])        Plan:  Rodolfo is compliant and doing well with current CPAP pressures.  Wakes up rested and refreshed.  No daytime sleepiness.  I reviewed his recent sleep study as well as download of CPAP machine.  His sleep disordered breathing is completely treated on current pressures.    PHT noted. Not very symptomatic in terms of SOA. No hx of lung disease and likely cardiac related from hypertension.    I reiterated the importance of effective treatment of obstructive sleep apnea with PAP machine.  Cardiovascular health risks of untreated sleep apnea were again reviewed.  Patient was asked to remain cautious if there is persistent hypersomnolence. The benefit of weight loss in reducing severity of obstructive sleep apnea was discussed.  Patient would benefit from adhering to a strict diet to achieve ideal BMI.     Change of PAP supplies regularly is important for effective use.  Change of cushion on the mask or plugs on nasal pillows along with disposable filters once every month and change of mask frame, tubing, headgear and Velcro straps every 6 months at the minimum was reiterated.    This patient is compliant with PAP machine and benefits from its use.  Apnea hypopneas index is corrected/improved.  Daytime hypersomnolence has resolved.     Patient will follow up in this clinic in 1 year APRN.    Thank you for allowing me to participate in your patient's care.    Electronically signed by Mitch Mendiola MD, 10/16/20, 9:54 AM EDT.    Part of this note may be an electronic transcription/translation of spoken language to printed text using the Dragon Dictation System.

## 2021-02-19 ENCOUNTER — TELEPHONE (OUTPATIENT)
Dept: CARDIOLOGY | Facility: HOSPITAL | Age: 76
End: 2021-02-19

## 2021-02-19 ENCOUNTER — OFFICE VISIT (OUTPATIENT)
Dept: CARDIOLOGY | Facility: CLINIC | Age: 76
End: 2021-02-19

## 2021-02-19 ENCOUNTER — HOSPITAL ENCOUNTER (OUTPATIENT)
Dept: CARDIOLOGY | Facility: HOSPITAL | Age: 76
Discharge: HOME | End: 2021-02-19

## 2021-02-19 ENCOUNTER — TELEPHONE (OUTPATIENT)
Dept: CARDIOLOGY | Facility: CLINIC | Age: 76
End: 2021-02-19

## 2021-02-19 ENCOUNTER — APPOINTMENT (OUTPATIENT)
Dept: GENERAL RADIOLOGY | Facility: HOSPITAL | Age: 76
End: 2021-02-19

## 2021-02-19 ENCOUNTER — HOSPITAL ENCOUNTER (INPATIENT)
Facility: HOSPITAL | Age: 76
LOS: 12 days | Discharge: HOME HEALTH | End: 2021-03-03
Attending: INTERNAL MEDICINE | Admitting: INTERNAL MEDICINE

## 2021-02-19 VITALS
OXYGEN SATURATION: 96 % | SYSTOLIC BLOOD PRESSURE: 122 MMHG | DIASTOLIC BLOOD PRESSURE: 64 MMHG | HEART RATE: 61 BPM | RESPIRATION RATE: 20 BRPM

## 2021-02-19 VITALS
BODY MASS INDEX: 29.1 KG/M2 | HEART RATE: 70 BPM | DIASTOLIC BLOOD PRESSURE: 68 MMHG | HEIGHT: 67 IN | WEIGHT: 185.4 LBS | SYSTOLIC BLOOD PRESSURE: 138 MMHG

## 2021-02-19 VITALS
DIASTOLIC BLOOD PRESSURE: 86 MMHG | BODY MASS INDEX: 29.03 KG/M2 | SYSTOLIC BLOOD PRESSURE: 138 MMHG | WEIGHT: 185 LBS | HEART RATE: 68 BPM | HEIGHT: 67 IN

## 2021-02-19 VITALS — DIASTOLIC BLOOD PRESSURE: 65 MMHG | OXYGEN SATURATION: 100 % | HEART RATE: 57 BPM | SYSTOLIC BLOOD PRESSURE: 120 MMHG

## 2021-02-19 DIAGNOSIS — R07.89 OTHER CHEST PAIN: ICD-10-CM

## 2021-02-19 DIAGNOSIS — R06.02 SHORTNESS OF BREATH: ICD-10-CM

## 2021-02-19 DIAGNOSIS — R77.8 ELEVATED TROPONIN: ICD-10-CM

## 2021-02-19 DIAGNOSIS — R06.09 DOE (DYSPNEA ON EXERTION): ICD-10-CM

## 2021-02-19 DIAGNOSIS — Z98.890 S/P MVR (MITRAL VALVE REPAIR): Primary | ICD-10-CM

## 2021-02-19 DIAGNOSIS — R07.89 CHEST DISCOMFORT: ICD-10-CM

## 2021-02-19 DIAGNOSIS — R07.89 CHEST DISCOMFORT: Primary | ICD-10-CM

## 2021-02-19 DIAGNOSIS — I49.3 FREQUENT PVCS: ICD-10-CM

## 2021-02-19 DIAGNOSIS — I27.20 PULMONARY HYPERTENSION (HCC): ICD-10-CM

## 2021-02-19 DIAGNOSIS — K92.0 COFFEE GROUND EMESIS: ICD-10-CM

## 2021-02-19 DIAGNOSIS — Z95.1 S/P CABG (CORONARY ARTERY BYPASS GRAFT): ICD-10-CM

## 2021-02-19 DIAGNOSIS — R94.31 ABNORMAL EKG: ICD-10-CM

## 2021-02-19 DIAGNOSIS — R06.09 DOE (DYSPNEA ON EXERTION): Primary | ICD-10-CM

## 2021-02-19 DIAGNOSIS — I25.110 CORONARY ARTERY DISEASE INVOLVING NATIVE CORONARY ARTERY OF NATIVE HEART WITH UNSTABLE ANGINA PECTORIS (HCC): ICD-10-CM

## 2021-02-19 PROBLEM — R79.89 ELEVATED TROPONIN: Status: ACTIVE | Noted: 2021-02-19

## 2021-02-19 LAB
ANION GAP SERPL CALCULATED.3IONS-SCNC: 8.1 MMOL/L (ref 5–15)
BASOPHILS # BLD AUTO: 0.06 10*3/MM3 (ref 0–0.2)
BASOPHILS # BLD AUTO: 0.06 10*3/MM3 (ref 0–0.2)
BASOPHILS NFR BLD AUTO: 0.7 % (ref 0–1.5)
BASOPHILS NFR BLD AUTO: 0.7 % (ref 0–1.5)
BUN SERPL-MCNC: 19 MG/DL (ref 8–23)
BUN/CREAT SERPL: 17.1 (ref 7–25)
CALCIUM SPEC-SCNC: 8.8 MG/DL (ref 8.6–10.5)
CHLORIDE SERPL-SCNC: 106 MMOL/L (ref 98–107)
CO2 SERPL-SCNC: 28.9 MMOL/L (ref 22–29)
CREAT SERPL-MCNC: 1.11 MG/DL (ref 0.76–1.27)
DEPRECATED RDW RBC AUTO: 41.2 FL (ref 37–54)
DEPRECATED RDW RBC AUTO: 44.3 FL (ref 37–54)
EOSINOPHIL # BLD AUTO: 0.27 10*3/MM3 (ref 0–0.4)
EOSINOPHIL # BLD AUTO: 0.28 10*3/MM3 (ref 0–0.4)
EOSINOPHIL NFR BLD AUTO: 3 % (ref 0.3–6.2)
EOSINOPHIL NFR BLD AUTO: 3.4 % (ref 0.3–6.2)
ERYTHROCYTE [DISTWIDTH] IN BLOOD BY AUTOMATED COUNT: 11.8 % (ref 12.3–15.4)
ERYTHROCYTE [DISTWIDTH] IN BLOOD BY AUTOMATED COUNT: 12.1 % (ref 12.3–15.4)
GFR SERPL CREATININE-BSD FRML MDRD: 64 ML/MIN/1.73
GLUCOSE SERPL-MCNC: 89 MG/DL (ref 65–99)
HCT VFR BLD AUTO: 39.2 % (ref 37.5–51)
HCT VFR BLD AUTO: 42.6 % (ref 37.5–51)
HGB BLD-MCNC: 12.8 G/DL (ref 13–17.7)
HGB BLD-MCNC: 13.9 G/DL (ref 13–17.7)
IMM GRANULOCYTES # BLD AUTO: 0.13 10*3/MM3 (ref 0–0.05)
IMM GRANULOCYTES # BLD AUTO: 0.15 10*3/MM3 (ref 0–0.05)
IMM GRANULOCYTES NFR BLD AUTO: 1.5 % (ref 0–0.5)
IMM GRANULOCYTES NFR BLD AUTO: 1.8 % (ref 0–0.5)
LYMPHOCYTES # BLD AUTO: 1.65 10*3/MM3 (ref 0.7–3.1)
LYMPHOCYTES # BLD AUTO: 1.81 10*3/MM3 (ref 0.7–3.1)
LYMPHOCYTES NFR BLD AUTO: 20.1 % (ref 19.6–45.3)
LYMPHOCYTES NFR BLD AUTO: 20.4 % (ref 19.6–45.3)
MCH RBC QN AUTO: 31.4 PG (ref 26.6–33)
MCH RBC QN AUTO: 32.6 PG (ref 26.6–33)
MCHC RBC AUTO-ENTMCNC: 32.6 G/DL (ref 31.5–35.7)
MCHC RBC AUTO-ENTMCNC: 32.7 G/DL (ref 31.5–35.7)
MCV RBC AUTO: 100 FL (ref 79–97)
MCV RBC AUTO: 96.3 FL (ref 79–97)
MONOCYTES # BLD AUTO: 0.67 10*3/MM3 (ref 0.1–0.9)
MONOCYTES # BLD AUTO: 0.85 10*3/MM3 (ref 0.1–0.9)
MONOCYTES NFR BLD AUTO: 8.2 % (ref 5–12)
MONOCYTES NFR BLD AUTO: 9.6 % (ref 5–12)
NEUTROPHILS NFR BLD AUTO: 5.39 10*3/MM3 (ref 1.7–7)
NEUTROPHILS NFR BLD AUTO: 5.76 10*3/MM3 (ref 1.7–7)
NEUTROPHILS NFR BLD AUTO: 64.8 % (ref 42.7–76)
NEUTROPHILS NFR BLD AUTO: 65.8 % (ref 42.7–76)
NRBC BLD AUTO-RTO: 0 /100 WBC (ref 0–0.2)
NRBC BLD AUTO-RTO: 0 /100 WBC (ref 0–0.2)
NT-PROBNP SERPL-MCNC: 2219 PG/ML (ref 0–1800)
PLATELET # BLD AUTO: 185 10*3/MM3 (ref 140–450)
PLATELET # BLD AUTO: 191 10*3/MM3 (ref 140–450)
PMV BLD AUTO: 10.1 FL (ref 6–12)
PMV BLD AUTO: 10.3 FL (ref 6–12)
POTASSIUM SERPL-SCNC: 4 MMOL/L (ref 3.5–5.2)
QT INTERVAL: 445 MS
RBC # BLD AUTO: 4.07 10*6/MM3 (ref 4.14–5.8)
RBC # BLD AUTO: 4.26 10*6/MM3 (ref 4.14–5.8)
SARS-COV-2 RDRP RESP QL NAA+PROBE: NORMAL
SODIUM SERPL-SCNC: 143 MMOL/L (ref 136–145)
TROPONIN T SERPL-MCNC: 0.41 NG/ML (ref 0–0.03)
WBC # BLD AUTO: 8.2 10*3/MM3 (ref 3.4–10.8)
WBC # BLD AUTO: 8.88 10*3/MM3 (ref 3.4–10.8)

## 2021-02-19 PROCEDURE — G0378 HOSPITAL OBSERVATION PER HR: HCPCS

## 2021-02-19 PROCEDURE — 99220 PR INITIAL OBSERVATION CARE/DAY 70 MINUTES: CPT | Performed by: NURSE PRACTITIONER

## 2021-02-19 PROCEDURE — 85610 PROTHROMBIN TIME: CPT | Performed by: INTERNAL MEDICINE

## 2021-02-19 PROCEDURE — 25010000002 FENTANYL CITRATE (PF) 100 MCG/2ML SOLUTION: Performed by: INTERNAL MEDICINE

## 2021-02-19 PROCEDURE — 93306 TTE W/DOPPLER COMPLETE: CPT | Performed by: INTERNAL MEDICINE

## 2021-02-19 PROCEDURE — 93460 R&L HRT ART/VENTRICLE ANGIO: CPT | Performed by: INTERNAL MEDICINE

## 2021-02-19 PROCEDURE — 93005 ELECTROCARDIOGRAM TRACING: CPT | Performed by: INTERNAL MEDICINE

## 2021-02-19 PROCEDURE — B2151ZZ FLUOROSCOPY OF LEFT HEART USING LOW OSMOLAR CONTRAST: ICD-10-PCS | Performed by: INTERNAL MEDICINE

## 2021-02-19 PROCEDURE — 99153 MOD SED SAME PHYS/QHP EA: CPT | Performed by: INTERNAL MEDICINE

## 2021-02-19 PROCEDURE — 83880 ASSAY OF NATRIURETIC PEPTIDE: CPT | Performed by: NURSE PRACTITIONER

## 2021-02-19 PROCEDURE — C1894 INTRO/SHEATH, NON-LASER: HCPCS | Performed by: INTERNAL MEDICINE

## 2021-02-19 PROCEDURE — 84484 ASSAY OF TROPONIN QUANT: CPT | Performed by: NURSE PRACTITIONER

## 2021-02-19 PROCEDURE — 4A023N8 MEASUREMENT OF CARDIAC SAMPLING AND PRESSURE, BILATERAL, PERCUTANEOUS APPROACH: ICD-10-PCS | Performed by: INTERNAL MEDICINE

## 2021-02-19 PROCEDURE — 25010000002 MIDAZOLAM PER 1 MG: Performed by: INTERNAL MEDICINE

## 2021-02-19 PROCEDURE — 93000 ELECTROCARDIOGRAM COMPLETE: CPT | Performed by: NURSE PRACTITIONER

## 2021-02-19 PROCEDURE — C1769 GUIDE WIRE: HCPCS | Performed by: INTERNAL MEDICINE

## 2021-02-19 PROCEDURE — 0 IOPAMIDOL PER 1 ML: Performed by: INTERNAL MEDICINE

## 2021-02-19 PROCEDURE — 93010 ELECTROCARDIOGRAM REPORT: CPT | Performed by: INTERNAL MEDICINE

## 2021-02-19 PROCEDURE — 80048 BASIC METABOLIC PNL TOTAL CA: CPT | Performed by: NURSE PRACTITIONER

## 2021-02-19 PROCEDURE — 63710000001 NITROGLYCERIN 0.4 MG SUBLINGUAL TABLET: Performed by: NURSE PRACTITIONER

## 2021-02-19 PROCEDURE — 85025 COMPLETE CBC W/AUTO DIFF WBC: CPT | Performed by: NURSE PRACTITIONER

## 2021-02-19 PROCEDURE — 25010000002 HEPARIN (PORCINE) PER 1000 UNITS: Performed by: INTERNAL MEDICINE

## 2021-02-19 PROCEDURE — B2111ZZ FLUOROSCOPY OF MULTIPLE CORONARY ARTERIES USING LOW OSMOLAR CONTRAST: ICD-10-PCS | Performed by: INTERNAL MEDICINE

## 2021-02-19 PROCEDURE — 93306 TTE W/DOPPLER COMPLETE: CPT

## 2021-02-19 PROCEDURE — 85025 COMPLETE CBC W/AUTO DIFF WBC: CPT | Performed by: INTERNAL MEDICINE

## 2021-02-19 PROCEDURE — 36415 COLL VENOUS BLD VENIPUNCTURE: CPT

## 2021-02-19 PROCEDURE — 85730 THROMBOPLASTIN TIME PARTIAL: CPT | Performed by: INTERNAL MEDICINE

## 2021-02-19 PROCEDURE — 94760 N-INVAS EAR/PLS OXIMETRY 1: CPT

## 2021-02-19 PROCEDURE — 99152 MOD SED SAME PHYS/QHP 5/>YRS: CPT | Performed by: INTERNAL MEDICINE

## 2021-02-19 PROCEDURE — 87635 SARS-COV-2 COVID-19 AMP PRB: CPT | Performed by: INTERNAL MEDICINE

## 2021-02-19 PROCEDURE — 71046 X-RAY EXAM CHEST 2 VIEWS: CPT

## 2021-02-19 PROCEDURE — A9270 NON-COVERED ITEM OR SERVICE: HCPCS | Performed by: NURSE PRACTITIONER

## 2021-02-19 RX ORDER — HYDROCODONE BITARTRATE AND ACETAMINOPHEN 5; 325 MG/1; MG/1
1 TABLET ORAL EVERY 4 HOURS PRN
Status: DISCONTINUED | OUTPATIENT
Start: 2021-02-19 | End: 2021-03-03 | Stop reason: HOSPADM

## 2021-02-19 RX ORDER — MIDAZOLAM HYDROCHLORIDE 1 MG/ML
INJECTION INTRAMUSCULAR; INTRAVENOUS AS NEEDED
Status: DISCONTINUED | OUTPATIENT
Start: 2021-02-19 | End: 2021-02-19 | Stop reason: HOSPADM

## 2021-02-19 RX ORDER — ATORVASTATIN CALCIUM 20 MG/1
40 TABLET, FILM COATED ORAL NIGHTLY
Status: DISCONTINUED | OUTPATIENT
Start: 2021-02-19 | End: 2021-02-23

## 2021-02-19 RX ORDER — POTASSIUM CHLORIDE 1.5 G/1.77G
40 POWDER, FOR SOLUTION ORAL AS NEEDED
Status: DISCONTINUED | OUTPATIENT
Start: 2021-02-19 | End: 2021-02-22

## 2021-02-19 RX ORDER — SODIUM CHLORIDE 0.9 % (FLUSH) 0.9 %
10 SYRINGE (ML) INJECTION AS NEEDED
Status: DISCONTINUED | OUTPATIENT
Start: 2021-02-19 | End: 2021-03-03 | Stop reason: HOSPADM

## 2021-02-19 RX ORDER — SODIUM CHLORIDE 9 MG/ML
50 INJECTION, SOLUTION INTRAVENOUS CONTINUOUS
Status: DISCONTINUED | OUTPATIENT
Start: 2021-02-19 | End: 2021-02-20

## 2021-02-19 RX ORDER — ACETAMINOPHEN 325 MG/1
650 TABLET ORAL EVERY 4 HOURS PRN
Status: DISCONTINUED | OUTPATIENT
Start: 2021-02-19 | End: 2021-02-27

## 2021-02-19 RX ORDER — CHLORHEXIDINE GLUCONATE 0.12 MG/ML
15 RINSE ORAL ONCE
Status: COMPLETED | OUTPATIENT
Start: 2021-02-21 | End: 2021-02-22

## 2021-02-19 RX ORDER — HEPARIN SODIUM 5000 [USP'U]/ML
30-47.7 INJECTION, SOLUTION INTRAVENOUS; SUBCUTANEOUS EVERY 6 HOURS PRN
Status: DISCONTINUED | OUTPATIENT
Start: 2021-02-19 | End: 2021-02-23

## 2021-02-19 RX ORDER — TEMAZEPAM 15 MG/1
15 CAPSULE ORAL NIGHTLY PRN
Status: DISCONTINUED | OUTPATIENT
Start: 2021-02-19 | End: 2021-03-03 | Stop reason: HOSPADM

## 2021-02-19 RX ORDER — ALPRAZOLAM 0.25 MG/1
0.25 TABLET ORAL EVERY 8 HOURS PRN
Status: DISCONTINUED | OUTPATIENT
Start: 2021-02-19 | End: 2021-02-22

## 2021-02-19 RX ORDER — NITROGLYCERIN 0.4 MG/1
0.4 TABLET SUBLINGUAL
Status: DISCONTINUED | OUTPATIENT
Start: 2021-02-19 | End: 2021-02-23

## 2021-02-19 RX ORDER — CEFAZOLIN SODIUM 2 G/100ML
2 INJECTION, SOLUTION INTRAVENOUS
Status: DISCONTINUED | OUTPATIENT
Start: 2021-02-22 | End: 2021-02-22

## 2021-02-19 RX ORDER — CHLORHEXIDINE GLUCONATE 500 MG/1
1 CLOTH TOPICAL EVERY 12 HOURS PRN
Status: DISCONTINUED | OUTPATIENT
Start: 2021-02-21 | End: 2021-02-22

## 2021-02-19 RX ORDER — FENTANYL CITRATE 50 UG/ML
INJECTION, SOLUTION INTRAMUSCULAR; INTRAVENOUS AS NEEDED
Status: DISCONTINUED | OUTPATIENT
Start: 2021-02-19 | End: 2021-02-19 | Stop reason: HOSPADM

## 2021-02-19 RX ORDER — MULTIPLE VITAMINS W/ MINERALS TAB 9MG-400MCG
1 TAB ORAL DAILY
Status: DISCONTINUED | OUTPATIENT
Start: 2021-02-19 | End: 2021-03-03 | Stop reason: HOSPADM

## 2021-02-19 RX ORDER — METOPROLOL SUCCINATE 25 MG/1
25 TABLET, EXTENDED RELEASE ORAL DAILY
Status: DISCONTINUED | OUTPATIENT
Start: 2021-02-19 | End: 2021-02-23

## 2021-02-19 RX ORDER — POTASSIUM CHLORIDE 750 MG/1
40 TABLET, FILM COATED, EXTENDED RELEASE ORAL AS NEEDED
Status: DISCONTINUED | OUTPATIENT
Start: 2021-02-19 | End: 2021-02-22

## 2021-02-19 RX ORDER — POTASSIUM CHLORIDE 7.45 MG/ML
10 INJECTION INTRAVENOUS
Status: DISCONTINUED | OUTPATIENT
Start: 2021-02-19 | End: 2021-02-22

## 2021-02-19 RX ORDER — LIDOCAINE HYDROCHLORIDE 20 MG/ML
INJECTION, SOLUTION INFILTRATION; PERINEURAL AS NEEDED
Status: DISCONTINUED | OUTPATIENT
Start: 2021-02-19 | End: 2021-02-19 | Stop reason: HOSPADM

## 2021-02-19 RX ORDER — ACETAMINOPHEN 325 MG/1
650 TABLET ORAL EVERY 4 HOURS PRN
Status: DISCONTINUED | OUTPATIENT
Start: 2021-02-19 | End: 2021-02-22

## 2021-02-19 RX ORDER — METOPROLOL SUCCINATE 25 MG/1
25 TABLET, EXTENDED RELEASE ORAL DAILY
Qty: 30 TABLET | Refills: 0 | Status: SHIPPED | OUTPATIENT
Start: 2021-02-19 | End: 2021-03-29 | Stop reason: SDUPTHER

## 2021-02-19 RX ORDER — CEFAZOLIN SODIUM 2 G/100ML
2 INJECTION, SOLUTION INTRAVENOUS
Status: COMPLETED | OUTPATIENT
Start: 2021-02-22 | End: 2021-02-22

## 2021-02-19 RX ORDER — ONDANSETRON 4 MG/1
4 TABLET, FILM COATED ORAL EVERY 6 HOURS PRN
Status: DISCONTINUED | OUTPATIENT
Start: 2021-02-19 | End: 2021-02-23

## 2021-02-19 RX ORDER — TEMAZEPAM 7.5 MG/1
7.5 CAPSULE ORAL NIGHTLY PRN
Status: DISCONTINUED | OUTPATIENT
Start: 2021-02-19 | End: 2021-02-22

## 2021-02-19 RX ORDER — AMLODIPINE BESYLATE 2.5 MG/1
2.5 TABLET ORAL DAILY
Status: DISCONTINUED | OUTPATIENT
Start: 2021-02-19 | End: 2021-02-23

## 2021-02-19 RX ORDER — NITROGLYCERIN 0.4 MG/1
0.4 TABLET SUBLINGUAL
Status: DISCONTINUED | OUTPATIENT
Start: 2021-02-19 | End: 2021-03-03 | Stop reason: HOSPADM

## 2021-02-19 RX ORDER — HEPARIN SODIUM 10000 [USP'U]/100ML
11.9 INJECTION, SOLUTION INTRAVENOUS
Status: DISCONTINUED | OUTPATIENT
Start: 2021-02-19 | End: 2021-02-23

## 2021-02-19 RX ORDER — SODIUM CHLORIDE 9 MG/ML
INJECTION, SOLUTION INTRAVENOUS CONTINUOUS PRN
Status: COMPLETED | OUTPATIENT
Start: 2021-02-19 | End: 2021-02-19

## 2021-02-19 RX ORDER — ONDANSETRON 2 MG/ML
4 INJECTION INTRAMUSCULAR; INTRAVENOUS EVERY 6 HOURS PRN
Status: DISCONTINUED | OUTPATIENT
Start: 2021-02-19 | End: 2021-02-23

## 2021-02-19 RX ADMIN — AMLODIPINE BESYLATE 2.5 MG: 5 TABLET ORAL at 20:47

## 2021-02-19 RX ADMIN — NITROGLYCERIN 0.4 MG: 0.4 TABLET, ORALLY DISINTEGRATING SUBLINGUAL at 23:54

## 2021-02-19 RX ADMIN — METOPROLOL SUCCINATE 25 MG: 25 TABLET, EXTENDED RELEASE ORAL at 21:00

## 2021-02-19 RX ADMIN — Medication 1 TABLET: at 20:47

## 2021-02-19 RX ADMIN — ALPRAZOLAM 0.25 MG: 0.5 TABLET ORAL at 23:37

## 2021-02-19 RX ADMIN — HYDROCODONE BITARTRATE AND ACETAMINOPHEN 1 TABLET: 5; 325 TABLET ORAL at 23:37

## 2021-02-19 RX ADMIN — NITROGLYCERIN 0.4 MG: 0.4 TABLET, ORALLY DISINTEGRATING SUBLINGUAL at 15:11

## 2021-02-19 RX ADMIN — ATORVASTATIN CALCIUM 40 MG: 20 TABLET, FILM COATED ORAL at 20:47

## 2021-02-19 RX ADMIN — SODIUM CHLORIDE, PRESERVATIVE FREE 10 ML: 5 INJECTION INTRAVENOUS at 20:48

## 2021-02-19 RX ADMIN — HEPARIN SODIUM 11.9 UNITS/KG/HR: 10000 INJECTION, SOLUTION INTRAVENOUS at 23:39

## 2021-02-19 NOTE — TELEPHONE ENCOUNTER
Lab called to report a troponin of 0.414 and BNP of 2219.0          Thank you,  Amy Cavazos RN  [facility]  Triage

## 2021-02-19 NOTE — PROGRESS NOTES
Date of Office Visit: 2021  Encounter Provider: Radha Rodríguez, ARCELIA, APRN  Place of Service: [facility] CARDIOLOGY  Patient Name: Rodolfo Grover  :1945        Subjective:     Chief Complaint:  Chest discomfort, shortness of breath, pulmonary hypertension, frequent PVCs      History of Present Illness:  Rodolfo Grover is a 76 y.o. male patient of Dr. Singer.  This patient is new to me and I reviewed his records.    Patient has a history of pulmonary hypertension, frequent PVCs, hypertension, sleep apnea, rheumatic fever, GERD.    Patient was seen 3/2018 after suffering a humeral fracture following a fall.  At that time he was in ventricular bigeminy and had normal potassium and magnesium levels.  He also was hypertensive.  Echo showed normal LV systolic function with normal diastolic function, mild left atrial enlargement, indeterminate saline study, aortic valve sclerosis with mild regurgitation and trivial mitral tricuspid regurgitation with mild pulmonary hypertension with RVSP of 42 mmHg.  Stress perfusion study was negative for ischemia.  Follow-up echo 2020 showed EF of 55%, normal diastolic dysfunction, mild aortic and mitral valve regurgitation, mild to moderate tricuspid regurgitation with RVSP increased further to 48 mmHg.  Plan was to follow-up with sleep medicine to ensure sleep apnea was well treated and get blood pressure better controlled and then look at rechecking an echo in 3 to 6 months.  Low-dose amlodipine was added to his regimen.      Patient presents to office today for follow-up appointment.  He called the office today reporting shortness of breath and chest discomfort symptoms and was scheduled for same day visit for further evaluation.  He reports that over the last week he has been noticing chest heaviness that almost feels like an anxiety type feeling in his chest.  He feels it more so at night and more so if he is reclining or laying flat.  He also  had an episode of chest heaviness/discomfort waking him up this morning that improved with Tylenol and aspirin.  He is currently without any chest pain or heaviness symptoms.  He has been noticing increased shortness of breath with exertion since last visit.  He is not sure if he has been having chest discomfort with activities as he has not been as active recently due to the poor weather.  He has been having some CPAP issues with his SIM card saying that there is an error and he is to get a new one however he is still been using his CPAP nightly.  He does not think he has been taking the metoprolol XL since last visit.  He does have EKG changes today including some T wave flattening.  He denies any palpitations or racing heartbeat sensation, syncope, near syncope, or abnormal bleeding.  He feels that his chronic lower extremity swelling has actually improved.  He appears euvolemic on exam.  Lungs are clear.  Denies cough, fever, or chills.          Past Medical History:   Diagnosis Date   • GERD (gastroesophageal reflux disease)    • Iritis of right eye    • PVC (premature ventricular contraction)    • Rheumatic fever    • Severe obstructive sleep apnea    • Shoulder fracture, right      Past Surgical History:   Procedure Laterality Date   • ENDOSCOPY N/A 8/16/2018    Erosive gastritis, diverticulosis   • HERNIA REPAIR     • SHOULDER CLOSED REDUCTION Right 3/16/2018    Procedure: RIGHT SHOULDER CLOSED REDUCTION;  Surgeon: Kj Garcia MD;  Location: [facility];  Service: Orthopedics   • TONSILLECTOMY     • TOTAL SHOULDER ARTHROPLASTY W/ DISTAL CLAVICLE EXCISION Right 3/22/2018    Procedure: Reverse Total Shoulder Arthroplsty;  Surgeon: Kj Garcia MD;  Location: [facility];  Service: Orthopedics     Outpatient Medications Prior to Visit   Medication Sig Dispense Refill   • acetaminophen (TYLENOL) 500 MG tablet Take 2 tablets by mouth 3 (Three) Times a Day. (Patient taking differently: Take 1,000 mg  by mouth 3 (Three) Times a Day As Needed.)     • amLODIPine (NORVASC) 2.5 MG tablet Take 1 tablet by mouth Daily. 30 tablet 11   • Multiple Vitamins-Minerals (EYE VITAMINS PO) Take 1 tablet by mouth Daily.     • Multiple Vitamins-Minerals (MULTIVITAMIN WITH MINERALS) tablet tablet Take 1 tablet by mouth Daily.     • triamcinolone (KENALOG) 0.1 % paste See Admin Instructions. Prn     • metoprolol succinate XL (TOPROL-XL) 50 MG 24 hr tablet Take 1 tablet by mouth Daily. 90 tablet 3     No facility-administered medications prior to visit.        Allergies as of 02/19/2021   • (No Known Allergies)     Social History     Socioeconomic History   • Marital status:      Spouse name: Not on file   • Number of children: Not on file   • Years of education: Not on file   • Highest education level: Not on file   Tobacco Use   • Smoking status: Never Smoker   • Smokeless tobacco: Never Used   Substance and Sexual Activity   • Alcohol use: Yes     Frequency: Monthly or less     Drinks per session: 1 or 2     Comment: occasional   • Drug use: No   • Sexual activity: Defer     Family History   Problem Relation Age of Onset   • Malig Hyperthermia Neg Hx            Review of Systems   Constitution: Positive for malaise/fatigue. Negative for chills, fever, weight gain and weight loss.   HENT: Negative for ear pain, hearing loss, nosebleeds and sore throat.    Eyes: Negative for blurred vision, double vision, redness, vision loss in left eye, vision loss in right eye and visual disturbance.   Cardiovascular:        SEE HPI   Respiratory: Positive for shortness of breath. Negative for cough, snoring and wheezing.    Endocrine: Negative for cold intolerance and heat intolerance.   Skin: Negative for itching, rash and suspicious lesions.   Musculoskeletal: Positive for joint pain. Negative for joint swelling and myalgias.   Gastrointestinal: Negative for abdominal pain, diarrhea, hematemesis, melena, nausea and vomiting.  "  Genitourinary: Negative for dysuria, frequency and hematuria.   Neurological: Negative for dizziness, headaches, numbness, paresthesias and seizures.   Psychiatric/Behavioral: Negative for altered mental status and depression. The patient is nervous/anxious.             Objective:     Vitals:    02/19/21 1146   BP: 138/68   BP Location: Right arm   Cuff Size: Adult   Pulse: 70   Weight: 84.1 kg (185 lb 6.4 oz)   Height: 170.2 cm (67\")     Body mass index is 29.04 kg/m².      PHYSICAL EXAM:  Constitutional:       General: Not in acute distress.     Appearance: Well-developed. Not diaphoretic.   Eyes:      Pupils: Pupils are equal, round, and reactive to light.   HENT:      Head: Normocephalic and atraumatic.   Neck:      Musculoskeletal: Neck supple.      Vascular: No carotid bruit or JVD.   Pulmonary:      Effort: Pulmonary effort is normal. No respiratory distress.      Breath sounds: Normal breath sounds. No wheezing. No rales.   Cardiovascular:      Normal rate. Occasional ectopic beats. Regular rhythm.      Murmurs: There is no murmur.      No gallop. No click. No rub.   Edema:     Peripheral edema absent.   Abdominal:      General: Bowel sounds are normal. There is no distension.      Palpations: Abdomen is soft.   Musculoskeletal: Normal range of motion.         General: No tenderness or deformity.   Skin:     General: Skin is warm and dry.      Findings: No erythema or rash.   Neurological:      Mental Status: Alert and oriented to person, place, and time.   Psychiatric:         Behavior: Behavior normal.         Judgment: Judgment normal.             ECG 12 Lead    Date/Time: 2/19/2021 12:48 PM  Performed by: Radha Rodríguez DNP, APRN  Authorized by: Radha Rodríguez DNP, NKECHI   Comparison: compared with previous ECG from 9/9/2020  Rhythm: sinus rhythm  Ectopy comments: PVC  Rate: normal  BPM: 68  Conduction: 1st degree AV block  Other findings: T wave abnormality    Clinical impression: abnormal " EKG              Assessment:       Diagnosis Plan   1. Chest discomfort     2. LARA (dyspnea on exertion)     3. Frequent PVCs  metoprolol succinate XL (TOPROL-XL) 25 MG 24 hr tablet   4. Pulmonary hypertension (CMS/HCC)           Plan:     1. Chest discomfort/heaviness and shortness of breath with exertion: We will check a troponin, proBNP, BMP, and echocardiogram today in the office.  Based on results will look at pursuing outpatient stress test versus admission for heart catheterization.  2. Hypertension: Not significantly elevated in the office today.  Has been taking amlodipine but thinks that he stopped the metoprolol XL by mistake.  3. Frequent PVCs: Discussed with Dr. Singer.  Will resume 25 mg metoprolol XL as he also has marked first-degree AV block.  4. First-degree AV block: Improved on EKG today compared with previous.  Plan as above.  5. Pulmonary hypertension: Pulmonary pressures were worse on last echocardiogram.  Patient was instructed to follow-up with Dr. Mendiola to ensure sleep apnea well treated.  He has been having some issues with his some card on his CPAP recently though has been using it.  We will recheck an echocardiogram today, as above.  6. Severe obstructive sleep apnea: Treated with CPAP.  Has been using nightly though is to get a new SIM card soon.      ADDENDUM: CBC shows normal white blood cell count, no anemia, normal hematocrit.  BMP normal.  proBNP elevated at 2,219.  Troponin elevated at 0.414.  Echocardiogram today shows normal LV systolic function with EF of 62%, new wall motion abnormalities with hypokinesis of the basal inferior lateral, mid inferolateral, and basal inferior walls.  Mild to moderate mitral regurgitation, trace aortic regurgitation, mild to moderate tricuspid regurgitation seen.  RVSP increased from previous echo, now 61.3 mmHg.  Plan of care discussed with Dr. Singer.  Patient started having some chest pressure after echo was transferred to chest pain unit and hooked  up to the monitor and given sublingual nitroglycerin with improvement.  Patient to get right and left heart cath today.  Benefits versus risks were discussed in detail with patient and he verbalized understanding of risks including but not limited to bleeding, stroke, arrhythmia, death.  He would like to proceed.  Dr. Nails aware of plan.  Orders in place.        Records reviewed including but not limited to 2018 echo, 2018 stress test, 2019 echo, 2019 monitor, 8/2020 echo, 2/19/2021 echo, 2/19/21 CBC, BMP, troponin, proBNP.  Greater than 2 hours was spent on office visit today.           Your medication list          Accurate as of February 19, 2021  3:30 PM. If you have any questions, ask your nurse or doctor.            CHANGE how you take these medications      Instructions Last Dose Given Next Dose Due   acetaminophen 500 MG tablet  Commonly known as: TYLENOL  What changed:   · when to take this  · reasons to take this      Take 2 tablets by mouth 3 (Three) Times a Day.       metoprolol succinate XL 25 MG 24 hr tablet  Commonly known as: TOPROL-XL  What changed:   · medication strength  · how much to take  Changed by: Radha Rodríguez, DNP, APRN      Take 1 tablet by mouth Daily.          CONTINUE taking these medications      Instructions Last Dose Given Next Dose Due   amLODIPine 2.5 MG tablet  Commonly known as: NORVASC      Take 1 tablet by mouth Daily.       multivitamin with minerals tablet tablet      Take 1 tablet by mouth Daily.       EYE VITAMINS PO      Take 1 tablet by mouth Daily.       triamcinolone 0.1 % paste  Commonly known as: KENALOG      See Admin Instructions. Prn             Where to Get Your Medications      These medications were sent to [facility] [address] [place] [address] - [address] [facility] - [phone]  - [phone]   [address] [facility] [address]    Phone: [phone]   · metoprolol succinate XL 25 MG 24 hr tablet         The above medication changes  may not have been made by this provider.  Patient's medication list was updated to reflect medications they are currently taking including medication changes made by other providers.            Thanks,    Radha Rodríguez, ARCELIA, APRN  02/19/2021         Dictated utilizing Dragon dictation

## 2021-02-19 NOTE — TELEPHONE ENCOUNTER
Pt is calling in with c/o intermittent chest heaviness and SOA.  He said this has been going on for the last few days. He has taken tylenol and ASA and stated it helps.  He liz cough, and stated the edema in his ankles has improved.  He said the chest heaviness is worse when he lays flat.  This doesn't radiate,  He is not nauseated or diaphoretic.  He is currently not experiencing the discomfort, but said it will wake him up from sleep.    I have scheduled him for further evaluation today   Thanks  Trinh Fontenot RN  Triage nurse

## 2021-02-20 ENCOUNTER — APPOINTMENT (OUTPATIENT)
Dept: CARDIOLOGY | Facility: HOSPITAL | Age: 76
End: 2021-02-20

## 2021-02-20 PROBLEM — I25.110 CORONARY ARTERY DISEASE INVOLVING NATIVE CORONARY ARTERY OF NATIVE HEART WITH UNSTABLE ANGINA PECTORIS: Status: ACTIVE | Noted: 2021-02-19

## 2021-02-20 LAB
ABO GROUP BLD: NORMAL
ANION GAP SERPL CALCULATED.3IONS-SCNC: 5.8 MMOL/L (ref 5–15)
APTT PPP: 127.1 SECONDS (ref 22.7–35.4)
APTT PPP: 31.1 SECONDS (ref 22.7–35.4)
APTT PPP: 38.5 SECONDS (ref 22.7–35.4)
ARTERIAL PATENCY WRIST A: POSITIVE
ATMOSPHERIC PRESS: 760.7 MMHG
BACTERIA UR QL AUTO: ABNORMAL /HPF
BASE EXCESS BLDA CALC-SCNC: 1.4 MMOL/L (ref 0–2)
BASOPHILS # BLD AUTO: 0.06 10*3/MM3 (ref 0–0.2)
BASOPHILS NFR BLD AUTO: 0.8 % (ref 0–1.5)
BDY SITE: NORMAL
BH CV XLRA MEAS - DIST GSV CALF DIST LEFT: 0.16 CM
BH CV XLRA MEAS - DIST GSV CALF DIST RIGHT: 0.24 CM
BH CV XLRA MEAS - DIST GSV THIGH DIST LEFT: 0.28 CM
BH CV XLRA MEAS - DIST GSV THIGH DIST RIGHT: 0.39 CM
BH CV XLRA MEAS - GSV ANKLE DIST LEFT: 0.18 CM
BH CV XLRA MEAS - GSV ANKLE DIST RIGHT: 0.24 CM
BH CV XLRA MEAS - GSV KNEE DIST LEFT: 0.29 CM
BH CV XLRA MEAS - GSV KNEE DIST RIGHT: 0.41 CM
BH CV XLRA MEAS - GSV ORIGIN DIST LEFT: 0.44 CM
BH CV XLRA MEAS - GSV ORIGIN DIST RIGHT: 0.39 CM
BH CV XLRA MEAS - MID GSV CALF LEFT: 0.21 CM
BH CV XLRA MEAS - MID GSV CALF RIGHT: 0.22 CM
BH CV XLRA MEAS - MID GSV THIGH  LEFT: 0.21 CM
BH CV XLRA MEAS - MID GSV THIGH  RIGHT: 0.37 CM
BH CV XLRA MEAS - PROX GSV CALF DIST LEFT: 0.21 CM
BH CV XLRA MEAS - PROX GSV CALF DIST RIGHT: 0.21 CM
BH CV XLRA MEAS - PROX GSV THIGH  LEFT: 0.29 CM
BH CV XLRA MEAS - PROX GSV THIGH  RIGHT: 0.32 CM
BH CV XLRA MEAS LEFT CCA RATIO VEL: -121 CM/SEC
BH CV XLRA MEAS LEFT DIST CCA EDV: -25.9 CM/SEC
BH CV XLRA MEAS LEFT DIST CCA PSV: -121 CM/SEC
BH CV XLRA MEAS LEFT DIST ICA EDV: -32.2 CM/SEC
BH CV XLRA MEAS LEFT DIST ICA PSV: -93.5 CM/SEC
BH CV XLRA MEAS LEFT ICA RATIO VEL: -109 CM/SEC
BH CV XLRA MEAS LEFT ICA/CCA RATIO: 0.9
BH CV XLRA MEAS LEFT MID ICA EDV: -25.9 CM/SEC
BH CV XLRA MEAS LEFT MID ICA PSV: -109 CM/SEC
BH CV XLRA MEAS LEFT PROX CCA EDV: 20.4 CM/SEC
BH CV XLRA MEAS LEFT PROX CCA PSV: 128 CM/SEC
BH CV XLRA MEAS LEFT PROX ECA EDV: -8.2 CM/SEC
BH CV XLRA MEAS LEFT PROX ECA PSV: -83.4 CM/SEC
BH CV XLRA MEAS LEFT PROX ICA EDV: -23.9 CM/SEC
BH CV XLRA MEAS LEFT PROX ICA PSV: -93.4 CM/SEC
BH CV XLRA MEAS LEFT PROX SCLA PSV: 127 CM/SEC
BH CV XLRA MEAS LEFT VERTEBRAL A EDV: -14.5 CM/SEC
BH CV XLRA MEAS LEFT VERTEBRAL A PSV: -62.5 CM/SEC
BH CV XLRA MEAS RIGHT CCA RATIO VEL: 128 CM/SEC
BH CV XLRA MEAS RIGHT DIST CCA EDV: 19.6 CM/SEC
BH CV XLRA MEAS RIGHT DIST CCA PSV: 128 CM/SEC
BH CV XLRA MEAS RIGHT DIST ICA EDV: 14.5 CM/SEC
BH CV XLRA MEAS RIGHT DIST ICA PSV: 58.5 CM/SEC
BH CV XLRA MEAS RIGHT ICA RATIO VEL: -103 CM/SEC
BH CV XLRA MEAS RIGHT ICA/CCA RATIO: -0.8
BH CV XLRA MEAS RIGHT MID ICA EDV: -19.3 CM/SEC
BH CV XLRA MEAS RIGHT MID ICA PSV: -74.5 CM/SEC
BH CV XLRA MEAS RIGHT PROX CCA EDV: 14.9 CM/SEC
BH CV XLRA MEAS RIGHT PROX CCA PSV: 113 CM/SEC
BH CV XLRA MEAS RIGHT PROX ECA EDV: -10.6 CM/SEC
BH CV XLRA MEAS RIGHT PROX ECA PSV: -156 CM/SEC
BH CV XLRA MEAS RIGHT PROX ICA EDV: -23.4 CM/SEC
BH CV XLRA MEAS RIGHT PROX ICA PSV: -103 CM/SEC
BH CV XLRA MEAS RIGHT PROX SCLA PSV: 189 CM/SEC
BH CV XLRA MEAS RIGHT VERTEBRAL A EDV: -10.2 CM/SEC
BH CV XLRA MEAS RIGHT VERTEBRAL A PSV: -61.3 CM/SEC
BILIRUB UR QL STRIP: NEGATIVE
BLD GP AB SCN SERPL QL: NEGATIVE
BUN SERPL-MCNC: 16 MG/DL (ref 8–23)
BUN/CREAT SERPL: 16.5 (ref 7–25)
CALCIUM SPEC-SCNC: 8.2 MG/DL (ref 8.6–10.5)
CHLORIDE SERPL-SCNC: 110 MMOL/L (ref 98–107)
CLARITY UR: CLEAR
CLOSE TME COLL+ADP + EPINEP PNL BLD: 95 %
CO2 SERPL-SCNC: 27.2 MMOL/L (ref 22–29)
COLOR UR: YELLOW
CREAT SERPL-MCNC: 0.97 MG/DL (ref 0.76–1.27)
DEPRECATED RDW RBC AUTO: 40.6 FL (ref 37–54)
EOSINOPHIL # BLD AUTO: 0.28 10*3/MM3 (ref 0–0.4)
EOSINOPHIL NFR BLD AUTO: 3.8 % (ref 0.3–6.2)
ERYTHROCYTE [DISTWIDTH] IN BLOOD BY AUTOMATED COUNT: 11.7 % (ref 12.3–15.4)
GFR SERPL CREATININE-BSD FRML MDRD: 75 ML/MIN/1.73
GLUCOSE SERPL-MCNC: 74 MG/DL (ref 65–99)
GLUCOSE UR STRIP-MCNC: NEGATIVE MG/DL
HCO3 BLDA-SCNC: 26.3 MMOL/L (ref 22–28)
HCT VFR BLD AUTO: 37.7 % (ref 37.5–51)
HGB BLD-MCNC: 12.6 G/DL (ref 13–17.7)
HGB UR QL STRIP.AUTO: ABNORMAL
HYALINE CASTS UR QL AUTO: ABNORMAL /LPF
IMM GRANULOCYTES # BLD AUTO: 0.13 10*3/MM3 (ref 0–0.05)
IMM GRANULOCYTES NFR BLD AUTO: 1.8 % (ref 0–0.5)
INR PPP: 1.23 (ref 0.9–1.1)
KETONES UR QL STRIP: NEGATIVE
LEFT ARM BP: NORMAL MMHG
LEUKOCYTE ESTERASE UR QL STRIP.AUTO: NEGATIVE
LYMPHOCYTES # BLD AUTO: 1.93 10*3/MM3 (ref 0.7–3.1)
LYMPHOCYTES NFR BLD AUTO: 26.1 % (ref 19.6–45.3)
MCH RBC QN AUTO: 32.2 PG (ref 26.6–33)
MCHC RBC AUTO-ENTMCNC: 33.4 G/DL (ref 31.5–35.7)
MCV RBC AUTO: 96.4 FL (ref 79–97)
MODALITY: NORMAL
MONOCYTES # BLD AUTO: 0.76 10*3/MM3 (ref 0.1–0.9)
MONOCYTES NFR BLD AUTO: 10.3 % (ref 5–12)
NEUTROPHILS NFR BLD AUTO: 4.23 10*3/MM3 (ref 1.7–7)
NEUTROPHILS NFR BLD AUTO: 57.2 % (ref 42.7–76)
NITRITE UR QL STRIP: NEGATIVE
NRBC BLD AUTO-RTO: 0 /100 WBC (ref 0–0.2)
PCO2 BLDA: 41.5 MM HG (ref 35–45)
PH BLDA: 7.41 PH UNITS (ref 7.35–7.45)
PH UR STRIP.AUTO: 6.5 [PH] (ref 5–8)
PLATELET # BLD AUTO: 163 10*3/MM3 (ref 140–450)
PMV BLD AUTO: 10.2 FL (ref 6–12)
PO2 BLDA: 80.3 MM HG (ref 80–100)
POTASSIUM SERPL-SCNC: 4.4 MMOL/L (ref 3.5–5.2)
PROT UR QL STRIP: NEGATIVE
PROTHROMBIN TIME: 15.3 SECONDS (ref 11.7–14.2)
QT INTERVAL: 443 MS
RBC # BLD AUTO: 3.91 10*6/MM3 (ref 4.14–5.8)
RBC # UR: ABNORMAL /HPF
REF LAB TEST METHOD: ABNORMAL
RH BLD: POSITIVE
RIGHT ARM BP: NORMAL MMHG
SAO2 % BLDCOA: 95.8 % (ref 92–99)
SET MECH RESP RATE: 16
SODIUM SERPL-SCNC: 143 MMOL/L (ref 136–145)
SP GR UR STRIP: 1.02 (ref 1–1.03)
SQUAMOUS #/AREA URNS HPF: ABNORMAL /HPF
T&S EXPIRATION DATE: NORMAL
UROBILINOGEN UR QL STRIP: ABNORMAL
WBC # BLD AUTO: 7.39 10*3/MM3 (ref 3.4–10.8)
WBC UR QL AUTO: ABNORMAL /HPF

## 2021-02-20 PROCEDURE — 85025 COMPLETE CBC W/AUTO DIFF WBC: CPT | Performed by: INTERNAL MEDICINE

## 2021-02-20 PROCEDURE — 94660 CPAP INITIATION&MGMT: CPT

## 2021-02-20 PROCEDURE — 93010 ELECTROCARDIOGRAM REPORT: CPT | Performed by: INTERNAL MEDICINE

## 2021-02-20 PROCEDURE — 86850 RBC ANTIBODY SCREEN: CPT | Performed by: THORACIC SURGERY (CARDIOTHORACIC VASCULAR SURGERY)

## 2021-02-20 PROCEDURE — 85576 BLOOD PLATELET AGGREGATION: CPT | Performed by: THORACIC SURGERY (CARDIOTHORACIC VASCULAR SURGERY)

## 2021-02-20 PROCEDURE — 82803 BLOOD GASES ANY COMBINATION: CPT

## 2021-02-20 PROCEDURE — 99232 SBSQ HOSP IP/OBS MODERATE 35: CPT | Performed by: NURSE PRACTITIONER

## 2021-02-20 PROCEDURE — 93970 EXTREMITY STUDY: CPT

## 2021-02-20 PROCEDURE — 80048 BASIC METABOLIC PNL TOTAL CA: CPT | Performed by: INTERNAL MEDICINE

## 2021-02-20 PROCEDURE — 94799 UNLISTED PULMONARY SVC/PX: CPT

## 2021-02-20 PROCEDURE — 86923 COMPATIBILITY TEST ELECTRIC: CPT

## 2021-02-20 PROCEDURE — 85730 THROMBOPLASTIN TIME PARTIAL: CPT | Performed by: INTERNAL MEDICINE

## 2021-02-20 PROCEDURE — 86901 BLOOD TYPING SEROLOGIC RH(D): CPT | Performed by: THORACIC SURGERY (CARDIOTHORACIC VASCULAR SURGERY)

## 2021-02-20 PROCEDURE — 93005 ELECTROCARDIOGRAM TRACING: CPT | Performed by: INTERNAL MEDICINE

## 2021-02-20 PROCEDURE — 93880 EXTRACRANIAL BILAT STUDY: CPT

## 2021-02-20 PROCEDURE — 36600 WITHDRAWAL OF ARTERIAL BLOOD: CPT

## 2021-02-20 PROCEDURE — 25010000002 HEPARIN (PORCINE) PER 1000 UNITS: Performed by: INTERNAL MEDICINE

## 2021-02-20 PROCEDURE — 86900 BLOOD TYPING SEROLOGIC ABO: CPT | Performed by: THORACIC SURGERY (CARDIOTHORACIC VASCULAR SURGERY)

## 2021-02-20 PROCEDURE — 81001 URINALYSIS AUTO W/SCOPE: CPT | Performed by: THORACIC SURGERY (CARDIOTHORACIC VASCULAR SURGERY)

## 2021-02-20 RX ADMIN — TEMAZEPAM 7.5 MG: 7.5 CAPSULE ORAL at 22:46

## 2021-02-20 RX ADMIN — AMLODIPINE BESYLATE 2.5 MG: 5 TABLET ORAL at 09:47

## 2021-02-20 RX ADMIN — Medication 1 TABLET: at 09:47

## 2021-02-20 RX ADMIN — MUPIROCIN 1 APPLICATION: 20 OINTMENT TOPICAL at 09:00

## 2021-02-20 RX ADMIN — NITROGLYCERIN 1 INCH: 20 OINTMENT TOPICAL at 17:25

## 2021-02-20 RX ADMIN — METOPROLOL SUCCINATE 25 MG: 25 TABLET, EXTENDED RELEASE ORAL at 09:47

## 2021-02-20 RX ADMIN — MUPIROCIN 1 APPLICATION: 20 OINTMENT TOPICAL at 17:31

## 2021-02-20 RX ADMIN — HEPARIN SODIUM 8.9 UNITS/KG/HR: 10000 INJECTION, SOLUTION INTRAVENOUS at 17:43

## 2021-02-20 RX ADMIN — ATORVASTATIN CALCIUM 40 MG: 20 TABLET, FILM COATED ORAL at 19:53

## 2021-02-20 RX ADMIN — NITROGLYCERIN 1 INCH: 20 OINTMENT TOPICAL at 14:02

## 2021-02-21 ENCOUNTER — ANESTHESIA EVENT (OUTPATIENT)
Dept: PERIOP | Facility: HOSPITAL | Age: 76
End: 2021-02-21

## 2021-02-21 LAB
APTT PPP: 85.4 SECONDS (ref 22.7–35.4)
BASOPHILS # BLD AUTO: 0.06 10*3/MM3 (ref 0–0.2)
BASOPHILS NFR BLD AUTO: 0.6 % (ref 0–1.5)
CHOLEST SERPL-MCNC: 163 MG/DL (ref 0–200)
DEPRECATED RDW RBC AUTO: 42.4 FL (ref 37–54)
EOSINOPHIL # BLD AUTO: 0.33 10*3/MM3 (ref 0–0.4)
EOSINOPHIL NFR BLD AUTO: 3.1 % (ref 0.3–6.2)
ERYTHROCYTE [DISTWIDTH] IN BLOOD BY AUTOMATED COUNT: 12 % (ref 12.3–15.4)
HBA1C MFR BLD: 5.4 % (ref 4.8–5.6)
HCT VFR BLD AUTO: 36.8 % (ref 37.5–51)
HDLC SERPL-MCNC: 57 MG/DL (ref 40–60)
HGB BLD-MCNC: 12 G/DL (ref 13–17.7)
IMM GRANULOCYTES # BLD AUTO: 0.23 10*3/MM3 (ref 0–0.05)
IMM GRANULOCYTES NFR BLD AUTO: 2.2 % (ref 0–0.5)
LDLC SERPL CALC-MCNC: 92 MG/DL (ref 0–100)
LDLC/HDLC SERPL: 1.6 {RATIO}
LYMPHOCYTES # BLD AUTO: 1.84 10*3/MM3 (ref 0.7–3.1)
LYMPHOCYTES NFR BLD AUTO: 17.5 % (ref 19.6–45.3)
MCH RBC QN AUTO: 31.7 PG (ref 26.6–33)
MCHC RBC AUTO-ENTMCNC: 32.6 G/DL (ref 31.5–35.7)
MCV RBC AUTO: 97.1 FL (ref 79–97)
MONOCYTES # BLD AUTO: 1.02 10*3/MM3 (ref 0.1–0.9)
MONOCYTES NFR BLD AUTO: 9.7 % (ref 5–12)
NEUTROPHILS NFR BLD AUTO: 66.9 % (ref 42.7–76)
NEUTROPHILS NFR BLD AUTO: 7.03 10*3/MM3 (ref 1.7–7)
NRBC BLD AUTO-RTO: 0 /100 WBC (ref 0–0.2)
PLATELET # BLD AUTO: 193 10*3/MM3 (ref 140–450)
PMV BLD AUTO: 10.3 FL (ref 6–12)
RBC # BLD AUTO: 3.79 10*6/MM3 (ref 4.14–5.8)
SARS-COV-2 ORF1AB RESP QL NAA+PROBE: NOT DETECTED
TRIGL SERPL-MCNC: 75 MG/DL (ref 0–150)
VLDLC SERPL-MCNC: 14 MG/DL (ref 5–40)
WBC # BLD AUTO: 10.51 10*3/MM3 (ref 3.4–10.8)

## 2021-02-21 PROCEDURE — U0004 COV-19 TEST NON-CDC HGH THRU: HCPCS | Performed by: THORACIC SURGERY (CARDIOTHORACIC VASCULAR SURGERY)

## 2021-02-21 PROCEDURE — 85025 COMPLETE CBC W/AUTO DIFF WBC: CPT | Performed by: INTERNAL MEDICINE

## 2021-02-21 PROCEDURE — 85730 THROMBOPLASTIN TIME PARTIAL: CPT | Performed by: INTERNAL MEDICINE

## 2021-02-21 PROCEDURE — 99232 SBSQ HOSP IP/OBS MODERATE 35: CPT | Performed by: NURSE PRACTITIONER

## 2021-02-21 PROCEDURE — 25010000002 HEPARIN (PORCINE) PER 1000 UNITS: Performed by: INTERNAL MEDICINE

## 2021-02-21 PROCEDURE — 80061 LIPID PANEL: CPT | Performed by: NURSE PRACTITIONER

## 2021-02-21 PROCEDURE — 94799 UNLISTED PULMONARY SVC/PX: CPT

## 2021-02-21 PROCEDURE — 83036 HEMOGLOBIN GLYCOSYLATED A1C: CPT | Performed by: NURSE PRACTITIONER

## 2021-02-21 RX ORDER — ACETAMINOPHEN 500 MG
1000 TABLET ORAL ONCE
Status: CANCELLED | OUTPATIENT
Start: 2021-02-22

## 2021-02-21 RX ORDER — FAMOTIDINE 10 MG/ML
20 INJECTION, SOLUTION INTRAVENOUS ONCE
Status: COMPLETED | OUTPATIENT
Start: 2021-02-21 | End: 2021-02-21

## 2021-02-21 RX ORDER — FENTANYL CITRATE 50 UG/ML
25 INJECTION, SOLUTION INTRAMUSCULAR; INTRAVENOUS
Status: CANCELLED | OUTPATIENT
Start: 2021-02-22 | End: 2021-02-26

## 2021-02-21 RX ADMIN — MUPIROCIN 1 APPLICATION: 20 OINTMENT TOPICAL at 09:10

## 2021-02-21 RX ADMIN — TEMAZEPAM 7.5 MG: 7.5 CAPSULE ORAL at 21:03

## 2021-02-21 RX ADMIN — NITROGLYCERIN 1 INCH: 20 OINTMENT TOPICAL at 01:53

## 2021-02-21 RX ADMIN — METOPROLOL SUCCINATE 25 MG: 25 TABLET, EXTENDED RELEASE ORAL at 09:09

## 2021-02-21 RX ADMIN — AMLODIPINE BESYLATE 2.5 MG: 5 TABLET ORAL at 09:09

## 2021-02-21 RX ADMIN — FAMOTIDINE 20 MG: 10 INJECTION INTRAVENOUS at 21:02

## 2021-02-21 RX ADMIN — ACETAMINOPHEN 650 MG: 325 TABLET, FILM COATED ORAL at 15:20

## 2021-02-21 RX ADMIN — HEPARIN SODIUM 11.9 UNITS/KG/HR: 10000 INJECTION, SOLUTION INTRAVENOUS at 21:07

## 2021-02-21 RX ADMIN — ATORVASTATIN CALCIUM 40 MG: 20 TABLET, FILM COATED ORAL at 21:02

## 2021-02-21 RX ADMIN — NITROGLYCERIN 1 INCH: 20 OINTMENT TOPICAL at 13:00

## 2021-02-21 RX ADMIN — ACETAMINOPHEN 650 MG: 325 TABLET, FILM COATED ORAL at 09:10

## 2021-02-21 RX ADMIN — NITROGLYCERIN 1 INCH: 20 OINTMENT TOPICAL at 17:47

## 2021-02-21 RX ADMIN — ALPRAZOLAM 0.25 MG: 0.5 TABLET ORAL at 21:03

## 2021-02-21 RX ADMIN — Medication 1 TABLET: at 09:09

## 2021-02-21 RX ADMIN — MUPIROCIN 1 APPLICATION: 20 OINTMENT TOPICAL at 21:03

## 2021-02-21 NOTE — ANESTHESIA PREPROCEDURE EVALUATION
Anesthesia Evaluation     Patient summary reviewed   NPO Solid Status: > 8 hours  NPO Liquid Status: > 2 hours           Airway   Mallampati: II  TM distance: >3 FB  Neck ROM: full  No difficulty expected  Dental - normal exam     Pulmonary - normal exam   (+) shortness of breath, sleep apnea on CPAP,   Cardiovascular - normal exam  Exercise tolerance: poor (<4 METS)    ECG reviewed  Patient on routine beta blocker and Beta blocker given within 24 hours of surgery    (+) CAD, angina, LARA, hyperlipidemia,       Neuro/Psych  GI/Hepatic/Renal/Endo    (+)  GERD,      Musculoskeletal     Abdominal  - normal exam    Bowel sounds: normal.   Substance History      OB/GYN          Other   arthritis,                    Anesthesia Plan    ASA 4     general   (A line, Central line and BK)  intravenous induction   Postoperative Plan: Expected vent after surgery  Anesthetic plan, all risks, benefits, and alternatives have been provided, discussed and informed consent has been obtained with: patient.  Use of blood products discussed with patient  Consented to blood products.

## 2021-02-22 ENCOUNTER — ANCILLARY PROCEDURE (OUTPATIENT)
Dept: PERIOP | Facility: HOSPITAL | Age: 76
End: 2021-02-22

## 2021-02-22 ENCOUNTER — ANESTHESIA (OUTPATIENT)
Dept: PERIOP | Facility: HOSPITAL | Age: 76
End: 2021-02-22

## 2021-02-22 ENCOUNTER — APPOINTMENT (OUTPATIENT)
Dept: GENERAL RADIOLOGY | Facility: HOSPITAL | Age: 76
End: 2021-02-22

## 2021-02-22 LAB
ACT BLD: 120 SECONDS (ref 82–152)
ACT BLD: 131 SECONDS (ref 82–152)
ACT BLD: 422 SECONDS (ref 82–152)
ACT BLD: 423 SECONDS (ref 82–152)
ACT BLD: 433 SECONDS (ref 82–152)
ACT BLD: 461 SECONDS (ref 82–152)
ACT BLD: 472 SECONDS (ref 82–152)
ALBUMIN SERPL-MCNC: 3.2 G/DL (ref 3.5–5.2)
ALBUMIN SERPL-MCNC: 4.4 G/DL (ref 3.5–5.2)
ANION GAP SERPL CALCULATED.3IONS-SCNC: 12.5 MMOL/L (ref 5–15)
ANION GAP SERPL CALCULATED.3IONS-SCNC: 17.6 MMOL/L (ref 5–15)
ANION GAP SERPL CALCULATED.3IONS-SCNC: 7.1 MMOL/L (ref 5–15)
AORTIC ARCH: 1.8 CM
APTT PPP: 33.3 SECONDS (ref 22.7–35.4)
APTT PPP: 97.9 SECONDS (ref 22.7–35.4)
ARTERIAL PATENCY WRIST A: ABNORMAL
ARTERIAL PATENCY WRIST A: ABNORMAL
ASCENDING AORTA: 3 CM
ATMOSPHERIC PRESS: 750.6 MMHG
ATMOSPHERIC PRESS: 752.7 MMHG
BASE EXCESS BLDA CALC-SCNC: -0.5 MMOL/L (ref 0–2)
BASE EXCESS BLDA CALC-SCNC: -1 MMOL/L (ref -5–5)
BASE EXCESS BLDA CALC-SCNC: -2.5 MMOL/L (ref 0–2)
BASE EXCESS BLDA CALC-SCNC: 2 MMOL/L (ref -5–5)
BASE EXCESS BLDA CALC-SCNC: 3 MMOL/L (ref -5–5)
BASE EXCESS BLDA CALC-SCNC: 3 MMOL/L (ref -5–5)
BASE EXCESS BLDA CALC-SCNC: 4 MMOL/L (ref -5–5)
BASOPHILS # BLD AUTO: 0.02 10*3/MM3 (ref 0–0.2)
BASOPHILS # BLD AUTO: 0.06 10*3/MM3 (ref 0–0.2)
BASOPHILS # BLD AUTO: 0.06 10*3/MM3 (ref 0–0.2)
BASOPHILS NFR BLD AUTO: 0.2 % (ref 0–1.5)
BASOPHILS NFR BLD AUTO: 0.4 % (ref 0–1.5)
BASOPHILS NFR BLD AUTO: 0.7 % (ref 0–1.5)
BDY SITE: ABNORMAL
BDY SITE: ABNORMAL
BH CV ECHO MEAS - ACS: 2.1 CM
BH CV ECHO MEAS - AO MAX PG (FULL): 3.5 MMHG
BH CV ECHO MEAS - AO MAX PG: 6.1 MMHG
BH CV ECHO MEAS - AO MEAN PG (FULL): 2 MMHG
BH CV ECHO MEAS - AO MEAN PG: 3.2 MMHG
BH CV ECHO MEAS - AO ROOT AREA (BSA CORRECTED): 1.6
BH CV ECHO MEAS - AO ROOT AREA: 7.4 CM^2
BH CV ECHO MEAS - AO ROOT DIAM: 3.1 CM
BH CV ECHO MEAS - AO V2 MAX: 123.1 CM/SEC
BH CV ECHO MEAS - AO V2 MEAN: 81 CM/SEC
BH CV ECHO MEAS - AO V2 VTI: 28.5 CM
BH CV ECHO MEAS - ASC AORTA: 2.8 CM
BH CV ECHO MEAS - AVA(I,A): 2 CM^2
BH CV ECHO MEAS - AVA(I,D): 2 CM^2
BH CV ECHO MEAS - AVA(V,A): 2.4 CM^2
BH CV ECHO MEAS - AVA(V,D): 2.4 CM^2
BH CV ECHO MEAS - BSA(HAYCOCK): 2 M^2
BH CV ECHO MEAS - BSA: 2 M^2
BH CV ECHO MEAS - BZI_BMI: 29 KILOGRAMS/M^2
BH CV ECHO MEAS - BZI_METRIC_HEIGHT: 170.2 CM
BH CV ECHO MEAS - BZI_METRIC_WEIGHT: 83.9 KG
BH CV ECHO MEAS - EDV(MOD-SP2): 92 ML
BH CV ECHO MEAS - EDV(MOD-SP4): 80 ML
BH CV ECHO MEAS - EDV(TEICH): 143.8 ML
BH CV ECHO MEAS - EF(CUBED): 65.6 %
BH CV ECHO MEAS - EF(MOD-BP): 62.1 %
BH CV ECHO MEAS - EF(MOD-SP2): 56.5 %
BH CV ECHO MEAS - EF(MOD-SP4): 60 %
BH CV ECHO MEAS - EF(TEICH): 56.6 %
BH CV ECHO MEAS - ESV(MOD-SP2): 40 ML
BH CV ECHO MEAS - ESV(MOD-SP4): 32 ML
BH CV ECHO MEAS - ESV(TEICH): 62.4 ML
BH CV ECHO MEAS - FS: 30 %
BH CV ECHO MEAS - IVS/LVPW: 1.1
BH CV ECHO MEAS - IVSD: 1.1 CM
BH CV ECHO MEAS - LAT PEAK E' VEL: 7.7 CM/SEC
BH CV ECHO MEAS - LV DIASTOLIC VOL/BSA (35-75): 40.9 ML/M^2
BH CV ECHO MEAS - LV MASS(C)D: 221.8 GRAMS
BH CV ECHO MEAS - LV MASS(C)DI: 113.4 GRAMS/M^2
BH CV ECHO MEAS - LV MAX PG: 2.6 MMHG
BH CV ECHO MEAS - LV MEAN PG: 1.1 MMHG
BH CV ECHO MEAS - LV SYSTOLIC VOL/BSA (12-30): 16.4 ML/M^2
BH CV ECHO MEAS - LV V1 MAX: 80.6 CM/SEC
BH CV ECHO MEAS - LV V1 MEAN: 48 CM/SEC
BH CV ECHO MEAS - LV V1 VTI: 15.4 CM
BH CV ECHO MEAS - LVIDD: 5.4 CM
BH CV ECHO MEAS - LVIDS: 3.8 CM
BH CV ECHO MEAS - LVLD AP2: 7.8 CM
BH CV ECHO MEAS - LVLD AP4: 6.4 CM
BH CV ECHO MEAS - LVLS AP2: 6.5 CM
BH CV ECHO MEAS - LVLS AP4: 6.1 CM
BH CV ECHO MEAS - LVOT AREA (M): 3.8 CM^2
BH CV ECHO MEAS - LVOT AREA: 3.7 CM^2
BH CV ECHO MEAS - LVOT DIAM: 2.2 CM
BH CV ECHO MEAS - LVPWD: 1 CM
BH CV ECHO MEAS - MED PEAK E' VEL: 7.6 CM/SEC
BH CV ECHO MEAS - MR MAX PG: 87.9 MMHG
BH CV ECHO MEAS - MR MAX VEL: 468.7 CM/SEC
BH CV ECHO MEAS - MV A DUR: 0.11 SEC
BH CV ECHO MEAS - MV A MAX VEL: 43.7 CM/SEC
BH CV ECHO MEAS - MV DEC SLOPE: 252.9 CM/SEC^2
BH CV ECHO MEAS - MV DEC TIME: 0.21 SEC
BH CV ECHO MEAS - MV E MAX VEL: 57.4 CM/SEC
BH CV ECHO MEAS - MV E/A: 1.3
BH CV ECHO MEAS - MV MAX PG: 2.5 MMHG
BH CV ECHO MEAS - MV MEAN PG: 1.8 MMHG
BH CV ECHO MEAS - MV P1/2T MAX VEL: 57 CM/SEC
BH CV ECHO MEAS - MV P1/2T: 66 MSEC
BH CV ECHO MEAS - MV V2 MAX: 79.5 CM/SEC
BH CV ECHO MEAS - MV V2 MEAN: 65.9 CM/SEC
BH CV ECHO MEAS - MV V2 VTI: 18.6 CM
BH CV ECHO MEAS - MVA P1/2T LCG: 3.9 CM^2
BH CV ECHO MEAS - MVA(P1/2T): 3.3 CM^2
BH CV ECHO MEAS - MVA(VTI): 3 CM^2
BH CV ECHO MEAS - PA MAX PG (FULL): 3.2 MMHG
BH CV ECHO MEAS - PA MAX PG: 5.2 MMHG
BH CV ECHO MEAS - PA V2 MAX: 114 CM/SEC
BH CV ECHO MEAS - PULM A REVS VEL: 22.7 CM/SEC
BH CV ECHO MEAS - PULM DIAS VEL: 32.6 CM/SEC
BH CV ECHO MEAS - PULM S/D: 1.7
BH CV ECHO MEAS - PULM SYS VEL: 54.8 CM/SEC
BH CV ECHO MEAS - PVA(V,A): 2.3 CM^2
BH CV ECHO MEAS - PVA(V,D): 2.3 CM^2
BH CV ECHO MEAS - QP/QS: 1
BH CV ECHO MEAS - RAP SYSTOLE: 8 MMHG
BH CV ECHO MEAS - RV MAX PG: 2 MMHG
BH CV ECHO MEAS - RV MEAN PG: 0.87 MMHG
BH CV ECHO MEAS - RV V1 MAX: 69.8 CM/SEC
BH CV ECHO MEAS - RV V1 MEAN: 43 CM/SEC
BH CV ECHO MEAS - RV V1 VTI: 15 CM
BH CV ECHO MEAS - RVOT AREA: 3.8 CM^2
BH CV ECHO MEAS - RVOT DIAM: 2.2 CM
BH CV ECHO MEAS - RVSP: 61.3 MMHG
BH CV ECHO MEAS - SI(AO): 107.8 ML/M^2
BH CV ECHO MEAS - SI(CUBED): 54 ML/M^2
BH CV ECHO MEAS - SI(LVOT): 28.8 ML/M^2
BH CV ECHO MEAS - SI(MOD-SP2): 26.6 ML/M^2
BH CV ECHO MEAS - SI(MOD-SP4): 24.5 ML/M^2
BH CV ECHO MEAS - SI(TEICH): 41.6 ML/M^2
BH CV ECHO MEAS - SUP REN AO DIAM: 1.8 CM
BH CV ECHO MEAS - SV(AO): 211 ML
BH CV ECHO MEAS - SV(CUBED): 105.7 ML
BH CV ECHO MEAS - SV(LVOT): 56.4 ML
BH CV ECHO MEAS - SV(MOD-SP2): 52 ML
BH CV ECHO MEAS - SV(MOD-SP4): 48 ML
BH CV ECHO MEAS - SV(RVOT): 56.8 ML
BH CV ECHO MEAS - SV(TEICH): 81.4 ML
BH CV ECHO MEAS - TAPSE (>1.6): 2.2 CM
BH CV ECHO MEAS - TR MAX VEL: 365 CM/SEC
BH CV ECHO MEASUREMENTS AVERAGE E/E' RATIO: 7.5
BH CV XLRA - RV BASE: 3.7 CM
BH CV XLRA - RV LENGTH: 6.7 CM
BH CV XLRA - RV MID: 2.6 CM
BH CV XLRA - TDI S': 10.6 CM/SEC
BUN SERPL-MCNC: 16 MG/DL (ref 8–23)
BUN SERPL-MCNC: 17 MG/DL (ref 8–23)
BUN SERPL-MCNC: 17 MG/DL (ref 8–23)
BUN/CREAT SERPL: 12.1 (ref 7–25)
BUN/CREAT SERPL: 12.4 (ref 7–25)
BUN/CREAT SERPL: 16.8 (ref 7–25)
CA-I BLD-MCNC: 4.6 MG/DL (ref 4.6–5.4)
CA-I BLDA-SCNC: ABNORMAL MMOL/L
CA-I SERPL ISE-MCNC: 1.15 MMOL/L (ref 1.15–1.35)
CALCIUM SPEC-SCNC: 7.7 MG/DL (ref 8.6–10.5)
CALCIUM SPEC-SCNC: 8 MG/DL (ref 8.6–10.5)
CALCIUM SPEC-SCNC: 8.5 MG/DL (ref 8.6–10.5)
CHLORIDE SERPL-SCNC: 105 MMOL/L (ref 98–107)
CHLORIDE SERPL-SCNC: 107 MMOL/L (ref 98–107)
CHLORIDE SERPL-SCNC: 108 MMOL/L (ref 98–107)
CO2 BLDA-SCNC: 26 MMOL/L (ref 24–29)
CO2 BLDA-SCNC: 28 MMOL/L (ref 24–29)
CO2 BLDA-SCNC: 28 MMOL/L (ref 24–29)
CO2 BLDA-SCNC: 29 MMOL/L (ref 24–29)
CO2 BLDA-SCNC: 29 MMOL/L (ref 24–29)
CO2 BLDA-SCNC: 30 MMOL/L (ref 24–29)
CO2 BLDA-SCNC: 30 MMOL/L (ref 24–29)
CO2 SERPL-SCNC: 20.4 MMOL/L (ref 22–29)
CO2 SERPL-SCNC: 21.5 MMOL/L (ref 22–29)
CO2 SERPL-SCNC: 23.9 MMOL/L (ref 22–29)
CREAT SERPL-MCNC: 1.01 MG/DL (ref 0.76–1.27)
CREAT SERPL-MCNC: 1.29 MG/DL (ref 0.76–1.27)
CREAT SERPL-MCNC: 1.41 MG/DL (ref 0.76–1.27)
DEPRECATED RDW RBC AUTO: 42.6 FL (ref 37–54)
DEPRECATED RDW RBC AUTO: 44 FL (ref 37–54)
DEPRECATED RDW RBC AUTO: 45.6 FL (ref 37–54)
DEPRECATED RDW RBC AUTO: 45.9 FL (ref 37–54)
DEPRECATED RDW RBC AUTO: 45.9 FL (ref 37–54)
EOSINOPHIL # BLD AUTO: 0 10*3/MM3 (ref 0–0.4)
EOSINOPHIL # BLD AUTO: 0.07 10*3/MM3 (ref 0–0.4)
EOSINOPHIL # BLD AUTO: 0.35 10*3/MM3 (ref 0–0.4)
EOSINOPHIL NFR BLD AUTO: 0 % (ref 0.3–6.2)
EOSINOPHIL NFR BLD AUTO: 0.5 % (ref 0.3–6.2)
EOSINOPHIL NFR BLD AUTO: 4.1 % (ref 0.3–6.2)
ERYTHROCYTE [DISTWIDTH] IN BLOOD BY AUTOMATED COUNT: 11.8 % (ref 12.3–15.4)
ERYTHROCYTE [DISTWIDTH] IN BLOOD BY AUTOMATED COUNT: 12.7 % (ref 12.3–15.4)
ERYTHROCYTE [DISTWIDTH] IN BLOOD BY AUTOMATED COUNT: 12.8 % (ref 12.3–15.4)
ERYTHROCYTE [DISTWIDTH] IN BLOOD BY AUTOMATED COUNT: 12.9 % (ref 12.3–15.4)
ERYTHROCYTE [DISTWIDTH] IN BLOOD BY AUTOMATED COUNT: 12.9 % (ref 12.3–15.4)
FIBRINOGEN PPP-MCNC: 349 MG/DL (ref 219–464)
GFR SERPL CREATININE-BSD FRML MDRD: 49 ML/MIN/1.73
GFR SERPL CREATININE-BSD FRML MDRD: 54 ML/MIN/1.73
GFR SERPL CREATININE-BSD FRML MDRD: 71 ML/MIN/1.73
GLUCOSE BLDC GLUCOMTR-MCNC: 107 MG/DL (ref 70–130)
GLUCOSE BLDC GLUCOMTR-MCNC: 107 MG/DL (ref 70–130)
GLUCOSE BLDC GLUCOMTR-MCNC: 114 MG/DL (ref 70–130)
GLUCOSE BLDC GLUCOMTR-MCNC: 128 MG/DL (ref 70–130)
GLUCOSE BLDC GLUCOMTR-MCNC: 131 MG/DL (ref 70–130)
GLUCOSE BLDC GLUCOMTR-MCNC: 140 MG/DL (ref 70–130)
GLUCOSE BLDC GLUCOMTR-MCNC: 149 MG/DL (ref 70–130)
GLUCOSE BLDC GLUCOMTR-MCNC: 162 MG/DL (ref 70–130)
GLUCOSE BLDC GLUCOMTR-MCNC: 181 MG/DL (ref 70–130)
GLUCOSE BLDC GLUCOMTR-MCNC: 188 MG/DL (ref 70–130)
GLUCOSE BLDC GLUCOMTR-MCNC: 203 MG/DL (ref 70–130)
GLUCOSE BLDC GLUCOMTR-MCNC: 217 MG/DL (ref 70–130)
GLUCOSE BLDC GLUCOMTR-MCNC: 218 MG/DL (ref 70–130)
GLUCOSE BLDC GLUCOMTR-MCNC: 227 MG/DL (ref 70–130)
GLUCOSE BLDC GLUCOMTR-MCNC: 241 MG/DL (ref 70–130)
GLUCOSE BLDC GLUCOMTR-MCNC: 86 MG/DL (ref 70–130)
GLUCOSE SERPL-MCNC: 138 MG/DL (ref 65–99)
GLUCOSE SERPL-MCNC: 174 MG/DL (ref 65–99)
GLUCOSE SERPL-MCNC: 95 MG/DL (ref 65–99)
HCO3 BLDA-SCNC: 23.2 MMOL/L (ref 22–28)
HCO3 BLDA-SCNC: 23.5 MMOL/L (ref 22–28)
HCO3 BLDA-SCNC: 24.5 MMOL/L (ref 22–26)
HCO3 BLDA-SCNC: 26.8 MMOL/L (ref 22–26)
HCO3 BLDA-SCNC: 27.2 MMOL/L (ref 22–26)
HCO3 BLDA-SCNC: 27.2 MMOL/L (ref 22–26)
HCO3 BLDA-SCNC: 27.5 MMOL/L (ref 22–26)
HCO3 BLDA-SCNC: 28.5 MMOL/L (ref 22–26)
HCO3 BLDA-SCNC: 28.9 MMOL/L (ref 22–26)
HCT VFR BLD AUTO: 21.5 % (ref 37.5–51)
HCT VFR BLD AUTO: 24.7 % (ref 37.5–51)
HCT VFR BLD AUTO: 25.2 % (ref 37.5–51)
HCT VFR BLD AUTO: 30.9 % (ref 37.5–51)
HCT VFR BLD AUTO: 36.5 % (ref 37.5–51)
HCT VFR BLDA CALC: 19 % (ref 38–51)
HCT VFR BLDA CALC: 22 % (ref 38–51)
HCT VFR BLDA CALC: 22 % (ref 38–51)
HCT VFR BLDA CALC: 25 % (ref 38–51)
HCT VFR BLDA CALC: 26 % (ref 38–51)
HCT VFR BLDA CALC: 27 % (ref 38–51)
HCT VFR BLDA CALC: 32 % (ref 38–51)
HGB BLD-MCNC: 10.4 G/DL (ref 13–17.7)
HGB BLD-MCNC: 12.1 G/DL (ref 13–17.7)
HGB BLD-MCNC: 7.2 G/DL (ref 13–17.7)
HGB BLD-MCNC: 8 G/DL (ref 13–17.7)
HGB BLD-MCNC: 8.4 G/DL (ref 13–17.7)
HGB BLDA-MCNC: 10.9 G/DL (ref 12–17)
HGB BLDA-MCNC: 6.5 G/DL (ref 12–17)
HGB BLDA-MCNC: 7.5 G/DL (ref 12–17)
HGB BLDA-MCNC: 7.5 G/DL (ref 12–17)
HGB BLDA-MCNC: 8.5 G/DL (ref 12–17)
HGB BLDA-MCNC: 8.8 G/DL (ref 12–17)
HGB BLDA-MCNC: 9.2 G/DL (ref 12–17)
IMM GRANULOCYTES # BLD AUTO: 0.17 10*3/MM3 (ref 0–0.05)
IMM GRANULOCYTES # BLD AUTO: 0.24 10*3/MM3 (ref 0–0.05)
IMM GRANULOCYTES # BLD AUTO: 0.39 10*3/MM3 (ref 0–0.05)
IMM GRANULOCYTES NFR BLD AUTO: 1.6 % (ref 0–0.5)
IMM GRANULOCYTES NFR BLD AUTO: 2.6 % (ref 0–0.5)
IMM GRANULOCYTES NFR BLD AUTO: 2.8 % (ref 0–0.5)
INHALED O2 CONCENTRATION: 100 %
INHALED O2 CONCENTRATION: 30 %
INR PPP: 1.61 (ref 0.9–1.1)
LEFT ATRIUM VOLUME INDEX: 29 ML/M2
LV EF 2D ECHO EST: 62 %
LYMPHOCYTES # BLD AUTO: 0.49 10*3/MM3 (ref 0.7–3.1)
LYMPHOCYTES # BLD AUTO: 0.91 10*3/MM3 (ref 0.7–3.1)
LYMPHOCYTES # BLD AUTO: 1.86 10*3/MM3 (ref 0.7–3.1)
LYMPHOCYTES NFR BLD AUTO: 21.7 % (ref 19.6–45.3)
LYMPHOCYTES NFR BLD AUTO: 4.7 % (ref 19.6–45.3)
LYMPHOCYTES NFR BLD AUTO: 6.1 % (ref 19.6–45.3)
MAGNESIUM SERPL-MCNC: 2 MG/DL (ref 1.6–2.4)
MAGNESIUM SERPL-MCNC: 2.3 MG/DL (ref 1.6–2.4)
MAXIMAL PREDICTED HEART RATE: 142 BPM
MCH RBC QN AUTO: 31.5 PG (ref 26.6–33)
MCH RBC QN AUTO: 31.8 PG (ref 26.6–33)
MCH RBC QN AUTO: 32.6 PG (ref 26.6–33)
MCH RBC QN AUTO: 32.7 PG (ref 26.6–33)
MCH RBC QN AUTO: 32.9 PG (ref 26.6–33)
MCHC RBC AUTO-ENTMCNC: 32.4 G/DL (ref 31.5–35.7)
MCHC RBC AUTO-ENTMCNC: 33.2 G/DL (ref 31.5–35.7)
MCHC RBC AUTO-ENTMCNC: 33.3 G/DL (ref 31.5–35.7)
MCHC RBC AUTO-ENTMCNC: 33.5 G/DL (ref 31.5–35.7)
MCHC RBC AUTO-ENTMCNC: 33.7 G/DL (ref 31.5–35.7)
MCV RBC AUTO: 95.5 FL (ref 79–97)
MCV RBC AUTO: 96.9 FL (ref 79–97)
MCV RBC AUTO: 97.2 FL (ref 79–97)
MCV RBC AUTO: 98.2 FL (ref 79–97)
MCV RBC AUTO: 98.6 FL (ref 79–97)
MODALITY: ABNORMAL
MODALITY: ABNORMAL
MONOCYTES # BLD AUTO: 0.5 10*3/MM3 (ref 0.1–0.9)
MONOCYTES # BLD AUTO: 0.97 10*3/MM3 (ref 0.1–0.9)
MONOCYTES # BLD AUTO: 1.02 10*3/MM3 (ref 0.1–0.9)
MONOCYTES NFR BLD AUTO: 11.9 % (ref 5–12)
MONOCYTES NFR BLD AUTO: 4.8 % (ref 5–12)
MONOCYTES NFR BLD AUTO: 6.5 % (ref 5–12)
NEUTROPHILS NFR BLD AUTO: 12.41 10*3/MM3 (ref 1.7–7)
NEUTROPHILS NFR BLD AUTO: 5.04 10*3/MM3 (ref 1.7–7)
NEUTROPHILS NFR BLD AUTO: 58.8 % (ref 42.7–76)
NEUTROPHILS NFR BLD AUTO: 83.9 % (ref 42.7–76)
NEUTROPHILS NFR BLD AUTO: 88.7 % (ref 42.7–76)
NEUTROPHILS NFR BLD AUTO: 9.21 10*3/MM3 (ref 1.7–7)
NRBC BLD AUTO-RTO: 0 /100 WBC (ref 0–0.2)
NRBC BLD AUTO-RTO: 0 /100 WBC (ref 0–0.2)
NRBC BLD AUTO-RTO: 0.1 /100 WBC (ref 0–0.2)
O2 A-A PPRESDIFF RESPIRATORY: 0.6 MMHG
O2 A-A PPRESDIFF RESPIRATORY: 0.6 MMHG
PCO2 BLDA: 33.4 MM HG (ref 35–45)
PCO2 BLDA: 33.9 MM HG (ref 35–45)
PCO2 BLDA: 38.8 MM HG (ref 35–45)
PCO2 BLDA: 43.2 MM HG (ref 35–45)
PCO2 BLDA: 44.9 MM HG (ref 35–45)
PCO2 BLDA: 45.3 MM HG (ref 35–45)
PCO2 BLDA: 45.5 MM HG (ref 35–45)
PCO2 BLDA: 45.8 MM HG (ref 35–45)
PCO2 BLDA: 46.5 MM HG (ref 35–45)
PEEP RESPIRATORY: 7.5 CM[H2O]
PEEP RESPIRATORY: 7.5 CM[H2O]
PH BLDA: 7.32 PH UNITS (ref 7.35–7.45)
PH BLDA: 7.35 PH UNITS (ref 7.35–7.6)
PH BLDA: 7.39 PH UNITS (ref 7.35–7.6)
PH BLDA: 7.4 PH UNITS (ref 7.35–7.6)
PH BLDA: 7.41 PH UNITS (ref 7.35–7.6)
PH BLDA: 7.41 PH UNITS (ref 7.35–7.6)
PH BLDA: 7.44 PH UNITS (ref 7.35–7.45)
PH BLDA: 7.45 PH UNITS (ref 7.35–7.6)
PH BLDA: 7.52 PH UNITS (ref 7.35–7.6)
PHOSPHATE SERPL-MCNC: 1.8 MG/DL (ref 2.5–4.5)
PHOSPHATE SERPL-MCNC: 3 MG/DL (ref 2.5–4.5)
PLATELET # BLD AUTO: 129 10*3/MM3 (ref 140–450)
PLATELET # BLD AUTO: 137 10*3/MM3 (ref 140–450)
PLATELET # BLD AUTO: 139 10*3/MM3 (ref 140–450)
PLATELET # BLD AUTO: 151 10*3/MM3 (ref 140–450)
PLATELET # BLD AUTO: 163 10*3/MM3 (ref 140–450)
PMV BLD AUTO: 10 FL (ref 6–12)
PMV BLD AUTO: 10.2 FL (ref 6–12)
PMV BLD AUTO: 10.4 FL (ref 6–12)
PMV BLD AUTO: 9.3 FL (ref 6–12)
PMV BLD AUTO: 9.4 FL (ref 6–12)
PO2 BLDA: 101.3 MM HG (ref 80–100)
PO2 BLDA: 393.1 MM HG (ref 80–100)
PO2 BLDA: 400 MMHG (ref 80–105)
PO2 BLDA: 407 MMHG (ref 80–105)
PO2 BLDA: 409 MMHG (ref 80–105)
PO2 BLDA: 431 MMHG (ref 80–105)
PO2 BLDA: 431 MMHG (ref 80–105)
PO2 BLDA: 48 MMHG (ref 80–105)
PO2 BLDA: 491 MMHG (ref 80–105)
POTASSIUM BLDA-SCNC: 4 MMOL/L (ref 3.5–4.9)
POTASSIUM BLDA-SCNC: 4.3 MMOL/L (ref 3.5–4.9)
POTASSIUM BLDA-SCNC: 4.7 MMOL/L (ref 3.5–4.9)
POTASSIUM BLDA-SCNC: 5.4 MMOL/L (ref 3.5–4.9)
POTASSIUM BLDA-SCNC: 5.5 MMOL/L (ref 3.5–4.9)
POTASSIUM BLDA-SCNC: 5.7 MMOL/L (ref 3.5–4.9)
POTASSIUM BLDA-SCNC: 5.8 MMOL/L (ref 3.5–4.9)
POTASSIUM SERPL-SCNC: 3.9 MMOL/L (ref 3.5–5.2)
POTASSIUM SERPL-SCNC: 4.1 MMOL/L (ref 3.5–5.2)
POTASSIUM SERPL-SCNC: 4.2 MMOL/L (ref 3.5–5.2)
PROTHROMBIN TIME: 18.9 SECONDS (ref 11.7–14.2)
PSV: 8 CMH2O
QT INTERVAL: 413 MS
RBC # BLD AUTO: 2.19 10*6/MM3 (ref 4.14–5.8)
RBC # BLD AUTO: 2.54 10*6/MM3 (ref 4.14–5.8)
RBC # BLD AUTO: 2.64 10*6/MM3 (ref 4.14–5.8)
RBC # BLD AUTO: 3.19 10*6/MM3 (ref 4.14–5.8)
RBC # BLD AUTO: 3.7 10*6/MM3 (ref 4.14–5.8)
SAO2 % BLDA: 100 % (ref 95–98)
SAO2 % BLDA: 81 % (ref 95–98)
SAO2 % BLDCOA: 100 % (ref 92–99)
SAO2 % BLDCOA: 97.2 % (ref 92–99)
SET MECH RESP RATE: 18
SINUS: 3 CM
SODIUM SERPL-SCNC: 139 MMOL/L (ref 136–145)
SODIUM SERPL-SCNC: 141 MMOL/L (ref 136–145)
SODIUM SERPL-SCNC: 143 MMOL/L (ref 136–145)
STJ: 2.7 CM
STRESS TARGET HR: 121 BPM
TOTAL RATE: 18 BREATHS/MINUTE
TOTAL RATE: 26 BREATHS/MINUTE
VENTILATOR MODE: ABNORMAL
VENTILATOR MODE: ABNORMAL
VT ON VENT VENT: 550 ML
WBC # BLD AUTO: 10.39 10*3/MM3 (ref 3.4–10.8)
WBC # BLD AUTO: 10.88 10*3/MM3 (ref 3.4–10.8)
WBC # BLD AUTO: 14.31 10*3/MM3 (ref 3.4–10.8)
WBC # BLD AUTO: 14.81 10*3/MM3 (ref 3.4–10.8)
WBC # BLD AUTO: 8.57 10*3/MM3 (ref 3.4–10.8)

## 2021-02-22 PROCEDURE — 25010000003 PROTAMINE SULFATE PER 10 MG: Performed by: THORACIC SURGERY (CARDIOTHORACIC VASCULAR SURGERY)

## 2021-02-22 PROCEDURE — P9016 RBC LEUKOCYTES REDUCED: HCPCS

## 2021-02-22 PROCEDURE — 25010000002 FUROSEMIDE PER 20 MG

## 2021-02-22 PROCEDURE — 25010000002 HEPARIN (PORCINE) PER 1000 UNITS: Performed by: THORACIC SURGERY (CARDIOTHORACIC VASCULAR SURGERY)

## 2021-02-22 PROCEDURE — 82962 GLUCOSE BLOOD TEST: CPT

## 2021-02-22 PROCEDURE — 33533 CABG ARTERIAL SINGLE: CPT | Performed by: THORACIC SURGERY (CARDIOTHORACIC VASCULAR SURGERY)

## 2021-02-22 PROCEDURE — 25010000002 MIDAZOLAM PER 1 MG: Performed by: ANESTHESIOLOGY

## 2021-02-22 PROCEDURE — 33508 ENDOSCOPIC VEIN HARVEST: CPT | Performed by: THORACIC SURGERY (CARDIOTHORACIC VASCULAR SURGERY)

## 2021-02-22 PROCEDURE — 82803 BLOOD GASES ANY COMBINATION: CPT

## 2021-02-22 PROCEDURE — 33523 CABG ART-VEIN SIX OR MORE: CPT | Performed by: SPECIALIST/TECHNOLOGIST, OTHER

## 2021-02-22 PROCEDURE — 25010000002 ALBUMIN HUMAN 5% PER 50 ML: Performed by: NURSE PRACTITIONER

## 2021-02-22 PROCEDURE — 83735 ASSAY OF MAGNESIUM: CPT | Performed by: NURSE PRACTITIONER

## 2021-02-22 PROCEDURE — 25010000002 HEPARIN (PORCINE) PER 1000 UNITS

## 2021-02-22 PROCEDURE — 85027 COMPLETE CBC AUTOMATED: CPT | Performed by: NURSE PRACTITIONER

## 2021-02-22 PROCEDURE — A4648 IMPLANTABLE TISSUE MARKER: HCPCS | Performed by: THORACIC SURGERY (CARDIOTHORACIC VASCULAR SURGERY)

## 2021-02-22 PROCEDURE — 93010 ELECTROCARDIOGRAM REPORT: CPT | Performed by: INTERNAL MEDICINE

## 2021-02-22 PROCEDURE — P9047 ALBUMIN (HUMAN), 25%, 50ML: HCPCS

## 2021-02-22 PROCEDURE — 85347 COAGULATION TIME ACTIVATED: CPT

## 2021-02-22 PROCEDURE — 5A1221Z PERFORMANCE OF CARDIAC OUTPUT, CONTINUOUS: ICD-10-PCS | Performed by: THORACIC SURGERY (CARDIOTHORACIC VASCULAR SURGERY)

## 2021-02-22 PROCEDURE — 25010000002 PAPAVERINE PER 60 MG: Performed by: THORACIC SURGERY (CARDIOTHORACIC VASCULAR SURGERY)

## 2021-02-22 PROCEDURE — C1713 ANCHOR/SCREW BN/BN,TIS/BN: HCPCS | Performed by: THORACIC SURGERY (CARDIOTHORACIC VASCULAR SURGERY)

## 2021-02-22 PROCEDURE — 85025 COMPLETE CBC W/AUTO DIFF WBC: CPT | Performed by: INTERNAL MEDICINE

## 2021-02-22 PROCEDURE — 85025 COMPLETE CBC W/AUTO DIFF WBC: CPT | Performed by: NURSE PRACTITIONER

## 2021-02-22 PROCEDURE — 021309W BYPASS CORONARY ARTERY, FOUR OR MORE ARTERIES FROM AORTA WITH AUTOLOGOUS VENOUS TISSUE, OPEN APPROACH: ICD-10-PCS | Performed by: THORACIC SURGERY (CARDIOTHORACIC VASCULAR SURGERY)

## 2021-02-22 PROCEDURE — 25010000003 CEFAZOLIN IN DEXTROSE 2-4 GM/100ML-% SOLUTION: Performed by: THORACIC SURGERY (CARDIOTHORACIC VASCULAR SURGERY)

## 2021-02-22 PROCEDURE — C1751 CATH, INF, PER/CENT/MIDLINE: HCPCS | Performed by: ANESTHESIOLOGY

## 2021-02-22 PROCEDURE — 85384 FIBRINOGEN ACTIVITY: CPT | Performed by: NURSE PRACTITIONER

## 2021-02-22 PROCEDURE — 80048 BASIC METABOLIC PNL TOTAL CA: CPT | Performed by: NURSE PRACTITIONER

## 2021-02-22 PROCEDURE — 86900 BLOOD TYPING SEROLOGIC ABO: CPT

## 2021-02-22 PROCEDURE — B24BZZ4 ULTRASONOGRAPHY OF HEART WITH AORTA, TRANSESOPHAGEAL: ICD-10-PCS | Performed by: ANESTHESIOLOGY

## 2021-02-22 PROCEDURE — 25010000002 PROTAMINE SULFATE PER 10 MG: Performed by: ANESTHESIOLOGY

## 2021-02-22 PROCEDURE — 4A133B3 MONITORING OF ARTERIAL PRESSURE, PULMONARY, PERCUTANEOUS APPROACH: ICD-10-PCS | Performed by: ANESTHESIOLOGY

## 2021-02-22 PROCEDURE — 99233 SBSQ HOSP IP/OBS HIGH 50: CPT | Performed by: INTERNAL MEDICINE

## 2021-02-22 PROCEDURE — 25010000002 PHENYLEPHRINE PER 1 ML: Performed by: ANESTHESIOLOGY

## 2021-02-22 PROCEDURE — 85730 THROMBOPLASTIN TIME PARTIAL: CPT | Performed by: INTERNAL MEDICINE

## 2021-02-22 PROCEDURE — 25010000002 FENTANYL CITRATE (PF) 100 MCG/2ML SOLUTION: Performed by: ANESTHESIOLOGY

## 2021-02-22 PROCEDURE — 85610 PROTHROMBIN TIME: CPT | Performed by: NURSE PRACTITIONER

## 2021-02-22 PROCEDURE — 82947 ASSAY GLUCOSE BLOOD QUANT: CPT

## 2021-02-22 PROCEDURE — 33426 REPAIR OF MITRAL VALVE: CPT | Performed by: SPECIALIST/TECHNOLOGIST, OTHER

## 2021-02-22 PROCEDURE — 93005 ELECTROCARDIOGRAM TRACING: CPT | Performed by: NURSE PRACTITIONER

## 2021-02-22 PROCEDURE — 25010000002 VANCOMYCIN 1 G RECONSTITUTED SOLUTION

## 2021-02-22 PROCEDURE — 93318 ECHO TRANSESOPHAGEAL INTRAOP: CPT

## 2021-02-22 PROCEDURE — 94799 UNLISTED PULMONARY SVC/PX: CPT

## 2021-02-22 PROCEDURE — 33508 ENDOSCOPIC VEIN HARVEST: CPT | Performed by: SPECIALIST/TECHNOLOGIST, OTHER

## 2021-02-22 PROCEDURE — 4A1239Z MONITORING OF CARDIAC OUTPUT, PERCUTANEOUS APPROACH: ICD-10-PCS | Performed by: ANESTHESIOLOGY

## 2021-02-22 PROCEDURE — 25010000002 MORPHINE PER 10 MG: Performed by: NURSE PRACTITIONER

## 2021-02-22 PROCEDURE — 63710000001 INSULIN REGULAR HUMAN PER 5 UNITS: Performed by: ANESTHESIOLOGY

## 2021-02-22 PROCEDURE — 25010000003 INSULIN REGULAR HUMAN PER 5 UNITS: Performed by: NURSE PRACTITIONER

## 2021-02-22 PROCEDURE — 02100Z9 BYPASS CORONARY ARTERY, ONE ARTERY FROM LEFT INTERNAL MAMMARY, OPEN APPROACH: ICD-10-PCS | Performed by: THORACIC SURGERY (CARDIOTHORACIC VASCULAR SURGERY)

## 2021-02-22 PROCEDURE — 80069 RENAL FUNCTION PANEL: CPT | Performed by: NURSE PRACTITIONER

## 2021-02-22 PROCEDURE — 94002 VENT MGMT INPAT INIT DAY: CPT

## 2021-02-22 PROCEDURE — 25010000002 ALBUMIN HUMAN 25% PER 50 ML

## 2021-02-22 PROCEDURE — 25010000003 MILRINONE LACTATE 10 MG/10ML SOLUTION 10 ML VIAL: Performed by: ANESTHESIOLOGY

## 2021-02-22 PROCEDURE — 02UG0JZ SUPPLEMENT MITRAL VALVE WITH SYNTHETIC SUBSTITUTE, OPEN APPROACH: ICD-10-PCS | Performed by: THORACIC SURGERY (CARDIOTHORACIC VASCULAR SURGERY)

## 2021-02-22 PROCEDURE — 33523 CABG ART-VEIN SIX OR MORE: CPT | Performed by: THORACIC SURGERY (CARDIOTHORACIC VASCULAR SURGERY)

## 2021-02-22 PROCEDURE — 02HP32Z INSERTION OF MONITORING DEVICE INTO PULMONARY TRUNK, PERCUTANEOUS APPROACH: ICD-10-PCS | Performed by: ANESTHESIOLOGY

## 2021-02-22 PROCEDURE — 25010000003 MILRINONE LACTATE IN DEXTROSE 20-5 MG/100ML-% SOLUTION: Performed by: NURSE PRACTITIONER

## 2021-02-22 PROCEDURE — 25010000002 HEPARIN (PORCINE) PER 1000 UNITS: Performed by: ANESTHESIOLOGY

## 2021-02-22 PROCEDURE — P9035 PLATELET PHERES LEUKOREDUCED: HCPCS

## 2021-02-22 PROCEDURE — 25010000002 PROPOFOL 10 MG/ML EMULSION: Performed by: ANESTHESIOLOGY

## 2021-02-22 PROCEDURE — 36430 TRANSFUSION BLD/BLD COMPNT: CPT

## 2021-02-22 PROCEDURE — 85018 HEMOGLOBIN: CPT

## 2021-02-22 PROCEDURE — 33426 REPAIR OF MITRAL VALVE: CPT | Performed by: THORACIC SURGERY (CARDIOTHORACIC VASCULAR SURGERY)

## 2021-02-22 PROCEDURE — 85730 THROMBOPLASTIN TIME PARTIAL: CPT | Performed by: NURSE PRACTITIONER

## 2021-02-22 PROCEDURE — 85014 HEMATOCRIT: CPT

## 2021-02-22 PROCEDURE — 06BP4ZZ EXCISION OF RIGHT SAPHENOUS VEIN, PERCUTANEOUS ENDOSCOPIC APPROACH: ICD-10-PCS | Performed by: THORACIC SURGERY (CARDIOTHORACIC VASCULAR SURGERY)

## 2021-02-22 PROCEDURE — 82330 ASSAY OF CALCIUM: CPT | Performed by: NURSE PRACTITIONER

## 2021-02-22 PROCEDURE — P9041 ALBUMIN (HUMAN),5%, 50ML: HCPCS | Performed by: NURSE PRACTITIONER

## 2021-02-22 PROCEDURE — 25010000002 MAGNESIUM SULFATE IN D5W 1G/100ML (PREMIX) 1-5 GM/100ML-% SOLUTION: Performed by: NURSE PRACTITIONER

## 2021-02-22 PROCEDURE — 25010000003 CEFAZOLIN IN DEXTROSE 2-4 GM/100ML-% SOLUTION: Performed by: NURSE PRACTITIONER

## 2021-02-22 PROCEDURE — 33533 CABG ARTERIAL SINGLE: CPT | Performed by: SPECIALIST/TECHNOLOGIST, OTHER

## 2021-02-22 PROCEDURE — 71045 X-RAY EXAM CHEST 1 VIEW: CPT

## 2021-02-22 DEVICE — SS SUTURE, 3 PER SLEEVE
Type: IMPLANTABLE DEVICE | Site: STERNUM | Status: FUNCTIONAL
Brand: MYO/WIRE II

## 2021-02-22 DEVICE — SS SUTURE, 4 PER SLEEVE
Type: IMPLANTABLE DEVICE | Site: STERNUM | Status: FUNCTIONAL
Brand: MYO/WIRE II

## 2021-02-22 DEVICE — COR-KNOT MINI® COMBO KITBASE PACKAGE TYPE - KITEACH STERILE PACKAGE KIT CONTAINS (2) SINGLE PATIENT USE COR-KNOT MINI® DEVICES AND (12) COR-KNOT® QUICK LOADS®.
Type: IMPLANTABLE DEVICE | Site: HEART | Status: FUNCTIONAL
Brand: COR-KNOT MINI®

## 2021-02-22 DEVICE — IMPLANTABLE DEVICE: Type: IMPLANTABLE DEVICE | Site: HEART | Status: FUNCTIONAL

## 2021-02-22 DEVICE — ABSORBABLE HEMOSTAT (OXIDIZED REGENERATED CELLULOSE, U.S.P.)
Type: IMPLANTABLE DEVICE | Site: HEART | Status: FUNCTIONAL
Brand: SURGICEL

## 2021-02-22 RX ORDER — PROPOFOL 10 MG/ML
VIAL (ML) INTRAVENOUS AS NEEDED
Status: DISCONTINUED | OUTPATIENT
Start: 2021-02-22 | End: 2021-02-22 | Stop reason: SURG

## 2021-02-22 RX ORDER — ACETAMINOPHEN 325 MG/1
650 TABLET ORAL EVERY 4 HOURS
Status: DISCONTINUED | OUTPATIENT
Start: 2021-02-22 | End: 2021-02-23

## 2021-02-22 RX ORDER — ACETAMINOPHEN 650 MG/1
650 SUPPOSITORY RECTAL EVERY 4 HOURS PRN
Status: DISCONTINUED | OUTPATIENT
Start: 2021-02-23 | End: 2021-03-03 | Stop reason: HOSPADM

## 2021-02-22 RX ORDER — MILRINONE LACTATE 0.2 MG/ML
.25-.375 INJECTION, SOLUTION INTRAVENOUS CONTINUOUS PRN
Status: DISCONTINUED | OUTPATIENT
Start: 2021-02-22 | End: 2021-02-23

## 2021-02-22 RX ORDER — ATORVASTATIN CALCIUM 20 MG/1
40 TABLET, FILM COATED ORAL NIGHTLY
Status: DISCONTINUED | OUTPATIENT
Start: 2021-02-22 | End: 2021-02-24

## 2021-02-22 RX ORDER — POTASSIUM CHLORIDE 29.8 MG/ML
20 INJECTION INTRAVENOUS
Status: DISCONTINUED | OUTPATIENT
Start: 2021-02-22 | End: 2021-02-28

## 2021-02-22 RX ORDER — DEXMEDETOMIDINE HYDROCHLORIDE 4 UG/ML
INJECTION, SOLUTION INTRAVENOUS CONTINUOUS PRN
Status: DISCONTINUED | OUTPATIENT
Start: 2021-02-22 | End: 2021-02-22 | Stop reason: SURG

## 2021-02-22 RX ORDER — SODIUM CHLORIDE 9 MG/ML
30 INJECTION, SOLUTION INTRAVENOUS CONTINUOUS
Status: DISCONTINUED | OUTPATIENT
Start: 2021-02-22 | End: 2021-02-27

## 2021-02-22 RX ORDER — ACETAMINOPHEN 160 MG/5ML
650 SOLUTION ORAL EVERY 4 HOURS
Status: DISCONTINUED | OUTPATIENT
Start: 2021-02-22 | End: 2021-02-23

## 2021-02-22 RX ORDER — METOCLOPRAMIDE HYDROCHLORIDE 5 MG/ML
10 INJECTION INTRAMUSCULAR; INTRAVENOUS EVERY 6 HOURS
Status: DISCONTINUED | OUTPATIENT
Start: 2021-02-22 | End: 2021-02-23

## 2021-02-22 RX ORDER — SODIUM CHLORIDE 9 MG/ML
INJECTION, SOLUTION INTRAVENOUS CONTINUOUS PRN
Status: DISCONTINUED | OUTPATIENT
Start: 2021-02-22 | End: 2021-02-22 | Stop reason: SURG

## 2021-02-22 RX ORDER — AMOXICILLIN 250 MG
2 CAPSULE ORAL NIGHTLY
Status: DISCONTINUED | OUTPATIENT
Start: 2021-02-23 | End: 2021-03-03 | Stop reason: HOSPADM

## 2021-02-22 RX ORDER — ROCURONIUM BROMIDE 10 MG/ML
INJECTION, SOLUTION INTRAVENOUS AS NEEDED
Status: DISCONTINUED | OUTPATIENT
Start: 2021-02-22 | End: 2021-02-22 | Stop reason: SURG

## 2021-02-22 RX ORDER — BISACODYL 5 MG/1
10 TABLET, DELAYED RELEASE ORAL DAILY PRN
Status: DISCONTINUED | OUTPATIENT
Start: 2021-02-22 | End: 2021-03-03 | Stop reason: HOSPADM

## 2021-02-22 RX ORDER — SODIUM CHLORIDE 9 MG/ML
30 INJECTION, SOLUTION INTRAVENOUS CONTINUOUS PRN
Status: DISCONTINUED | OUTPATIENT
Start: 2021-02-22 | End: 2021-03-03 | Stop reason: HOSPADM

## 2021-02-22 RX ORDER — ACETAMINOPHEN 160 MG/5ML
650 SOLUTION ORAL EVERY 4 HOURS PRN
Status: DISCONTINUED | OUTPATIENT
Start: 2021-02-23 | End: 2021-02-27

## 2021-02-22 RX ORDER — NITROGLYCERIN 20 MG/100ML
5-200 INJECTION INTRAVENOUS
Status: DISCONTINUED | OUTPATIENT
Start: 2021-02-22 | End: 2021-02-23

## 2021-02-22 RX ORDER — MIDAZOLAM HYDROCHLORIDE 1 MG/ML
2 INJECTION INTRAMUSCULAR; INTRAVENOUS
Status: DISCONTINUED | OUTPATIENT
Start: 2021-02-22 | End: 2021-02-23

## 2021-02-22 RX ORDER — ACETAMINOPHEN 650 MG/1
650 SUPPOSITORY RECTAL EVERY 4 HOURS
Status: DISCONTINUED | OUTPATIENT
Start: 2021-02-22 | End: 2021-02-23

## 2021-02-22 RX ORDER — PROTAMINE SULFATE 10 MG/ML
INJECTION, SOLUTION INTRAVENOUS AS NEEDED
Status: DISCONTINUED | OUTPATIENT
Start: 2021-02-22 | End: 2021-02-22 | Stop reason: SURG

## 2021-02-22 RX ORDER — AMINOCAPROIC ACID 250 MG/ML
INJECTION, SOLUTION INTRAVENOUS AS NEEDED
Status: DISCONTINUED | OUTPATIENT
Start: 2021-02-22 | End: 2021-02-22 | Stop reason: SURG

## 2021-02-22 RX ORDER — CEFAZOLIN SODIUM 2 G/100ML
2 INJECTION, SOLUTION INTRAVENOUS EVERY 8 HOURS
Status: COMPLETED | OUTPATIENT
Start: 2021-02-22 | End: 2021-02-24

## 2021-02-22 RX ORDER — PROPOFOL 10 MG/ML
VIAL (ML) INTRAVENOUS CONTINUOUS PRN
Status: DISCONTINUED | OUTPATIENT
Start: 2021-02-22 | End: 2021-02-22 | Stop reason: SURG

## 2021-02-22 RX ORDER — NALOXONE HCL 0.4 MG/ML
0.4 VIAL (ML) INJECTION
Status: DISCONTINUED | OUTPATIENT
Start: 2021-02-22 | End: 2021-03-03 | Stop reason: HOSPADM

## 2021-02-22 RX ORDER — CYCLOBENZAPRINE HCL 10 MG
10 TABLET ORAL EVERY 8 HOURS PRN
Status: DISCONTINUED | OUTPATIENT
Start: 2021-02-23 | End: 2021-02-24

## 2021-02-22 RX ORDER — POTASSIUM CHLORIDE 7.45 MG/ML
10 INJECTION INTRAVENOUS
Status: DISCONTINUED | OUTPATIENT
Start: 2021-02-22 | End: 2021-02-28

## 2021-02-22 RX ORDER — MEPERIDINE HYDROCHLORIDE 25 MG/ML
25 INJECTION INTRAMUSCULAR; INTRAVENOUS; SUBCUTANEOUS EVERY 4 HOURS PRN
Status: DISCONTINUED | OUTPATIENT
Start: 2021-02-22 | End: 2021-02-23

## 2021-02-22 RX ORDER — DEXTROSE MONOHYDRATE 25 G/50ML
25-50 INJECTION, SOLUTION INTRAVENOUS
Status: DISCONTINUED | OUTPATIENT
Start: 2021-02-22 | End: 2021-02-22 | Stop reason: HOSPADM

## 2021-02-22 RX ORDER — DOPAMINE HYDROCHLORIDE 160 MG/100ML
2-20 INJECTION, SOLUTION INTRAVENOUS CONTINUOUS PRN
Status: DISCONTINUED | OUTPATIENT
Start: 2021-02-22 | End: 2021-02-23

## 2021-02-22 RX ORDER — HYDROCODONE BITARTRATE AND ACETAMINOPHEN 5; 325 MG/1; MG/1
2 TABLET ORAL EVERY 4 HOURS PRN
Status: DISCONTINUED | OUTPATIENT
Start: 2021-02-22 | End: 2021-02-24

## 2021-02-22 RX ORDER — ONDANSETRON 2 MG/ML
4 INJECTION INTRAMUSCULAR; INTRAVENOUS EVERY 6 HOURS PRN
Status: DISCONTINUED | OUTPATIENT
Start: 2021-02-22 | End: 2021-02-23

## 2021-02-22 RX ORDER — PANTOPRAZOLE SODIUM 40 MG/1
40 TABLET, DELAYED RELEASE ORAL EVERY MORNING
Status: DISCONTINUED | OUTPATIENT
Start: 2021-02-23 | End: 2021-02-23

## 2021-02-22 RX ORDER — ACETAMINOPHEN 325 MG/1
650 TABLET ORAL EVERY 4 HOURS PRN
Status: DISCONTINUED | OUTPATIENT
Start: 2021-02-23 | End: 2021-02-27

## 2021-02-22 RX ORDER — POLYETHYLENE GLYCOL 3350 17 G/17G
17 POWDER, FOR SOLUTION ORAL DAILY
Status: DISCONTINUED | OUTPATIENT
Start: 2021-02-23 | End: 2021-03-03 | Stop reason: HOSPADM

## 2021-02-22 RX ORDER — MAGNESIUM SULFATE 1 G/100ML
1 INJECTION INTRAVENOUS EVERY 8 HOURS
Status: DISCONTINUED | OUTPATIENT
Start: 2021-02-22 | End: 2021-02-23

## 2021-02-22 RX ORDER — BISACODYL 10 MG
10 SUPPOSITORY, RECTAL RECTAL DAILY PRN
Status: DISCONTINUED | OUTPATIENT
Start: 2021-02-23 | End: 2021-03-03 | Stop reason: HOSPADM

## 2021-02-22 RX ORDER — PANTOPRAZOLE SODIUM 40 MG/10ML
40 INJECTION, POWDER, LYOPHILIZED, FOR SOLUTION INTRAVENOUS ONCE
Status: DISCONTINUED | OUTPATIENT
Start: 2021-02-22 | End: 2021-02-23

## 2021-02-22 RX ORDER — ALBUMIN, HUMAN INJ 5% 5 %
1500 SOLUTION INTRAVENOUS AS NEEDED
Status: DISPENSED | OUTPATIENT
Start: 2021-02-22 | End: 2021-02-23

## 2021-02-22 RX ORDER — POTASSIUM CHLORIDE 29.8 MG/ML
20 INJECTION INTRAVENOUS
Status: COMPLETED | OUTPATIENT
Start: 2021-02-22 | End: 2021-02-26

## 2021-02-22 RX ORDER — PAPAVERINE HYDROCHLORIDE 30 MG/ML
INJECTION INTRAMUSCULAR; INTRAVENOUS AS NEEDED
Status: DISCONTINUED | OUTPATIENT
Start: 2021-02-22 | End: 2021-02-22 | Stop reason: HOSPADM

## 2021-02-22 RX ORDER — MIDAZOLAM HYDROCHLORIDE 1 MG/ML
INJECTION INTRAMUSCULAR; INTRAVENOUS AS NEEDED
Status: DISCONTINUED | OUTPATIENT
Start: 2021-02-22 | End: 2021-02-22 | Stop reason: SURG

## 2021-02-22 RX ORDER — ALPRAZOLAM 0.25 MG/1
0.25 TABLET ORAL EVERY 8 HOURS PRN
Status: DISCONTINUED | OUTPATIENT
Start: 2021-02-22 | End: 2021-03-03 | Stop reason: HOSPADM

## 2021-02-22 RX ORDER — HEPARIN SODIUM 5000 [USP'U]/ML
INJECTION, SOLUTION INTRAVENOUS; SUBCUTANEOUS AS NEEDED
Status: DISCONTINUED | OUTPATIENT
Start: 2021-02-22 | End: 2021-02-22 | Stop reason: HOSPADM

## 2021-02-22 RX ORDER — ASPIRIN 81 MG/1
81 TABLET ORAL DAILY
Status: DISCONTINUED | OUTPATIENT
Start: 2021-02-23 | End: 2021-03-03 | Stop reason: HOSPADM

## 2021-02-22 RX ORDER — PROTAMINE SULFATE 10 MG/ML
INJECTION, SOLUTION INTRAVENOUS AS NEEDED
Status: DISCONTINUED | OUTPATIENT
Start: 2021-02-22 | End: 2021-02-22 | Stop reason: HOSPADM

## 2021-02-22 RX ORDER — MORPHINE SULFATE 2 MG/ML
1 INJECTION, SOLUTION INTRAMUSCULAR; INTRAVENOUS EVERY 4 HOURS PRN
Status: DISPENSED | OUTPATIENT
Start: 2021-02-22 | End: 2021-03-01

## 2021-02-22 RX ORDER — SODIUM CHLORIDE 0.9 % (FLUSH) 0.9 %
30 SYRINGE (ML) INJECTION ONCE AS NEEDED
Status: DISCONTINUED | OUTPATIENT
Start: 2021-02-22 | End: 2021-03-03 | Stop reason: HOSPADM

## 2021-02-22 RX ORDER — MORPHINE SULFATE 2 MG/ML
4 INJECTION, SOLUTION INTRAMUSCULAR; INTRAVENOUS
Status: DISCONTINUED | OUTPATIENT
Start: 2021-02-22 | End: 2021-02-27

## 2021-02-22 RX ORDER — SODIUM CHLORIDE, SODIUM LACTATE, POTASSIUM CHLORIDE, CALCIUM CHLORIDE 600; 310; 30; 20 MG/100ML; MG/100ML; MG/100ML; MG/100ML
INJECTION, SOLUTION INTRAVENOUS CONTINUOUS PRN
Status: DISCONTINUED | OUTPATIENT
Start: 2021-02-22 | End: 2021-02-22 | Stop reason: SURG

## 2021-02-22 RX ORDER — FENTANYL CITRATE 50 UG/ML
INJECTION, SOLUTION INTRAMUSCULAR; INTRAVENOUS AS NEEDED
Status: DISCONTINUED | OUTPATIENT
Start: 2021-02-22 | End: 2021-02-22 | Stop reason: SURG

## 2021-02-22 RX ORDER — OXYCODONE HYDROCHLORIDE 5 MG/1
10 TABLET ORAL EVERY 4 HOURS PRN
Status: DISCONTINUED | OUTPATIENT
Start: 2021-02-22 | End: 2021-02-24

## 2021-02-22 RX ORDER — HEPARIN SODIUM 1000 [USP'U]/ML
INJECTION, SOLUTION INTRAVENOUS; SUBCUTANEOUS AS NEEDED
Status: DISCONTINUED | OUTPATIENT
Start: 2021-02-22 | End: 2021-02-22 | Stop reason: SURG

## 2021-02-22 RX ORDER — NOREPINEPHRINE BIT/0.9 % NACL 8 MG/250ML
.02-.3 INFUSION BOTTLE (ML) INTRAVENOUS CONTINUOUS PRN
Status: DISCONTINUED | OUTPATIENT
Start: 2021-02-22 | End: 2021-02-23

## 2021-02-22 RX ORDER — CHLORHEXIDINE GLUCONATE 0.12 MG/ML
15 RINSE ORAL EVERY 12 HOURS
Status: DISCONTINUED | OUTPATIENT
Start: 2021-02-22 | End: 2021-03-03 | Stop reason: HOSPADM

## 2021-02-22 RX ADMIN — MORPHINE SULFATE 2 MG: 2 INJECTION, SOLUTION INTRAMUSCULAR; INTRAVENOUS at 14:30

## 2021-02-22 RX ADMIN — DEXMEDETOMIDINE HYDROCHLORIDE 0.5 MCG/KG/HR: 4 INJECTION, SOLUTION INTRAVENOUS at 07:30

## 2021-02-22 RX ADMIN — MUPIROCIN 1 APPLICATION: 20 OINTMENT TOPICAL at 20:32

## 2021-02-22 RX ADMIN — CEFAZOLIN SODIUM 2 G: 2 INJECTION, SOLUTION INTRAVENOUS at 11:30

## 2021-02-22 RX ADMIN — MAGNESIUM SULFATE HEPTAHYDRATE 1 G: 1 INJECTION, SOLUTION INTRAVENOUS at 13:48

## 2021-02-22 RX ADMIN — INSULIN HUMAN 5 UNITS: 100 INJECTION, SOLUTION PARENTERAL at 10:40

## 2021-02-22 RX ADMIN — ALBUMIN HUMAN 500 ML: 0.05 INJECTION, SOLUTION INTRAVENOUS at 16:00

## 2021-02-22 RX ADMIN — CHLORHEXIDINE GLUCONATE 15 ML: 1.2 RINSE ORAL at 05:42

## 2021-02-22 RX ADMIN — FENTANYL CITRATE 100 MCG: 50 INJECTION INTRAMUSCULAR; INTRAVENOUS at 11:26

## 2021-02-22 RX ADMIN — HYDROCODONE BITARTRATE AND ACETAMINOPHEN 2 TABLET: 5; 325 TABLET ORAL at 17:00

## 2021-02-22 RX ADMIN — ALPRAZOLAM 0.25 MG: 0.5 TABLET ORAL at 06:34

## 2021-02-22 RX ADMIN — PHENYLEPHRINE HYDROCHLORIDE 0.2 MCG/KG/MIN: 10 INJECTION, SOLUTION INTRAMUSCULAR; INTRAVENOUS; SUBCUTANEOUS at 12:05

## 2021-02-22 RX ADMIN — PROTAMINE SULFATE 300 MG: 10 INJECTION, SOLUTION INTRAVENOUS at 11:16

## 2021-02-22 RX ADMIN — AMINOCAPROIC ACID 10 G: 250 INJECTION, SOLUTION INTRAVENOUS at 07:45

## 2021-02-22 RX ADMIN — CEFAZOLIN SODIUM 2 G: 2 INJECTION, SOLUTION INTRAVENOUS at 07:26

## 2021-02-22 RX ADMIN — SODIUM CHLORIDE: 900 INJECTION, SOLUTION INTRAVENOUS at 06:36

## 2021-02-22 RX ADMIN — FENTANYL CITRATE 250 MCG: 50 INJECTION INTRAMUSCULAR; INTRAVENOUS at 06:56

## 2021-02-22 RX ADMIN — SODIUM CHLORIDE 0.25 MCG/KG/MIN: 0.9 INJECTION, SOLUTION INTRAVENOUS at 10:53

## 2021-02-22 RX ADMIN — ROCURONIUM BROMIDE 50 MG: 10 INJECTION INTRAVENOUS at 08:54

## 2021-02-22 RX ADMIN — METOPROLOL TARTRATE 12.5 MG: 25 TABLET, FILM COATED ORAL at 20:32

## 2021-02-22 RX ADMIN — MILRINONE LACTATE IN DEXTROSE 0.25 MCG/KG/MIN: 200 INJECTION, SOLUTION INTRAVENOUS at 20:43

## 2021-02-22 RX ADMIN — MIDAZOLAM 2 MG: 1 INJECTION INTRAMUSCULAR; INTRAVENOUS at 06:45

## 2021-02-22 RX ADMIN — FENTANYL CITRATE 100 MCG: 50 INJECTION INTRAMUSCULAR; INTRAVENOUS at 08:54

## 2021-02-22 RX ADMIN — SODIUM CHLORIDE 3.4 UNITS/HR: 9 INJECTION, SOLUTION INTRAVENOUS at 20:22

## 2021-02-22 RX ADMIN — SODIUM CHLORIDE, POTASSIUM CHLORIDE, SODIUM LACTATE AND CALCIUM CHLORIDE: 600; 310; 30; 20 INJECTION, SOLUTION INTRAVENOUS at 06:36

## 2021-02-22 RX ADMIN — MORPHINE SULFATE 2 MG: 2 INJECTION, SOLUTION INTRAMUSCULAR; INTRAVENOUS at 16:00

## 2021-02-22 RX ADMIN — FENTANYL CITRATE 150 MCG: 50 INJECTION INTRAMUSCULAR; INTRAVENOUS at 07:42

## 2021-02-22 RX ADMIN — ALBUMIN HUMAN 500 ML: 0.05 INJECTION, SOLUTION INTRAVENOUS at 13:44

## 2021-02-22 RX ADMIN — SODIUM CHLORIDE 30 ML/HR: 9 INJECTION, SOLUTION INTRAVENOUS at 13:14

## 2021-02-22 RX ADMIN — HEPARIN SODIUM 25000 UNITS: 1000 INJECTION, SOLUTION INTRAVENOUS; SUBCUTANEOUS at 08:48

## 2021-02-22 RX ADMIN — NITROGLYCERIN 1 INCH: 20 OINTMENT TOPICAL at 05:42

## 2021-02-22 RX ADMIN — MORPHINE SULFATE 2 MG: 2 INJECTION, SOLUTION INTRAMUSCULAR; INTRAVENOUS at 15:03

## 2021-02-22 RX ADMIN — MAGNESIUM SULFATE HEPTAHYDRATE 1 G: 1 INJECTION, SOLUTION INTRAVENOUS at 20:33

## 2021-02-22 RX ADMIN — ROCURONIUM BROMIDE 70 MG: 10 INJECTION INTRAVENOUS at 06:56

## 2021-02-22 RX ADMIN — ACETAMINOPHEN 650 MG: 325 TABLET ORAL at 20:32

## 2021-02-22 RX ADMIN — ATORVASTATIN CALCIUM 40 MG: 20 TABLET, FILM COATED ORAL at 20:00

## 2021-02-22 RX ADMIN — MORPHINE SULFATE 1 MG: 2 INJECTION, SOLUTION INTRAMUSCULAR; INTRAVENOUS at 18:50

## 2021-02-22 RX ADMIN — ALBUMIN HUMAN 500 ML: 0.05 INJECTION, SOLUTION INTRAVENOUS at 22:36

## 2021-02-22 RX ADMIN — ROCURONIUM BROMIDE 30 MG: 10 INJECTION INTRAVENOUS at 07:40

## 2021-02-22 RX ADMIN — AMINOCAPROIC ACID 10 G: 250 INJECTION, SOLUTION INTRAVENOUS at 11:30

## 2021-02-22 RX ADMIN — PROPOFOL 25 MCG/KG/MIN: 10 INJECTION, EMULSION INTRAVENOUS at 07:30

## 2021-02-22 RX ADMIN — METOPROLOL TARTRATE 12.5 MG: 25 TABLET, FILM COATED ORAL at 06:34

## 2021-02-22 RX ADMIN — PROPOFOL 100 MG: 10 INJECTION, EMULSION INTRAVENOUS at 06:56

## 2021-02-22 RX ADMIN — NITROGLYCERIN 1 INCH: 20 OINTMENT TOPICAL at 02:33

## 2021-02-22 RX ADMIN — MORPHINE SULFATE 2 MG: 2 INJECTION, SOLUTION INTRAMUSCULAR; INTRAVENOUS at 16:40

## 2021-02-22 RX ADMIN — CEFAZOLIN SODIUM 2 G: 2 INJECTION, SOLUTION INTRAVENOUS at 20:28

## 2021-02-22 NOTE — ANESTHESIA PROCEDURE NOTES
Airway  Urgency: elective    Date/Time: 2/22/2021 7:22 AM  Airway not difficult    General Information and Staff    Patient location during procedure: OR  Anesthesiologist: Jayden Hodge MD    Indications and Patient Condition  Indications for airway management: airway protection    Preoxygenated: yes  MILS maintained throughout  Mask difficulty assessment: 2 - vent by mask + OA or adjuvant +/- NMBA    Final Airway Details  Final airway type: endotracheal airway      Successful airway: ETT  Cuffed: yes   Successful intubation technique: direct laryngoscopy  Facilitating devices/methods: anterior pressure/BURP  Blade: Claudine  Blade size: 3  ETT size (mm): 8.0  Cormack-Lehane Classification: grade IIa - partial view of glottis  Placement verified by: chest auscultation and capnometry   Measured from: lips  ETT/EBT  to lips (cm): 22  Number of attempts at approach: 1  Assessment: lips, teeth, and gum same as pre-op and atraumatic intubation

## 2021-02-22 NOTE — ANESTHESIA PROCEDURE NOTES
[place] Rena catheter      Patient reassessed immediately prior to procedure    Patient location during procedure: OR  Indications: central pressure monitoring and vascular access  Staff  Anesthesiologist: Jayden Hodge MD  Preanesthetic Checklist  Completed: patient identified, site marked, surgical consent, pre-op evaluation, timeout performed, IV checked, risks and benefits discussed and monitors and equipment checked  Central Line Prep  Sterile Tech:gloves, cap, gown, mask and sterile barriers  Prep: chloraprep  no medical exclusion documented for following all elements of maximal sterile barrier technique  Patient monitoring: blood pressure monitoring, continuous pulse oximetry and EKG  Central Line Procedure  Laterality:right  Location:internal jugular  Catheter Type:[place]-Rena  Catheter Size:7 Fr  Guidance:ultrasound guided  PROCEDURE NOTE/ULTRASOUND INTERPRETATION.  Using ultrasound guidance the potential vascular sites for insertion of the catheter were visualized to determine the patency of the vessel to be used for vascular access.  After selecting the appropriate site for insertion, the needle was visualized under ultrasound being inserted into the internal jugular vein, followed by ultrasound confirmation of wire and catheter placement. There were no abnormalities seen on ultrasound; an image was taken; and the patient tolerated the procedure with no complications. Images: still images not obtained  Assessment  Post procedure:biopatch applied, line sutured and occlusive dressing applied  Assessement:blood return through all ports, free fluid flow and chest x-ray ordered  Complications:no  Patient Tolerance:patient tolerated the procedure well with no apparent complications

## 2021-02-22 NOTE — ANESTHESIA PROCEDURE NOTES
Central Line      Patient reassessed immediately prior to procedure    Patient location during procedure: OR  Indications: central pressure monitoring and vascular access  Staff  Anesthesiologist: Jayden Hodge MD  Preanesthetic Checklist  Completed: patient identified, site marked, surgical consent, pre-op evaluation, timeout performed, IV checked, risks and benefits discussed and monitors and equipment checked  Central Line Prep  Sterile Tech:gloves, cap, gown, mask and sterile barriers  Prep: chloraprep  no medical exclusion documented for following all elements of maximal sterile barrier technique  Patient monitoring: blood pressure monitoring, continuous pulse oximetry and EKG  Central Line Procedure  Laterality:right  Location:internal jugular  Catheter Type:Cordis  Catheter Size:9 Fr  Guidance:ultrasound guided  PROCEDURE NOTE/ULTRASOUND INTERPRETATION.  Using ultrasound guidance the potential vascular sites for insertion of the catheter were visualized to determine the patency of the vessel to be used for vascular access.  After selecting the appropriate site for insertion, the needle was visualized under ultrasound being inserted into the internal jugular vein, followed by ultrasound confirmation of wire and catheter placement. There were no abnormalities seen on ultrasound; an image was taken; and the patient tolerated the procedure with no complications. Images: still images not obtained  Assessment  Post procedure:biopatch applied, line sutured and occlusive dressing applied  Assessement:blood return through all ports, free fluid flow and chest x-ray ordered  Complications:no  Patient Tolerance:patient tolerated the procedure well with no apparent complications

## 2021-02-22 NOTE — ANESTHESIA PROCEDURE NOTES
Procedure Performed: Emergent/Open-Heart Anesthesia BK     Start Time:        End Time:      Preanesthesia Checklist:  Patient identified, IV assessed, risks and benefits discussed, monitors and equipment assessed, procedure being performed at surgeon's request and anesthesia consent obtained.    General Procedure Information  Diagnostic Indications for Echo:  assessment of ascending aorta, assessment of surgical repair and defect repair evaluation  Physician Requesting Echo: Jr Shyam Echavarria MD  Location performed:  OR  Intubated  Bite block placed  Heart visualized  Probe Insertion:  Easy  Probe Type:  Multiplane  Modalities:  Pulse wave Doppler, continuous wave Doppler and color flow mapping    Echocardiographic and Doppler Measurements    Ventricles    Right Ventricle:  Cavity size normal.  Hypertrophy not present.  Thrombus not present.  Global function normal.    Left Ventricle:  Cavity size dilated.  Hypertrophy not present.  Thrombus not present.  Global Function mildly impaired.  Ejection Fraction 40%.      Ventricular Regional Function:  5- Basal Inferior:  hypokinetic  11- Mid Inferior:  hypokinetic  15- Apical Inferior:  hypokinetic  17- [place]:  hypokinetic      Valves    Aortic Valve:  Annulus calcified.  Stenosis not present.  Pressure Half-time: 650 ms.  Regurgitation mild.  Leaflets calcified and thickened.  Leaflet motions normal.      Mitral Valve:  Annulus dilated.  Stenosis not present.  Vena Contracta Width: 0.3 cm.  Regurgitation moderate.  Leaflets thickened.  Leaflet motions normal.      Tricuspid Valve:  Stenosis not present.  Regurgitation mild.    Pulmonic Valve:  Stenosis not present.  Regurgitation absent.    Other Valve Findings:       AV is tricuspid. Valve leaflets are thickened and mildly calcified. AI jet between non and Left coronary cusps and is eccentric.     Mitral leaflets are slightly  Thickened. MR jet is central. There are multiple jets noted and seem to be mostly  originating at A2/P2 and A1/P1. MR is moderate.     Aorta    Ascending Aorta:  Size normal.  Dissection not present.  Plaque thickness less than 3 mm.  Mobile plaque not present.    Aortic Arch:  Size normal.  Dissection not present.  Plaque thickness less than 3 mm.  Mobile plaque not present.    Descending Aorta:  Size normal.  Dissection not present.  Plaque thickness less than 3 mm.  Mobile plaque not present.          Atria    Right Atrium:  Size normal.  Spontaneous echo contrast not present.  Thrombus not present.  Tumor not present.  Device not present.      Left Atrium:  Size dilated.  Spontaneous echo contrast not present.  Thrombus not present.  Tumor not present.  Device not present.    Left atrial appendage normal.      Septa    Atrial Septum:  Intra-atrial septal morphology normal.      Ventricular Septum:  Intra-ventricular septum morphology normal.              Anesthesia Information  Performed Personally  Anesthesiologist:  Jayden Hodge MD      Echocardiogram Comments:       Post CPB;  LV EF is 45-50%. RV function is mildly  Diminished.  Mitral regurgitation is trace. Mitral annuloplasty ring is well seated. Mean gradient across mitral valve is 3 mmHg at HR of 80/min.  AI is mild, similar to baseline.  Aorta is intact.  There is a small pericardial effusion with no compression.

## 2021-02-22 NOTE — ANESTHESIA POSTPROCEDURE EVALUATION
Patient: Rodolfo Grover    Procedure Summary     Date: 02/22/21 Room / Location: [facility] OR [address] [place] [address] MAIN OR    Anesthesia Start: 0636 Anesthesia Stop: 1240    Procedures:       BK, MIDLINE STERNOTOMY, CORONARY ARTERY BYPASS X 7 UTILIZING THE LEFT INTERNAL MAMMARY ARTERY AND THE RIGHT SAPHENOUS VEIN, MITRAL VALVE REPAIR, PRP (N/A Chest)      TRANSESOPHAGEAL ECHOCARDIOGRAM (N/A Chest) Diagnosis:       Coronary artery disease involving native coronary artery of native heart with unstable angina pectoris (CMS/HCC)      (Coronary artery disease involving native coronary artery of native heart with unstable angina pectoris (CMS/HCC) [I25.110])    Surgeon: Jr Shyam Echavarria MD Provider: Jayden Hodge MD    Anesthesia Type: general ASA Status: 4          Anesthesia Type: general    Vitals  Vitals Value Taken Time   /49 02/22/21 1630   Temp 35.9 °C (96.6 °F) 02/22/21 1235   Pulse 90 02/22/21 1640   Resp 16 02/22/21 1625   SpO2 99 % 02/22/21 1640   Vitals shown include unvalidated device data.        Post Anesthesia Care and Evaluation    Patient location during evaluation: bedside  Patient participation: complete - patient participated  Level of consciousness: awake  Pain management: adequate  Airway patency: patent  Anesthetic complications: No anesthetic complications  PONV Status: none  Cardiovascular status: acceptable  Respiratory status: acceptable  Hydration status: acceptable  Post Neuraxial Block status: Motor and sensory function returned to baseline

## 2021-02-22 NOTE — ANESTHESIA PROCEDURE NOTES
Arterial Line      Patient reassessed immediately prior to procedure    Patient location during procedure: OR   Line placed for hemodynamic monitoring and ABGs/Labs/ISTAT.  Performed By   Anesthesiologist: Jayden Hodge MD  Preanesthetic Checklist  Completed: patient identified, site marked, surgical consent, pre-op evaluation, timeout performed, IV checked, risks and benefits discussed and monitors and equipment checked  Arterial Line Prep   Sterile Tech: cap, gloves, sterile barriers and mask  Prep: ChloraPrep  Patient monitoring: EKG, continuous pulse oximetry and blood pressure monitoring  Arterial Line Procedure   Laterality:right  Location:  radial artery  Catheter size: 20 G   Guidance: ultrasound guided  PROCEDURE NOTE/ULTRASOUND INTERPRETATION.  Using ultrasound guidance the potential vascular sites for insertion of the catheter were visualized to determine the patency of the vessel to be used for vascular access.  After selecting the appropriate site for insertion, the needle was visualized under ultrasound being inserted into the radial artery, followed by ultrasound confirmation of wire and catheter placement. There were no abnormalities seen on ultrasound; an image was taken; and the patient tolerated the procedure with no complications.   Number of attempts: 2  Successful placement: yes  Post Assessment   Dressing Type: wrist guard applied, secured with tape and occlusive dressing applied.   Complications no  Circ/Move/Sens Assessment: normal and unchanged.   Patient Tolerance: patient tolerated the procedure well with no apparent complications

## 2021-02-23 ENCOUNTER — APPOINTMENT (OUTPATIENT)
Dept: GENERAL RADIOLOGY | Facility: HOSPITAL | Age: 76
End: 2021-02-23

## 2021-02-23 LAB
ALBUMIN SERPL-MCNC: 4.5 G/DL (ref 3.5–5.2)
ANION GAP SERPL CALCULATED.3IONS-SCNC: 13 MMOL/L (ref 5–15)
ARTERIAL PATENCY WRIST A: ABNORMAL
ATMOSPHERIC PRESS: 751.2 MMHG
BASE EXCESS BLDA CALC-SCNC: -7.5 MMOL/L (ref 0–2)
BDY SITE: ABNORMAL
BH BB BLOOD EXPIRATION DATE: NORMAL
BH BB BLOOD TYPE BARCODE: 6200
BH BB BLOOD TYPE BARCODE: 6200
BH BB BLOOD TYPE BARCODE: 7300
BH BB BLOOD TYPE BARCODE: 7300
BH BB DISPENSE STATUS: NORMAL
BH BB PRODUCT CODE: NORMAL
BH BB UNIT NUMBER: NORMAL
BUN SERPL-MCNC: 21 MG/DL (ref 8–23)
BUN/CREAT SERPL: 13 (ref 7–25)
CA-I BLD-MCNC: 4.8 MG/DL (ref 4.6–5.4)
CA-I SERPL ISE-MCNC: 1.21 MMOL/L (ref 1.15–1.35)
CALCIUM SPEC-SCNC: 8.1 MG/DL (ref 8.6–10.5)
CHLORIDE SERPL-SCNC: 107 MMOL/L (ref 98–107)
CO2 SERPL-SCNC: 25 MMOL/L (ref 22–29)
CREAT SERPL-MCNC: 1.61 MG/DL (ref 0.76–1.27)
CROSSMATCH INTERPRETATION: NORMAL
CROSSMATCH INTERPRETATION: NORMAL
DEPRECATED RDW RBC AUTO: 44.9 FL (ref 37–54)
ERYTHROCYTE [DISTWIDTH] IN BLOOD BY AUTOMATED COUNT: 12.8 % (ref 12.3–15.4)
GAS FLOW AIRWAY: 2 LPM
GFR SERPL CREATININE-BSD FRML MDRD: 42 ML/MIN/1.73
GLUCOSE BLDC GLUCOMTR-MCNC: 102 MG/DL (ref 70–130)
GLUCOSE BLDC GLUCOMTR-MCNC: 111 MG/DL (ref 70–130)
GLUCOSE BLDC GLUCOMTR-MCNC: 118 MG/DL (ref 70–130)
GLUCOSE BLDC GLUCOMTR-MCNC: 122 MG/DL (ref 70–130)
GLUCOSE BLDC GLUCOMTR-MCNC: 127 MG/DL (ref 70–130)
GLUCOSE BLDC GLUCOMTR-MCNC: 140 MG/DL (ref 70–130)
GLUCOSE BLDC GLUCOMTR-MCNC: 144 MG/DL (ref 70–130)
GLUCOSE BLDC GLUCOMTR-MCNC: 152 MG/DL (ref 70–130)
GLUCOSE BLDC GLUCOMTR-MCNC: 160 MG/DL (ref 70–130)
GLUCOSE BLDC GLUCOMTR-MCNC: 169 MG/DL (ref 70–130)
GLUCOSE BLDC GLUCOMTR-MCNC: 169 MG/DL (ref 70–130)
GLUCOSE BLDC GLUCOMTR-MCNC: 190 MG/DL (ref 70–130)
GLUCOSE SERPL-MCNC: 113 MG/DL (ref 65–99)
HCO3 BLDA-SCNC: 17.8 MMOL/L (ref 22–28)
HCT VFR BLD AUTO: 24.2 % (ref 37.5–51)
HCT VFR BLD AUTO: 25.5 % (ref 37.5–51)
HCT VFR BLD AUTO: 26.5 % (ref 37.5–51)
HGB BLD-MCNC: 7.8 G/DL (ref 13–17.7)
HGB BLD-MCNC: 8.6 G/DL (ref 13–17.7)
HGB BLD-MCNC: 8.7 G/DL (ref 13–17.7)
INR PPP: 1.37 (ref 0.9–1.1)
MAGNESIUM SERPL-MCNC: 2.7 MG/DL (ref 1.6–2.4)
MCH RBC QN AUTO: 31 PG (ref 26.6–33)
MCHC RBC AUTO-ENTMCNC: 32.2 G/DL (ref 31.5–35.7)
MCV RBC AUTO: 96 FL (ref 79–97)
MODALITY: ABNORMAL
PCO2 BLDA: 34 MM HG (ref 35–45)
PH BLDA: 7.33 PH UNITS (ref 7.35–7.45)
PHOSPHATE SERPL-MCNC: 2.3 MG/DL (ref 2.5–4.5)
PLATELET # BLD AUTO: 138 10*3/MM3 (ref 140–450)
PMV BLD AUTO: 9.9 FL (ref 6–12)
PO2 BLDA: 81.3 MM HG (ref 80–100)
POTASSIUM SERPL-SCNC: 3.5 MMOL/L (ref 3.5–5.2)
POTASSIUM SERPL-SCNC: 4.4 MMOL/L (ref 3.5–5.2)
PROTHROMBIN TIME: 16.6 SECONDS (ref 11.7–14.2)
QT INTERVAL: 524 MS
RBC # BLD AUTO: 2.52 10*6/MM3 (ref 4.14–5.8)
SAO2 % BLDCOA: 95.2 % (ref 92–99)
SET MECH RESP RATE: 20
SODIUM SERPL-SCNC: 145 MMOL/L (ref 136–145)
TOTAL RATE: 20 BREATHS/MINUTE
UNIT  ABO: NORMAL
UNIT  RH: NORMAL
WBC # BLD AUTO: 10.89 10*3/MM3 (ref 3.4–10.8)

## 2021-02-23 PROCEDURE — 71045 X-RAY EXAM CHEST 1 VIEW: CPT

## 2021-02-23 PROCEDURE — 25010000003 POTASSIUM CHLORIDE PER 2 MEQ: Performed by: NURSE PRACTITIONER

## 2021-02-23 PROCEDURE — 85014 HEMATOCRIT: CPT | Performed by: INTERNAL MEDICINE

## 2021-02-23 PROCEDURE — 82330 ASSAY OF CALCIUM: CPT | Performed by: NURSE PRACTITIONER

## 2021-02-23 PROCEDURE — 82803 BLOOD GASES ANY COMBINATION: CPT

## 2021-02-23 PROCEDURE — 82962 GLUCOSE BLOOD TEST: CPT

## 2021-02-23 PROCEDURE — 93005 ELECTROCARDIOGRAM TRACING: CPT | Performed by: NURSE PRACTITIONER

## 2021-02-23 PROCEDURE — 97162 PT EVAL MOD COMPLEX 30 MIN: CPT

## 2021-02-23 PROCEDURE — 85018 HEMOGLOBIN: CPT | Performed by: THORACIC SURGERY (CARDIOTHORACIC VASCULAR SURGERY)

## 2021-02-23 PROCEDURE — P9016 RBC LEUKOCYTES REDUCED: HCPCS

## 2021-02-23 PROCEDURE — 25010000002 ONDANSETRON PER 1 MG: Performed by: NURSE PRACTITIONER

## 2021-02-23 PROCEDURE — 74018 RADEX ABDOMEN 1 VIEW: CPT

## 2021-02-23 PROCEDURE — 85610 PROTHROMBIN TIME: CPT | Performed by: NURSE PRACTITIONER

## 2021-02-23 PROCEDURE — 99233 SBSQ HOSP IP/OBS HIGH 50: CPT | Performed by: INTERNAL MEDICINE

## 2021-02-23 PROCEDURE — 86900 BLOOD TYPING SEROLOGIC ABO: CPT

## 2021-02-23 PROCEDURE — 84132 ASSAY OF SERUM POTASSIUM: CPT | Performed by: THORACIC SURGERY (CARDIOTHORACIC VASCULAR SURGERY)

## 2021-02-23 PROCEDURE — 99024 POSTOP FOLLOW-UP VISIT: CPT | Performed by: NURSE PRACTITIONER

## 2021-02-23 PROCEDURE — 80069 RENAL FUNCTION PANEL: CPT | Performed by: NURSE PRACTITIONER

## 2021-02-23 PROCEDURE — 36430 TRANSFUSION BLD/BLD COMPNT: CPT

## 2021-02-23 PROCEDURE — 25010000003 CEFAZOLIN IN DEXTROSE 2-4 GM/100ML-% SOLUTION: Performed by: NURSE PRACTITIONER

## 2021-02-23 PROCEDURE — 85018 HEMOGLOBIN: CPT | Performed by: INTERNAL MEDICINE

## 2021-02-23 PROCEDURE — 94799 UNLISTED PULMONARY SVC/PX: CPT

## 2021-02-23 PROCEDURE — 36600 WITHDRAWAL OF ARTERIAL BLOOD: CPT

## 2021-02-23 PROCEDURE — 25010000002 FUROSEMIDE PER 20 MG: Performed by: NURSE PRACTITIONER

## 2021-02-23 PROCEDURE — 25010000002 ONDANSETRON PER 1 MG: Performed by: THORACIC SURGERY (CARDIOTHORACIC VASCULAR SURGERY)

## 2021-02-23 PROCEDURE — 85027 COMPLETE CBC AUTOMATED: CPT | Performed by: THORACIC SURGERY (CARDIOTHORACIC VASCULAR SURGERY)

## 2021-02-23 PROCEDURE — 85014 HEMATOCRIT: CPT | Performed by: THORACIC SURGERY (CARDIOTHORACIC VASCULAR SURGERY)

## 2021-02-23 PROCEDURE — 97110 THERAPEUTIC EXERCISES: CPT

## 2021-02-23 PROCEDURE — 25010000002 ENOXAPARIN PER 10 MG: Performed by: NURSE PRACTITIONER

## 2021-02-23 PROCEDURE — 93010 ELECTROCARDIOGRAM REPORT: CPT | Performed by: INTERNAL MEDICINE

## 2021-02-23 PROCEDURE — 25010000002 LEVETIRACETAM IN NACL 0.82% 500 MG/100ML SOLUTION: Performed by: PSYCHIATRY & NEUROLOGY

## 2021-02-23 PROCEDURE — 25010000002 MORPHINE PER 10 MG: Performed by: NURSE PRACTITIONER

## 2021-02-23 PROCEDURE — 83735 ASSAY OF MAGNESIUM: CPT | Performed by: NURSE PRACTITIONER

## 2021-02-23 PROCEDURE — 86923 COMPATIBILITY TEST ELECTRIC: CPT

## 2021-02-23 RX ORDER — LORAZEPAM 2 MG/ML
1 INJECTION INTRAMUSCULAR
Status: ACTIVE | OUTPATIENT
Start: 2021-02-23 | End: 2021-03-02

## 2021-02-23 RX ORDER — LEVETIRACETAM 5 MG/ML
500 INJECTION INTRAVASCULAR EVERY 12 HOURS SCHEDULED
Status: DISCONTINUED | OUTPATIENT
Start: 2021-02-23 | End: 2021-02-25

## 2021-02-23 RX ORDER — FUROSEMIDE 10 MG/ML
40 INJECTION INTRAMUSCULAR; INTRAVENOUS ONCE
Status: COMPLETED | OUTPATIENT
Start: 2021-02-23 | End: 2021-02-23

## 2021-02-23 RX ORDER — ONDANSETRON 2 MG/ML
4 INJECTION INTRAMUSCULAR; INTRAVENOUS EVERY 6 HOURS PRN
Status: DISCONTINUED | OUTPATIENT
Start: 2021-02-23 | End: 2021-03-03 | Stop reason: HOSPADM

## 2021-02-23 RX ORDER — MILRINONE LACTATE 0.2 MG/ML
0.25 INJECTION, SOLUTION INTRAVENOUS
Status: DISCONTINUED | OUTPATIENT
Start: 2021-02-23 | End: 2021-02-23

## 2021-02-23 RX ORDER — MILRINONE LACTATE 0.2 MG/ML
0.12 INJECTION, SOLUTION INTRAVENOUS
Status: DISCONTINUED | OUTPATIENT
Start: 2021-02-23 | End: 2021-02-24

## 2021-02-23 RX ORDER — LORAZEPAM 2 MG/ML
INJECTION INTRAMUSCULAR
Status: DISPENSED
Start: 2021-02-23 | End: 2021-02-24

## 2021-02-23 RX ORDER — POTASSIUM CHLORIDE 750 MG/1
20 TABLET, FILM COATED, EXTENDED RELEASE ORAL ONCE
Status: DISCONTINUED | OUTPATIENT
Start: 2021-02-23 | End: 2021-02-24

## 2021-02-23 RX ORDER — NICOTINE POLACRILEX 4 MG
15 LOZENGE BUCCAL
Status: DISCONTINUED | OUTPATIENT
Start: 2021-02-23 | End: 2021-03-01

## 2021-02-23 RX ORDER — INSULIN LISPRO 100 [IU]/ML
0-9 INJECTION, SOLUTION INTRAVENOUS; SUBCUTANEOUS
Status: DISCONTINUED | OUTPATIENT
Start: 2021-02-23 | End: 2021-02-28

## 2021-02-23 RX ORDER — PANTOPRAZOLE SODIUM 40 MG/10ML
40 INJECTION, POWDER, LYOPHILIZED, FOR SOLUTION INTRAVENOUS
Status: DISCONTINUED | OUTPATIENT
Start: 2021-02-24 | End: 2021-02-24

## 2021-02-23 RX ORDER — LIDOCAINE HYDROCHLORIDE 10 MG/ML
INJECTION, SOLUTION INFILTRATION; PERINEURAL
Status: DISPENSED
Start: 2021-02-23 | End: 2021-02-23

## 2021-02-23 RX ORDER — DEXTROSE MONOHYDRATE 25 G/50ML
25 INJECTION, SOLUTION INTRAVENOUS
Status: DISCONTINUED | OUTPATIENT
Start: 2021-02-23 | End: 2021-03-01

## 2021-02-23 RX ADMIN — LEVETIRACETAM 500 MG: 5 INJECTION INTRAVASCULAR at 22:22

## 2021-02-23 RX ADMIN — POTASSIUM CHLORIDE 20 MEQ: 29.8 INJECTION, SOLUTION INTRAVENOUS at 04:05

## 2021-02-23 RX ADMIN — ENOXAPARIN SODIUM 40 MG: 40 INJECTION SUBCUTANEOUS at 17:03

## 2021-02-23 RX ADMIN — MORPHINE SULFATE 4 MG: 2 INJECTION, SOLUTION INTRAMUSCULAR; INTRAVENOUS at 09:41

## 2021-02-23 RX ADMIN — ONDANSETRON 4 MG: 2 INJECTION INTRAMUSCULAR; INTRAVENOUS at 23:20

## 2021-02-23 RX ADMIN — ACETAMINOPHEN 650 MG: 325 TABLET ORAL at 00:55

## 2021-02-23 RX ADMIN — HYDROCODONE BITARTRATE AND ACETAMINOPHEN 1 TABLET: 5; 325 TABLET ORAL at 17:04

## 2021-02-23 RX ADMIN — MUPIROCIN 1 APPLICATION: 20 OINTMENT TOPICAL at 08:26

## 2021-02-23 RX ADMIN — FUROSEMIDE 40 MG: 10 INJECTION, SOLUTION INTRAMUSCULAR; INTRAVENOUS at 08:37

## 2021-02-23 RX ADMIN — OXYCODONE 10 MG: 5 TABLET ORAL at 08:46

## 2021-02-23 RX ADMIN — CHLORHEXIDINE GLUCONATE 15 ML: 1.2 RINSE ORAL at 01:23

## 2021-02-23 RX ADMIN — ONDANSETRON 4 MG: 2 INJECTION INTRAMUSCULAR; INTRAVENOUS at 03:05

## 2021-02-23 RX ADMIN — OXYCODONE 10 MG: 5 TABLET ORAL at 03:25

## 2021-02-23 RX ADMIN — CHLORHEXIDINE GLUCONATE 15 ML: 1.2 RINSE ORAL at 13:32

## 2021-02-23 RX ADMIN — CEFAZOLIN SODIUM 2 G: 2 INJECTION, SOLUTION INTRAVENOUS at 17:04

## 2021-02-23 RX ADMIN — POTASSIUM CHLORIDE 20 MEQ: 29.8 INJECTION, SOLUTION INTRAVENOUS at 05:08

## 2021-02-23 RX ADMIN — ASPIRIN 81 MG: 81 TABLET, COATED ORAL at 08:01

## 2021-02-23 RX ADMIN — CEFAZOLIN SODIUM 2 G: 2 INJECTION, SOLUTION INTRAVENOUS at 03:25

## 2021-02-23 RX ADMIN — HYDROCODONE BITARTRATE AND ACETAMINOPHEN 2 TABLET: 5; 325 TABLET ORAL at 12:45

## 2021-02-23 RX ADMIN — ONDANSETRON 4 MG: 2 INJECTION INTRAMUSCULAR; INTRAVENOUS at 17:22

## 2021-02-23 RX ADMIN — CEFAZOLIN SODIUM 2 G: 2 INJECTION, SOLUTION INTRAVENOUS at 13:32

## 2021-02-23 RX ADMIN — HYDROCODONE BITARTRATE AND ACETAMINOPHEN 2 TABLET: 5; 325 TABLET ORAL at 06:06

## 2021-02-23 RX ADMIN — PANTOPRAZOLE SODIUM 40 MG: 40 TABLET, DELAYED RELEASE ORAL at 06:55

## 2021-02-24 ENCOUNTER — APPOINTMENT (OUTPATIENT)
Dept: CT IMAGING | Facility: HOSPITAL | Age: 76
End: 2021-02-24

## 2021-02-24 ENCOUNTER — APPOINTMENT (OUTPATIENT)
Dept: GENERAL RADIOLOGY | Facility: HOSPITAL | Age: 76
End: 2021-02-24

## 2021-02-24 ENCOUNTER — APPOINTMENT (OUTPATIENT)
Dept: NEUROLOGY | Facility: HOSPITAL | Age: 76
End: 2021-02-24

## 2021-02-24 PROBLEM — G40.409 GENERALIZED TONIC-CLONIC SEIZURE: Status: ACTIVE | Noted: 2021-02-24

## 2021-02-24 PROBLEM — R56.9 POST-ICTAL STATE: Status: ACTIVE | Noted: 2021-02-24

## 2021-02-24 PROBLEM — G40.109 FOCAL MOTOR SEIZURE: Status: ACTIVE | Noted: 2021-02-24

## 2021-02-24 LAB
ALBUMIN SERPL-MCNC: 3.8 G/DL (ref 3.5–5.2)
ALBUMIN/GLOB SERPL: 2 G/DL
ALP SERPL-CCNC: 52 U/L (ref 39–117)
ALT SERPL W P-5'-P-CCNC: 7 U/L (ref 1–41)
ANION GAP SERPL CALCULATED.3IONS-SCNC: 12.6 MMOL/L (ref 5–15)
ANION GAP SERPL CALCULATED.3IONS-SCNC: 12.7 MMOL/L (ref 5–15)
ANION GAP SERPL CALCULATED.3IONS-SCNC: 13.2 MMOL/L (ref 5–15)
AST SERPL-CCNC: 53 U/L (ref 1–40)
BH BB BLOOD EXPIRATION DATE: NORMAL
BH BB BLOOD TYPE BARCODE: 7300
BH BB DISPENSE STATUS: NORMAL
BH BB PRODUCT CODE: NORMAL
BH BB UNIT NUMBER: NORMAL
BILIRUB SERPL-MCNC: 0.4 MG/DL (ref 0–1.2)
BUN SERPL-MCNC: 34 MG/DL (ref 8–23)
BUN SERPL-MCNC: 37 MG/DL (ref 8–23)
BUN SERPL-MCNC: 39 MG/DL (ref 8–23)
BUN/CREAT SERPL: 11.7 (ref 7–25)
BUN/CREAT SERPL: 12.1 (ref 7–25)
BUN/CREAT SERPL: 12.5 (ref 7–25)
CALCIUM SPEC-SCNC: 7.8 MG/DL (ref 8.6–10.5)
CALCIUM SPEC-SCNC: 7.9 MG/DL (ref 8.6–10.5)
CALCIUM SPEC-SCNC: 7.9 MG/DL (ref 8.6–10.5)
CHLORIDE SERPL-SCNC: 105 MMOL/L (ref 98–107)
CHLORIDE SERPL-SCNC: 107 MMOL/L (ref 98–107)
CHLORIDE SERPL-SCNC: 108 MMOL/L (ref 98–107)
CK SERPL-CCNC: 1042 U/L (ref 20–200)
CO2 SERPL-SCNC: 24.8 MMOL/L (ref 22–29)
CO2 SERPL-SCNC: 25.3 MMOL/L (ref 22–29)
CO2 SERPL-SCNC: 25.4 MMOL/L (ref 22–29)
CREAT SERPL-MCNC: 2.91 MG/DL (ref 0.76–1.27)
CREAT SERPL-MCNC: 3.05 MG/DL (ref 0.76–1.27)
CREAT SERPL-MCNC: 3.11 MG/DL (ref 0.76–1.27)
CREAT UR-MCNC: 102.7 MG/DL
CROSSMATCH INTERPRETATION: NORMAL
DEPRECATED RDW RBC AUTO: 44.6 FL (ref 37–54)
DEPRECATED RDW RBC AUTO: 46.4 FL (ref 37–54)
ERYTHROCYTE [DISTWIDTH] IN BLOOD BY AUTOMATED COUNT: 12.9 % (ref 12.3–15.4)
ERYTHROCYTE [DISTWIDTH] IN BLOOD BY AUTOMATED COUNT: 13.1 % (ref 12.3–15.4)
GFR SERPL CREATININE-BSD FRML MDRD: 20 ML/MIN/1.73
GFR SERPL CREATININE-BSD FRML MDRD: 20 ML/MIN/1.73
GFR SERPL CREATININE-BSD FRML MDRD: 21 ML/MIN/1.73
GLOBULIN UR ELPH-MCNC: 1.9 GM/DL
GLUCOSE BLDC GLUCOMTR-MCNC: 134 MG/DL (ref 70–130)
GLUCOSE BLDC GLUCOMTR-MCNC: 147 MG/DL (ref 70–130)
GLUCOSE BLDC GLUCOMTR-MCNC: 150 MG/DL (ref 70–130)
GLUCOSE BLDC GLUCOMTR-MCNC: 151 MG/DL (ref 70–130)
GLUCOSE SERPL-MCNC: 148 MG/DL (ref 65–99)
GLUCOSE SERPL-MCNC: 158 MG/DL (ref 65–99)
GLUCOSE SERPL-MCNC: 158 MG/DL (ref 65–99)
HCT VFR BLD AUTO: 25.5 % (ref 37.5–51)
HCT VFR BLD AUTO: 25.9 % (ref 37.5–51)
HGB BLD-MCNC: 8.4 G/DL (ref 13–17.7)
HGB BLD-MCNC: 8.7 G/DL (ref 13–17.7)
MCH RBC QN AUTO: 31.9 PG (ref 26.6–33)
MCH RBC QN AUTO: 32.3 PG (ref 26.6–33)
MCHC RBC AUTO-ENTMCNC: 32.9 G/DL (ref 31.5–35.7)
MCHC RBC AUTO-ENTMCNC: 33.6 G/DL (ref 31.5–35.7)
MCV RBC AUTO: 96.3 FL (ref 79–97)
MCV RBC AUTO: 97 FL (ref 79–97)
PLATELET # BLD AUTO: 135 10*3/MM3 (ref 140–450)
PLATELET # BLD AUTO: 152 10*3/MM3 (ref 140–450)
PMV BLD AUTO: 10.9 FL (ref 6–12)
PMV BLD AUTO: 11 FL (ref 6–12)
POTASSIUM SERPL-SCNC: 3.8 MMOL/L (ref 3.5–5.2)
POTASSIUM SERPL-SCNC: 3.9 MMOL/L (ref 3.5–5.2)
POTASSIUM SERPL-SCNC: 3.9 MMOL/L (ref 3.5–5.2)
PROT SERPL-MCNC: 5.7 G/DL (ref 6–8.5)
QT INTERVAL: 408 MS
RBC # BLD AUTO: 2.63 10*6/MM3 (ref 4.14–5.8)
RBC # BLD AUTO: 2.69 10*6/MM3 (ref 4.14–5.8)
SODIUM SERPL-SCNC: 143 MMOL/L (ref 136–145)
SODIUM SERPL-SCNC: 145 MMOL/L (ref 136–145)
SODIUM SERPL-SCNC: 146 MMOL/L (ref 136–145)
SODIUM UR-SCNC: <20 MMOL/L
UNIT  ABO: NORMAL
UNIT  RH: NORMAL
WBC # BLD AUTO: 15.35 10*3/MM3 (ref 3.4–10.8)
WBC # BLD AUTO: 15.89 10*3/MM3 (ref 3.4–10.8)

## 2021-02-24 PROCEDURE — 25010000002 LEVETIRACETAM IN NACL 0.82% 500 MG/100ML SOLUTION: Performed by: PSYCHIATRY & NEUROLOGY

## 2021-02-24 PROCEDURE — 99222 1ST HOSP IP/OBS MODERATE 55: CPT | Performed by: INTERNAL MEDICINE

## 2021-02-24 PROCEDURE — 25010000002 MORPHINE PER 10 MG: Performed by: NURSE PRACTITIONER

## 2021-02-24 PROCEDURE — 70450 CT HEAD/BRAIN W/O DYE: CPT

## 2021-02-24 PROCEDURE — 95816 EEG AWAKE AND DROWSY: CPT

## 2021-02-24 PROCEDURE — 25010000002 ENOXAPARIN PER 10 MG: Performed by: NURSE PRACTITIONER

## 2021-02-24 PROCEDURE — 99223 1ST HOSP IP/OBS HIGH 75: CPT | Performed by: PSYCHIATRY & NEUROLOGY

## 2021-02-24 PROCEDURE — 84300 ASSAY OF URINE SODIUM: CPT | Performed by: INTERNAL MEDICINE

## 2021-02-24 PROCEDURE — 80053 COMPREHEN METABOLIC PANEL: CPT | Performed by: NURSE PRACTITIONER

## 2021-02-24 PROCEDURE — 25010000003 CEFAZOLIN IN DEXTROSE 2-4 GM/100ML-% SOLUTION: Performed by: NURSE PRACTITIONER

## 2021-02-24 PROCEDURE — 95816 EEG AWAKE AND DROWSY: CPT | Performed by: PSYCHIATRY & NEUROLOGY

## 2021-02-24 PROCEDURE — 93005 ELECTROCARDIOGRAM TRACING: CPT | Performed by: NURSE PRACTITIONER

## 2021-02-24 PROCEDURE — 99232 SBSQ HOSP IP/OBS MODERATE 35: CPT | Performed by: INTERNAL MEDICINE

## 2021-02-24 PROCEDURE — 82962 GLUCOSE BLOOD TEST: CPT

## 2021-02-24 PROCEDURE — 85027 COMPLETE CBC AUTOMATED: CPT | Performed by: NURSE PRACTITIONER

## 2021-02-24 PROCEDURE — 99024 POSTOP FOLLOW-UP VISIT: CPT | Performed by: THORACIC SURGERY (CARDIOTHORACIC VASCULAR SURGERY)

## 2021-02-24 PROCEDURE — 25010000002 DOPAMINE PER 40 MG: Performed by: NURSE PRACTITIONER

## 2021-02-24 PROCEDURE — 82570 ASSAY OF URINE CREATININE: CPT | Performed by: INTERNAL MEDICINE

## 2021-02-24 PROCEDURE — 71045 X-RAY EXAM CHEST 1 VIEW: CPT

## 2021-02-24 PROCEDURE — 25010000002 ONDANSETRON PER 1 MG: Performed by: THORACIC SURGERY (CARDIOTHORACIC VASCULAR SURGERY)

## 2021-02-24 PROCEDURE — 85027 COMPLETE CBC AUTOMATED: CPT | Performed by: INTERNAL MEDICINE

## 2021-02-24 PROCEDURE — 82550 ASSAY OF CK (CPK): CPT | Performed by: INTERNAL MEDICINE

## 2021-02-24 RX ORDER — DOPAMINE HYDROCHLORIDE 160 MG/100ML
3 INJECTION, SOLUTION INTRAVENOUS
Status: DISCONTINUED | OUTPATIENT
Start: 2021-02-24 | End: 2021-03-01

## 2021-02-24 RX ORDER — PANTOPRAZOLE SODIUM 40 MG/10ML
40 INJECTION, POWDER, LYOPHILIZED, FOR SOLUTION INTRAVENOUS EVERY 24 HOURS
Status: DISCONTINUED | OUTPATIENT
Start: 2021-02-24 | End: 2021-02-24

## 2021-02-24 RX ORDER — PANTOPRAZOLE SODIUM 40 MG/10ML
40 INJECTION, POWDER, LYOPHILIZED, FOR SOLUTION INTRAVENOUS EVERY 24 HOURS
Status: DISCONTINUED | OUTPATIENT
Start: 2021-02-25 | End: 2021-02-26 | Stop reason: ALTCHOICE

## 2021-02-24 RX ORDER — DEXTROSE AND SODIUM CHLORIDE 5; .9 G/100ML; G/100ML
75 INJECTION, SOLUTION INTRAVENOUS CONTINUOUS
Status: DISCONTINUED | OUTPATIENT
Start: 2021-02-24 | End: 2021-02-24

## 2021-02-24 RX ORDER — DEXTROSE AND SODIUM CHLORIDE 5; .45 G/100ML; G/100ML
75 INJECTION, SOLUTION INTRAVENOUS CONTINUOUS
Status: DISCONTINUED | OUTPATIENT
Start: 2021-02-24 | End: 2021-02-26

## 2021-02-24 RX ADMIN — LEVETIRACETAM 500 MG: 5 INJECTION INTRAVASCULAR at 20:36

## 2021-02-24 RX ADMIN — MUPIROCIN 1 APPLICATION: 20 OINTMENT TOPICAL at 08:25

## 2021-02-24 RX ADMIN — SODIUM CHLORIDE 8 MG/HR: 900 INJECTION INTRAVENOUS at 12:26

## 2021-02-24 RX ADMIN — CEFAZOLIN SODIUM 2 G: 2 INJECTION, SOLUTION INTRAVENOUS at 04:13

## 2021-02-24 RX ADMIN — MUPIROCIN 1 APPLICATION: 20 OINTMENT TOPICAL at 20:36

## 2021-02-24 RX ADMIN — PANTOPRAZOLE SODIUM 40 MG: 40 INJECTION, POWDER, FOR SOLUTION INTRAVENOUS at 06:26

## 2021-02-24 RX ADMIN — DOPAMINE HYDROCHLORIDE 3 MCG/KG/MIN: 160 INJECTION, SOLUTION INTRAVENOUS at 11:17

## 2021-02-24 RX ADMIN — POTASSIUM PHOSPHATE, MONOBASIC AND POTASSIUM PHOSPHATE, DIBASIC 10 MMOL: 224; 236 INJECTION, SOLUTION, CONCENTRATE INTRAVENOUS at 17:33

## 2021-02-24 RX ADMIN — DEXTROSE AND SODIUM CHLORIDE 75 ML/HR: 5; 900 INJECTION, SOLUTION INTRAVENOUS at 08:35

## 2021-02-24 RX ADMIN — MORPHINE SULFATE 1 MG: 2 INJECTION, SOLUTION INTRAMUSCULAR; INTRAVENOUS at 13:53

## 2021-02-24 RX ADMIN — ENOXAPARIN SODIUM 40 MG: 40 INJECTION SUBCUTANEOUS at 08:23

## 2021-02-24 RX ADMIN — LEVETIRACETAM 500 MG: 5 INJECTION INTRAVASCULAR at 08:34

## 2021-02-24 RX ADMIN — DEXTROSE AND SODIUM CHLORIDE 125 ML/HR: 5; 450 INJECTION, SOLUTION INTRAVENOUS at 15:57

## 2021-02-24 RX ADMIN — ONDANSETRON 4 MG: 2 INJECTION INTRAMUSCULAR; INTRAVENOUS at 06:26

## 2021-02-24 RX ADMIN — SODIUM CHLORIDE 8 MG/HR: 900 INJECTION INTRAVENOUS at 19:41

## 2021-02-24 RX ADMIN — MORPHINE SULFATE 1 MG: 2 INJECTION, SOLUTION INTRAMUSCULAR; INTRAVENOUS at 18:14

## 2021-02-25 PROBLEM — K92.0 COFFEE GROUND EMESIS: Status: ACTIVE | Noted: 2021-02-19

## 2021-02-25 LAB
ABO GROUP BLD: NORMAL
ALBUMIN SERPL-MCNC: 3.5 G/DL (ref 3.5–5.2)
ANION GAP SERPL CALCULATED.3IONS-SCNC: 9.7 MMOL/L (ref 5–15)
ANION GAP SERPL CALCULATED.3IONS-SCNC: 9.8 MMOL/L (ref 5–15)
BLD GP AB SCN SERPL QL: NEGATIVE
BUN SERPL-MCNC: 36 MG/DL (ref 8–23)
BUN SERPL-MCNC: 40 MG/DL (ref 8–23)
BUN/CREAT SERPL: 15.4 (ref 7–25)
BUN/CREAT SERPL: 16.4 (ref 7–25)
CALCIUM SPEC-SCNC: 7.5 MG/DL (ref 8.6–10.5)
CALCIUM SPEC-SCNC: 7.7 MG/DL (ref 8.6–10.5)
CHLORIDE SERPL-SCNC: 110 MMOL/L (ref 98–107)
CHLORIDE SERPL-SCNC: 113 MMOL/L (ref 98–107)
CK SERPL-CCNC: 1028 U/L (ref 20–200)
CO2 SERPL-SCNC: 23.3 MMOL/L (ref 22–29)
CO2 SERPL-SCNC: 25.2 MMOL/L (ref 22–29)
CREAT SERPL-MCNC: 2.34 MG/DL (ref 0.76–1.27)
CREAT SERPL-MCNC: 2.44 MG/DL (ref 0.76–1.27)
DEPRECATED RDW RBC AUTO: 44.7 FL (ref 37–54)
DEPRECATED RDW RBC AUTO: 46.6 FL (ref 37–54)
DEPRECATED RDW RBC AUTO: 47.1 FL (ref 37–54)
DEPRECATED RDW RBC AUTO: 47.4 FL (ref 37–54)
ERYTHROCYTE [DISTWIDTH] IN BLOOD BY AUTOMATED COUNT: 12.6 % (ref 12.3–15.4)
ERYTHROCYTE [DISTWIDTH] IN BLOOD BY AUTOMATED COUNT: 13.1 % (ref 12.3–15.4)
ERYTHROCYTE [DISTWIDTH] IN BLOOD BY AUTOMATED COUNT: 13.8 % (ref 12.3–15.4)
ERYTHROCYTE [DISTWIDTH] IN BLOOD BY AUTOMATED COUNT: 13.9 % (ref 12.3–15.4)
GFR SERPL CREATININE-BSD FRML MDRD: 26 ML/MIN/1.73
GFR SERPL CREATININE-BSD FRML MDRD: 27 ML/MIN/1.73
GLUCOSE BLDC GLUCOMTR-MCNC: 140 MG/DL (ref 70–130)
GLUCOSE BLDC GLUCOMTR-MCNC: 141 MG/DL (ref 70–130)
GLUCOSE BLDC GLUCOMTR-MCNC: 150 MG/DL (ref 70–130)
GLUCOSE BLDC GLUCOMTR-MCNC: 95 MG/DL (ref 70–130)
GLUCOSE SERPL-MCNC: 152 MG/DL (ref 65–99)
GLUCOSE SERPL-MCNC: 164 MG/DL (ref 65–99)
HCT VFR BLD AUTO: 24.2 % (ref 37.5–51)
HCT VFR BLD AUTO: 24.6 % (ref 37.5–51)
HCT VFR BLD AUTO: 27.2 % (ref 37.5–51)
HCT VFR BLD AUTO: 27.2 % (ref 37.5–51)
HGB BLD-MCNC: 7.9 G/DL (ref 13–17.7)
HGB BLD-MCNC: 8.1 G/DL (ref 13–17.7)
HGB BLD-MCNC: 9.1 G/DL (ref 13–17.7)
HGB BLD-MCNC: 9.1 G/DL (ref 13–17.7)
MAGNESIUM SERPL-MCNC: 2.4 MG/DL (ref 1.6–2.4)
MCH RBC QN AUTO: 31.6 PG (ref 26.6–33)
MCH RBC QN AUTO: 31.7 PG (ref 26.6–33)
MCH RBC QN AUTO: 31.8 PG (ref 26.6–33)
MCH RBC QN AUTO: 32.1 PG (ref 26.6–33)
MCHC RBC AUTO-ENTMCNC: 32.6 G/DL (ref 31.5–35.7)
MCHC RBC AUTO-ENTMCNC: 32.9 G/DL (ref 31.5–35.7)
MCHC RBC AUTO-ENTMCNC: 33.5 G/DL (ref 31.5–35.7)
MCHC RBC AUTO-ENTMCNC: 33.5 G/DL (ref 31.5–35.7)
MCV RBC AUTO: 94.8 FL (ref 79–97)
MCV RBC AUTO: 95.1 FL (ref 79–97)
MCV RBC AUTO: 96.8 FL (ref 79–97)
MCV RBC AUTO: 97.6 FL (ref 79–97)
PHOSPHATE SERPL-MCNC: 3.7 MG/DL (ref 2.5–4.5)
PLATELET # BLD AUTO: 129 10*3/MM3 (ref 140–450)
PLATELET # BLD AUTO: 133 10*3/MM3 (ref 140–450)
PLATELET # BLD AUTO: 133 10*3/MM3 (ref 140–450)
PLATELET # BLD AUTO: 140 10*3/MM3 (ref 140–450)
PMV BLD AUTO: 10.3 FL (ref 6–12)
PMV BLD AUTO: 10.3 FL (ref 6–12)
PMV BLD AUTO: 10.7 FL (ref 6–12)
PMV BLD AUTO: 10.8 FL (ref 6–12)
POTASSIUM SERPL-SCNC: 3.5 MMOL/L (ref 3.5–5.2)
POTASSIUM SERPL-SCNC: 3.6 MMOL/L (ref 3.5–5.2)
QT INTERVAL: 492 MS
RBC # BLD AUTO: 2.5 10*6/MM3 (ref 4.14–5.8)
RBC # BLD AUTO: 2.52 10*6/MM3 (ref 4.14–5.8)
RBC # BLD AUTO: 2.86 10*6/MM3 (ref 4.14–5.8)
RBC # BLD AUTO: 2.87 10*6/MM3 (ref 4.14–5.8)
RH BLD: POSITIVE
SARS-COV-2 RNA RESP QL NAA+PROBE: NOT DETECTED
SODIUM SERPL-SCNC: 145 MMOL/L (ref 136–145)
SODIUM SERPL-SCNC: 146 MMOL/L (ref 136–145)
T&S EXPIRATION DATE: NORMAL
URATE SERPL-MCNC: 8.9 MG/DL (ref 3.4–7)
WBC # BLD AUTO: 11.85 10*3/MM3 (ref 3.4–10.8)
WBC # BLD AUTO: 12.18 10*3/MM3 (ref 3.4–10.8)
WBC # BLD AUTO: 12.29 10*3/MM3 (ref 3.4–10.8)
WBC # BLD AUTO: 12.51 10*3/MM3 (ref 3.4–10.8)

## 2021-02-25 PROCEDURE — 83735 ASSAY OF MAGNESIUM: CPT | Performed by: NURSE PRACTITIONER

## 2021-02-25 PROCEDURE — 99024 POSTOP FOLLOW-UP VISIT: CPT | Performed by: NURSE PRACTITIONER

## 2021-02-25 PROCEDURE — 86900 BLOOD TYPING SEROLOGIC ABO: CPT | Performed by: NURSE PRACTITIONER

## 2021-02-25 PROCEDURE — 82962 GLUCOSE BLOOD TEST: CPT

## 2021-02-25 PROCEDURE — 86900 BLOOD TYPING SEROLOGIC ABO: CPT

## 2021-02-25 PROCEDURE — 25010000002 MORPHINE PER 10 MG: Performed by: NURSE PRACTITIONER

## 2021-02-25 PROCEDURE — 93010 ELECTROCARDIOGRAM REPORT: CPT | Performed by: INTERNAL MEDICINE

## 2021-02-25 PROCEDURE — 99221 1ST HOSP IP/OBS SF/LOW 40: CPT | Performed by: INTERNAL MEDICINE

## 2021-02-25 PROCEDURE — 5A1223Z PERFORMANCE OF CARDIAC PACING, CONTINUOUS: ICD-10-PCS | Performed by: THORACIC SURGERY (CARDIOTHORACIC VASCULAR SURGERY)

## 2021-02-25 PROCEDURE — 25010000003 POTASSIUM CHLORIDE 10 MEQ/100ML SOLUTION: Performed by: NURSE PRACTITIONER

## 2021-02-25 PROCEDURE — 99232 SBSQ HOSP IP/OBS MODERATE 35: CPT | Performed by: INTERNAL MEDICINE

## 2021-02-25 PROCEDURE — 25010000002 LEVETIRACETAM IN NACL 0.82% 500 MG/100ML SOLUTION: Performed by: PSYCHIATRY & NEUROLOGY

## 2021-02-25 PROCEDURE — 82550 ASSAY OF CK (CPK): CPT | Performed by: INTERNAL MEDICINE

## 2021-02-25 PROCEDURE — 25010000002 ENOXAPARIN PER 10 MG: Performed by: NURSE PRACTITIONER

## 2021-02-25 PROCEDURE — 84550 ASSAY OF BLOOD/URIC ACID: CPT | Performed by: INTERNAL MEDICINE

## 2021-02-25 PROCEDURE — 99231 SBSQ HOSP IP/OBS SF/LOW 25: CPT | Performed by: PSYCHIATRY & NEUROLOGY

## 2021-02-25 PROCEDURE — 80069 RENAL FUNCTION PANEL: CPT | Performed by: INTERNAL MEDICINE

## 2021-02-25 PROCEDURE — 36430 TRANSFUSION BLD/BLD COMPNT: CPT

## 2021-02-25 PROCEDURE — 86850 RBC ANTIBODY SCREEN: CPT | Performed by: NURSE PRACTITIONER

## 2021-02-25 PROCEDURE — 80048 BASIC METABOLIC PNL TOTAL CA: CPT | Performed by: NURSE PRACTITIONER

## 2021-02-25 PROCEDURE — 93005 ELECTROCARDIOGRAM TRACING: CPT | Performed by: INTERNAL MEDICINE

## 2021-02-25 PROCEDURE — P9016 RBC LEUKOCYTES REDUCED: HCPCS

## 2021-02-25 PROCEDURE — 86923 COMPATIBILITY TEST ELECTRIC: CPT

## 2021-02-25 PROCEDURE — 85027 COMPLETE CBC AUTOMATED: CPT | Performed by: INTERNAL MEDICINE

## 2021-02-25 PROCEDURE — 86901 BLOOD TYPING SEROLOGIC RH(D): CPT | Performed by: NURSE PRACTITIONER

## 2021-02-25 PROCEDURE — U0003 INFECTIOUS AGENT DETECTION BY NUCLEIC ACID (DNA OR RNA); SEVERE ACUTE RESPIRATORY SYNDROME CORONAVIRUS 2 (SARS-COV-2) (CORONAVIRUS DISEASE [COVID-19]), AMPLIFIED PROBE TECHNIQUE, MAKING USE OF HIGH THROUGHPUT TECHNOLOGIES AS DESCRIBED BY CMS-2020-01-R: HCPCS | Performed by: INTERNAL MEDICINE

## 2021-02-25 PROCEDURE — 97530 THERAPEUTIC ACTIVITIES: CPT

## 2021-02-25 RX ORDER — POTASSIUM CHLORIDE 7.45 MG/ML
10 INJECTION INTRAVENOUS ONCE
Status: COMPLETED | OUTPATIENT
Start: 2021-02-25 | End: 2021-02-25

## 2021-02-25 RX ORDER — LEVETIRACETAM 500 MG/1
500 TABLET ORAL 2 TIMES DAILY
Status: DISCONTINUED | OUTPATIENT
Start: 2021-02-25 | End: 2021-03-02

## 2021-02-25 RX ADMIN — POLYETHYLENE GLYCOL 3350 17 G: 17 POWDER, FOR SOLUTION ORAL at 09:05

## 2021-02-25 RX ADMIN — CHLORHEXIDINE GLUCONATE 15 ML: 1.2 RINSE ORAL at 00:00

## 2021-02-25 RX ADMIN — SODIUM CHLORIDE 8 MG/HR: 900 INJECTION INTRAVENOUS at 00:15

## 2021-02-25 RX ADMIN — MORPHINE SULFATE 1 MG: 2 INJECTION, SOLUTION INTRAMUSCULAR; INTRAVENOUS at 23:15

## 2021-02-25 RX ADMIN — SODIUM CHLORIDE 8 MG/HR: 900 INJECTION INTRAVENOUS at 06:18

## 2021-02-25 RX ADMIN — PANTOPRAZOLE SODIUM 40 MG: 40 INJECTION, POWDER, FOR SOLUTION INTRAVENOUS at 23:10

## 2021-02-25 RX ADMIN — SODIUM CHLORIDE 8 MG/HR: 900 INJECTION INTRAVENOUS at 23:10

## 2021-02-25 RX ADMIN — DEXTROSE AND SODIUM CHLORIDE 125 ML/HR: 5; 450 INJECTION, SOLUTION INTRAVENOUS at 00:15

## 2021-02-25 RX ADMIN — LEVETIRACETAM 500 MG: 5 INJECTION INTRAVASCULAR at 09:04

## 2021-02-25 RX ADMIN — DOCUSATE SODIUM 50MG AND SENNOSIDES 8.6MG 2 TABLET: 8.6; 5 TABLET, FILM COATED ORAL at 20:50

## 2021-02-25 RX ADMIN — SODIUM CHLORIDE 8 MG/HR: 900 INJECTION INTRAVENOUS at 18:25

## 2021-02-25 RX ADMIN — MUPIROCIN 1 APPLICATION: 20 OINTMENT TOPICAL at 20:56

## 2021-02-25 RX ADMIN — MUPIROCIN 1 APPLICATION: 20 OINTMENT TOPICAL at 09:04

## 2021-02-25 RX ADMIN — ENOXAPARIN SODIUM 40 MG: 40 INJECTION SUBCUTANEOUS at 09:05

## 2021-02-25 RX ADMIN — SODIUM CHLORIDE, PRESERVATIVE FREE 10 ML: 5 INJECTION INTRAVENOUS at 09:04

## 2021-02-25 RX ADMIN — POTASSIUM CHLORIDE 10 MEQ: 7.46 INJECTION, SOLUTION INTRAVENOUS at 16:16

## 2021-02-25 RX ADMIN — SODIUM CHLORIDE 8 MG/HR: 900 INJECTION INTRAVENOUS at 11:31

## 2021-02-25 RX ADMIN — HYDROCODONE BITARTRATE AND ACETAMINOPHEN 1 TABLET: 5; 325 TABLET ORAL at 22:18

## 2021-02-25 RX ADMIN — LEVETIRACETAM 500 MG: 500 TABLET, FILM COATED ORAL at 20:50

## 2021-02-25 RX ADMIN — Medication 1 TABLET: at 09:03

## 2021-02-25 RX ADMIN — ASPIRIN 81 MG: 81 TABLET, COATED ORAL at 09:03

## 2021-02-26 ENCOUNTER — APPOINTMENT (OUTPATIENT)
Dept: GENERAL RADIOLOGY | Facility: HOSPITAL | Age: 76
End: 2021-02-26

## 2021-02-26 ENCOUNTER — ANESTHESIA (OUTPATIENT)
Dept: GASTROENTEROLOGY | Facility: HOSPITAL | Age: 76
End: 2021-02-26

## 2021-02-26 ENCOUNTER — ANESTHESIA EVENT (OUTPATIENT)
Dept: GASTROENTEROLOGY | Facility: HOSPITAL | Age: 76
End: 2021-02-26

## 2021-02-26 VITALS — DIASTOLIC BLOOD PRESSURE: 67 MMHG | OXYGEN SATURATION: 100 % | SYSTOLIC BLOOD PRESSURE: 130 MMHG

## 2021-02-26 LAB
ALBUMIN SERPL-MCNC: 3.1 G/DL (ref 3.5–5.2)
ANION GAP SERPL CALCULATED.3IONS-SCNC: 7.6 MMOL/L (ref 5–15)
BH BB BLOOD EXPIRATION DATE: NORMAL
BH BB BLOOD TYPE BARCODE: 7300
BH BB DISPENSE STATUS: NORMAL
BH BB PRODUCT CODE: NORMAL
BH BB UNIT NUMBER: NORMAL
BUN SERPL-MCNC: 37 MG/DL (ref 8–23)
BUN/CREAT SERPL: 19.3 (ref 7–25)
CALCIUM SPEC-SCNC: 7.5 MG/DL (ref 8.6–10.5)
CHLORIDE SERPL-SCNC: 112 MMOL/L (ref 98–107)
CO2 SERPL-SCNC: 24.4 MMOL/L (ref 22–29)
CREAT SERPL-MCNC: 1.92 MG/DL (ref 0.76–1.27)
CROSSMATCH INTERPRETATION: NORMAL
DEPRECATED RDW RBC AUTO: 49 FL (ref 37–54)
DEPRECATED RDW RBC AUTO: 50 FL (ref 37–54)
ERYTHROCYTE [DISTWIDTH] IN BLOOD BY AUTOMATED COUNT: 13.7 % (ref 12.3–15.4)
ERYTHROCYTE [DISTWIDTH] IN BLOOD BY AUTOMATED COUNT: 13.9 % (ref 12.3–15.4)
GFR SERPL CREATININE-BSD FRML MDRD: 34 ML/MIN/1.73
GLUCOSE BLDC GLUCOMTR-MCNC: 100 MG/DL (ref 70–130)
GLUCOSE BLDC GLUCOMTR-MCNC: 101 MG/DL (ref 70–130)
GLUCOSE BLDC GLUCOMTR-MCNC: 144 MG/DL (ref 70–130)
GLUCOSE SERPL-MCNC: 156 MG/DL (ref 65–99)
HCT VFR BLD AUTO: 26.9 % (ref 37.5–51)
HCT VFR BLD AUTO: 28.3 % (ref 37.5–51)
HGB BLD-MCNC: 8.6 G/DL (ref 13–17.7)
HGB BLD-MCNC: 9.1 G/DL (ref 13–17.7)
MCH RBC QN AUTO: 31.2 PG (ref 26.6–33)
MCH RBC QN AUTO: 31.7 PG (ref 26.6–33)
MCHC RBC AUTO-ENTMCNC: 32 G/DL (ref 31.5–35.7)
MCHC RBC AUTO-ENTMCNC: 32.2 G/DL (ref 31.5–35.7)
MCV RBC AUTO: 97.5 FL (ref 79–97)
MCV RBC AUTO: 98.6 FL (ref 79–97)
PHOSPHATE SERPL-MCNC: 2.5 MG/DL (ref 2.5–4.5)
PLATELET # BLD AUTO: 137 10*3/MM3 (ref 140–450)
PLATELET # BLD AUTO: 146 10*3/MM3 (ref 140–450)
PMV BLD AUTO: 10.7 FL (ref 6–12)
PMV BLD AUTO: 10.8 FL (ref 6–12)
POTASSIUM SERPL-SCNC: 3.6 MMOL/L (ref 3.5–5.2)
QT INTERVAL: 389 MS
RBC # BLD AUTO: 2.76 10*6/MM3 (ref 4.14–5.8)
RBC # BLD AUTO: 2.87 10*6/MM3 (ref 4.14–5.8)
SODIUM SERPL-SCNC: 144 MMOL/L (ref 136–145)
UNIT  ABO: NORMAL
UNIT  RH: NORMAL
WBC # BLD AUTO: 11.44 10*3/MM3 (ref 3.4–10.8)
WBC # BLD AUTO: 12.31 10*3/MM3 (ref 3.4–10.8)

## 2021-02-26 PROCEDURE — 99232 SBSQ HOSP IP/OBS MODERATE 35: CPT | Performed by: INTERNAL MEDICINE

## 2021-02-26 PROCEDURE — 85027 COMPLETE CBC AUTOMATED: CPT | Performed by: INTERNAL MEDICINE

## 2021-02-26 PROCEDURE — 99024 POSTOP FOLLOW-UP VISIT: CPT | Performed by: NURSE PRACTITIONER

## 2021-02-26 PROCEDURE — 93005 ELECTROCARDIOGRAM TRACING: CPT | Performed by: NURSE PRACTITIONER

## 2021-02-26 PROCEDURE — 25010000003 POTASSIUM CHLORIDE PER 2 MEQ: Performed by: NURSE PRACTITIONER

## 2021-02-26 PROCEDURE — 82962 GLUCOSE BLOOD TEST: CPT

## 2021-02-26 PROCEDURE — 25010000002 MORPHINE PER 10 MG: Performed by: INTERNAL MEDICINE

## 2021-02-26 PROCEDURE — 97530 THERAPEUTIC ACTIVITIES: CPT

## 2021-02-26 PROCEDURE — 85027 COMPLETE CBC AUTOMATED: CPT | Performed by: NURSE PRACTITIONER

## 2021-02-26 PROCEDURE — 25010000002 PHENYLEPHRINE PER 1 ML: Performed by: ANESTHESIOLOGY

## 2021-02-26 PROCEDURE — 88305 TISSUE EXAM BY PATHOLOGIST: CPT | Performed by: INTERNAL MEDICINE

## 2021-02-26 PROCEDURE — 0DB68ZX EXCISION OF STOMACH, VIA NATURAL OR ARTIFICIAL OPENING ENDOSCOPIC, DIAGNOSTIC: ICD-10-PCS | Performed by: INTERNAL MEDICINE

## 2021-02-26 PROCEDURE — 25010000002 ENOXAPARIN PER 10 MG: Performed by: NURSE PRACTITIONER

## 2021-02-26 PROCEDURE — 80069 RENAL FUNCTION PANEL: CPT | Performed by: INTERNAL MEDICINE

## 2021-02-26 PROCEDURE — 71045 X-RAY EXAM CHEST 1 VIEW: CPT

## 2021-02-26 PROCEDURE — 43251 EGD REMOVE LESION SNARE: CPT | Performed by: INTERNAL MEDICINE

## 2021-02-26 PROCEDURE — 25010000002 PROPOFOL 10 MG/ML EMULSION: Performed by: ANESTHESIOLOGY

## 2021-02-26 DEVICE — DEV CLIP ENDO RESOLUTION360 CONTRL ROT 235CM: Type: IMPLANTABLE DEVICE | Site: STOMACH | Status: FUNCTIONAL

## 2021-02-26 RX ORDER — GLYCOPYRROLATE 0.2 MG/ML
INJECTION INTRAMUSCULAR; INTRAVENOUS AS NEEDED
Status: DISCONTINUED | OUTPATIENT
Start: 2021-02-26 | End: 2021-02-26 | Stop reason: SURG

## 2021-02-26 RX ORDER — SUCRALFATE 1 G/1
1 TABLET ORAL
Status: DISCONTINUED | OUTPATIENT
Start: 2021-02-26 | End: 2021-03-03 | Stop reason: HOSPADM

## 2021-02-26 RX ORDER — PROPOFOL 10 MG/ML
VIAL (ML) INTRAVENOUS CONTINUOUS PRN
Status: DISCONTINUED | OUTPATIENT
Start: 2021-02-26 | End: 2021-02-26 | Stop reason: SURG

## 2021-02-26 RX ORDER — LIDOCAINE HYDROCHLORIDE 20 MG/ML
INJECTION, SOLUTION INFILTRATION; PERINEURAL AS NEEDED
Status: DISCONTINUED | OUTPATIENT
Start: 2021-02-26 | End: 2021-02-26 | Stop reason: SURG

## 2021-02-26 RX ORDER — PROPOFOL 10 MG/ML
VIAL (ML) INTRAVENOUS AS NEEDED
Status: DISCONTINUED | OUTPATIENT
Start: 2021-02-26 | End: 2021-02-26 | Stop reason: SURG

## 2021-02-26 RX ORDER — PANTOPRAZOLE SODIUM 40 MG/10ML
40 INJECTION, POWDER, LYOPHILIZED, FOR SOLUTION INTRAVENOUS
Status: DISCONTINUED | OUTPATIENT
Start: 2021-02-26 | End: 2021-02-27

## 2021-02-26 RX ADMIN — PROPOFOL 50 MCG/KG/MIN: 10 INJECTION, EMULSION INTRAVENOUS at 12:41

## 2021-02-26 RX ADMIN — ASPIRIN 81 MG: 81 TABLET, COATED ORAL at 14:51

## 2021-02-26 RX ADMIN — POTASSIUM CHLORIDE 20 MEQ: 29.8 INJECTION, SOLUTION INTRAVENOUS at 06:51

## 2021-02-26 RX ADMIN — SUCRALFATE 1 G: 1 TABLET ORAL at 20:45

## 2021-02-26 RX ADMIN — GLYCOPYRROLATE 0.2 MG: 0.2 INJECTION INTRAMUSCULAR; INTRAVENOUS at 12:39

## 2021-02-26 RX ADMIN — HYDROCODONE BITARTRATE AND ACETAMINOPHEN 1 TABLET: 5; 325 TABLET ORAL at 20:45

## 2021-02-26 RX ADMIN — MORPHINE SULFATE 1 MG: 2 INJECTION, SOLUTION INTRAMUSCULAR; INTRAVENOUS at 21:40

## 2021-02-26 RX ADMIN — MUPIROCIN 1 APPLICATION: 20 OINTMENT TOPICAL at 20:45

## 2021-02-26 RX ADMIN — LEVETIRACETAM 500 MG: 500 TABLET, FILM COATED ORAL at 14:52

## 2021-02-26 RX ADMIN — CHLORHEXIDINE GLUCONATE 15 ML: 1.2 RINSE ORAL at 13:49

## 2021-02-26 RX ADMIN — MUPIROCIN: 20 OINTMENT TOPICAL at 10:00

## 2021-02-26 RX ADMIN — PANTOPRAZOLE SODIUM 40 MG: 40 INJECTION, POWDER, FOR SOLUTION INTRAVENOUS at 17:49

## 2021-02-26 RX ADMIN — SODIUM CHLORIDE 8 MG/HR: 900 INJECTION INTRAVENOUS at 04:21

## 2021-02-26 RX ADMIN — SUCRALFATE 1 G: 1 TABLET ORAL at 17:49

## 2021-02-26 RX ADMIN — ENOXAPARIN SODIUM 40 MG: 40 INJECTION SUBCUTANEOUS at 13:48

## 2021-02-26 RX ADMIN — PHENYLEPHRINE HYDROCHLORIDE 100 MCG: 10 INJECTION INTRAVENOUS at 12:55

## 2021-02-26 RX ADMIN — LEVETIRACETAM 500 MG: 500 TABLET, FILM COATED ORAL at 20:45

## 2021-02-26 RX ADMIN — LIDOCAINE HYDROCHLORIDE 60 MG: 20 INJECTION, SOLUTION INFILTRATION; PERINEURAL at 12:39

## 2021-02-26 RX ADMIN — PHENYLEPHRINE HYDROCHLORIDE 100 MCG: 10 INJECTION INTRAVENOUS at 12:48

## 2021-02-26 RX ADMIN — PROPOFOL 50 MG: 10 INJECTION, EMULSION INTRAVENOUS at 12:39

## 2021-02-26 NOTE — ANESTHESIA PREPROCEDURE EVALUATION
Anesthesia Evaluation     Patient summary reviewed and Nursing notes reviewed   NPO Solid Status: > 8 hours  NPO Liquid Status: > 2 hours           Airway   Mallampati: I  TM distance: >3 FB  Neck ROM: full  No difficulty expected  Dental - normal exam     Pulmonary - normal exam   (+) shortness of breath, sleep apnea,     ROS comment: Pneumothorax wiyh chest tubes present  Cardiovascular - normal exam    (+) past MI  1-7 days, CAD, CABG, angina, LARA, hyperlipidemia,       Neuro/Psych  (+) seizures,     GI/Hepatic/Renal/Endo    (+)  GERD, GI bleeding ,     Musculoskeletal     Abdominal  - normal exam    Bowel sounds: normal.   Substance History - negative use     OB/GYN negative ob/gyn ROS         Other   arthritis,                    Anesthesia Plan    ASA 4     MAC       Anesthetic plan, all risks, benefits, and alternatives have been provided, discussed and informed consent has been obtained with: patient.

## 2021-02-26 NOTE — ANESTHESIA POSTPROCEDURE EVALUATION
Patient: Rodolfo Grover    Procedure Summary     Date: 02/26/21 Room / Location:  [facility]    Anesthesia Start: 1236 Anesthesia Stop: 1308    Procedure: ESOPHAGOGASTRODUODENOSCOPY AT BEDSIDE WITH HOT SNARE POLYPECTOMY AND CLIP PLACEMENT X1 (N/A Esophagus) Diagnosis:       Coffee ground emesis      (Coffee ground emesis [K92.0])    Surgeon: Jono Laboy MD Provider: Andrés Gardner MD    Anesthesia Type: MAC ASA Status: 4          Anesthesia Type: MAC    Vitals  Vitals Value Taken Time   /69 02/26/21 1236   Temp     Pulse 61 02/26/21 1237   Resp     SpO2 97 % 02/26/21 1237   Vitals shown include unvalidated device data.        Anesthesia Post Evaluation

## 2021-02-27 ENCOUNTER — INPATIENT HOSPITAL (OUTPATIENT)
Dept: URBAN - METROPOLITAN AREA HOSPITAL 113 | Facility: HOSPITAL | Age: 76
End: 2021-02-27
Payer: MEDICARE

## 2021-02-27 ENCOUNTER — APPOINTMENT (OUTPATIENT)
Dept: GENERAL RADIOLOGY | Facility: HOSPITAL | Age: 76
End: 2021-02-27

## 2021-02-27 DIAGNOSIS — K21.00 GASTRO-ESOPHAGEAL REFLUX DISEASE WITH ESOPHAGITIS, WITHOUT B: ICD-10-CM

## 2021-02-27 DIAGNOSIS — K92.0 HEMATEMESIS: ICD-10-CM

## 2021-02-27 DIAGNOSIS — K31.7 POLYP OF STOMACH AND DUODENUM: ICD-10-CM

## 2021-02-27 LAB
ALBUMIN SERPL-MCNC: 2.9 G/DL (ref 3.5–5.2)
ANION GAP SERPL CALCULATED.3IONS-SCNC: 8.6 MMOL/L (ref 5–15)
BUN SERPL-MCNC: 36 MG/DL (ref 8–23)
BUN/CREAT SERPL: 20.3 (ref 7–25)
CALCIUM SPEC-SCNC: 7.8 MG/DL (ref 8.6–10.5)
CHLORIDE SERPL-SCNC: 111 MMOL/L (ref 98–107)
CO2 SERPL-SCNC: 24.4 MMOL/L (ref 22–29)
CREAT SERPL-MCNC: 1.77 MG/DL (ref 0.76–1.27)
DEPRECATED RDW RBC AUTO: 48.3 FL (ref 37–54)
ERYTHROCYTE [DISTWIDTH] IN BLOOD BY AUTOMATED COUNT: 13.5 % (ref 12.3–15.4)
GFR SERPL CREATININE-BSD FRML MDRD: 37 ML/MIN/1.73
GLUCOSE BLDC GLUCOMTR-MCNC: 106 MG/DL (ref 70–130)
GLUCOSE BLDC GLUCOMTR-MCNC: 114 MG/DL (ref 70–130)
GLUCOSE BLDC GLUCOMTR-MCNC: 117 MG/DL (ref 70–130)
GLUCOSE BLDC GLUCOMTR-MCNC: 143 MG/DL (ref 70–130)
GLUCOSE BLDC GLUCOMTR-MCNC: 85 MG/DL (ref 70–130)
GLUCOSE SERPL-MCNC: 93 MG/DL (ref 65–99)
HCT VFR BLD AUTO: 27.8 % (ref 37.5–51)
HGB BLD-MCNC: 9.1 G/DL (ref 13–17.7)
MCH RBC QN AUTO: 31.7 PG (ref 26.6–33)
MCHC RBC AUTO-ENTMCNC: 32.7 G/DL (ref 31.5–35.7)
MCV RBC AUTO: 96.9 FL (ref 79–97)
PHOSPHATE SERPL-MCNC: 2 MG/DL (ref 2.5–4.5)
PLATELET # BLD AUTO: 161 10*3/MM3 (ref 140–450)
PMV BLD AUTO: 10.2 FL (ref 6–12)
POTASSIUM SERPL-SCNC: 3.9 MMOL/L (ref 3.5–5.2)
RBC # BLD AUTO: 2.87 10*6/MM3 (ref 4.14–5.8)
SODIUM SERPL-SCNC: 144 MMOL/L (ref 136–145)
WBC # BLD AUTO: 11.36 10*3/MM3 (ref 3.4–10.8)

## 2021-02-27 PROCEDURE — 82962 GLUCOSE BLOOD TEST: CPT

## 2021-02-27 PROCEDURE — 71045 X-RAY EXAM CHEST 1 VIEW: CPT

## 2021-02-27 PROCEDURE — 93005 ELECTROCARDIOGRAM TRACING: CPT | Performed by: NURSE PRACTITIONER

## 2021-02-27 PROCEDURE — 80069 RENAL FUNCTION PANEL: CPT | Performed by: INTERNAL MEDICINE

## 2021-02-27 PROCEDURE — 85027 COMPLETE CBC AUTOMATED: CPT | Performed by: INTERNAL MEDICINE

## 2021-02-27 PROCEDURE — 25010000002 ENOXAPARIN PER 10 MG: Performed by: INTERNAL MEDICINE

## 2021-02-27 PROCEDURE — 99024 POSTOP FOLLOW-UP VISIT: CPT | Performed by: THORACIC SURGERY (CARDIOTHORACIC VASCULAR SURGERY)

## 2021-02-27 PROCEDURE — 99233 SBSQ HOSP IP/OBS HIGH 50: CPT | Performed by: INTERNAL MEDICINE

## 2021-02-27 PROCEDURE — 25010000002 FUROSEMIDE PER 20 MG: Performed by: INTERNAL MEDICINE

## 2021-02-27 PROCEDURE — 99232 SBSQ HOSP IP/OBS MODERATE 35: CPT | Performed by: NURSE PRACTITIONER

## 2021-02-27 PROCEDURE — 97530 THERAPEUTIC ACTIVITIES: CPT

## 2021-02-27 RX ORDER — PANTOPRAZOLE SODIUM 40 MG/1
40 TABLET, DELAYED RELEASE ORAL
Status: DISCONTINUED | OUTPATIENT
Start: 2021-02-28 | End: 2021-03-03 | Stop reason: HOSPADM

## 2021-02-27 RX ORDER — FUROSEMIDE 10 MG/ML
40 INJECTION INTRAMUSCULAR; INTRAVENOUS ONCE
Status: COMPLETED | OUTPATIENT
Start: 2021-02-27 | End: 2021-02-27

## 2021-02-27 RX ORDER — ACETAMINOPHEN 500 MG
500 TABLET ORAL EVERY 4 HOURS PRN
Status: DISCONTINUED | OUTPATIENT
Start: 2021-02-27 | End: 2021-03-03 | Stop reason: HOSPADM

## 2021-02-27 RX ADMIN — LEVETIRACETAM 500 MG: 500 TABLET, FILM COATED ORAL at 09:48

## 2021-02-27 RX ADMIN — PANTOPRAZOLE SODIUM 40 MG: 40 INJECTION, POWDER, FOR SOLUTION INTRAVENOUS at 17:30

## 2021-02-27 RX ADMIN — DOCUSATE SODIUM 50MG AND SENNOSIDES 8.6MG 2 TABLET: 8.6; 5 TABLET, FILM COATED ORAL at 00:00

## 2021-02-27 RX ADMIN — SUCRALFATE 1 G: 1 TABLET ORAL at 17:29

## 2021-02-27 RX ADMIN — SUCRALFATE 1 G: 1 TABLET ORAL at 12:45

## 2021-02-27 RX ADMIN — PANTOPRAZOLE SODIUM 40 MG: 40 INJECTION, POWDER, FOR SOLUTION INTRAVENOUS at 06:50

## 2021-02-27 RX ADMIN — SUCRALFATE 1 G: 1 TABLET ORAL at 21:07

## 2021-02-27 RX ADMIN — LEVETIRACETAM 500 MG: 500 TABLET, FILM COATED ORAL at 21:07

## 2021-02-27 RX ADMIN — POLYETHYLENE GLYCOL 3350 17 G: 17 POWDER, FOR SOLUTION ORAL at 09:49

## 2021-02-27 RX ADMIN — Medication 1 TABLET: at 09:48

## 2021-02-27 RX ADMIN — SUCRALFATE 1 G: 1 TABLET ORAL at 07:00

## 2021-02-27 RX ADMIN — HYDROCODONE BITARTRATE AND ACETAMINOPHEN 1 TABLET: 5; 325 TABLET ORAL at 23:20

## 2021-02-27 RX ADMIN — Medication 2 PACKET: at 21:07

## 2021-02-27 RX ADMIN — FUROSEMIDE 40 MG: 10 INJECTION, SOLUTION INTRAMUSCULAR; INTRAVENOUS at 15:39

## 2021-02-27 RX ADMIN — Medication 2 PACKET: at 17:30

## 2021-02-27 RX ADMIN — ENOXAPARIN SODIUM 40 MG: 40 INJECTION SUBCUTANEOUS at 09:48

## 2021-02-27 RX ADMIN — MUPIROCIN 1 APPLICATION: 20 OINTMENT TOPICAL at 09:48

## 2021-02-27 RX ADMIN — ASPIRIN 81 MG: 81 TABLET, COATED ORAL at 09:48

## 2021-02-28 ENCOUNTER — APPOINTMENT (OUTPATIENT)
Dept: GENERAL RADIOLOGY | Facility: HOSPITAL | Age: 76
End: 2021-02-28

## 2021-02-28 LAB
ALBUMIN SERPL-MCNC: 2.6 G/DL (ref 3.5–5.2)
ALBUMIN/GLOB SERPL: 1.1 G/DL
ALP SERPL-CCNC: 83 U/L (ref 39–117)
ALT SERPL W P-5'-P-CCNC: 7 U/L (ref 1–41)
ANION GAP SERPL CALCULATED.3IONS-SCNC: 8.6 MMOL/L (ref 5–15)
AST SERPL-CCNC: 20 U/L (ref 1–40)
BILIRUB SERPL-MCNC: 0.9 MG/DL (ref 0–1.2)
BUN SERPL-MCNC: 31 MG/DL (ref 8–23)
BUN/CREAT SERPL: 21.1 (ref 7–25)
CALCIUM SPEC-SCNC: 7.4 MG/DL (ref 8.6–10.5)
CHLORIDE SERPL-SCNC: 106 MMOL/L (ref 98–107)
CO2 SERPL-SCNC: 24.4 MMOL/L (ref 22–29)
CREAT SERPL-MCNC: 1.47 MG/DL (ref 0.76–1.27)
DEPRECATED RDW RBC AUTO: 47.8 FL (ref 37–54)
ERYTHROCYTE [DISTWIDTH] IN BLOOD BY AUTOMATED COUNT: 13.6 % (ref 12.3–15.4)
GFR SERPL CREATININE-BSD FRML MDRD: 46 ML/MIN/1.73
GLOBULIN UR ELPH-MCNC: 2.4 GM/DL
GLUCOSE BLDC GLUCOMTR-MCNC: 114 MG/DL (ref 70–130)
GLUCOSE SERPL-MCNC: 101 MG/DL (ref 65–99)
HCT VFR BLD AUTO: 28.2 % (ref 37.5–51)
HGB BLD-MCNC: 9.2 G/DL (ref 13–17.7)
MAGNESIUM SERPL-MCNC: 1.9 MG/DL (ref 1.6–2.4)
MCH RBC QN AUTO: 31.5 PG (ref 26.6–33)
MCHC RBC AUTO-ENTMCNC: 32.6 G/DL (ref 31.5–35.7)
MCV RBC AUTO: 96.6 FL (ref 79–97)
PHOSPHATE SERPL-MCNC: 2.2 MG/DL (ref 2.5–4.5)
PLATELET # BLD AUTO: 185 10*3/MM3 (ref 140–450)
PMV BLD AUTO: 10.3 FL (ref 6–12)
POTASSIUM SERPL-SCNC: 3.2 MMOL/L (ref 3.5–5.2)
POTASSIUM SERPL-SCNC: 4.3 MMOL/L (ref 3.5–5.2)
PROT SERPL-MCNC: 5 G/DL (ref 6–8.5)
QT INTERVAL: 442 MS
QT INTERVAL: 493 MS
RBC # BLD AUTO: 2.92 10*6/MM3 (ref 4.14–5.8)
SODIUM SERPL-SCNC: 139 MMOL/L (ref 136–145)
WBC # BLD AUTO: 11.84 10*3/MM3 (ref 3.4–10.8)

## 2021-02-28 PROCEDURE — 25010000003 POTASSIUM CHLORIDE 10 MEQ/100ML SOLUTION: Performed by: INTERNAL MEDICINE

## 2021-02-28 PROCEDURE — 25010000002 ENOXAPARIN PER 10 MG: Performed by: INTERNAL MEDICINE

## 2021-02-28 PROCEDURE — 84100 ASSAY OF PHOSPHORUS: CPT | Performed by: INTERNAL MEDICINE

## 2021-02-28 PROCEDURE — 80053 COMPREHEN METABOLIC PANEL: CPT | Performed by: THORACIC SURGERY (CARDIOTHORACIC VASCULAR SURGERY)

## 2021-02-28 PROCEDURE — 99024 POSTOP FOLLOW-UP VISIT: CPT | Performed by: THORACIC SURGERY (CARDIOTHORACIC VASCULAR SURGERY)

## 2021-02-28 PROCEDURE — 71045 X-RAY EXAM CHEST 1 VIEW: CPT

## 2021-02-28 PROCEDURE — 85027 COMPLETE CBC AUTOMATED: CPT | Performed by: THORACIC SURGERY (CARDIOTHORACIC VASCULAR SURGERY)

## 2021-02-28 PROCEDURE — 84132 ASSAY OF SERUM POTASSIUM: CPT | Performed by: THORACIC SURGERY (CARDIOTHORACIC VASCULAR SURGERY)

## 2021-02-28 PROCEDURE — 93005 ELECTROCARDIOGRAM TRACING: CPT | Performed by: NURSE PRACTITIONER

## 2021-02-28 PROCEDURE — 82962 GLUCOSE BLOOD TEST: CPT

## 2021-02-28 PROCEDURE — 97116 GAIT TRAINING THERAPY: CPT

## 2021-02-28 PROCEDURE — 99232 SBSQ HOSP IP/OBS MODERATE 35: CPT | Performed by: NURSE PRACTITIONER

## 2021-02-28 PROCEDURE — 83735 ASSAY OF MAGNESIUM: CPT | Performed by: THORACIC SURGERY (CARDIOTHORACIC VASCULAR SURGERY)

## 2021-02-28 PROCEDURE — 97530 THERAPEUTIC ACTIVITIES: CPT

## 2021-02-28 RX ORDER — POTASSIUM CHLORIDE 1.5 G/1.77G
40 POWDER, FOR SOLUTION ORAL AS NEEDED
Status: DISCONTINUED | OUTPATIENT
Start: 2021-02-28 | End: 2021-03-03 | Stop reason: HOSPADM

## 2021-02-28 RX ORDER — POTASSIUM CHLORIDE 750 MG/1
40 TABLET, FILM COATED, EXTENDED RELEASE ORAL AS NEEDED
Status: DISCONTINUED | OUTPATIENT
Start: 2021-02-28 | End: 2021-03-03 | Stop reason: HOSPADM

## 2021-02-28 RX ADMIN — LEVETIRACETAM 500 MG: 500 TABLET, FILM COATED ORAL at 08:32

## 2021-02-28 RX ADMIN — ASPIRIN 81 MG: 81 TABLET, COATED ORAL at 08:33

## 2021-02-28 RX ADMIN — ACETAMINOPHEN 500 MG: 500 TABLET ORAL at 08:33

## 2021-02-28 RX ADMIN — LEVETIRACETAM 500 MG: 500 TABLET, FILM COATED ORAL at 20:54

## 2021-02-28 RX ADMIN — MUPIROCIN 1 APPLICATION: 20 OINTMENT TOPICAL at 08:33

## 2021-02-28 RX ADMIN — SUCRALFATE 1 G: 1 TABLET ORAL at 06:30

## 2021-02-28 RX ADMIN — POTASSIUM CHLORIDE 10 MEQ: 7.46 INJECTION, SOLUTION INTRAVENOUS at 06:30

## 2021-02-28 RX ADMIN — SUCRALFATE 1 G: 1 TABLET ORAL at 18:30

## 2021-02-28 RX ADMIN — POTASSIUM CHLORIDE 40 MEQ: 750 TABLET, EXTENDED RELEASE ORAL at 08:34

## 2021-02-28 RX ADMIN — POLYETHYLENE GLYCOL 3350 17 G: 17 POWDER, FOR SOLUTION ORAL at 08:33

## 2021-02-28 RX ADMIN — ENOXAPARIN SODIUM 40 MG: 40 INJECTION SUBCUTANEOUS at 08:32

## 2021-02-28 RX ADMIN — SUCRALFATE 1 G: 1 TABLET ORAL at 20:54

## 2021-02-28 RX ADMIN — Medication 1 TABLET: at 08:32

## 2021-02-28 RX ADMIN — PANTOPRAZOLE SODIUM 40 MG: 40 TABLET, DELAYED RELEASE ORAL at 06:30

## 2021-02-28 RX ADMIN — POTASSIUM CHLORIDE 40 MEQ: 750 TABLET, EXTENDED RELEASE ORAL at 12:10

## 2021-02-28 RX ADMIN — Medication 2 PACKET: at 08:33

## 2021-02-28 RX ADMIN — SUCRALFATE 1 G: 1 TABLET ORAL at 12:10

## 2021-02-28 RX ADMIN — PANTOPRAZOLE SODIUM 40 MG: 40 TABLET, DELAYED RELEASE ORAL at 18:30

## 2021-03-01 LAB
ALBUMIN SERPL-MCNC: 2.9 G/DL (ref 3.5–5.2)
ANION GAP SERPL CALCULATED.3IONS-SCNC: 8.6 MMOL/L (ref 5–15)
BUN SERPL-MCNC: 25 MG/DL (ref 8–23)
BUN/CREAT SERPL: 18.1 (ref 7–25)
CALCIUM SPEC-SCNC: 8 MG/DL (ref 8.6–10.5)
CHLORIDE SERPL-SCNC: 106 MMOL/L (ref 98–107)
CO2 SERPL-SCNC: 23.4 MMOL/L (ref 22–29)
CREAT SERPL-MCNC: 1.38 MG/DL (ref 0.76–1.27)
DEPRECATED RDW RBC AUTO: 49.1 FL (ref 37–54)
EOSINOPHIL # BLD MANUAL: 0.63 10*3/MM3 (ref 0–0.4)
EOSINOPHIL NFR BLD MANUAL: 4 % (ref 0.3–6.2)
ERYTHROCYTE [DISTWIDTH] IN BLOOD BY AUTOMATED COUNT: 13.7 % (ref 12.3–15.4)
GFR SERPL CREATININE-BSD FRML MDRD: 50 ML/MIN/1.73
GLUCOSE BLDC GLUCOMTR-MCNC: 133 MG/DL (ref 70–130)
GLUCOSE SERPL-MCNC: 112 MG/DL (ref 65–99)
HCT VFR BLD AUTO: 30.5 % (ref 37.5–51)
HGB BLD-MCNC: 9.6 G/DL (ref 13–17.7)
HYPOCHROMIA BLD QL: ABNORMAL
LAB AP CASE REPORT: NORMAL
LAB AP DIAGNOSIS COMMENT: NORMAL
LYMPHOCYTES # BLD MANUAL: 2.05 10*3/MM3 (ref 0.7–3.1)
LYMPHOCYTES NFR BLD MANUAL: 13 % (ref 19.6–45.3)
LYMPHOCYTES NFR BLD MANUAL: 7 % (ref 5–12)
MAGNESIUM SERPL-MCNC: 2 MG/DL (ref 1.6–2.4)
MCH RBC QN AUTO: 31.6 PG (ref 26.6–33)
MCHC RBC AUTO-ENTMCNC: 31.5 G/DL (ref 31.5–35.7)
MCV RBC AUTO: 100.3 FL (ref 79–97)
METAMYELOCYTES NFR BLD MANUAL: 8 % (ref 0–0)
MONOCYTES # BLD AUTO: 1.11 10*3/MM3 (ref 0.1–0.9)
MYELOCYTES NFR BLD MANUAL: 3 % (ref 0–0)
NEUTROPHILS # BLD AUTO: 10.27 10*3/MM3 (ref 1.7–7)
NEUTROPHILS NFR BLD MANUAL: 65 % (ref 42.7–76)
PATH REPORT.FINAL DX SPEC: NORMAL
PATH REPORT.GROSS SPEC: NORMAL
PHOSPHATE SERPL-MCNC: 1.9 MG/DL (ref 2.5–4.5)
PLAT MORPH BLD: NORMAL
PLATELET # BLD AUTO: 253 10*3/MM3 (ref 140–450)
PMV BLD AUTO: 10.9 FL (ref 6–12)
POTASSIUM SERPL-SCNC: 4.2 MMOL/L (ref 3.5–5.2)
RBC # BLD AUTO: 3.04 10*6/MM3 (ref 4.14–5.8)
SODIUM SERPL-SCNC: 138 MMOL/L (ref 136–145)
WBC # BLD AUTO: 15.8 10*3/MM3 (ref 3.4–10.8)
WBC MORPH BLD: NORMAL

## 2021-03-01 PROCEDURE — 85007 BL SMEAR W/DIFF WBC COUNT: CPT | Performed by: INTERNAL MEDICINE

## 2021-03-01 PROCEDURE — 99232 SBSQ HOSP IP/OBS MODERATE 35: CPT | Performed by: NURSE PRACTITIONER

## 2021-03-01 PROCEDURE — 99232 SBSQ HOSP IP/OBS MODERATE 35: CPT | Performed by: INTERNAL MEDICINE

## 2021-03-01 PROCEDURE — 97530 THERAPEUTIC ACTIVITIES: CPT

## 2021-03-01 PROCEDURE — 85025 COMPLETE CBC W/AUTO DIFF WBC: CPT | Performed by: INTERNAL MEDICINE

## 2021-03-01 PROCEDURE — 82962 GLUCOSE BLOOD TEST: CPT

## 2021-03-01 PROCEDURE — 80069 RENAL FUNCTION PANEL: CPT | Performed by: INTERNAL MEDICINE

## 2021-03-01 PROCEDURE — 25010000002 ENOXAPARIN PER 10 MG: Performed by: INTERNAL MEDICINE

## 2021-03-01 PROCEDURE — 83735 ASSAY OF MAGNESIUM: CPT | Performed by: INTERNAL MEDICINE

## 2021-03-01 RX ORDER — METOPROLOL SUCCINATE 25 MG/1
25 TABLET, EXTENDED RELEASE ORAL
Status: DISCONTINUED | OUTPATIENT
Start: 2021-03-02 | End: 2021-03-03 | Stop reason: HOSPADM

## 2021-03-01 RX ADMIN — SUCRALFATE 1 G: 1 TABLET ORAL at 06:41

## 2021-03-01 RX ADMIN — SUCRALFATE 1 G: 1 TABLET ORAL at 16:39

## 2021-03-01 RX ADMIN — LEVETIRACETAM 500 MG: 500 TABLET, FILM COATED ORAL at 20:10

## 2021-03-01 RX ADMIN — Medication 1 TABLET: at 09:00

## 2021-03-01 RX ADMIN — LEVETIRACETAM 500 MG: 500 TABLET, FILM COATED ORAL at 09:00

## 2021-03-01 RX ADMIN — PANTOPRAZOLE SODIUM 40 MG: 40 TABLET, DELAYED RELEASE ORAL at 16:39

## 2021-03-01 RX ADMIN — MUPIROCIN 1 APPLICATION: 20 OINTMENT TOPICAL at 20:10

## 2021-03-01 RX ADMIN — SUCRALFATE 1 G: 1 TABLET ORAL at 20:10

## 2021-03-01 RX ADMIN — ENOXAPARIN SODIUM 40 MG: 40 INJECTION SUBCUTANEOUS at 09:00

## 2021-03-01 RX ADMIN — ASPIRIN 81 MG: 81 TABLET, COATED ORAL at 09:00

## 2021-03-01 RX ADMIN — PANTOPRAZOLE SODIUM 40 MG: 40 TABLET, DELAYED RELEASE ORAL at 06:41

## 2021-03-01 RX ADMIN — SUCRALFATE 1 G: 1 TABLET ORAL at 10:52

## 2021-03-01 RX ADMIN — POTASSIUM & SODIUM PHOSPHATES POWDER PACK 280-160-250 MG 2 PACKET: 280-160-250 PACK at 14:00

## 2021-03-01 RX ADMIN — SODIUM PHOSPHATE, MONOBASIC, MONOHYDRATE 10 MMOL: 276; 142 INJECTION, SOLUTION INTRAVENOUS at 18:36

## 2021-03-01 NOTE — PLAN OF CARE
Goal Outcome Evaluation:  Plan of Care Reviewed With: (P) patient  Progress: (P) improving  Outcome Summary: (P) Pt significantly improved gait distance today demonstrating improved strength and endurance. He noted feeling better today overall and wanting to walk mulitple times per day - PT recommended her ask nursing later to walk. Pt able to perform bed mobility and STS transfers with SBA showing improved independence with daily tasks. He will continue to benefit from skilled PT to progress gait and endurance to improve overall functional mobility.

## 2021-03-01 NOTE — PROGRESS NOTES
Chart reviewed. Therapy notes reviewed.  Yesterday, patient able to ambulate to 45 ft CTG with rolling walker.   Today, patient significantly improved gait distance today demonstrating improved strength and endurance.  Gait 120 feet shuffling, flexed posture min assist. Pt able to perform bed mobility and STS transfers with SBA showing improved independence with daily tasks.     Will follow progress and review with rehab admissions to assess most appropriate level of rehab services after acute care hospital stay.

## 2021-03-01 NOTE — PROGRESS NOTES
[facility]  Inpatient Progress Note    Reason for Follow Up: Coffee ground emesis    Subjective     Interval History:   No further nausea or vomiting.  No blood in the stool.  He reports chest pain that occurred last night after he used his CPAP at normal settings.  No pain or difficulty swallowing.  He denies heartburn    Current Facility-Administered Medications:   •  [DISCONTINUED] acetaminophen (TYLENOL) tablet 650 mg, 650 mg, Oral, Q4H PRN **OR** [DISCONTINUED] acetaminophen (TYLENOL) 160 MG/5ML solution 650 mg, 650 mg, Oral, Q4H PRN **OR** acetaminophen (TYLENOL) suppository 650 mg, 650 mg, Rectal, Q4H PRN, Jono Laboy MD  •  acetaminophen (TYLENOL) tablet 500 mg, 500 mg, Oral, Q4H PRN, Laila Echevarria MD, 500 mg at 02/28/21 0833  •  ALPRAZolam (XANAX) tablet 0.25 mg, 0.25 mg, Oral, Q8H PRN, Jono Laboy MD  •  aspirin EC tablet 81 mg, 81 mg, Oral, Daily, Jono Laboy MD, 81 mg at 03/01/21 0900  •  bisacodyl (DULCOLAX) EC tablet 10 mg, 10 mg, Oral, Daily PRN, Jono Laboy MD  •  bisacodyl (DULCOLAX) suppository 10 mg, 10 mg, Rectal, Daily PRN, Jono Laboy MD  •  chlorhexidine (PERIDEX) 0.12 % solution 15 mL, 15 mL, Mouth/Throat, Q12H, Jono Laboy MD, 15 mL at 02/26/21 1349  •  dextrose (D50W) 25 g/ 50mL Intravenous Solution 25 g, 25 g, Intravenous, Q15 Min PRN, Jono Laboy MD  •  dextrose (GLUTOSE) oral gel 15 g, 15 g, Oral, Q15 Min PRN, Jono Laboy MD  •  enoxaparin (LOVENOX) syringe 40 mg, 40 mg, Subcutaneous, Daily, Jono Laboy MD, 40 mg at 03/01/21 0900  •  glucagon (human recombinant) (GLUCAGEN DIAGNOSTIC) injection 1 mg, 1 mg, Subcutaneous, Q15 Min PRN, Jono Laboy MD  •  HYDROcodone-acetaminophen (NORCO) 5-325 MG per tablet 1 tablet, 1 tablet, Oral, Q4H PRN, Jono Laboy MD, 1 tablet at 02/27/21 1490  •  levETIRAcetam (KEPPRA) tablet 500 mg, 500 mg, Oral, BID,  Jono Laboy MD, 500 mg at 03/01/21 0900  •  LORazepam (ATIVAN) injection 1 mg, 1 mg, Intravenous, Q2H PRN, Jono Laboy MD  •  magnesium hydroxide (MILK OF MAGNESIA) suspension 2400 mg/10mL 10 mL, 10 mL, Oral, Daily PRN, Jono Laboy MD  •  morphine injection 1 mg, 1 mg, Intravenous, Q4H PRN, 1 mg at 02/26/21 2140 **AND** naloxone (NARCAN) injection 0.4 mg, 0.4 mg, Intravenous, Q5 Min PRN, Jono Laboy MD  •  multivitamin with minerals 1 tablet, 1 tablet, Oral, Daily, Jono Laboy MD, 1 tablet at 03/01/21 0900  •  mupirocin (BACTROBAN) 2 % nasal ointment, , Each Nare, BID, Jono Laboy MD, 1 application at 02/28/21 0833  •  ondansetron (ZOFRAN) injection 4 mg, 4 mg, Intravenous, Q6H PRN, Jono Laboy MD, 4 mg at 02/24/21 0626  •  pantoprazole (PROTONIX) EC tablet 40 mg, 40 mg, Oral, BID AC, Jaspreet Rosa MD, 40 mg at 03/01/21 0641  •  polyethylene glycol (MIRALAX) packet 17 g, 17 g, Oral, Daily, Jono Laboy MD, 17 g at 02/28/21 0833  •  potassium & sodium phosphates (PHOS-NAK) 280-160-250 MG packet - for Phosphorus less than 1.25 mg/dL, 2 packet, Oral, Q6H PRN **OR** potassium & sodium phosphates (PHOS-NAK) 280-160-250 MG packet - for Phosphorus 1.25 - 2.5 mg/dL, 2 packet, Oral, Q6H PRN, Maranda Gonsalez, APRN  •  potassium chloride (K-DUR,KLOR-CON) ER tablet 40 mEq, 40 mEq, Oral, PRN, Nik Evans MD, 40 mEq at 02/28/21 1210  •  potassium chloride (KLOR-CON) packet 40 mEq, 40 mEq, Oral, PRN, Nik Evans MD  •  sennosides-docusate (PERICOLACE) 8.6-50 MG per tablet 2 tablet, 2 tablet, Oral, Nightly, Jono Laboy MD, 2 tablet at 02/27/21 0000  •  sodium chloride 0.9 % flush 10 mL, 10 mL, Intravenous, PRN, Jono Laboy MD, 10 mL at 02/25/21 0904  •  sodium chloride 0.9 % flush 30 mL, 30 mL, Intravenous, Once PRN, Jono Laboy MD  •  sodium chloride 0.9 % infusion, 30 mL/hr,  Intravenous, Continuous PRN, Jono Laboy MD, Last Rate: 0 mL/hr at 02/23/21 0742, Restarted at 02/26/21 1240  •  sucralfate (CARAFATE) tablet 1 g, 1 g, Oral, 4x Daily AC & at Bedtime, Jono Laboy MD, 1 g at 03/01/21 0641  •  temazepam (RESTORIL) capsule 15 mg, 15 mg, Oral, Nightly PRN, Jono Laboy MD  Review of Systems:    Negative for nausea or vomiting, negative for fevers or chills    Objective     Vital Signs  Temp:  [97.2 °F (36.2 °C)-97.8 °F (36.6 °C)] 97.8 °F (36.6 °C)  Heart Rate:  [64-97] 73  Resp:  [16-18] 16  BP: ()/(53-99) 122/53  Body mass index is 29.18 kg/m².    Intake/Output Summary (Last 24 hours) at 3/1/2021 0953  Last data filed at 3/1/2021 0935  Gross per 24 hour   Intake 360 ml   Output 650 ml   Net -290 ml     I/O this shift:  In: 360 [P.O.:360]  Out: 200 [Urine:200]     Physical Exam:   General: patient awake, alert and cooperative   Eyes: Normal lids and lashes, no scleral icterus   Neck: supple, normal ROM   Skin: warm and dry, not jaundiced   Chest: Sternotomy incision with ecchymosis   Pulm:   regular and unlabored   Abdomen: soft, nontender, nondistended    Psychiatric: Normal mood and behavior; memory intact     Results Review:     I reviewed the patient's new clinical results.    Results from last 7 days   Lab Units 02/28/21  0523 02/27/21  0150 02/26/21  1229   WBC 10*3/mm3 11.84* 11.36* 12.31*   HEMOGLOBIN g/dL 9.2* 9.1* 9.1*   HEMATOCRIT % 28.2* 27.8* 28.3*   PLATELETS 10*3/mm3 185 161 146     Results from last 7 days   Lab Units 03/01/21  0519 02/28/21  1649 02/28/21  0522 02/27/21  0150  02/24/21  0406   SODIUM mmol/L 138  --  139 144   < > 145  143   POTASSIUM mmol/L 4.2 4.3 3.2* 3.9   < > 3.9  3.9   CHLORIDE mmol/L 106  --  106 111*   < > 107  105   CO2 mmol/L 23.4  --  24.4 24.4   < > 25.4  25.3   BUN mg/dL 25*  --  31* 36*   < > 37*  34*   CREATININE mg/dL 1.38*  --  1.47* 1.77*   < > 3.05*  2.91*   CALCIUM mg/dL 8.0*  --  7.4* 7.8*    < > 7.9*  7.9*   BILIRUBIN mg/dL  --   --  0.9  --   --  0.4   ALK PHOS U/L  --   --  83  --   --  52   ALT (SGPT) U/L  --   --  7  --   --  7   AST (SGOT) U/L  --   --  20  --   --  53*   GLUCOSE mg/dL 112*  --  101* 93   < > 158*  158*    < > = values in this interval not displayed.     Results from last 7 days   Lab Units 02/23/21  0304 02/22/21  1248   INR  1.37* 1.61*     No results found for: LIPASE    Radiology:  XR Chest 1 View   Final Result   No interval change       This report was finalized on 2/28/2021 3:28 PM by Dr. Gaurav Vuong M.D.          XR Chest 1 View   Final Result         Electronically signed by Nancy Ferrell MD on 02-28-21 at 0453      XR Chest 1 View   Final Result   Right chest tube removal. Minimal, 1 mm right apical   pneumothorax.       This report was finalized on 2/27/2021 2:59 PM by Dr. Silvano Kaur M.D.          XR Chest 1 View   Final Result   Chest tube removal. No pneumothorax.       This report was finalized on 2/26/2021 5:27 PM by Dr. Silvano Kaur M.D.          XR Chest 1 View   Final Result      XR Chest 1 View   Final Result      CT Head Without Contrast   Final Result   No acute intracranial findings       Radiation dose reduction techniques were utilized, including automated   exposure control and exposure modulation based on body size.       This report was finalized on 2/24/2021 2:57 AM by Dr. Cata Borges M.D.          XR Abdomen KUB   Final Result      XR Chest 1 View   Final Result      XR Chest 1 View   Final Result      XR Chest 1 View   Final Result      XR Chest 1 View   Final Result       1. Moderate to large right-sided pneumothorax, new when compared to   prior exam.       FINDINGS were called to patient's nurse at 5:45 AM.       This report was finalized on 2/23/2021 5:46 AM by Dr. Cata Borges M.D.          XR Chest 1 View   Final Result   Postsurgical changes.       This report was finalized on 2/22/2021 1:43 PM by  Dr. Silvano Kaur M.D.          XR Chest PA & Lateral   Final Result   No acute findings.       This report was finalized on 2/19/2021 10:46 PM by Dr. Cata Borges M.D.              Assessment/Plan     Patient Active Problem List   Diagnosis   • Acute blood loss anemia   • Fracture of fifth metacarpal bone of right hand   • Closed fracture dislocation of right shoulder   • Gastroesophageal reflux disease without esophagitis   • DNR (do not resuscitate)   • Bradycardia following surgery   • Frequent PVCs   • Proximal humerus fracture   • Osteoarthritis   • Severe obstructive sleep apnea   • Pure hypercholesterolemia   • Pulmonary hypertension (CMS/HCC)   • LARA (dyspnea on exertion)   • Localized edema   • Chest discomfort   • Elevated troponin   • Abnormal EKG   • Coronary artery disease involving native coronary artery of native heart with unstable angina pectoris (CMS/HCC)   • Focal motor seizure (CMS/HCC)   • Generalized tonic-clonic seizure (CMS/HCC)   • Post-ictal state (CMS/HCC)   • Coffee ground emesis     All problems are new to me today  Assessment:  1. coffee ground emesis due to esophagitis  2. Gastritis      Plan:  · Twice daily proton pump inhibitor, Carafate  · Hemoglobin has been stable-recheck today  · Follow-up on results of pathology from EGD  · Will need repeat EGD in 3 months to assess healing of esophagitis    I discussed the patients findings and my recommendations with patient.    Radhika Valadez MD

## 2021-03-01 NOTE — PROGRESS NOTES
LOS: 9 days    Patient Care Team:  Domenica Merida MD as PCP - General (Family Medicine)  Mitch Mendiola MD as Consulting Physician (Pulmonary Disease)    Chief Complaint:  No chief complaint on file.    Follow UP SETH  Subjective     Interval History:   No shortness of breath on room air; appetite is mediocre, but tolerating diet with no N/V; no difficulty voiding after Valadez catheter removed over weekend    Objective     Vital Signs  Temp:  [97.2 °F (36.2 °C)-98 °F (36.7 °C)] 98 °F (36.7 °C)  Heart Rate:  [64-88] 78  Resp:  [16-18] 16  BP: (110-136)/(53-99) 129/55    Flowsheet Rows      First Filed Value   Admission Height  --   Admission Weight  88.1 kg (194 lb 3.6 oz) Documented at 02/23/2021 0550          I/O this shift:  In: 840 [P.O.:840]  Out: 600 [Urine:600]  I/O last 3 completed shifts:  In: 360 [P.O.:360]  Out: 1420 [Urine:950; Chest Tube:470]    Intake/Output Summary (Last 24 hours) at 3/1/2021 1659  Last data filed at 3/1/2021 1200  Gross per 24 hour   Intake 840 ml   Output 600 ml   Net 240 ml       Physical Exam:  General Appearance: alert, comfortable, flat affect  Neck  no JVD  Lungs decreased breath sounds in bases; no wheezes; not labored on room air  CV RRR no m/g  abd soft NT/ND, BS +  vasc trace edema     Results Review:    Results from last 7 days   Lab Units 03/01/21  0519 02/28/21  1649 02/28/21  0522 02/27/21  0150  02/24/21  0406   SODIUM mmol/L 138  --  139 144   < > 145  143   POTASSIUM mmol/L 4.2 4.3 3.2* 3.9   < > 3.9  3.9   CHLORIDE mmol/L 106  --  106 111*   < > 107  105   CO2 mmol/L 23.4  --  24.4 24.4   < > 25.4  25.3   BUN mg/dL 25*  --  31* 36*   < > 37*  34*   CREATININE mg/dL 1.38*  --  1.47* 1.77*   < > 3.05*  2.91*   CALCIUM mg/dL 8.0*  --  7.4* 7.8*   < > 7.9*  7.9*   BILIRUBIN mg/dL  --   --  0.9  --   --  0.4   ALK PHOS U/L  --   --  83  --   --  52   ALT (SGPT) U/L  --   --  7  --   --  7   AST (SGOT) U/L  --   --  20  --   --  53*   GLUCOSE mg/dL 112*  --  101* 93    < > 158*  158*    < > = values in this interval not displayed.       Estimated Creatinine Clearance: 45.9 mL/min (A) (by C-G formula based on SCr of 1.38 mg/dL (H)).    Results from last 7 days   Lab Units 03/01/21  0519 02/28/21  0522 02/27/21  0150  02/25/21  1500   MAGNESIUM mg/dL 2.0 1.9  --   --  2.4   PHOSPHORUS mg/dL 1.9* 2.2* 2.0*   < >  --     < > = values in this interval not displayed.       Results from last 7 days   Lab Units 02/25/21  0408   URIC ACID mg/dL 8.9*       Results from last 7 days   Lab Units 03/01/21  1210 02/28/21  0523 02/27/21  0150 02/26/21  1229 02/26/21  0423   WBC 10*3/mm3 15.80* 11.84* 11.36* 12.31* 11.44*   HEMOGLOBIN g/dL 9.6* 9.2* 9.1* 9.1* 8.6*   PLATELETS 10*3/mm3 253 185 161 146 137*       Results from last 7 days   Lab Units 02/23/21  0304   INR  1.37*         Imaging Results (Last 24 Hours)     ** No results found for the last 24 hours. **        aspirin, 81 mg, Oral, Daily  chlorhexidine, 15 mL, Mouth/Throat, Q12H  enoxaparin, 40 mg, Subcutaneous, Daily  levETIRAcetam, 500 mg, Oral, BID  multivitamin with minerals, 1 tablet, Oral, Daily  mupirocin, , Each Nare, BID  pantoprazole, 40 mg, Oral, BID AC  polyethylene glycol, 17 g, Oral, Daily  senna-docusate sodium, 2 tablet, Oral, Nightly  sucralfate, 1 g, Oral, 4x Daily AC & at Bedtime      sodium chloride, 30 mL/hr, Last Rate: 0 mL/hr (02/23/21 0742)        Medication Review:   Current Facility-Administered Medications   Medication Dose Route Frequency Provider Last Rate Last Admin   • acetaminophen (TYLENOL) suppository 650 mg  650 mg Rectal Q4H PRN Jono Laboy MD       • acetaminophen (TYLENOL) tablet 500 mg  500 mg Oral Q4H PRN Laila Echevarria MD   500 mg at 02/28/21 0833   • ALPRAZolam (XANAX) tablet 0.25 mg  0.25 mg Oral Q8H PRN Jono Laboy MD       • aspirin EC tablet 81 mg  81 mg Oral Daily Jono Laboy MD   81 mg at 03/01/21 0900   • bisacodyl (DULCOLAX) EC tablet 10 mg  10 mg  Oral Daily PRN Jono Laboy MD       • bisacodyl (DULCOLAX) suppository 10 mg  10 mg Rectal Daily PRN Jono Laboy MD       • chlorhexidine (PERIDEX) 0.12 % solution 15 mL  15 mL Mouth/Throat Q12H Jono Laboy MD   15 mL at 02/26/21 1349   • enoxaparin (LOVENOX) syringe 40 mg  40 mg Subcutaneous Daily Jono Laboy MD   40 mg at 03/01/21 0900   • HYDROcodone-acetaminophen (NORCO) 5-325 MG per tablet 1 tablet  1 tablet Oral Q4H PRN Jono Laboy MD   1 tablet at 02/27/21 2320   • levETIRAcetam (KEPPRA) tablet 500 mg  500 mg Oral BID Jono Laboy MD   500 mg at 03/01/21 0900   • LORazepam (ATIVAN) injection 1 mg  1 mg Intravenous Q2H PRN Jono Laboy MD       • magnesium hydroxide (MILK OF MAGNESIA) suspension 2400 mg/10mL 10 mL  10 mL Oral Daily PRN Jono Laboy MD       • multivitamin with minerals 1 tablet  1 tablet Oral Daily Jono Laboy MD   1 tablet at 03/01/21 0900   • mupirocin (BACTROBAN) 2 % nasal ointment   Each Nare BID Jono Laboy MD   1 application at 02/28/21 0833   • naloxone (NARCAN) injection 0.4 mg  0.4 mg Intravenous Q5 Min PRN Jono Laboy MD       • ondansetron (ZOFRAN) injection 4 mg  4 mg Intravenous Q6H PRN Jono Laboy MD   4 mg at 02/24/21 0626   • pantoprazole (PROTONIX) EC tablet 40 mg  40 mg Oral BID Jaspreet Chavez MD   40 mg at 03/01/21 1639   • polyethylene glycol (MIRALAX) packet 17 g  17 g Oral Daily Jono Laboy MD   17 g at 02/28/21 0833   • potassium & sodium phosphates (PHOS-NAK) 280-160-250 MG packet - for Phosphorus less than 1.25 mg/dL  2 packet Oral Q6H PRN Maranda Gonsalez APRN        Or   • potassium & sodium phosphates (PHOS-NAK) 280-160-250 MG packet - for Phosphorus 1.25 - 2.5 mg/dL  2 packet Oral Q6H PRN Maranda Gonsalez APRN       • potassium chloride (K-DUR,KLOR-CON) ER tablet 40 mEq  40 mEq Oral PRN Nik Evans MD   40  mEq at 02/28/21 1210   • potassium chloride (KLOR-CON) packet 40 mEq  40 mEq Oral PRN Nik Evans MD       • sennosides-docusate (PERICOLACE) 8.6-50 MG per tablet 2 tablet  2 tablet Oral Nightly Jono Laboy MD   2 tablet at 02/27/21 0000   • sodium chloride 0.9 % flush 10 mL  10 mL Intravenous PRN Jono Laboy MD   10 mL at 02/25/21 0904   • sodium chloride 0.9 % flush 30 mL  30 mL Intravenous Once PRN Jono Laboy MD       • sodium chloride 0.9 % infusion  30 mL/hr Intravenous Continuous PRN Jono Laboy MD 0 mL/hr at 02/23/21 0742 Restarted at 02/26/21 1240   • sucralfate (CARAFATE) tablet 1 g  1 g Oral 4x Daily AC & at Bedtime Jono Laboy MD   1 g at 03/01/21 1639   • temazepam (RESTORIL) capsule 15 mg  15 mg Oral Nightly PRN Jono Laboy MD           Assessment/Plan   1.  SETH, nonoliguric, improving, multifactorial ATN post-CABG 2/22; SCR stable.  Electrolytes compensated other than low phosphorus, likely nutritional.  Looks euvolemic   2.  Non-STEMI:  s/p cath 2/19 and CABG with MV repair on 2/22; EF 62%, RVSP > 55   3.  Hypotension, resolved   4.  Right-sided pneumothorax: chest tube removed  5.  Hematemesis; s/p EGD 2/26  6.  ABLA: Hemoglobin  stable  7.  Seizure 2/23; seen by neurology; on Kera  8.  PCM, severe    Plan  1.  Sodium phosphate IV  2.  No need for diuretic today  3.  Discussed with daughter at bedside        Gurjit Rodríguez MD  03/01/21  16:59 EST

## 2021-03-01 NOTE — PROGRESS NOTES
LOS: 9 days   Patient Care Team:  Domenica Merida MD as PCP - General (Family Medicine)  Mitch Mendiola MD as Consulting Physician (Pulmonary Disease)    Chief Complaint: post op    Subjective      Vital Signs  Temp:  [97.2 °F (36.2 °C)-97.8 °F (36.6 °C)] 97.6 °F (36.4 °C)  Heart Rate:  [68-97] 82  Resp:  [17-18] 18  BP: ()/(56-99) 123/66  Body mass index is 29.18 kg/m².    Intake/Output Summary (Last 24 hours) at 3/1/2021 0736  Last data filed at 2/28/2021 1600  Gross per 24 hour   Intake 360 ml   Output 540 ml   Net -180 ml     No intake/output data recorded.    Chest tube drainage last 8 hours        02/26/21  0500 02/27/21  0358 03/01/21  0629   Weight: 83.6 kg (184 lb 4.8 oz) 83.6 kg (184 lb 6.4 oz) 84.5 kg (186 lb 4.8 oz)         Objective    Results Review:        WBC WBC   Date Value Ref Range Status   02/28/2021 11.84 (H) 3.40 - 10.80 10*3/mm3 Final   02/27/2021 11.36 (H) 3.40 - 10.80 10*3/mm3 Final   02/26/2021 12.31 (H) 3.40 - 10.80 10*3/mm3 Final      HGB Hemoglobin   Date Value Ref Range Status   02/28/2021 9.2 (L) 13.0 - 17.7 g/dL Final   02/27/2021 9.1 (L) 13.0 - 17.7 g/dL Final   02/26/2021 9.1 (L) 13.0 - 17.7 g/dL Final      HCT Hematocrit   Date Value Ref Range Status   02/28/2021 28.2 (L) 37.5 - 51.0 % Final   02/27/2021 27.8 (L) 37.5 - 51.0 % Final   02/26/2021 28.3 (L) 37.5 - 51.0 % Final      Platelets Platelets   Date Value Ref Range Status   02/28/2021 185 140 - 450 10*3/mm3 Final   02/27/2021 161 140 - 450 10*3/mm3 Final   02/26/2021 146 140 - 450 10*3/mm3 Final        PT/INR:  No results found for: PROTIME/No results found for: INR    Sodium Sodium   Date Value Ref Range Status   03/01/2021 138 136 - 145 mmol/L Final   02/28/2021 139 136 - 145 mmol/L Final   02/27/2021 144 136 - 145 mmol/L Final      Potassium Potassium   Date Value Ref Range Status   03/01/2021 4.2 3.5 - 5.2 mmol/L Final   02/28/2021 4.3 3.5 - 5.2 mmol/L Final     Comment:     Slight hemolysis detected by analyzer.  Results may be affected.   02/28/2021 3.2 (L) 3.5 - 5.2 mmol/L Final   02/27/2021 3.9 3.5 - 5.2 mmol/L Final      Chloride Chloride   Date Value Ref Range Status   03/01/2021 106 98 - 107 mmol/L Final   02/28/2021 106 98 - 107 mmol/L Final   02/27/2021 111 (H) 98 - 107 mmol/L Final      Bicarbonate CO2   Date Value Ref Range Status   03/01/2021 23.4 22.0 - 29.0 mmol/L Final   02/28/2021 24.4 22.0 - 29.0 mmol/L Final   02/27/2021 24.4 22.0 - 29.0 mmol/L Final      BUN BUN   Date Value Ref Range Status   03/01/2021 25 (H) 8 - 23 mg/dL Final   02/28/2021 31 (H) 8 - 23 mg/dL Final   02/27/2021 36 (H) 8 - 23 mg/dL Final      Creatinine Creatinine   Date Value Ref Range Status   03/01/2021 1.38 (H) 0.76 - 1.27 mg/dL Final   02/28/2021 1.47 (H) 0.76 - 1.27 mg/dL Final   02/27/2021 1.77 (H) 0.76 - 1.27 mg/dL Final      Calcium Calcium   Date Value Ref Range Status   03/01/2021 8.0 (L) 8.6 - 10.5 mg/dL Final   02/28/2021 7.4 (L) 8.6 - 10.5 mg/dL Final   02/27/2021 7.8 (L) 8.6 - 10.5 mg/dL Final      Magnesium Magnesium   Date Value Ref Range Status   03/01/2021 2.0 1.6 - 2.4 mg/dL Final   02/28/2021 1.9 1.6 - 2.4 mg/dL Final          aspirin, 81 mg, Oral, Daily  chlorhexidine, 15 mL, Mouth/Throat, Q12H  enoxaparin, 40 mg, Subcutaneous, Daily  levETIRAcetam, 500 mg, Oral, BID  multivitamin with minerals, 1 tablet, Oral, Daily  mupirocin, , Each Nare, BID  pantoprazole, 40 mg, Oral, BID AC  polyethylene glycol, 17 g, Oral, Daily  senna-docusate sodium, 2 tablet, Oral, Nightly  sucralfate, 1 g, Oral, 4x Daily AC & at Bedtime      DOPamine, 3 mcg/kg/min, Last Rate: Stopped (02/24/21 7202)  sodium chloride, 30 mL/hr, Last Rate: 0 mL/hr (02/23/21 1745)            Patient Active Problem List   Diagnosis Code   • Acute blood loss anemia D62   • Fracture of fifth metacarpal bone of right hand S62.306A   • Closed fracture dislocation of right shoulder S42.91XA   • Gastroesophageal reflux disease without esophagitis K21.9   • DNR (do  not resuscitate) Z66   • Bradycardia following surgery I97.89   • Frequent PVCs I49.3   • Proximal humerus fracture S42.209A   • Osteoarthritis M19.90   • Severe obstructive sleep apnea G47.33   • Pure hypercholesterolemia E78.00   • Pulmonary hypertension (CMS/[facility]) I27.20   • LARA (dyspnea on exertion) R06.00   • Localized edema R60.0   • Chest discomfort R07.89   • Elevated troponin R77.8   • Abnormal EKG R94.31   • Coronary artery disease involving native coronary artery of native heart with unstable angina pectoris (CMS/[facility]) I25.110   • Focal motor seizure (CMS/[facility]) G40.109   • Generalized tonic-clonic seizure (CMS/[facility]) G40.409   • Post-ictal state (CMS/[facility]) R56.9   • Coffee ground emesis K92.0       Assessment & Plan    -NSTEMI, severe CAD, mitral incompetence s/p urgent CABGx7 LIMA, MV repair, right EVH----- POD#7 ([place])  -ICM, EF 40%  -Cardiogenic shock, hypotension, complete heart block in immediate preop  -Junctional post op----atrial flutter now with controlled response  -Post op seizure---brief tonic clonic/postictal state on POD2, CT negative, EEG without seizure activity, continue Keppra until outpt follow up with neuro  -Pulmonary hypertension  -COLEMAN  -Post op anemia expected acute blood loss----acute GI loss, transfused 1 unit PRBC 2/25   -Right Pneumothorax- improved post chest tube placement  -SETH r/t hypotension, perfusion----renal following   -hemataemesis----on protonix BID protonix      Resting comfortably   HR 70s-- atrial fibrillation--no AC d/t hemataemesis  Wires removed yesterday  Only ambulating 45 feet-- will see if he would qualify for upstairs rehab  Routine care    NKECHI Mcgrath  03/01/21  07:36 EST

## 2021-03-01 NOTE — PROGRESS NOTES
Patient Name: Rodolfo Grover  :1945  76 y.o.      Patient Care Team:  Domenica Merida MD as PCP - General (Family Medicine)  Mitch Mendiola MD as Consulting Physician (Pulmonary Disease)    Chief Complaint: follow up NSTEMI, coronary artery disease, mitral valve insufficiency    Interval History: remains in AF. HR well controlled. Walked further and better today with physical therapy. No specific complaints. He is on room air.        Objective   Vital Signs  Temp:  [97.2 °F (36.2 °C)-98 °F (36.7 °C)] 98 °F (36.7 °C)  Heart Rate:  [64-88] 79  Resp:  [16-18] 16  BP: (110-136)/(53-99) 110/66    Intake/Output Summary (Last 24 hours) at 3/1/2021 1504  Last data filed at 3/1/2021 1200  Gross per 24 hour   Intake 840 ml   Output 850 ml   Net -10 ml     Flowsheet Rows      First Filed Value   Admission Height  --   Admission Weight  88.1 kg (194 lb 3.6 oz) Documented at 2021 0550          Physical Exam:   General Appearance:    Alert, cooperative, in no acute distress   Lungs:     Clear to auscultation.  Normal respiratory effort and rate.      Heart:    irregular rhythm and normal rate, normal S1 and S2, no murmurs, gallops or rubs.     Chest Wall:    Median sternotomy, incision open to air.no drainage. Well approximated.    Abdomen:     Soft, nontender, positive bowel sounds.     Extremities:   no cyanosis, clubbing or edema.  No marked joint deformities.  Adequate musculoskeletal strength.       Results Review:    Results from last 7 days   Lab Units 21  0519   SODIUM mmol/L 138   POTASSIUM mmol/L 4.2   CHLORIDE mmol/L 106   CO2 mmol/L 23.4   BUN mg/dL 25*   CREATININE mg/dL 1.38*   GLUCOSE mg/dL 112*   CALCIUM mg/dL 8.0*     Results from last 7 days   Lab Units 21  0408 21  1550   CK TOTAL U/L 1,028* 1,042*     Results from last 7 days   Lab Units 21  1210   WBC 10*3/mm3 15.80*   HEMOGLOBIN g/dL 9.6*   HEMATOCRIT % 30.5*   PLATELETS 10*3/mm3 253     Results from last 7 days   Lab  Units 02/23/21  0304   INR  1.37*     Results from last 7 days   Lab Units 03/01/21  0519   MAGNESIUM mg/dL 2.0                   Medication Review:   aspirin, 81 mg, Oral, Daily  chlorhexidine, 15 mL, Mouth/Throat, Q12H  enoxaparin, 40 mg, Subcutaneous, Daily  levETIRAcetam, 500 mg, Oral, BID  multivitamin with minerals, 1 tablet, Oral, Daily  mupirocin, , Each Nare, BID  pantoprazole, 40 mg, Oral, BID AC  polyethylene glycol, 17 g, Oral, Daily  senna-docusate sodium, 2 tablet, Oral, Nightly  sucralfate, 1 g, Oral, 4x Daily AC & at Bedtime         sodium chloride, 30 mL/hr, Last Rate: 0 mL/hr (02/23/21 0742)        Assessment/Plan   1. NSTEMI - multivessel coronary artery disease s/p CABG x 7. POD #7. MV repair.  2. ICM - EF 40%. GDMT as BP will tolerate. Add low dose beta blocker today.   3. Cardiogenic shock, hypotension and complete heart block in the immediate preop.   4. Postop seizure on postop day 2, EEG negative, was seen by neurology currently on Keppra with no recurrence  5. Expected postop anemia but also had acute GI blood loss (coffee ground emesis) and was transfused 1 unit of packed cells on 2/25--seen by GI and underwent upper GI yesterday--showed gastritis, esophagitis and a single polyp was removed. Will need repeat EGD in 3 months to assess healing of esophagitis. On PPI twice daily + carafate.   6. Postop bradycardia, now improved, he is in atrial fibrillation but his heart rate is well controlled on no AV letha blocking agents at this time.  Vital signs are stable.  We will continue to monitor for now.  Epicardial wires dc'd yesterday.    Add low dose beta blocker.   Remains in AF. AC recommendations per CTS.     NKECHI Eugene  [facility]  03/01/21  15:04 EST

## 2021-03-01 NOTE — PLAN OF CARE
Goal Outcome Evaluation:   VSS, pt remains in Aflutter. IS encouraged. Pt placed on O2 at bedtime could not tolerate his home CPAP. Pain well managed. No pain medication needed. Will continue to monitor

## 2021-03-01 NOTE — CONSULTS
Pulmonary Consultation     Patient Name: Rodolfo Grover  Age/Sex: 76 y.o. male  : 1945  MRN: [id number]    Date of Admission: 2021  Date of Encounter Visit: 21  Encounter Provider: Fredi Izquierdo MD  Referring Provider: Bry Nails MD  Place of Service: [facility]  Patient Care Team:  Domenica Merida MD as PCP - General (Family Medicine)  Mitch Mendiola MD as Consulting Physician (Pulmonary Disease)      Subjective:     Consulted for: [facility] management    Chief Complaint: Chest tightness with chest pain due to CPAP usage    History of Present Illness:  Rodolfo Grover is a 76 y.o. male On a regular basis at the [facility] sleep disorder center.With history of obstructive sleep apnea, usually follows with Dr. Mitch Mendiola.  Patient was having problem with high residual AHI and had an in lab titration and was adjusted down on his pressure to an auto CPAP 10-15  Patient did well on that 1 and has been using it regularly since 2020 when he had his last in lab titration study.  Patient was admitted with coronary artery disease had an emergency CABG x7 with mitral valve repair.  He is doing fine from the postop standpoint, he did have some transient complication with some atelectasis in cardiogenic shock and seizure but all of these seems to have resolved.  He did have a right pneumothorax that improved post chest tube placement and currently his chest tubes are all removed with no problem with recurrence.  Notes were reviewed please refer to thoracic surgery nurse notes for more details  I was called specifically because the patient is having some problem with his chest pain caused by the use of the CPAP machine and he feels that his pressure is too high causing the pain which can be a possibility, the CPAP has no functional smartcard and unable to get a download at this point.  Patient did not have any problem with the CPAP machine before the surgery  Patient denies any chest pain at rest except  when he is using the machine or within few minutes after he takes the mask off.  No productive cough or hemoptysis or pleurisy  No more seizure  No significant head trauma or seizure disorder prior to this hospitalization  No cataplexy  No parasomnias    Polysomnography Testing Results 6/3/2020:      Results:  Overnight diagnostic polysomnogram study from 10:45 PM to 5:02 AM.  Sleep efficiency 87.5% with 5.51 hours total sleep time.  Sleep distribution shows minimal slow-wave sleep at 1.2%.  Reduced REM sleep at 15.1%.  Oxygen saturation remained above 88%.  Arousal index 10.3.  PLM index 9.4 with PLM arousal index 0.5.  CPAP was increased from 5 to 17 cm water pressure.  Bilevel also tried at 18/14.  Maximum sleep recorded at 11 cm water pressure with AHI less than 5.  Patient slept supine for the entire night.     Impression:   Patient's current auto CPAP setting is 15 to 17 cm.  Per this report it looks like higher pressures are not beneficial for this patient.  I will therefore try this patient on auto CPAP setting of 10 to 15 cm.  His home device will be adjusted to the same.  If persistent elevated AHI he will need consideration of a BiPAP device.     No results found for: FEV1, FVC, MRK0RTM, TLC, DLCO    Review of Systems:   Review of Systems on initial presentation was as follows  Constitution: Positive for malaise/fatigue. Negative for chills, fever, weight gain and weight loss.   HENT: Negative for ear pain, hearing loss, nosebleeds and sore throat.    Eyes: Negative for blurred vision, double vision, redness, vision loss in left eye, vision loss in right eye and visual disturbance.   Cardiovascular:        SEE HPI   Respiratory: Positive for shortness of breath. Negative for cough, snoring and wheezing.    Endocrine: Negative for cold intolerance and heat intolerance.   Skin: Negative for itching, rash and suspicious lesions.   Musculoskeletal: Positive for joint pain. Negative for joint swelling and  myalgias.   Gastrointestinal: Negative for abdominal pain, diarrhea, hematemesis, melena, nausea and vomiting.   Genitourinary: Negative for dysuria, frequency and hematuria.   Neurological: Negative for dizziness, headaches, numbness, paresthesias and seizures.   Psychiatric/Behavioral: Negative for altered mental status and depression. The patient is nervous/anxious.    Past Medical History:  Past Medical History:   Diagnosis Date   • GERD (gastroesophageal reflux disease)    • Iritis of right eye    • PVC (premature ventricular contraction)    • Rheumatic fever    • Severe obstructive sleep apnea    • Shoulder fracture, right        Past Surgical History:   Procedure Laterality Date   • CARDIAC CATHETERIZATION N/A 2/19/2021    Procedure: Coronary angiography;  Surgeon: Bry Nails MD;  Location: [facility] CATH INVASIVE LOCATION;  Service: Cardiovascular;  Laterality: N/A;   • CARDIAC CATHETERIZATION N/A 2/19/2021    Procedure: Left heart cath;  Surgeon: Bry Nails MD;  Location: [facility] CATH INVASIVE LOCATION;  Service: Cardiovascular;  Laterality: N/A;   • CARDIAC CATHETERIZATION N/A 2/19/2021    Procedure: Right Heart Cath;  Surgeon: Bry Nails MD;  Location: [facility] CATH INVASIVE LOCATION;  Service: Cardiovascular;  Laterality: N/A;   • CARDIAC CATHETERIZATION N/A 2/19/2021    Procedure: Left ventriculography;  Surgeon: Bry Nails MD;  Location: [facility] CATH INVASIVE LOCATION;  Service: Cardiovascular;  Laterality: N/A;   • CORONARY ARTERY BYPASS GRAFT N/A 2/22/2021    Procedure: BK, MIDLINE STERNOTOMY, CORONARY ARTERY BYPASS X 7 UTILIZING THE LEFT INTERNAL MAMMARY ARTERY AND THE RIGHT SAPHENOUS VEIN, MITRAL VALVE REPAIR, PRP;  Surgeon: Jr Shyam Echavarria MD;  Location: [facility] OR;  Service: Cardiothoracic;  Laterality: N/A;   • ENDOSCOPY N/A 8/16/2018    Erosive gastritis, diverticulosis   • ENDOSCOPY N/A 2/26/2021    Procedure: ESOPHAGOGASTRODUODENOSCOPY AT  BEDSIDE WITH HOT SNARE POLYPECTOMY AND CLIP PLACEMENT X1;  Surgeon: Jono Laboy MD;  Location: [facility] ENDOSCOPY;  Service: Gastroenterology;  Laterality: N/A;  PRE-  GI BLEED  POST- GASTRITIS, ESOPHAGITIS, POLYP   • HERNIA REPAIR     • SHOULDER CLOSED REDUCTION Right 3/16/2018    Procedure: RIGHT SHOULDER CLOSED REDUCTION;  Surgeon: Kj Garcia MD;  Location: [facility] OR;  Service: Orthopedics   • TONSILLECTOMY     • TOTAL SHOULDER ARTHROPLASTY W/ DISTAL CLAVICLE EXCISION Right 3/22/2018    Procedure: Reverse Total Shoulder Arthroplsty;  Surgeon: Kj Garcia MD;  Location: [facility] OR;  Service: Orthopedics   • TRANSESOPHAGEAL ECHOCARDIOGRAM (BK) N/A 2/22/2021    Procedure: TRANSESOPHAGEAL ECHOCARDIOGRAM;  Surgeon: Jr Shyam Echavarria MD;  Location: [facility] OR;  Service: Cardiothoracic;  Laterality: N/A;       Home Medications:   Facility-Administered Medications Prior to Admission   Medication Dose Route Frequency Provider Last Rate Last Admin   • nitroglycerin (NITROSTAT) SL tablet 0.4 mg  0.4 mg Sublingual Q5 Min PRN Radha Rodríguez DNP, APRN   0.4 mg at 02/19/21 1511   • sodium chloride 0.9 % flush 10 mL  10 mL Intravenous PRN Radha Rodríguez DNP, APRN         Medications Prior to Admission   Medication Sig Dispense Refill Last Dose   • acetaminophen (TYLENOL) 500 MG tablet Take 2 tablets by mouth 3 (Three) Times a Day. (Patient taking differently: Take 1,000 mg by mouth 3 (Three) Times a Day As Needed.)      • amLODIPine (NORVASC) 2.5 MG tablet Take 1 tablet by mouth Daily. 30 tablet 11    • metoprolol succinate XL (TOPROL-XL) 25 MG 24 hr tablet Take 1 tablet by mouth Daily. 30 tablet 0    • Multiple Vitamins-Minerals (EYE VITAMINS PO) Take 1 tablet by mouth Daily.      • Multiple Vitamins-Minerals (MULTIVITAMIN WITH MINERALS) tablet tablet Take 1 tablet by mouth Daily.      • triamcinolone (KENALOG) 0.1 % paste See Admin Instructions. Prn          Inpatient  Medications:  Scheduled Meds:aspirin, 81 mg, Oral, Daily  chlorhexidine, 15 mL, Mouth/Throat, Q12H  enoxaparin, 40 mg, Subcutaneous, Daily  levETIRAcetam, 500 mg, Oral, BID  multivitamin with minerals, 1 tablet, Oral, Daily  mupirocin, , Each Nare, BID  pantoprazole, 40 mg, Oral, BID AC  polyethylene glycol, 17 g, Oral, Daily  senna-docusate sodium, 2 tablet, Oral, Nightly  sucralfate, 1 g, Oral, 4x Daily AC & at Bedtime      Continuous Infusions:sodium chloride, 30 mL/hr, Last Rate: 0 mL/hr (21 0742)      PRN Meds:.•  [DISCONTINUED] acetaminophen **OR** [DISCONTINUED] acetaminophen **OR** acetaminophen  •  acetaminophen  •  ALPRAZolam  •  bisacodyl  •  bisacodyl  •  HYDROcodone-acetaminophen  •  LORazepam  •  magnesium hydroxide  •  [] Morphine **AND** naloxone  •  ondansetron  •  potassium & sodium phosphates **OR** potassium & sodium phosphates  •  potassium chloride  •  potassium chloride  •  sodium chloride  •  sodium chloride  •  sodium chloride  •  temazepam    Allergies:  No Known Allergies    Past Social History:  Social History     Socioeconomic History   • Marital status:      Spouse name: Not on file   • Number of children: Not on file   • Years of education: Not on file   • Highest education level: Not on file   Tobacco Use   • Smoking status: Never Smoker   • Smokeless tobacco: Never Used   Substance and Sexual Activity   • Alcohol use: Yes     Frequency: Monthly or less     Drinks per session: 1 or 2     Comment: occasional   • Drug use: No   • Sexual activity: Defer       Past Family History:  Family History   Problem Relation Age of Onset   • Malig Hyperthermia Neg Hx            Objective:   Temp:  [97.2 °F (36.2 °C)-98 °F (36.7 °C)] 98 °F (36.7 °C)  Heart Rate:  [64-88] 79  Resp:  [16-18] 16  BP: (110-136)/(53-99) 110/66  SpO2:  [95 %-98 %] 97 %  on  Flow (L/min):  [2] 2 Device (Oxygen Therapy): room air     Intake/Output Summary (Last 24 hours) at 3/1/2021 8494  Last data filed  at 3/1/2021 1200  Gross per 24 hour   Intake 840 ml   Output 850 ml   Net -10 ml     Body mass index is 29.18 kg/m².      02/26/21  0500 02/27/21  0358 03/01/21  0629   Weight: 83.6 kg (184 lb 4.8 oz) 83.6 kg (184 lb 6.4 oz) 84.5 kg (186 lb 4.8 oz)     Weight change:     Physical Exam:   Physical Exam   General:    No acute distress, alert and oriented x4, pleasant, on room air                   Head:    Normocephalic, atraumatic. External ears and nose are normal   Eyes:          Conjunctivae and sclerae normal, no icterus, PERRLA, no  discharge   Throat:   No oral lesions, no thrush, oral mucosa moist.  Mallampati 3 airway   Neck:   Supple, trachea midline. No JVD, no cervical or supraclavicular lymphadenopathy    Lungs:     Normal chest on inspection with clean and dry chest incision, CTAB, no wheezes. No rhonchi. No crackles. Respirations regular, even and unlabored.      Heart:    Regular rhythm and normal rate.  No murmurs, gallops, or rubs noted.   Abdomen:     Soft, nontender, nondistended, positive bowel sounds. No hepatospleenomegaly    Extremities:   No clubbing, cyanosis, or edema.     Pulses:   Pulses palpable and equal bilaterally.    Skin:   No bleeding or rash. No bumps, good turgor pressure    Neuro:   Nonfocal.  Moves all extremities well. Strength 5/5 and symmetrical, no sensory deficit    Psychiatric:   Normal mood and affect.     Lab Review:   Results from last 7 days   Lab Units 03/01/21  0519 02/28/21  1649 02/28/21  0522 02/27/21  0150 02/26/21  0423 02/25/21  1500 02/25/21  0408 02/24/21  1550 02/24/21  0406  02/23/21  0304   SODIUM mmol/L 138  --  139 144 144 146* 145 146* 145  143  --  145   POTASSIUM mmol/L 4.2 4.3 3.2* 3.9 3.6 3.5 3.6 3.8 3.9  3.9   < > 3.5   CHLORIDE mmol/L 106  --  106 111* 112* 113* 110* 108* 107  105  --  107   CO2 mmol/L 23.4  --  24.4 24.4 24.4 23.3 25.2 24.8 25.4  25.3  --  25.0   BUN mg/dL 25*  --  31* 36* 37* 36* 40* 39* 37*  34*  --  21   CREATININE mg/dL  1.38*  --  1.47* 1.77* 1.92* 2.34* 2.44* 3.11* 3.05*  2.91*  --  1.61*   GLUCOSE mg/dL 112*  --  101* 93 156* 164* 152* 148* 158*  158*  --  113*   CALCIUM mg/dL 8.0*  --  7.4* 7.8* 7.5* 7.7* 7.5* 7.8* 7.9*  7.9*  --  8.1*   AST (SGOT) U/L  --   --  20  --   --   --   --   --  53*  --   --    ALT (SGPT) U/L  --   --  7  --   --   --   --   --  7  --   --    ALBUMIN g/dL 2.90*  --  2.60* 2.90* 3.10*  --  3.50  --  3.80  --  4.50    < > = values in this interval not displayed.     Results from last 7 days   Lab Units 02/25/21  0408 02/24/21  1550   CK TOTAL U/L 1,028* 1,042*     Results from last 7 days   Lab Units 03/01/21  1210 02/28/21  0523 02/27/21  0150 02/26/21  1229 02/26/21  0423 02/25/21  2321 02/25/21  1500  02/22/21  2156   WBC 10*3/mm3 15.80* 11.84* 11.36* 12.31* 11.44* 12.29* 11.85*   < > 10.39   HEMOGLOBIN g/dL 9.6* 9.2* 9.1* 9.1* 8.6* 9.1* 9.1*   < > 8.0*   HEMATOCRIT % 30.5* 28.2* 27.8* 28.3* 26.9* 27.2* 27.2*   < > 24.7*   PLATELETS 10*3/mm3 253 185 161 146 137* 133* 129*   < > 137*   MCV fL 100.3* 96.6 96.9 98.6* 97.5* 95.1 94.8   < > 97.2*   MCH pg 31.6 31.5 31.7 31.7 31.2 31.8 31.7   < > 31.5   MCHC g/dL 31.5 32.6 32.7 32.2 32.0 33.5 33.5   < > 32.4   RDW % 13.7 13.6 13.5 13.9 13.7 13.8 13.9   < > 12.9   RDW-SD fl 49.1 47.8 48.3 50.0 49.0 47.1 47.4   < > 45.9   MPV fL 10.9 10.3 10.2 10.8 10.7 10.7 10.3   < > 9.4   NEUTROPHIL % %  --   --   --   --   --   --   --   --  88.7*   LYMPHOCYTE % %  --   --   --   --   --   --   --   --  4.7*   MONOCYTES % %  --   --   --   --   --   --   --   --  4.8*   EOSINOPHIL % %  --   --   --   --   --   --   --   --  0.0*   BASOPHIL % %  --   --   --   --   --   --   --   --  0.2   IMM GRAN % %  --   --   --   --   --   --   --   --  1.6*   NEUTROS ABS 10*3/mm3 10.27*  --   --   --   --   --   --   --  9.21*   LYMPHS ABS 10*3/mm3  --   --   --   --   --   --   --   --  0.49*   MONOS ABS 10*3/mm3  --   --   --   --   --   --   --   --  0.50   EOS ABS 10*3/mm3  0.63*  --   --   --   --   --   --   --  0.00   BASOS ABS 10*3/mm3  --   --   --   --   --   --   --   --  0.02   IMMATURE GRANS (ABS) 10*3/mm3  --   --   --   --   --   --   --   --  0.17*   NRBC /100 WBC  --   --   --   --   --   --   --   --  0.1    < > = values in this interval not displayed.     Results from last 7 days   Lab Units 02/23/21  0304   INR  1.37*     Results from last 7 days   Lab Units 03/01/21  0519 02/28/21  0522 02/25/21  1500 02/23/21  0304 02/22/21  1601   MAGNESIUM mg/dL 2.0 1.9 2.4 2.7* 2.3           Invalid input(s): LDLCALC          Results from last 7 days   Lab Units 02/23/21  2126   PH, ARTERIAL pH units 7.326*   PCO2, ARTERIAL mm Hg 34.0*   PO2 ART mm Hg 81.3   HCO3 ART mmol/L 17.8*                         Results from last 7 days   Lab Units 02/25/21  1615   COVID19  Not Detected     Results from last 7 days   Lab Units 02/25/21  0408 02/24/21  1148   SODIUM UR mmol/L  --  <20   CREATININE UR mg/dL  --  102.7   URIC ACID mg/dL 8.9*  --          Imaging:  Imaging Results (Most Recent)     Procedure Component Value Units Date/Time    XR Chest 1 View [[id number]] Collected: 02/28/21 1525     Updated: 02/28/21 1531    Narrative:      CHEST SINGLE VIEW     HISTORY: Chest tube removal. Follow-up exam     COMPARISON: AP chest 2/20/2021, 2/27/2021     FINDINGS: Right IJ sheath tip is in the SVC. Strandy wires are present.  Heart size is enlarged. There is left basilar atelectasis and small  pleural effusions are present. There is no evidence for pneumothorax.  Right reverse shoulder arthroplasty is present.       Impression:      No interval change     This report was finalized on 2/28/2021 3:28 PM by Dr. Gaurav Vuong M.D.       XR Chest 1 View [[id number]] Collected: 02/28/21 0454     Updated: 02/28/21 0454    Narrative:        Patient: WILLIAM RIVERA  Time Out: 04:53  Exam(s): FILM CXR 1 VIEW     EXAM:    XR Chest, 1 View    CLINICAL HISTORY:     Reason for exam: Postop.    TECHNIQUE:     Frontal view of the chest.    COMPARISON:    02 27 21 1428    FINDINGS:    Lungs:  Mild bilateral atelectasis.    Pleural space:  No significant abnormality.  No pneumothorax.    Heart:  Stable cardiomediastinal silhouette.    Mediastinum:  Stable postoperative mediastinum.    Bones joints:  No acute osseous abnormality.    Tubes, lines and devices:  Stable left chest tube tip projects over the   medial left lower lung.  Stable right IJ central venous catheter tip   projects over the superior vena cava.    IMPRESSION:         No significant interval change since prior exam.      Impression:          Electronically signed by Nancy Ferrell MD on 02-28-21 at 0453    XR Chest 1 View [[id number]] Collected: 02/27/21 1453     Updated: 02/27/21 1502    Narrative:      XR CHEST 1 VW-     HISTORY: Male who is 76 years-old,  chest tube removal     TECHNIQUE: Frontal view of the chest     COMPARISON: 02/26/2021     FINDINGS: Right chest tube has been removed. Right IJ sheath and left  chest tube remain. Sternotomy wires with cardiac valve marker noted. The  heart is enlarged. Aorta is tortuous. Pulmonary vasculature is  unremarkable. Minimal likely atelectasis at the bases. Minimal, 1 mm  right apical pneumothorax. No left pneumothorax. Mildly increased  subcutaneous emphysema seen along right chest wall. No acute osseous  process.       Impression:      Right chest tube removal. Minimal, 1 mm right apical  pneumothorax.     This report was finalized on 2/27/2021 2:59 PM by Dr. Silvano Kaur M.D.       XR Chest 1 View [[id number]] Collected: 02/26/21 1724     Updated: 02/26/21 1730    Narrative:      XR CHEST 1 VW-     HISTORY: Male who is 76 years-old,  chest tube removal     TECHNIQUE: Frontal view of the chest     COMPARISON: 02/26/2021     FINDINGS: Interval chest tube removal (1 chest tube remains on each  side). Right IJ sheath appear stable. The heart is enlarged. Pulmonary  vasculature is unremarkable. Minimal  likely atelectasis at the bases. No  large pleural effusions. No pneumothorax. No acute osseous process.       Impression:      Chest tube removal. No pneumothorax.     This report was finalized on 2/26/2021 5:27 PM by Dr. Silvano Kaur M.D.       XR Chest 1 View [[id number]] Collected: 02/26/21 0824     Updated: 02/26/21 1014    Narrative:      XR CHEST 1 VIEW-     CLINICAL: Postop day 6 CABG and mitral valve repair.     COMPARISON: 02/24/2021.     FINDINGS: Central venous catheter, bilateral chest tubes, mediastinal  tube and nasogastric tube in position. No pneumothorax. Median  sternotomy wires and vascular markers in position, previous valvular  repair as well. No edema or acute airspace disease has developed within  the interim. Stable mediastinum, there is cardiac enlargement. Stable  subcutaneous emphysema right chest wall.     CONCLUSION: No right-sided pneumothorax demonstrated at this time. Chest  otherwise similar to 02/24/2021.     This report was finalized on 2/26/2021 10:11 AM by Dr. Jim Murrell M.D.       XR Chest 1 View [[id number]] Collected: 02/24/21 0603     Updated: 02/24/21 0610    Narrative:      XR CHEST 1 VW-     HISTORY:  Postop open heart.     COMPARISON:  Chest radiograph 02/23/2021     FINDINGS:    A single portable view of the chest was obtained. There is a new enteric  tube extending below the field-of-view. There is a right IJ central  line. There are bibasilar chest tubes and mediastinal drains. Epicardial  pacing wires are present. The cardiac silhouette and mediastinal and  hilar contours are not significantly changed. There are postsurgical  changes from CABG. There is an annuloplasty ring. There is mild  bibasilar atelectasis. There are trace bilateral pleural effusions. A  small right apical pneumothorax is not significantly changed. Median  sternotomy wires are intact. There is a right shoulder total  arthroplasty.     This report was finalized on 2/24/2021 6:07 AM by  Dr. Gisel Acosta M.D.       CT Head Without Contrast [[id number]] Collected: 02/24/21 0255     Updated: 02/24/21 0300    Narrative:      CT HEAD WITHOUT CONTRAST     HISTORY: Seizure     COMPARISON: None available.     TECHNIQUE: Axial CT imaging was obtained through the brain. No IV  contrast was administered.     FINDINGS:  No acute intracranial hemorrhage is seen. There is diffuse atrophy.  There is periventricular and deep white matter microangiopathic change.  There is no midline shift or mass effect. The paranasal sinuses and  mastoid air cells appear clear.       Impression:      No acute intracranial findings     Radiation dose reduction techniques were utilized, including automated  exposure control and exposure modulation based on body size.     This report was finalized on 2/24/2021 2:57 AM by Dr. Cata Borges M.D.       XR Abdomen KUB [[id number]] Collected: 02/23/21 2231     Updated: 02/23/21 2235    Narrative:      SINGLE VIEW OF THE CHEST     HISTORY: Nasogastric tube placement     COMPARISON: None available.     FINDINGS:  Nasogastric tube extends into the stomach. Visualized bowel gas pattern  is unremarkable.     This report was finalized on 2/23/2021 10:32 PM by Dr. Cata Borges M.D.       XR Chest 1 View [[id number]] Collected: 02/23/21 1048     Updated: 02/23/21 1052    Narrative:      XR CHEST 1 VW-     Clinical: Right chest tube placement for pneumothorax     COMPARISON examination 0911 hours, current examination 1039 hours     FINDINGS: Newly placed right sided chest tube is in position.  Pneumothorax substantially diminished in size with small apical  residual. There is a band of discoid atelectasis at the right lung base.  The cardiomediastinal silhouette is stable. All other tubes and lines  remain stable. Unchanged appearance to the left lung.     This report was finalized on 2/23/2021 10:49 AM by Dr. Jim Murrell M.D.       XR Chest 1 View [[id number]] Collected: 02/23/21  0937     Updated: 02/23/21 0943    Narrative:      XR CHEST 1 VW-     Clinical: Pneumothorax follow-up     COMPARISON 2/23/2021 at 0747 hours, current examination 0 9109 hours     FINDINGS: Lower inspiratory effort on the current examination,  right-sided pneumothorax not significantly changed in size. The  cardiomediastinal silhouette is stable. There is or increasing  atelectasis demonstrated at the right base.     Central venous catheter, left chest and mediastinal tubes in position.  Median sternotomy wires and vascular markers in position.     CONCLUSION: Lower inspiratory effort on the current examination, the  right-sided pneumothorax size similar to the previous examination. There  is increasing atelectasis at the right lung base.     This report was finalized on 2/23/2021 9:40 AM by Dr. Jim Murrell M.D.       XR Chest 1 View [[id number]] Collected: 02/23/21 0802     Updated: 02/23/21 0808    Narrative:      XR CHEST 1 VW-     Clinical: Follow-up pneumothorax     COMPARISON 2/23/2021     FINDINGS: There has been enlargement of the right-sided pneumothorax.  30-40% at this time. Pickrell-Rena catheter removed in the interim. Chest  and mediastinal tubes in place. Minimal infiltrate or atelectasis  demonstrated at the left base along the chest tube. Stable  cardiomediastinal silhouette without shift. Trace left pleural effusion  may be present. The remainder is unremarkable.     CONCLUSION: Enlarging right pneumothorax.     This report was finalized on 2/23/2021 8:05 AM by Dr. Jim Murrell M.D.       XR Chest 1 View [[id number]] Collected: 02/23/21 0543     Updated: 02/23/21 0549    Narrative:      SINGLE VIEW OF THE CHEST     HISTORY: Postop open heart surgery     COMPARISON: 02/22/2021     FINDINGS:  Endotracheal tube has been removed. Remaining tubes and lines are  stable. There is cardiomegaly. There is vascular congestion. There is a  right-sided pneumothorax. This is moderate to large in size, and was  not  evident on prior exam. No definite pneumothorax is seen on the left.  There is bibasilar atelectasis. There is a small left pleural effusion.       Impression:         1. Moderate to large right-sided pneumothorax, new when compared to  prior exam.     FINDINGS were called to patient's nurse at 5:45 AM.     This report was finalized on 2/23/2021 5:46 AM by Dr. Cata Borges M.D.       XR Chest 1 View [[id number]] Collected: 02/22/21 1341     Updated: 02/22/21 1347    Narrative:      XR CHEST 1 VW-     HISTORY: Male who is 76 years-old,  postoperative evaluation     TECHNIQUE: Supine view of the chest     COMPARISON: 02/19/2021     FINDINGS: Endotracheal tube tip is 2.5 cm above the elmo. Right IJ  Howell-Rena catheter extends to the pulmonary arterial trunk. Left chest  tube, mediastinal drain, sternotomy wires, cardiac valve marker noted.  The heart size is borderline. Pulmonary vasculature is unremarkable.  Small likely atelectasis in both lungs. No pleural effusion, or  pneumothorax is seen, limited by supine positioning. No acute osseous  process.       Impression:      Postsurgical changes.     This report was finalized on 2/22/2021 1:43 PM by Dr. Silvano Kaur M.D.       XR Chest PA & Lateral [[id number]] Collected: 02/19/21 2243     Updated: 02/19/21 2249    Narrative:      PA AND LATERAL CHEST RADIOGRAPH     HISTORY: Preop heart surgery     COMPARISON: None available.     FINDINGS:  Heart size is within normal limits. Lungs appear clear. No pneumothorax,  pleural effusion, or acute infiltrate is seen. Mild compression  deformity is noted at T8, age indeterminate. Correlation with history  and point tenderness is suggested. Patient is also status post right  shoulder arthroplasty.       Impression:      No acute findings.     This report was finalized on 2/19/2021 10:46 PM by Dr. Cata Borges M.D.             I personally viewed and interpreted the patient's imaging studies.    Assessment:    Obstructive sleep apnea on auto CPAP 10-15  Coronary artery disease with mitral insufficiency status post emergency CABG with mitral valve repair  Acute kidney injury due to ATN  Postoperative seizure after open heart surgery, currently controlled  Hematemesis with acute blood loss anemia currently stable  Pneumothorax, managed with chest tube currently resolved  Cardiogenic shock, resolved    Plan:     Patient is doing pretty well postoperatively and he is doing well from the pulmonary standpoint.  He was having some residual elevated AHI with some central component and was doing better with the adjustment on his CPAP pressure down to a new range of 10-15.  Currently his chest pain is mainly from the chest expansion while breathing with the CPAP and that is a common issue with the CPAP and not really a complication.  But since it is interfering with his tolerance to the machine we will reduce the pressure and will put him on a fixed pressure of 10 cm of water which based on the initial sleep study as documented above, should be adequate to provide still enough control for the sleep apnea without jeopardizing his risk for further coronary artery disease.  Expect that to be a transient problem which should improve as he continues to heal from his sternotomy incision.  His smart card is not working and will ask the staff to take the CPAP machine upstairs to the sleep center for reprogramming.  Patient to get a new smart card from the [facility] for further follow-up with Dr. Mitch Mendiola  Further management of his renal disease and respiratory issues are to be deferred to the primary team with no active respiratory problems during the daytime as documented above  Discussed with the patient and with the daughter at the bedside, will follow up  If unable to reprogram the CPAP machine we will use a hospital CPAP at a fixed pressure of 10 cm H2O      Thank you for allowing me to participate in the care of Rodolfo Grover. Feel free  to contact me directly with any further questions or concerns.    Fredi Izquierdo MD  [facility]   03/01/21  13:59 EST    Dictated utilizing Dragon dictation

## 2021-03-01 NOTE — THERAPY TREATMENT NOTE
Patient Name: Rodolfo Grover  : 1945    MRN: [id number]                              Today's Date: 3/1/2021       Admit Date: 2021    Visit Dx:     ICD-10-CM ICD-9-CM   1. Coronary artery disease involving native coronary artery of native heart with unstable angina pectoris (CMS/HCC)  I25.110 414.01     411.1   2. Chest discomfort  R07.89 786.59   3. Elevated troponin  R77.8 790.6   4. Abnormal EKG  R94.31 794.31   5. Other chest pain  R07.89 786.59   6. Shortness of breath  R06.02 786.05   7. S/P MVR (mitral valve repair)  Z98.890 V45.89   8. S/P CABG (coronary artery bypass graft)  Z95.1 V45.81   9. Coffee ground emesis  K92.0 578.0     Patient Active Problem List   Diagnosis   • Acute blood loss anemia   • Fracture of fifth metacarpal bone of right hand   • Closed fracture dislocation of right shoulder   • Gastroesophageal reflux disease without esophagitis   • DNR (do not resuscitate)   • Bradycardia following surgery   • Frequent PVCs   • Proximal humerus fracture   • Osteoarthritis   • Severe obstructive sleep apnea   • Pure hypercholesterolemia   • Pulmonary hypertension (CMS/HCC)   • LARA (dyspnea on exertion)   • Localized edema   • Chest discomfort   • Elevated troponin   • Abnormal EKG   • Coronary artery disease involving native coronary artery of native heart with unstable angina pectoris (CMS/HCC)   • Focal motor seizure (CMS/HCC)   • Generalized tonic-clonic seizure (CMS/HCC)   • Post-ictal state (CMS/HCC)   • Coffee ground emesis     Past Medical History:   Diagnosis Date   • GERD (gastroesophageal reflux disease)    • Iritis of right eye    • PVC (premature ventricular contraction)    • Rheumatic fever    • Severe obstructive sleep apnea    • Shoulder fracture, right      Past Surgical History:   Procedure Laterality Date   • CARDIAC CATHETERIZATION N/A 2021    Procedure: Coronary angiography;  Surgeon: Bry Nails MD;  Location: [facility] INVASIVE LOCATION;  Service:  Cardiovascular;  Laterality: N/A;   • CARDIAC CATHETERIZATION N/A 2/19/2021    Procedure: Left heart cath;  Surgeon: Bry Nails MD;  Location: [facility] CATH INVASIVE LOCATION;  Service: Cardiovascular;  Laterality: N/A;   • CARDIAC CATHETERIZATION N/A 2/19/2021    Procedure: Right Heart Cath;  Surgeon: Bry Nails MD;  Location: [facility] CATH INVASIVE LOCATION;  Service: Cardiovascular;  Laterality: N/A;   • CARDIAC CATHETERIZATION N/A 2/19/2021    Procedure: Left ventriculography;  Surgeon: Bry Nails MD;  Location: [facility] CATH INVASIVE LOCATION;  Service: Cardiovascular;  Laterality: N/A;   • CORONARY ARTERY BYPASS GRAFT N/A 2/22/2021    Procedure: KB, MIDLINE STERNOTOMY, CORONARY ARTERY BYPASS X 7 UTILIZING THE LEFT INTERNAL MAMMARY ARTERY AND THE RIGHT SAPHENOUS VEIN, MITRAL VALVE REPAIR, PRP;  Surgeon: Jr Shyam Echavarria MD;  Location: [facility] MAIN OR;  Service: Cardiothoracic;  Laterality: N/A;   • ENDOSCOPY N/A 8/16/2018    Erosive gastritis, diverticulosis   • ENDOSCOPY N/A 2/26/2021    Procedure: ESOPHAGOGASTRODUODENOSCOPY AT BEDSIDE WITH HOT SNARE POLYPECTOMY AND CLIP PLACEMENT X1;  Surgeon: Jono Laboy MD;  Location: [facility] ENDOSCOPY;  Service: Gastroenterology;  Laterality: N/A;  PRE-  GI BLEED  POST- GASTRITIS, ESOPHAGITIS, POLYP   • HERNIA REPAIR     • SHOULDER CLOSED REDUCTION Right 3/16/2018    Procedure: RIGHT SHOULDER CLOSED REDUCTION;  Surgeon: Kj Garcia MD;  Location: [facility] OR;  Service: Orthopedics   • TONSILLECTOMY     • TOTAL SHOULDER ARTHROPLASTY W/ DISTAL CLAVICLE EXCISION Right 3/22/2018    Procedure: Reverse Total Shoulder Arthroplsty;  Surgeon: Kj Garcia MD;  Location: [facility] MAIN OR;  Service: Orthopedics   • TRANSESOPHAGEAL ECHOCARDIOGRAM (BK) N/A 2/22/2021    Procedure: TRANSESOPHAGEAL ECHOCARDIOGRAM;  Surgeon: Jr Shyam Echavarria MD;  Location: [facility] MAIN OR;  Service: Cardiothoracic;  Laterality: N/A;     General  Information     [facility] Name 03/01/21 1047          Physical Therapy Time and Intention    Document Type  therapy note (daily note)  (Pended)   -     Mode of Treatment  individual therapy;physical therapy  (Pended)   [facility] Name 03/01/21 1047          General Information    Existing Precautions/Restrictions  cardiac;fall;sternal  (Pended)   -[facility] Name 03/01/21 1047          Cognition    Orientation Status (Cognition)  oriented x 4  (Pended)   -[facility] Name 03/01/21 1047          Safety Issues, Functional Mobility    Impairments Affecting Function (Mobility)  balance;endurance/activity tolerance;shortness of breath;strength  (Pended)   -       User Key  (r) = Recorded By, (t) = Taken By, (c) = Cosigned By    Initials Name Provider Type     Azucena Sloan, PT Student PT Student        Mobility     [facility] Name 03/01/21 1047          Bed Mobility    Supine-Sit [place] (Bed Mobility)  verbal cues;nonverbal cues (demo/gesture);standby assist  (Pended)   -     Sit-Supine [place] (Bed Mobility)  not tested  (Pended)  Sitting in chair  -     Assistive Device (Bed Mobility)  head of bed elevated  (Pended)   -[facility] Name 03/01/21 1047          Sit-Stand Transfer    Sit-Stand [place] (Transfers)  1 person assist;standby assist  (Pended)   -[facility] Name 03/01/21 1047          Gait/Stairs (Locomotion)    [place] Level (Gait)  1 person assist;minimum assist (75% patient effort)  (Pended)  1 HHA  -     Distance in Feet (Gait)  120ft  (Pended)   -     Deviations/Abnormal Patterns (Gait)  colin decreased;gait speed decreased;stride length decreased;festinating/shuffling  (Pended)   -     Bilateral Gait Deviations  forward flexed posture  (Pended)   -     Comment (Gait/Stairs)  Gait distance increased overall but limited secondary to SOA/fatigue. Pt denied any dizziness with ambulation, but complained of some SOA throughout. However, O2sat was still 97% following activity.   (Pended)   -       User Key  (r) = Recorded By, (t) = Taken By, (c) = Cosigned By    Initials Name Provider Type     Azucena Sloan PT Student PT Student        Obj/Interventions     Row Name 03/01/21 1050          Motor Skills    Therapeutic Exercise  --  (Pended)  10 reps B cardiac rehab protocol, cardiac level 3  -       User Key  (r) = Recorded By, (t) = Taken By, (c) = Cosigned By    Initials Name Provider Type     Azucena Sloan PT Student PT Student        Goals/Plan    No documentation.       Clinical Impression     Row Name 03/01/21 1050          Pain    Additional Documentation  Pain Scale: FACES Pre/Post-Treatment (Group)  (Pended)   -     Row Name 03/01/21 1050          Pain Scale: FACES Pre/Post-Treatment    Pain: FACES Scale, Pretreatment  0-->no hurt  (Pended)   -     Posttreatment Pain Rating  0-->no hurt  (Pended)   -     Row Name 03/01/21 1050          Plan of Care Review    Plan of Care Reviewed With  patient  (Pended)   -     Progress  improving  (Pended)   -     Outcome Summary  Pt significantly improved gait distance today demonstrating improved strength and endurance. He noted feeling better today overall and wanting to walk mulitple times per day - PT recommended her ask nursing later to walk. Pt able to perform bed mobility and STS transfers with SBA showing improved independence with daily tasks. He will continue to benefit from skilled PT to progress gait and endurance to improve overall functional mobility.  (Pended)   -     Row Name 03/01/21 1050          Positioning and Restraints    Pre-Treatment Position  in bed  (Pended)   -     Post Treatment Position  chair  (Pended)   -     In Chair  reclined;call light within reach;encouraged to call for assist  (Pended)   -       User Key  (r) = Recorded By, (t) = Taken By, (c) = Cosigned By    Initials Name Provider Type     Azucena Sloan PT Student PT Student        Outcome Measures     Row Name 03/01/21 1055           How much help from another person do you currently need...    Turning from your back to your side while in flat bed without using bedrails?  4  (Pended)   -BH     Moving from lying on back to sitting on the side of a flat bed without bedrails?  4  (Pended)   -BH     Moving to and from a bed to a chair (including a wheelchair)?  4  (Pended)   -BH     Standing up from a chair using your arms (e.g., wheelchair, bedside chair)?  4  (Pended)   -BH     Climbing 3-5 steps with a railing?  2  (Pended)   -BH     To walk in hospital room?  4  (Pended)   -     AM-PAC 6 Clicks Score (PT)  22  (Pended)   -     Row Name 03/01/21 1054          Functional Assessment    Outcome Measure Options  AM-PAC 6 Clicks Basic Mobility (PT)  (Pended)   -       User Key  (r) = Recorded By, (t) = Taken By, (c) = Cosigned By    Initials Name Provider Type     Azucena Sloan, PT Student PT Student        Physical Therapy Education                 Title: PT OT SLP Therapies (Done)     Topic: Physical Therapy (Done)     Point: Mobility training (Done)     Learning Progress Summary           Patient Acceptance, E,TB,D, VU,NR by  at 3/1/2021 1055    Acceptance, E,TB,D, VU,NR by  at 2/26/2021 1332    Acceptance, E,TB,D, VU,NR by  at 2/25/2021 1155    Acceptance, E,TB,D, VU,NR by  at 2/23/2021 1118                   Point: Home exercise program (Done)     Learning Progress Summary           Patient Acceptance, E,TB,D, VU,NR by  at 3/1/2021 1055    Acceptance, E,TB,D, VU,NR by  at 2/26/2021 1332    Acceptance, E,TB,D, VU,NR by  at 2/25/2021 1155    Acceptance, E,TB,D, VU,NR by  at 2/23/2021 1118                   Point: Body mechanics (Done)     Learning Progress Summary           Patient Acceptance, E,TB,D, VU,NR by  at 3/1/2021 1055    Acceptance, E,TB,D, VU,NR by  at 2/26/2021 1332    Acceptance, E,TB,D, VU,NR by  at 2/25/2021 1155    Acceptance, E,TB,D, VU,NR by  at 2/23/2021 1118                   Point:  Precautions (Done)     Learning Progress Summary           Patient Acceptance, E,TB,D, VU,NR by  at 3/1/2021 1055    Acceptance, E,TB,D, VU,NR by  at 2/26/2021 1332    Acceptance, E,TB,D, VU,NR by  at 2/25/2021 1155    Acceptance, E,TB,D, VU,NR by  at 2/23/2021 1118                               User Key     Initials Effective Dates Name Provider Type Discipline     04/03/18 -  Yamel Wheat, PT Physical Therapist PT     02/01/21 -  Azucena Sloan PT Student PT Student PT              PT Recommendation and Plan     Plan of Care Reviewed With: (P) patient  Progress: (P) improving  Outcome Summary: (P) Pt significantly improved gait distance today demonstrating improved strength and endurance. He noted feeling better today overall and wanting to walk mulitple times per day - PT recommended her ask nursing later to walk. Pt able to perform bed mobility and STS transfers with SBA showing improved independence with daily tasks. He will continue to benefit from skilled PT to progress gait and endurance to improve overall functional mobility.     Time Calculation:   PT Charges     Row Name 03/01/21 1055             Time Calculation    Start Time  0933  (Pended)   -      Stop Time  0945  (Pended)   -      Time Calculation (min)  12 min  (Pended)   -      PT Received On  03/01/21  (Pended)   -      PT - Next Appointment  03/02/21  (Pended)   -         Time Calculation- PT    Total Timed Code Minutes- PT  12 minute(s)  (Pended)   -        User Key  (r) = Recorded By, (t) = Taken By, (c) = Cosigned By    Initials Name Provider Type     Azucena Sloan PT Student PT Student        Therapy Charges for Today     Code Description Service Date Service Provider Modifiers Qty    13503554991 HC PT THERAPEUTIC ACT EA 15 MIN 3/1/2021 Azucena Sloan, PT Student GP 1    25060196687 HC PT THER SUPP EA 15 MIN 3/1/2021 Azucena Sloan PT Student GP 1          PT G-Codes  Outcome Measure Options: (P) AM-PAC 6  Clicks Basic Mobility (PT)  AM-PAC 6 Clicks Score (PT): (P) 22    Azucena Sloan, PT Student  3/1/2021

## 2021-03-02 DIAGNOSIS — Z23 IMMUNIZATION DUE: ICD-10-CM

## 2021-03-02 LAB
ALBUMIN SERPL-MCNC: 2.7 G/DL (ref 3.5–5.2)
ANION GAP SERPL CALCULATED.3IONS-SCNC: 9.3 MMOL/L (ref 5–15)
BUN SERPL-MCNC: 22 MG/DL (ref 8–23)
BUN/CREAT SERPL: 18.2 (ref 7–25)
CALCIUM SPEC-SCNC: 7.7 MG/DL (ref 8.6–10.5)
CHLORIDE SERPL-SCNC: 106 MMOL/L (ref 98–107)
CO2 SERPL-SCNC: 21.7 MMOL/L (ref 22–29)
CREAT SERPL-MCNC: 1.21 MG/DL (ref 0.76–1.27)
GFR SERPL CREATININE-BSD FRML MDRD: 58 ML/MIN/1.73
GLUCOSE SERPL-MCNC: 107 MG/DL (ref 65–99)
HCT VFR BLD AUTO: 28.7 % (ref 37.5–51)
HGB BLD-MCNC: 9.1 G/DL (ref 13–17.7)
MAGNESIUM SERPL-MCNC: 1.8 MG/DL (ref 1.6–2.4)
PHOSPHATE SERPL-MCNC: 3.1 MG/DL (ref 2.5–4.5)
POTASSIUM SERPL-SCNC: 4 MMOL/L (ref 3.5–5.2)
SODIUM SERPL-SCNC: 137 MMOL/L (ref 136–145)

## 2021-03-02 PROCEDURE — 99232 SBSQ HOSP IP/OBS MODERATE 35: CPT | Performed by: INTERNAL MEDICINE

## 2021-03-02 PROCEDURE — 80069 RENAL FUNCTION PANEL: CPT | Performed by: INTERNAL MEDICINE

## 2021-03-02 PROCEDURE — 85018 HEMOGLOBIN: CPT | Performed by: INTERNAL MEDICINE

## 2021-03-02 PROCEDURE — 25010000002 ENOXAPARIN PER 10 MG: Performed by: INTERNAL MEDICINE

## 2021-03-02 PROCEDURE — 85014 HEMATOCRIT: CPT | Performed by: INTERNAL MEDICINE

## 2021-03-02 PROCEDURE — 99232 SBSQ HOSP IP/OBS MODERATE 35: CPT | Performed by: NURSE PRACTITIONER

## 2021-03-02 PROCEDURE — 83735 ASSAY OF MAGNESIUM: CPT | Performed by: INTERNAL MEDICINE

## 2021-03-02 PROCEDURE — 97530 THERAPEUTIC ACTIVITIES: CPT

## 2021-03-02 RX ORDER — LEVETIRACETAM 250 MG/1
250 TABLET ORAL 2 TIMES DAILY
Status: DISCONTINUED | OUTPATIENT
Start: 2021-03-02 | End: 2021-03-03 | Stop reason: HOSPADM

## 2021-03-02 RX ADMIN — PANTOPRAZOLE SODIUM 40 MG: 40 TABLET, DELAYED RELEASE ORAL at 06:20

## 2021-03-02 RX ADMIN — CHLORHEXIDINE GLUCONATE 15 ML: 1.2 RINSE ORAL at 00:48

## 2021-03-02 RX ADMIN — PANTOPRAZOLE SODIUM 40 MG: 40 TABLET, DELAYED RELEASE ORAL at 17:10

## 2021-03-02 RX ADMIN — MUPIROCIN: 20 OINTMENT TOPICAL at 21:00

## 2021-03-02 RX ADMIN — CHLORHEXIDINE GLUCONATE 15 ML: 1.2 RINSE ORAL at 14:35

## 2021-03-02 RX ADMIN — ACETAMINOPHEN 500 MG: 500 TABLET ORAL at 17:09

## 2021-03-02 RX ADMIN — ACETAMINOPHEN 500 MG: 500 TABLET ORAL at 00:47

## 2021-03-02 RX ADMIN — ASPIRIN 81 MG: 81 TABLET, COATED ORAL at 09:15

## 2021-03-02 RX ADMIN — SUCRALFATE 1 G: 1 TABLET ORAL at 21:30

## 2021-03-02 RX ADMIN — LEVETIRACETAM 250 MG: 250 TABLET, FILM COATED ORAL at 21:30

## 2021-03-02 RX ADMIN — SUCRALFATE 1 G: 1 TABLET ORAL at 11:09

## 2021-03-02 RX ADMIN — MUPIROCIN 1 APPLICATION: 20 OINTMENT TOPICAL at 09:14

## 2021-03-02 RX ADMIN — Medication 1 TABLET: at 09:15

## 2021-03-02 RX ADMIN — LEVETIRACETAM 500 MG: 500 TABLET, FILM COATED ORAL at 09:14

## 2021-03-02 RX ADMIN — SUCRALFATE 1 G: 1 TABLET ORAL at 06:20

## 2021-03-02 RX ADMIN — ENOXAPARIN SODIUM 40 MG: 40 INJECTION SUBCUTANEOUS at 09:15

## 2021-03-02 RX ADMIN — SUCRALFATE 1 G: 1 TABLET ORAL at 17:12

## 2021-03-02 RX ADMIN — METOPROLOL SUCCINATE 25 MG: 25 TABLET, EXTENDED RELEASE ORAL at 09:15

## 2021-03-02 NOTE — PROGRESS NOTES
[facility] Acute Inpt Rehab Note     Dr. Perry asked Rehab Admissions to assess patient for potential admission to acute rehab.  Chart and physical therapy notes reviewed.  Will need occupational therapy to assess patient as well to see if patient can benefit from two disciplines as acute rehab requires a need for two different therapies.   left BRENT Mata.      Thanks,   Flakita Zarate, RN  Rehab Admission Nurse   [phone]

## 2021-03-02 NOTE — THERAPY TREATMENT NOTE
Patient Name: Rodolfo Grover  : 1945    MRN: [id number]                              Today's Date: 3/2/2021       Admit Date: 2021    Visit Dx:     ICD-10-CM ICD-9-CM   1. Coronary artery disease involving native coronary artery of native heart with unstable angina pectoris (CMS/HCC)  I25.110 414.01     411.1   2. Chest discomfort  R07.89 786.59   3. Elevated troponin  R77.8 790.6   4. Abnormal EKG  R94.31 794.31   5. Other chest pain  R07.89 786.59   6. Shortness of breath  R06.02 786.05   7. S/P MVR (mitral valve repair)  Z98.890 V45.89   8. S/P CABG (coronary artery bypass graft)  Z95.1 V45.81   9. Coffee ground emesis  K92.0 578.0     Patient Active Problem List   Diagnosis   • Acute blood loss anemia   • Fracture of fifth metacarpal bone of right hand   • Closed fracture dislocation of right shoulder   • Gastroesophageal reflux disease without esophagitis   • DNR (do not resuscitate)   • Bradycardia following surgery   • Frequent PVCs   • Proximal humerus fracture   • Osteoarthritis   • Severe obstructive sleep apnea   • Pure hypercholesterolemia   • Pulmonary hypertension (CMS/HCC)   • LARA (dyspnea on exertion)   • Localized edema   • Chest discomfort   • Elevated troponin   • Abnormal EKG   • Coronary artery disease involving native coronary artery of native heart with unstable angina pectoris (CMS/HCC)   • Focal motor seizure (CMS/HCC)   • Generalized tonic-clonic seizure (CMS/HCC)   • Post-ictal state (CMS/HCC)   • Coffee ground emesis     Past Medical History:   Diagnosis Date   • GERD (gastroesophageal reflux disease)    • Iritis of right eye    • PVC (premature ventricular contraction)    • Rheumatic fever    • Severe obstructive sleep apnea    • Shoulder fracture, right      Past Surgical History:   Procedure Laterality Date   • CARDIAC CATHETERIZATION N/A 2021    Procedure: Coronary angiography;  Surgeon: Bry Nails MD;  Location: [facility] INVASIVE LOCATION;  Service:  Cardiovascular;  Laterality: N/A;   • CARDIAC CATHETERIZATION N/A 2/19/2021    Procedure: Left heart cath;  Surgeon: Bry Nails MD;  Location: [facility] CATH INVASIVE LOCATION;  Service: Cardiovascular;  Laterality: N/A;   • CARDIAC CATHETERIZATION N/A 2/19/2021    Procedure: Right Heart Cath;  Surgeon: Bry Nails MD;  Location: [facility] CATH INVASIVE LOCATION;  Service: Cardiovascular;  Laterality: N/A;   • CARDIAC CATHETERIZATION N/A 2/19/2021    Procedure: Left ventriculography;  Surgeon: Bry Nails MD;  Location: [facility] CATH INVASIVE LOCATION;  Service: Cardiovascular;  Laterality: N/A;   • CORONARY ARTERY BYPASS GRAFT N/A 2/22/2021    Procedure: BK, MIDLINE STERNOTOMY, CORONARY ARTERY BYPASS X 7 UTILIZING THE LEFT INTERNAL MAMMARY ARTERY AND THE RIGHT SAPHENOUS VEIN, MITRAL VALVE REPAIR, PRP;  Surgeon: Jr Shyam Echavarria MD;  Location: [facility] MAIN OR;  Service: Cardiothoracic;  Laterality: N/A;   • ENDOSCOPY N/A 8/16/2018    Erosive gastritis, diverticulosis   • ENDOSCOPY N/A 2/26/2021    Procedure: ESOPHAGOGASTRODUODENOSCOPY AT BEDSIDE WITH HOT SNARE POLYPECTOMY AND CLIP PLACEMENT X1;  Surgeon: Jono Laboy MD;  Location: [facility] ENDOSCOPY;  Service: Gastroenterology;  Laterality: N/A;  PRE-  GI BLEED  POST- GASTRITIS, ESOPHAGITIS, POLYP   • HERNIA REPAIR     • SHOULDER CLOSED REDUCTION Right 3/16/2018    Procedure: RIGHT SHOULDER CLOSED REDUCTION;  Surgeon: Kj Garcia MD;  Location: [facility] OR;  Service: Orthopedics   • TONSILLECTOMY     • TOTAL SHOULDER ARTHROPLASTY W/ DISTAL CLAVICLE EXCISION Right 3/22/2018    Procedure: Reverse Total Shoulder Arthroplsty;  Surgeon: Kj Garcia MD;  Location: [facility] MAIN OR;  Service: Orthopedics   • TRANSESOPHAGEAL ECHOCARDIOGRAM (BK) N/A 2/22/2021    Procedure: TRANSESOPHAGEAL ECHOCARDIOGRAM;  Surgeon: Jr Shyam Echavarria MD;  Location: [facility] MAIN OR;  Service: Cardiothoracic;  Laterality: N/A;     General  Information     Row Name 03/02/21 1109          Physical Therapy Time and Intention    Document Type  therapy note (daily note)  -SR     Mode of Treatment  physical therapy;individual therapy  -SR     Row Name 03/02/21 1109          General Information    Patient Profile Reviewed  yes  -SR     Existing Precautions/Restrictions  cardiac;fall;sternal  -SR     Row Name 03/02/21 1109          Cognition    Orientation Status (Cognition)  oriented x 4  -SR     Row Name 03/02/21 1109          Safety Issues, Functional Mobility    Impairments Affecting Function (Mobility)  balance;endurance/activity tolerance;shortness of breath;strength  -SR       User Key  (r) = Recorded By, (t) = Taken By, (c) = Cosigned By    Initials Name Provider Type    SR Eloisa Vaughn Physical Therapist        Mobility     Row Name 03/02/21 1109          Bed Mobility    Comment (Bed Mobility)  UIC  -SR     Row Name 03/02/21 1109          Transfers    Comment (Transfers)  SBA for sit <> stand without AD, toilet transfer from standing SBA and independent with toilet hygiene.  -SR     Row Name 03/02/21 1109          Sit-Stand Transfer    Sit-Stand [place] (Transfers)  1 person assist;standby assist  -SR     Row Name 03/02/21 1109          Gait/Stairs (Locomotion)    [place] Level (Gait)  contact guard;minimum assist (75% patient effort)  -SR     Assistive Device (Gait)  -- wall handrail  -SR     Distance in Feet (Gait)  2 x 75 ft  -SR     Deviations/Abnormal Patterns (Gait)  gait speed decreased;stride length decreased  -SR     Bilateral Gait Deviations  forward flexed posture  -SR     [place] Level (Stairs)  contact guard  -SR     Handrail Location (Stairs)  both sides  -SR     Number of Steps (Stairs)  8  -SR     Ascending Technique (Stairs)  step-over-step  -SR     Descending Technique (Stairs)  step-over-step  -SR     Stairs, Impairments  strength decreased  -SR     Comment (Gait/Stairs)  pt ambulated 75 ft to and from stairs  requiring CGA during first bout, Luther during second bout due to fatigue from stair climbing. Pt ascended/descend 8 steps with B rails requiring 2 standing rest breaks to complete descent due to SOA  -SR       User Key  (r) = Recorded By, (t) = Taken By, (c) = Cosigned By    Initials Name Provider Type    Eloisa Constantino Physical Therapist        Obj/Interventions     Row Name 03/02/21 1113          Motor Skills    Therapeutic Exercise  -- 10 reps B cardiac rehab protocol  -SR     Row Name 03/02/21 1113          Balance    Balance Assessment  sitting static balance;sitting dynamic balance;standing static balance;standing dynamic balance  -SR     Static Sitting Balance  WFL;sitting in chair  -SR     Dynamic Sitting Balance  WFL;sitting in chair  -SR     Static Standing Balance  mild impairment;unsupported  -SR     Dynamic Standing Balance  mild impairment;unsupported  -SR     Comment, Balance  requires CGA/Luther during standing to maintain balance when fatigued  -SR       User Key  (r) = Recorded By, (t) = Taken By, (c) = Cosigned By    Initials Name Provider Type    Eloisa Constantino Physical Therapist        Goals/Plan     Row Name 03/02/21 1137          Patient Education Goal (PT)    Activity (Patient Education Goal, PT)  Cardiac level 5  -SR     Time Frame (Patient Education Goal, PT)  1 week  -SR     Progress/Outcome (Patient Education Goal, PT)  goal ongoing  -SR       User Key  (r) = Recorded By, (t) = Taken By, (c) = Cosigned By    Initials Name Provider Type    Eloisa Constantino Physical Therapist        Clinical Impression     Row Name 03/02/21 1115          Pain    Additional Documentation  Pain Scale: FACES Pre/Post-Treatment (Group)  -SR     Row Name 03/02/21 1115          Pain Scale: Numbers Pre/Post-Treatment    Pain Intervention(s)  Repositioned  -SR     Row Name 03/02/21 1115          Pain Scale: FACES Pre/Post-Treatment    Pain: FACES Scale, Pretreatment  2-->hurts little bit  -SR     Posttreatment  Pain Rating  2-->hurts little bit  -SR     Row Name 03/02/21 1115          Plan of Care Review    Plan of Care Reviewed With  patient  -SR     Progress  improving  -SR     Outcome Summary  Pt was able to progress to stair climbing using B rails CGA for safety requiring rest breaks to complete. Pt gait distance did not improve today due to fatigue from stair climbing and requires minAx1 for ambulation when fatigued. Pt SBA for transfers. Pt could continue to benefit from skilled PT to improve activity tolerance and functional mobility.  -SR     Row Name 03/02/21 1115          Therapy Assessment/Plan (PT)    Criteria for Skilled Interventions Met (PT)  skilled treatment is necessary;yes;meets criteria  -SR     Row Name 03/02/21 1115          Vital Signs    O2 Delivery Pre Treatment  room air  -SR     O2 Delivery Intra Treatment  room air  -SR     Post SpO2 (%)  93  -SR     O2 Delivery Post Treatment  room air  -SR     Row Name 03/02/21 1115          Positioning and Restraints    Pre-Treatment Position  sitting in chair/recliner  -SR     Post Treatment Position  chair  -SR     In Chair  reclined;call light within reach;encouraged to call for assist;DEANA elevated;MONICA elevated  -SR       User Key  (r) = Recorded By, (t) = Taken By, (c) = Cosigned By    Initials Name Provider Type    SR Eloisa Vaughn Physical Therapist        Outcome Measures     Row Name 03/02/21 1120          How much help from another person do you currently need...    Turning from your back to your side while in flat bed without using bedrails?  4  -SR     Moving from lying on back to sitting on the side of a flat bed without bedrails?  4  -SR     Moving to and from a bed to a chair (including a wheelchair)?  4  -SR     Standing up from a chair using your arms (e.g., wheelchair, bedside chair)?  4  -SR     Climbing 3-5 steps with a railing?  3  -SR     To walk in hospital room?  4  -SR     AM-PAC 6 Clicks Score (PT)  23  -SR     Row Name 03/02/21 1120           Functional Assessment    Outcome Measure Options  AM-PAC 6 Clicks Basic Mobility (PT)  -SR       User Key  (r) = Recorded By, (t) = Taken By, (c) = Cosigned By    Initials Name Provider Type    Eloisa Constantino Physical Therapist        Physical Therapy Education                 Title: PT OT SLP Therapies (Done)     Topic: Physical Therapy (Done)     Point: Mobility training (Done)     Learning Progress Summary           Patient Acceptance, E,D, VU by  at 3/2/2021 1120    Acceptance, E,TB,D, VU,NR by  at 3/1/2021 1055    Acceptance, E,TB,D, VU,NR by  at 2/26/2021 1332    Acceptance, E,TB,D, VU,NR by  at 2/25/2021 1155    Acceptance, E,TB,D, VU,NR by  at 2/23/2021 1118                   Point: Home exercise program (Done)     Learning Progress Summary           Patient Acceptance, E,D, VU by  at 3/2/2021 1120    Acceptance, E,TB,D, VU,NR by  at 3/1/2021 1055    Acceptance, E,TB,D, VU,NR by  at 2/26/2021 1332    Acceptance, E,TB,D, VU,NR by  at 2/25/2021 1155    Acceptance, E,TB,D, VU,NR by  at 2/23/2021 1118                   Point: Body mechanics (Done)     Learning Progress Summary           Patient Acceptance, E,D, VU by SR at 3/2/2021 1120    Acceptance, E,TB,D, VU,NR by  at 3/1/2021 1055    Acceptance, E,TB,D, VU,NR by  at 2/26/2021 1332    Acceptance, E,TB,D, VU,NR by  at 2/25/2021 1155    Acceptance, E,TB,D, VU,NR by  at 2/23/2021 1118                   Point: Precautions (Done)     Learning Progress Summary           Patient Acceptance, E,D, VU by SR at 3/2/2021 1120    Acceptance, E,TB,D, VU,NR by  at 3/1/2021 1055    Acceptance, E,TB,D, VU,NR by  at 2/26/2021 1332    Acceptance, E,TB,D, VU,NR by  at 2/25/2021 1155    Acceptance, E,HALIMA GARLAND, VU,NR by  at 2/23/2021 1118                               User Key     Initials Effective Dates Name Provider Type Discipline     04/03/18 -  Yamel Wheat, PT Physical Therapist PT     02/01/21 -  Azucena Sloan, PT  Student PT Student PT    SR 02/08/21 -  Eloisa Vaughn Physical Therapist PT              PT Recommendation and Plan     Plan of Care Reviewed With: patient  Progress: improving  Outcome Summary: Pt was able to progress to stair climbing using B rails CGA for safety requiring rest breaks to complete. Pt gait distance did not improve today due to fatigue from stair climbing and requires minAx1 for ambulation when fatigued. Pt SBA for transfers. Pt could continue to benefit from skilled PT to improve activity tolerance and functional mobility.     Time Calculation:   PT Charges     Row Name 03/02/21 1122             Time Calculation    Start Time  0935  -SR      Stop Time  0950  -      Time Calculation (min)  15 min  -SR      PT Received On  03/02/21  -SR      PT - Next Appointment  03/03/21  -      PT Goal Re-Cert Due Date  03/09/21  -         Time Calculation- PT    Total Timed Code Minutes- PT  15 minute(s)  -SR        User Key  (r) = Recorded By, (t) = Taken By, (c) = Cosigned By    Initials Name Provider Type    SR Eloisa Vaughn Physical Therapist        Therapy Charges for Today     Code Description Service Date Service Provider Modifiers Qty    [id number]  PT THERAPEUTIC ACT EA 15 MIN 3/2/2021 Eloisa Vaughn GP 1          PT G-Codes  Outcome Measure Options: AM-PAC 6 Clicks Basic Mobility (PT)  AM-PAC 6 Clicks Score (PT): 23    Eloisa Vaughn  3/2/2021

## 2021-03-02 NOTE — PROGRESS NOTES
"  PROGRESS NOTE  Patient Name: Rodolfo Grover  Age/Sex: 76 y.o. male  : 1945  MRN: [id number]    Date of Admission: 2021  Date of Encounter Visit: 21   LOS: 10 days   Patient Care Team:  Domenica Merida MD as PCP - General (Family Medicine)  Mitch Mendiola MD as Consulting Physician (Pulmonary Disease)    Chief Complaint: Obstructive sleep apnea with problem with the CPAP causing some chest pain post CABG    Hospital course: Patient did better on the lower pressure of 10 with good sleep reported, with no problem with hypoxemia while on the monitor without the need for any supplemental oxygen.  Woke up refreshed this morning with no chest pain.     REVIEW OF SYSTEMS:   CONSTITUTIONAL: no fever or chills    Ventilator/Non-Invasive Ventilation Settings (From admission, onward)   CPAP 10 cm of water at night only    Vital Signs  Temp:  [97.5 °F (36.4 °C)-98 °F (36.7 °C)] 97.5 °F (36.4 °C)  Heart Rate:  [72-89] 81  Resp:  [16] 16  BP: ()/(55-71) 128/59  SpO2:  [95 %-97 %] 97 %  on    Device (Oxygen Therapy): room air    Intake/Output Summary (Last 24 hours) at 3/2/2021 1326  Last data filed at 3/2/2021 0803  Gross per 24 hour   Intake 600 ml   Output 575 ml   Net 25 ml     Flowsheet Rows      First Filed Value   Admission Height  170.2 cm (67\") Documented at 2021 0916   Admission Weight  88.1 kg (194 lb 3.6 oz) Documented at 2021 0550        Body mass index is 27.72 kg/m².      21  0358 21  0629 21  0500   Weight: 83.6 kg (184 lb 6.4 oz) 84.5 kg (186 lb 4.8 oz) 80.3 kg (177 lb)       Physical Exam:  GEN:  No acute distress, alert, cooperative, well developed   LUNGS: Normal chest on inspection, CTAB, no wheezes. No rhonchi. No crackles. Respirations regular, even and unlabored.       Results Review:    Results From Last 14 Days   Lab Units 21  0411   TRIGLYCERIDES mg/dL 75     Results from last 7 days   Lab Units 21  0351 21  0519 21  1649 " 02/28/21  0522 02/27/21  0150 02/26/21  0423 02/25/21  1500 02/25/21  0408  02/24/21  0406   SODIUM mmol/L 137 138  --  139 144 144 146* 145   < > 145  143   POTASSIUM mmol/L 4.0 4.2 4.3 3.2* 3.9 3.6 3.5 3.6   < > 3.9  3.9   CHLORIDE mmol/L 106 106  --  106 111* 112* 113* 110*   < > 107  105   CO2 mmol/L 21.7* 23.4  --  24.4 24.4 24.4 23.3 25.2   < > 25.4  25.3   BUN mg/dL 22 25*  --  31* 36* 37* 36* 40*   < > 37*  34*   CREATININE mg/dL 1.21 1.38*  --  1.47* 1.77* 1.92* 2.34* 2.44*   < > 3.05*  2.91*   CALCIUM mg/dL 7.7* 8.0*  --  7.4* 7.8* 7.5* 7.7* 7.5*   < > 7.9*  7.9*   AST (SGOT) U/L  --   --   --  20  --   --   --   --   --  53*   ALT (SGPT) U/L  --   --   --  7  --   --   --   --   --  7   ANION GAP mmol/L 9.3 8.6  --  8.6 8.6 7.6 9.7 9.8   < > 12.6  12.7   ALBUMIN g/dL 2.70* 2.90*  --  2.60* 2.90* 3.10*  --  3.50  --  3.80    < > = values in this interval not displayed.     Results from last 7 days   Lab Units 02/25/21  0408 02/24/21  1550   CK TOTAL U/L 1,028* 1,042*             Results from last 7 days   Lab Units 03/02/21  0351 03/01/21  1210 02/28/21  0523 02/27/21  0150 02/26/21  1229 02/26/21  0423 02/25/21  2321 02/25/21  1500   WBC 10*3/mm3  --  15.80* 11.84* 11.36* 12.31* 11.44* 12.29* 11.85*   HEMOGLOBIN g/dL 9.1* 9.6* 9.2* 9.1* 9.1* 8.6* 9.1* 9.1*   HEMATOCRIT % 28.7* 30.5* 28.2* 27.8* 28.3* 26.9* 27.2* 27.2*   PLATELETS 10*3/mm3  --  253 185 161 146 137* 133* 129*   MCV fL  --  100.3* 96.6 96.9 98.6* 97.5* 95.1 94.8         Results from last 7 days   Lab Units 03/02/21  0351 03/01/21  0519 02/28/21  0522 02/25/21  1500   MAGNESIUM mg/dL 1.8 2.0 1.9 2.4           Invalid input(s): LDLCALC  Results from last 7 days   Lab Units 02/23/21  2126   PH, ARTERIAL pH units 7.326*   PCO2, ARTERIAL mm Hg 34.0*   PO2 ART mm Hg 81.3   HCO3 ART mmol/L 17.8*         Glucose   Date/Time Value Ref Range Status   03/01/2021 1040 133 (H) 70 - 130 mg/dL Final   02/28/2021 0558 114 70 - 130 mg/dL Final    02/27/2021 1958 117 70 - 130 mg/dL Final   02/27/2021 1824 143 (H) 70 - 130 mg/dL Final   02/27/2021 1631 85 70 - 130 mg/dL Final                 Results from last 7 days   Lab Units 02/25/21  1615   COVID19  Not Detected     Results from last 7 days   Lab Units 02/25/21  0408 02/24/21  1148   SODIUM UR mmol/L  --  <20   CREATININE UR mg/dL  --  102.7   URIC ACID mg/dL 8.9*  --            Imaging:           Medication Review:   aspirin, 81 mg, Oral, Daily  chlorhexidine, 15 mL, Mouth/Throat, Q12H  enoxaparin, 40 mg, Subcutaneous, Daily  levETIRAcetam, 500 mg, Oral, BID  metoprolol succinate XL, 25 mg, Oral, Q24H  multivitamin with minerals, 1 tablet, Oral, Daily  mupirocin, , Each Nare, BID  pantoprazole, 40 mg, Oral, BID AC  polyethylene glycol, 17 g, Oral, Daily  senna-docusate sodium, 2 tablet, Oral, Nightly  sucralfate, 1 g, Oral, 4x Daily AC & at Bedtime        sodium chloride, 30 mL/hr, Last Rate: 0 mL/hr (02/23/21 0742)        ASSESSMENT:   1. Obstructive sleep apnea on auto CPAP 10-15, reduced down to 10 cm on this admission  2. Coronary artery disease with mitral insufficiency status post emergency CABG with mitral valve repair  3. Acute kidney injury due to ATN  4. Postoperative seizure after open heart surgery, currently controlled  5. Hematemesis with acute blood loss anemia currently stable  6. Pneumothorax, managed with chest tube currently resolved  7. Cardiogenic shock, resolved       PLAN:  Patient did very well on the CPAP at a reduced pressure of 10 cm of water, will continue with that pressure after discharge and he will follow up with Dr. Mendiola as scheduled for any further adjustment if necessary.  After reviewing the CPAP titration study patient should do as well on a CPAP of 10.  Patient was advised to work on the weight loss  He is cleared for discharge from the pulmonary standpoint since he is not requiring any oxygen  We will follow up as needed during the remainder of the hospital  stay  Discussed with patient and with the daughter    Disposition: Per primary team but basically he seems to be planning to go to his daughter's house for the next 10 days  Fredi Izquierdo MD  03/02/21  13:26 EST        Dictated utilizing Dragon dictation

## 2021-03-02 NOTE — PROGRESS NOTES
LOS: 10 days    Patient Care Team:  Domenica Merida MD as PCP - General (Family Medicine)  Mitch Mendiola MD as Consulting Physician (Pulmonary Disease)    Chief Complaint:  No chief complaint on file.    Follow UP SETH  Subjective     Interval History:   Walked earlier today and was able to climb steps as well; no shortness of breath or palpitations; no chest pain; appetite is improving    Objective     Vital Signs  Temp:  [97.5 °F (36.4 °C)-98 °F (36.7 °C)] 97.5 °F (36.4 °C)  Heart Rate:  [72-89] 81  Resp:  [16] 16  BP: ()/(58-71) 128/59    Flowsheet Rows      First Filed Value   Admission Height  --   Admission Weight  88.1 kg (194 lb 3.6 oz) Documented at 02/23/2021 0550          I/O this shift:  In: 480 [P.O.:480]  Out: -   I/O last 3 completed shifts:  In: 960 [P.O.:960]  Out: 1175 [Urine:1175]    Intake/Output Summary (Last 24 hours) at 3/2/2021 1506  Last data filed at 3/2/2021 0803  Gross per 24 hour   Intake 600 ml   Output 575 ml   Net 25 ml       Physical Exam:  General Appearance: alert, comfortable; affect much brighter than yesterday  Neck no JVD  Lungs decreased breath sounds in bases; no wheezes; not labored on room air  CV irregularly irregular, no m/g  abd soft NT/ND, BS +  vasc trace edema     Results Review:    Results from last 7 days   Lab Units 03/02/21  0351 03/01/21  0519 02/28/21  1649 02/28/21  0522  02/24/21  0406   SODIUM mmol/L 137 138  --  139   < > 145  143   POTASSIUM mmol/L 4.0 4.2 4.3 3.2*   < > 3.9  3.9   CHLORIDE mmol/L 106 106  --  106   < > 107  105   CO2 mmol/L 21.7* 23.4  --  24.4   < > 25.4  25.3   BUN mg/dL 22 25*  --  31*   < > 37*  34*   CREATININE mg/dL 1.21 1.38*  --  1.47*   < > 3.05*  2.91*   CALCIUM mg/dL 7.7* 8.0*  --  7.4*   < > 7.9*  7.9*   BILIRUBIN mg/dL  --   --   --  0.9  --  0.4   ALK PHOS U/L  --   --   --  83  --  52   ALT (SGPT) U/L  --   --   --  7  --  7   AST (SGOT) U/L  --   --   --  20  --  53*   GLUCOSE mg/dL 107* 112*  --  101*   < >  158*  158*    < > = values in this interval not displayed.       Estimated Creatinine Clearance: 51.1 mL/min (by C-G formula based on SCr of 1.21 mg/dL).    Results from last 7 days   Lab Units 03/02/21  0351 03/01/21  0519 02/28/21  0522   MAGNESIUM mg/dL 1.8 2.0 1.9   PHOSPHORUS mg/dL 3.1 1.9* 2.2*       Results from last 7 days   Lab Units 02/25/21  0408   URIC ACID mg/dL 8.9*       Results from last 7 days   Lab Units 03/02/21  0351 03/01/21  1210 02/28/21  0523 02/27/21  0150 02/26/21  1229 02/26/21  0423   WBC 10*3/mm3  --  15.80* 11.84* 11.36* 12.31* 11.44*   HEMOGLOBIN g/dL 9.1* 9.6* 9.2* 9.1* 9.1* 8.6*   PLATELETS 10*3/mm3  --  253 185 161 146 137*               Imaging Results (Last 24 Hours)     ** No results found for the last 24 hours. **        aspirin, 81 mg, Oral, Daily  chlorhexidine, 15 mL, Mouth/Throat, Q12H  enoxaparin, 40 mg, Subcutaneous, Daily  levETIRAcetam, 250 mg, Oral, BID  metoprolol succinate XL, 25 mg, Oral, Q24H  multivitamin with minerals, 1 tablet, Oral, Daily  mupirocin, , Each Nare, BID  pantoprazole, 40 mg, Oral, BID AC  polyethylene glycol, 17 g, Oral, Daily  senna-docusate sodium, 2 tablet, Oral, Nightly  sucralfate, 1 g, Oral, 4x Daily AC & at Bedtime      sodium chloride, 30 mL/hr, Last Rate: 0 mL/hr (02/23/21 0742)        Medication Review:   Current Facility-Administered Medications   Medication Dose Route Frequency Provider Last Rate Last Admin   • acetaminophen (TYLENOL) suppository 650 mg  650 mg Rectal Q4H PRN Jono Laboy MD       • acetaminophen (TYLENOL) tablet 500 mg  500 mg Oral Q4H PRN Laila Echevarria MD   500 mg at 03/02/21 0047   • ALPRAZolam (XANAX) tablet 0.25 mg  0.25 mg Oral Q8H PRN Jono Laboy MD       • aspirin EC tablet 81 mg  81 mg Oral Daily Jono Laboy MD   81 mg at 03/02/21 0915   • bisacodyl (DULCOLAX) EC tablet 10 mg  10 mg Oral Daily PRN Jono Laboy MD       • bisacodyl (DULCOLAX) suppository 10 mg  10  mg Rectal Daily PRN Jono Laboy MD       • chlorhexidine (PERIDEX) 0.12 % solution 15 mL  15 mL Mouth/Throat Q12H Jono Laboy MD   15 mL at 03/02/21 1435   • enoxaparin (LOVENOX) syringe 40 mg  40 mg Subcutaneous Daily Jono Laboy MD   40 mg at 03/02/21 0915   • HYDROcodone-acetaminophen (NORCO) 5-325 MG per tablet 1 tablet  1 tablet Oral Q4H PRN Jono Laboy MD   1 tablet at 02/27/21 2320   • levETIRAcetam (KEPPRA) tablet 250 mg  250 mg Oral BID Sherie Rao, APRSEAN       • LORazepam (ATIVAN) injection 1 mg  1 mg Intravenous Q2H PRN Jono Laboy MD       • magnesium hydroxide (MILK OF MAGNESIA) suspension 2400 mg/10mL 10 mL  10 mL Oral Daily PRN Jono Laboy MD       • metoprolol succinate XL (TOPROL-XL) 24 hr tablet 25 mg  25 mg Oral Q24H Maranda Gonsalez APRN   25 mg at 03/02/21 0915   • multivitamin with minerals 1 tablet  1 tablet Oral Daily Jono Laboy MD   1 tablet at 03/02/21 0915   • mupirocin (BACTROBAN) 2 % nasal ointment   Each Nare BID Jono Laboy MD   1 application at 03/02/21 0914   • naloxone (NARCAN) injection 0.4 mg  0.4 mg Intravenous Q5 Min PRN Jono Laboy MD       • ondansetron (ZOFRAN) injection 4 mg  4 mg Intravenous Q6H PRN Jono Laboy MD   4 mg at 02/24/21 0626   • pantoprazole (PROTONIX) EC tablet 40 mg  40 mg Oral BID AC Jaspreet Rosa MD   40 mg at 03/02/21 0620   • polyethylene glycol (MIRALAX) packet 17 g  17 g Oral Daily Jono Laboy MD   17 g at 02/28/21 0833   • potassium & sodium phosphates (PHOS-NAK) 280-160-250 MG packet - for Phosphorus less than 1.25 mg/dL  2 packet Oral Q6H PRN Maranda Gonsalez, APRN   2 packet at 03/01/21 1400    Or   • potassium & sodium phosphates (PHOS-NAK) 280-160-250 MG packet - for Phosphorus 1.25 - 2.5 mg/dL  2 packet Oral Q6H PRN Maranda Gonsalez, APRN       • potassium chloride (K-DUR,KLOR-CON) ER tablet 40 mEq  40 mEq Oral PRN  Nik Evans MD   40 mEq at 02/28/21 1210   • potassium chloride (KLOR-CON) packet 40 mEq  40 mEq Oral PRN Nik Evans MD       • sennosides-docusate (PERICOLACE) 8.6-50 MG per tablet 2 tablet  2 tablet Oral Nightly Jono Laboy MD   2 tablet at 02/27/21 0000   • sodium chloride 0.9 % flush 10 mL  10 mL Intravenous PRN Jono Laboy MD   10 mL at 02/25/21 0904   • sodium chloride 0.9 % flush 30 mL  30 mL Intravenous Once PRN Jono Laboy MD       • sodium chloride 0.9 % infusion  30 mL/hr Intravenous Continuous PRN Jono Laboy MD 0 mL/hr at 02/23/21 0742 Restarted at 02/26/21 1240   • sucralfate (CARAFATE) tablet 1 g  1 g Oral 4x Daily AC & at Bedtime Jono Laboy MD   1 g at 03/02/21 1109   • temazepam (RESTORIL) capsule 15 mg  15 mg Oral Nightly PRN Jono Laboy MD           Assessment/Plan   1.  SETH, nonoliguric, improving, multifactorial ATN post-CABG 2/22; SCR stable.  Electrolytes compensated; Looks euvolemic   2.  Non-STEMI:  s/p cath 2/19 and CABG with MV repair on 2/22; EF 62%, RVSP > 55   3.  Hypotension, resolved   4.  Right-sided pneumothorax: chest tube removed  5.  Hematemesis; s/p EGD 2/26  6.  ABLA: Hemoglobin  stable  7.  Seizure 2/23; seen by neurology; on Keppra  8.  PCM, severe    Plan  1.  No need for diuretic today:  he is auto-diuresing  2.  Home anytime from renal view  3.  Discussed with daughter at bedside        Gurjit Rodríguez MD  03/02/21  15:06 EST

## 2021-03-02 NOTE — PLAN OF CARE
Goal Outcome Evaluation:  Plan of Care Reviewed With: patient  Progress: improving       Patient resting comfortably. denies pain. Soft systolic BP 95's-100's. Control A fib/ flutter on tele monitor. Respirations even and unlabored. Oxygen 95% on CPAP. No acute signs of distress noted . Will continue to monitor.

## 2021-03-02 NOTE — PROGRESS NOTES
LOS: 10 days   Patient Care Team:  Domenica Merida MD as PCP - General (Family Medicine)  Mitch Mendiola MD as Consulting Physician (Pulmonary Disease)    Chief Complaint: post op    Subjective:  Symptoms:  No shortness of breath or chest pain.    Diet:  No nausea.    Activity level: Impaired due to weakness.          Vital Signs  Temp:  [97.8 °F (36.6 °C)-98 °F (36.7 °C)] 97.8 °F (36.6 °C)  Heart Rate:  [72-89] 85  Resp:  [16] 16  BP: ()/(55-71) 119/60  Body mass index is 27.72 kg/m².    Intake/Output Summary (Last 24 hours) at 3/2/2021 0852  Last data filed at 3/2/2021 0803  Gross per 24 hour   Intake 1080 ml   Output 1175 ml   Net -95 ml     I/O this shift:  In: 480 [P.O.:480]  Out: -             02/27/21  0358 03/01/21  0629 03/02/21  0500   Weight: 83.6 kg (184 lb 6.4 oz) 84.5 kg (186 lb 4.8 oz) 80.3 kg (177 lb)         Objective    Results Review:        WBC WBC   Date Value Ref Range Status   03/01/2021 15.80 (H) 3.40 - 10.80 10*3/mm3 Final   02/28/2021 11.84 (H) 3.40 - 10.80 10*3/mm3 Final      HGB Hemoglobin   Date Value Ref Range Status   03/02/2021 9.1 (L) 13.0 - 17.7 g/dL Final   03/01/2021 9.6 (L) 13.0 - 17.7 g/dL Final   02/28/2021 9.2 (L) 13.0 - 17.7 g/dL Final      HCT Hematocrit   Date Value Ref Range Status   03/02/2021 28.7 (L) 37.5 - 51.0 % Final   03/01/2021 30.5 (L) 37.5 - 51.0 % Final   02/28/2021 28.2 (L) 37.5 - 51.0 % Final      Platelets Platelets   Date Value Ref Range Status   03/01/2021 253 140 - 450 10*3/mm3 Final   02/28/2021 185 140 - 450 10*3/mm3 Final        PT/INR:  No results found for: PROTIME/No results found for: INR    Sodium Sodium   Date Value Ref Range Status   03/02/2021 137 136 - 145 mmol/L Final   03/01/2021 138 136 - 145 mmol/L Final   02/28/2021 139 136 - 145 mmol/L Final      Potassium Potassium   Date Value Ref Range Status   03/02/2021 4.0 3.5 - 5.2 mmol/L Final   03/01/2021 4.2 3.5 - 5.2 mmol/L Final   02/28/2021 4.3 3.5 - 5.2 mmol/L Final     Comment:      Slight hemolysis detected by analyzer. Results may be affected.   02/28/2021 3.2 (L) 3.5 - 5.2 mmol/L Final      Chloride Chloride   Date Value Ref Range Status   03/02/2021 106 98 - 107 mmol/L Final   03/01/2021 106 98 - 107 mmol/L Final   02/28/2021 106 98 - 107 mmol/L Final      Bicarbonate CO2   Date Value Ref Range Status   03/02/2021 21.7 (L) 22.0 - 29.0 mmol/L Final   03/01/2021 23.4 22.0 - 29.0 mmol/L Final   02/28/2021 24.4 22.0 - 29.0 mmol/L Final      BUN BUN   Date Value Ref Range Status   03/02/2021 22 8 - 23 mg/dL Final   03/01/2021 25 (H) 8 - 23 mg/dL Final   02/28/2021 31 (H) 8 - 23 mg/dL Final      Creatinine Creatinine   Date Value Ref Range Status   03/02/2021 1.21 0.76 - 1.27 mg/dL Final   03/01/2021 1.38 (H) 0.76 - 1.27 mg/dL Final   02/28/2021 1.47 (H) 0.76 - 1.27 mg/dL Final      Calcium Calcium   Date Value Ref Range Status   03/02/2021 7.7 (L) 8.6 - 10.5 mg/dL Final   03/01/2021 8.0 (L) 8.6 - 10.5 mg/dL Final   02/28/2021 7.4 (L) 8.6 - 10.5 mg/dL Final      Magnesium Magnesium   Date Value Ref Range Status   03/02/2021 1.8 1.6 - 2.4 mg/dL Final   03/01/2021 2.0 1.6 - 2.4 mg/dL Final   02/28/2021 1.9 1.6 - 2.4 mg/dL Final          aspirin, 81 mg, Oral, Daily  chlorhexidine, 15 mL, Mouth/Throat, Q12H  enoxaparin, 40 mg, Subcutaneous, Daily  levETIRAcetam, 500 mg, Oral, BID  metoprolol succinate XL, 25 mg, Oral, Q24H  multivitamin with minerals, 1 tablet, Oral, Daily  mupirocin, , Each Nare, BID  pantoprazole, 40 mg, Oral, BID AC  polyethylene glycol, 17 g, Oral, Daily  senna-docusate sodium, 2 tablet, Oral, Nightly  sucralfate, 1 g, Oral, 4x Daily AC & at Bedtime      sodium chloride, 30 mL/hr, Last Rate: 0 mL/hr (02/23/21 0742)            Patient Active Problem List   Diagnosis Code   • Acute blood loss anemia D62   • Fracture of fifth metacarpal bone of right hand S62.306A   • Closed fracture dislocation of right shoulder S42.91XA   • Gastroesophageal reflux disease without esophagitis K21.9    • DNR (do not resuscitate) Z66   • Bradycardia following surgery I97.89   • Frequent PVCs I49.3   • Proximal humerus fracture S42.209A   • Osteoarthritis M19.90   • Severe obstructive sleep apnea G47.33   • Pure hypercholesterolemia E78.00   • Pulmonary hypertension (CMS/HCC) I27.20   • LARA (dyspnea on exertion) R06.00   • Localized edema R60.0   • Chest discomfort R07.89   • Elevated troponin R77.8   • Abnormal EKG R94.31   • Coronary artery disease involving native coronary artery of native heart with unstable angina pectoris (CMS/HCC) I25.110   • Focal motor seizure (CMS/[facility]) G40.109   • Generalized tonic-clonic seizure (CMS/[facility]) G40.409   • Post-ictal state (CMS/[facility]) R56.9   • Coffee ground emesis K92.0       Assessment & Plan      -NSTEMI, severe CAD, mitral incompetence s/p urgent CABGx7 LIMA, MV repair, right EVH----- POD#8 ([place])  -ICM, EF 40%  -Cardiogenic shock, hypotension, complete heart block in immediate preop  -Junctional post op----atrial flutter now with controlled response  -Post op seizure---brief tonic clonic/postictal state on POD2, CT negative, EEG without seizure activity, continue Keppra until outpt follow up with neuro  -Pulmonary hypertension  -COLEMAN  -Post op anemia expected acute blood loss----acute GI loss, transfused 1 unit PRBC 2/25   -Right Pneumothorax- improved post chest tube placement  -SETH r/t hypotension, perfusion----renal following   -hemataemesis----on protonix BID protonix      Resting comfortably   HR 70s-- atrial fibrillation--no AC d/t hemataemesis  Agree with addition of metoprolol XL  Looks much stronger this morning  Made better progress with PT yesterday may be able to go home with home health instead of rehab  Continue routine care    NKECHI Mcgrath  03/02/21  08:52 EST.

## 2021-03-02 NOTE — PLAN OF CARE
Goal Outcome Evaluation:  Plan of Care Reviewed With: patient  Progress: improving  Outcome Summary: Pt was able to progress to stair climbing using B rails CGA for safety requiring rest breaks to complete. Pt gait distance did not improve today due to fatigue from stair climbing and requires minAx1 for ambulation when fatigued. Pt SBA for transfers. Pt could continue to benefit from skilled PT to improve activity tolerance and functional mobility.  Pt and PT staff wore appropriate PPE throughout session. Hand hygiene performed before and after session.

## 2021-03-02 NOTE — PROGRESS NOTES
"Adult Nutrition  Assessment/PES    Patient Name:  Rodolfo Grover  YOB: 1945  MRN: [id number]  Admit Date:  2/19/2021    Assessment Date:  3/2/2021    Comments:  Assessed for LOS day 10    EMR reviewed - s/p CABG and MVR, POD #8. Tolerating diet, intake/appetite improving. Noted [facility] eval and possible dc to there vs. SNF. Will cont to monitor.     Reason for Assessment     Row Name 03/02/21 0912          Reason for Assessment    Reason For Assessment  identified at risk by screening criteria LOS     Diagnosis  cardiac disease;neurologic conditions;renal disease CAD, general seizure           Anthropometrics     Row Name 03/02/21 0916 03/02/21 0500       Anthropometrics    Height  170.2 cm (67\")  --    Weight  --  80.3 kg (177 lb)       Admit Weight    Admit Weight  88 kg (194 lb) bedscale  --       Ideal Body Weight (IBW)    Ideal Body Weight (IBW) (kg)  68.1  --       Body Mass Index (BMI)    BMI Assessment  BMI 25-29.9: overweight 27  --        Labs/Tests/Procedures/Meds     Row Name 03/02/21 0918          Labs/Procedures/Meds    Lab Results Reviewed  reviewed     Lab Results Comments  Glu        Diagnostic Tests/Procedures    Diagnostic Test/Procedure Reviewed  reviewed     Diagnostic Test/Procedures Comments  s/p urgent CABGx7 LIMA, MV repair -POD#8 ([place])        Medications    Pertinent Medications Reviewed  reviewed     Pertinent Medications Comments  keppra, mvi/min, miralax, colace, carafate, protonix         Physical Findings     Row Name 03/02/21 0920          Physical Findings    Skin  surgical incision;other (see comments) B 20         Estimated/Assessed Needs     Row Name 03/02/21 0916          Calculation Measurements    Height  170.2 cm (67\")         Nutrition Prescription Ordered     Row Name 03/02/21 0921          Nutrition Prescription PO    Current PO Diet  Regular     Fluid Consistency  Thin     Common Modifiers  Cardiac         Evaluation of Received Nutrient/Fluid Intake     Row " Name 03/02/21 0921          PO Evaluation    Number of Days PO Intake Evaluated  3 days     % PO Intake  % (improved)               Problem/Interventions:  Problem 1     Row Name 03/02/21 0923          Nutrition Diagnoses Problem 1    Problem 1  Nutrition Appropriate for Condition at this Time     Etiology (related to)  MNT for Treatment/Condition               Intervention Goal     Row Name 03/02/21 0923          Intervention Goal    General  Maintain nutrition;Disease management/therapy;Reduce/improve symptoms     PO  Continue positive trend;Tolerate PO;PO intake (%)     PO Intake %  75 %         Nutrition Intervention     Row Name 03/02/21 0924          Nutrition Intervention    RD/Tech Action  Follow Tx progress;Care plan reviewd;Encourage intake           Education/Evaluation     Row Name 03/02/21 0925          Education    Education  Will Instruct as appropriate        Monitor/Evaluation    Monitor  Per protocol           Electronically signed by:  Sherie Cabrera RD  03/02/21 09:26 EST

## 2021-03-02 NOTE — PROGRESS NOTES
Patient's home unit was working fine with no issues noted. 10-15 pressures noted. Patient refused hospital unit. We do have a unit on standby-if needed.                                             Thanks, RTCLARK RRT

## 2021-03-02 NOTE — PROGRESS NOTES
[facility]  Inpatient Progress Note    Reason for Follow Up:  Coffee ground emesis    Subjective     Interval History:   No nausea or vomiting.  The patient's hemoglobin is stable at 9.1.  Pathology from his EGD was benign.    Current Facility-Administered Medications:   •  [DISCONTINUED] acetaminophen (TYLENOL) tablet 650 mg, 650 mg, Oral, Q4H PRN **OR** [DISCONTINUED] acetaminophen (TYLENOL) 160 MG/5ML solution 650 mg, 650 mg, Oral, Q4H PRN **OR** acetaminophen (TYLENOL) suppository 650 mg, 650 mg, Rectal, Q4H PRN, Jono Laboy MD  •  acetaminophen (TYLENOL) tablet 500 mg, 500 mg, Oral, Q4H PRN, Laila Echevarria MD, 500 mg at 03/02/21 0047  •  ALPRAZolam (XANAX) tablet 0.25 mg, 0.25 mg, Oral, Q8H PRN, Jono Laboy MD  •  aspirin EC tablet 81 mg, 81 mg, Oral, Daily, Jono Laboy MD, 81 mg at 03/02/21 0915  •  bisacodyl (DULCOLAX) EC tablet 10 mg, 10 mg, Oral, Daily PRN, Jono Laboy MD  •  bisacodyl (DULCOLAX) suppository 10 mg, 10 mg, Rectal, Daily PRN, Jono Laboy MD  •  chlorhexidine (PERIDEX) 0.12 % solution 15 mL, 15 mL, Mouth/Throat, Q12H, Jono Laboy MD, 15 mL at 03/02/21 1435  •  enoxaparin (LOVENOX) syringe 40 mg, 40 mg, Subcutaneous, Daily, Jono Laboy MD, 40 mg at 03/02/21 0915  •  HYDROcodone-acetaminophen (NORCO) 5-325 MG per tablet 1 tablet, 1 tablet, Oral, Q4H PRN, Jono Laboy MD, 1 tablet at 02/27/21 2320  •  levETIRAcetam (KEPPRA) tablet 250 mg, 250 mg, Oral, BID, Sherie Rao APRN  •  LORazepam (ATIVAN) injection 1 mg, 1 mg, Intravenous, Q2H PRN, Jono Laboy MD  •  magnesium hydroxide (MILK OF MAGNESIA) suspension 2400 mg/10mL 10 mL, 10 mL, Oral, Daily PRN, Jono Laboy MD  •  metoprolol succinate XL (TOPROL-XL) 24 hr tablet 25 mg, 25 mg, Oral, Q24H, Maranda Gonsalez, APRN, 25 mg at 03/02/21 0915  •  multivitamin with minerals 1 tablet, 1 tablet, Oral, Daily,  Jono Laboy MD, 1 tablet at 21 0915  •  mupirocin (BACTROBAN) 2 % nasal ointment, , Each Nare, BID, Jono Laboy MD, 1 application at 21 0914  •  [] morphine injection 1 mg, 1 mg, Intravenous, Q4H PRN, 1 mg at 21 2140 **AND** naloxone (NARCAN) injection 0.4 mg, 0.4 mg, Intravenous, Q5 Min PRN, Jono Laboy MD  •  ondansetron (ZOFRAN) injection 4 mg, 4 mg, Intravenous, Q6H PRN, Jono Laboy MD, 4 mg at 21 0626  •  pantoprazole (PROTONIX) EC tablet 40 mg, 40 mg, Oral, BID AC, Jaspreet Rosa MD, 40 mg at 21 0620  •  polyethylene glycol (MIRALAX) packet 17 g, 17 g, Oral, Daily, Jono Laboy MD, 17 g at 21 0833  •  potassium & sodium phosphates (PHOS-NAK) 280-160-250 MG packet - for Phosphorus less than 1.25 mg/dL, 2 packet, Oral, Q6H PRN, 2 packet at 21 1400 **OR** potassium & sodium phosphates (PHOS-NAK) 280-160-250 MG packet - for Phosphorus 1.25 - 2.5 mg/dL, 2 packet, Oral, Q6H PRN, Maranda Gonsalez, APRN  •  potassium chloride (K-DUR,KLOR-CON) ER tablet 40 mEq, 40 mEq, Oral, PRN, Nik Evans MD, 40 mEq at 21 1210  •  potassium chloride (KLOR-CON) packet 40 mEq, 40 mEq, Oral, PRN, Nik Evans MD  •  sennosides-docusate (PERICOLACE) 8.6-50 MG per tablet 2 tablet, 2 tablet, Oral, Nightly, Jono Laboy MD, 2 tablet at 21 0000  •  sodium chloride 0.9 % flush 10 mL, 10 mL, Intravenous, PRN, Jono Laboy MD, 10 mL at 21 0904  •  sodium chloride 0.9 % flush 30 mL, 30 mL, Intravenous, Once PRN, Jono Laboy MD  •  sodium chloride 0.9 % infusion, 30 mL/hr, Intravenous, Continuous PRN, Jono Laboy MD, Last Rate: 0 mL/hr at 21 0742, Restarted at 21 1240  •  sucralfate (CARAFATE) tablet 1 g, 1 g, Oral, 4x Daily AC & at Bedtime, Jono Laboy MD, 1 g at 21 1109  •  temazepam (RESTORIL) capsule 15 mg, 15 mg, Oral,  Nightly Benoit RM, Jono Bowen MD  Review of Systems:    The following systems were reviewed and negative;  constitution, ENT, respiratory, cardiovascular, gastrointestinal, genitourinary, musculoskeletal and neurological    Objective     Vital Signs  Temp:  [97.5 °F (36.4 °C)-98 °F (36.7 °C)] 97.5 °F (36.4 °C)  Heart Rate:  [72-89] 81  Resp:  [16] 16  BP: ()/(58-71) 128/59  Body mass index is 27.72 kg/m².    Intake/Output Summary (Last 24 hours) at 3/2/2021 1538  Last data filed at 3/2/2021 0803  Gross per 24 hour   Intake 600 ml   Output 575 ml   Net 25 ml     I/O this shift:  In: 480 [P.O.:480]  Out: -      Physical Exam:   General: patient awake, alert and cooperative   Eyes: Normal lids and lashes, no scleral icterus   Neck: supple, normal ROM   Skin: warm and dry, not jaundiced   Cardiovascular: regular rhythm and rate, no murmurs auscultated   Pulm: clear to auscultation bilaterally, regular and unlabored   Abdomen: soft, nontender, nondistended; normal bowel sounds   Extremities: no rash or edema   Psychiatric: Normal mood and behavior; memory intact     Results Review:     I reviewed the patient's new clinical results.    Results from last 7 days   Lab Units 03/02/21  0351 03/01/21  1210 02/28/21  0523 02/27/21  0150   WBC 10*3/mm3  --  15.80* 11.84* 11.36*   HEMOGLOBIN g/dL 9.1* 9.6* 9.2* 9.1*   HEMATOCRIT % 28.7* 30.5* 28.2* 27.8*   PLATELETS 10*3/mm3  --  253 185 161     Results from last 7 days   Lab Units 03/02/21  0351 03/01/21  0519 02/28/21  1649 02/28/21  0522  02/24/21  0406   SODIUM mmol/L 137 138  --  139   < > 145  143   POTASSIUM mmol/L 4.0 4.2 4.3 3.2*   < > 3.9  3.9   CHLORIDE mmol/L 106 106  --  106   < > 107  105   CO2 mmol/L 21.7* 23.4  --  24.4   < > 25.4  25.3   BUN mg/dL 22 25*  --  31*   < > 37*  34*   CREATININE mg/dL 1.21 1.38*  --  1.47*   < > 3.05*  2.91*   CALCIUM mg/dL 7.7* 8.0*  --  7.4*   < > 7.9*  7.9*   BILIRUBIN mg/dL  --   --   --  0.9  --  0.4   ALK PHOS  U/L  --   --   --  83  --  52   ALT (SGPT) U/L  --   --   --  7  --  7   AST (SGOT) U/L  --   --   --  20  --  53*   GLUCOSE mg/dL 107* 112*  --  101*   < > 158*  158*    < > = values in this interval not displayed.         No results found for: LIPASE    Radiology:  XR Chest 1 View   Final Result   No interval change       This report was finalized on 2/28/2021 3:28 PM by Dr. Gaurav Vuong M.D.          XR Chest 1 View   Final Result         Electronically signed by Nancy Ferrell MD on 02-28-21 at 0453      XR Chest 1 View   Final Result   Right chest tube removal. Minimal, 1 mm right apical   pneumothorax.       This report was finalized on 2/27/2021 2:59 PM by Dr. Silvano Kaur M.D.          XR Chest 1 View   Final Result   Chest tube removal. No pneumothorax.       This report was finalized on 2/26/2021 5:27 PM by Dr. Silvano Kaur M.D.          XR Chest 1 View   Final Result      XR Chest 1 View   Final Result      CT Head Without Contrast   Final Result   No acute intracranial findings       Radiation dose reduction techniques were utilized, including automated   exposure control and exposure modulation based on body size.       This report was finalized on 2/24/2021 2:57 AM by Dr. Cata Borges M.D.          XR Abdomen KUB   Final Result      XR Chest 1 View   Final Result      XR Chest 1 View   Final Result      XR Chest 1 View   Final Result      XR Chest 1 View   Final Result       1. Moderate to large right-sided pneumothorax, new when compared to   prior exam.       FINDINGS were called to patient's nurse at 5:45 AM.       This report was finalized on 2/23/2021 5:46 AM by Dr. Cata Borges M.D.          XR Chest 1 View   Final Result   Postsurgical changes.       This report was finalized on 2/22/2021 1:43 PM by Dr. Silvano Kaur M.D.          XR Chest PA & Lateral   Final Result   No acute findings.       This report was finalized on 2/19/2021 10:46 PM by   Cata Borges M.D.              Assessment/Plan     Patient Active Problem List   Diagnosis   • Acute blood loss anemia   • Fracture of fifth metacarpal bone of right hand   • Closed fracture dislocation of right shoulder   • Gastroesophageal reflux disease without esophagitis   • DNR (do not resuscitate)   • Bradycardia following surgery   • Frequent PVCs   • Proximal humerus fracture   • Osteoarthritis   • Severe obstructive sleep apnea   • Pure hypercholesterolemia   • Pulmonary hypertension (CMS/HCC)   • LARA (dyspnea on exertion)   • Localized edema   • Chest discomfort   • Elevated troponin   • Abnormal EKG   • Coronary artery disease involving native coronary artery of native heart with unstable angina pectoris (CMS/HCC)   • Focal motor seizure (CMS/HCC)   • Generalized tonic-clonic seizure (CMS/HCC)   • Post-ictal state (CMS/HCC)   • Coffee ground emesis       Assessment/Recommendations:    All problems are new to me today  Assessment:  1. coffee ground emesis due to esophagitis  2. Gastritis     Plan:  · Twice daily proton pump inhibitor, Carafate  · Hemoglobin has been stable  · Pathology from EGD is benign.  · Will need repeat EGD in 3 months to assess healing of esophagitis.  He could follow-up with me in the office in 4 to 6 weeks.  I could schedule him for a repeat EGD in 12 weeks.    GI will sign off and see as needed.       I discussed the patients findings and my recommendations with patient and family.    Edgar Allen MD

## 2021-03-03 ENCOUNTER — READMISSION MANAGEMENT (OUTPATIENT)
Dept: CALL CENTER | Facility: HOSPITAL | Age: 76
End: 2021-03-03

## 2021-03-03 VITALS
BODY MASS INDEX: 29.66 KG/M2 | RESPIRATION RATE: 16 BRPM | SYSTOLIC BLOOD PRESSURE: 138 MMHG | DIASTOLIC BLOOD PRESSURE: 64 MMHG | TEMPERATURE: 98 F | OXYGEN SATURATION: 96 % | HEIGHT: 67 IN | HEART RATE: 82 BPM | WEIGHT: 189 LBS

## 2021-03-03 LAB
ALBUMIN SERPL-MCNC: 2.7 G/DL (ref 3.5–5.2)
ANION GAP SERPL CALCULATED.3IONS-SCNC: 9.2 MMOL/L (ref 5–15)
BUN SERPL-MCNC: 22 MG/DL (ref 8–23)
BUN/CREAT SERPL: 16.8 (ref 7–25)
CALCIUM SPEC-SCNC: 8 MG/DL (ref 8.6–10.5)
CHLORIDE SERPL-SCNC: 105 MMOL/L (ref 98–107)
CO2 SERPL-SCNC: 23.8 MMOL/L (ref 22–29)
CREAT SERPL-MCNC: 1.31 MG/DL (ref 0.76–1.27)
GFR SERPL CREATININE-BSD FRML MDRD: 53 ML/MIN/1.73
GLUCOSE SERPL-MCNC: 135 MG/DL (ref 65–99)
PHOSPHATE SERPL-MCNC: 2.2 MG/DL (ref 2.5–4.5)
POTASSIUM SERPL-SCNC: 3.8 MMOL/L (ref 3.5–5.2)
SODIUM SERPL-SCNC: 138 MMOL/L (ref 136–145)

## 2021-03-03 PROCEDURE — 80069 RENAL FUNCTION PANEL: CPT | Performed by: INTERNAL MEDICINE

## 2021-03-03 PROCEDURE — 99238 HOSP IP/OBS DSCHRG MGMT 30/<: CPT | Performed by: NURSE PRACTITIONER

## 2021-03-03 PROCEDURE — 25010000002 ENOXAPARIN PER 10 MG: Performed by: INTERNAL MEDICINE

## 2021-03-03 PROCEDURE — 97530 THERAPEUTIC ACTIVITIES: CPT

## 2021-03-03 RX ORDER — PANTOPRAZOLE SODIUM 40 MG/1
40 TABLET, DELAYED RELEASE ORAL
Qty: 60 TABLET | Refills: 1 | Status: SHIPPED | OUTPATIENT
Start: 2021-03-03 | End: 2021-03-29 | Stop reason: SDUPTHER

## 2021-03-03 RX ORDER — ASPIRIN 81 MG/1
81 TABLET ORAL DAILY
Qty: 30 TABLET | Refills: 0 | Status: SHIPPED | OUTPATIENT
Start: 2021-03-04 | End: 2021-03-29 | Stop reason: SDUPTHER

## 2021-03-03 RX ORDER — HYDROCODONE BITARTRATE AND ACETAMINOPHEN 5; 325 MG/1; MG/1
1 TABLET ORAL EVERY 4 HOURS PRN
Qty: 42 TABLET | Refills: 0 | Status: SHIPPED | OUTPATIENT
Start: 2021-03-03 | End: 2021-03-10

## 2021-03-03 RX ORDER — FLUCONAZOLE 100 MG/1
100 TABLET ORAL EVERY 24 HOURS
Qty: 10 TABLET | Refills: 0 | Status: SHIPPED | OUTPATIENT
Start: 2021-03-03 | End: 2021-03-13

## 2021-03-03 RX ORDER — FLUCONAZOLE 100 MG/1
100 TABLET ORAL EVERY 24 HOURS
Status: DISCONTINUED | OUTPATIENT
Start: 2021-03-03 | End: 2021-03-03 | Stop reason: HOSPADM

## 2021-03-03 RX ORDER — FUROSEMIDE 20 MG/1
20 TABLET ORAL DAILY
Status: DISCONTINUED | OUTPATIENT
Start: 2021-03-03 | End: 2021-03-03 | Stop reason: HOSPADM

## 2021-03-03 RX ORDER — SUCRALFATE 1 G/1
1 TABLET ORAL
Qty: 120 TABLET | Refills: 1 | Status: SHIPPED | OUTPATIENT
Start: 2021-03-03 | End: 2021-03-29 | Stop reason: SDUPTHER

## 2021-03-03 RX ORDER — LEVETIRACETAM 250 MG/1
250 TABLET ORAL 2 TIMES DAILY
Qty: 60 TABLET | Refills: 0 | Status: SHIPPED | OUTPATIENT
Start: 2021-03-03 | End: 2021-03-29 | Stop reason: SDUPTHER

## 2021-03-03 RX ORDER — FUROSEMIDE 20 MG/1
20 TABLET ORAL DAILY
Qty: 30 TABLET | Refills: 0 | Status: SHIPPED | OUTPATIENT
Start: 2021-03-04 | End: 2021-03-09

## 2021-03-03 RX ADMIN — ACETAMINOPHEN 500 MG: 500 TABLET ORAL at 13:46

## 2021-03-03 RX ADMIN — Medication 1 TABLET: at 09:18

## 2021-03-03 RX ADMIN — SUCRALFATE 1 G: 1 TABLET ORAL at 12:09

## 2021-03-03 RX ADMIN — LEVETIRACETAM 250 MG: 250 TABLET, FILM COATED ORAL at 09:19

## 2021-03-03 RX ADMIN — SUCRALFATE 1 G: 1 TABLET ORAL at 06:14

## 2021-03-03 RX ADMIN — FUROSEMIDE 20 MG: 20 TABLET ORAL at 10:22

## 2021-03-03 RX ADMIN — ASPIRIN 81 MG: 81 TABLET, COATED ORAL at 09:19

## 2021-03-03 RX ADMIN — ENOXAPARIN SODIUM 40 MG: 40 INJECTION SUBCUTANEOUS at 09:19

## 2021-03-03 RX ADMIN — PANTOPRAZOLE SODIUM 40 MG: 40 TABLET, DELAYED RELEASE ORAL at 06:14

## 2021-03-03 RX ADMIN — METOPROLOL SUCCINATE 25 MG: 25 TABLET, EXTENDED RELEASE ORAL at 09:18

## 2021-03-03 RX ADMIN — CHLORHEXIDINE GLUCONATE 15 ML: 1.2 RINSE ORAL at 01:11

## 2021-03-03 RX ADMIN — POTASSIUM PHOSPHATE, MONOBASIC AND POTASSIUM PHOSPHATE, DIBASIC 15 MMOL: 224; 236 INJECTION, SOLUTION, CONCENTRATE INTRAVENOUS at 10:22

## 2021-03-03 RX ADMIN — ACETAMINOPHEN 500 MG: 500 TABLET ORAL at 04:56

## 2021-03-03 NOTE — PROGRESS NOTES
LOS: 11 days   Patient Care Team:  Domenica Merida MD as PCP - General (Family Medicine)  Mitch Mendiola MD as Consulting Physician (Pulmonary Disease)    Chief Complaint: post op    Subjective      Vital Signs  Temp:  [97.5 °F (36.4 °C)-98.5 °F (36.9 °C)] 97.8 °F (36.6 °C)  Heart Rate:  [80-94] 94  Resp:  [16-17] 16  BP: (126-139)/(53-68) 139/62  Body mass index is 29.6 kg/m².    Intake/Output Summary (Last 24 hours) at 3/3/2021 0838  Last data filed at 3/3/2021 0333  Gross per 24 hour   Intake 120 ml   Output 400 ml   Net -280 ml     No intake/output data recorded.    Chest tube drainage last 8 hours        03/01/21  0629 03/02/21  0500 03/03/21  0333   Weight: 84.5 kg (186 lb 4.8 oz) 80.3 kg (177 lb) 85.7 kg (189 lb)         Objective    Results Review:        WBC WBC   Date Value Ref Range Status   03/01/2021 15.80 (H) 3.40 - 10.80 10*3/mm3 Final      HGB Hemoglobin   Date Value Ref Range Status   03/02/2021 9.1 (L) 13.0 - 17.7 g/dL Final   03/01/2021 9.6 (L) 13.0 - 17.7 g/dL Final      HCT Hematocrit   Date Value Ref Range Status   03/02/2021 28.7 (L) 37.5 - 51.0 % Final   03/01/2021 30.5 (L) 37.5 - 51.0 % Final      Platelets Platelets   Date Value Ref Range Status   03/01/2021 253 140 - 450 10*3/mm3 Final        PT/INR:  No results found for: PROTIME/No results found for: INR    Sodium Sodium   Date Value Ref Range Status   03/03/2021 138 136 - 145 mmol/L Final   03/02/2021 137 136 - 145 mmol/L Final   03/01/2021 138 136 - 145 mmol/L Final      Potassium Potassium   Date Value Ref Range Status   03/03/2021 3.8 3.5 - 5.2 mmol/L Final   03/02/2021 4.0 3.5 - 5.2 mmol/L Final   03/01/2021 4.2 3.5 - 5.2 mmol/L Final   02/28/2021 4.3 3.5 - 5.2 mmol/L Final     Comment:     Slight hemolysis detected by analyzer. Results may be affected.      Chloride Chloride   Date Value Ref Range Status   03/03/2021 105 98 - 107 mmol/L Final   03/02/2021 106 98 - 107 mmol/L Final   03/01/2021 106 98 - 107 mmol/L Final       Bicarbonate CO2   Date Value Ref Range Status   03/03/2021 23.8 22.0 - 29.0 mmol/L Final   03/02/2021 21.7 (L) 22.0 - 29.0 mmol/L Final   03/01/2021 23.4 22.0 - 29.0 mmol/L Final      BUN BUN   Date Value Ref Range Status   03/03/2021 22 8 - 23 mg/dL Final   03/02/2021 22 8 - 23 mg/dL Final   03/01/2021 25 (H) 8 - 23 mg/dL Final      Creatinine Creatinine   Date Value Ref Range Status   03/03/2021 1.31 (H) 0.76 - 1.27 mg/dL Final   03/02/2021 1.21 0.76 - 1.27 mg/dL Final   03/01/2021 1.38 (H) 0.76 - 1.27 mg/dL Final      Calcium Calcium   Date Value Ref Range Status   03/03/2021 8.0 (L) 8.6 - 10.5 mg/dL Final   03/02/2021 7.7 (L) 8.6 - 10.5 mg/dL Final   03/01/2021 8.0 (L) 8.6 - 10.5 mg/dL Final      Magnesium Magnesium   Date Value Ref Range Status   03/02/2021 1.8 1.6 - 2.4 mg/dL Final   03/01/2021 2.0 1.6 - 2.4 mg/dL Final          aspirin, 81 mg, Oral, Daily  chlorhexidine, 15 mL, Mouth/Throat, Q12H  enoxaparin, 40 mg, Subcutaneous, Daily  levETIRAcetam, 250 mg, Oral, BID  metoprolol succinate XL, 25 mg, Oral, Q24H  multivitamin with minerals, 1 tablet, Oral, Daily  mupirocin, , Each Nare, BID  pantoprazole, 40 mg, Oral, BID AC  polyethylene glycol, 17 g, Oral, Daily  senna-docusate sodium, 2 tablet, Oral, Nightly  sucralfate, 1 g, Oral, 4x Daily AC & at Bedtime      sodium chloride, 30 mL/hr, Last Rate: 0 mL/hr (02/23/21 0742)            Patient Active Problem List   Diagnosis Code   • Acute blood loss anemia D62   • Fracture of fifth metacarpal bone of right hand S62.306A   • Closed fracture dislocation of right shoulder S42.91XA   • Gastroesophageal reflux disease without esophagitis K21.9   • DNR (do not resuscitate) Z66   • Bradycardia following surgery I97.89   • Frequent PVCs I49.3   • Proximal humerus fracture S42.209A   • Osteoarthritis M19.90   • Severe obstructive sleep apnea G47.33   • Pure hypercholesterolemia E78.00   • Pulmonary hypertension (CMS/HCC) I27.20   • LARA (dyspnea on exertion) R06.00    • Localized edema R60.0   • Chest discomfort R07.89   • Elevated troponin R77.8   • Abnormal EKG R94.31   • Coronary artery disease involving native coronary artery of native heart with unstable angina pectoris (CMS/HCC) I25.110   • Focal motor seizure (CMS/HCC) G40.109   • Generalized tonic-clonic seizure (CMS/[facility]) G40.409   • Post-ictal state (CMS/[facility]) R56.9   • Coffee ground emesis K92.0       Assessment & Plan    -NSTEMI, severe CAD, mitral incompetence s/p urgent CABGx7 LIMA, MV repair, right EVH----- POD#9([place])  -ICM, EF 40%  -Cardiogenic shock, hypotension, complete heart block in immediate preop  -Junctional post op----atrial flutter now with controlled response  -Post op seizure---brief tonic clonic/postictal state on POD2, CT negative, EEG without seizure activity, continue Keppra until outpt follow up with neuro  -Pulmonary hypertension  -COLEMAN  -Post op anemia expected acute blood loss----acute GI loss, transfused 1 unit PRBC 2/25   -Right Pneumothorax- improved post chest tube placement  -SETH r/t hypotension, perfusion----renal following   -hemataemesis----on protonix BID protonix         Tolerated metoprolol addition  Discussed with Dr. Echavarria we did decrease keppra dose since he was so sleepy  Will discuss AC again with Dr. Echavarria, GI is okay with AC and hgb stable.  Made great progress with PT yesterday  Okay from our standpoint for discharge home with home health  Continue routine care        NKECHI Mcgrath  03/03/21  08:38 EST

## 2021-03-03 NOTE — PROGRESS NOTES
Case Management Discharge Note      Final Note: Pt d/c to his daughter's home in [place], [place] with Jess[facility]  scheduled to follow...........RERE LUIS/[facility]    Provided Post Acute Provider List?: Yes  Post Acute Provider List: Home Health  Delivered To: Support Person  Support Person: ady Vasquez  Method of Delivery: In person    Selected Continued Care - Discharged on 3/3/2021 Admission date: 2/19/2021 - Discharge disposition: Home-Health Care c    Destination    No services have been selected for the patient.              Durable Medical Equipment    No services have been selected for the patient.              Dialysis/Infusion    No services have been selected for the patient.              Home Medical Care Coordination complete    Service Provider Selected Services Address Phone Fax Patient Preferred    [facility]  [facility] [address], [address], [facility] [address] [phone] --          Therapy    No services have been selected for the patient.              Community Resources    No services have been selected for the patient.                       Final Discharge Disposition Code: 06 - home with home health care

## 2021-03-03 NOTE — DISCHARGE SUMMARY
Hospital Discharge    Patient Name: Rodolfo Grover  Age/Sex: 76 y.o. male  : 1945  MRN: [id number]    Encounter Provider: NKECHI Eugene  Referring Provider: Bry Nails MD  Place of Service: [facility] CARDIOLOG[facility]  Patient Care Team:  Domenica Merida MD as PCP - General (Family Medicine)  Mitch Mendiola MD as Consulting Physician (Pulmonary Disease)         Date of Discharge:  3/3/2021   Date of Admit: 2021    Discharge Condition: Stable  Discharge Diagnosis:    Coronary artery disease involving native coronary artery of native heart with unstable angina pectoris (CMS/HCC)    Chest discomfort    Elevated troponin    Abnormal EKG    Focal motor seizure (CMS/HCC)    Generalized tonic-clonic seizure (CMS/HCC)    Post-ictal state (CMS/HCC)    Coffee ground emesis      Hospital Course:   Rodolfo Grover is a 76 y.o. male who was seen in the office on  by NKECHI Castillo for chest discomfort. Echocardiogram was done that day and showed new wall motion abnormalities as well as new pulmonary hypertension. He was given nitro in the [facility] and pain improved. Labs checked and showed elevated troponin consistent with NSTEMI. He was sent that day to the cath lab where coronary angiography showed multivessel disease. He was treated with heparin drip and on  he underwent CABG x ___ with mitral valve repair. On post op day 2 he was noted to have coffee ground emesis and ultimately underwent EGD that showed esophagitis. Biopsy showed some candida and he will be treated with fluconazole for 10 days. He also suffered significant pneumothorax requiring chest tube placement. He had complete heart block and was seen by EP. This recovered and he has been in and out of AF with controlled rate. He had a seizure and was seen by neurology. He is on keppra and will continue this until outpatient follow up with neurology. Beta blocker was initiated 3/2 and he tolerated it well. He  has made great progress over the last few days with physical therapy and is stable for discharge home with home health. We have deferred anticoagulation for now per Dr. Echavarria's recommendations. I have reached out to Dr. Allen to see when it would be safe to add OAC given his recent coffee ground emesis and esophagitis. We will continue to follow recommendations closely as an outpatient. He will keep his upcoming appointment with Dr. Singer. See neurology in one month. He will stay on Keppra until then per Dr. Raygoza recommendations.     Objective:  Temp:  [97.6 °F (36.4 °C)-98.5 °F (36.9 °C)] 98 °F (36.7 °C)  Heart Rate:  [80-94] 82  Resp:  [16-17] 16  BP: (126-139)/(53-68) 138/64    Intake/Output Summary (Last 24 hours) at 3/3/2021 1322  Last data filed at 3/3/2021 1203  Gross per 24 hour   Intake 360 ml   Output 400 ml   Net -40 ml     Body mass index is 29.6 kg/m².      03/01/21  0629 03/02/21  0500 03/03/21  0333   Weight: 84.5 kg (186 lb 4.8 oz) 80.3 kg (177 lb) 85.7 kg (189 lb)     Weight change: 5.443 kg (12 lb)    Physical Exam:  Constitutional: He is oriented to person, place, and time. He appears well-developed. He does not appear ill.   HENT:   Head: Normocephalic and atraumatic. Head is without contusion.   Right Ear: Hearing normal. No drainage.   Left Ear: Hearing normal. No drainage.   Nose: No nasal deformity. No epistaxis.   Eyes: Lids are normal. Right eye exhibits no exudate. Left eye exhibits no exudate.  Neck: No JVD present. Carotid bruit is not present. No tracheal deviation present. No thyroid mass and no thyromegaly present.   Cardiovascular: Normal rate, regular rhythm and normal heart sounds.    Pulses:       Posterior tibial pulses are 2+ on the right side, and 2+ on the left side.   Pulmonary/Chest: Effort normal and breath sounds normal. midline incision well approximated. No drainage.   Abdominal: Soft. Normal appearance and bowel sounds are normal. There is no tenderness.    Musculoskeletal: Normal range of motion.        Right shoulder: He exhibits no deformity.        Left shoulder: He exhibits no deformity.   Neurological: He is alert and oriented to person, place, and time. He has normal strength.   Skin: Skin is warm, dry and intact. No rash noted. right EVH site. Well approximated no drainage noted  Psychiatric: He has a normal mood and affect. His behavior is normal. Thought content normal.   Vitals reviewed      Procedures Performed  Procedure(s):  ESOPHAGOGASTRODUODENOSCOPY AT BEDSIDE WITH HOT SNARE POLYPECTOMY AND CLIP PLACEMENT X1        Urgent CABG x7.  Skeletonized LIMA to mid LAD.  Vein graft to right coronary artery.  Vein graft to PDA sequentially to left-ventricular branch.  Vein graft to ramus intermedius.  Natural Y vein graft to OM 2 and OM 3.  Mitral valve repair complex with a 36 mm Medtronic 3D ring with edge to edge repair of posterior lateral commissural prolapse with a Som Magic stitch.  Cardiopulmonary bypass.  Antegrade and retrograde cold blood cardioplegia with warm reperfusion.  Logic monitoring.  Transapical echo.  Endoscopic vein harvest of the right greater saphenous vein.    Procedure findings:  Left main: Large caliber vessel that bifurcates to an LAD and circumflex coronary artery.  The left main coronary artery is normal.  LAD: Medium caliber vessel that gives rise to a small caliber diagonal branch in the proximal segment.  Discrete 95% ostial stenosis with mild calcification.  Diffuse 80% mid vessel stenosis with moderate calcification. Discrete 90% mid vessel stenosis.  Sequential 70 to 80% diagonal stenoses.  LCX: Medium caliber vessel that gives rise to a medium caliber OM1 and OM 2 branch.  Discrete 60 to 70% mid vessel stenosis.  RCA: Medium to large caliber vessel that gives rise to a small caliber PDA and RPL branch.  Diffuse 80% stenosis in proximal to mid segment.  Severe calcification.     Left ventricular angiography: Normal  left ventricular size.  Mildly reduced left ventricular systolic function.  Mid to distal inferior wall severe hypokinesis.  Moderate anterolateral hypokinesis     Hemodynamics:   LV: 109/13  AO: 109/53/78     RA: 8/9/7  RV 42/10  PA 38/13/23     Estimated blood loss: Minimal     Complications: None     Conclusions:   1. Left main: Normal  2. LAD: Discrete 95% ostial stenosis with mild calcification.  Diffuse 80% mid vessel stenosis with moderate calcification.  Discrete 90% mid vessel stenosis.  Sequential 70 to 80% diagonal stenosis  3. LCX: Discrete 60 to 70% mid vessel stenosis  4. RCA: Diffuse 80% stenosis in proximal to mid segment with severe calcification  5.  Normal left ventricular size.  Mildly reduced systolic function.  Mid to distal inferior wall severe hypokinesis.  Moderate anterolateral wall hypokinesis     Recommendations: CABG     Echo  · The following left ventricular wall segments are hypokinetic: basal inferolateral, mid inferolateral and basal inferior.  · Calculated left ventricular EF = 62.1% Estimated left ventricular EF = 62% Estimated left ventricular EF was in agreement with the calculated left ventricular EF. Left ventricular systolic function is normal. Normal left ventricular cavity size and wall thickness noted. Left ventricular diastolic function was normal.  · Mild to moderate mitral valve regurgitation is present.  · Trace aortic valve regurgitation is present.  · Mild to moderate tricuspid valve regurgitation is present. Estimated right ventricular systolic pressure from tricuspid regurgitation is markedly elevated (>55 mmHg). Calculated right ventricular systolic pressure from tricuspid regurgitation is 61.3 mmHg.    Consults:  Consults     Date and Time Order Name Status Description    3/1/2021 1102 Inpatient Pulmonology Consult Completed     3/1/2021 1056 Inpatient Physical Medicine Rehab Consult      2/25/2021 0868 Cardiac Electrophysiologist Inpatient Consult Completed      2/24/2021 0825 Inpatient Gastroenterology Consult Completed     2/24/2021 0701 Inpatient Nephrology Consult Completed     2/24/2021 0648 Inpatient Nephrology Consult Completed     2/23/2021 2136 Inpatient Neurology Consult Other (see comments) Completed           Pertinent Test Results:  Results from last 7 days   Lab Units 03/03/21  0538 03/02/21  0351 03/01/21  0519 02/28/21  1649 02/28/21  0522 02/27/21  0150 02/26/21  0423 02/25/21  1500   SODIUM mmol/L 138 137 138  --  139 144 144 146*   POTASSIUM mmol/L 3.8 4.0 4.2 4.3 3.2* 3.9 3.6 3.5   CHLORIDE mmol/L 105 106 106  --  106 111* 112* 113*   CO2 mmol/L 23.8 21.7* 23.4  --  24.4 24.4 24.4 23.3   BUN mg/dL 22 22 25*  --  31* 36* 37* 36*   CREATININE mg/dL 1.31* 1.21 1.38*  --  1.47* 1.77* 1.92* 2.34*   GLUCOSE mg/dL 135* 107* 112*  --  101* 93 156* 164*   CALCIUM mg/dL 8.0* 7.7* 8.0*  --  7.4* 7.8* 7.5* 7.7*   AST (SGOT) U/L  --   --   --   --  20  --   --   --    ALT (SGPT) U/L  --   --   --   --  7  --   --   --      Results from last 7 days   Lab Units 02/25/21  0408 02/24/21  1550   CK TOTAL U/L 1,028* 1,042*     Results from last 7 days   Lab Units 03/02/21  0351 03/01/21  1210 02/28/21  0523 02/27/21  0150 02/26/21  1229 02/26/21  0423 02/25/21  2321 02/25/21  1500   WBC 10*3/mm3  --  15.80* 11.84* 11.36* 12.31* 11.44* 12.29* 11.85*   HEMOGLOBIN g/dL 9.1* 9.6* 9.2* 9.1* 9.1* 8.6* 9.1* 9.1*   HEMATOCRIT % 28.7* 30.5* 28.2* 27.8* 28.3* 26.9* 27.2* 27.2*   PLATELETS 10*3/mm3  --  253 185 161 146 137* 133* 129*         Results from last 7 days   Lab Units 03/02/21  0351 03/01/21  0519 02/28/21  0522 02/25/21  1500   MAGNESIUM mg/dL 1.8 2.0 1.9 2.4           Invalid input(s): LDLCALC            Discharge Medications     Discharge Medications      New Medications      Instructions Start Date   aspirin 81 MG EC tablet   81 mg, Oral, Daily   Start Date: March 4, 2021     fluconazole 100 MG tablet  Commonly known as: DIFLUCAN   100 mg, Oral, Every 24 Hours       furosemide 20 MG tablet  Commonly known as: LASIX   20 mg, Oral, Daily   Start Date: March 4, 2021     HYDROcodone-acetaminophen 5-325 MG per tablet  Commonly known as: NORCO   1 tablet, Oral, Every 4 Hours PRN      levETIRAcetam 250 MG tablet  Commonly known as: KEPPRA   250 mg, Oral, 2 Times Daily      pantoprazole 40 MG EC tablet  Commonly known as: PROTONIX   40 mg, Oral, 2 Times Daily Before Meals      sucralfate 1 g tablet  Commonly known as: CARAFATE   1 g, Oral, 4 Times Daily Before Meals & Nightly         Changes to Medications      Instructions Start Date   acetaminophen 500 MG tablet  Commonly known as: TYLENOL  What changed:   · when to take this  · reasons to take this   1,000 mg, Oral, 3 Times Daily         Continue These Medications      Instructions Start Date   metoprolol succinate XL 25 MG 24 hr tablet  Commonly known as: TOPROL-XL   25 mg, Oral, Daily      multivitamin with minerals tablet tablet   1 tablet, Oral, Daily      EYE VITAMINS PO   1 tablet, Oral, Daily         Stop These Medications    amLODIPine 2.5 MG tablet  Commonly known as: NORVASC     triamcinolone 0.1 % paste  Commonly known as: KENALOG            Discharge Diet:    Dietary Orders (From admission, onward)     Start     Ordered    02/27/21 1719  Diet Regular; Cardiac  Diet Effective 0500     Question Answer Comment   Diet Texture / Consistency Regular    Common Modifiers Cardiac        02/27/21 1719                Activity at Discharge:    Activity Instructions     Gradually Increase Activity Until at Pre-Hospitalization Level             Discharge disposition: home with home health    Discharge Instructions and Follow ups:  Future Appointments   Date Time Provider Department Center   3/10/2021  9:40 AM Jimena Singer MD [facility] None   3/31/2021 10:15 AM Aleta Herring APRN [facility] C[facility] None   4/8/2021  3:00 PM Domenica Merida MD [facility] PC EAS[facility]   10/8/2021 10:15 AM Chetna Cabrera APRN [facility] None     Additional  Instructions for the Follow-ups that You Need to Schedule     Ambulatory Referral to Cardiac Rehab   As directed      Ambulatory Referral to Home Health   As directed      Face to Face Visit Date: 3/3/2021    Follow-up provider for Plan of Care?: I will be treating the patient on an ongoing basis.  Please send me the Plan of Care for signature.    Follow-up provider: JR MILTON SORENSEN [[id number]]    Reason/Clinical Findings: post op open heart    Describe mobility limitations that make leaving home difficult: weakness    Nursing/Therapeutic Services Requested: Skilled Nursing Physical Therapy    Skilled nursing orders: Post CABG care    PT orders: Strengthening    Frequency: 1 Week 1         Call MD With Problems / Concerns   As directed      Instructions:  Call office at [phone] for any drainage, increased redness, or fever over 100.5    Order Comments: Instructions:  Call office at [phone] for any drainage, increased redness, or fever over 100.5          Discharge Follow-up with PCP   As directed       Currently Documented PCP:    Domenica Merida MD    PCP Phone Number:    [phone]     Follow Up Details: in 1 week         Discharge Follow-up with Specialty: Cardiologist APRN/PA; 1 Week   As directed      Specialty: Cardiologist APRN/PA    Follow Up: 1 Week    Follow Up Details: bring all prescription bottles to appointment, call for appointment         Discharge Follow-up with Specified Provider: Cardiologist; 1 Month   As directed      To: Cardiologist    Follow Up: 1 Month    Follow Up Details: call for appointment, bring all medication bottles to appointment         Discharge Follow-up with Specified Provider: Aleta ARBOLEDA 3/31 at 10   As directed      To: Aleta ARBOLEDA 3/31 at 10    Follow Up Details: 4-6 weeks, bring all current medications to appointment            Contact information for follow-up providers     [facility] .    Specialty: Cardiac  Rehabilitation  Contact information:  [address] [facility] [address] [phone]           Domenica Merida MD .    Specialty: Family Medicine  Why: in 1 week  Contact information:  [address]  [address]  [facility] [address] [phone]                   Contact information for after-discharge care     Home [facility] Care     [facility] .    Service: Home Health Services  Contact information:  [address], [address]  [facility] [address] [phone]                             Test Results Pending at Discharge: none     Maranda Gonsalez, APRN  03/03/21  13:22 EST    Time: Discharge 30 min

## 2021-03-03 NOTE — PLAN OF CARE
Goal Outcome Evaluation:      VSS, Pt status improving. No evidence of gastrointestinal bleeding continuous to be in Afib. No distress noticed. Appropriate for discharge

## 2021-03-03 NOTE — PROGRESS NOTES
Patient Name: Rodolfo Grover  :1945  76 y.o.      Patient Care Team:  Domenica Merida MD as PCP - General (Family Medicine)  Mitch Mendiola MD as Consulting Physician (Pulmonary Disease)    Chief Complaint: follow up NSTEMI, coronary artery disease, mitral valve insufficiency    Interval History: remains in AF. HR well controlled. BP and HR tolerated beta blocker.     Objective   Vital Signs  Temp:  [97.5 °F (36.4 °C)-98 °F (36.7 °C)] 97.6 °F (36.4 °C)  Heart Rate:  [81-89] 84  Resp:  [16] 16  BP: ()/(58-68) 126/68    Intake/Output Summary (Last 24 hours) at 3/2/2021 1945  Last data filed at 3/2/2021 0803  Gross per 24 hour   Intake 600 ml   Output 575 ml   Net 25 ml     Flowsheet Rows      First Filed Value   Admission Height  --   Admission Weight  88.1 kg (194 lb 3.6 oz) Documented at 2021 0550          Physical Exam:   General Appearance:    Alert, cooperative, in no acute distress   Lungs:     Clear to auscultation.  Normal respiratory effort and rate.      Heart:    irregular rhythm and normal rate, normal S1 and S2, no murmurs, gallops or rubs.     Chest Wall:    Median sternotomy, incision open to air.no drainage. Well approximated.    Abdomen:     Soft, nontender, positive bowel sounds.     Extremities:   no cyanosis, clubbing or edema.  No marked joint deformities.  Adequate musculoskeletal strength.       Results Review:    Results from last 7 days   Lab Units 21  0351   SODIUM mmol/L 137   POTASSIUM mmol/L 4.0   CHLORIDE mmol/L 106   CO2 mmol/L 21.7*   BUN mg/dL 22   CREATININE mg/dL 1.21   GLUCOSE mg/dL 107*   CALCIUM mg/dL 7.7*     Results from last 7 days   Lab Units 21  0408 21  1550   CK TOTAL U/L 1,028* 1,042*     Results from last 7 days   Lab Units 21  0351 21  1210   WBC 10*3/mm3  --  15.80*   HEMOGLOBIN g/dL 9.1* 9.6*   HEMATOCRIT % 28.7* 30.5*   PLATELETS 10*3/mm3  --  253         Results from last 7 days   Lab Units 21  0351   MAGNESIUM  mg/dL 1.8                   Medication Review:   aspirin, 81 mg, Oral, Daily  chlorhexidine, 15 mL, Mouth/Throat, Q12H  enoxaparin, 40 mg, Subcutaneous, Daily  levETIRAcetam, 250 mg, Oral, BID  metoprolol succinate XL, 25 mg, Oral, Q24H  multivitamin with minerals, 1 tablet, Oral, Daily  mupirocin, , Each Nare, BID  pantoprazole, 40 mg, Oral, BID AC  polyethylene glycol, 17 g, Oral, Daily  senna-docusate sodium, 2 tablet, Oral, Nightly  sucralfate, 1 g, Oral, 4x Daily AC & at Bedtime         sodium chloride, 30 mL/hr, Last Rate: 0 mL/hr (02/23/21 0742)        Assessment/Plan   1. NSTEMI - multivessel coronary artery disease s/p CABG x 7. POD #7. MV repair.  2. ICM - EF 40%. GDMT as BP will tolerate. Add low dose beta blocker today.   3. Cardiogenic shock, hypotension and complete heart block in the immediate preop.   4. Postop seizure on postop day 2, EEG negative, was seen by neurology currently on Kera with no recurrence  5. Expected postop anemia but also had acute GI blood loss (coffee ground emesis) and was transfused 1 unit of packed cells on 2/25--seen by GI and underwent upper GI yesterday--showed gastritis, esophagitis and a single polyp was removed. Will need repeat EGD in 3 months to assess healing of esophagitis. On PPI twice daily + carafate.   6. Postop bradycardia, now improved, he is in atrial fibrillation but his heart rate is well controlled on no AV letha blocking agents at this time.  Vital signs are stable.  We will continue to monitor for now.  Epicardial wires dc'd.    Tolerating low dose beta blocker. Auto diuresing. No need for diuretics. Nephrology and GI cleared for discharge.   Remains in AF. AC recommendations per CTS.   OT eval. Discharge when discharge plans have been finalized. 1-2 days.    NKECHI Eugene  [facility]  03/02/21  19:45 EST

## 2021-03-03 NOTE — PROGRESS NOTES
LOS: 11 days    Patient Care Team:  Domenica Merida MD as PCP - General (Family Medicine)  Mitch Mendiola MD as Consulting Physician (Pulmonary Disease)    Chief Complaint:  No chief complaint on file.    Follow UP SETH  Subjective     Interval History:   Feels puffy; breathing is comfortable on room air; eager to go home today    Objective     Vital Signs  Temp:  [97.5 °F (36.4 °C)-98.5 °F (36.9 °C)] 97.8 °F (36.6 °C)  Heart Rate:  [80-94] 94  Resp:  [16-17] 16  BP: (126-139)/(53-68) 139/62    Flowsheet Rows      First Filed Value   Admission Height  --   Admission Weight  88.1 kg (194 lb 3.6 oz) Documented at 02/23/2021 0550          No intake/output data recorded.  I/O last 3 completed shifts:  In: 720 [P.O.:720]  Out: 975 [Urine:975]    Intake/Output Summary (Last 24 hours) at 3/3/2021 0858  Last data filed at 3/3/2021 0333  Gross per 24 hour   Intake 120 ml   Output 400 ml   Net -280 ml       Physical Exam:  General Appearance: alert, comfortable; affect much brighter than yesterday  Neck no JVD  Lungs decreased breath sounds in bases; no wheezes; not labored on room air  CV irregularly irregular, no m/g  abd soft NT/ND, BS +  vasc +1 edema     Results Review:    Results from last 7 days   Lab Units 03/03/21  0538 03/02/21  0351 03/01/21  0519  02/28/21  0522   SODIUM mmol/L 138 137 138  --  139   POTASSIUM mmol/L 3.8 4.0 4.2   < > 3.2*   CHLORIDE mmol/L 105 106 106  --  106   CO2 mmol/L 23.8 21.7* 23.4  --  24.4   BUN mg/dL 22 22 25*  --  31*   CREATININE mg/dL 1.31* 1.21 1.38*  --  1.47*   CALCIUM mg/dL 8.0* 7.7* 8.0*  --  7.4*   BILIRUBIN mg/dL  --   --   --   --  0.9   ALK PHOS U/L  --   --   --   --  83   ALT (SGPT) U/L  --   --   --   --  7   AST (SGOT) U/L  --   --   --   --  20   GLUCOSE mg/dL 135* 107* 112*  --  101*    < > = values in this interval not displayed.       Estimated Creatinine Clearance: 48.6 mL/min (A) (by C-G formula based on SCr of 1.31 mg/dL (H)).    Results from last 7 days   Lab  Units 03/03/21  0538 03/02/21  0351 03/01/21  0519 02/28/21  0522   MAGNESIUM mg/dL  --  1.8 2.0 1.9   PHOSPHORUS mg/dL 2.2* 3.1 1.9* 2.2*       Results from last 7 days   Lab Units 02/25/21  0408   URIC ACID mg/dL 8.9*       Results from last 7 days   Lab Units 03/02/21  0351 03/01/21  1210 02/28/21  0523 02/27/21  0150 02/26/21  1229 02/26/21  0423   WBC 10*3/mm3  --  15.80* 11.84* 11.36* 12.31* 11.44*   HEMOGLOBIN g/dL 9.1* 9.6* 9.2* 9.1* 9.1* 8.6*   PLATELETS 10*3/mm3  --  253 185 161 146 137*               Imaging Results (Last 24 Hours)     ** No results found for the last 24 hours. **        aspirin, 81 mg, Oral, Daily  chlorhexidine, 15 mL, Mouth/Throat, Q12H  enoxaparin, 40 mg, Subcutaneous, Daily  levETIRAcetam, 250 mg, Oral, BID  metoprolol succinate XL, 25 mg, Oral, Q24H  multivitamin with minerals, 1 tablet, Oral, Daily  pantoprazole, 40 mg, Oral, BID AC  polyethylene glycol, 17 g, Oral, Daily  senna-docusate sodium, 2 tablet, Oral, Nightly  sucralfate, 1 g, Oral, 4x Daily AC & at Bedtime      sodium chloride, 30 mL/hr, Last Rate: 0 mL/hr (02/23/21 0742)        Medication Review:   Current Facility-Administered Medications   Medication Dose Route Frequency Provider Last Rate Last Admin   • acetaminophen (TYLENOL) suppository 650 mg  650 mg Rectal Q4H PRN Jono Laboy MD       • acetaminophen (TYLENOL) tablet 500 mg  500 mg Oral Q4H PRN Laila Echevarria MD   500 mg at 03/03/21 0456   • ALPRAZolam (XANAX) tablet 0.25 mg  0.25 mg Oral Q8H PRN Jono Laboy MD       • aspirin EC tablet 81 mg  81 mg Oral Daily Jono Laboy MD   81 mg at 03/02/21 0915   • bisacodyl (DULCOLAX) EC tablet 10 mg  10 mg Oral Daily PRN Jono Laboy MD       • bisacodyl (DULCOLAX) suppository 10 mg  10 mg Rectal Daily PRN Jono Laboy MD       • chlorhexidine (PERIDEX) 0.12 % solution 15 mL  15 mL Mouth/Throat Q12H Jono Laboy MD   15 mL at 03/03/21 0111   • enoxaparin  (LOVENOX) syringe 40 mg  40 mg Subcutaneous Daily Jono Laboy MD   40 mg at 03/02/21 0915   • HYDROcodone-acetaminophen (NORCO) 5-325 MG per tablet 1 tablet  1 tablet Oral Q4H PRN Jono Laboy MD   1 tablet at 02/27/21 2320   • levETIRAcetam (KEPPRA) tablet 250 mg  250 mg Oral BID Sherie Rao APRN   250 mg at 03/02/21 2130   • magnesium hydroxide (MILK OF MAGNESIA) suspension 2400 mg/10mL 10 mL  10 mL Oral Daily PRN Jono Laboy MD       • metoprolol succinate XL (TOPROL-XL) 24 hr tablet 25 mg  25 mg Oral Q24H Maranda Gonsalez APRN   25 mg at 03/02/21 0915   • multivitamin with minerals 1 tablet  1 tablet Oral Daily Jono Laboy MD   1 tablet at 03/02/21 0915   • naloxone (NARCAN) injection 0.4 mg  0.4 mg Intravenous Q5 Min PRN Jono Laboy MD       • ondansetron (ZOFRAN) injection 4 mg  4 mg Intravenous Q6H PRN Jono Laboy MD   4 mg at 02/24/21 0626   • pantoprazole (PROTONIX) EC tablet 40 mg  40 mg Oral BID AC Jaspreet Rosa MD   40 mg at 03/03/21 0614   • polyethylene glycol (MIRALAX) packet 17 g  17 g Oral Daily Jono Laboy MD   17 g at 02/28/21 0833   • potassium & sodium phosphates (PHOS-NAK) 280-160-250 MG packet - for Phosphorus less than 1.25 mg/dL  2 packet Oral Q6H PRN Maranda Gonsalez APRN   2 packet at 03/01/21 1400    Or   • potassium & sodium phosphates (PHOS-NAK) 280-160-250 MG packet - for Phosphorus 1.25 - 2.5 mg/dL  2 packet Oral Q6H PRN Maranda Gonsalez, APRN       • potassium chloride (K-DUR,KLOR-CON) ER tablet 40 mEq  40 mEq Oral PRN Nik Evans MD   40 mEq at 02/28/21 1210   • potassium chloride (KLOR-CON) packet 40 mEq  40 mEq Oral PRN Nik Evans MD       • sennosides-docusate (PERICOLACE) 8.6-50 MG per tablet 2 tablet  2 tablet Oral Nightly Jono Laboy MD   2 tablet at 02/27/21 0000   • sodium chloride 0.9 % flush 10 mL  10 mL Intravenous PRN Jono Laboy MD    10 mL at 02/25/21 0904   • sodium chloride 0.9 % flush 30 mL  30 mL Intravenous Once PRN Jono Laboy MD       • sodium chloride 0.9 % infusion  30 mL/hr Intravenous Continuous PRN Jono Laboy MD 0 mL/hr at 02/23/21 0742 Restarted at 02/26/21 1240   • sucralfate (CARAFATE) tablet 1 g  1 g Oral 4x Daily AC & at Bedtime Jono Laboy MD   1 g at 03/03/21 0614   • temazepam (RESTORIL) capsule 15 mg  15 mg Oral Nightly PRN Jono Laboy MD           Assessment/Plan   1.  SETH, nonoliguric, improving, multifactorial ATN post-CABG 2/22; SCR stable.  Electrolytes compensated other than low phosphorus; does have peripheral edema  2.  Non-STEMI:  s/p cath 2/19 and CABG with MV repair on 2/22; EF 62%, RVSP > 55   3.  Hypotension, resolved   4.  Right-sided pneumothorax: chest tube removed  5.  Hematemesis; s/p EGD 2/26  6.  ABLA: Hemoglobin  stable  7.  Seizure 2/23; seen by neurology; on Kera  8.  PCM, severe    Plan  1.  Begin furosemide 20 mg daily  2.  Potassium phosphate IV  3.  Home anytime from renal view        Gurjit Rodríguez MD  03/03/21  08:58 EST

## 2021-03-03 NOTE — PROGRESS NOTES
Continued Stay Note  [facility]     Patient Name: Rodolfo Grover  MRN: [id number]  Today's Date: 3/3/2021    Admit Date: 2/19/2021    Discharge Plan     Row Name 03/03/21 1049       Plan    Plan  3/3 To daughter home in [facility] with [facility]    Plan Comments  [facility] spoke with Pt daughter in [facility] yesterday and she advised the d/c plan remains the same for the patient.  Pt will discharge to her home in [place], [place] with [facility] to follow.  Sadie/[facility] [phone], is following.  RERE LUIS/CCP        Discharge Codes    No documentation.             Esperanza Paris RN

## 2021-03-03 NOTE — PLAN OF CARE
Goal Outcome Evaluation:  Plan of Care Reviewed With: (P) patient  Progress: (P) improving  Outcome Summary: (P) Pt able to ambulate 250ft today with 2 brief rest breaks d/t SOA. Pt had an IV and did not want to attempt stairs again today, PT will attempt again tomorrow. Pt with improved gait mechanics and no LOB. He will continue to benefit from skilled PT to improve overall strength, endurance, gait, and functional mobility.

## 2021-03-03 NOTE — PROGRESS NOTES
[facility] Acute Inpt Rehab Note     Met with patient at bedside.  Acute Rehab was explained.  Patient really not interested in staying for rehab, states he wants to go home with his daughter, Christina, who will be able to assist him.  He states plans are for home health and cardiac rehab thereafter if needed.      Explained we will be available if he changes his mind and would like to continue with evaluation.      We will sign off at this time.    Thank you,  Flakita Zarate RN  Rehab Admission Nurse   [phone]

## 2021-03-03 NOTE — PROGRESS NOTES
Path showed some candida from the biopsies  Recommend fluconazole 100mg/day for 10 days  Office f/u with Dr roman in 4-6 weeks

## 2021-03-04 ENCOUNTER — NURSE TRIAGE (OUTPATIENT)
Dept: CALL CENTER | Facility: HOSPITAL | Age: 76
End: 2021-03-04

## 2021-03-04 ENCOUNTER — TRANSITIONAL CARE MANAGEMENT TELEPHONE ENCOUNTER (OUTPATIENT)
Dept: CALL CENTER | Facility: HOSPITAL | Age: 76
End: 2021-03-04

## 2021-03-04 ENCOUNTER — TELEPHONE (OUTPATIENT)
Dept: CARDIAC SURGERY | Facility: CLINIC | Age: 76
End: 2021-03-04

## 2021-03-04 DIAGNOSIS — R56.9 POST-ICTAL STATE (HCC): ICD-10-CM

## 2021-03-04 NOTE — OUTREACH NOTE
Prep Survey      Responses   [facility] patient discharged from?  [place]   Is LACE score < 7 ?  No   Emergency Room discharge w/ pulse ox?  No   Eligibility  [facility]   Date of Admission  02/19/21   Date of Discharge  03/03/21   Discharge Disposition  Home or Self Care   Discharge diagnosis  Coronary artery disease involving native coronary artery of native heart with unstable angina pectoris,   Urgent CABG x7   Does the patient have one of the following disease processes/diagnoses(primary or secondary)?  Cardiothoracic surgery   Does the patient have Home health ordered?  Yes   What is the Home health agency?   [facility]     Is there a DME ordered?  No   Prep survey completed?  Yes          Christina Lyon RN

## 2021-03-04 NOTE — TELEPHONE ENCOUNTER
"Patient  Daughter is calling about meds. Has no Metoprolol Succinate, from the AVS  meds ordered on 2/19 which was day of surgery. Given the name of the pharmcy  With the number to call  For the medication    Reason for Disposition  • Caller has medication question only, adult not sick, and triager answers question    Additional Information  • Negative: Drug overdose and triager unable to answer question  • Negative: Caller requesting information unrelated to medicine  • Negative: Caller requesting a prescription for Strep throat and has a positive culture result  • Negative: Rash while taking a medication or within 3 days of stopping it  • Negative: Immunization reaction suspected  • Negative: [1] Asthma and [2] having symptoms of asthma (cough, wheezing, etc.)  • Negative: [1] Influenza symptoms AND [2] anti-viral med prescription request, such as Tamiflu  • Negative: [1] Symptom of illness (e.g., headache, abdominal pain, earache, vomiting) AND [2] more than mild  • Negative: MORE THAN A DOUBLE DOSE of a prescription or over-the-counter (OTC) drug  • Negative: [1] DOUBLE DOSE (an extra dose or lesser amount) of over-the-counter (OTC) drug AND [2] any symptoms (e.g., dizziness, nausea, pain, sleepiness)  • Negative: [1] DOUBLE DOSE (an extra dose or lesser amount) of prescription drug AND [2] any symptoms (e.g., dizziness, nausea, pain, sleepiness)  • Negative: Took another person's prescription drug  • Negative: [1] DOUBLE DOSE (an extra dose or lesser amount) of prescription drug AND [2] NO symptoms (Exception: a double dose of antibiotics)  • Negative: Diabetes drug error or overdose (e.g., took wrong type of insulin or took extra dose)  • Negative: [1] Request for URGENT new prescription or refill of \"essential\" medication (i.e., likelihood of harm to patient if not taken) AND [2] triager unable to fill per unit policy  • Negative: [1] Prescription not at pharmacy AND [2] was prescribed by PCP recently  • " "Negative: [1] Pharmacy calling with prescription questions AND [2] triager unable to answer question  • Negative: [1] Caller has URGENT medication question about med that PCP or specialist prescribed AND [2] triager unable to answer question  • Negative: [1] Caller has NON-URGENT medication question about med that PCP prescribed AND [2] triager unable to answer question  • Negative: [1] Caller requesting a NON-URGENT new prescription or refill AND [2] triager unable to refill per unit policy  • Negative: [1] Caller has medication question about med not prescribed by PCP AND [2] triager unable to answer question (e.g., compatibility with other med, storage)  • Negative: Caller requesting a CONTROLLED substance prescription refill (e.g., narcotics, ADHD medicines)  • Negative: Caller wants to use a complementary or alternative medicine  • Negative: [1] Prescription prescribed recently is not at pharmacy AND [2] triager has access to patient's EMR AND [3] prescription is recorded in the EMR  • Negative: [1] DOUBLE DOSE (an extra dose or lesser amount) of over-the-counter (OTC) drug AND [2] NO symptoms  • Negative: [1] DOUBLE DOSE (an extra dose or lesser amount) of antibiotic drug AND [2] NO symptoms    Answer Assessment - Initial Assessment Questions  1.   NAME of MEDICATION: \"What medicine are you calling about?\"   Metoprolol Succinate  2.   QUESTION: \"What is your question?\"    Should he  Be taking the medication  3.   PRESCRIBING HCP: \"Who prescribed it?\" Reason: if prescribed by specialist, call should be referred to that group.    na  4. SYMPTOMS: \"Do you have any symptoms?\"   na  5. SEVERITY: If symptoms are present, ask \"Are they mild, moderate or severe?\"     moderate  6.  PREGNANCY:  \"Is there any chance that you are pregnant?\" \"When was your last menstrual period?\"  na    Protocols used: MEDICATION QUESTION CALL-ADULT-      "

## 2021-03-04 NOTE — TELEPHONE ENCOUNTER
I returned a call to RERE Cruz for [facility]. She was calling to inform me that the patient was having some serosanguinous drainage from the chest tube exit sites. I instructed her to paint all of the wounds/incisions with betadine. She also states that he had sutures over the chest tube sites. I gave her the okay to remove these. She verbalized understanding. She is to call back with any further issues.     NKECHI Lorenz  3/4/21  3:20PM

## 2021-03-04 NOTE — OUTREACH NOTE
Call Center TCM Note      Responses   [facility] patient discharged from?  [place]   Does the patient have one of the following disease processes/diagnoses(primary or secondary)?  Cardiothoracic surgery   TCM attempt successful?  Yes   Call start time  1525   Call end time  1533   Discharge diagnosis  Coronary artery disease involving native coronary artery of native heart with unstable angina pectoris,   Urgent CABG x7   Meds reviewed with patient/caregiver?  Yes   Is the patient having any side effects they believe may be caused by any medication additions or changes?  No   Does the patient have all medications related to this admission filled (includes all antibiotics, pain medications, cardiac medications, etc.)  Yes   Is the patient taking all medications as directed (includes completed medication regime)?  Yes   Does the patient have a primary care provider?   Yes   Does the patient have an appointment scheduled with their C/T surgeon?  Yes   Has the patient kept scheduled appointments due by today?  N/A   What is the Home health agency?   [facility] out today to see pt.    Has home health visited the patient within 72 hours of discharge?  Yes   Did the patient receive a copy of their discharge instructions?  Yes   Nursing interventions  Reviewed instructions with patient   What is the patient's perception of their health status since discharge?  Improving   Nursing interventions  Nurse provided patient education   Is the patient/caregiver able to teach back normal signs of recovery?  Pain or discomfort at incisional site, Nausea and lack of appetite   Nursing interventions  Reassured on normal signs of recovery   Is the patient /caregiver able to teach back basic post-op care?  Continue use of incentive spirometry at least 1 week post discharge, Practice 'cough and deep breath', Keep incision areas clean, dry and protected, Lifting as instructed by MD in discharge instructions   Is the  patient/caregiver able to teach back signs and symptoms of incisional infection?  Increased redness, swelling or pain at the incisonal site, Increased drainage or bleeding, Incisional warmth, Pus or odor from incision, Fever   Is the patient/caregiver able to teach back steps to recovery at home?  Eat a well-balance diet, Rest and rebuild strength, gradually increase activity   Is the patient /caregiver able to teach back the importance of cardiac rehab?  Yes   Nursing interventions  Provided education on importance of cardiac rehab   If the patient is a current smoker, are they able to teach back resources for cessation?  Not a smoker   Is the patient/caregiver able to teach back the hierarchy of who to call/visit for symptoms/problems? PCP, Specialist, Home health nurse, Urgent Care, ED, 911  Yes   TCM call completed?  Yes   Wrap up additional comments  Pt. states he had some red drainage from around the chest tube earlier. Pt. states dressing CDI to the area and the drainage  has eased up at this time. Denies any s/s of infection. Told pt. what s/s infection are and if any or worsening symptoms to return to ED.  Pt. states sutures intact and open to air where heart procedure performed.            Hiral Moore RN    3/4/2021, 15:39 EST

## 2021-03-09 ENCOUNTER — OFFICE VISIT (OUTPATIENT)
Dept: CARDIOLOGY | Facility: CLINIC | Age: 76
End: 2021-03-09

## 2021-03-09 ENCOUNTER — HOSPITAL ENCOUNTER (OUTPATIENT)
Dept: CARDIOLOGY | Facility: HOSPITAL | Age: 76
Discharge: HOME | End: 2021-03-09
Admitting: NURSE PRACTITIONER

## 2021-03-09 ENCOUNTER — TELEPHONE (OUTPATIENT)
Dept: NEUROLOGY | Facility: CLINIC | Age: 76
End: 2021-03-09

## 2021-03-09 ENCOUNTER — OFFICE VISIT (OUTPATIENT)
Dept: FAMILY MEDICINE CLINIC | Facility: CLINIC | Age: 76
End: 2021-03-09

## 2021-03-09 VITALS
HEART RATE: 73 BPM | OXYGEN SATURATION: 97 % | SYSTOLIC BLOOD PRESSURE: 125 MMHG | HEIGHT: 67 IN | BODY MASS INDEX: 29.57 KG/M2 | DIASTOLIC BLOOD PRESSURE: 60 MMHG | WEIGHT: 188.4 LBS

## 2021-03-09 VITALS
DIASTOLIC BLOOD PRESSURE: 64 MMHG | WEIGHT: 187.6 LBS | TEMPERATURE: 96.8 F | HEIGHT: 67 IN | HEART RATE: 58 BPM | OXYGEN SATURATION: 99 % | RESPIRATION RATE: 16 BRPM | BODY MASS INDEX: 29.44 KG/M2 | SYSTOLIC BLOOD PRESSURE: 124 MMHG

## 2021-03-09 DIAGNOSIS — D62 ACUTE BLOOD LOSS ANEMIA: ICD-10-CM

## 2021-03-09 DIAGNOSIS — Z95.1 S/P CABG X 7: ICD-10-CM

## 2021-03-09 DIAGNOSIS — I34.0 MITRAL VALVE INSUFFICIENCY, UNSPECIFIED ETIOLOGY: ICD-10-CM

## 2021-03-09 DIAGNOSIS — I27.20 PULMONARY HYPERTENSION (HCC): ICD-10-CM

## 2021-03-09 DIAGNOSIS — I25.110 CORONARY ARTERY DISEASE INVOLVING NATIVE CORONARY ARTERY OF NATIVE HEART WITH UNSTABLE ANGINA PECTORIS (HCC): ICD-10-CM

## 2021-03-09 DIAGNOSIS — I25.5 ISCHEMIC CARDIOMYOPATHY: ICD-10-CM

## 2021-03-09 DIAGNOSIS — K20.90 ESOPHAGITIS: ICD-10-CM

## 2021-03-09 DIAGNOSIS — Z98.890 S/P MITRAL VALVE REPAIR: ICD-10-CM

## 2021-03-09 DIAGNOSIS — L03.115 CELLULITIS OF RIGHT LOWER EXTREMITY: ICD-10-CM

## 2021-03-09 DIAGNOSIS — I25.110 CORONARY ARTERY DISEASE INVOLVING NATIVE CORONARY ARTERY OF NATIVE HEART WITH UNSTABLE ANGINA PECTORIS (HCC): Primary | ICD-10-CM

## 2021-03-09 DIAGNOSIS — G40.109 FOCAL MOTOR SEIZURE (HCC): ICD-10-CM

## 2021-03-09 DIAGNOSIS — Z79.899 MEDICATION MANAGEMENT: ICD-10-CM

## 2021-03-09 DIAGNOSIS — I48.91 ATRIAL FIBRILLATION, UNSPECIFIED TYPE (HCC): ICD-10-CM

## 2021-03-09 DIAGNOSIS — I97.89 BRADYCARDIA FOLLOWING SURGERY: ICD-10-CM

## 2021-03-09 DIAGNOSIS — Z09 HOSPITAL DISCHARGE FOLLOW-UP: Primary | ICD-10-CM

## 2021-03-09 DIAGNOSIS — R60.0 BILATERAL LOWER EXTREMITY EDEMA: ICD-10-CM

## 2021-03-09 DIAGNOSIS — G47.33 SEVERE OBSTRUCTIVE SLEEP APNEA: ICD-10-CM

## 2021-03-09 DIAGNOSIS — D50.9 IRON DEFICIENCY ANEMIA, UNSPECIFIED IRON DEFICIENCY ANEMIA TYPE: ICD-10-CM

## 2021-03-09 LAB
BH CV LOW VAS RIGHT GREATER SAPH AK VESSEL: 1
BH CV LOWER VASCULAR LEFT COMMON FEMORAL AUGMENT: NORMAL
BH CV LOWER VASCULAR LEFT COMMON FEMORAL COMPETENT: NORMAL
BH CV LOWER VASCULAR LEFT COMMON FEMORAL COMPRESS: NORMAL
BH CV LOWER VASCULAR LEFT COMMON FEMORAL PHASIC: NORMAL
BH CV LOWER VASCULAR LEFT COMMON FEMORAL SPONT: NORMAL
BH CV LOWER VASCULAR LEFT DISTAL FEMORAL COMPRESS: NORMAL
BH CV LOWER VASCULAR LEFT GASTRONEMIUS COMPRESS: NORMAL
BH CV LOWER VASCULAR LEFT GREATER SAPH AK COMPRESS: NORMAL
BH CV LOWER VASCULAR LEFT GREATER SAPH BK COMPRESS: NORMAL
BH CV LOWER VASCULAR LEFT LESSER SAPH COMPRESS: NORMAL
BH CV LOWER VASCULAR LEFT MID FEMORAL AUGMENT: NORMAL
BH CV LOWER VASCULAR LEFT MID FEMORAL COMPETENT: NORMAL
BH CV LOWER VASCULAR LEFT MID FEMORAL COMPRESS: NORMAL
BH CV LOWER VASCULAR LEFT MID FEMORAL PHASIC: NORMAL
BH CV LOWER VASCULAR LEFT MID FEMORAL SPONT: NORMAL
BH CV LOWER VASCULAR LEFT PERONEAL COMPRESS: NORMAL
BH CV LOWER VASCULAR LEFT POPLITEAL AUGMENT: NORMAL
BH CV LOWER VASCULAR LEFT POPLITEAL COMPETENT: NORMAL
BH CV LOWER VASCULAR LEFT POPLITEAL COMPRESS: NORMAL
BH CV LOWER VASCULAR LEFT POPLITEAL PHASIC: NORMAL
BH CV LOWER VASCULAR LEFT POPLITEAL SPONT: NORMAL
BH CV LOWER VASCULAR LEFT POSTERIOR TIBIAL COMPRESS: NORMAL
BH CV LOWER VASCULAR LEFT PROFUNDA FEMORAL COMPRESS: NORMAL
BH CV LOWER VASCULAR LEFT PROXIMAL FEMORAL COMPRESS: NORMAL
BH CV LOWER VASCULAR LEFT SAPHENOFEMORAL JUNCTION COMPRESS: NORMAL
BH CV LOWER VASCULAR RIGHT COMMON FEMORAL AUGMENT: NORMAL
BH CV LOWER VASCULAR RIGHT COMMON FEMORAL COMPETENT: NORMAL
BH CV LOWER VASCULAR RIGHT COMMON FEMORAL COMPRESS: NORMAL
BH CV LOWER VASCULAR RIGHT COMMON FEMORAL PHASIC: NORMAL
BH CV LOWER VASCULAR RIGHT COMMON FEMORAL SPONT: NORMAL
BH CV LOWER VASCULAR RIGHT DISTAL FEMORAL COMPRESS: NORMAL
BH CV LOWER VASCULAR RIGHT GASTRONEMIUS COMPRESS: NORMAL
BH CV LOWER VASCULAR RIGHT GREATER SAPH AK COMPRESS: NORMAL
BH CV LOWER VASCULAR RIGHT GREATER SAPH AK THROMBUS: NORMAL
BH CV LOWER VASCULAR RIGHT GREATER SAPH BK COMPRESS: NORMAL
BH CV LOWER VASCULAR RIGHT LESSER SAPH COMPRESS: NORMAL
BH CV LOWER VASCULAR RIGHT MID FEMORAL AUGMENT: NORMAL
BH CV LOWER VASCULAR RIGHT MID FEMORAL COMPETENT: NORMAL
BH CV LOWER VASCULAR RIGHT MID FEMORAL COMPRESS: NORMAL
BH CV LOWER VASCULAR RIGHT MID FEMORAL PHASIC: NORMAL
BH CV LOWER VASCULAR RIGHT MID FEMORAL SPONT: NORMAL
BH CV LOWER VASCULAR RIGHT PERONEAL COMPRESS: NORMAL
BH CV LOWER VASCULAR RIGHT POPLITEAL AUGMENT: NORMAL
BH CV LOWER VASCULAR RIGHT POPLITEAL COMPETENT: NORMAL
BH CV LOWER VASCULAR RIGHT POPLITEAL COMPRESS: NORMAL
BH CV LOWER VASCULAR RIGHT POPLITEAL PHASIC: NORMAL
BH CV LOWER VASCULAR RIGHT POPLITEAL SPONT: NORMAL
BH CV LOWER VASCULAR RIGHT POSTERIOR TIBIAL COMPRESS: NORMAL
BH CV LOWER VASCULAR RIGHT PROFUNDA FEMORAL COMPRESS: NORMAL
BH CV LOWER VASCULAR RIGHT PROXIMAL FEMORAL COMPRESS: NORMAL
BH CV LOWER VASCULAR RIGHT SAPHENOFEMORAL JUNCTION COMPRESS: NORMAL

## 2021-03-09 PROCEDURE — 93970 EXTREMITY STUDY: CPT

## 2021-03-09 PROCEDURE — 99215 OFFICE O/P EST HI 40 MIN: CPT | Performed by: NURSE PRACTITIONER

## 2021-03-09 PROCEDURE — 99214 OFFICE O/P EST MOD 30 MIN: CPT | Performed by: FAMILY MEDICINE

## 2021-03-09 PROCEDURE — 93000 ELECTROCARDIOGRAM COMPLETE: CPT | Performed by: NURSE PRACTITIONER

## 2021-03-09 RX ORDER — FUROSEMIDE 20 MG/1
20 TABLET ORAL 2 TIMES DAILY
Qty: 30 TABLET | Refills: 0
Start: 2021-03-09 | End: 2021-03-17

## 2021-03-09 RX ORDER — CEPHALEXIN 500 MG/1
500 CAPSULE ORAL 2 TIMES DAILY
Qty: 14 CAPSULE | Refills: 0 | Status: SHIPPED | OUTPATIENT
Start: 2021-03-09 | End: 2021-03-31

## 2021-03-09 NOTE — PROGRESS NOTES
Today provided RLEV acute SVT preliminary results to Violetta Clancy RN. Followed instructions to send patient home and NKECHI Aleman will follow up.

## 2021-03-09 NOTE — PROGRESS NOTES
Chief Complaint  Hospital Follow Up Visit (cardiac issues)    Subjective          Rodolfo Grover presents to [facility]  History of Present Illness  Pt was admitted -3/3 after presenting to cardiology with chest pain and found to have wall abnormalities on echo that were new as well as elevated troponin. He was taken to cath lab and noted to have multivessel disease. Had CABG x 7 and mitral valve repair.  Complete heart block following surgery, had pacing briefly, AF. Recommended on beta blocker, rate control for PAF.   Developed coffee ground emesis and EGD revealed esophagitis, gastric polyp that was benign, candida. Being treated with fluconazole and taking sucralafate and BID PPI. Holding on the AC for now. Noted that cardiology and GI will confer and determine start date for AC.   He did also have seizure during admission, evaluated by neuro and started on keppra. Reported to have had episode where he had eye deviation to the left and then some tonic-clonic movements in all limbs and lost consciousness. Did have postictal symptoms as well. Thought pt had a focal motor seizure followed by rapid generalized seizure. CT of the head without acute change, does have changes of . Will get MRI for further evaluation after cleared from CT surgery. EEG showed non-specific mild diffuse background slowing.     Pt with concern for area of his inner thigh on the right. It is firm and tender. Concern for blood clot.     He is home with Home health. Has done PT once.   He has been home for about one week.   Living with daughter right now.   He had stitches out yesterday.   Has taken iron and has not been on for one year. Longstanding hx of anemia.    On 3/6 the furosemide was increased to twice daily.   He was swelling. Had gained 5 lbs that day.   Pt says that has helped. The right leg swelling improved and weight is better.   He is sleeping more.   Getting around the house without assistive  "device, feels stable with his gait. They brought him up in the wheelchair because did not want him to get SOB.     He does not yet have follow up with neurology or GI but daughter has the information and is going to call to get the appointments.     We reviewed the medication list in detail and they match.    Objective   Vital Signs:   /64 (BP Location: Right arm, Patient Position: Sitting, Cuff Size: Adult)   Pulse 58   Temp 96.8 °F (36 °C) (Infrared)   Resp 16   Ht 170.2 cm (67\")   SpO2 99%   BMI 29.60 kg/m²     Physical Exam  Vitals reviewed.   Constitutional:       General: He is not in acute distress.  Eyes:      General: No scleral icterus.        Right eye: No discharge.         Left eye: No discharge.      Conjunctiva/sclera: Conjunctivae normal.   Cardiovascular:      Rate and Rhythm: Normal rate and regular rhythm.      Heart sounds: Normal heart sounds. No murmur.   Pulmonary:      Effort: Pulmonary effort is normal. No respiratory distress.      Breath sounds: Normal breath sounds. No wheezing.   Musculoskeletal:      Cervical back: Neck supple. No muscular tenderness.      Right lower leg: Edema present.      Left lower leg: No edema.   Lymphadenopathy:      Cervical: No cervical adenopathy.   Skin:     Findings: Erythema present.      Comments: Over the right inner thigh there is a large area about 8 cm across of indurated, tender, erythematous skin. There is a large space of unaffected tissue from this skin change and the groin where the cath access site is.   Surgical lesions are healing well in the RLE.   Neurological:      Mental Status: He is oriented to person, place, and time.      Motor: No abnormal muscle tone.   Psychiatric:         Behavior: Behavior normal.        Result Review :                 Assessment and Plan    Diagnoses and all orders for this visit:    1. Hospital discharge follow-up (Primary)    2. Iron deficiency anemia, unspecified iron deficiency anemia type  -     " CBC (No Diff)  -     Iron and TIBC  -     Ferritin    3. Esophagitis    4. Cellulitis of right lower extremity    5. Medication management  -     Basic Metabolic Panel    Other orders  -     furosemide (LASIX) 20 MG tablet; Take 1 tablet by mouth 2 (Two) Times a Day. Take at 10 and 2  Dispense: 30 tablet; Refill: 0  -     cephalexin (KEFLEX) 500 MG capsule; Take 1 capsule by mouth 2 (Two) Times a Day.  Dispense: 14 capsule; Refill: 0        Follow Up   No follow-ups on file.  Patient was given instructions and counseling regarding his condition or for health maintenance advice. Please see specific information pulled into the AVS if appropriate.     I reviewed the procedure notes, notes, reports, labs, and imaging studies from pt's recent admission.   Current outpatient and discharge medications have been reconciled for the patient.  Reviewed by: Domenica Merida MD    We facetimed the daughter he is living with currently per their request. We discussed medications and questions were answered.   She says she has the information for scheduling neuro and GI follow up and I encouraged her to call right away for those appointments.     To be determined is if/when to go on AC. He is in NSR on exam today.   Discussed importance of completing the fluconazole, two left, and taking the medications to protect the stomach and esophagus for healing of the areas that were bleeding. GI will help determine the duration of this therapy needed before restarting AC.     He is more sleepy and suspect this is from the prolonged admission, inactivity/deconditioning and keppra initiation.     The are of the right inner thigh is warm red and indurated. I recommended witch hazel strokes for potential for hematoma but concern for cellulitis and recommended warm compress and antibiotic.     Last use of pain medication was yesterday. Using tylenol as well. No trouble with BMs, no black stool or blood in stool.     Anemia, will check in on labs.  May need to get back on iron, anemia is stable but hgb at 9.     Seeing cardiology today. Will assess need for lasix dosing to remain BID.

## 2021-03-09 NOTE — PATIENT INSTRUCTIONS
Hematoma and cellulitis on the inner right thigh. Recommend to do soaks with witch hazel for about 10 minutes 2-3 times per day. Also recommend that you do a warm compress over the area a couple times per day as well.

## 2021-03-09 NOTE — PROGRESS NOTES
Date of Office Visit: 21  Encounter Provider: NKECHI Valentin  Primary Cardiologist: Dr. Singer  Place of Service: [facility]  Patient Name: Rodolfo Grover  :1945      Subjective:     Chief Complaint:  Hospital follow-up post CABG    History of Present Illness:  Rodolfo Grover is a pleasant 76 y.o. male who is new to me .  Outside records have been requested and reviewed by me if available.     This is a patient of Dr. Singer with history of pulmonary hypertension, frequent PVCs, hypertension, sleep apnea, rheumatic fever, GERD.  He has newly diagnosed CAD post non-STEMI and is status post CABG x 7 the mitral regurgitation status post mitral valve repair with complicated postop course including pneumothorax, GI bleed, bradycardia/complete heart block, A. fib/flutter, seizure.    Patient was seen 3/2018 after suffering a humeral fracture following a fall.  At that time he was in ventricular bigeminy and had normal potassium and magnesium levels.  He also was hypertensive.  Echo showed normal LV systolic function with normal diastolic function, mild left atrial enlargement, indeterminate saline study, aortic valve sclerosis with mild regurgitation and trivial mitral tricuspid regurgitation with mild pulmonary hypertension with RVSP of 42 mmHg.  Stress perfusion study was negative for ischemia.  Follow-up echo 2020 showed EF of 55%, normal diastolic dysfunction, mild aortic and mitral valve regurgitation, mild to moderate tricuspid regurgitation with RVSP increased further to 48 mmHg.  Plan was to follow-up with sleep medicine to ensure sleep apnea was well treated and get blood pressure better controlled and then look at rechecking an echo in 3 to 6 months.  Low-dose amlodipine was added to his regimen.    2021 patient saw Lizeth Rodríguez in the office for shortness of breath chest discomfort symptoms over the last week with some associated anxiety and worsening  confusion whether or not he was taking his metoprolol succinate.  Apparently symptoms were improving with Tylenol and aspirin he did have some T wave flattening.  As part of his work-up he was sent to [facility] and had an elevated troponin 0 0.414 proBNP elevated to 219 proBNP normal echo showed normal LV systolic function with EF 62% with new wall motion abnormalities with hypokinesis of basal inferolateral, mid inferolateral and basal inferior walls, mild to moderate mitral regurgitation, trace aortic regurgitation, mild to moderate TR, RVSP increased to 61.3 mmHg.  Patient had recurrence of chest symptoms while getting echo.  Was transferred for same-day right and left heart cath.  He was found to have severe multivessel coronary disease and was referred for CABG. treated with heparin drip and nitro paste for chest pain and non-STEMI.    2/22/2021 patient is status post CABG x7 with skeletonized LIMA to mid LAD, vein graft RCA, vein graft to PDA sequentially to the LV branch, vein graft to ramus intermediate after Y graft to OM 2 and OM 3 and mitral valve repair with 36 millimeters [employer] 3D ring.  He had right saphenous vein graft harvest.  He had a complicated postop course with possible seizure for which neurology followed he apparently had negative EEG and was started on Keppra, GI bleeding coffee-ground emesis requiring transfusion 2/25 and EGD2/28 ended up showing gastritis, esophagitis single polyp was removed plan was to repeat EGD in 3 months to assess healing of esophagitis he was placed on PPI twice daily plus Carafate and biopsies ended up showing Candida so was placed on fluconazole for 10 days.  He had complete heart block in the immediate preop as well as postop heart block and bradycardia as well as atrial fibrillation/flutter for which EP valuated the patient eventually regained native conduction and did not require pacemaker. He also had acute kidney injury for which nephrology followed was felt  related to cardiogenic shock and hypotension as well as pneumothorax and chest tube placement.    Eventually able to be started on beta-blocker 3/2 and tolerated it is able to be discharged 3/3/2021 with orders for home health.  He was discharged on aspirin alone as well as furosemide.    He is presenting today for hospital follow-up post CABG. He is present today with his daughter.  He is doing better each day.  He is gaining strength each day.  He is not have any significant chest soreness and no further chest pressure which is his prior anginal symptom is not having any significant shortness of breath he denies palpitations, dizziness, lightness, syncope, near syncope he is wearing his CPAP and sleeping really well with it since adjustments were made in the hospital.  He is not had any major weight gain and his vital signs been stable blood pressure ranging 120s to 130s/60s-70s heart rate has been 60s to 70s.  He denies any cough, fever or sternal drainage but is now having drainage from his right lower extremity vein graft harvest sites.  He also has an area of tenderness and swelling right upper inner thigh that is warm and red to touch.  He saw his PCP today and he was prescribed Keflex which she has not yet started.  He states he had this in the hospital and is not feel like it is getting worse and maybe has had slow improvement but is still present.  They are quite concerned about a blood clot.  Fortunately has had no further bleeding issues at first he had some slightly dark stool that has cleared up no further coffee-ground emesis or other signs of bleeding.  He is also not had any further seizure activity.    Past Medical History:   Diagnosis Date   • CAD (coronary artery disease)    • Chest discomfort    • Elevated troponin    • Focal motor seizure (CMS/HCC)    • Generalized tonic-clonic seizure (CMS/HCC)    • GERD (gastroesophageal reflux disease)    • Iritis of right eye    • Post-ictal state (CMS/HCC)     • PVC (premature ventricular contraction)    • Rheumatic fever    • Severe obstructive sleep apnea    • Shoulder fracture, right      Past Surgical History:   Procedure Laterality Date   • CARDIAC CATHETERIZATION N/A 2/19/2021    Procedure: Coronary angiography;  Surgeon: Bry Nails MD;  Location: [facility] CATH INVASIVE LOCATION;  Service: Cardiovascular;  Laterality: N/A;   • CARDIAC CATHETERIZATION N/A 2/19/2021    Procedure: Left heart cath;  Surgeon: Bry Nails MD;  Location: [facility] CATH INVASIVE LOCATION;  Service: Cardiovascular;  Laterality: N/A;   • CARDIAC CATHETERIZATION N/A 2/19/2021    Procedure: Right Heart Cath;  Surgeon: Bry Nails MD;  Location: [facility] CATH INVASIVE LOCATION;  Service: Cardiovascular;  Laterality: N/A;   • CARDIAC CATHETERIZATION N/A 2/19/2021    Procedure: Left ventriculography;  Surgeon: Bry Nails MD;  Location: [facility] CATH INVASIVE LOCATION;  Service: Cardiovascular;  Laterality: N/A;   • CORONARY ARTERY BYPASS GRAFT N/A 2/22/2021    Procedure: BK, MIDLINE STERNOTOMY, CORONARY ARTERY BYPASS X 7 UTILIZING THE LEFT INTERNAL MAMMARY ARTERY AND THE RIGHT SAPHENOUS VEIN, MITRAL VALVE REPAIR, PRP;  Surgeon: Jr Shyam Echavarria MD;  Location: [facility];  Service: Cardiothoracic;  Laterality: N/A;   • ENDOSCOPY N/A 8/16/2018    Erosive gastritis, diverticulosis   • ENDOSCOPY N/A 2/26/2021    Procedure: ESOPHAGOGASTRODUODENOSCOPY AT BEDSIDE WITH HOT SNARE POLYPECTOMY AND CLIP PLACEMENT X1;  Surgeon: Jono Laboy MD;  Location: [facility] ENDOSCOPY;  Service: Gastroenterology;  Laterality: N/A;  PRE-  GI BLEED  POST- GASTRITIS, ESOPHAGITIS, POLYP   • HERNIA REPAIR     • SHOULDER CLOSED REDUCTION Right 3/16/2018    Procedure: RIGHT SHOULDER CLOSED REDUCTION;  Surgeon: Kj Garcia MD;  Location: [facility];  Service: Orthopedics   • TONSILLECTOMY     • TOTAL SHOULDER ARTHROPLASTY W/ DISTAL CLAVICLE EXCISION Right  3/22/2018    Procedure: Reverse Total Shoulder Arthroplsty;  Surgeon: Kj Garcia MD;  Location: [facility] MAIN OR;  Service: Orthopedics   • TRANSESOPHAGEAL ECHOCARDIOGRAM (BK) N/A 2/22/2021    Procedure: TRANSESOPHAGEAL ECHOCARDIOGRAM;  Surgeon: Jr Shyam Echavarria MD;  Location: [facility] MAIN OR;  Service: Cardiothoracic;  Laterality: N/A;     Outpatient Medications Prior to Visit   Medication Sig Dispense Refill   • acetaminophen (TYLENOL) 500 MG tablet Take 2 tablets by mouth 3 (Three) Times a Day. (Patient taking differently: Take 1,000 mg by mouth 3 (Three) Times a Day As Needed.)     • aspirin 81 MG EC tablet Take 1 tablet by mouth Daily. 30 tablet 0   • cephalexin (KEFLEX) 500 MG capsule Take 1 capsule by mouth 2 (Two) Times a Day. 14 capsule 0   • fluconazole (DIFLUCAN) 100 MG tablet Take 1 tablet by mouth Daily for 10 doses. Indications: Infection of the Skin and/or Soft Tissue 10 tablet 0   • furosemide (LASIX) 20 MG tablet Take 1 tablet by mouth 2 (Two) Times a Day. Take at 10 and 2 30 tablet 0   • HYDROcodone-acetaminophen (NORCO) 5-325 MG per tablet Take 1 tablet by mouth Every 4 (Four) Hours As Needed for Moderate Pain  for up to 7 days. 42 tablet 0   • levETIRAcetam (KEPPRA) 250 MG tablet Take 1 tablet by mouth 2 (Two) Times a Day. 60 tablet 0   • metoprolol succinate XL (TOPROL-XL) 25 MG 24 hr tablet Take 1 tablet by mouth Daily. 30 tablet 0   • pantoprazole (PROTONIX) 40 MG EC tablet Take 1 tablet by mouth 2 (Two) Times a Day Before Meals. 60 tablet 1   • Psyllium (METAMUCIL FIBER PO) Take  by mouth.     • sucralfate (CARAFATE) 1 g tablet Take 1 tablet by mouth 4 (Four) Times a Day Before Meals & at Bedtime. 120 tablet 1   • Multiple Vitamins-Minerals (EYE VITAMINS PO) Take 1 tablet by mouth Daily.     • Multiple Vitamins-Minerals (MULTIVITAMIN WITH MINERALS) tablet tablet Take 1 tablet by mouth Daily.       No facility-administered medications prior to visit.       Allergies as of 03/09/2021  "  • (No Known Allergies)     Social History     Socioeconomic History   • Marital status:      Spouse name: Not on file   • Number of children: Not on file   • Years of education: Not on file   • Highest education level: Not on file   Tobacco Use   • Smoking status: Never Smoker   • Smokeless tobacco: Never Used   Substance and Sexual Activity   • Alcohol use: Yes     Comment: occasional   • Drug use: No   • Sexual activity: Defer     Family History   Problem Relation Age of Onset   • Malig Hyperthermia Neg Hx      Review of Systems   Constitutional: Negative for chills, fever, weight gain and weight loss.   Cardiovascular: Positive for leg swelling (with drainage and area of tenderness on right inner thigh). Negative for chest pain, dyspnea on exertion, near-syncope, orthopnea, palpitations, paroxysmal nocturnal dyspnea and syncope.   Respiratory: Positive for snoring (uses cpap). Negative for cough, shortness of breath and wheezing.    Hematologic/Lymphatic: Negative for bleeding problem. Does not bruise/bleed easily.   Musculoskeletal: Negative for falls.   Gastrointestinal: Negative for abdominal pain, diarrhea, hematemesis, hematochezia, melena, nausea and vomiting.   Neurological: Positive for excessive daytime sleepiness. Negative for dizziness, light-headedness and seizures.          Objective:     Vitals:    03/09/21 1451 03/09/21 1506   BP: 125/60    BP Location: Right arm    Patient Position: Sitting    Pulse: 84 73   SpO2:  97%   Weight: 85.5 kg (188 lb 6.4 oz)    Height: 170.2 cm (67\")      Body mass index is 29.51 kg/m².    PHYSICAL EXAM:  Constitutional:       Appearance: Not in distress.   Pulmonary:      Effort: Pulmonary effort is normal.      Breath sounds: Normal breath sounds. No wheezing. No rhonchi. No rales.   Chest:      Chest wall: Not tender to palpatation.      Comments: Vertical mid sternal incision well healed and approximated  with no open areas, erythema, ecchymosis, swelling, " warmth or drainage. There is no popping or clicking of the sternum or sensation of sternal wires felt on palpation. The incision is non-tender.  There is scabbing present.    The puncture sites are well healed with no swelling, redness, warmth or drainage.  There is scabbing present      Cardiovascular:      Regular rhythm. Normal S1.      Murmurs: There is a grade 1/6 systolic murmur, radiating to the apex.      Comments: Patient has bilateral lower extremity edema slightly worse on the right leg  Edema:     Thigh: 1+ edema of the right thigh.     Pretibial: bilateral 1+ edema of the pretibial area.     Ankle: bilateral 1+ edema of the ankle.     Feet: bilateral 1+ edema of the feet.  Abdominal:      General: Bowel sounds are normal. There is no distension.      Palpations: Abdomen is soft.      Tenderness: There is no abdominal tenderness.   Skin:     General: Skin is warm and dry.           Comments: 2 right leg SVG harvest sites with some slight serosanguineous drainage yellow wound bed of lower site with some erythema around both sites.    Neurological:      Mental Status: Alert.   Psychiatric:         Attention and Perception: Attention normal.         Mood and Affect: Mood normal.         Cognition and Memory: Cognition normal.           ECG 12 Lead    Date/Time: 3/9/2021 2:55 PM  Performed by: Allyssa Leigh APRN  Authorized by: Allyssa Leigh APRN   Comparison: compared with previous ECG from 2/28/2021  Comparison to previous ECG: SR new  Rhythm comments: sinus vs ectopic atrial rhythm  Rate: normal  BPM: 84  Conduction: 1st degree AV block  T inversion: III, II and aVF  QRS axis: normal  Other findings: non-specific ST-T wave changes and T wave abnormality  Comments: Diffuse T wave abnormalities with some inferior T wave inversions  Reviewed current and prior ECG with Dr. Singer  Indication: CAD with prior NSTEMI CABG, and MVR            Most recent lab review:  3/3/2021 creatinine mildly elevated 1.31  with a low GFR 53 normal sodium potassium BUN calcium slightly low 8.0 albumin low 2.70 phosphorus slightly low 2.2    2/28/2021 LFTs normal  2/21/2020 lipid panel LDL 92, HDL 57, triglycerides 75, total cholesterol 163, hemoglobin A1c 5.40%  Assessment:       Diagnosis Plan   1. Coronary artery disease involving native coronary artery of native heart with unstable angina pectoris (CMS/HCC)  Duplex Venous Lower Extremity - Bilateral CAR   2. S/P CABG x 7  Duplex Venous Lower Extremity - Bilateral CAR   3. Mitral valve insufficiency, unspecified etiology  Duplex Venous Lower Extremity - Bilateral CAR   4. S/P mitral valve repair  Duplex Venous Lower Extremity - Bilateral CAR   5. Atrial fibrillation, unspecified type (CMS/HCC)  Duplex Venous Lower Extremity - Bilateral CAR   6. Ischemic cardiomyopathy  Duplex Venous Lower Extremity - Bilateral CAR   7. Pulmonary hypertension (CMS/HCC)  Duplex Venous Lower Extremity - Bilateral CAR   8. Acute blood loss anemia  Duplex Venous Lower Extremity - Bilateral CAR   9. Bradycardia following surgery  Duplex Venous Lower Extremity - Bilateral CAR   10. Focal motor seizure (CMS/HCC)  Duplex Venous Lower Extremity - Bilateral CAR   11. Severe obstructive sleep apnea  Duplex Venous Lower Extremity - Bilateral CAR   12. Bilateral lower extremity edema  Duplex Venous Lower Extremity - Bilateral CAR       Plan:            Patient here for hospital follow-up after long complicated postop course      1.  Non-STEMI with multivessel coronary disease: s/p right & left Heart cath 2/19. now CABG x7 2/22 care complicated by GI bleed, acute renal insufficiency, seizure, bradycardia and right-sided pneumothorax.   Slowly improving lungs are clear.  He is having no anginal symptoms.  His ECG and T wave were reviewed with Dr. Singer today.  2.  Ischemic cardiomyopathy: EF ortiz op 40%.  Postop BK 45 to 50%.  On low-dose beta-blocker and furosemide.  He does have lower extremity edema but no PND,  orthopnea or other heart failure symptoms.  3.  Mitral valve prolapse, status post repair.  As above  4.  Complete heart block with atrial flutter and bradycardia: Evaluated by  EP and ultimately did not require pacemaker and was started on low-dose beta-blocker.  Anticoagulation was held off per CT surgery.  Appears sinus versus ectopic atrial rhythm with first-degree AV block heart rate stable on ECG today.  QT stable.  5.  Hematemesis with anemia: EGD 2/26 with esophagitis, gastritis and plan was for repeat EGD in a few months.  He also had Candida and placed on fluconazole. 3/2/2021 H&H overall stable at 9.1/28.7.  Denies further signs of bleeding.  6. Post op Seizure: EEG reportedly negative.  Remain on Keppra and have outpatient neurology follow-up.  Defer to neurology.  5.  Pulmonary hypertension  6.  Obstructive sleep apnea, on CPAP therapy  7.  Dyslipidemia-address at next visit as below  8.  History of hypertension: Actually had cardiogenic shock and hypotension while inpatient.  He was eventually able to be started on low-dose metoprolol BP is stable.  I have encouraged him to continue to monitor at home.   9. SETH: Likely due to shock and hypotension.  Started to improve at discharge. Was managed by nephrology while in patient. Has repeat labs ordered by PCP.  10.  Postop atrial flutter/fibrillation: He is on low-dose beta-blocker. Not on AC due to acute GI bleed/anemia while inpatient.  Today's ECG shows sinus versus ectopic atrial rhythm with first-degree AV block.  We will need to continue to monitor.        Due to area of erythema/hematoma on right inner thigh he was placed on Keflex today by PCP which I agree with because he is having some drainage and redness around his right lower leg incisions.  Pictures taken on daughter's phone to monitor over the next couple days. I informed patient and his daughter if he does not have improvement in his redness and drainage of lower extremity in the next  couple days he needs to see CT surgery sooner than 3/31/2021.   He also has repeat labs ordered including CBC, with iron panel, BMP per PCP which are still pending.  Due to concern for DVT he will be sent for stat hold and call lower extremity venous duplex.  Otherwise he seems to be doing pretty well and recovering well.  Would continue him on his current management with aspirin and furosemide unless his renal function comes back markedly abnormal. We will need to revisit statin therapy at next appointment.  Will also need to have a conversation with GI/CT surgery about anticoagulation if he requires it.        Follow up with Dr. Singer within 4 weeks, unless otherwise needed sooner.  I advised the patient to contact our office with any questions or concerns.      I have reviewed this case with Dr. Singer as well as his leg wounds and ECG it has been a pleasure to participate in this patient's care. Please feel free to contact me with any questions or concerns.     Allyssa Leigh, NKECHI  03/09/21             Your medication list          Accurate as of March 9, 2021  3:36 PM. If you have any questions, ask your nurse or doctor.            START taking these medications      Instructions Last Dose Given Next Dose Due   cephalexin 500 MG capsule  Commonly known as: KEFLEX  Started by: Domenica Merida MD      Take 1 capsule by mouth 2 (Two) Times a Day.          CHANGE how you take these medications      Instructions Last Dose Given Next Dose Due   acetaminophen 500 MG tablet  Commonly known as: TYLENOL  What changed:   · when to take this  · reasons to take this      Take 2 tablets by mouth 3 (Three) Times a Day.       furosemide 20 MG tablet  Commonly known as: LASIX  What changed:   · when to take this  · additional instructions  Changed by: Domenica Merida MD      Take 1 tablet by mouth 2 (Two) Times a Day. Take at 10 and 2          CONTINUE taking these medications      Instructions Last Dose Given Next Dose Due   Aspirin  Low Dose 81 MG EC tablet  Generic drug: aspirin      Take 1 tablet by mouth Daily.       fluconazole 100 MG tablet  Commonly known as: DIFLUCAN      Take 1 tablet by mouth Daily for 10 doses. Indications: Infection of the Skin and/or Soft Tissue       HYDROcodone-acetaminophen 5-325 MG per tablet  Commonly known as: NORCO      Take 1 tablet by mouth Every 4 (Four) Hours As Needed for Moderate Pain  for up to 7 days.       levETIRAcetam 250 MG tablet  Commonly known as: KEPPRA      Take 1 tablet by mouth 2 (Two) Times a Day.       METAMUCIL FIBER PO      Take  by mouth.       metoprolol succinate XL 25 MG 24 hr tablet  Commonly known as: TOPROL-XL      Take 1 tablet by mouth Daily.       pantoprazole 40 MG EC tablet  Commonly known as: PROTONIX      Take 1 tablet by mouth 2 (Two) Times a Day Before Meals.       sucralfate 1 g tablet  Commonly known as: CARAFATE      Take 1 tablet by mouth 4 (Four) Times a Day Before Meals & at Bedtime.          STOP taking these medications    EYE VITAMINS PO  Stopped by: NKECHI Valentin        multivitamin with minerals tablet tablet  Stopped by: NKECHI Valentin              Where to Get Your Medications      These medications were sent to [facility] [address] [place] [id number] [facility] - [phone]  - [phone]   [id number] [place] [address]    Phone: [phone]   · cephalexin 500 MG capsule     Information about where to get these medications is not yet available    Ask your nurse or doctor about these medications  · furosemide 20 MG tablet         The above medication changes may not have been made by this provider.  Medication list was updated to reflect medications patient is currently taking including medication changes and discontinuations made by other healthcare providers or the patients themselves.     Dictated utilizing Dragon Dictation System.

## 2021-03-10 ENCOUNTER — OUTSIDE FACILITY SERVICE (OUTPATIENT)
Dept: CARDIAC SURGERY | Facility: CLINIC | Age: 76
End: 2021-03-10

## 2021-03-10 ENCOUNTER — TELEPHONE (OUTPATIENT)
Dept: CARDIOLOGY | Facility: CLINIC | Age: 76
End: 2021-03-10

## 2021-03-10 ENCOUNTER — READMISSION MANAGEMENT (OUTPATIENT)
Dept: CALL CENTER | Facility: HOSPITAL | Age: 76
End: 2021-03-10

## 2021-03-10 LAB
BUN SERPL-MCNC: 21 MG/DL (ref 8–27)
BUN/CREAT SERPL: 13 (ref 10–24)
CALCIUM SERPL-MCNC: 8.9 MG/DL (ref 8.6–10.2)
CHLORIDE SERPL-SCNC: 103 MMOL/L (ref 96–106)
CO2 SERPL-SCNC: 23 MMOL/L (ref 20–29)
CREAT SERPL-MCNC: 1.63 MG/DL (ref 0.76–1.27)
ERYTHROCYTE [DISTWIDTH] IN BLOOD BY AUTOMATED COUNT: 13.3 % (ref 11.6–15.4)
FERRITIN SERPL-MCNC: 480 NG/ML (ref 30–400)
GLUCOSE SERPL-MCNC: 87 MG/DL (ref 65–99)
HCT VFR BLD AUTO: 29.1 % (ref 37.5–51)
HGB BLD-MCNC: 9.3 G/DL (ref 13–17.7)
IRON SATN MFR SERPL: 15 % (ref 15–55)
IRON SERPL-MCNC: 42 UG/DL (ref 38–169)
MCH RBC QN AUTO: 31 PG (ref 26.6–33)
MCHC RBC AUTO-ENTMCNC: 32 G/DL (ref 31.5–35.7)
MCV RBC AUTO: 97 FL (ref 79–97)
PLATELET # BLD AUTO: 374 X10E3/UL (ref 150–450)
POTASSIUM SERPL-SCNC: 4.7 MMOL/L (ref 3.5–5.2)
RBC # BLD AUTO: 3 X10E6/UL (ref 4.14–5.8)
SODIUM SERPL-SCNC: 141 MMOL/L (ref 134–144)
TIBC SERPL-MCNC: 272 UG/DL (ref 250–450)
UIBC SERPL-MCNC: 230 UG/DL (ref 111–343)
WBC # BLD AUTO: 9.1 X10E3/UL (ref 3.4–10.8)

## 2021-03-10 PROCEDURE — G0180 MD CERTIFICATION HHA PATIENT: HCPCS | Performed by: THORACIC SURGERY (CARDIOTHORACIC VASCULAR SURGERY)

## 2021-03-10 NOTE — PROGRESS NOTES
I saw this patient yesterday for hospital follow up. He was red, tender, indurated on his right inner thigh. I ordered venous duplex which shows the acute superficial thrombophlebitis. No DVT. He is on 81 aspirin nothing else as he had GI bleed post op. He also has some drainage and redness of his two right leg graft site incisions. Dr. Merida had already called in [facility] for him yesterday when she saw him. Do you have anything else to add and do you want to see this patient sooner than 3/31?

## 2021-03-10 NOTE — OUTREACH NOTE
CT Surgery Week 2 Survey      Responses   [facility] patient discharged from?  [facility]   Does the patient have one of the following disease processes/diagnoses(primary or secondary)?  Cardiothoracic surgery   Week 2 attempt successful?  No   Unsuccessful attempts  Attempt 1          Chioma Hewitt RN

## 2021-03-10 NOTE — TELEPHONE ENCOUNTER
Hospital Follow-Up     Patient Name: WILLIAM RIVERA  : 1945  Caller: Christina Kong    New or established patient: NEW    Date of discharge: 3-3-2021    Facility discharged from: [facility]    Diagnosis/Symptoms:artery of native heart with unstable angina pectoris    Length of stay (If applicable): 12 DAYS    Follow up: Schedule an appointment with Jordan Raygoza MD (Neurology) in 1 month (4/3/2021)    **PIPER WITH THE HUB SPOKE WITH REJI AT [facility] OFFICE AND REJI WAS GOING TO WORK ON SETTING UP APPT WITH ONE OF THE PROVIDERS AND CALL THE PT'S DAUGHTER Christina Kong.    PT'S DAUGHTER Christina Kong CALLS TODAY CHECK UP ON THE STATUS OF GETTING THE HOSP F/U APPT MADE. I MADE A CALL TO [facility] OFFICE AND SPOKE WITH BURAK DUE TO REJI BEING OFF. BURAK WILL CONTACT REJI AND SHE WILL WORK ON GETTING THE HOSP F/U APPT SCHEDULED.     Best call back number:Christina Gregorychie [phone]

## 2021-03-10 NOTE — TELEPHONE ENCOUNTER
after discussing his lower extremity venous duplex results with right thigh superficial thrombophlebitis no DVT noted with CT surgery NP Aleta. I called and spoke with daughter Christina (on ALVIN form as patient did not answer his phone) regarding the results.  Plan of care is to continue compression stockings use warm compresses continue aspirin 81 mg daily and continue with Keflex as already ordered by Dr. Merida.  Aleta plans to see the patient sooner than 3/31/2021 and her office will call to arrange earlier follow-up within a week.  I informed patient's daughter to call their office if she is not heard about a sooner appointment by early next week or if symptoms worsen and she verbalized understanding of the plan of care.

## 2021-03-11 ENCOUNTER — READMISSION MANAGEMENT (OUTPATIENT)
Dept: CALL CENTER | Facility: HOSPITAL | Age: 76
End: 2021-03-11

## 2021-03-11 NOTE — OUTREACH NOTE
CT Surgery Week 2 Survey      Responses   [facility] patient discharged from?  [place]   Does the patient have one of the following disease processes/diagnoses(primary or secondary)?  Cardiothoracic surgery   Week 2 attempt successful?  Yes   Call start time  1010   Call end time  1015   Meds reviewed with patient/caregiver?  Yes   Is the patient having any side effects they believe may be caused by any medication additions or changes?  No   Does the patient have all medications related to this admission filled (includes all antibiotics, pain medications, cardiac medications, etc.)  Yes   Is the patient taking all medications as directed (includes completed medication regime)?  Yes   Does the patient have a primary care provider?   Yes   Does the patient have an appointment scheduled with their C/T surgeon?  Yes   Has the patient kept scheduled appointments due by today?  N/A   What is the Home health agency?   [facility] out [facility] to [facility] pt[facility] in [facility],  staying with daughter   Has home health visited the patient within 72 hours of discharge?  Yes   Psychosocial issues?  No   What is the patient's perception of their health status since discharge?  Improving   Nursing interventions  Nurse provided patient education   Is the patient/caregiver able to teach back normal signs of recovery?  Nausea and lack of appetite, Constipation, Pain or discomfort at incisional site, Depression or irritability   Nursing interventions  Reassured on normal signs of recovery   Is the patient /caregiver able to teach back basic post-op care?  Continue use of incentive spirometry at least 1 week post discharge, Practice 'cough and deep breath', No tub bath, swimming, or hot tub until instructed by MD, Lifting as instructed by MD in discharge instructions   Is the patient/caregiver able to teach back signs and symptoms of incisional infection?  Increased redness, swelling or pain at the incisonal site, Increased drainage  or bleeding, Incisional warmth, Pus or odor from incision, Fever   Is the patient/caregiver able to teach back steps to recovery at home?  Eat a well-balance diet, Rest and rebuild strength, gradually increase activity, Set small, achievable goals for return to baseline health   Is the patient /caregiver able to teach back the importance of cardiac rehab?  Yes   Nursing interventions  Provided education on importance of cardiac rehab   If the patient is a current smoker, are they able to teach back resources for cessation?  Not a smoker   Is the patient/caregiver able to teach back the hierarchy of who to call/visit for symptoms/problems? PCP, Specialist, Home health nurse, Urgent Care, ED, 911  Yes   Week 2 call completed?  Yes   Wrap up additional comments  Pt states he is doing well, has HH, PT coming to house. Pt is staying with his daughter.          Sandra Gastelum RN

## 2021-03-12 ENCOUNTER — OUTSIDE FACILITY SERVICE (OUTPATIENT)
Dept: CARDIAC SURGERY | Facility: CLINIC | Age: 76
End: 2021-03-12

## 2021-03-12 PROCEDURE — G0179 MD RECERTIFICATION HHA PT: HCPCS | Performed by: THORACIC SURGERY (CARDIOTHORACIC VASCULAR SURGERY)

## 2021-03-17 ENCOUNTER — OFFICE VISIT (OUTPATIENT)
Dept: CARDIAC SURGERY | Facility: CLINIC | Age: 76
End: 2021-03-17

## 2021-03-17 VITALS
HEIGHT: 67 IN | SYSTOLIC BLOOD PRESSURE: 118 MMHG | BODY MASS INDEX: 28.56 KG/M2 | DIASTOLIC BLOOD PRESSURE: 73 MMHG | HEART RATE: 89 BPM | WEIGHT: 182 LBS | OXYGEN SATURATION: 97 % | RESPIRATION RATE: 20 BRPM | TEMPERATURE: 97.4 F

## 2021-03-17 DIAGNOSIS — Z98.890 S/P MITRAL VALVE REPAIR: ICD-10-CM

## 2021-03-17 DIAGNOSIS — Z95.1 S/P CABG X 7: Primary | ICD-10-CM

## 2021-03-17 PROCEDURE — 99024 POSTOP FOLLOW-UP VISIT: CPT | Performed by: NURSE PRACTITIONER

## 2021-03-17 RX ORDER — FUROSEMIDE 20 MG/1
20 TABLET ORAL DAILY PRN
Start: 2021-03-17 | End: 2021-03-31

## 2021-03-17 NOTE — PROGRESS NOTES
CARDIOVASCULAR SURGERY FOLLOW-UP PROGRESS NOTE  Chief Complaint: Postop follow-up        HPI:   Dear Domenica Burrell MD and colleagues:    It was nice to see Rodolfo Grover in follow up 3 weeks after surgery.  As you know, he is a 76 y.o. male with CAD, mitral incompetence, ischemic cardiomyopathy, cardiogenic shock with hypotension and complete heart block and immediate preop period who underwent urgent CABG x7 with LIMA, mitral valve repair on 2/22/2021 with Dr. Echavarria. He had a complicated postoperative course with right pneumothorax requiring postoperative chest tube placement, atrial flutter, postop seizure on postop day 2 currently suppressed by Keppra, acute GI blood loss with coffee-ground emesis and esophagitis by EGD, and acute kidney injury.  He is scheduled for follow-up with neurology to discuss discontinuation of his Keppra.  He has continued to hold any anticoagulation with the exception of aspirin 81 mg due to his GI blood loss.  He was recently seen by cardiology APRN and was sent to our office after lower extremity Doppler demonstrated right thrombophlebitis at [facility] site, his primary care had initiated him on antibiotic treatment.  He comes in today complaining of right leg discomfort.  His activity level has been good.  From a surgical standpoint, the sternal incision is well approximated without erythema, edema, or drainage.  The sternum is stable to palpation, and the patient denies any popping or clicking with deep inspiration or coughing.  His right thigh has significant hematoma, right harvest site incisions are well approximated with mild erythema along incision line, no drainage or fluctuance is noted.  He does have 1+ right lower extremity edema, he states that his weight has been very stable.  Upon review of his labs his creatinine was 1.6 on 3/9/2021, his creatinine peak was 3.1 on 2/24/2021 and was 1.3 prior to discharge.    Physical Exam:         /73 (BP Location: Right arm,  "Patient Position: Sitting, Cuff Size: Adult)   Pulse 89   Temp 97.4 °F (36.3 °C) (Oral)   Resp 20   Ht 170.2 cm (67\")   Wt 82.6 kg (182 lb)   SpO2 97%   BMI 28.51 kg/m²   Heart:  regular rate and rhythm, S1, S2 normal, no murmur, click, rub or gallop  Lungs:  diminished breath sounds R base  Extremities:  1+ lower extremity edema on right  Incision(s):  mid chest healing well, sternum stable    Assessment/Plan:     S/P CABG and mitral valve repair. Overall, he is doing well.    Saphenous vein harvest site superficial thrombophlebitis, continue antibiotics to completion, compression hose to right lower extremity    Lasix as needed, will recheck BMP after next office visit    No heavy lifting > 10 pounds for 3 more weeks  Keep incisions clean and dry  Follow-up as scheduled with cardiology  Return to clinic in 1-2 week(s) with no new studies    Continue lifting restriction of 10 lbs until 6 weeks and 50 lbs until 12 weeks from the date of surgery, no excessive jarring motions or twisting motions until 12 weeks from the date of surgery    Return to clinic if any signs or symptoms of infection or sternal instability develop       Thank you for allowing me to participate in the care of your patient.    Regards,  NKECHI Kent      "

## 2021-03-17 NOTE — PATIENT INSTRUCTIONS
Weigh daily.  Take Lasix (furosemide) for weight gain of 3 lbs/24 hours or 5 lbs/72 hours, take potassium pill with every lasix dose.

## 2021-03-19 ENCOUNTER — READMISSION MANAGEMENT (OUTPATIENT)
Dept: CALL CENTER | Facility: HOSPITAL | Age: 76
End: 2021-03-19

## 2021-03-19 NOTE — OUTREACH NOTE
CT Surgery Week 3 Survey      Responses   [facility] patient discharged from?  [place]   Does the patient have one of the following disease processes/diagnoses(primary or secondary)?  Cardiothoracic surgery   Week 3 attempt successful?  Yes   Call start time  1120   Call end time  1126   Discharge diagnosis  Coronary artery disease involving native coronary artery of native heart with unstable angina pectoris,   Urgent CABG x7   Is patient permission given to speak with other caregiver?  Yes   List who call center can speak with  dtr   What is the Home health agency?   [facility] out today to see pt. in [facility],  staying with daughter   Home health comments  PT coming 2x/wk, Skilled nsg 1x/wk   Psychosocial issues?  No   Comments  Incisions all healed well. Wearing NORBERTO hose for edema in LE. Pt doing daily wts.    What is the patient's perception of their health status since discharge?  Improving   Is the patient /caregiver able to teach back basic post-op care?  Practice 'cough and deep breath', Continue use of incentive spirometry at least 1 week post discharge, Lifting as instructed by MD in discharge instructions   Is the patient/caregiver able to teach back steps to recovery at home?  Set small, achievable goals for return to baseline health   Is the patient/caregiver able to teach back the hierarchy of who to call/visit for symptoms/problems? PCP, Specialist, Home health nurse, Urgent Care, ED, 911  Yes   Week 3 call completed?  Yes   Wrap up additional comments  Pt states he is doing well, has HH, PT coming to house. Pt is staying with his daughter.          Isabela Hagen, RN

## 2021-03-29 ENCOUNTER — TELEPHONE (OUTPATIENT)
Dept: FAMILY MEDICINE CLINIC | Facility: CLINIC | Age: 76
End: 2021-03-29

## 2021-03-29 ENCOUNTER — TELEPHONE (OUTPATIENT)
Dept: GASTROENTEROLOGY | Facility: CLINIC | Age: 76
End: 2021-03-29

## 2021-03-29 ENCOUNTER — NURSE TRIAGE (OUTPATIENT)
Dept: CALL CENTER | Facility: HOSPITAL | Age: 76
End: 2021-03-29

## 2021-03-29 ENCOUNTER — READMISSION MANAGEMENT (OUTPATIENT)
Dept: CALL CENTER | Facility: HOSPITAL | Age: 76
End: 2021-03-29

## 2021-03-29 DIAGNOSIS — I49.3 FREQUENT PVCS: ICD-10-CM

## 2021-03-29 NOTE — TELEPHONE ENCOUNTER
"    Reason for Disposition  • [1] Caller requesting a NON-URGENT new prescription or refill AND [2] triager unable to refill per unit policy    Additional Information  • Negative: Drug overdose and triager unable to answer question  • Negative: Caller requesting information unrelated to medicine  • Negative: Caller requesting a prescription for Strep throat and has a positive culture result  • Negative: Rash while taking a medication or within 3 days of stopping it  • Negative: Immunization reaction suspected  • Negative: [1] Asthma and [2] having symptoms of asthma (cough, wheezing, etc.)  • Negative: [1] Influenza symptoms AND [2] anti-viral med prescription request, such as Tamiflu  • Negative: [1] Symptom of illness (e.g., headache, abdominal pain, earache, vomiting) AND [2] more than mild  • Negative: MORE THAN A DOUBLE DOSE of a prescription or over-the-counter (OTC) drug  • Negative: [1] DOUBLE DOSE (an extra dose or lesser amount) of over-the-counter (OTC) drug AND [2] any symptoms (e.g., dizziness, nausea, pain, sleepiness)  • Negative: [1] DOUBLE DOSE (an extra dose or lesser amount) of prescription drug AND [2] any symptoms (e.g., dizziness, nausea, pain, sleepiness)  • Negative: Took another person's prescription drug  • Negative: [1] DOUBLE DOSE (an extra dose or lesser amount) of prescription drug AND [2] NO symptoms (Exception: a double dose of antibiotics)  • Negative: Diabetes drug error or overdose (e.g., took wrong type of insulin or took extra dose)  • Negative: [1] Request for URGENT new prescription or refill of \"essential\" medication (i.e., likelihood of harm to patient if not taken) AND [2] triager unable to fill per unit policy  • Negative: [1] Prescription not at pharmacy AND [2] was prescribed by PCP recently  • Negative: [1] Pharmacy calling with prescription questions AND [2] triager unable to answer question  • Negative: [1] Caller has URGENT medication question about med that PCP or " "specialist prescribed AND [2] triager unable to answer question  • Negative: [1] Caller has NON-URGENT medication question about med that PCP prescribed AND [2] triager unable to answer question    Answer Assessment - Initial Assessment Questions  1.   NAME of MEDICATION: \"What medicine are you calling about?\"  Needing refills omn medications prescribed at discharge.  2.   QUESTION: \"What is your question?\"      *No Answer*  3.   PRESCRIBING HCP: \"Who prescribed it?\" Reason: if prescribed by specialist, call should be referred to that group.      *No Answer*  4. SYMPTOMS: \"Do you have any symptoms?\"      *No Answer*  5. SEVERITY: If symptoms are present, ask \"Are they mild, moderate or severe?\"      *No Answer*  6.  PREGNANCY:  \"Is there any chance that you are pregnant?\" \"When was your last menstrual period?\"      *No Answer*    Protocols used: MEDICATION QUESTION CALL-ADULT-AH      "

## 2021-03-29 NOTE — OUTREACH NOTE
CT Surgery Week 4 Survey      Responses   [facility] [place] patient discharged from?  [place]   Does the patient have one of the following disease processes/diagnoses(primary or secondary)?  Cardiothoracic surgery   Week 4 attempt successful?  Yes   Call start time  1609   Call end time  1612   Discharge diagnosis  Coronary artery disease involving native coronary artery of native heart with unstable angina pectoris,   Urgent CABG x7   Meds reviewed with patient/caregiver?  Yes   Is the patient taking all medications as directed (includes completed medication regime)?  Yes   Has the patient kept scheduled appointments due by today?  Yes   Is the patient still receiving Home Health Services?  Yes   Psychosocial issues?  No   Comments  Swelling in LE but it's not as much.   What is the patient's perception of their health status since discharge?  Improving   Is the patient/caregiver able to teach back steps to recovery at home?  Rest and rebuild strength, gradually increase activity   Is the patient/caregiver able to teach back the hierarchy of who to call/visit for symptoms/problems? PCP, Specialist, Home health nurse, Urgent Care, ED, 911  Yes   Additional teach back comments  Incision doing well.Asking about cardiac rehab   Week 4 Call Completed?  Yes   Would the patient like one additional call?  No   Graduated  Yes   Is the patient interested in additional calls from an ambulatory ?  NOTE:  applies to high risk patients requiring additional follow-up.  No   Did the patient feel the follow up calls were helpful during their recovery period?  Yes   Was the number of calls appropriate?  Yes          Darby Navarro RN

## 2021-03-31 ENCOUNTER — OFFICE VISIT (OUTPATIENT)
Dept: CARDIAC SURGERY | Facility: CLINIC | Age: 76
End: 2021-03-31

## 2021-03-31 ENCOUNTER — LAB (OUTPATIENT)
Dept: LAB | Facility: HOSPITAL | Age: 76
End: 2021-03-31

## 2021-03-31 VITALS
RESPIRATION RATE: 20 BRPM | WEIGHT: 191 LBS | HEART RATE: 92 BPM | OXYGEN SATURATION: 100 % | BODY MASS INDEX: 29.98 KG/M2 | SYSTOLIC BLOOD PRESSURE: 130 MMHG | DIASTOLIC BLOOD PRESSURE: 76 MMHG | HEIGHT: 67 IN | TEMPERATURE: 97.5 F

## 2021-03-31 DIAGNOSIS — Z95.1 S/P CABG X 7: ICD-10-CM

## 2021-03-31 DIAGNOSIS — N18.30 STAGE 3 CHRONIC KIDNEY DISEASE, UNSPECIFIED WHETHER STAGE 3A OR 3B CKD (HCC): Primary | ICD-10-CM

## 2021-03-31 DIAGNOSIS — Z98.890 S/P MITRAL VALVE REPAIR: ICD-10-CM

## 2021-03-31 DIAGNOSIS — N18.30 STAGE 3 CHRONIC KIDNEY DISEASE, UNSPECIFIED WHETHER STAGE 3A OR 3B CKD (HCC): ICD-10-CM

## 2021-03-31 LAB
ANION GAP SERPL CALCULATED.3IONS-SCNC: 7.2 MMOL/L (ref 5–15)
BUN SERPL-MCNC: 19 MG/DL (ref 8–23)
BUN/CREAT SERPL: 13 (ref 7–25)
CALCIUM SPEC-SCNC: 8.7 MG/DL (ref 8.6–10.5)
CHLORIDE SERPL-SCNC: 106 MMOL/L (ref 98–107)
CO2 SERPL-SCNC: 27.8 MMOL/L (ref 22–29)
CREAT SERPL-MCNC: 1.46 MG/DL (ref 0.76–1.27)
GFR SERPL CREATININE-BSD FRML MDRD: 47 ML/MIN/1.73
GLUCOSE SERPL-MCNC: 100 MG/DL (ref 65–99)
POTASSIUM SERPL-SCNC: 4.2 MMOL/L (ref 3.5–5.2)
SODIUM SERPL-SCNC: 141 MMOL/L (ref 136–145)

## 2021-03-31 PROCEDURE — 80048 BASIC METABOLIC PNL TOTAL CA: CPT

## 2021-03-31 PROCEDURE — 99024 POSTOP FOLLOW-UP VISIT: CPT | Performed by: NURSE PRACTITIONER

## 2021-03-31 PROCEDURE — 36415 COLL VENOUS BLD VENIPUNCTURE: CPT

## 2021-03-31 NOTE — PROGRESS NOTES
"CARDIOVASCULAR SURGERY FOLLOW-UP PROGRESS NOTE  Chief Complaint: Postop follow-up        HPI:   Dear Domenica Burrell MD and colleagues:    It was nice to see Rodolfo Grover in follow up 5 weeks after surgery.  As you know, he is a 76 y.o. male with CAD, mitral incompetence, ischemic cardiomyopathy, cardiogenic shock with hypotension and complete heart block in the immediate preop period who underwent urgent CABG x7 with LIMA, mitral valve repair on 2/22/2021 with Dr. Echavarria. He had a complicated postoperative course with right pneumothorax requiring postoperative chest tube placement, atrial flutter, postop seizure on postop day 2 currently suppressed by Keppra, acute GI blood loss with coffee-ground emesis and esophagitis by EGD, and acute kidney injury.  He continues to take aspirin and lieu of any other anticoagulation due to his GI issues.  He underwent a lower extremity Doppler ordered by cardiology APRN that demonstrated a right thrombophlebitis at the [facility] site, he has completed a round of Keflex, he states that it is much less painful than when I saw him 2 weeks ago.  He comes in today complaining of nothing.  His activity level has been good.  From a surgical standpoint, the sternal incision is well approximated without erythema, edema, or drainage.  The sternum is stable to palpation, and the patient denies any popping or clicking with deep inspiration or coughing.  He has superficial yellow slough at his right lower extremity distal harvest site, I instructed him to leave it open to air, wash twice daily and paint with Betadine.  There is no erythema, warmth, or exudate noted.    He is no longer taking Lasix, his creatinine peak was 3.1 on 2/24/2021, was 1.3 prior to discharge, and was 1.6 on 3/9/2021    Physical Exam:         /76 (BP Location: Right arm, Patient Position: Sitting, Cuff Size: Adult)   Pulse 92   Temp 97.5 °F (36.4 °C) (Oral)   Resp 20   Ht 170.2 cm (67\")   Wt 86.6 kg (191 lb)  "  SpO2 100%   BMI 29.91 kg/m²   Heart:  regular rate and rhythm, S1, S2 normal, no murmur, click, rub or gallop  Lungs:  clear to auscultation bilaterally  Extremities:  no edema  Incision(s):  mid chest healing well, sternum stable    Assessment/Plan:     S/P CABG. Overall, he is doing well.    Slow post-op rehabilitation progress    No heavy lifting > 10 pounds for 1 more weeks  Keep incisions clean and dry  OK to drive if not taking narcotic pain medicine  OK to begin cardiac rehab  Follow-up as scheduled with cardiology  Return to clinic in 1 week(s) with no new studies  BMP to follow-up on renal function--- results demonstrate a creatinine of 1.4 which is improved and may be his new baseline    Continue lifting restriction of 10 lbs until 6 weeks and 50 lbs until 12 weeks from the date of surgery, no excessive jarring motions or twisting motions until 12 weeks from the date of surgery    Return to clinic if any signs or symptoms of infection or sternal instability develop       Thank you for allowing me to participate in the care of your patient.    Regards,  NKECHI Kent

## 2021-04-02 DIAGNOSIS — I49.3 FREQUENT PVCS: ICD-10-CM

## 2021-04-02 RX ORDER — METOPROLOL SUCCINATE 25 MG/1
25 TABLET, EXTENDED RELEASE ORAL DAILY
Qty: 90 TABLET | Refills: 1 | Status: SHIPPED | OUTPATIENT
Start: 2021-04-02 | End: 2021-06-25 | Stop reason: SDUPTHER

## 2021-04-02 RX ORDER — METOPROLOL SUCCINATE 25 MG/1
25 TABLET, EXTENDED RELEASE ORAL DAILY
Qty: 30 TABLET | Refills: 0 | OUTPATIENT
Start: 2021-04-02

## 2021-04-02 RX ORDER — PANTOPRAZOLE SODIUM 40 MG/1
40 TABLET, DELAYED RELEASE ORAL
Qty: 60 TABLET | Refills: 1 | OUTPATIENT
Start: 2021-04-02

## 2021-04-02 RX ORDER — ASPIRIN 81 MG/1
81 TABLET ORAL DAILY
Qty: 90 TABLET | Refills: 1 | Status: SHIPPED | OUTPATIENT
Start: 2021-04-02

## 2021-04-02 RX ORDER — PANTOPRAZOLE SODIUM 40 MG/1
40 TABLET, DELAYED RELEASE ORAL
Qty: 60 TABLET | Refills: 1 | Status: SHIPPED | OUTPATIENT
Start: 2021-04-02 | End: 2021-06-07 | Stop reason: SDUPTHER

## 2021-04-02 RX ORDER — LEVETIRACETAM 250 MG/1
250 TABLET ORAL 2 TIMES DAILY
Qty: 60 TABLET | Refills: 0 | Status: SHIPPED | OUTPATIENT
Start: 2021-04-02 | End: 2021-04-21

## 2021-04-02 RX ORDER — SUCRALFATE 1 G/1
1 TABLET ORAL
Qty: 120 TABLET | Refills: 1 | Status: SHIPPED | OUTPATIENT
Start: 2021-04-02 | End: 2021-05-05 | Stop reason: ALTCHOICE

## 2021-04-02 RX ORDER — SUCRALFATE 1 G/1
1 TABLET ORAL
Qty: 120 TABLET | Refills: 1 | OUTPATIENT
Start: 2021-04-02

## 2021-04-02 RX ORDER — LEVETIRACETAM 250 MG/1
250 TABLET ORAL 2 TIMES DAILY
Qty: 60 TABLET | Refills: 0 | OUTPATIENT
Start: 2021-04-02

## 2021-04-02 NOTE — TELEPHONE ENCOUNTER
Caller: Rodolfo Grover SALVADOR    Relationship: Self    Best call back number: [phone]    Medication needed:   Requested Prescriptions     Pending Prescriptions Disp Refills   • levETIRAcetam (KEPPRA) 250 MG tablet 60 tablet 0     Sig: Take 1 tablet by mouth 2 (Two) Times a Day.   • metoprolol succinate XL (TOPROL-XL) 25 MG 24 hr tablet 30 tablet 0     Sig: Take 1 tablet by mouth Daily.   • pantoprazole (PROTONIX) 40 MG EC tablet 60 tablet 1     Sig: Take 1 tablet by mouth 2 (Two) Times a Day Before Meals.   • sucralfate (CARAFATE) 1 g tablet 120 tablet 1     Sig: Take 1 tablet by mouth 4 (Four) Times a Day Before Meals & at Bedtime.       When do you need the refill by: TODAY    What additional details did the patient provide when requesting the medication: WILL BE OUT OF MEDICATION TODAY.    Does the patient have less than a 3 day supply:  [x] Yes  [] No    What is the patient's preferred pharmacy: [address] [address] [facility] [phone] [facility] [phone]

## 2021-04-05 ENCOUNTER — OFFICE VISIT (OUTPATIENT)
Dept: GASTROENTEROLOGY | Facility: CLINIC | Age: 76
End: 2021-04-05

## 2021-04-05 VITALS
HEIGHT: 67 IN | DIASTOLIC BLOOD PRESSURE: 62 MMHG | WEIGHT: 187 LBS | BODY MASS INDEX: 29.35 KG/M2 | TEMPERATURE: 97.7 F | SYSTOLIC BLOOD PRESSURE: 128 MMHG

## 2021-04-05 DIAGNOSIS — Z95.1 S/P CABG X 7: ICD-10-CM

## 2021-04-05 DIAGNOSIS — D62 ACUTE BLOOD LOSS ANEMIA: Primary | ICD-10-CM

## 2021-04-05 DIAGNOSIS — D50.0 IRON DEFICIENCY ANEMIA DUE TO CHRONIC BLOOD LOSS: ICD-10-CM

## 2021-04-05 DIAGNOSIS — K21.01 GASTROESOPHAGEAL REFLUX DISEASE WITH ESOPHAGITIS AND HEMORRHAGE: ICD-10-CM

## 2021-04-05 DIAGNOSIS — K63.5 POLYP OF COLON, UNSPECIFIED PART OF COLON, UNSPECIFIED TYPE: ICD-10-CM

## 2021-04-05 PROCEDURE — 99214 OFFICE O/P EST MOD 30 MIN: CPT | Performed by: INTERNAL MEDICINE

## 2021-04-05 NOTE — PROGRESS NOTES
Chief Complaint   Patient presents with   • Hospital Follow Up Visit       History of Present Illness:   76 y.o. male        The patient was admitted in 2020 with a heart attack and had CABG.  He had coffee-ground emesis after that and an EGD was done by Dr. Jono Cullen on 2021 that showed grade D esophagitis and a benign gastric polyp was removed.       Overall he feels well. No heartburn while on Pantoprazole 40 mg/day and Carafate 1 gm AC and HS. No chest pain. No dysphagia or odynaphagia.        Last colonoscopy several years ago. Dad  in late 60's bladder cancer. Mom  in 70's of a stroke. No rectal bleeding or melena. He has iron deficiency in past but ferritin level in last one month was normal. No abdominal or chest pain. No nausea or vomting. No fevers, chills. Weight is increasing. He is trying to get a Covid vaccine     Past Medical History:   Diagnosis Date   • Acute blood loss anemia    • Atrial fibrillation (CMS/HCC)    • Bilateral lower extremity edema    • Bradycardia following surgery    • CAD (coronary artery disease)    • Chest discomfort    • Elevated troponin    • Focal motor seizure (CMS/HCC)    • Generalized tonic-clonic seizure (CMS/HCC)    • GERD (gastroesophageal reflux disease)    • Iritis of right eye    • Ischemic cardiomyopathy    • Mitral valve insufficiency    • Post-ictal state (CMS/HCC)    • Pulmonary hypertension (CMS/HCC)    • PVC (premature ventricular contraction)    • Rheumatic fever    • S/P CABG x 7    • S/P mitral valve repair    • Severe obstructive sleep apnea    • Shoulder fracture, right        Past Surgical History:   Procedure Laterality Date   • CARDIAC CATHETERIZATION N/A 2021    Procedure: Coronary angiography;  Surgeon: Bry Nails MD;  Location: [facility] INVASIVE LOCATION;  Service: Cardiovascular;  Laterality: N/A;   • CARDIAC CATHETERIZATION N/A 2021    Procedure: Left heart cath;  Surgeon: Bry Nails MD;   Location: [facility] CATH INVASIVE LOCATION;  Service: Cardiovascular;  Laterality: N/A;   • CARDIAC CATHETERIZATION N/A 2/19/2021    Procedure: Right Heart Cath;  Surgeon: Bry Nails MD;  Location: [facility] CATH INVASIVE LOCATION;  Service: Cardiovascular;  Laterality: N/A;   • CARDIAC CATHETERIZATION N/A 2/19/2021    Procedure: Left ventriculography;  Surgeon: Bry Nails MD;  Location: [facility] CATH INVASIVE LOCATION;  Service: Cardiovascular;  Laterality: N/A;   • CORONARY ARTERY BYPASS GRAFT N/A 2/22/2021    Procedure: BK, MIDLINE STERNOTOMY, CORONARY ARTERY BYPASS X 7 UTILIZING THE LEFT INTERNAL MAMMARY ARTERY AND THE RIGHT SAPHENOUS VEIN, MITRAL VALVE REPAIR, PRP;  Surgeon: Jr Shyam Echavarria MD;  Location: [facility] OR;  Service: Cardiothoracic;  Laterality: N/A;   • ENDOSCOPY N/A 8/16/2018    Erosive gastritis, diverticulosis   • ENDOSCOPY N/A 2/26/2021    Procedure: ESOPHAGOGASTRODUODENOSCOPY AT BEDSIDE WITH HOT SNARE POLYPECTOMY AND CLIP PLACEMENT X1;  Surgeon: Jono Laboy MD;  Location: [facility] ENDOSCOPY;  Service: Gastroenterology;  Laterality: N/A;  PRE-  GI BLEED  POST- GASTRITIS, ESOPHAGITIS, POLYP   • HERNIA REPAIR     • SHOULDER CLOSED REDUCTION Right 3/16/2018    Procedure: RIGHT SHOULDER CLOSED REDUCTION;  Surgeon: Kj Garcia MD;  Location: [facility] OR;  Service: Orthopedics   • TONSILLECTOMY     • TOTAL SHOULDER ARTHROPLASTY W/ DISTAL CLAVICLE EXCISION Right 3/22/2018    Procedure: Reverse Total Shoulder Arthroplsty;  Surgeon: Kj Garcia MD;  Location: [facility] OR;  Service: Orthopedics   • TRANSESOPHAGEAL ECHOCARDIOGRAM (BK) N/A 2/22/2021    Procedure: TRANSESOPHAGEAL ECHOCARDIOGRAM;  Surgeon: Jr Shyam Echavarria MD;  Location: [facility] OR;  Service: Cardiothoracic;  Laterality: N/A;         Current Outpatient Medications:   •  acetaminophen (TYLENOL) 500 MG tablet, Take 2 tablets by mouth 3 (Three) Times a Day. (Patient taking differently:  Take 1,000 mg by mouth 3 (Three) Times a Day As Needed.), Disp: , Rfl:   •  aspirin 81 MG EC tablet, Take 1 tablet by mouth Daily., Disp: 90 tablet, Rfl: 1  •  levETIRAcetam (KEPPRA) 250 MG tablet, Take 1 tablet by mouth 2 (Two) Times a Day., Disp: 60 tablet, Rfl: 0  •  metoprolol succinate XL (TOPROL-XL) 25 MG 24 hr tablet, Take 1 tablet by mouth Daily., Disp: 90 tablet, Rfl: 1  •  pantoprazole (PROTONIX) 40 MG EC tablet, Take 1 tablet by mouth 2 (Two) Times a Day Before Meals., Disp: 60 tablet, Rfl: 1  •  Psyllium (METAMUCIL FIBER PO), Take  by mouth., Disp: , Rfl:   •  sucralfate (CARAFATE) 1 g tablet, Take 1 tablet by mouth 4 (Four) Times a Day Before Meals & at Bedtime., Disp: 120 tablet, Rfl: 1  •  VITAMIN D PO, Take  by mouth Daily., Disp: , Rfl:     No Known Allergies    Family History   Problem Relation Age of Onset   • Malig Hyperthermia Neg Hx        Social History     Socioeconomic History   • Marital status:      Spouse name: Not on file   • Number of children: Not on file   • Years of education: Not on file   • Highest education level: Not on file   Tobacco Use   • Smoking status: Never Smoker   • Smokeless tobacco: Never Used   Vaping Use   • Vaping Use: Never used   Substance and Sexual Activity   • Alcohol use: Yes     Comment: occasional   • Drug use: No   • Sexual activity: Defer       Review of Systems   Gastrointestinal: Negative for abdominal pain.     Pertinent positives and negatives documented in the HPI and all other systems reviewed and were found to be negative.  Vitals:    04/05/21 0804   BP: 128/62   Temp: 97.7 °F (36.5 °C)       Physical Exam  Vitals reviewed.   Constitutional:       General: He is not in acute distress.     Appearance: Normal appearance. He is well-developed. He is not diaphoretic.   HENT:      Head: Normocephalic and atraumatic. Hair is normal.      Right Ear: Hearing, tympanic membrane, ear canal and external ear normal.      Left Ear: Hearing, tympanic  membrane, ear canal and external ear normal.      Nose: Nose normal. No nasal deformity.      Mouth/Throat:      Mouth: Mucous membranes are moist. No oral lesions.      Pharynx: Uvula midline. No uvula swelling.   Eyes:      General: Lids are normal. No scleral icterus.        Right eye: No discharge.         Left eye: No discharge.      Extraocular Movements: Extraocular movements intact.      Right eye: Normal extraocular motion and no nystagmus.      Left eye: Normal extraocular motion and no nystagmus.      Conjunctiva/sclera: Conjunctivae normal.      Pupils: Pupils are equal, round, and reactive to light.   Neck:      Thyroid: No thyromegaly.      Vascular: No JVD.   Cardiovascular:      Rate and Rhythm: Normal rate and regular rhythm.      Pulses: Normal pulses.      Heart sounds: Normal heart sounds. No murmur heard.   No gallop.    Pulmonary:      Effort: Pulmonary effort is normal. No respiratory distress.      Breath sounds: Normal breath sounds. No wheezing or rales.   Chest:      Chest wall: No tenderness.   Abdominal:      General: Bowel sounds are normal. There is no distension.      Palpations: Abdomen is soft. There is no mass.      Tenderness: There is no abdominal tenderness. There is no guarding.      Hernia: No hernia is present.   Genitourinary:     Rectum: Normal. Guaiac result negative.   Musculoskeletal:         General: No tenderness or deformity. Normal range of motion.      Cervical back: Normal range of motion and neck supple.   Lymphadenopathy:      Cervical: No cervical adenopathy.   Skin:     General: Skin is warm and dry.      Findings: No rash.   Neurological:      Mental Status: He is alert and oriented to person, place, and time.      Cranial Nerves: No cranial nerve deficit.      Motor: No abnormal muscle tone.      Coordination: Coordination normal.      Deep Tendon Reflexes: Reflexes are normal and symmetric. Reflexes normal.   Psychiatric:         Mood and Affect: Mood  normal.         Behavior: Behavior normal.         Thought Content: Thought content normal.         Judgment: Judgment normal.         Diagnoses and all orders for this visit:    1. Acute blood loss anemia (Primary)    2. S/P CABG x 7    3. Gastroesophageal reflux disease with esophagitis and hemorrhage    4. Iron deficiency anemia due to chronic blood loss    5. Polyp of colon, unspecified part of colon, unspecified type      Assessment:  1.  History of grade D esophagitis noted on 2 of 2021 EGD.  2.  Patient has a history of colon polyps.  3. H/o iron def Anemia. He is heme negative today and his ferritin level is now normal.   4. CRI    Recommendations:  1. CBC - I will defer this to Dr. Merida who he is seeing this week.   2. We discussed the pros and cons of doing a colonoscopy in a 75 yo. I wouldn't want to do it for 6 mos since he recently had CABG.  3. F/U 6 mos.   4. Finish the carafate and stop it. Continue the Pantoprazole. We disccussed the pros and cons of being on a PPI especially given his CRI. He will f/u with his Nephrologist.     Return in about 6 months (around 10/5/2021).    Edgar Allen MD  4/5/2021

## 2021-04-06 ENCOUNTER — OFFICE VISIT (OUTPATIENT)
Dept: CARDIOLOGY | Facility: CLINIC | Age: 76
End: 2021-04-06

## 2021-04-06 ENCOUNTER — HOSPITAL ENCOUNTER (OUTPATIENT)
Dept: CARDIOLOGY | Facility: HOSPITAL | Age: 76
Discharge: HOME | End: 2021-04-06
Admitting: INTERNAL MEDICINE

## 2021-04-06 ENCOUNTER — TELEPHONE (OUTPATIENT)
Dept: GASTROENTEROLOGY | Facility: CLINIC | Age: 76
End: 2021-04-06

## 2021-04-06 VITALS
DIASTOLIC BLOOD PRESSURE: 70 MMHG | BODY MASS INDEX: 29.51 KG/M2 | WEIGHT: 188 LBS | HEART RATE: 59 BPM | HEIGHT: 67 IN | SYSTOLIC BLOOD PRESSURE: 110 MMHG

## 2021-04-06 VITALS — OXYGEN SATURATION: 92 %

## 2021-04-06 DIAGNOSIS — Z98.890 H/O MITRAL VALVE REPAIR: ICD-10-CM

## 2021-04-06 DIAGNOSIS — I25.110 CORONARY ARTERY DISEASE INVOLVING NATIVE CORONARY ARTERY OF NATIVE HEART WITH UNSTABLE ANGINA PECTORIS (HCC): ICD-10-CM

## 2021-04-06 DIAGNOSIS — I48.0 PAF (PAROXYSMAL ATRIAL FIBRILLATION) (HCC): ICD-10-CM

## 2021-04-06 DIAGNOSIS — Z98.890 S/P MITRAL VALVE REPAIR: ICD-10-CM

## 2021-04-06 DIAGNOSIS — R06.09 DOE (DYSPNEA ON EXERTION): ICD-10-CM

## 2021-04-06 DIAGNOSIS — I25.5 ISCHEMIC CARDIOMYOPATHY: ICD-10-CM

## 2021-04-06 DIAGNOSIS — I48.0 PAF (PAROXYSMAL ATRIAL FIBRILLATION) (HCC): Primary | ICD-10-CM

## 2021-04-06 DIAGNOSIS — Z95.1 S/P CABG X 7: ICD-10-CM

## 2021-04-06 LAB
AORTIC ARCH: 2.3 CM
ASCENDING AORTA: 3.3 CM
BH CV ECHO MEAS - ACS: 1.8 CM
BH CV ECHO MEAS - AI DEC SLOPE: 200 CM/SEC^2
BH CV ECHO MEAS - AI MAX PG: 50.7 MMHG
BH CV ECHO MEAS - AI MAX VEL: 356 CM/SEC
BH CV ECHO MEAS - AI P1/2T: 521.4 MSEC
BH CV ECHO MEAS - AO MAX PG (FULL): 5.1 MMHG
BH CV ECHO MEAS - AO MAX PG: 8.1 MMHG
BH CV ECHO MEAS - AO MEAN PG (FULL): 2 MMHG
BH CV ECHO MEAS - AO MEAN PG: 4 MMHG
BH CV ECHO MEAS - AO ROOT AREA (BSA CORRECTED): 1.6
BH CV ECHO MEAS - AO ROOT AREA: 8 CM^2
BH CV ECHO MEAS - AO ROOT DIAM: 3.2 CM
BH CV ECHO MEAS - AO V2 MAX: 142 CM/SEC
BH CV ECHO MEAS - AO V2 MEAN: 90.3 CM/SEC
BH CV ECHO MEAS - AO V2 VTI: 26.9 CM
BH CV ECHO MEAS - ASC AORTA: 3.3 CM
BH CV ECHO MEAS - AVA(I,A): 1.7 CM^2
BH CV ECHO MEAS - AVA(I,D): 1.7 CM^2
BH CV ECHO MEAS - AVA(V,A): 1.7 CM^2
BH CV ECHO MEAS - AVA(V,D): 1.7 CM^2
BH CV ECHO MEAS - BSA(HAYCOCK): 2 M^2
BH CV ECHO MEAS - BSA: 2 M^2
BH CV ECHO MEAS - BZI_BMI: 25.5 KILOGRAMS/M^2
BH CV ECHO MEAS - BZI_METRIC_HEIGHT: 177.8 CM
BH CV ECHO MEAS - BZI_METRIC_WEIGHT: 80.7 KG
BH CV ECHO MEAS - EDV(MOD-SP2): 163 ML
BH CV ECHO MEAS - EDV(MOD-SP4): 121 ML
BH CV ECHO MEAS - EDV(TEICH): 118.2 ML
BH CV ECHO MEAS - EF(CUBED): 65.7 %
BH CV ECHO MEAS - EF(MOD-BP): 37.6 %
BH CV ECHO MEAS - EF(MOD-SP2): 42.9 %
BH CV ECHO MEAS - EF(MOD-SP4): 29.8 %
BH CV ECHO MEAS - EF(TEICH): 57 %
BH CV ECHO MEAS - ESV(MOD-SP2): 93 ML
BH CV ECHO MEAS - ESV(MOD-SP4): 85 ML
BH CV ECHO MEAS - ESV(TEICH): 50.9 ML
BH CV ECHO MEAS - FS: 30 %
BH CV ECHO MEAS - IVS/LVPW: 1
BH CV ECHO MEAS - IVSD: 1.1 CM
BH CV ECHO MEAS - LAT PEAK E' VEL: 6.6 CM/SEC
BH CV ECHO MEAS - LV DIASTOLIC VOL/BSA (35-75): 60.9 ML/M^2
BH CV ECHO MEAS - LV MASS(C)D: 207.1 GRAMS
BH CV ECHO MEAS - LV MASS(C)DI: 104.3 GRAMS/M^2
BH CV ECHO MEAS - LV MAX PG: 3 MMHG
BH CV ECHO MEAS - LV MEAN PG: 2 MMHG
BH CV ECHO MEAS - LV SYSTOLIC VOL/BSA (12-30): 42.8 ML/M^2
BH CV ECHO MEAS - LV V1 MAX: 86.7 CM/SEC
BH CV ECHO MEAS - LV V1 MEAN: 58.8 CM/SEC
BH CV ECHO MEAS - LV V1 VTI: 16 CM
BH CV ECHO MEAS - LVIDD: 5 CM
BH CV ECHO MEAS - LVIDS: 3.5 CM
BH CV ECHO MEAS - LVLD AP2: 8.8 CM
BH CV ECHO MEAS - LVLD AP4: 8.5 CM
BH CV ECHO MEAS - LVLS AP2: 6.8 CM
BH CV ECHO MEAS - LVLS AP4: 7 CM
BH CV ECHO MEAS - LVOT AREA (M): 2.8 CM^2
BH CV ECHO MEAS - LVOT AREA: 2.8 CM^2
BH CV ECHO MEAS - LVOT DIAM: 1.9 CM
BH CV ECHO MEAS - LVPWD: 1.1 CM
BH CV ECHO MEAS - MED PEAK E' VEL: 4.8 CM/SEC
BH CV ECHO MEAS - MR MAX PG: 35.3 MMHG
BH CV ECHO MEAS - MR MAX VEL: 297 CM/SEC
BH CV ECHO MEAS - MV DEC SLOPE: 459 CM/SEC^2
BH CV ECHO MEAS - MV DEC TIME: 0.2 SEC
BH CV ECHO MEAS - MV E MAX VEL: 127 CM/SEC
BH CV ECHO MEAS - MV MAX PG: 9.1 MMHG
BH CV ECHO MEAS - MV MEAN PG: 3 MMHG
BH CV ECHO MEAS - MV P1/2T MAX VEL: 148 CM/SEC
BH CV ECHO MEAS - MV P1/2T: 94.4 MSEC
BH CV ECHO MEAS - MV V2 MAX: 151 CM/SEC
BH CV ECHO MEAS - MV V2 MEAN: 74.8 CM/SEC
BH CV ECHO MEAS - MV V2 VTI: 34.6 CM
BH CV ECHO MEAS - MVA P1/2T LCG: 1.5 CM^2
BH CV ECHO MEAS - MVA(P1/2T): 2.3 CM^2
BH CV ECHO MEAS - MVA(VTI): 1.3 CM^2
BH CV ECHO MEAS - PA ACC TIME: 0.09 SEC
BH CV ECHO MEAS - PA MAX PG (FULL): 2.5 MMHG
BH CV ECHO MEAS - PA MAX PG: 3.2 MMHG
BH CV ECHO MEAS - PA PR(ACCEL): 40.8 MMHG
BH CV ECHO MEAS - PA V2 MAX: 89.6 CM/SEC
BH CV ECHO MEAS - PI END-D VEL: 137 CM/SEC
BH CV ECHO MEAS - PULM DIAS VEL: 63.8 CM/SEC
BH CV ECHO MEAS - PULM S/D: 0.66
BH CV ECHO MEAS - PULM SYS VEL: 42.2 CM/SEC
BH CV ECHO MEAS - PVA(V,A): 1.4 CM^2
BH CV ECHO MEAS - PVA(V,D): 1.4 CM^2
BH CV ECHO MEAS - QP/QS: 0.67
BH CV ECHO MEAS - RAP SYSTOLE: 8 MMHG
BH CV ECHO MEAS - RV MAX PG: 0.68 MMHG
BH CV ECHO MEAS - RV MEAN PG: 0 MMHG
BH CV ECHO MEAS - RV V1 MAX: 41.1 CM/SEC
BH CV ECHO MEAS - RV V1 MEAN: 26.8 CM/SEC
BH CV ECHO MEAS - RV V1 VTI: 9.7 CM
BH CV ECHO MEAS - RVOT AREA: 3.1 CM^2
BH CV ECHO MEAS - RVOT DIAM: 2 CM
BH CV ECHO MEAS - RVSP: 64.6 MMHG
BH CV ECHO MEAS - SI(AO): 108.9 ML/M^2
BH CV ECHO MEAS - SI(CUBED): 41.3 ML/M^2
BH CV ECHO MEAS - SI(LVOT): 22.8 ML/M^2
BH CV ECHO MEAS - SI(MOD-SP2): 35.2 ML/M^2
BH CV ECHO MEAS - SI(MOD-SP4): 18.1 ML/M^2
BH CV ECHO MEAS - SI(TEICH): 33.9 ML/M^2
BH CV ECHO MEAS - SUP REN AO DIAM: 1.6 CM
BH CV ECHO MEAS - SV(AO): 216.3 ML
BH CV ECHO MEAS - SV(CUBED): 82.1 ML
BH CV ECHO MEAS - SV(LVOT): 45.4 ML
BH CV ECHO MEAS - SV(MOD-SP2): 70 ML
BH CV ECHO MEAS - SV(MOD-SP4): 36 ML
BH CV ECHO MEAS - SV(RVOT): 30.4 ML
BH CV ECHO MEAS - SV(TEICH): 67.4 ML
BH CV ECHO MEAS - TAPSE (>1.6): 1.3 CM
BH CV ECHO MEAS - TR MAX VEL: 376 CM/SEC
BH CV ECHO MEASUREMENTS AVERAGE E/E' RATIO: 22.28
BH CV XLRA - RV BASE: 3.1 CM
BH CV XLRA - RV LENGTH: 7.9 CM
BH CV XLRA - RV MID: 3.4 CM
BH CV XLRA - TDI S': 7.7 CM/SEC
LEFT ATRIUM VOLUME INDEX: 20 ML/M2
LV EF 2D ECHO EST: 38 %
SINUS: 3 CM
STJ: 2.9 CM

## 2021-04-06 PROCEDURE — 99215 OFFICE O/P EST HI 40 MIN: CPT | Performed by: INTERNAL MEDICINE

## 2021-04-06 PROCEDURE — 93306 TTE W/DOPPLER COMPLETE: CPT | Performed by: INTERNAL MEDICINE

## 2021-04-06 PROCEDURE — 93306 TTE W/DOPPLER COMPLETE: CPT

## 2021-04-06 PROCEDURE — 25010000002 PERFLUTREN (DEFINITY) 8.476 MG IN SODIUM CHLORIDE (PF) 0.9 % 10 ML INJECTION: Performed by: INTERNAL MEDICINE

## 2021-04-06 PROCEDURE — 93000 ELECTROCARDIOGRAM COMPLETE: CPT | Performed by: INTERNAL MEDICINE

## 2021-04-06 RX ORDER — WARFARIN SODIUM 5 MG/1
TABLET ORAL
Qty: 60 TABLET | Refills: 2 | Status: SHIPPED | OUTPATIENT
Start: 2021-04-06 | End: 2021-04-14 | Stop reason: SDUPTHER

## 2021-04-06 RX ADMIN — PERFLUTREN 1.5 ML: 6.52 INJECTION, SUSPENSION INTRAVENOUS at 13:21

## 2021-04-06 NOTE — TELEPHONE ENCOUNTER
Call from DR Jimena Singer.  States pt seen in office today - in A Fib.,  Needs to anticoag pt unless Dr Allen has contraindication.      Message to DR Allen.

## 2021-04-06 NOTE — PROGRESS NOTES
Date of Office Visit: 2021  Encounter Provider: Jimena Singer MD  Place of Service: [facility] CARDIOLOGY  Patient Name: Rodolfo Grover  :1945    Chief complaint  Pulmonary hypertension, ventricular bigeminy    History of Present Illness  Patient is a 76-year-old gentleman with history of rheumatic fever, GE reflux disease who was seen at [facility] in 2018 after humeral fracture following a fall.  At that time he was noted to have ventricular bigeminy with normal potassium and magnesium levels.  He was hypertensive at the time.  He had an echocardiogram that showed normal systolic function with normal diastolic function, mild left atrial enlargement saline study was indeterminate.  There was aortic valve sclerosis with mild aortic regurgitation and trivial mitral and tricuspid regurgitation with mild pulmonary hypertension with an RV systolic pressure 42 mmHg.  A stress perfusion study was negative for ischemia.  Follow-up echocardiogram in 2020 showed an ejection fraction of 55% with normal diastolic function, mild aortic mitral valve regurgitation, mild to moderate tricuspid regurgitation with an RV systolic pressure increased further to 48 mmHg.  He presented in 2020 with complaints of chest discomfort and non-STEMI.  Echo showed new wall motion abnormalities as well as pulmonary hypertension( RVSP 61 mmHg).  Subsequent cardiac catheterization showed multivessel coronary artery disease.  Subsequently underwent CABG x7 vessels with mitral valve repair.  Postoperative course was complicated by hemoptysis, esophageal esophagitis, atrial fibrillation associated also with intermittent complete heart block.  He was also felt to have had a seizure and was started on Keppra.  He also had acute renal insufficiency..  He also had induration and possible cellulitis of his right thigh leg wound.    Since last visit he is using his CPAP consistently.  He has had  no chest pain or dizziness.  He occasionally has had palpitations.  BMP on 3/31 showed a creatinine of 1.47 with GFR 40.  With ongoing leg pain he had a venous Doppler ultrasound of his legs on 3/9/2021 that showed acute left lower extremity superficial thrombophlebitis in the short segment proximal to the harvest site.  He was seen by Dr. Allen yesterday and a repeat CBC was recommended.  Colonoscopy was deferred for another 6 months.  He was to stop Carafate after this regimen was completed but to continue with pantoprazole.  Patient states that his dyspnea has improved week by week his edema in his legs and swelling has also improved though still there.  Has minimal chest pain.  Blood pressures been as it is today.  He is to start rehab in 2 weeks    Past Medical History:   Diagnosis Date   • Acute blood loss anemia    • Atrial fibrillation (CMS/HCC)    • Bilateral lower extremity edema    • Bradycardia following surgery    • CAD (coronary artery disease)    • Chest discomfort    • Elevated troponin    • Focal motor seizure (CMS/HCC)    • Generalized tonic-clonic seizure (CMS/HCC)    • GERD (gastroesophageal reflux disease)    • Iritis of right eye    • Ischemic cardiomyopathy    • Mitral valve insufficiency    • Post-ictal state (CMS/HCC)    • Pulmonary hypertension (CMS/HCC)    • PVC (premature ventricular contraction)    • Rheumatic fever    • S/P CABG x 7    • S/P mitral valve repair    • Severe obstructive sleep apnea    • Shoulder fracture, right      Past Surgical History:   Procedure Laterality Date   • CARDIAC CATHETERIZATION N/A 2/19/2021    Procedure: Coronary angiography;  Surgeon: Bry Nails MD;  Location: [facility] INVASIVE LOCATION;  Service: Cardiovascular;  Laterality: N/A;   • CARDIAC CATHETERIZATION N/A 2/19/2021    Procedure: Left heart cath;  Surgeon: Bry Nails MD;  Location: [facility] CATH INVASIVE LOCATION;  Service: Cardiovascular;  Laterality: N/A;   • CARDIAC  CATHETERIZATION N/A 2/19/2021    Procedure: Right Heart Cath;  Surgeon: Bry Nails MD;  Location: [facility] CATH INVASIVE LOCATION;  Service: Cardiovascular;  Laterality: N/A;   • CARDIAC CATHETERIZATION N/A 2/19/2021    Procedure: Left ventriculography;  Surgeon: Bry Nails MD;  Location: [facility] CATH INVASIVE LOCATION;  Service: Cardiovascular;  Laterality: N/A;   • CORONARY ARTERY BYPASS GRAFT N/A 2/22/2021    Procedure: BK, MIDLINE STERNOTOMY, CORONARY ARTERY BYPASS X 7 UTILIZING THE LEFT INTERNAL MAMMARY ARTERY AND THE RIGHT SAPHENOUS VEIN, MITRAL VALVE REPAIR, PRP;  Surgeon: Jr Shyam Echavarria MD;  Location: [facility] OR;  Service: Cardiothoracic;  Laterality: N/A;   • ENDOSCOPY N/A 8/16/2018    Erosive gastritis, diverticulosis   • ENDOSCOPY N/A 2/26/2021    Procedure: ESOPHAGOGASTRODUODENOSCOPY AT BEDSIDE WITH HOT SNARE POLYPECTOMY AND CLIP PLACEMENT X1;  Surgeon: Jono Laboy MD;  Location: [facility] ENDOSCOPY;  Service: Gastroenterology;  Laterality: N/A;  PRE-  GI BLEED  POST- GASTRITIS, ESOPHAGITIS, POLYP   • HERNIA REPAIR     • SHOULDER CLOSED REDUCTION Right 3/16/2018    Procedure: RIGHT SHOULDER CLOSED REDUCTION;  Surgeon: Kj Garcia MD;  Location: [facility] OR;  Service: Orthopedics   • TONSILLECTOMY     • TOTAL SHOULDER ARTHROPLASTY W/ DISTAL CLAVICLE EXCISION Right 3/22/2018    Procedure: Reverse Total Shoulder Arthroplsty;  Surgeon: Kj Garcia MD;  Location: [facility] OR;  Service: Orthopedics   • TRANSESOPHAGEAL ECHOCARDIOGRAM (BK) N/A 2/22/2021    Procedure: TRANSESOPHAGEAL ECHOCARDIOGRAM;  Surgeon: Jr Shyam Echavarria MD;  Location: [facility] OR;  Service: Cardiothoracic;  Laterality: N/A;     Outpatient Medications Prior to Visit   Medication Sig Dispense Refill   • acetaminophen (TYLENOL) 500 MG tablet Take 2 tablets by mouth 3 (Three) Times a Day. (Patient taking differently: Take 1,000 mg by mouth 3 (Three) Times a Day As Needed.)     •  aspirin 81 MG EC tablet Take 1 tablet by mouth Daily. 90 tablet 1   • levETIRAcetam (KEPPRA) 250 MG tablet Take 1 tablet by mouth 2 (Two) Times a Day. 60 tablet 0   • metoprolol succinate XL (TOPROL-XL) 25 MG 24 hr tablet Take 1 tablet by mouth Daily. 90 tablet 1   • pantoprazole (PROTONIX) 40 MG EC tablet Take 1 tablet by mouth 2 (Two) Times a Day Before Meals. 60 tablet 1   • Psyllium (METAMUCIL FIBER PO) Take  by mouth As Needed.     • sucralfate (CARAFATE) 1 g tablet Take 1 tablet by mouth 4 (Four) Times a Day Before Meals & at Bedtime. 120 tablet 1   • VITAMIN D PO Take  by mouth Daily.       No facility-administered medications prior to visit.       Allergies as of 04/06/2021   • (No Known Allergies)     Social History     Socioeconomic History   • Marital status:      Spouse name: Not on file   • Number of children: Not on file   • Years of education: Not on file   • Highest education level: Not on file   Tobacco Use   • Smoking status: Never Smoker   • Smokeless tobacco: Never Used   Vaping Use   • Vaping Use: Never used   Substance and Sexual Activity   • Alcohol use: Yes     Comment: occasional   • Drug use: No   • Sexual activity: Defer     Family History   Problem Relation Age of Onset   • Malig Hyperthermia Neg Hx      Review of Systems   Constitutional: Negative for chills, fever, weight gain and weight loss.   Cardiovascular: Positive for leg swelling.   Respiratory: Negative for cough, snoring and wheezing.    Hematologic/Lymphatic: Negative for bleeding problem. Does not bruise/bleed easily.   Skin: Negative for color change.   Musculoskeletal: Positive for joint pain. Negative for falls and myalgias.   Gastrointestinal: Negative for melena.   Genitourinary: Negative for hematuria.   Neurological: Negative for excessive daytime sleepiness.   Psychiatric/Behavioral: Negative for depression. The patient is not nervous/anxious.         Objective:     Vitals:    04/06/21 1038   BP: 110/70  "  Pulse: 59   Weight: 85.3 kg (188 lb)   Height: 170.2 cm (67\")     Body mass index is 29.44 kg/m².    Vitals reviewed.   Constitutional:       Appearance: Well-developed.   Eyes:      General: No scleral icterus.        Right eye: No discharge.      Conjunctiva/sclera: Conjunctivae normal.      Pupils: Pupils are equal, round, and reactive to light.   HENT:      Head: Normocephalic.      Nose: Nose normal.   Neck:      Thyroid: No thyromegaly.      Vascular: No JVD.   Pulmonary:      Effort: Pulmonary effort is normal. No respiratory distress.      Breath sounds: Normal breath sounds. No wheezing. No rales.   Cardiovascular:      Normal rate. Regular rhythm. Normal S1. Normal S2.      Murmurs: There is no murmur.      No gallop.   Pulses:     Intact distal pulses.   Edema:     Ankle: bilateral trace edema of the ankle.  Abdominal:      General: Bowel sounds are normal. There is no distension.      Palpations: Abdomen is soft.      Tenderness: There is no abdominal tenderness. There is no rebound.   Musculoskeletal: Normal range of motion.         General: No tenderness.      Cervical back: Normal range of motion and neck supple. Skin:     General: Skin is warm and dry.      Findings: No erythema or rash.           Comments: He has slight oozing from his harvest site wound in the right lower leg.  Upper thigh wound is well-healed with mild induration.   Neurological:      Mental Status: Alert and oriented to person, place, and time.   Psychiatric:         Behavior: Behavior normal.         Thought Content: Thought content normal.         Judgment: Judgment normal.       Lab Review:     ECG 12 Lead    Date/Time: 4/6/2021 10:38 AM  Performed by: Jimena Singer MD  Authorized by: Jimena Singer MD   Comparison: compared with previous ECG   Comparison to previous ECG: Atrial fibrillation has replaced sinus rhythm.  PVC is present.  Nonspecific inferior and anterolateral ST-T wave changes are present  Rhythm: atrial " fibrillation  Rhythm comments: PVC is present  Other findings: non-specific ST-T wave changes    Clinical impression: abnormal EKG          Assessment:       Diagnosis Plan   1. PAF (paroxysmal atrial fibrillation) (CMS/HCC)  ECG 12 Lead    Adult Transthoracic Echo Complete W/ Cont if Necessary Per Protocol    Ambulatory Referral to Anticoagulation Monitoring   2. H/O mitral valve repair  Adult Transthoracic Echo Complete W/ Cont if Necessary Per Protocol   3. Coronary artery disease involving native coronary artery of native heart with unstable angina pectoris (CMS/HCC)     4. LARA (dyspnea on exertion)     5. S/P mitral valve repair     6. S/P CABG x 7     7. Ischemic cardiomyopathy       Plan:       1.  Recurrent postoperative atrial fibrillation with history of bradycardia postoperatively.  Rates are relatively controlled on low-dose beta-blocker therapy remains asymptomatic.  However need to start anticoagulation which is complicated by recent hematemesis with gastritis.  Also has a leg wound that still has some drainage.  Await callback from Dr. Serrano and then will contact CV surgery.  Probably best to consider Coumadin with slow titration and anticoagulant.  Also await echocardiogram results.  2.  Recent non-STEMI with subsequent multivessel CABG.  Clinically improving and to start cardiac rehab in 2 weeks  3.  Status post mitral valve repair, as above  4.  Postoperative hematemesis with recent EGD showing grade D esophagitis.  Seen by Dr. Allen regarding ability to anticoagulate at this point.  Call to Dr. Antunez pending  5.  Acute renal insufficiency, he is to follow-up with nephrology  6.  Superficial vein thrombosis, clinically seems improved by description.  7.  Pulmonary hypertension, reassess by echocardiography  8.  Severe obstructive sleep apnea, on CPAP    Addendum: Echocardiogram results reviewed with patient and Dr. Clifford.  Also discussed with Dr. Kelly will start him on Coumadin 5 mg today 5  mg tomorrow and then to check INR on Thursday and subsequently managed by coagulation clinic with goal INR 2-3.  He will watch for bruising from his leg wound as well as additional bleeding from other sites.  I have also reminded him that while he is on Coumadin he must not use any nonsteroidals other than the baby aspirin he is to concern for coronary disease.    I spent a total 45minutes spent including reviewing records [id number] of face to face time with this patient.       Your medication list          Accurate as of April 6, 2021 11:59 PM. If you have any questions, ask your nurse or doctor.            START taking these medications      Instructions Last Dose Given Next Dose Due   warfarin 5 MG tablet  Commonly known as: COUMADIN  Started by: Jimena Singer MD      Take one tablet po tonight and richa and then as directed          CHANGE how you take these medications      Instructions Last Dose Given Next Dose Due   acetaminophen 500 MG tablet  Commonly known as: TYLENOL  What changed:   · when to take this  · reasons to take this      Take 2 tablets by mouth 3 (Three) Times a Day.          CONTINUE taking these medications      Instructions Last Dose Given Next Dose Due   aspirin 81 MG EC tablet      Take 1 tablet by mouth Daily.       levETIRAcetam 250 MG tablet  Commonly known as: KEPPRA      Take 1 tablet by mouth 2 (Two) Times a Day.       METAMUCIL FIBER PO      Take  by mouth As Needed.       metoprolol succinate XL 25 MG 24 hr tablet  Commonly known as: TOPROL-XL      Take 1 tablet by mouth Daily.       pantoprazole 40 MG EC tablet  Commonly known as: PROTONIX      Take 1 tablet by mouth 2 (Two) Times a Day Before Meals.       sucralfate 1 g tablet  Commonly known as: CARAFATE      Take 1 tablet by mouth 4 (Four) Times a Day Before Meals & at Bedtime.       VITAMIN D PO      Take  by mouth Daily.             Where to Get Your Medications      These medications were sent to [id number]  - [place], [place] - [address] [facility] - [phone] PH - [phone] FX  [id number] [facility], [facility] [address]    Phone: [phone]   · warfarin 5 MG tablet         Patient is no longer taking -.  I corrected the med list to reflect this.  I did not stop these medications.    Dictated utilizing Dragon dictation

## 2021-04-07 ENCOUNTER — HOSPITAL ENCOUNTER (OUTPATIENT)
Dept: GENERAL RADIOLOGY | Facility: HOSPITAL | Age: 76
Discharge: HOME | End: 2021-04-07
Admitting: NURSE PRACTITIONER

## 2021-04-07 ENCOUNTER — TELEPHONE (OUTPATIENT)
Dept: CARDIAC REHAB | Facility: HOSPITAL | Age: 76
End: 2021-04-07

## 2021-04-07 ENCOUNTER — OFFICE VISIT (OUTPATIENT)
Dept: CARDIAC SURGERY | Facility: CLINIC | Age: 76
End: 2021-04-07

## 2021-04-07 VITALS
RESPIRATION RATE: 20 BRPM | WEIGHT: 185 LBS | DIASTOLIC BLOOD PRESSURE: 79 MMHG | TEMPERATURE: 97.7 F | OXYGEN SATURATION: 96 % | BODY MASS INDEX: 29.03 KG/M2 | HEIGHT: 67 IN | HEART RATE: 65 BPM | SYSTOLIC BLOOD PRESSURE: 140 MMHG

## 2021-04-07 DIAGNOSIS — R06.09 DYSPNEA ON EXERTION: ICD-10-CM

## 2021-04-07 DIAGNOSIS — Z95.1 S/P CABG X 7: ICD-10-CM

## 2021-04-07 DIAGNOSIS — R06.09 DYSPNEA ON EXERTION: Primary | ICD-10-CM

## 2021-04-07 DIAGNOSIS — Z98.890 S/P MITRAL VALVE REPAIR: ICD-10-CM

## 2021-04-07 PROCEDURE — 99024 POSTOP FOLLOW-UP VISIT: CPT | Performed by: NURSE PRACTITIONER

## 2021-04-07 PROCEDURE — 71046 X-RAY EXAM CHEST 2 VIEWS: CPT

## 2021-04-07 RX ORDER — CEPHALEXIN 500 MG/1
500 CAPSULE ORAL 2 TIMES DAILY
Qty: 14 CAPSULE | Refills: 0 | Status: SHIPPED | OUTPATIENT
Start: 2021-04-07 | End: 2021-04-14

## 2021-04-07 NOTE — PROGRESS NOTES
CARDIOVASCULAR SURGERY FOLLOW-UP PROGRESS NOTE  Chief Complaint: Postop follow-up        HPI:   Dear Domenica Burrell MD and colleagues:    It was nice to see Rodolfo Grover in follow up 6 weeks after surgery.  As you know, he is a 76 y.o. male with CAD, mitral incompetence, ischemic cardiomyopathy, cardiogenic shock with hypotension and complete heart block in the immediate preop period who underwent urgent CABG x7 with LIMA, mitral valve repair on 2/22/2021 with Dr. Echavarria. He had a complicated postoperative course with right pneumothorax requiring postoperative chest tube placement, atrial flutter, postop seizure on postop day 2 currently suppressed by Keppra, acute GI blood loss with coffee-ground emesis and esophagitis by EGD, and acute kidney injury.  The patient states that he started Coumadin yesterday for atrial arrhythmias, and has INR directed therapy by his primary cardiologist.  His kidney function on his last BMP demonstrated a creatinine of 1.4 which was improved from his hospitalization peak creatinine of 3.1, I suspect that this is his baseline.  He states that he is still becoming short of breath with activity, he states he was able to walk from back edge of the parking lot to our office without issue, but he is having notable dyspnea with redressing after his exam and he has diminished right lower lobe sounds on auscultation.      I saw him last week to evaluate his right SVH site thrombophlebitis, he is here today for continued surveillance.  He has mild blanchable erythema surrounding his right lower extremity incision site, intact scab proximally and open shallow area distally without any purulence or fluctuance noted.  I instructed him to continue leaving open to air, washing twice daily and painting liberally with Betadine.    **Picture of wound in media tab    Physical Exam:         /79 (BP Location: Left arm, Patient Position: Sitting, Cuff Size: Adult)   Pulse 65   Temp 97.7 °F  "(36.5 °C) (Oral)   Resp 20   Ht 170.2 cm (67\")   Wt 83.9 kg (185 lb)   SpO2 96%   BMI 28.98 kg/m²   Incision(s):  mid chest healing well, sternum stable  Bilateral lower extremity 1+ edema    Assessment/Plan:     S/P CABG and mitral valve repair. Overall, he is doing well.    Slow post-op rehabilitation progress    Keep incisions clean and dry  Follow-up as scheduled with cardiology  Return to clinic in 2 week(s) with no new studies  Keflex 500 mg twice daily for 7 days called to pharmacy, I instructed the patient to let his provider know that he is on an antibiotic during his Coumadin titration  Chest x-ray PA and lateral to evaluate for pleural effusion  Continue NORBERTO hose    Continue lifting restriction of 10 lbs until 6 weeks and 50 lbs until 12 weeks from the date of surgery, no excessive jarring motions or twisting motions until 12 weeks from the date of surgery    Return to clinic if any signs or symptoms of infection or sternal instability develop       Thank you for allowing me to participate in the care of your patient.    Regards,  NKECHI Kent      "

## 2021-04-07 NOTE — TELEPHONE ENCOUNTER
Called pt secondary to note to call him after April 5. Pt says he is interested in cardiac rehab but still has two more weeks with [facility]. He will call us when home health is finished.

## 2021-04-08 ENCOUNTER — TELEPHONE (OUTPATIENT)
Dept: CARDIAC SURGERY | Facility: CLINIC | Age: 76
End: 2021-04-08

## 2021-04-08 ENCOUNTER — ANTICOAGULATION VISIT (OUTPATIENT)
Dept: PHARMACY | Facility: HOSPITAL | Age: 76
End: 2021-04-08

## 2021-04-08 DIAGNOSIS — J90 PLEURAL EFFUSION: Primary | ICD-10-CM

## 2021-04-08 LAB
INR PPP: 1.7 (ref 0.91–1.09)
PROTHROMBIN TIME: 20.9 SECONDS (ref 10–13.8)

## 2021-04-08 PROCEDURE — 36416 COLLJ CAPILLARY BLOOD SPEC: CPT

## 2021-04-08 PROCEDURE — G0463 HOSPITAL OUTPT CLINIC VISIT: HCPCS

## 2021-04-08 PROCEDURE — 85610 PROTHROMBIN TIME: CPT

## 2021-04-08 RX ORDER — FUROSEMIDE 40 MG/1
40 TABLET ORAL DAILY
Qty: 30 TABLET | Refills: 1 | Status: SHIPPED | OUTPATIENT
Start: 2021-04-08 | End: 2021-04-21

## 2021-04-08 RX ORDER — FUROSEMIDE 20 MG/1
40 TABLET ORAL DAILY
Status: CANCELLED | OUTPATIENT
Start: 2021-04-08

## 2021-04-08 RX ORDER — POTASSIUM CHLORIDE 750 MG/1
10 TABLET, FILM COATED, EXTENDED RELEASE ORAL DAILY
Qty: 30 TABLET | Refills: 1 | Status: SHIPPED | OUTPATIENT
Start: 2021-04-08 | End: 2021-04-21

## 2021-04-08 NOTE — PROGRESS NOTES
I have supervised and reviewed the notes, assessments, and/or procedures performed by our Pharmacy Intern. The documented assessment and plan were developed cooperatively, and the plan was implemented in my presence. I concur with the documentation of this patient encounter.    Eduar Weeks [facility]

## 2021-04-08 NOTE — PROGRESS NOTES
Anticoagulation Clinic Progress Note  Anticoagulation Summary  As of 2021    INR goal:  2.0-3.0   TTR:  --   INR used for dosin.7 (2021)   Warfarin maintenance plan:  2.5 mg every day   Weekly warfarin total:  17.5 mg   Plan last modified:  Lor Garcia, Pharmacy Intern (2021)   Next INR check:  2021   Target end date:           Anticoagulation Episode Summary     INR check location:      Preferred lab:      Send INR reminders to:   [facility]    Comments:  Warfarin 5 mg daily starting 21; Dr. Enmanuel hilario INR check 21      Anticoagulation Care Providers     Provider Role Specialty Phone number    Jimena Singer MD Referring Cardiology [phone]          Clinic Interview:  Patient Findings     Negatives:  Signs/symptoms of thrombosis, Signs/symptoms of bleeding,   Laboratory test error suspected, Change in health, Change in alcohol use,   Change in activity, Upcoming invasive procedure, Emergency department   visit, Upcoming dental procedure, Missed doses, Extra doses, Change in   medications, Change in diet/appetite, Hospital admission, Bruising, Other   complaints      Clinical Outcomes     Negatives:  Major bleeding event, Thromboembolic event,   Anticoagulation-related hospital admission, Anticoagulation-related ED   visit, Anticoagulation-related fatality        Education:  Rodolfo FRANCO Reilly is a new start in the [facility] Management Clinic. We discussed the followin) Warfarin's indication, mechanism, and dosing  2) Enforced the importance of taking warfarin as instructed and at the same time every day, preferably in the evening so that we can make dose adjustments more easily following subsequent clinic visits  3) What he should do about a missed dose; pts can take missed doses within about 12 hours of their usual scheduled dose, but he was instructed on the importance of not doubling up on doses unless told to do so by the [facility] Clinic  4)  Explained possible side effects of warfarin therapy, including increased risk of bleeding, s/sx of bleeding and s/sx of any additional clots/PE/CVA.   5) Discussed monitoring of warfarin, the INR, goal INR range, and the frequency of monitoring  6) Reviewed drug/food/tobacco/EtOH interactions and provided written information covering these topics in more detail, explaining that green, leafy vegetables interact most heavily with warfarin  7) Instructed the pt not to take or discontinue any medications without informing his physician/pharmacist and reminded him to inform us of any dietary changes, as well  8) Explained that he would be coming into the clinic more frequently in these first few weeks of therapy as we try to adjust his dose and achieve a therapeutic INR x 2 consecutive readings. Once that is achieved, patient will follow up in clinic every 4 weeks, on average.    He stated no problems with transportation or scheduling clinic appts in this manner. he expressed understanding of the information provided and has no additional questions at this time.    Roodlfo Grover was presented with a copy of the Patients Rights and Responsibilities. he expressed verbal consent and agreement to receive care in the [facility] Management Clinic under the current collaborative care agreement with [facility].       INR History:  4/6/2021 **Warfarin start date**  4/7/2021 No INR  4/8/2021 INR1.7    Plan:  1. INR is subtherapeutic today after 2 days of warfarin 5 mg daily- see above in Anticoagulation Summary.   Will instruct Rodolfo Grover to Change their warfarin regimen- see above in Anticoagulation Summary.  2. Follow up in 4 days with [facility]  3. Patient declines warfarin refills.  4. Verbal and written information provided. Patient expresses understanding and has no further questions at this time.    Lor Garcia, Pharmacy Intern

## 2021-04-09 ENCOUNTER — IMMUNIZATION (OUTPATIENT)
Dept: VACCINE CLINIC | Facility: HOSPITAL | Age: 76
End: 2021-04-09

## 2021-04-09 PROCEDURE — 0001A: CPT | Performed by: INTERNAL MEDICINE

## 2021-04-09 PROCEDURE — 91300 HC SARSCOV02 VAC 30MCG/0.3ML IM: CPT | Performed by: INTERNAL MEDICINE

## 2021-04-12 ENCOUNTER — ANTICOAGULATION VISIT (OUTPATIENT)
Dept: PHARMACY | Facility: HOSPITAL | Age: 76
End: 2021-04-12

## 2021-04-12 PROBLEM — I48.0 PAF (PAROXYSMAL ATRIAL FIBRILLATION): Status: ACTIVE | Noted: 2021-03-09

## 2021-04-12 LAB — INR PPP: 3.5

## 2021-04-12 NOTE — PROGRESS NOTES
Anticoagulation Clinic Progress Note    Anticoagulation Summary  As of 4/12/2021    INR goal:  2.0-3.0   TTR:  --   INR used for dosing:  3.50 (4/12/2021)   Warfarin maintenance plan:  2.5 mg every day   Weekly warfarin total:  17.5 mg   Plan last modified:  Lor Garcia, Pharmacy Intern (4/8/2021)   Next INR check:  4/14/2021   Target end date:           Anticoagulation Episode Summary     INR check location:      Preferred lab:      Send INR reminders to:   [facility] PO[facility]    Comments:  Warfarin 5 mg daily starting 4/6/21; Dr. Enmanuel hilario INR check 4/8/21      Anticoagulation Care Providers     Provider Role Specialty Phone number    Jimena Singer MD Referring Cardiology [phone]          Drug interactions: has remained unchanged.  Diet: has remained unchanged.    Clinic Interview:  No pertinent clinical findings have been reported.    INR History:  Anticoagulation Monitoring 4/8/2021 4/12/2021   INR 1.7 3.50   INR Date 4/8/2021 4/12/2021   INR Goal 2.0-3.0 2.0-3.0   Trend - Same   Last Week Total 10 mg 20 mg   Next Week Total 17.5 mg 12.5 mg   Sun 2.5 mg -   Mon - Hold (4/12)   Tue - Hold (4/13)   Wed - -   Thu 2.5 mg (4/8) -   Fri 2.5 mg -   Sat 2.5 mg -   Visit Report - -       Plan:  1. INR is Supratherapeutic today- see above in Anticoagulation Summary.   Will instruct Rodolfo Grover to Change their warfarin regimen- see above in Anticoagulation Summary.  2. Follow up in 2 days.  3. S/W Den[facility].    Allyssa Ramirez [facility]

## 2021-04-14 ENCOUNTER — ANTICOAGULATION VISIT (OUTPATIENT)
Dept: PHARMACY | Facility: HOSPITAL | Age: 76
End: 2021-04-14

## 2021-04-14 LAB — INR PPP: 2.4

## 2021-04-14 RX ORDER — WARFARIN SODIUM 2 MG/1
TABLET ORAL
Qty: 30 TABLET | Refills: 0 | Status: SHIPPED | OUTPATIENT
Start: 2021-04-14 | End: 2021-05-12 | Stop reason: SDUPTHER

## 2021-04-14 NOTE — PROGRESS NOTES
Anticoagulation Clinic Progress Note    Anticoagulation Summary  As of 2021    INR goal:  2.0-3.0   TTR:  --   INR used for dosin.40 (2021)   Warfarin maintenance plan:  2 mg every day   Weekly warfarin total:  14 mg   Plan last modified:  Allyssa Ramirez [facility] (2021)   Next INR check:  2021   Target end date:           Anticoagulation Episode Summary     INR check location:      Preferred lab:      Send INR reminders to:   [facility] CLINICAL POOL    Comments:  Warfarin 5 mg daily starting 21; Dr. Enmanuel hilario INR check 21      Anticoagulation Care Providers     Provider Role Specialty Phone number    Jimena Singer MD Referring Cardiology [phone]          Clinic Interview:  Patient Findings     Negatives:  Signs/symptoms of thrombosis, Signs/symptoms of bleeding,   Laboratory test error suspected, Change in health, Change in alcohol use,   Change in activity, Upcoming invasive procedure, Emergency department   visit, Upcoming dental procedure, Missed doses, Extra doses, Change in   medications, Change in diet/appetite, Hospital admission, Bruising, Other   complaints      Clinical Outcomes     Negatives:  Major bleeding event, Thromboembolic event,   Anticoagulation-related hospital admission, Anticoagulation-related ED   visit, Anticoagulation-related fatality        INR History:  Anticoagulation Monitoring 2021   INR 1.7 3.50 2.40   INR Date 2021   INR Goal 2.0-3.0 2.0-3.0 2.0-3.0   Trend - Same Down   Last Week Total 10 mg 20 mg 15 mg   Next Week Total 17.5 mg 12.5 mg 14 mg   Sun 2.5 mg - -   Mon - Hold () -   Tue - Hold () -   Wed - - 2 mg   Thu 2.5 mg () - 2 mg   Fri 2.5 mg - -   Sat 2.5 mg - -   Visit Report - - -   Some recent data might be hidden       Plan:  1. INR is Therapeutic today- see above in Anticoagulation Summary.   Will instruct Rodolfo Grover to Change their warfarin regimen- see above in Anticoagulation  Summary.  2. Follow up in 2 days.  Spoke with Demi/[facility].  3. They have been instructed to call if any changes in medications, doses, concerns, etc. Patient expresses understanding and has no further questions at this time.    Allyssa Ramirez [facility]

## 2021-04-16 ENCOUNTER — ANTICOAGULATION VISIT (OUTPATIENT)
Dept: PHARMACY | Facility: HOSPITAL | Age: 76
End: 2021-04-16

## 2021-04-16 DIAGNOSIS — I48.0 PAF (PAROXYSMAL ATRIAL FIBRILLATION) (HCC): Primary | ICD-10-CM

## 2021-04-16 LAB — INR PPP: 2.2

## 2021-04-16 NOTE — PROGRESS NOTES
Anticoagulation Clinic Progress Note    Anticoagulation Summary  As of 2021    INR goal:  2.0-3.0   TTR:  --   INR used for dosin.20 (2021)   Warfarin maintenance plan:  3 mg every Fri; 2 mg all other days   Weekly warfarin total:  15 mg   Plan last modified:  Eduar Weeks RPH (2021)   Next INR check:  2021   Target end date:      Indications    PAF (paroxysmal atrial fibrillation) (CMS/HCC) [I48.0]             Anticoagulation Episode Summary     INR check location:      Preferred lab:      Send INR reminders to:  [facility] [place]    Comments:        Anticoagulation Care Providers     Provider Role Specialty Phone number    Jimena Singer MD Referring Cardiology [phone]          INR History:  Anticoagulation Monitoring 2021   INR 3.50 2.40 2.20   INR Date 2021   INR Goal 2.0-3.0 2.0-3.0 2.0-3.0   Trend Same Down Up   Last Week Total 20 mg 15 mg 11.5 mg   Next Week Total 12.5 mg 14 mg 15 mg   Sun - - 2 mg   Mon Hold () - 2 mg   Tue Hold () - -   Wed - 2 mg -   Thu - 2 mg -   Fri - - 3 mg   Sat - - 2 mg   Visit Report - - -   Some recent data might be hidden       Plan:  1. INR is Therapeutic today- see above in Anticoagulation Summary.   Provided instructions to [facility] to Change their warfarin regimen- see above in Anticoagulation Summary.  2. Follow up in 4 days      Eduar Weeks RPH

## 2021-04-19 ENCOUNTER — LAB (OUTPATIENT)
Dept: LAB | Facility: HOSPITAL | Age: 76
End: 2021-04-19

## 2021-04-19 ENCOUNTER — OFFICE VISIT (OUTPATIENT)
Dept: NEUROLOGY | Facility: CLINIC | Age: 76
End: 2021-04-19

## 2021-04-19 VITALS
HEIGHT: 67 IN | DIASTOLIC BLOOD PRESSURE: 64 MMHG | WEIGHT: 178 LBS | BODY MASS INDEX: 27.94 KG/M2 | OXYGEN SATURATION: 99 % | SYSTOLIC BLOOD PRESSURE: 110 MMHG | HEART RATE: 74 BPM

## 2021-04-19 DIAGNOSIS — J90 PLEURAL EFFUSION: ICD-10-CM

## 2021-04-19 DIAGNOSIS — R56.9 CONVULSIONS, UNSPECIFIED CONVULSION TYPE (HCC): Primary | ICD-10-CM

## 2021-04-19 LAB
ANION GAP SERPL CALCULATED.3IONS-SCNC: 11.1 MMOL/L (ref 5–15)
BUN SERPL-MCNC: 31 MG/DL (ref 8–23)
BUN/CREAT SERPL: 18.9 (ref 7–25)
CALCIUM SPEC-SCNC: 8.7 MG/DL (ref 8.6–10.5)
CHLORIDE SERPL-SCNC: 102 MMOL/L (ref 98–107)
CO2 SERPL-SCNC: 26.9 MMOL/L (ref 22–29)
CREAT SERPL-MCNC: 1.64 MG/DL (ref 0.76–1.27)
GFR SERPL CREATININE-BSD FRML MDRD: 41 ML/MIN/1.73
GLUCOSE SERPL-MCNC: 93 MG/DL (ref 65–99)
POTASSIUM SERPL-SCNC: 4 MMOL/L (ref 3.5–5.2)
SODIUM SERPL-SCNC: 140 MMOL/L (ref 136–145)

## 2021-04-19 PROCEDURE — 36415 COLL VENOUS BLD VENIPUNCTURE: CPT

## 2021-04-19 PROCEDURE — 99215 OFFICE O/P EST HI 40 MIN: CPT | Performed by: PSYCHIATRY & NEUROLOGY

## 2021-04-19 PROCEDURE — 80048 BASIC METABOLIC PNL TOTAL CA: CPT

## 2021-04-19 NOTE — PROGRESS NOTES
Subjective:     Patient ID: Rodolfo Grover is a 76 y.o. male.    Mr. Grover is a 76-year-old right-handed male with history of coronary artery disease status post 7 vessel bypass, status post mitral valve replacement, upper GI bleed, atrial fibrillation on anticoagulation, hypertension, hyperlipidemia, GERD, arthritis, pulmonary hypertension, and sleep apnea on CPAP who presents to the neurology clinic today as a new patient to me for the evaluation of a possible seizure.  Neurology was consulted on February 24, 2021.  Reports that the patient was going from a chair and briefly standing while going to his bed and had loss of consciousness.  Reportedly he had eye deviation to the left and proceeded to have generalized tonic-clonic activity and was postictal afterwards.  He has had recurrent low blood pressures with a hemoglobin 8.4 that day.  Due to concerns for seizures he was started levetiracetam 500 mg twice a day.  He had a head CT on February 24, 2021 that was negative.  He had an EEG on February 24, 2021 that showed mild diffuse slowing.  His GFR on March 31, 2021 was 47.    Patient doesn't remember the event.  It was the day of surgery (2/23/21).  Doesn't remember anything about that day.  Within a day or so came to and felt ok.  Has never had a seizure before.  Has not had one since.  No on has told him any details of what happened.  No injuries.  No tongue biting.   Not sure if daughter was there at the time.  We called her during the visit.  Witnessed by nurse.  Was vomiting blood, turned to the side (left) and pulled his arms in.  Not sure how long it lasted.  LEV was started, and it is making him tired.  Starting cardiac rehab soon.  Can't lift more than 10 lbs.      Risk factors:  Childhood/febrile seizures? no  Head trauma/pathology? no  CNS infections? no  Family history of seizures? no    Driving? 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    Review of Systems   Constitutional: Negative for activity change, appetite change and fatigue.   HENT: Negative for ear pain, tinnitus and trouble swallowing.    Eyes: Negative for photophobia, pain and visual disturbance.   Cardiovascular: Negative for chest pain, palpitations and leg swelling.   Musculoskeletal: Negative for back pain, gait problem and neck pain.   Neurological: Negative for dizziness, tremors, seizures, syncope, facial asymmetry, speech difficulty, weakness, light-headedness, numbness and headaches.   Hematological: Negative for adenopathy. Does not bruise/bleed easily.   Psychiatric/Behavioral: Negative for agitation, behavioral problems, confusion, decreased concentration, dysphoric mood, hallucinations, self-injury, sleep disturbance and suicidal ideas. The patient is not nervous/anxious and is not hyperactive.    I reviewed the ROS documented by the MA.  All other systems negative.       Objective:    Neurologic Exam    Physical Exam   **The patient is wearing a mask**  Constitutional:  Vital signs reviewed.  No apparent distress.  Well groomed.  Eyes:  No injection, no icterus.    Respiratory:  Normal effort.  Clear to auscultation bilaterally.  Cardiovascular:  Regular rate with an irregular rhythm.  No murmurs.  No carotid bruits. Symmetric radial pulses.  Musculoskeletal: Normal station.  Gait steady.  Normal arm swing.  Patient able to walk on heels and toes.  Tandem gait intact.  Romberg negative.  Muscle tone and bulk normal in the bilateral upper and lower extremities.  Strength is 5/5 in the bilateral upper and lower extremities proximally and distally unless otherwise specified in the neurological exam.  Skin:  No rashes.  Warm, dry, and intact.  Psychiatric:  Good mood.  Normal affect.    Neurologic:  Mental status-  The patient is  alert and oriented to person, place and time. Attention/concentration is within normal limits.  Speech is fluent without dysarthria.  The patient is able to name, repeat and follow complex commands without difficulty.  Immediate memory and delayed recall intact (3/3 words immediate and after 4 minutes).  Fund of knowledge normal.  Cranial nerves- Pupils equally round and reactive to light with intact accomodation.  Visual fields intact.  Extraocular movements intact.  Facial sensation intact.   Hearing intact to finger-rub bilaterally.  SCM and trapezius are 5/5 bilaterally.    Motor-  See musculoskeletal above.  No tremor.  Reflexes- 2+ in the bilateral biceps, brachioradialis, patellar and achilles.  Toes down-going bilaterally.  Sensation- Intact to pinprick and vibration in bilateral upper and lower extremities symmetrically.  Coordination- Intact to finger tapping and heel knee shin bilaterally.   Gait- See musculoskeletal exam above.       Assessment/Plan:    The patient is a 76-year-old right-handed male with history of coronary artery disease status post 7 vessel bypass, status post a mitral valve replacement, upper GI bleed, atrial fibrillation on anticoagulation, hyperlipidemia, GERD, arthritis, pulmonary hypertension, and sleep apnea on CPAP who presents today for the evaluation of a convulsion.    1.  Convulsion-the patient had an episode in the hospital that was suspected to be a seizure.  The patient's clinical presentation would seem most concerning for convulsive syncope given the change in position, his hypovolemic state and anemia.  He has no major risk factors for seizures.  The patient would like to come off the medication.  Even if he did have a seizure, he does not meet diagnostic criteria for epilepsy and therefore an antiseizure medication is not warranted at this time.  He can go ahead and stop Keppra.  I would recommend an MRI as well as a repeat EEG.  If those do not show anything  potentially epileptogenic, then he can remain off an antiseizure medication and follow-up on an as-needed basis.  We did review routine seizure precautions including but not limited to not driving within 90 days of a seizure.    A total of 40 minutes time was spent on this encounter today.  This included reviewing the patient's records, face-to-face time, and documentation.           Problems Addressed this Visit     None      Visit Diagnoses     Convulsions, unspecified convulsion type (CMS/HCC)    -  Primary    Relevant Orders    EEG    MRI Brain With & Without Contrast      Diagnoses       Codes Comments    Convulsions, unspecified convulsion type (CMS/HCC)    -  Primary ICD-10-CM: R56.9  ICD-9-CM: 780.39

## 2021-04-19 NOTE — PATIENT INSTRUCTIONS
[facility]  Azucena Nelson MD  Neurology clinic  [phone]    With anti-seizure medications, you may initially notice side effects of fatigue, drowsiness, unsteadiness, and dizziness.  Other possible side effects include nausea, abdominal pain, headache, blurry or double vision, slurred speech and mood changes.  Generally, patients will noticed these symptoms when the medication is first started or with higher doses and will go away with time.    It is import to consistently take your medication every day.  Missing just one dose may put you at risk for a breakthrough seizure.  Consider using reminders on your phone or a pill box.    If you develop a rash, please call the neurology clinic immediately or notify another healthcare professional, as this may be potentially life-threatening.  If you are unable to reach a healthcare professional, go to the emergency room immediately for further evaluation.    If you develop thoughts of wanting to hurt yourself or others, please call the neurology clinic immediately to notify another healthcare professional.  If you are unable to reach a healthcare professional, go to the emergency room immediately for further evaluation.    It is the [place] state law that you cannot drive within 90 days of a seizure.    You should avoid certain activities that if you were to have a seizure, you could harm yourself or others. In general, it is recommended that you avoid operating heavy machinery or power tools, swimming or taking baths by yourself (showers are ok), don't stand over open flames, don't get on high ladders or the roof.  I also recommend to avoid sleeping on your stomach.    For further information on epilepsy and resources available to patients and their families, please visit the [facility] [place] at www.efky.org or call [phone].    **Check out the [facility] [place]'s monthly Art Group Gathering.  They are  located at [facility], [address] [place] [address].  Call Krystle Yip at [phone] or email her at [email or web address] for the dates of future gatherings.**      **If you have having memory problems, consider HOBSCOTCH (Home-Based Self-management and Cognitive Training Changes lives).  It is an 8 week self-management program for adults with epilepsy and memory problems.  The program is free at the [facility].  Contact Shahnaz Reynolds at [phone] or [email or web address].**

## 2021-04-20 ENCOUNTER — ANTICOAGULATION VISIT (OUTPATIENT)
Dept: PHARMACY | Facility: HOSPITAL | Age: 76
End: 2021-04-20

## 2021-04-20 DIAGNOSIS — I48.0 PAF (PAROXYSMAL ATRIAL FIBRILLATION) (HCC): Primary | ICD-10-CM

## 2021-04-20 LAB — INR PPP: 3.5

## 2021-04-20 NOTE — PROGRESS NOTES
Anticoagulation Clinic Progress Note    Anticoagulation Summary  As of 4/20/2021    INR goal:  2.0-3.0   TTR:  61.5 % (4 d)   INR used for dosing:  3.50 (4/20/2021)   Warfarin maintenance plan:  3 mg every Fri; 2 mg all other days   Weekly warfarin total:  15 mg   Plan last modified:  Eduar Weeks [facility] (4/16/2021)   Next INR check:  4/23/2021   Target end date:      Indications    PAF (paroxysmal atrial fibrillation) (CMS/HCC) [I48.0]             Anticoagulation Episode Summary     INR check location:      Preferred lab:      [facility]ers [facility]:   [facility]    Comments:        Anticoagulation Care Providers     Provider Role Specialty Phone number    Jimena Singer MD Referring Cardiology [phone]          INR History:  Anticoagulation Monitoring 4/14/2021 4/16/2021 4/20/2021   INR 2.40 2.20 3.50   INR Date 4/14/2021 4/16/2021 4/20/2021   INR Goal 2.0-3.0 2.0-3.0 2.0-3.0   Trend Down Up Same   Last Week Total 15 mg 11.5 mg 13 mg   Next Week Total 14 mg 15 mg 14 mg   Sun - 2 mg -   Mon - 2 mg -   Tue - - 1 mg (4/20)   Wed 2 mg - 2 mg   Thu 2 mg - 2 mg   Fri - 3 mg -   Sat - 2 mg -   Visit Report - - -   Some recent data might be hidden       Plan:  1. INR is Supratherapeutic today- see above in Anticoagulation Summary.   Provided instructions to Demi with [facility] to decrease today's dose to 1mg, then resume his current warfarin regimen- see above in Anticoagulation Summary.  2. Follow up in 3 days      Magali Dawson [facility]

## 2021-04-21 ENCOUNTER — OFFICE VISIT (OUTPATIENT)
Dept: CARDIAC SURGERY | Facility: CLINIC | Age: 76
End: 2021-04-21

## 2021-04-21 VITALS
TEMPERATURE: 96.9 F | HEIGHT: 67 IN | DIASTOLIC BLOOD PRESSURE: 76 MMHG | BODY MASS INDEX: 27.47 KG/M2 | SYSTOLIC BLOOD PRESSURE: 142 MMHG | RESPIRATION RATE: 20 BRPM | HEART RATE: 96 BPM | OXYGEN SATURATION: 98 % | WEIGHT: 175 LBS

## 2021-04-21 DIAGNOSIS — Z95.1 S/P CABG X 7: Primary | ICD-10-CM

## 2021-04-21 DIAGNOSIS — Z98.890 S/P MITRAL VALVE REPAIR: ICD-10-CM

## 2021-04-21 PROCEDURE — 99024 POSTOP FOLLOW-UP VISIT: CPT | Performed by: NURSE PRACTITIONER

## 2021-04-21 RX ORDER — FUROSEMIDE 40 MG/1
40 TABLET ORAL DAILY PRN
Qty: 30 TABLET | Refills: 1
Start: 2021-04-21 | End: 2022-01-25 | Stop reason: SDUPTHER

## 2021-04-21 RX ORDER — POTASSIUM CHLORIDE 750 MG/1
10 TABLET, FILM COATED, EXTENDED RELEASE ORAL DAILY PRN
Qty: 30 TABLET | Refills: 1
Start: 2021-04-21 | End: 2022-01-25 | Stop reason: SDUPTHER

## 2021-04-21 NOTE — PROGRESS NOTES
CARDIOVASCULAR SURGERY FOLLOW-UP PROGRESS NOTE  Chief Complaint: Postop follow-up        HPI:   Dear Domenica Burrell MD and colleagues:    It was nice to see Rodolfo Grover in follow up 8 weeks after surgery.  As you know, he is a 76 y.o. male with CAD, mitral incompetence, ischemic cardiomyopathy, cardiogenic shock with hypotension and complete heart block in the immediate preop period who underwent urgent CABG x7 with LIMA, mitral valve repair on 2/22/2021 with Dr. Echavarria. He had a complicated postoperative course with right pneumothorax requiring postoperative chest tube placement, atrial flutter, postop seizure on postop day 2 subsequently suppressed by Keppra, acute GI blood loss with coffee-ground emesis and esophagitis by EGD, and acute kidney injury.  He has resumed coumadin and has not had any bleeding issues.  He comes in today complaining of nothing.  At his last office visit he was complaining of shortness of breath and had noticeable dyspnea with minimal exertion, chest x-ray demonstrated continued pleural effusion, I resumed his Lasix 40 mg daily.  The patient states that his shortness of breath has resolved, his right base on auscultation is still mildly diminished, his creatinine increased to 1.6 which is fairly close to his baseline, his hospitalization peak creatinine was 3.1.  I have instructed him to discontinue his Lasix and monitor for recurrence of shortness of breath and to monitor his weight, if he has a 3 pound increase in 24 hours or 5 pound increase in 72 hours he is to take Lasix.    I have been following him every 1 to 2 weeks to evaluate his right SVH site thrombophlebitis, he has completed antibiotic course approximately 1 week ago.  The site is well approximated without erythema, there is 1+ right lower extremity edema.  He is to continue current treatment and utilizing compression hose during the day.    Physical Exam:         /76 (BP Location: Left arm, Patient Position:  "Sitting, Cuff Size: Adult)   Pulse 96   Temp 96.9 °F (36.1 °C) (Oral)   Resp 20   Ht 170.2 cm (67\")   Wt 79.4 kg (175 lb)   SpO2 98%   BMI 27.41 kg/m²   Heart:  regular rate and rhythm, S1, S2 normal, no murmur, click, rub or gallop  Lungs:  diminished breath sounds R base  Extremities:  1+ lower extremity edema on right  Incision(s):  mid chest healing well, right leg healing well, sternum stable    Assessment/Plan:     S/P CABG and mitral valve repair. Overall, he is doing well.    Post-op pleural effusion  Slow post-op rehabilitation progress  Right lower extremity thrombophlebitis, resolving    Keep incisions clean and dry  Follow-up as scheduled with cardiology  Follow-up with CT surgery prn    Continue lifting restriction of 10 lbs until 6 weeks and 50 lbs until 12 weeks from the date of surgery, no excessive jarring motions or twisting motions until 12 weeks from the date of surgery    Return to clinic if any signs or symptoms of infection or sternal instability develop       Thank you for allowing me to participate in the care of your patient.    Regards,  NKECHI Kent      "

## 2021-04-23 ENCOUNTER — ANTICOAGULATION VISIT (OUTPATIENT)
Dept: PHARMACY | Facility: HOSPITAL | Age: 76
End: 2021-04-23

## 2021-04-23 DIAGNOSIS — I48.0 PAF (PAROXYSMAL ATRIAL FIBRILLATION) (HCC): Primary | ICD-10-CM

## 2021-04-23 LAB — INR PPP: 2.9

## 2021-04-23 NOTE — PROGRESS NOTES
Anticoagulation Clinic Progress Note    Anticoagulation Summary  As of 2021    INR goal:  2.0-3.0   TTR:  42.3 % (1 wk)   INR used for dosin.90 (2021)   Warfarin maintenance plan:  2 mg every day   Weekly warfarin total:  14 mg   Plan last modified:  Juan David Delgado, PharmD (2021)   Next INR check:  2021   Target end date:      Indications    PAF (paroxysmal atrial fibrillation) (CMS/HCC) [I48.0]             Anticoagulation Episode Summary     INR check location:      Preferred lab:      Send INR reminders to:  [facility] [place]    Comments:        Anticoagulation Care Providers     Provider Role Specialty Phone number    Jimena Singer MD Referring Cardiology [phone]        Clinic Interview:  Patient Findings     Negatives:  Signs/symptoms of thrombosis, Signs/symptoms of bleeding,   Laboratory test error suspected, Change in health, Change in alcohol use,   Change in activity, Upcoming invasive procedure, Emergency department   visit, Upcoming dental procedure, Missed doses, Extra doses, Change in   medications, Change in diet/appetite, Hospital admission, Bruising, Other   complaints    Comments:  Per Care Magen Walker      Clinical Outcomes     Negatives:  Major bleeding event, Thromboembolic event,   Anticoagulation-related hospital admission, Anticoagulation-related ED   visit, Anticoagulation-related fatality    Comments:  Per Care Magen JERNIGAN Denise        INR History:  Anticoagulation Monitoring 2021   INR 2.20 3.50 2.90   INR Date 2021   INR Goal 2.0-3.0 2.0-3.0 2.0-3.0   Trend Up Same Down   Last Week Total 11.5 mg 13 mg 14 mg   Next Week Total 15 mg 14 mg 14 mg   Sun 2 mg - 2 mg   Mon 2 mg - 2 mg   Tue - 1 mg () 2 mg   Wed - 2 mg 2 mg   Thu - 2 mg -   Fri 3 mg - 2 mg   Sat 2 mg - 2 mg   Visit Report - - -   Some recent data might be hidden     Plan:  1. INR is Therapeutic today- see above in Anticoagulation  Summary.   Will instruct Rodolfo FRANCO Reilly via [facility] Walker to Change their warfarin regimen (adjust to 2mg daily from 2mg all days of the week except 3mg on Friday)- see above in Anticoagulation Summary.  2. Follow up in 1 week or sooner ([facility] Walker setting up at least 1 more INR before patient begins Cardiac Rehab)  3.  They have been instructed to call if any changes in medications, doses, concerns, etc. Patient expresses understanding and has no further questions at this time.    Juan David Delgado, ElidaD

## 2021-04-29 ENCOUNTER — APPOINTMENT (OUTPATIENT)
Dept: VACCINE CLINIC | Facility: HOSPITAL | Age: 76
End: 2021-04-29

## 2021-04-29 ENCOUNTER — ANTICOAGULATION VISIT (OUTPATIENT)
Dept: PHARMACY | Facility: HOSPITAL | Age: 76
End: 2021-04-29

## 2021-04-29 DIAGNOSIS — I48.0 PAF (PAROXYSMAL ATRIAL FIBRILLATION) (HCC): Primary | ICD-10-CM

## 2021-04-29 LAB — INR PPP: 3.1

## 2021-04-29 NOTE — PROGRESS NOTES
Anticoagulation Clinic Progress Note    Anticoagulation Summary  As of 4/29/2021    INR goal:  2.0-3.0   TTR:  45.9 % (1.9 wk)   INR used for dosing:  3.10 (4/29/2021)   Warfarin maintenance plan:  1 mg every Thu; 2 mg all other days   Weekly warfarin total:  13 mg   Plan last modified:  Eduar Weeks RPH (4/29/2021)   Next INR check:  5/4/2021   Target end date:      Indications    PAF (paroxysmal atrial fibrillation) (CMS/[facility]) [I48.0]             Anticoagulation Episode Summary     INR check location:      Preferred lab:      Send INR reminders to:  [facility]    Comments:        Anticoagulation Care Providers     Provider Role Specialty Phone number    Jimena Singer MD Referring Cardiology [phone]          Clinic Interview:  Patient Findings     Positives:  Other complaints    Negatives:  Signs/symptoms of thrombosis, Signs/symptoms of bleeding,   Laboratory test error suspected, Change in health, Change in alcohol use,   Change in activity, Upcoming invasive procedure, Emergency department   visit, Upcoming dental procedure, Missed doses, Extra doses, Change in   medications, Change in diet/appetite, Hospital admission, Bruising    Comments:  Discharged from [facility] today.       Clinical Outcomes     Negatives:  Major bleeding event, Thromboembolic event,   Anticoagulation-related hospital admission, Anticoagulation-related ED   visit, Anticoagulation-related fatality    Comments:  Discharged from [facility] today.         INR History:  Anticoagulation Monitoring 4/20/2021 4/23/2021 4/29/2021   INR 3.50 2.90 3.10   INR Date 4/20/2021 4/23/2021 4/29/2021   INR Goal 2.0-3.0 2.0-3.0 2.0-3.0   Trend Same Down Down   Last Week Total 13 mg 14 mg 14 mg   Next Week Total 14 mg 14 mg 13 mg   Sun - 2 mg 2 mg   Mon - 2 mg 2 mg   Tue 1 mg (4/20) 2 mg -   Wed 2 mg 2 mg -   Thu 2 mg - 1 mg   Fri - 2 mg 2 mg   Sat - 2 mg 2 mg   Visit Report - - -   Some recent data might be hidden       Plan:  1.  INR is Supratherapeutic today- see above in Anticoagulation Summary.   Will instruct Rodolfo Grover to Change their warfarin regimen- see above in Anticoagulation Summary.  2. Follow up in 5 days in clinic  3. They have been instructed to call if any changes in medications, doses, concerns, etc. Patient expresses understanding and has no further questions at this time.    Eduar Weeks [facility]

## 2021-04-30 ENCOUNTER — IMMUNIZATION (OUTPATIENT)
Dept: VACCINE CLINIC | Facility: HOSPITAL | Age: 76
End: 2021-04-30

## 2021-04-30 PROCEDURE — 0002A: CPT | Performed by: INTERNAL MEDICINE

## 2021-04-30 PROCEDURE — 91300 HC SARSCOV02 VAC 30MCG/0.3ML IM: CPT | Performed by: INTERNAL MEDICINE

## 2021-05-04 ENCOUNTER — ANTICOAGULATION VISIT (OUTPATIENT)
Dept: PHARMACY | Facility: HOSPITAL | Age: 76
End: 2021-05-04

## 2021-05-04 ENCOUNTER — OFFICE VISIT (OUTPATIENT)
Dept: CARDIAC REHAB | Facility: HOSPITAL | Age: 76
End: 2021-05-04

## 2021-05-04 DIAGNOSIS — I48.0 PAF (PAROXYSMAL ATRIAL FIBRILLATION) (HCC): Primary | ICD-10-CM

## 2021-05-04 DIAGNOSIS — Z98.890 S/P MVR (MITRAL VALVE REPAIR): ICD-10-CM

## 2021-05-04 DIAGNOSIS — I21.4 NSTEMI, INITIAL EPISODE OF CARE (HCC): Primary | ICD-10-CM

## 2021-05-04 DIAGNOSIS — Z95.1 S/P CABG (CORONARY ARTERY BYPASS GRAFT): ICD-10-CM

## 2021-05-04 LAB
INR PPP: 3 (ref 0.91–1.09)
PROTHROMBIN TIME: 36.1 SECONDS (ref 10–13.8)

## 2021-05-04 PROCEDURE — 85610 PROTHROMBIN TIME: CPT

## 2021-05-04 PROCEDURE — 36416 COLLJ CAPILLARY BLOOD SPEC: CPT

## 2021-05-04 PROCEDURE — 93797 PHYS/QHP OP CAR RHAB WO ECG: CPT

## 2021-05-04 NOTE — PROGRESS NOTES
Anticoagulation Clinic Progress Note    Anticoagulation Summary  As of 5/4/2021    INR goal:  2.0-3.0   TTR:  32.3 % (2.6 wk)   INR used for dosing:  3.0 (5/4/2021)   Warfarin maintenance plan:  1 mg every Thu; 2 mg all other days   Weekly warfarin total:  13 mg   No change documented:  Margaret Keyes   Plan last modified:  Eduar Weeks [facility] (4/29/2021)   Next INR check:  5/17/2021   Priority:  High   Target end date:      Indications    PAF (paroxysmal atrial fibrillation) (CMS/HCC) [I48.0]             Anticoagulation Episode Summary     INR check location:      Preferred lab:      [facility]    Comments:        Anticoagulation Care Providers     Provider Role Specialty Phone number    Jimena Singer MD Referring Cardiology [phone]          Clinic Interview:      INR History:  Anticoagulation Monitoring 4/23/2021 4/29/2021 5/4/2021   INR 2.90 3.10 3.0   INR Date 4/23/2021 4/29/2021 5/4/2021   INR Goal 2.0-3.0 2.0-3.0 2.0-3.0   Trend Down Down Same   Last Week Total 14 mg 14 mg 13 mg   Next Week Total 14 mg 13 mg 13 mg   Sun 2 mg 2 mg 2 mg   Mon 2 mg 2 mg 2 mg   Tue 2 mg - 2 mg   Wed 2 mg - 2 mg   Thu - 1 mg 1 mg   Fri 2 mg 2 mg 2 mg   Sat 2 mg 2 mg 2 mg   Visit Report - - -   Some recent data might be hidden       Plan:  1. INR is therapeutic today- see above in Anticoagulation Summary.   Will instruct Rodolfo L Reilly to continue their warfarin regimen- see above in Anticoagulation Summary.  2. Follow up in 2 weeks.  3. Patient declines warfarin refills.  4. Verbal and written information provided. Patient expresses understanding and has no further questions at this time.    Margaret Keyes

## 2021-05-05 ENCOUNTER — HOSPITAL ENCOUNTER (OUTPATIENT)
Dept: CARDIOLOGY | Facility: HOSPITAL | Age: 76
Discharge: HOME | End: 2021-05-05
Admitting: NURSE PRACTITIONER

## 2021-05-05 ENCOUNTER — TELEPHONE (OUTPATIENT)
Dept: CARDIOLOGY | Facility: CLINIC | Age: 76
End: 2021-05-05

## 2021-05-05 ENCOUNTER — OFFICE VISIT (OUTPATIENT)
Dept: CARDIOLOGY | Facility: CLINIC | Age: 76
End: 2021-05-05

## 2021-05-05 VITALS
DIASTOLIC BLOOD PRESSURE: 78 MMHG | WEIGHT: 180 LBS | HEART RATE: 70 BPM | SYSTOLIC BLOOD PRESSURE: 152 MMHG | BODY MASS INDEX: 28.25 KG/M2 | HEIGHT: 67 IN

## 2021-05-05 DIAGNOSIS — I25.10 CORONARY ARTERY DISEASE INVOLVING NATIVE CORONARY ARTERY OF NATIVE HEART WITHOUT ANGINA PECTORIS: ICD-10-CM

## 2021-05-05 DIAGNOSIS — I49.3 PVC (PREMATURE VENTRICULAR CONTRACTION): ICD-10-CM

## 2021-05-05 DIAGNOSIS — Z95.1 S/P CABG (CORONARY ARTERY BYPASS GRAFT): ICD-10-CM

## 2021-05-05 DIAGNOSIS — I10 ESSENTIAL HYPERTENSION: ICD-10-CM

## 2021-05-05 DIAGNOSIS — Z98.890 S/P MITRAL VALVE REPAIR: ICD-10-CM

## 2021-05-05 DIAGNOSIS — Z79.01 CHRONIC ANTICOAGULATION: Primary | ICD-10-CM

## 2021-05-05 LAB
ANION GAP SERPL CALCULATED.3IONS-SCNC: 11 MMOL/L (ref 5–15)
BASOPHILS # BLD AUTO: 0.06 10*3/MM3 (ref 0–0.2)
BASOPHILS NFR BLD AUTO: 0.8 % (ref 0–1.5)
BUN SERPL-MCNC: 18 MG/DL (ref 8–23)
BUN/CREAT SERPL: 13.1 (ref 7–25)
CALCIUM SPEC-SCNC: 9.1 MG/DL (ref 8.6–10.5)
CHLORIDE SERPL-SCNC: 105 MMOL/L (ref 98–107)
CO2 SERPL-SCNC: 26 MMOL/L (ref 22–29)
CREAT SERPL-MCNC: 1.37 MG/DL (ref 0.76–1.27)
DEPRECATED RDW RBC AUTO: 45.8 FL (ref 37–54)
EOSINOPHIL # BLD AUTO: 0.29 10*3/MM3 (ref 0–0.4)
EOSINOPHIL NFR BLD AUTO: 4 % (ref 0.3–6.2)
ERYTHROCYTE [DISTWIDTH] IN BLOOD BY AUTOMATED COUNT: 12.7 % (ref 12.3–15.4)
GFR SERPL CREATININE-BSD FRML MDRD: 50 ML/MIN/1.73
GLUCOSE SERPL-MCNC: 77 MG/DL (ref 65–99)
HCT VFR BLD AUTO: 39 % (ref 37.5–51)
HGB BLD-MCNC: 12.8 G/DL (ref 13–17.7)
IMM GRANULOCYTES # BLD AUTO: 0.06 10*3/MM3 (ref 0–0.05)
IMM GRANULOCYTES NFR BLD AUTO: 0.8 % (ref 0–0.5)
LYMPHOCYTES # BLD AUTO: 1.18 10*3/MM3 (ref 0.7–3.1)
LYMPHOCYTES NFR BLD AUTO: 16.1 % (ref 19.6–45.3)
MAGNESIUM SERPL-MCNC: 1.8 MG/DL (ref 1.6–2.4)
MCH RBC QN AUTO: 31.5 PG (ref 26.6–33)
MCHC RBC AUTO-ENTMCNC: 32.8 G/DL (ref 31.5–35.7)
MCV RBC AUTO: 96.1 FL (ref 79–97)
MONOCYTES # BLD AUTO: 0.64 10*3/MM3 (ref 0.1–0.9)
MONOCYTES NFR BLD AUTO: 8.7 % (ref 5–12)
NEUTROPHILS NFR BLD AUTO: 5.09 10*3/MM3 (ref 1.7–7)
NEUTROPHILS NFR BLD AUTO: 69.6 % (ref 42.7–76)
NRBC BLD AUTO-RTO: 0 /100 WBC (ref 0–0.2)
PLATELET # BLD AUTO: 213 10*3/MM3 (ref 140–450)
PMV BLD AUTO: 9.9 FL (ref 6–12)
POTASSIUM SERPL-SCNC: 3.9 MMOL/L (ref 3.5–5.2)
RBC # BLD AUTO: 4.06 10*6/MM3 (ref 4.14–5.8)
SODIUM SERPL-SCNC: 142 MMOL/L (ref 136–145)
WBC # BLD AUTO: 7.32 10*3/MM3 (ref 3.4–10.8)

## 2021-05-05 PROCEDURE — 93000 ELECTROCARDIOGRAM COMPLETE: CPT | Performed by: NURSE PRACTITIONER

## 2021-05-05 PROCEDURE — 36415 COLL VENOUS BLD VENIPUNCTURE: CPT

## 2021-05-05 PROCEDURE — 83735 ASSAY OF MAGNESIUM: CPT | Performed by: NURSE PRACTITIONER

## 2021-05-05 PROCEDURE — 85025 COMPLETE CBC W/AUTO DIFF WBC: CPT | Performed by: NURSE PRACTITIONER

## 2021-05-05 PROCEDURE — 99214 OFFICE O/P EST MOD 30 MIN: CPT | Performed by: NURSE PRACTITIONER

## 2021-05-05 PROCEDURE — 80048 BASIC METABOLIC PNL TOTAL CA: CPT | Performed by: NURSE PRACTITIONER

## 2021-05-05 NOTE — PROGRESS NOTES
Date of Office Visit: 2021  Encounter Provider: Radha Rodríguez, ARCELIA, APRN  Place of Service: [facility]  Patient Name: Rodolfo Grover  :1945        Subjective:     Chief Complaint:  Coronary artery disease status post CABG, ischemic cardiomyopathy, mitral valve prolapse status post repair, paroxysmal atrial fibrillation      History of Present Illness:  Rodolfo Grover is a 76 y.o. male patient of Dr. Singer.  I am seeing this patient in the office today and I have reviewed his records.    Patient has a history of pulmonary hypertension, frequent PVCs, hypertension, sleep apnea, rheumatic fever, GERD.     Patient was seen 3/2018 after suffering a humeral fracture following a fall.  At that time he was in ventricular bigeminy and had normal potassium and magnesium levels.  He also was hypertensive.  Echo showed normal LV systolic function with normal diastolic function, mild left atrial enlargement, indeterminate saline study, aortic valve sclerosis with mild regurgitation and trivial mitral tricuspid regurgitation with mild pulmonary hypertension with RVSP of 42 mmHg.  Stress perfusion study was negative for ischemia.  Follow-up echo 2020 showed EF of 55%, normal diastolic dysfunction, mild aortic and mitral valve regurgitation, mild to moderate tricuspid regurgitation with RVSP increased further to 48 mmHg.  Plan was to follow-up with sleep medicine to ensure sleep apnea was well treated and get blood pressure better controlled and then look at rechecking an echo in 3 to 6 months.  Low-dose amlodipine was added to his regimen.      Patient was seen in the office 2021 at which point he had some concerning EKG changes as well as some chest discomfort and troponin was positive in the office.  Echo showed EF of 62% with some hypokinesis of the LV wall, mild to moderate mitral regurgitation, trace aortic regurgitation, mild to moderate tricuspid regurgitation with RVSP  of 61.3 mmHg. Heart cath 2/2021 done on same day as office visit showed normal left main, LAD with discrete 95% ostial stenosis with mild calcification, diffuse 80% mid vessel stenosis with moderate calcification, discrete 90% mid vessel stenosis, and sequential 70 to 80% diagonal stenosis, discrete 60 to 70% mid vessel stenosis of the left circumflex, RCA with diffuse 80% stenosis in the proximal to mid segment with severe calcification, and mildly reduced LV systolic function with mid to distal inferior wall and anterior lateral wall hypokinesis.  Assessment for possible coronary artery bypass grafting was recommended.  He was treated with heparin drip and underwent coronary artery bypass grafting x 7 and mitral valve repair.  He had some coffee-ground emesis on postop day 2 and had an EGD showing esophagitis and biopsy showed some Candida and he was treated with fluconazole.  He suffered a significant pneumothorax requiring chest tube.  He also had complete heart block and was seen by EP and recovered and was going in and out of atrial fibrillation with controlled rate.  He had a seizure and was seen by neurology and was started on Keppra which was recommended to be continued as an outpatient until follow-up with neurology.  Beta-blocker was initiated once blood pressure and heart rate will tolerate.  He was cleared by GI to be discharged on low-dose aspirin.  Anticoagulation however was deferred.  Preop carotid ultrasound 2/2021 was normal bilaterally.  He was seen in the office 4/2021 at which point he had recurrent atrial arrhythmia.  He was started on warfarin after cleared by GI and cardiothoracic surgeon.  Echo 4/6/2021 showed EF of 38%, borderline concentric LVH, mild aortic regurgitation, trace mitral regurgitation with fixed posterior leaflet findings consistent with previous surgical repair, mild tricuspid regurgitation with RVSP of 64.6 mmHg      Patient presents to office today for follow-up  appointment.  Patient reports he is doing well overall since last visit.  He had his initial evaluation with cardiac rehab yesterday and start cardiac rehab on Friday.  He is also been walking about 10 minutes twice a day.  Denies any chest pain or discomfort with this.  Continues to get some mild shortness of breath with exertion, unchanged.  Denies any palpitations, racing heartbeat sensation, dizziness, syncope, falls, or abnormal bleeding.  He gets a random brief twinge of discomfort to his chest that occurs at rest and last maybe a second and then is gone.  This sounds musculoskeletal and not typical for cardiac discomfort.  He has been having some bilateral lower extremity swelling despite use of compression socks.  Lasix and potassium were recently changed to be used on an as-needed basis only given renal insufficiency.  He is drinking caffeine free coffee but is drinking caffeinated tea throughout the day.          Past Medical History:   Diagnosis Date   • Acute blood loss anemia    • Atrial fibrillation (CMS/HCC)    • Bilateral lower extremity edema    • Bradycardia following surgery    • CAD (coronary artery disease)    • Chest discomfort    • Elevated troponin    • Focal motor seizure (CMS/HCC)    • Generalized tonic-clonic seizure (CMS/HCC)    • GERD (gastroesophageal reflux disease)    • Iritis of right eye    • Ischemic cardiomyopathy    • Mitral valve insufficiency    • Post-ictal state (CMS/HCC)    • Pulmonary hypertension (CMS/HCC)    • PVC (premature ventricular contraction)    • Rheumatic fever    • S/P CABG x 7    • S/P mitral valve repair    • Severe obstructive sleep apnea    • Shoulder fracture, right      Past Surgical History:   Procedure Laterality Date   • CARDIAC CATHETERIZATION N/A 2/19/2021    Procedure: Coronary angiography;  Surgeon: Bry Nails MD;  Location: [facility] INVASIVE LOCATION;  Service: Cardiovascular;  Laterality: N/A;   • CARDIAC CATHETERIZATION N/A  2/19/2021    Procedure: Left heart cath;  Surgeon: Bry Nails MD;  Location: [facility] CATH INVASIVE LOCATION;  Service: Cardiovascular;  Laterality: N/A;   • CARDIAC CATHETERIZATION N/A 2/19/2021    Procedure: Right Heart Cath;  Surgeon: Bry Nails MD;  Location: [facility] CATH INVASIVE LOCATION;  Service: Cardiovascular;  Laterality: N/A;   • CARDIAC CATHETERIZATION N/A 2/19/2021    Procedure: Left ventriculography;  Surgeon: Bry Nails MD;  Location: [facility] CATH INVASIVE LOCATION;  Service: Cardiovascular;  Laterality: N/A;   • CORONARY ARTERY BYPASS GRAFT N/A 2/22/2021    Procedure: BK, MIDLINE STERNOTOMY, CORONARY ARTERY BYPASS X 7 UTILIZING THE LEFT INTERNAL MAMMARY ARTERY AND THE RIGHT SAPHENOUS VEIN, MITRAL VALVE REPAIR, PRP;  Surgeon: Jr Shyam Echavarria MD;  Location: [facility];  Service: Cardiothoracic;  Laterality: N/A;   • ENDOSCOPY N/A 8/16/2018    Erosive gastritis, diverticulosis   • ENDOSCOPY N/A 2/26/2021    Procedure: ESOPHAGOGASTRODUODENOSCOPY AT BEDSIDE WITH HOT SNARE POLYPECTOMY AND CLIP PLACEMENT X1;  Surgeon: Jono Laboy MD;  Location: [facility] ENDOSCOPY;  Service: Gastroenterology;  Laterality: N/A;  PRE-  GI BLEED  POST- GASTRITIS, ESOPHAGITIS, POLYP   • HERNIA REPAIR     • SHOULDER CLOSED REDUCTION Right 3/16/2018    Procedure: RIGHT SHOULDER CLOSED REDUCTION;  Surgeon: Kj Garcia MD;  Location: [facility] OR;  Service: Orthopedics   • TONSILLECTOMY     • TOTAL SHOULDER ARTHROPLASTY W/ DISTAL CLAVICLE EXCISION Right 3/22/2018    Procedure: Reverse Total Shoulder Arthroplsty;  Surgeon: Kj Garcia MD;  Location: [facility] OR;  Service: Orthopedics   • TRANSESOPHAGEAL ECHOCARDIOGRAM (BK) N/A 2/22/2021    Procedure: TRANSESOPHAGEAL ECHOCARDIOGRAM;  Surgeon: Jr Shyam Echavarria MD;  Location: [facility] OR;  Service: Cardiothoracic;  Laterality: N/A;     Outpatient Medications Prior to Visit   Medication Sig Dispense Refill   •  acetaminophen (TYLENOL) 500 MG tablet Take 2 tablets by mouth 3 (Three) Times a Day. (Patient taking differently: Take 1,000 mg by mouth 3 (Three) Times a Day As Needed.)     • aspirin 81 MG EC tablet Take 1 tablet by mouth Daily. 90 tablet 1   • furosemide (Lasix) 40 MG tablet Take 1 tablet by mouth Daily As Needed (for weight gain or shortness of breath). 30 tablet 1   • metoprolol succinate XL (TOPROL-XL) 25 MG 24 hr tablet Take 1 tablet by mouth Daily. 90 tablet 1   • pantoprazole (PROTONIX) 40 MG EC tablet Take 1 tablet by mouth 2 (Two) Times a Day Before Meals. 60 tablet 1   • potassium chloride 10 MEQ CR tablet Take 1 tablet by mouth Daily As Needed (Take with Lasix (furosemide)). With Lasix (furosemide) 30 tablet 1   • Psyllium (METAMUCIL FIBER PO) Take  by mouth As Needed.     • VITAMIN D PO Take  by mouth Daily.     • warfarin (COUMADIN) 2 MG tablet Take one tablet by mouth daily or as directed by the [facility] 30 tablet 0   • sucralfate (CARAFATE) 1 g tablet Take 1 tablet by mouth 4 (Four) Times a Day Before Meals & at Bedtime. 120 tablet 1     No facility-administered medications prior to visit.       Allergies as of 05/05/2021   • (No Known Allergies)     Social History     Socioeconomic History   • Marital status:      Spouse name: Not on file   • Number of children: Not on file   • Years of education: Not on file   • Highest education level: Not on file   Tobacco Use   • Smoking status: Never Smoker   • Smokeless tobacco: Never Used   Vaping Use   • Vaping Use: Never used   Substance and Sexual Activity   • Alcohol use: Yes     Comment: occasional   • Drug use: No   • Sexual activity: Defer     Family History   Problem Relation Age of Onset   • Malig Hyperthermia Neg Hx        Review of Systems   Constitutional: Negative for chills, fever, malaise/fatigue, weight gain and weight loss.   Cardiovascular:        SEE HPI   Respiratory: Negative for cough, snoring and wheezing.   "  Hematologic/Lymphatic: Negative for bleeding problem. Does not bruise/bleed easily.   Musculoskeletal: Negative for falls.   Gastrointestinal: Negative for diarrhea, melena, nausea and vomiting.   Genitourinary: Negative for hematuria.   Neurological: Negative for dizziness.   Psychiatric/Behavioral: Negative for altered mental status, depression and substance abuse. The patient is not nervous/anxious.           Objective:     Vitals:    05/05/21 1051   BP: 152/78   BP Location: Left arm   Cuff Size: Adult   Pulse: 70   Weight: 81.6 kg (180 lb)   Height: 170.2 cm (67\")     Body mass index is 28.19 kg/m².      PHYSICAL EXAM:  Constitutional:       General: Not in acute distress.     Appearance: Well-developed. Not diaphoretic.   Eyes:      Pupils: Pupils are equal, round, and reactive to light.   HENT:      Head: Normocephalic and atraumatic.   Neck:      Vascular: No carotid bruit or JVD.   Pulmonary:      Effort: Pulmonary effort is normal. No respiratory distress.      Breath sounds: Normal breath sounds. No wheezing. No rales.   Cardiovascular:      Normal rate. Regular rhythm.      Murmurs: There is no murmur.      No gallop. No click. No rub.   Edema:     Peripheral edema (1+, ankles) present.  Abdominal:      General: Bowel sounds are normal. There is no distension.      Palpations: Abdomen is soft.   Musculoskeletal: Normal range of motion.         General: No tenderness or deformity.      Cervical back: Neck supple. Skin:     General: Skin is warm and dry.      Findings: No erythema or rash.   Neurological:      Mental Status: Alert and oriented to person, place, and time.   Psychiatric:         Behavior: Behavior normal.         Judgment: Judgment normal.             ECG 12 Lead    Date/Time: 5/5/2021 11:00 AM  Performed by: Radha Rodríguez DNP, NKECHI  Authorized by: Radha Rodríguez DNP, APRN   Comparison: compared with previous ECG   Rhythm: sinus rhythm  Ectopy: unifocal PVCs  Rate: normal  BPM: " 70  Conduction: 1st degree AV block  Other findings: non-specific ST-T wave changes    Clinical impression: abnormal EKG              Assessment:       Diagnosis Plan   1. Chronic anticoagulation  Basic Metabolic Panel    CBC & Differential   2. PVC (premature ventricular contraction)  Magnesium   3. Coronary artery disease involving native coronary artery of native heart without angina pectoris     4. S/P CABG (coronary artery bypass graft)     5. S/P mitral valve repair     6. Essential hypertension           Plan:     1. NSTEMI with subsequent coronary artery bypass grafting x7: Appears to be recovering well.  Had his initial evaluation with cardiac rehab yesterday and start cardiac rehab on Friday.  2. Bilateral lower extremity edema: He is going to take a dose of Lasix and potassium today.  He will call if swelling does not improve or if he has recurrence of swelling despite use of compression socks.  Also recommend monitoring daily weights and call if he gains more than 2 pounds in 1 day or 5 pounds in a week.  Continue to avoid high salt foods and elevate legs when sitting.  If he continues to have issues with swelling may need to cautiously try a low-dose of scheduled Lasix with careful continued monitoring of renal function.  3. PVCs: Noted on EKG today with known history of frequent PVCs even prior to CABG.  Asymptomatic.  He is going to change his tea to decaf and avoid other stimulants as well.  Avoiding increased AV letha blocker right now given marked first-degree AV block on EKG.  We will check a magnesium level and BMP today as well.  Also need to ensure blood pressure well controlled continue to treat sleep apnea.  Also sent a message to cardiac rehab and they are going to send strips if he has ectopy during rehab which he starts on Friday.  4. Ischemic cardiomyopathy: Resolved.  EF returned normal on 4/2021 echo.  5. Recurrent postoperative atrial fibrillation with history of bradycardia  postoperatively: Maintaining sinus rhythm in the office today.  Tolerating warfarin without falls or abnormal bleeding.  Follows with INR clinic with INR goal of 2-3.  6. Status post mitral valve repair: Only trace mitral regurgitation seen on follow-up 4/6/2021 echo.  Clinically stable.  7. Hypertension: Blood pressure little high in the office today but is lower at home, around 130s over 70s.  He is going to continue to monitor this as he starts cardiac rehab will call if staying greater than 130/80 on average.  8. Pulmonary hypertension: Clinically stable.  Continue with blood pressure control and sleep apnea treatment.  9. Severe obstructive sleep apnea: On CPAP therapy  10. Postoperative hematemesis and EGD showing grade D esophagitis: Follows with Dr. Allen.  11. Postop renal insufficiency: Follows with nephrology  12. Superficial vein thrombosis  13. Normal carotid ultrasound 2/2021    Patient to keep June follow-up with Dr. Singer as scheduled or follow-up sooner if needed for any new, recurrent, or worsening symptoms or other issues or concerns.  Discussed in detail signs/symptoms warrant sooner call or follow-up to the office or ER visit.      Records reviewed including but not limited to 4/2021 echo, 2 test 2021 carotid Doppler, 2/2021 heart cath, 2/2021 echo, 5/2021 metabolic panel         Your medication list          Accurate as of May 5, 2021 11:59 PM. If you have any questions, ask your nurse or doctor.            CHANGE how you take these medications      Instructions Last Dose Given Next Dose Due   acetaminophen 500 MG tablet  Commonly known as: TYLENOL  What changed:   · when to take this  · reasons to take this      Take 2 tablets by mouth 3 (Three) Times a Day.          CONTINUE taking these medications      Instructions Last Dose Given Next Dose Due   aspirin 81 MG EC tablet      Take 1 tablet by mouth Daily.       furosemide 40 MG tablet  Commonly known as: Lasix      Take 1 tablet by mouth Daily  As Needed (for weight gain or shortness of breath).       METAMUCIL FIBER PO      Take  by mouth As Needed.       metoprolol succinate XL 25 MG 24 hr tablet  Commonly known as: TOPROL-XL      Take 1 tablet by mouth Daily.       pantoprazole 40 MG EC tablet  Commonly known as: PROTONIX      Take 1 tablet by mouth 2 (Two) Times a Day Before Meals.       potassium chloride 10 MEQ CR tablet      Take 1 tablet by mouth Daily As Needed (Take with Lasix (furosemide)). With Lasix (furosemide)       VITAMIN D PO      Take  by mouth Daily.       warfarin 2 MG tablet  Commonly known as: COUMADIN      Take one tablet by mouth daily or as directed by the [facility]            I did not stop or change the above medications.  Patient's medication list was updated to reflect medications they are currently taking including medication changes made by other providers.            Thanks,    Radha Rodríguez, ARCELIA, APRN  05/05/2021         Dictated utilizing Dragon dictation

## 2021-05-07 ENCOUNTER — TREATMENT (OUTPATIENT)
Dept: CARDIAC REHAB | Facility: HOSPITAL | Age: 76
End: 2021-05-07

## 2021-05-07 DIAGNOSIS — I21.4 NSTEMI, INITIAL EPISODE OF CARE (HCC): Primary | ICD-10-CM

## 2021-05-07 PROBLEM — I25.10 CORONARY ARTERY DISEASE INVOLVING NATIVE CORONARY ARTERY OF NATIVE HEART WITHOUT ANGINA PECTORIS: Status: ACTIVE | Noted: 2021-02-19

## 2021-05-07 PROBLEM — I10 ESSENTIAL HYPERTENSION: Status: ACTIVE | Noted: 2021-05-07

## 2021-05-07 PROCEDURE — 93798 PHYS/QHP OP CAR RHAB W/ECG: CPT

## 2021-05-10 ENCOUNTER — TREATMENT (OUTPATIENT)
Dept: CARDIAC REHAB | Facility: HOSPITAL | Age: 76
End: 2021-05-10

## 2021-05-10 DIAGNOSIS — Z98.890 S/P MVR (MITRAL VALVE REPAIR): ICD-10-CM

## 2021-05-10 DIAGNOSIS — I21.4 NSTEMI, INITIAL EPISODE OF CARE (HCC): Primary | ICD-10-CM

## 2021-05-10 DIAGNOSIS — Z95.1 S/P CABG (CORONARY ARTERY BYPASS GRAFT): ICD-10-CM

## 2021-05-10 PROCEDURE — 93798 PHYS/QHP OP CAR RHAB W/ECG: CPT

## 2021-05-12 ENCOUNTER — TREATMENT (OUTPATIENT)
Dept: CARDIAC REHAB | Facility: HOSPITAL | Age: 76
End: 2021-05-12

## 2021-05-12 DIAGNOSIS — I21.4 NSTEMI, INITIAL EPISODE OF CARE (HCC): Primary | ICD-10-CM

## 2021-05-12 DIAGNOSIS — Z98.890 S/P MVR (MITRAL VALVE REPAIR): ICD-10-CM

## 2021-05-12 DIAGNOSIS — Z95.1 S/P CABG (CORONARY ARTERY BYPASS GRAFT): ICD-10-CM

## 2021-05-12 PROCEDURE — 93798 PHYS/QHP OP CAR RHAB W/ECG: CPT

## 2021-05-12 RX ORDER — WARFARIN SODIUM 2 MG/1
TABLET ORAL
Qty: 90 TABLET | Refills: 1 | Status: SHIPPED | OUTPATIENT
Start: 2021-05-12 | End: 2021-11-24 | Stop reason: SDUPTHER

## 2021-05-14 ENCOUNTER — TREATMENT (OUTPATIENT)
Dept: CARDIAC REHAB | Facility: HOSPITAL | Age: 76
End: 2021-05-14

## 2021-05-14 DIAGNOSIS — I21.4 NSTEMI, INITIAL EPISODE OF CARE (HCC): Primary | ICD-10-CM

## 2021-05-14 PROCEDURE — 93798 PHYS/QHP OP CAR RHAB W/ECG: CPT

## 2021-05-17 ENCOUNTER — ANTICOAGULATION VISIT (OUTPATIENT)
Dept: PHARMACY | Facility: HOSPITAL | Age: 76
End: 2021-05-17

## 2021-05-17 ENCOUNTER — TELEPHONE (OUTPATIENT)
Dept: CARDIAC REHAB | Facility: HOSPITAL | Age: 76
End: 2021-05-17

## 2021-05-17 ENCOUNTER — TREATMENT (OUTPATIENT)
Dept: CARDIAC REHAB | Facility: HOSPITAL | Age: 76
End: 2021-05-17

## 2021-05-17 DIAGNOSIS — I48.0 PAF (PAROXYSMAL ATRIAL FIBRILLATION) (HCC): Primary | ICD-10-CM

## 2021-05-17 DIAGNOSIS — I21.4 NSTEMI, INITIAL EPISODE OF CARE (HCC): Primary | ICD-10-CM

## 2021-05-17 LAB
INR PPP: 2.5 (ref 0.91–1.09)
PROTHROMBIN TIME: 29.6 SECONDS (ref 10–13.8)

## 2021-05-17 PROCEDURE — 85610 PROTHROMBIN TIME: CPT

## 2021-05-17 PROCEDURE — 93798 PHYS/QHP OP CAR RHAB W/ECG: CPT

## 2021-05-17 PROCEDURE — 36416 COLLJ CAPILLARY BLOOD SPEC: CPT

## 2021-05-17 NOTE — PROGRESS NOTES
Anticoagulation Clinic Progress Note    Anticoagulation Summary  As of 2021    INR goal:  2.0-3.0   TTR:  60.4 % (1 mo)   INR used for dosin.5 (2021)   Warfarin maintenance plan:  1 mg every Thu; 2 mg all other days   Weekly warfarin total:  13 mg   Plan last modified:  Eduar Weeks RPH (2021)   Next INR check:  2021   Priority:  High   Target end date:      Indications    PAF (paroxysmal atrial fibrillation) (CMS/HCC) [I48.0]             Anticoagulation Episode Summary     INR check location:      Preferred lab:      Send INR reminders to:  [facility]    Comments:        Anticoagulation Care Providers     Provider Role Specialty Phone number    Jimena Singer MD Referring Cardiology [phone]          Clinic Interview:  Patient Findings     Negatives:  Signs/symptoms of thrombosis, Signs/symptoms of bleeding,   Laboratory test error suspected, Change in health, Change in alcohol use,   Change in activity, Upcoming invasive procedure, Emergency department   visit, Upcoming dental procedure, Missed doses, Extra doses, Change in   medications, Change in diet/appetite, Hospital admission, Bruising, Other   complaints    Comments:  Pt reports he is unsure if he missed a dose on  or       Clinical Outcomes     Negatives:  Major bleeding event, Thromboembolic event,   Anticoagulation-related hospital admission, Anticoagulation-related ED   visit, Anticoagulation-related fatality    Comments:  Pt reports he is unsure if he missed a dose on  or         INR History:  Anticoagulation Monitoring 2021   INR 3.10 3.0 2.5   INR Date 2021   INR Goal 2.0-3.0 2.0-3.0 2.0-3.0   Trend Down Same Same   Last Week Total 14 mg 13 mg 13 mg   Next Week Total 13 mg 13 mg 13 mg   Sun 2 mg 2 mg 2 mg   Mon 2 mg 2 mg 2 mg   Tue - 2 mg 2 mg   Wed - 2 mg 2 mg   Thu 1 mg 1 mg 1 mg   Fri 2 mg 2 mg 2 mg   Sat 2 mg 2 mg 2 mg   Visit Report - -  -   Some recent data might be hidden       Plan:  1. INR is Therapeutic today- see above in Anticoagulation Summary.  Will instruct Rodolfo Grover to Continue their warfarin regimen- see above in Anticoagulation Summary.  2. Follow up in 2 weeks  3. Patient declines warfarin refills.  4. Verbal and written information provided. Patient expresses understanding and has no further questions at this time.    Buck Dloan, [facility]

## 2021-05-19 ENCOUNTER — HOSPITAL ENCOUNTER (OUTPATIENT)
Dept: MRI IMAGING | Facility: HOSPITAL | Age: 76
Discharge: HOME | End: 2021-05-19

## 2021-05-19 ENCOUNTER — TREATMENT (OUTPATIENT)
Dept: CARDIAC REHAB | Facility: HOSPITAL | Age: 76
End: 2021-05-19

## 2021-05-19 ENCOUNTER — HOSPITAL ENCOUNTER (OUTPATIENT)
Dept: NEUROLOGY | Facility: HOSPITAL | Age: 76
Discharge: HOME | End: 2021-05-19

## 2021-05-19 DIAGNOSIS — Z98.890 S/P MVR (MITRAL VALVE REPAIR): ICD-10-CM

## 2021-05-19 DIAGNOSIS — R56.9 CONVULSIONS, UNSPECIFIED CONVULSION TYPE (HCC): ICD-10-CM

## 2021-05-19 DIAGNOSIS — Z95.1 S/P CABG (CORONARY ARTERY BYPASS GRAFT): ICD-10-CM

## 2021-05-19 DIAGNOSIS — I21.4 NSTEMI, INITIAL EPISODE OF CARE (HCC): Primary | ICD-10-CM

## 2021-05-19 PROCEDURE — 70553 MRI BRAIN STEM W/O & W/DYE: CPT

## 2021-05-19 PROCEDURE — 95813 EEG EXTND MNTR 61-119 MIN: CPT

## 2021-05-19 PROCEDURE — 82565 ASSAY OF CREATININE: CPT

## 2021-05-19 PROCEDURE — 93798 PHYS/QHP OP CAR RHAB W/ECG: CPT

## 2021-05-19 PROCEDURE — A9577 INJ MULTIHANCE: HCPCS | Performed by: PSYCHIATRY & NEUROLOGY

## 2021-05-19 PROCEDURE — 95813 EEG EXTND MNTR 61-119 MIN: CPT | Performed by: PSYCHIATRY & NEUROLOGY

## 2021-05-19 PROCEDURE — 93797 PHYS/QHP OP CAR RHAB WO ECG: CPT

## 2021-05-19 PROCEDURE — 0 GADOBENATE DIMEGLUMINE 529 MG/ML SOLUTION: Performed by: PSYCHIATRY & NEUROLOGY

## 2021-05-19 RX ADMIN — GADOBENATE DIMEGLUMINE 16 ML: 529 INJECTION, SOLUTION INTRAVENOUS at 08:32

## 2021-05-20 LAB — CREAT BLDA-MCNC: 1.8 MG/DL (ref 0.6–1.3)

## 2021-05-21 ENCOUNTER — TREATMENT (OUTPATIENT)
Dept: CARDIAC REHAB | Facility: HOSPITAL | Age: 76
End: 2021-05-21

## 2021-05-21 DIAGNOSIS — Z95.1 S/P CABG (CORONARY ARTERY BYPASS GRAFT): ICD-10-CM

## 2021-05-21 DIAGNOSIS — Z98.890 S/P MVR (MITRAL VALVE REPAIR): ICD-10-CM

## 2021-05-21 DIAGNOSIS — I21.4 NSTEMI, INITIAL EPISODE OF CARE (HCC): Primary | ICD-10-CM

## 2021-05-21 PROCEDURE — 93798 PHYS/QHP OP CAR RHAB W/ECG: CPT

## 2021-05-24 ENCOUNTER — TREATMENT (OUTPATIENT)
Dept: CARDIAC REHAB | Facility: HOSPITAL | Age: 76
End: 2021-05-24

## 2021-05-24 DIAGNOSIS — I21.4 NSTEMI, INITIAL EPISODE OF CARE (HCC): Primary | ICD-10-CM

## 2021-05-24 DIAGNOSIS — Z98.890 S/P MVR (MITRAL VALVE REPAIR): ICD-10-CM

## 2021-05-24 DIAGNOSIS — Z95.1 S/P CABG (CORONARY ARTERY BYPASS GRAFT): ICD-10-CM

## 2021-05-24 PROCEDURE — 93798 PHYS/QHP OP CAR RHAB W/ECG: CPT

## 2021-05-26 ENCOUNTER — TREATMENT (OUTPATIENT)
Dept: CARDIAC REHAB | Facility: HOSPITAL | Age: 76
End: 2021-05-26

## 2021-05-26 DIAGNOSIS — I21.4 NSTEMI, INITIAL EPISODE OF CARE (HCC): Primary | ICD-10-CM

## 2021-05-26 DIAGNOSIS — Z95.1 S/P CABG (CORONARY ARTERY BYPASS GRAFT): ICD-10-CM

## 2021-05-26 DIAGNOSIS — Z98.890 S/P MVR (MITRAL VALVE REPAIR): ICD-10-CM

## 2021-05-26 PROCEDURE — 93798 PHYS/QHP OP CAR RHAB W/ECG: CPT

## 2021-05-28 ENCOUNTER — TREATMENT (OUTPATIENT)
Dept: CARDIAC REHAB | Facility: HOSPITAL | Age: 76
End: 2021-05-28

## 2021-05-28 DIAGNOSIS — I21.4 NSTEMI, INITIAL EPISODE OF CARE (HCC): Primary | ICD-10-CM

## 2021-05-28 PROCEDURE — 93798 PHYS/QHP OP CAR RHAB W/ECG: CPT

## 2021-06-02 ENCOUNTER — ANTICOAGULATION VISIT (OUTPATIENT)
Dept: PHARMACY | Facility: HOSPITAL | Age: 76
End: 2021-06-02

## 2021-06-02 ENCOUNTER — TREATMENT (OUTPATIENT)
Dept: CARDIAC REHAB | Facility: HOSPITAL | Age: 76
End: 2021-06-02

## 2021-06-02 DIAGNOSIS — I21.4 NSTEMI, INITIAL EPISODE OF CARE (HCC): Primary | ICD-10-CM

## 2021-06-02 DIAGNOSIS — I48.0 PAF (PAROXYSMAL ATRIAL FIBRILLATION) (HCC): Primary | ICD-10-CM

## 2021-06-02 LAB
INR PPP: 3.1 (ref 0.91–1.09)
PROTHROMBIN TIME: 37 SECONDS (ref 10–13.8)

## 2021-06-02 PROCEDURE — 85610 PROTHROMBIN TIME: CPT

## 2021-06-02 PROCEDURE — 93798 PHYS/QHP OP CAR RHAB W/ECG: CPT

## 2021-06-02 PROCEDURE — 36416 COLLJ CAPILLARY BLOOD SPEC: CPT

## 2021-06-02 NOTE — PROGRESS NOTES
Anticoagulation Clinic Progress Note    Anticoagulation Summary  As of 6/2/2021    INR goal:  2.0-3.0   TTR:  68.1 % (1.6 mo)   INR used for dosing:  3.1 (6/2/2021)   Warfarin maintenance plan:  1 mg every Thu; 2 mg all other days   Weekly warfarin total:  13 mg   No change documented:  Brenda Whitney   Plan last modified:  Eduar Weeks RPH (4/29/2021)   Next INR check:  6/16/2021   Priority:  High   Target end date:      Indications    PAF (paroxysmal atrial fibrillation) (CMS/[facility]) [I48.0]             Anticoagulation Episode Summary     INR check location:      Preferred lab:      Send INR reminders to:   [facility]    Comments:        Anticoagulation Care Providers     Provider Role Specialty Phone number    Jimena Singer MD Referring Cardiology [phone]          Clinic Interview:  Patient Findings     Negatives:  Signs/symptoms of thrombosis, Signs/symptoms of bleeding,   Laboratory test error suspected, Change in health, Change in alcohol use,   Change in activity, Upcoming invasive procedure, Emergency department   visit, Upcoming dental procedure, Missed doses, Extra doses, Change in   medications, Change in diet/appetite, Hospital admission, Bruising, Other   complaints      Clinical Outcomes     Negatives:  Major bleeding event, Thromboembolic event,   Anticoagulation-related hospital admission, Anticoagulation-related ED   visit, Anticoagulation-related fatality        INR History:  Anticoagulation Monitoring 5/4/2021 5/17/2021 6/2/2021   INR 3.0 2.5 3.1   INR Date 5/4/2021 5/17/2021 6/2/2021   INR Goal 2.0-3.0 2.0-3.0 2.0-3.0   Trend Same Same Same   Last Week Total 13 mg 13 mg 13 mg   Next Week Total 13 mg 13 mg 13 mg   Sun 2 mg 2 mg 2 mg   Mon 2 mg 2 mg 2 mg   Tue 2 mg 2 mg 2 mg   Wed 2 mg 2 mg 2 mg   Thu 1 mg 1 mg 1 mg   Fri 2 mg 2 mg 2 mg   Sat 2 mg 2 mg 2 mg   Visit Report - - -   Some recent data might be hidden       Plan:  1. INR is out of range- see above in Anticoagulation  Summary.   Will instruct Rodolfo SALVADOR Reilly to Continue  their warfarin regimen- see above in Anticoagulation Summary.  2. Follow up in 2 weeks.   3. Patient declines warfarin refills.  4. Verbal and written information provided. Patient expresses understanding and has no further questions at this time.    Brenda Whitney

## 2021-06-04 ENCOUNTER — TREATMENT (OUTPATIENT)
Dept: CARDIAC REHAB | Facility: HOSPITAL | Age: 76
End: 2021-06-04

## 2021-06-04 DIAGNOSIS — I21.4 NSTEMI, INITIAL EPISODE OF CARE (HCC): Primary | ICD-10-CM

## 2021-06-04 PROCEDURE — 93798 PHYS/QHP OP CAR RHAB W/ECG: CPT

## 2021-06-07 ENCOUNTER — TREATMENT (OUTPATIENT)
Dept: CARDIAC REHAB | Facility: HOSPITAL | Age: 76
End: 2021-06-07

## 2021-06-07 DIAGNOSIS — I21.4 NSTEMI, INITIAL EPISODE OF CARE (HCC): Primary | ICD-10-CM

## 2021-06-07 PROCEDURE — 93798 PHYS/QHP OP CAR RHAB W/ECG: CPT

## 2021-06-07 NOTE — TELEPHONE ENCOUNTER
Caller: Rodolfo Grover    Relationship: Self    Best call back number: [phone]     Medication needed:   Requested Prescriptions     Pending Prescriptions Disp Refills   • pantoprazole (PROTONIX) 40 MG EC tablet 60 tablet 1     Sig: Take 1 tablet by mouth 2 (Two) Times a Day Before Meals.       When do you need the refill by: 06/07/21    What additional details did the patient provide when requesting the medication: HAS LESS THAN 3 DAYS    Does the patient have less than a 3 day supply:  [x] Yes  [] No    What is the patient's preferred pharmacy: [address] [address] [facility] [phone] [facility] [phone]

## 2021-06-08 RX ORDER — PANTOPRAZOLE SODIUM 40 MG/1
40 TABLET, DELAYED RELEASE ORAL
Qty: 60 TABLET | Refills: 1 | Status: SHIPPED | OUTPATIENT
Start: 2021-06-08 | End: 2021-10-04 | Stop reason: SDUPTHER

## 2021-06-09 ENCOUNTER — TREATMENT (OUTPATIENT)
Dept: CARDIAC REHAB | Facility: HOSPITAL | Age: 76
End: 2021-06-09

## 2021-06-09 DIAGNOSIS — I21.4 NSTEMI, INITIAL EPISODE OF CARE (HCC): Primary | ICD-10-CM

## 2021-06-09 PROCEDURE — 93798 PHYS/QHP OP CAR RHAB W/ECG: CPT

## 2021-06-10 ENCOUNTER — OFFICE VISIT (OUTPATIENT)
Dept: FAMILY MEDICINE CLINIC | Facility: CLINIC | Age: 76
End: 2021-06-10

## 2021-06-10 VITALS
OXYGEN SATURATION: 98 % | SYSTOLIC BLOOD PRESSURE: 146 MMHG | RESPIRATION RATE: 15 BRPM | DIASTOLIC BLOOD PRESSURE: 62 MMHG | TEMPERATURE: 97.1 F | WEIGHT: 175.74 LBS | BODY MASS INDEX: 27.58 KG/M2 | HEIGHT: 67 IN | HEART RATE: 56 BPM

## 2021-06-10 DIAGNOSIS — R79.89 ELEVATED SERUM CREATININE: ICD-10-CM

## 2021-06-10 DIAGNOSIS — Z11.59 ENCOUNTER FOR HEPATITIS C SCREENING TEST FOR LOW RISK PATIENT: ICD-10-CM

## 2021-06-10 DIAGNOSIS — G47.09 OTHER INSOMNIA: ICD-10-CM

## 2021-06-10 DIAGNOSIS — Z00.00 MEDICARE ANNUAL WELLNESS VISIT, SUBSEQUENT: Primary | ICD-10-CM

## 2021-06-10 PROCEDURE — 1159F MED LIST DOCD IN RCRD: CPT | Performed by: FAMILY MEDICINE

## 2021-06-10 PROCEDURE — 1125F AMNT PAIN NOTED PAIN PRSNT: CPT | Performed by: FAMILY MEDICINE

## 2021-06-10 PROCEDURE — G0439 PPPS, SUBSEQ VISIT: HCPCS | Performed by: FAMILY MEDICINE

## 2021-06-10 RX ORDER — DOXEPIN HYDROCHLORIDE 10 MG/1
10-30 CAPSULE ORAL NIGHTLY
Qty: 45 CAPSULE | Refills: 0 | Status: SHIPPED | OUTPATIENT
Start: 2021-06-10 | End: 2021-06-25

## 2021-06-10 NOTE — PROGRESS NOTES
The ABCs of the Annual Wellness Visit  Subsequent Medicare Wellness Visit    Chief Complaint   Patient presents with   • Medicare Wellness-subsequent     BP elevated today. Doing cardiac rehab, starts high and then goes down during session, home reading 130-140 systolic    • Insomnia     problem staying asleep        Subjective   History of Present Illness:  Rodolfo Grover is a 76 y.o. male who presents for a Subsequent Medicare Wellness Visit.    HEALTH RISK ASSESSMENT    Recent Hospitalizations:  Recently treated at the following:  [facility]    Current Medical Providers:  Patient Care Team:  Domenica Merida MD as PCP - General (Family Medicine)  Mitch Mendiola MD as Consulting Physician (Pulmonary Disease)  Edgar Allen MD as Consulting Physician (Gastroenterology)  Jimena Singer MD as Consulting Physician (Cardiology)    Smoking Status:  Social History     Tobacco Use   Smoking Status Never Smoker   Smokeless Tobacco Never Used       Alcohol Consumption:  Social History     Substance and Sexual Activity   Alcohol Use Yes    Comment: occasional       Depression Screen:   PHQ-2/PHQ-9 Depression Screening 6/10/2021   Little interest or pleasure in doing things 0   Feeling down, depressed, or hopeless 0   Total Score 0       Fall Risk Screen:  STEADI Fall Risk Assessment was completed, and patient is at LOW risk for falls.Assessment completed on:6/10/2021    Health Habits and Functional and Cognitive Screening:  Functional & Cognitive Status 6/10/2021   Do you have difficulty preparing food and eating? No   Do you have difficulty bathing yourself, getting dressed or grooming yourself? No   Do you have difficulty using the toilet? No   Do you have difficulty moving around from place to place? No   Do you have trouble with steps or getting out of a bed or a chair? No   Current Diet Low Fat Diet        Current Diet Comment low sodium   Dental Exam Not up to date        Dental Exam Comment -   Eye Exam Up to  date   Exercise (times per week) 7 times per week   Current Exercise Activities Include Walking   Do you need help using the phone?  No   Are you deaf or do you have serious difficulty hearing?  Yes   Do you need help with transportation? No   Do you need help shopping? No   Do you need help preparing meals?  No   Do you need help with housework?  No   Do you need help with laundry? No   Do you need help taking your medications? No   Do you need help managing money? No   Do you ever drive or ride in a car without wearing a seat belt? No   Have you felt unusual stress, anger or loneliness in the last month? No   Who do you live with? Alone   If you need help, do you have trouble finding someone available to you? No   Have you been bothered in the last four weeks by sexual problems? No   Do you have difficulty concentrating, remembering or making decisions? No         Does the patient have evidence of cognitive impairment? No    Asprin use counseling:Taking ASA appropriately as indicated    Age-appropriate Screening Schedule:  Refer to the list below for future screening recommendations based on patient's age, sex and/or medical conditions. Orders for these recommended tests are listed in the plan section. The patient has been provided with a written plan.    Health Maintenance   Topic Date Due   • TDAP/TD VACCINES (1 - Tdap) Never done   • ZOSTER VACCINE (2 of 3) 01/25/2012   • INFLUENZA VACCINE  08/01/2021   • LIPID PANEL  02/21/2022          The following portions of the patient's history were reviewed and updated as appropriate: allergies, current medications, past family history, past medical history, past social history, past surgical history and problem list.    Outpatient Medications Prior to Visit   Medication Sig Dispense Refill   • acetaminophen (TYLENOL) 500 MG tablet Take 2 tablets by mouth 3 (Three) Times a Day. (Patient taking differently: Take 1,000 mg by mouth 3 (Three) Times a Day As Needed.)     •  aspirin 81 MG EC tablet Take 1 tablet by mouth Daily. 90 tablet 1   • furosemide (Lasix) 40 MG tablet Take 1 tablet by mouth Daily As Needed (for weight gain or shortness of breath). 30 tablet 1   • metoprolol succinate XL (TOPROL-XL) 25 MG 24 hr tablet Take 1 tablet by mouth Daily. 90 tablet 1   • pantoprazole (PROTONIX) 40 MG EC tablet Take 1 tablet by mouth 2 (Two) Times a Day Before Meals. 60 tablet 1   • potassium chloride 10 MEQ CR tablet Take 1 tablet by mouth Daily As Needed (Take with Lasix (furosemide)). With Lasix (furosemide) 30 tablet 1   • Psyllium (METAMUCIL FIBER PO) Take  by mouth As Needed.     • VITAMIN D PO Take 2,000 Units by mouth Daily.     • warfarin (COUMADIN) 2 MG tablet Take one tablet by mouth daily or as directed by the [facility] (Patient taking differently: Take one tablet by mouth daily except thurs  whichis  1/2 tab or as directed by the [facility]) 90 tablet 1     No facility-administered medications prior to visit.       Patient Active Problem List   Diagnosis   • Acute blood loss anemia   • Fracture of fifth metacarpal bone of right hand   • Closed fracture dislocation of right shoulder   • Gastroesophageal reflux disease without esophagitis   • DNR (do not resuscitate)   • Bradycardia following surgery   • Frequent PVCs   • Proximal humerus fracture   • Osteoarthritis   • Severe obstructive sleep apnea   • Pure hypercholesterolemia   • Pulmonary hypertension (CMS/HCC)   • LARA (dyspnea on exertion)   • Localized edema   • Chest discomfort   • Elevated troponin   • Abnormal EKG   • Coronary artery disease involving native coronary artery of native heart without angina pectoris   • Focal motor seizure (CMS/HCC)   • Generalized tonic-clonic seizure (CMS/HCC)   • Post-ictal state (CMS/HCC)   • Coffee ground emesis   • S/P CABG (coronary artery bypass graft)   • Mitral regurgitation   • S/P mitral valve repair   • PAF (paroxysmal atrial fibrillation)  "(CMS/[facility])   • Ischemic cardiomyopathy   • Essential hypertension       Advanced Care Planning:  ACP discussion was held with the patient during this visit. Patient has an advance directive (not in EMR), copy requested.    Review of Systems   Constitutional: Negative for activity change and fatigue.   Eyes: Negative for visual disturbance.   Respiratory: Negative for shortness of breath.    Cardiovascular: Negative for chest pain, palpitations and leg swelling.   Neurological: Negative for light-headedness and headaches.       Compared to one year ago, the patient feels his physical health is the same.  Compared to one year ago, the patient feels his mental health is the same.    Reviewed chart for potential of high risk medication in the elderly: yes  Reviewed chart for potential of harmful drug interactions in the elderly:yes    Objective         Vitals:    06/10/21 1150   BP: 146/62   BP Location: Right arm   Patient Position: Sitting   Cuff Size: Adult   Pulse: 56   Resp: 15   Temp: 97.1 °F (36.2 °C)   TempSrc: Infrared   SpO2: 98%   Weight: 79.7 kg (175 lb 11.8 oz)   Height: 170.2 cm (67\")   PainSc:   2   PainLoc: Generalized  Comment: arthiritic pain       Body mass index is 27.52 kg/m².  Discussed the patient's BMI with him. The BMI is above average; BMI management plan is completed.    Physical Exam  Vitals reviewed.   Constitutional:       General: He is not in acute distress.     Appearance: Normal appearance. He is not ill-appearing.   HENT:      Right Ear: Tympanic membrane, ear canal and external ear normal. There is no impacted cerumen.      Left Ear: Tympanic membrane, ear canal and external ear normal. There is no impacted cerumen.      Nose: Nose normal.      Mouth/Throat:      Pharynx: No oropharyngeal exudate.   Eyes:      General: No scleral icterus.        Right eye: No discharge.         Left eye: No discharge.      Conjunctiva/sclera: Conjunctivae normal.   Neck:      Thyroid: No thyromegaly. "      Vascular: No carotid bruit.   Cardiovascular:      Rate and Rhythm: Normal rate and regular rhythm.      Heart sounds: Normal heart sounds. No murmur heard.   No friction rub. No gallop.    Pulmonary:      Effort: Pulmonary effort is normal. No respiratory distress.      Breath sounds: Normal breath sounds. No wheezing or rales.   Chest:      Chest wall: No tenderness.   Abdominal:      General: Bowel sounds are normal. There is no distension.      Palpations: Abdomen is soft. There is no mass.      Tenderness: There is no abdominal tenderness. There is no guarding.   Musculoskeletal:         General: No swelling or deformity.      Cervical back: Neck supple.      Right lower leg: No edema.      Left lower leg: No edema.   Lymphadenopathy:      Cervical: No cervical adenopathy.   Skin:     General: Skin is warm and dry.   Neurological:      Mental Status: He is alert and oriented to person, place, and time.      Motor: No abnormal muscle tone.      Coordination: Coordination normal.   Psychiatric:         Mood and Affect: Mood normal.         Behavior: Behavior normal.         Lab Results   Component Value Date    GLU 81 06/10/2021        Assessment/Plan   Medicare Risks and Personalized Health Plan  CMS Preventative Services Quick Reference  Advance Directive Discussion  Cardiovascular risk  Colon Cancer Screening  Immunizations Discussed/Encouraged (specific immunizations; Shingrix )    The above risks/problems have been discussed with the patient.  Pertinent information has been shared with the patient in the After Visit Summary.  Follow up plans and orders are seen below in the Assessment/Plan Section.    Diagnoses and all orders for this visit:    1. Medicare annual wellness visit, subsequent (Primary)    2. Other insomnia    3. Elevated serum creatinine  -     Comprehensive Metabolic Panel    4. Encounter for hepatitis C screening test for low risk patient  -     Hepatitis C Antibody    Other orders  -      doxepin (SINEquan) 10 MG capsule; Take 1-3 capsules by mouth Every Night.  Dispense: 45 capsule; Refill: 0      Follow Up:  No follow-ups on file.     An After Visit Summary and PPPS were given to the patient.       He says he would like more sleep at night. He is sleeping on average 5-6 hours per nights, sometimes 7 hours and maybe 8 hours monthly and that feels great.   He continuously tells himself he is ok.   Thinks everything has to be cyndie  But big hospitalization, lives alone and feels lonely.   Sees neighbor often.   He goes to [place]. Walks there when weather is bad.   Trouble with sleep maintenance. Wanted to try something for sleep first vs anxiety. We decided on doxepin. Discussed TCAs and how to use for sleep, when to take. Dosing.   He is going to let me know if he has any questions. Can continue melatonin. I think will help more if he takes it earlier. We discussed.

## 2021-06-11 ENCOUNTER — TREATMENT (OUTPATIENT)
Dept: CARDIAC REHAB | Facility: HOSPITAL | Age: 76
End: 2021-06-11

## 2021-06-11 DIAGNOSIS — I21.4 NSTEMI, INITIAL EPISODE OF CARE (HCC): Primary | ICD-10-CM

## 2021-06-11 LAB
ALBUMIN SERPL-MCNC: 4.3 G/DL (ref 3.5–5.2)
ALBUMIN/GLOB SERPL: 1.7 G/DL
ALP SERPL-CCNC: 113 U/L (ref 39–117)
ALT SERPL-CCNC: 28 U/L (ref 1–41)
AST SERPL-CCNC: 19 U/L (ref 1–40)
BILIRUB SERPL-MCNC: 0.3 MG/DL (ref 0–1.2)
BUN SERPL-MCNC: 25 MG/DL (ref 8–23)
BUN/CREAT SERPL: 19.4 (ref 7–25)
CALCIUM SERPL-MCNC: 9 MG/DL (ref 8.6–10.5)
CHLORIDE SERPL-SCNC: 105 MMOL/L (ref 98–107)
CO2 SERPL-SCNC: 27.4 MMOL/L (ref 22–29)
CREAT SERPL-MCNC: 1.29 MG/DL (ref 0.76–1.27)
GLOBULIN SER CALC-MCNC: 2.5 GM/DL
GLUCOSE SERPL-MCNC: 81 MG/DL (ref 65–99)
HCV AB S/CO SERPL IA: <0.1 S/CO RATIO (ref 0–0.9)
POTASSIUM SERPL-SCNC: 4.5 MMOL/L (ref 3.5–5.2)
PROT SERPL-MCNC: 6.8 G/DL (ref 6–8.5)
SODIUM SERPL-SCNC: 142 MMOL/L (ref 136–145)

## 2021-06-11 PROCEDURE — 93798 PHYS/QHP OP CAR RHAB W/ECG: CPT

## 2021-06-14 ENCOUNTER — TREATMENT (OUTPATIENT)
Dept: CARDIAC REHAB | Facility: HOSPITAL | Age: 76
End: 2021-06-14

## 2021-06-14 DIAGNOSIS — I21.4 NSTEMI, INITIAL EPISODE OF CARE (HCC): Primary | ICD-10-CM

## 2021-06-14 PROCEDURE — 93798 PHYS/QHP OP CAR RHAB W/ECG: CPT

## 2021-06-16 ENCOUNTER — TREATMENT (OUTPATIENT)
Dept: CARDIAC REHAB | Facility: HOSPITAL | Age: 76
End: 2021-06-16

## 2021-06-16 ENCOUNTER — ANTICOAGULATION VISIT (OUTPATIENT)
Dept: PHARMACY | Facility: HOSPITAL | Age: 76
End: 2021-06-16

## 2021-06-16 DIAGNOSIS — I21.4 NSTEMI, INITIAL EPISODE OF CARE (HCC): Primary | ICD-10-CM

## 2021-06-16 DIAGNOSIS — Z98.890 S/P MVR (MITRAL VALVE REPAIR): ICD-10-CM

## 2021-06-16 DIAGNOSIS — I48.0 PAF (PAROXYSMAL ATRIAL FIBRILLATION) (HCC): Primary | ICD-10-CM

## 2021-06-16 DIAGNOSIS — Z95.1 S/P CABG (CORONARY ARTERY BYPASS GRAFT): ICD-10-CM

## 2021-06-16 LAB
INR PPP: 1.6 (ref 0.91–1.09)
PROTHROMBIN TIME: 18.7 SECONDS (ref 10–13.8)

## 2021-06-16 PROCEDURE — 93798 PHYS/QHP OP CAR RHAB W/ECG: CPT

## 2021-06-16 PROCEDURE — 85610 PROTHROMBIN TIME: CPT

## 2021-06-16 PROCEDURE — 36416 COLLJ CAPILLARY BLOOD SPEC: CPT

## 2021-06-16 PROCEDURE — G0463 HOSPITAL OUTPT CLINIC VISIT: HCPCS

## 2021-06-16 NOTE — PROGRESS NOTES
Anticoagulation Clinic Progress Note    Anticoagulation Summary  As of 2021    INR goal:  2.0-3.0   TTR:  67.8 % (2 mo)   INR used for dosin.6 (2021)   Warfarin maintenance plan:  1 mg every Thu; 2 mg all other days   Weekly warfarin total:  13 mg   Plan last modified:  Eduar Weeks [facility] (2021)   Next INR check:  2021   Priority:  High   Target end date:      Indications    PAF (paroxysmal atrial fibrillation) (CMS/HCC) [I48.0]             Anticoagulation Episode Summary     INR check location:      Preferred lab:      Send INR reminders to:  [facility]    Comments:        Anticoagulation Care Providers     Provider Role Specialty Phone number    Jimena Singer MD Referring Cardiology [phone]          Clinic Interview:  Patient Findings     Positives:  Missed doses, Extra doses, Change in diet/appetite    Negatives:  Signs/symptoms of thrombosis, Signs/symptoms of bleeding,   Laboratory test error suspected, Change in health, Change in alcohol use,   Change in activity, Upcoming invasive procedure, Emergency department   visit, Upcoming dental procedure, Change in medications, Hospital   admission, Bruising, Other complaints    Comments:  Missed 2-mg dose on 21; took extra 1 mg on 6/10/21.   Reports more vit k in past week than usual.       Clinical Outcomes     Negatives:  Major bleeding event, Thromboembolic event,   Anticoagulation-related hospital admission, Anticoagulation-related ED   visit, Anticoagulation-related fatality    Comments:  Missed 2-mg dose on 21; took extra 1 mg on 6/10/21.   Reports more vit k in past week than usual.         INR History:  Anticoagulation Monitoring 2021   INR 2.5 3.1 1.6   INR Date 2021   INR Goal 2.0-3.0 2.0-3.0 2.0-3.0   Trend Same Same Same   Last Week Total 13 mg 13 mg 12 mg   Next Week Total 13 mg 13 mg 14 mg   Sun 2 mg 2 mg 2 mg   Mon 2 mg 2 mg 2 mg   Tue 2 mg 2 mg 2 mg    Wed 2 mg 2 mg 2 mg   Thu 1 mg 1 mg 2 mg (6/17); Otherwise 1 mg   Fri 2 mg 2 mg 2 mg   Sat 2 mg 2 mg 2 mg   Visit Report - - -   Some recent data might be hidden       Plan:  1. INR is Subtherapeutic today- see above in Anticoagulation Summary.  Will instruct Rodolfo Grover to Change their warfarin regimen- see above in Anticoagulation Summary.  2. Follow up in 2 weeks  3. Patient declines warfarin refills.  4. Verbal and written information provided. Patient expresses understanding and has no further questions at this time.    Eduar Weeks [facility]

## 2021-06-18 ENCOUNTER — TREATMENT (OUTPATIENT)
Dept: CARDIAC REHAB | Facility: HOSPITAL | Age: 76
End: 2021-06-18

## 2021-06-18 DIAGNOSIS — I21.4 NSTEMI, INITIAL EPISODE OF CARE (HCC): Primary | ICD-10-CM

## 2021-06-18 PROCEDURE — 93798 PHYS/QHP OP CAR RHAB W/ECG: CPT

## 2021-06-21 ENCOUNTER — TREATMENT (OUTPATIENT)
Dept: CARDIAC REHAB | Facility: HOSPITAL | Age: 76
End: 2021-06-21

## 2021-06-21 DIAGNOSIS — Z98.890 S/P MVR (MITRAL VALVE REPAIR): ICD-10-CM

## 2021-06-21 DIAGNOSIS — I21.4 NSTEMI, INITIAL EPISODE OF CARE (HCC): Primary | ICD-10-CM

## 2021-06-21 DIAGNOSIS — Z95.1 S/P CABG (CORONARY ARTERY BYPASS GRAFT): ICD-10-CM

## 2021-06-21 PROCEDURE — 93798 PHYS/QHP OP CAR RHAB W/ECG: CPT

## 2021-06-23 ENCOUNTER — TREATMENT (OUTPATIENT)
Dept: CARDIAC REHAB | Facility: HOSPITAL | Age: 76
End: 2021-06-23

## 2021-06-23 DIAGNOSIS — Z95.1 S/P CABG (CORONARY ARTERY BYPASS GRAFT): ICD-10-CM

## 2021-06-23 DIAGNOSIS — I21.4 NSTEMI, INITIAL EPISODE OF CARE (HCC): Primary | ICD-10-CM

## 2021-06-23 DIAGNOSIS — Z98.890 S/P MVR (MITRAL VALVE REPAIR): ICD-10-CM

## 2021-06-23 PROCEDURE — 93798 PHYS/QHP OP CAR RHAB W/ECG: CPT

## 2021-06-25 ENCOUNTER — OFFICE VISIT (OUTPATIENT)
Dept: CARDIOLOGY | Facility: CLINIC | Age: 76
End: 2021-06-25

## 2021-06-25 ENCOUNTER — TREATMENT (OUTPATIENT)
Dept: CARDIAC REHAB | Facility: HOSPITAL | Age: 76
End: 2021-06-25

## 2021-06-25 VITALS
HEART RATE: 68 BPM | SYSTOLIC BLOOD PRESSURE: 146 MMHG | WEIGHT: 177 LBS | BODY MASS INDEX: 27.78 KG/M2 | DIASTOLIC BLOOD PRESSURE: 70 MMHG | HEIGHT: 67 IN

## 2021-06-25 DIAGNOSIS — I42.8 NON-ISCHEMIC CARDIOMYOPATHY (HCC): ICD-10-CM

## 2021-06-25 DIAGNOSIS — Z98.890 S/P MVR (MITRAL VALVE REPAIR): ICD-10-CM

## 2021-06-25 DIAGNOSIS — I49.3 FREQUENT PVCS: ICD-10-CM

## 2021-06-25 DIAGNOSIS — I25.5 ISCHEMIC CARDIOMYOPATHY: ICD-10-CM

## 2021-06-25 DIAGNOSIS — I27.20 PULMONARY HYPERTENSION (HCC): ICD-10-CM

## 2021-06-25 DIAGNOSIS — E78.00 PURE HYPERCHOLESTEROLEMIA: ICD-10-CM

## 2021-06-25 DIAGNOSIS — I21.4 NSTEMI, INITIAL EPISODE OF CARE (HCC): Primary | ICD-10-CM

## 2021-06-25 DIAGNOSIS — Z98.890 S/P MITRAL VALVE REPAIR: ICD-10-CM

## 2021-06-25 DIAGNOSIS — I25.10 CORONARY ARTERY DISEASE INVOLVING NATIVE CORONARY ARTERY OF NATIVE HEART WITHOUT ANGINA PECTORIS: ICD-10-CM

## 2021-06-25 DIAGNOSIS — Z95.1 S/P CABG (CORONARY ARTERY BYPASS GRAFT): ICD-10-CM

## 2021-06-25 DIAGNOSIS — I10 ESSENTIAL HYPERTENSION: ICD-10-CM

## 2021-06-25 DIAGNOSIS — Z98.890 H/O MITRAL VALVE REPAIR: Primary | ICD-10-CM

## 2021-06-25 PROCEDURE — 99214 OFFICE O/P EST MOD 30 MIN: CPT | Performed by: INTERNAL MEDICINE

## 2021-06-25 PROCEDURE — 93000 ELECTROCARDIOGRAM COMPLETE: CPT | Performed by: INTERNAL MEDICINE

## 2021-06-25 PROCEDURE — 93798 PHYS/QHP OP CAR RHAB W/ECG: CPT

## 2021-06-25 RX ORDER — METOPROLOL SUCCINATE 25 MG/1
25 TABLET, EXTENDED RELEASE ORAL DAILY
Qty: 90 TABLET | Refills: 1 | Status: SHIPPED | OUTPATIENT
Start: 2021-06-25 | End: 2021-06-25 | Stop reason: SDUPTHER

## 2021-06-25 RX ORDER — METOPROLOL SUCCINATE 50 MG/1
25 TABLET, EXTENDED RELEASE ORAL DAILY
Qty: 30 TABLET | Refills: 6 | Status: SHIPPED | OUTPATIENT
Start: 2021-06-25 | End: 2021-10-12 | Stop reason: SDUPTHER

## 2021-06-25 NOTE — CONSULTS
Talked with pt about stress mgt. Defined and identified stressors. Discussed pt's stress mgt techniques being used.

## 2021-06-25 NOTE — PROGRESS NOTES
Date of Office Visit: 2021  Encounter Provider: Jimena Singer MD  Place of Service: [facility] CARDIOLOGY  Patient Name: Rodolfo Grover  :1945    Chief complaint  Pulmonary hypertension, coronary artery disease, valvular heart disease.    History of Present Illness  Patient is a 76-year-old gentleman with history of rheumatic fever, GE reflux disease who was seen at [facility], [place], operated by [facility] in 2018 after humeral fracture following a fall.  At that time he was noted to have ventricular bigeminy with normal potassium and magnesium levels.  He was hypertensive at the time.  He had an echocardiogram that showed normal systolic function with normal diastolic function, mild left atrial enlargement saline study was indeterminate.  There was aortic valve sclerosis with mild aortic regurgitation and trivial mitral and tricuspid regurgitation with mild pulmonary hypertension with an RV systolic pressure 42 mmHg.  A stress perfusion study was negative for ischemia.  Follow-up echocardiogram in 2020 showed an ejection fraction of 55% with normal diastolic function, mild aortic mitral valve regurgitation, mild to moderate tricuspid regurgitation with an RV systolic pressure increased further to 48 mmHg.  He presented in 2020 with complaints of chest discomfort and non-STEMI.  Echo showed new wall motion abnormalities as well as pulmonary hypertension( RVSP 61 mmHg).  Subsequent cardiac catheterization showed multivessel coronary artery disease.  Subsequently underwent CABG x7 vessels with mitral valve repair.  Postoperative course was complicated by hemoptysis, esophageal esophagitis, atrial fibrillation associated also with intermittent complete heart block.  He was also felt to have had a seizure and was started on Keppra.  He also had acute renal insufficiency..  He also had induration and possible cellulitis of his right thigh leg wound.    Since last visit he has had sternal  wound pain.  He denies any palpitation shortness of breath.  He has had bilateral edema of ankles but admits to not wearing support stockings and is on his feet much of the day.  He is participating in cardiac rehab.  While on telemetry he has had episodes of PVC and a brief episode of tachycardia lasting 3 beats.    Past Medical History:   Diagnosis Date   • Acute blood loss anemia    • Atrial fibrillation (CMS/HCC)    • Bilateral lower extremity edema    • Bradycardia following surgery    • CAD (coronary artery disease)    • Chest discomfort    • Elevated troponin    • Focal motor seizure (CMS/HCC)    • Generalized tonic-clonic seizure (CMS/HCC)    • GERD (gastroesophageal reflux disease)    • Iritis of right eye    • Ischemic cardiomyopathy    • Mitral valve insufficiency    • Post-ictal state (CMS/HCC)    • Pulmonary hypertension (CMS/HCC)    • PVC (premature ventricular contraction)    • Rheumatic fever    • S/P CABG x 7    • S/P mitral valve repair    • Severe obstructive sleep apnea    • Shoulder fracture, right      Past Surgical History:   Procedure Laterality Date   • CARDIAC CATHETERIZATION N/A 2/19/2021    Procedure: Coronary angiography;  Surgeon: Bry Nails MD;  Location: [facility] CATH INVASIVE LOCATION;  Service: Cardiovascular;  Laterality: N/A;   • CARDIAC CATHETERIZATION N/A 2/19/2021    Procedure: Left heart cath;  Surgeon: Bry Nails MD;  Location: [facility] CATH INVASIVE LOCATION;  Service: Cardiovascular;  Laterality: N/A;   • CARDIAC CATHETERIZATION N/A 2/19/2021    Procedure: Right Heart Cath;  Surgeon: Bry Nails MD;  Location: [facility] CATH INVASIVE LOCATION;  Service: Cardiovascular;  Laterality: N/A;   • CARDIAC CATHETERIZATION N/A 2/19/2021    Procedure: Left ventriculography;  Surgeon: Bry Nails MD;  Location: [facility] CATH INVASIVE LOCATION;  Service: Cardiovascular;  Laterality: N/A;   • CORONARY ARTERY BYPASS GRAFT N/A 2/22/2021    Procedure:  BK, MIDLINE STERNOTOMY, CORONARY ARTERY BYPASS X 7 UTILIZING THE LEFT INTERNAL MAMMARY ARTERY AND THE RIGHT SAPHENOUS VEIN, MITRAL VALVE REPAIR, PRP;  Surgeon: Jr Shyam Echavarria MD;  Location: [facility];  Service: Cardiothoracic;  Laterality: N/A;   • ENDOSCOPY N/A 8/16/2018    Erosive gastritis, diverticulosis   • ENDOSCOPY N/A 2/26/2021    Procedure: ESOPHAGOGASTRODUODENOSCOPY AT BEDSIDE WITH HOT SNARE POLYPECTOMY AND CLIP PLACEMENT X1;  Surgeon: Jono Laboy MD;  Location: [facility] ENDOSCOPY;  Service: Gastroenterology;  Laterality: N/A;  PRE-  GI BLEED  POST- GASTRITIS, ESOPHAGITIS, POLYP   • HERNIA REPAIR     • SHOULDER CLOSED REDUCTION Right 3/16/2018    Procedure: RIGHT SHOULDER CLOSED REDUCTION;  Surgeon: Kj Garcia MD;  Location: [facility];  Service: Orthopedics   • TONSILLECTOMY     • TOTAL SHOULDER ARTHROPLASTY W/ DISTAL CLAVICLE EXCISION Right 3/22/2018    Procedure: Reverse Total Shoulder Arthroplsty;  Surgeon: Kj Garcia MD;  Location: [facility];  Service: Orthopedics   • TRANSESOPHAGEAL ECHOCARDIOGRAM (BK) N/A 2/22/2021    Procedure: TRANSESOPHAGEAL ECHOCARDIOGRAM;  Surgeon: Jr Shyam Echavarria MD;  Location: [facility];  Service: Cardiothoracic;  Laterality: N/A;     Outpatient Medications Prior to Visit   Medication Sig Dispense Refill   • acetaminophen (TYLENOL) 500 MG tablet Take 2 tablets by mouth 3 (Three) Times a Day. (Patient taking differently: Take 1,000 mg by mouth 3 (Three) Times a Day As Needed.)     • aspirin 81 MG EC tablet Take 1 tablet by mouth Daily. 90 tablet 1   • furosemide (Lasix) 40 MG tablet Take 1 tablet by mouth Daily As Needed (for weight gain or shortness of breath). 30 tablet 1   • pantoprazole (PROTONIX) 40 MG EC tablet Take 1 tablet by mouth 2 (Two) Times a Day Before Meals. 60 tablet 1   • potassium chloride 10 MEQ CR tablet Take 1 tablet by mouth Daily As Needed (Take with Lasix (furosemide)). With Lasix (furosemide) 30  tablet 1   • Psyllium (METAMUCIL FIBER PO) Take  by mouth As Needed.     • VITAMIN D PO Take 2,000 Units by mouth Daily.     • warfarin (COUMADIN) 2 MG tablet Take one tablet by mouth daily or as directed by the [facility] (Patient taking differently: Take one tablet by mouth daily except thurs  whichis  1/2 tab or as directed by the [facility]) 90 tablet 1   • metoprolol succinate XL (TOPROL-XL) 25 MG 24 hr tablet Take 1 tablet by mouth Daily. 90 tablet 1   • doxepin (SINEquan) 10 MG capsule Take 1-3 capsules by mouth Every Night. 45 capsule 0     No facility-administered medications prior to visit.       Allergies as of 06/25/2021   • (No Known Allergies)     Social History     Socioeconomic History   • Marital status:      Spouse name: Not on file   • Number of children: Not on file   • Years of education: Not on file   • Highest education level: Not on file   Tobacco Use   • Smoking status: Never Smoker   • Smokeless tobacco: Never Used   Vaping Use   • Vaping Use: Never used   Substance and Sexual Activity   • Alcohol use: Yes     Comment: occasional   • Drug use: No   • Sexual activity: Defer     Family History   Problem Relation Age of Onset   • Malig Hyperthermia Neg Hx      Review of Systems   Constitutional: Negative for chills, fever, weight gain and weight loss.   Cardiovascular: Negative for leg swelling.   Respiratory: Negative for cough, snoring and wheezing.    Hematologic/Lymphatic: Negative for bleeding problem. Does not bruise/bleed easily.   Skin: Negative for color change.   Musculoskeletal: Negative for falls, joint pain and myalgias.   Gastrointestinal: Negative for melena.   Genitourinary: Negative for hematuria.   Neurological: Negative for excessive daytime sleepiness.   Psychiatric/Behavioral: Negative for depression. The patient is not nervous/anxious.         Objective:     Vitals:    06/25/21 1027   BP: 146/70   Pulse: 68   Weight: 80.3 kg (177  "lb)   Height: 170.2 cm (67\")     Body mass index is 27.72 kg/m².    Vitals reviewed.   Constitutional:       Appearance: Well-developed.   Eyes:      General: No scleral icterus.        Right eye: No discharge.      Conjunctiva/sclera: Conjunctivae normal.      Pupils: Pupils are equal, round, and reactive to light.   HENT:      Head: Normocephalic.      Nose: Nose normal.   Neck:      Thyroid: No thyromegaly.      Vascular: No JVD.   Pulmonary:      Effort: Pulmonary effort is normal. No respiratory distress.      Breath sounds: Normal breath sounds. No wheezing. No rales.   Cardiovascular:      Normal rate. Regular rhythm.      No gallop.   Abdominal:      General: Bowel sounds are normal. There is no distension.      Palpations: Abdomen is soft.      Tenderness: There is no abdominal tenderness. There is no rebound.   Musculoskeletal: Normal range of motion.         General: No tenderness.      Cervical back: Normal range of motion and neck supple. Skin:     General: Skin is warm and dry.      Findings: No erythema or rash.   Neurological:      Mental Status: Alert and oriented to person, place, and time.   Psychiatric:         Behavior: Behavior normal.         Thought Content: Thought content normal.         Judgment: Judgment normal.       Lab Review:     ECG 12 Lead    Date/Time: 6/25/2021 10:39 AM  Performed by: Jimena Singer MD  Authorized by: Jimena Singer MD   Comparison: compared with previous ECG   Similar to previous ECG  Rhythm: sinus rhythm  Conduction: 1st degree AV block  Other findings: non-specific ST-T wave changes    Clinical impression: abnormal EKG          Assessment:       Diagnosis Plan   1. H/O mitral valve repair     2. Frequent PVCs  ECG 12 Lead    metoprolol succinate XL (TOPROL-XL) 50 MG 24 hr tablet   3. Non-ischemic cardiomyopathy (CMS/HCC)  Adult Transthoracic Echo Complete W/ Cont if Necessary Per Protocol    Basic Metabolic Panel    proBNP   4. Pure hypercholesterolemia     5. " Coronary artery disease involving native coronary artery of native heart without angina pectoris     6. S/P mitral valve repair     7. Ischemic cardiomyopathy     8. Essential hypertension     9. Pulmonary hypertension (CMS/HCC)       Plan:       1.  Recurrent postoperative atrial fibrillation with history of bradycardia postoperatively.  EKG shows probable sinus rhythm today.  Will increase metoprolol due to hypertension.  Follow-up in 3 months.  He will continue with warfarin.  2.  Recent non-STEMI with subsequent multivessel CABG with cardiomyopathy.  No anginal symptoms at this time.  Will increase Lasix to taking both Lasix and potassium every Tuesday.  We will have him check a BMP 3 days after second dose given concomitant renal insufficiency.  We will plan on repeat echocardiographic imaging in 3 months at follow-up.  3.  Status post mitral valve repair, as above  4.  Postoperative hematemesis with recent EGD showing grade D esophagitis.  No recurrent problems.  5.  Chronic renal insufficiency.  Labs still remain abnormal.  Plan on repeat work as above  6.  Superficial vein thrombosis, clinically seems improved by description.  7.  Pulmonary hypertension,, reassess echocardiography in 3 months.  8.  Severe obstructive sleep apnea, on CPAP      Time Spent: I spent 30 minutes caring for Rodolfo on this date of service. This time includes time spent by me in the following activities: preparing for the visit, reviewing tests, obtaining and/or reviewing a separately obtained history, performing a medically appropriate examination and/or evaluation, counseling and educating the patient/family/caregiver, ordering medications, tests, or procedures, documenting information in the medical record and independently interpreting results and communicating that information with the patient/family/caregiver.   I spent 1 minutes on the separately reported service of ECG. This time is not included in the time used to support the  E/M service also reported today.        Your medication list          Accurate as of June 25, 2021 11:59 PM. If you have any questions, ask your nurse or doctor.            CHANGE how you take these medications      Instructions Last Dose Given Next Dose Due   acetaminophen 500 MG tablet  Commonly known as: TYLENOL  What changed:   · when to take this  · reasons to take this      Take 2 tablets by mouth 3 (Three) Times a Day.       metoprolol succinate XL 50 MG 24 hr tablet  Commonly known as: TOPROL-XL  What changed: medication strength  Changed by: Jimena Singer MD      Take 0.5 tablets by mouth Daily.       warfarin 2 MG tablet  Commonly known as: COUMADIN  What changed: additional instructions      Take one tablet by mouth daily or as directed by the [facility]          CONTINUE taking these medications      Instructions Last Dose Given Next Dose Due   aspirin 81 MG EC tablet      Take 1 tablet by mouth Daily.       furosemide 40 MG tablet  Commonly known as: Lasix      Take 1 tablet by mouth Daily As Needed (for weight gain or shortness of breath).       METAMUCIL FIBER PO      Take  by mouth As Needed.       pantoprazole 40 MG EC tablet  Commonly known as: PROTONIX      Take 1 tablet by mouth 2 (Two) Times a Day Before Meals.       potassium chloride 10 MEQ CR tablet      Take 1 tablet by mouth Daily As Needed (Take with Lasix (furosemide)). With Lasix (furosemide)       VITAMIN D PO      Take 2,000 Units by mouth Daily.          STOP taking these medications    doxepin 10 MG capsule  Commonly known as: SINEquan  Stopped by: Jimena Singer MD              Where to Get Your Medications      These medications were sent to [facility] [address] [place] [id number] [facility] - [phone]  - [phone] FX  [id number] [place] [id number]    Phone: [phone]   · metoprolol succinate XL 50 MG 24 hr tablet         Patient is no longer taking doxepin.  I corrected the med  list to reflect this.  I did not stop these medications.      Dictated utilizing Dragon dictation

## 2021-06-28 ENCOUNTER — TREATMENT (OUTPATIENT)
Dept: CARDIAC REHAB | Facility: HOSPITAL | Age: 76
End: 2021-06-28

## 2021-06-28 DIAGNOSIS — Z95.1 S/P CABG (CORONARY ARTERY BYPASS GRAFT): ICD-10-CM

## 2021-06-28 DIAGNOSIS — I21.4 NSTEMI, INITIAL EPISODE OF CARE (HCC): Primary | ICD-10-CM

## 2021-06-28 PROBLEM — I42.8 NON-ISCHEMIC CARDIOMYOPATHY: Status: ACTIVE | Noted: 2021-06-28

## 2021-06-28 PROCEDURE — 93798 PHYS/QHP OP CAR RHAB W/ECG: CPT

## 2021-06-30 ENCOUNTER — TREATMENT (OUTPATIENT)
Dept: CARDIAC REHAB | Facility: HOSPITAL | Age: 76
End: 2021-06-30

## 2021-06-30 DIAGNOSIS — Z98.890 S/P MVR (MITRAL VALVE REPAIR): ICD-10-CM

## 2021-06-30 DIAGNOSIS — I21.4 NSTEMI, INITIAL EPISODE OF CARE (HCC): Primary | ICD-10-CM

## 2021-06-30 DIAGNOSIS — Z95.1 S/P CABG (CORONARY ARTERY BYPASS GRAFT): ICD-10-CM

## 2021-06-30 PROCEDURE — 93798 PHYS/QHP OP CAR RHAB W/ECG: CPT

## 2021-07-01 ENCOUNTER — ANTICOAGULATION VISIT (OUTPATIENT)
Dept: PHARMACY | Facility: HOSPITAL | Age: 76
End: 2021-07-01

## 2021-07-01 DIAGNOSIS — I48.0 PAF (PAROXYSMAL ATRIAL FIBRILLATION) (HCC): Primary | ICD-10-CM

## 2021-07-01 LAB
INR PPP: 2.1 (ref 0.91–1.09)
PROTHROMBIN TIME: 25.7 SECONDS (ref 10–13.8)

## 2021-07-01 PROCEDURE — 36416 COLLJ CAPILLARY BLOOD SPEC: CPT

## 2021-07-01 PROCEDURE — 85610 PROTHROMBIN TIME: CPT

## 2021-07-02 ENCOUNTER — TREATMENT (OUTPATIENT)
Dept: CARDIAC REHAB | Facility: HOSPITAL | Age: 76
End: 2021-07-02

## 2021-07-02 DIAGNOSIS — Z98.890 S/P MVR (MITRAL VALVE REPAIR): ICD-10-CM

## 2021-07-02 DIAGNOSIS — I21.4 NSTEMI, INITIAL EPISODE OF CARE (HCC): Primary | ICD-10-CM

## 2021-07-02 DIAGNOSIS — Z95.1 S/P CABG (CORONARY ARTERY BYPASS GRAFT): ICD-10-CM

## 2021-07-02 PROCEDURE — 93798 PHYS/QHP OP CAR RHAB W/ECG: CPT

## 2021-07-02 NOTE — PROGRESS NOTES
Anticoagulation Clinic Progress Note    Anticoagulation Summary  As of 2021    INR goal:  2.0-3.0   TTR:  58.4 % (2.5 mo)   INR used for dosin.1 (2021)   Warfarin maintenance plan:  1 mg every Thu; 2 mg all other days   Weekly warfarin total:  13 mg   No change documented:  Brenda Whitney   Plan last modified:  Eduar Weeks RP (2021)   Next INR check:  2021   Priority:  High   Target end date:      Indications    PAF (paroxysmal atrial fibrillation) (CMS/[facility]) [I48.0]             Anticoagulation Episode Summary     INR check location:      Preferred lab:      Send INR reminders to:   [facility]    Comments:        Anticoagulation Care Providers     Provider Role Specialty Phone number    Jimena Singer MD Referring Cardiology [phone]          Clinic Interview:  Patient Findings     Negatives:  Signs/symptoms of thrombosis, Signs/symptoms of bleeding,   Laboratory test error suspected, Change in health, Change in alcohol use,   Change in activity, Upcoming invasive procedure, Emergency department   visit, Upcoming dental procedure, Missed doses, Extra doses, Change in   medications, Change in diet/appetite, Hospital admission, Bruising, Other   complaints      Clinical Outcomes     Negatives:  Major bleeding event, Thromboembolic event,   Anticoagulation-related hospital admission, Anticoagulation-related ED   visit, Anticoagulation-related fatality        INR History:  Anticoagulation Monitoring 2021   INR 3.1 1.6 2.1   INR Date 2021   INR Goal 2.0-3.0 2.0-3.0 2.0-3.0   Trend Same Same Same   Last Week Total 13 mg 12 mg 13 mg   Next Week Total 13 mg 14 mg 13 mg   Sun 2 mg 2 mg 2 mg   Mon 2 mg 2 mg 2 mg   Tue 2 mg 2 mg 2 mg   Wed 2 mg 2 mg 2 mg   Thu 1 mg 2 mg (); Otherwise 1 mg 1 mg   Fri 2 mg 2 mg 2 mg   Sat 2 mg 2 mg 2 mg   Visit Report - - -   Some recent data might be hidden       Plan:  1. INR is therapeutic today-  see above in Anticoagulation Summary.   Will instruct Rodolfo Grover to continue their warfarin regimen- see above in Anticoagulation Summary.  2. Follow up in 2.5 weeks.  3. Patient declines warfarin refills.  4. Verbal and written information provided. Patient expresses understanding and has no further questions at this time.    Brenda Whitney

## 2021-07-07 ENCOUNTER — TREATMENT (OUTPATIENT)
Dept: CARDIAC REHAB | Facility: HOSPITAL | Age: 76
End: 2021-07-07

## 2021-07-07 ENCOUNTER — LAB (OUTPATIENT)
Dept: LAB | Facility: HOSPITAL | Age: 76
End: 2021-07-07

## 2021-07-07 DIAGNOSIS — I42.8 NON-ISCHEMIC CARDIOMYOPATHY (HCC): ICD-10-CM

## 2021-07-07 DIAGNOSIS — I21.4 NSTEMI, INITIAL EPISODE OF CARE (HCC): Primary | ICD-10-CM

## 2021-07-07 DIAGNOSIS — Z95.1 S/P CABG (CORONARY ARTERY BYPASS GRAFT): ICD-10-CM

## 2021-07-07 DIAGNOSIS — Z98.890 S/P MVR (MITRAL VALVE REPAIR): ICD-10-CM

## 2021-07-07 LAB
ANION GAP SERPL CALCULATED.3IONS-SCNC: 12.7 MMOL/L (ref 5–15)
BUN SERPL-MCNC: 26 MG/DL (ref 8–23)
BUN/CREAT SERPL: 17.1 (ref 7–25)
CALCIUM SPEC-SCNC: 8.9 MG/DL (ref 8.6–10.5)
CHLORIDE SERPL-SCNC: 103 MMOL/L (ref 98–107)
CO2 SERPL-SCNC: 20.3 MMOL/L (ref 22–29)
CREAT SERPL-MCNC: 1.52 MG/DL (ref 0.76–1.27)
GFR SERPL CREATININE-BSD FRML MDRD: 45 ML/MIN/1.73
GLUCOSE SERPL-MCNC: 90 MG/DL (ref 65–99)
NT-PROBNP SERPL-MCNC: 2186 PG/ML (ref 0–1800)
POTASSIUM SERPL-SCNC: 4.6 MMOL/L (ref 3.5–5.2)
SODIUM SERPL-SCNC: 136 MMOL/L (ref 136–145)

## 2021-07-07 PROCEDURE — 93798 PHYS/QHP OP CAR RHAB W/ECG: CPT

## 2021-07-07 PROCEDURE — 83880 ASSAY OF NATRIURETIC PEPTIDE: CPT

## 2021-07-07 PROCEDURE — 36415 COLL VENOUS BLD VENIPUNCTURE: CPT

## 2021-07-07 PROCEDURE — 80048 BASIC METABOLIC PNL TOTAL CA: CPT

## 2021-07-09 ENCOUNTER — TREATMENT (OUTPATIENT)
Dept: CARDIAC REHAB | Facility: HOSPITAL | Age: 76
End: 2021-07-09

## 2021-07-09 ENCOUNTER — TELEPHONE (OUTPATIENT)
Dept: CARDIOLOGY | Facility: CLINIC | Age: 76
End: 2021-07-09

## 2021-07-09 DIAGNOSIS — I21.4 NSTEMI, INITIAL EPISODE OF CARE (HCC): Primary | ICD-10-CM

## 2021-07-09 DIAGNOSIS — Z98.890 S/P MVR (MITRAL VALVE REPAIR): ICD-10-CM

## 2021-07-09 DIAGNOSIS — Z95.1 S/P CABG (CORONARY ARTERY BYPASS GRAFT): ICD-10-CM

## 2021-07-09 PROCEDURE — 93798 PHYS/QHP OP CAR RHAB W/ECG: CPT

## 2021-07-10 NOTE — TELEPHONE ENCOUNTER
I talked to pt re blood work and told him need to f/p with Dr Rodríguez (nephrologist) in the next several weeks.  I told him to stay on the same regimen and avoid taking additional Lasix.  He took 1 Lasix and potassium to Tuesdays in a row.  With this minimal intake of increased diuretics his creatinine increased.  Again as above we will asked nephrology to review.  He is going out of town this next week and I have cautioned him to not take excessive salt inadvertently in his diet if possible.    Please arrange for follow-up appointment with Dr. Rodríguez in the next 2 to 3 weeks.

## 2021-07-12 ENCOUNTER — APPOINTMENT (OUTPATIENT)
Dept: CARDIAC REHAB | Facility: HOSPITAL | Age: 76
End: 2021-07-12

## 2021-07-12 NOTE — TELEPHONE ENCOUNTER
I spoke with Milena at [facility], she stated he would be a new patient. Please fax referral with demographics, insurance, last 3 office notes and 12 months of labs to Milena at [phone] and they will call patient to schedule.     Thank you  [place]

## 2021-07-14 ENCOUNTER — APPOINTMENT (OUTPATIENT)
Dept: PHARMACY | Facility: HOSPITAL | Age: 76
End: 2021-07-14

## 2021-07-14 ENCOUNTER — APPOINTMENT (OUTPATIENT)
Dept: CARDIAC REHAB | Facility: HOSPITAL | Age: 76
End: 2021-07-14

## 2021-07-14 DIAGNOSIS — N28.9 RENAL INSUFFICIENCY: ICD-10-CM

## 2021-07-14 DIAGNOSIS — R06.09 DOE (DYSPNEA ON EXERTION): Primary | ICD-10-CM

## 2021-07-14 NOTE — TELEPHONE ENCOUNTER
I do not think they probably need to nephrologist the patient was seen by Dr. Rodríguez in February in the hospital.  However I did place referral as requested.  Please arrange

## 2021-07-16 ENCOUNTER — APPOINTMENT (OUTPATIENT)
Dept: CARDIAC REHAB | Facility: HOSPITAL | Age: 76
End: 2021-07-16

## 2021-07-19 ENCOUNTER — APPOINTMENT (OUTPATIENT)
Dept: CARDIAC REHAB | Facility: HOSPITAL | Age: 76
End: 2021-07-19

## 2021-07-21 ENCOUNTER — ANTICOAGULATION VISIT (OUTPATIENT)
Dept: PHARMACY | Facility: HOSPITAL | Age: 76
End: 2021-07-21

## 2021-07-21 ENCOUNTER — APPOINTMENT (OUTPATIENT)
Dept: CARDIAC REHAB | Facility: HOSPITAL | Age: 76
End: 2021-07-21

## 2021-07-21 DIAGNOSIS — I48.0 PAF (PAROXYSMAL ATRIAL FIBRILLATION) (HCC): Primary | ICD-10-CM

## 2021-07-21 LAB
INR PPP: 1.5 (ref 0.91–1.09)
PROTHROMBIN TIME: 17.8 SECONDS (ref 10–13.8)

## 2021-07-21 PROCEDURE — 36416 COLLJ CAPILLARY BLOOD SPEC: CPT

## 2021-07-21 PROCEDURE — G0463 HOSPITAL OUTPT CLINIC VISIT: HCPCS

## 2021-07-21 PROCEDURE — 85610 PROTHROMBIN TIME: CPT

## 2021-07-21 NOTE — PROGRESS NOTES
Anticoagulation Clinic Progress Note    Anticoagulation Summary  As of 2021    INR goal:  2.0-3.0   TTR:  49.8 % (3.2 mo)   INR used for dosin.5 (2021)   Warfarin maintenance plan:  1 mg every Thu; 2 mg all other days   Weekly warfarin total:  13 mg   Plan last modified:  Eduar Weeks, PharmD (2021)   Next INR check:  2021   Priority:  High   Target end date:      Indications    PAF (paroxysmal atrial fibrillation) (CMS/HCC) [I48.0]             Anticoagulation Episode Summary     INR check location:      Preferred lab:      Send INR reminders to:   [facility]    Comments:        Anticoagulation Care Providers     Provider Role Specialty Phone number    Jimena Singer MD Referring Cardiology [phone]          Clinic Interview:      INR History:  Anticoagulation Monitoring 2021   INR 1.6 2.1 1.5   INR Date 2021   INR Goal 2.0-3.0 2.0-3.0 2.0-3.0   Trend Same Same Same   Last Week Total 12 mg 13 mg 13 mg   Next Week Total 14 mg 13 mg 14 mg   Sun 2 mg 2 mg 2 mg   Mon 2 mg 2 mg 2 mg   Tue 2 mg 2 mg 2 mg   Wed 2 mg 2 mg 2 mg   Thu 2 mg (); Otherwise 1 mg 1 mg 2 mg (); Otherwise 1 mg   Fri 2 mg 2 mg 2 mg   Sat 2 mg 2 mg 2 mg   Visit Report - - -   Some recent data might be hidden       Plan:  1. INR is Subtherapeutic today- see above in Anticoagulation Summary.  Will instruct Rodolfo Grover to Increase their warfarin regimen- see above in Anticoagulation Summary.  2. Follow up in 2 weeks, recommended 1 week but patient really wanted a 2 week appointment   3. Patient declines warfarin refills.  4. Verbal and written information provided. Patient expresses understanding and has no further questions at this time.    Luci Hale [facility]

## 2021-07-23 ENCOUNTER — APPOINTMENT (OUTPATIENT)
Dept: CARDIAC REHAB | Facility: HOSPITAL | Age: 76
End: 2021-07-23

## 2021-07-26 ENCOUNTER — APPOINTMENT (OUTPATIENT)
Dept: CARDIAC REHAB | Facility: HOSPITAL | Age: 76
End: 2021-07-26

## 2021-07-28 ENCOUNTER — APPOINTMENT (OUTPATIENT)
Dept: CARDIAC REHAB | Facility: HOSPITAL | Age: 76
End: 2021-07-28

## 2021-07-30 ENCOUNTER — APPOINTMENT (OUTPATIENT)
Dept: CARDIAC REHAB | Facility: HOSPITAL | Age: 76
End: 2021-07-30

## 2021-08-02 ENCOUNTER — APPOINTMENT (OUTPATIENT)
Dept: CARDIAC REHAB | Facility: HOSPITAL | Age: 76
End: 2021-08-02

## 2021-08-04 ENCOUNTER — APPOINTMENT (OUTPATIENT)
Dept: CARDIAC REHAB | Facility: HOSPITAL | Age: 76
End: 2021-08-04

## 2021-08-04 ENCOUNTER — ANTICOAGULATION VISIT (OUTPATIENT)
Dept: PHARMACY | Facility: HOSPITAL | Age: 76
End: 2021-08-04

## 2021-08-04 DIAGNOSIS — I48.0 PAF (PAROXYSMAL ATRIAL FIBRILLATION) (HCC): Primary | ICD-10-CM

## 2021-08-04 LAB
INR PPP: 1.7 (ref 0.91–1.09)
PROTHROMBIN TIME: 20.9 SECONDS (ref 10–13.8)

## 2021-08-04 PROCEDURE — 36416 COLLJ CAPILLARY BLOOD SPEC: CPT

## 2021-08-04 PROCEDURE — 85610 PROTHROMBIN TIME: CPT

## 2021-08-04 PROCEDURE — G0463 HOSPITAL OUTPT CLINIC VISIT: HCPCS

## 2021-08-04 NOTE — PROGRESS NOTES
Anticoagulation Clinic Progress Note    Anticoagulation Summary  As of 2021    INR goal:  2.0-3.0   TTR:  43.5 % (3.7 mo)   INR used for dosin.7 (2021)   Warfarin maintenance plan:  2 mg every day   Weekly warfarin total:  14 mg   Plan last modified:  Eduar Weeks, PharmD (2021)   Next INR check:  2021   Priority:  High   Target end date:      Indications    PAF (paroxysmal atrial fibrillation) (CMS/[facility]) [I48.0]             Anticoagulation Episode Summary     INR check location:      Preferred lab:      Send INR reminders to:   [facility]    Comments:        Anticoagulation Care Providers     Provider Role Specialty Phone number    Jimena Singer MD Referring Cardiology [phone]          Clinic Interview:  Patient Findings     Negatives:  Signs/symptoms of thrombosis, Signs/symptoms of bleeding,   Laboratory test error suspected, Change in health, Change in alcohol use,   Change in activity, Upcoming invasive procedure, Emergency department   visit, Upcoming dental procedure, Missed doses, Extra doses, Change in   medications, Change in diet/appetite, Hospital admission, Bruising, Other   complaints      Clinical Outcomes     Negatives:  Major bleeding event, Thromboembolic event,   Anticoagulation-related hospital admission, Anticoagulation-related ED   visit, Anticoagulation-related fatality        INR History:  Anticoagulation Monitoring 2021   INR 2.1 1.5 1.7   INR Date 2021   INR Goal 2.0-3.0 2.0-3.0 2.0-3.0   Trend Same Same Up   Last Week Total 13 mg 13 mg 13 mg   Next Week Total 13 mg 14 mg 14 mg   Sun 2 mg 2 mg 2 mg   Mon 2 mg 2 mg 2 mg   Tue 2 mg 2 mg 2 mg   Wed 2 mg 2 mg 2 mg   Thu 1 mg 2 mg (); Otherwise 1 mg 2 mg   Fri 2 mg 2 mg 2 mg   Sat 2 mg 2 mg 2 mg   Visit Report - - -   Some recent data might be hidden       Plan:  1. INR is Subtherapeutic today- see above in Anticoagulation Summary.  Will instruct Rodolfo FRANCO  Ray to Increase their warfarin regimen- see above in Anticoagulation Summary.  2. Follow up in 2 weeks  3. Patient declines warfarin refills.  4. Verbal and written information provided. Patient expresses understanding and has no further questions at this time.    Eduar Weeks, PharmD

## 2021-08-18 ENCOUNTER — ANTICOAGULATION VISIT (OUTPATIENT)
Dept: PHARMACY | Facility: HOSPITAL | Age: 76
End: 2021-08-18

## 2021-08-18 DIAGNOSIS — I48.0 PAF (PAROXYSMAL ATRIAL FIBRILLATION) (HCC): Primary | ICD-10-CM

## 2021-08-18 LAB
INR PPP: 2.2 (ref 0.91–1.09)
PROTHROMBIN TIME: 26.2 SECONDS (ref 10–13.8)

## 2021-08-18 PROCEDURE — 85610 PROTHROMBIN TIME: CPT

## 2021-08-18 PROCEDURE — 36416 COLLJ CAPILLARY BLOOD SPEC: CPT

## 2021-08-18 NOTE — PROGRESS NOTES
Anticoagulation Clinic Progress Note    Anticoagulation Summary  As of 2021    INR goal:  2.0-3.0   TTR:  43.1 % (4.1 mo)   INR used for dosin.2 (2021)   Warfarin maintenance plan:  2 mg every day   Weekly warfarin total:  14 mg   No change documented:  Brenda Whitney   Plan last modified:  Eduar Weeks, PharmD (2021)   Next INR check:  2021   Priority:  High   Target end date:      Indications    PAF (paroxysmal atrial fibrillation) (CMS/HCC) [I48.0]             Anticoagulation Episode Summary     INR check location:      Preferred lab:      Send INR reminders to:  [facility]    Comments:        Anticoagulation Care Providers     Provider Role Specialty Phone number    Jimena Singer MD Referring Cardiology [phone]          Clinic Interview:  Patient Findings     Negatives:  Signs/symptoms of thrombosis, Signs/symptoms of bleeding,   Laboratory test error suspected, Change in health, Change in alcohol use,   Change in activity, Upcoming invasive procedure, Emergency department   visit, Upcoming dental procedure, Missed doses, Extra doses, Change in   medications, Change in diet/appetite, Hospital admission, Bruising, Other   complaints      Clinical Outcomes     Negatives:  Major bleeding event, Thromboembolic event,   Anticoagulation-related hospital admission, Anticoagulation-related ED   visit, Anticoagulation-related fatality        INR History:  Anticoagulation Monitoring 2021   INR 1.5 1.7 2.2   INR Date 2021   INR Goal 2.0-3.0 2.0-3.0 2.0-3.0   Trend Same Up Same   Last Week Total 13 mg 13 mg 14 mg   Next Week Total 14 mg 14 mg 14 mg   Sun 2 mg 2 mg 2 mg   Mon 2 mg 2 mg 2 mg   Tue 2 mg 2 mg 2 mg   Wed 2 mg 2 mg 2 mg   Thu 2 mg (); Otherwise 1 mg 2 mg 2 mg   Fri 2 mg 2 mg 2 mg   Sat 2 mg 2 mg 2 mg   Visit Report - - -   Some recent data might be hidden       Plan:  1. INR is therapeutic today- see above in  Anticoagulation Summary.   Will instruct Rodolfo Grover to continue their warfarin regimen- see above in Anticoagulation Summary.  2. Follow up in 2 weeks.  3. Patient declines warfarin refills.  4. Verbal and written information provided. Patient expresses understanding and has no further questions at this time.    Brenda Whitney

## 2021-09-01 ENCOUNTER — ANTICOAGULATION VISIT (OUTPATIENT)
Dept: PHARMACY | Facility: HOSPITAL | Age: 76
End: 2021-09-01

## 2021-09-01 DIAGNOSIS — I48.0 PAF (PAROXYSMAL ATRIAL FIBRILLATION) (HCC): Primary | ICD-10-CM

## 2021-09-01 LAB
INR PPP: 2 (ref 0.91–1.09)
PROTHROMBIN TIME: 24.5 SECONDS (ref 10–13.8)

## 2021-09-01 PROCEDURE — 36416 COLLJ CAPILLARY BLOOD SPEC: CPT

## 2021-09-01 PROCEDURE — 85610 PROTHROMBIN TIME: CPT

## 2021-09-01 NOTE — PROGRESS NOTES
Anticoagulation Clinic Progress Note    Anticoagulation Summary  As of 2021    INR goal:  2.0-3.0   TTR:  48.9 % (4.6 mo)   INR used for dosin.0 (2021)   Warfarin maintenance plan:  2 mg every day   Weekly warfarin total:  14 mg   No change documented:  Brenda Whitney   Plan last modified:  dEuar Weeks, PharmD (2021)   Next INR check:  2021   Priority:  High   Target end date:      Indications    PAF (paroxysmal atrial fibrillation) (CMS/HCC) [I48.0]             Anticoagulation Episode Summary     INR check location:      Preferred lab:      Send INR reminders to:  [facility]    Comments:        Anticoagulation Care Providers     Provider Role Specialty Phone number    Jimena Singer MD Referring Cardiology [phone]          Clinic Interview:  Patient Findings     Negatives:  Signs/symptoms of thrombosis, Signs/symptoms of bleeding,   Laboratory test error suspected, Change in health, Change in alcohol use,   Change in activity, Upcoming invasive procedure, Emergency department   visit, Upcoming dental procedure, Missed doses, Extra doses, Change in   medications, Change in diet/appetite, Hospital admission, Bruising, Other   complaints      Clinical Outcomes     Negatives:  Major bleeding event, Thromboembolic event,   Anticoagulation-related hospital admission, Anticoagulation-related ED   visit, Anticoagulation-related fatality        INR History:  Anticoagulation Monitoring 2021   INR 1.7 2.2 2.0   INR Date 2021   INR Goal 2.0-3.0 2.0-3.0 2.0-3.0   Trend Up Same Same   Last Week Total 13 mg 14 mg 14 mg   Next Week Total 14 mg 14 mg 14 mg   Sun 2 mg 2 mg 2 mg   Mon 2 mg 2 mg 2 mg   Tue 2 mg 2 mg 2 mg   Wed 2 mg 2 mg 2 mg   Thu 2 mg 2 mg 2 mg   Fri 2 mg 2 mg 2 mg   Sat 2 mg 2 mg 2 mg   Visit Report - - -   Some recent data might be hidden       Plan:  1. INR is therapeutic today- see above in Anticoagulation Summary.   Will  instruct Rodolfo Grover to continue their warfarin regimen- see above in Anticoagulation Summary.  2. Follow up in 4 weeks.  3. Patient declines warfarin refills.  4. Verbal and written information provided. Patient expresses understanding and has no further questions at this time.    Brenda Whitney

## 2021-09-24 ENCOUNTER — HOSPITAL ENCOUNTER (OUTPATIENT)
Dept: CARDIOLOGY | Facility: HOSPITAL | Age: 76
Discharge: HOME | End: 2021-09-24
Admitting: INTERNAL MEDICINE

## 2021-09-24 VITALS
WEIGHT: 177 LBS | SYSTOLIC BLOOD PRESSURE: 140 MMHG | HEIGHT: 67 IN | BODY MASS INDEX: 27.78 KG/M2 | OXYGEN SATURATION: 98 % | DIASTOLIC BLOOD PRESSURE: 62 MMHG | HEART RATE: 63 BPM

## 2021-09-24 DIAGNOSIS — I42.8 NON-ISCHEMIC CARDIOMYOPATHY (HCC): ICD-10-CM

## 2021-09-24 PROCEDURE — 93306 TTE W/DOPPLER COMPLETE: CPT | Performed by: INTERNAL MEDICINE

## 2021-09-24 PROCEDURE — 25010000002 PERFLUTREN (DEFINITY) 8.476 MG IN SODIUM CHLORIDE (PF) 0.9 % 10 ML INJECTION: Performed by: INTERNAL MEDICINE

## 2021-09-24 PROCEDURE — 93306 TTE W/DOPPLER COMPLETE: CPT

## 2021-09-24 RX ADMIN — PERFLUTREN 1.5 ML: 6.52 INJECTION, SUSPENSION INTRAVENOUS at 10:00

## 2021-09-27 ENCOUNTER — OFFICE VISIT (OUTPATIENT)
Dept: CARDIOLOGY | Facility: CLINIC | Age: 76
End: 2021-09-27

## 2021-09-27 VITALS
SYSTOLIC BLOOD PRESSURE: 118 MMHG | WEIGHT: 179.2 LBS | BODY MASS INDEX: 28.12 KG/M2 | HEART RATE: 68 BPM | HEIGHT: 67 IN | DIASTOLIC BLOOD PRESSURE: 52 MMHG

## 2021-09-27 DIAGNOSIS — I27.20 PULMONARY HYPERTENSION (HCC): Primary | ICD-10-CM

## 2021-09-27 DIAGNOSIS — Z98.890 H/O MITRAL VALVE REPAIR: ICD-10-CM

## 2021-09-27 DIAGNOSIS — R06.9 UNSPECIFIED ABNORMALITIES OF BREATHING: ICD-10-CM

## 2021-09-27 DIAGNOSIS — Z95.1 S/P CABG (CORONARY ARTERY BYPASS GRAFT): ICD-10-CM

## 2021-09-27 DIAGNOSIS — I25.10 CORONARY ARTERY DISEASE INVOLVING NATIVE CORONARY ARTERY OF NATIVE HEART WITHOUT ANGINA PECTORIS: Primary | ICD-10-CM

## 2021-09-27 DIAGNOSIS — I48.0 PAF (PAROXYSMAL ATRIAL FIBRILLATION) (HCC): ICD-10-CM

## 2021-09-27 DIAGNOSIS — R53.83 FATIGUE, UNSPECIFIED TYPE: Primary | ICD-10-CM

## 2021-09-27 LAB
AORTIC ARCH: 2.4 CM
ASCENDING AORTA: 2.6 CM
BH CV ECHO MEAS - ACS: 2 CM
BH CV ECHO MEAS - AI DEC SLOPE: 149 CM/SEC^2
BH CV ECHO MEAS - AI MAX PG: 34.8 MMHG
BH CV ECHO MEAS - AI MAX VEL: 295 CM/SEC
BH CV ECHO MEAS - AI P1/2T: 579.9 MSEC
BH CV ECHO MEAS - AO MAX PG (FULL): 3.9 MMHG
BH CV ECHO MEAS - AO MAX PG: 7 MMHG
BH CV ECHO MEAS - AO MEAN PG (FULL): 0 MMHG
BH CV ECHO MEAS - AO MEAN PG: 2 MMHG
BH CV ECHO MEAS - AO ROOT AREA (BSA CORRECTED): 1.8
BH CV ECHO MEAS - AO ROOT AREA: 9.1 CM^2
BH CV ECHO MEAS - AO ROOT DIAM: 3.4 CM
BH CV ECHO MEAS - AO V2 MAX: 132 CM/SEC
BH CV ECHO MEAS - AO V2 MEAN: 66.1 CM/SEC
BH CV ECHO MEAS - AO V2 VTI: 22.2 CM
BH CV ECHO MEAS - ASC AORTA: 2.6 CM
BH CV ECHO MEAS - AVA(I,A): 2.6 CM^2
BH CV ECHO MEAS - AVA(I,D): 2.6 CM^2
BH CV ECHO MEAS - AVA(V,A): 2.1 CM^2
BH CV ECHO MEAS - AVA(V,D): 2.1 CM^2
BH CV ECHO MEAS - BSA(HAYCOCK): 2 M^2
BH CV ECHO MEAS - BSA: 1.9 M^2
BH CV ECHO MEAS - BZI_BMI: 27.7 KILOGRAMS/M^2
BH CV ECHO MEAS - BZI_METRIC_HEIGHT: 170.2 CM
BH CV ECHO MEAS - BZI_METRIC_WEIGHT: 80.3 KG
BH CV ECHO MEAS - EDV(MOD-SP2): 159 ML
BH CV ECHO MEAS - EDV(MOD-SP4): 144 ML
BH CV ECHO MEAS - EDV(TEICH): 166.6 ML
BH CV ECHO MEAS - EF(CUBED): 62 %
BH CV ECHO MEAS - EF(MOD-BP): 52 %
BH CV ECHO MEAS - EF(MOD-SP2): 50.9 %
BH CV ECHO MEAS - EF(MOD-SP4): 52.8 %
BH CV ECHO MEAS - EF(TEICH): 52.8 %
BH CV ECHO MEAS - ESV(MOD-SP2): 78 ML
BH CV ECHO MEAS - ESV(MOD-SP4): 68 ML
BH CV ECHO MEAS - ESV(TEICH): 78.6 ML
BH CV ECHO MEAS - FS: 27.6 %
BH CV ECHO MEAS - IVS/LVPW: 0.91
BH CV ECHO MEAS - IVSD: 1 CM
BH CV ECHO MEAS - LAT PEAK E' VEL: 7 CM/SEC
BH CV ECHO MEAS - LV DIASTOLIC VOL/BSA (35-75): 75 ML/M^2
BH CV ECHO MEAS - LV MASS(C)D: 248.5 GRAMS
BH CV ECHO MEAS - LV MASS(C)DI: 129.4 GRAMS/M^2
BH CV ECHO MEAS - LV MAX PG: 3.1 MMHG
BH CV ECHO MEAS - LV MEAN PG: 2 MMHG
BH CV ECHO MEAS - LV SYSTOLIC VOL/BSA (12-30): 35.4 ML/M^2
BH CV ECHO MEAS - LV V1 MAX: 87.9 CM/SEC
BH CV ECHO MEAS - LV V1 MEAN: 59.9 CM/SEC
BH CV ECHO MEAS - LV V1 VTI: 18.7 CM
BH CV ECHO MEAS - LVIDD: 5.8 CM
BH CV ECHO MEAS - LVIDS: 4.2 CM
BH CV ECHO MEAS - LVLD AP2: 8.7 CM
BH CV ECHO MEAS - LVLD AP4: 8.5 CM
BH CV ECHO MEAS - LVLS AP2: 8 CM
BH CV ECHO MEAS - LVLS AP4: 7.6 CM
BH CV ECHO MEAS - LVOT AREA (M): 3.1 CM^2
BH CV ECHO MEAS - LVOT AREA: 3.1 CM^2
BH CV ECHO MEAS - LVOT DIAM: 2 CM
BH CV ECHO MEAS - LVPWD: 1.1 CM
BH CV ECHO MEAS - MED PEAK E' VEL: 4 CM/SEC
BH CV ECHO MEAS - MR MAX PG: 29.2 MMHG
BH CV ECHO MEAS - MR MAX VEL: 270 CM/SEC
BH CV ECHO MEAS - MV A DUR: 0.12 SEC
BH CV ECHO MEAS - MV A MAX VEL: 94.1 CM/SEC
BH CV ECHO MEAS - MV DEC SLOPE: 508 CM/SEC^2
BH CV ECHO MEAS - MV DEC TIME: 0.13 SEC
BH CV ECHO MEAS - MV E MAX VEL: 111 CM/SEC
BH CV ECHO MEAS - MV E/A: 1.2
BH CV ECHO MEAS - MV MAX PG: 6.1 MMHG
BH CV ECHO MEAS - MV MEAN PG: 3 MMHG
BH CV ECHO MEAS - MV P1/2T MAX VEL: 119 CM/SEC
BH CV ECHO MEAS - MV P1/2T: 68.6 MSEC
BH CV ECHO MEAS - MV V2 MAX: 123 CM/SEC
BH CV ECHO MEAS - MV V2 MEAN: 77.1 CM/SEC
BH CV ECHO MEAS - MV V2 VTI: 35.4 CM
BH CV ECHO MEAS - MVA P1/2T LCG: 1.8 CM^2
BH CV ECHO MEAS - MVA(P1/2T): 3.2 CM^2
BH CV ECHO MEAS - MVA(VTI): 1.7 CM^2
BH CV ECHO MEAS - PA ACC TIME: 0.16 SEC
BH CV ECHO MEAS - PA MAX PG (FULL): 4.2 MMHG
BH CV ECHO MEAS - PA MAX PG: 5.3 MMHG
BH CV ECHO MEAS - PA PR(ACCEL): 6.6 MMHG
BH CV ECHO MEAS - PA V2 MAX: 115 CM/SEC
BH CV ECHO MEAS - PULM A REVS DUR: 0.11 SEC
BH CV ECHO MEAS - PULM A REVS VEL: 23.3 CM/SEC
BH CV ECHO MEAS - PULM DIAS VEL: 65.2 CM/SEC
BH CV ECHO MEAS - PULM S/D: 0.82
BH CV ECHO MEAS - PULM SYS VEL: 53.3 CM/SEC
BH CV ECHO MEAS - PVA(V,A): 1.4 CM^2
BH CV ECHO MEAS - PVA(V,D): 1.4 CM^2
BH CV ECHO MEAS - QP/QS: 0.64
BH CV ECHO MEAS - RAP SYSTOLE: 3 MMHG
BH CV ECHO MEAS - RV MAX PG: 1.1 MMHG
BH CV ECHO MEAS - RV MEAN PG: 1 MMHG
BH CV ECHO MEAS - RV V1 MAX: 52.2 CM/SEC
BH CV ECHO MEAS - RV V1 MEAN: 35.1 CM/SEC
BH CV ECHO MEAS - RV V1 VTI: 12 CM
BH CV ECHO MEAS - RVOT AREA: 3.1 CM^2
BH CV ECHO MEAS - RVOT DIAM: 2 CM
BH CV ECHO MEAS - RVSP: 44 MMHG
BH CV ECHO MEAS - SI(AO): 105 ML/M^2
BH CV ECHO MEAS - SI(CUBED): 63 ML/M^2
BH CV ECHO MEAS - SI(LVOT): 30.6 ML/M^2
BH CV ECHO MEAS - SI(MOD-SP2): 42.2 ML/M^2
BH CV ECHO MEAS - SI(MOD-SP4): 39.6 ML/M^2
BH CV ECHO MEAS - SI(TEICH): 45.8 ML/M^2
BH CV ECHO MEAS - SUP REN AO DIAM: 1.5 CM
BH CV ECHO MEAS - SV(AO): 201.6 ML
BH CV ECHO MEAS - SV(CUBED): 121 ML
BH CV ECHO MEAS - SV(LVOT): 58.7 ML
BH CV ECHO MEAS - SV(MOD-SP2): 81 ML
BH CV ECHO MEAS - SV(MOD-SP4): 76 ML
BH CV ECHO MEAS - SV(RVOT): 37.7 ML
BH CV ECHO MEAS - SV(TEICH): 88 ML
BH CV ECHO MEAS - TAPSE (>1.6): 1.5 CM
BH CV ECHO MEAS - TR MAX VEL: 319 CM/SEC
BH CV ECHO MEASUREMENTS AVERAGE E/E' RATIO: 20.18
BH CV XLRA - RV BASE: 3.7 CM
BH CV XLRA - RV LENGTH: 8.3 CM
BH CV XLRA - RV MID: 3.4 CM
BH CV XLRA - TDI S': 9.5 CM/SEC
LEFT ATRIUM VOLUME INDEX: 25 ML/M2
LV EF 2D ECHO EST: 52 %
MAXIMAL PREDICTED HEART RATE: 142 BPM
SINUS: 2.9 CM
STJ: 2.6 CM
STRESS TARGET HR: 121 BPM

## 2021-09-27 PROCEDURE — 99214 OFFICE O/P EST MOD 30 MIN: CPT | Performed by: NURSE PRACTITIONER

## 2021-09-27 PROCEDURE — 93000 ELECTROCARDIOGRAM COMPLETE: CPT | Performed by: NURSE PRACTITIONER

## 2021-09-27 RX ORDER — SUCRALFATE 1 G/1
TABLET ORAL AS NEEDED
COMMUNITY
Start: 2021-08-28

## 2021-09-27 NOTE — PROGRESS NOTES
Date of Office Visit: 2021  Encounter Provider: Radha Rodríguez, ARCELIA, APRN  Place of Service: [facility] CARDIOLOGY  Patient Name: Rodolfo Grover  :1945        Subjective:     Chief Complaint:  Coronary artery disease, mitral valve repair      History of Present Illness:  Rodolof Grover is a 76 y.o. male patient of Dr. Singer.  I am seeing this patient in the office today and I reviewed his records.    Patient has a history of coronary disease, pulmonary hypertension, valvular heart disease, rheumatic fever, GERD.    PER PREVIOUS OFFICE NOTE:  seen at [facility] in 2018 after humeral fracture following a fall.  At that time he was noted to have ventricular bigeminy with normal potassium and magnesium levels.  He was hypertensive at the time.  He had an echocardiogram that showed normal systolic function with normal diastolic function, mild left atrial enlargement saline study was indeterminate.  There was aortic valve sclerosis with mild aortic regurgitation and trivial mitral and tricuspid regurgitation with mild pulmonary hypertension with an RV systolic pressure 42 mmHg.  A stress perfusion study was negative for ischemia.  Follow-up echocardiogram in 2020 showed an ejection fraction of 55% with normal diastolic function, mild aortic mitral valve regurgitation, mild to moderate tricuspid regurgitation with an RV systolic pressure increased further to 48 mmHg.  He presented in 2020 with complaints of chest discomfort and non-STEMI.  Echo showed new wall motion abnormalities as well as pulmonary hypertension( RVSP 61 mmHg).  Subsequent cardiac catheterization showed multivessel coronary artery disease.  Subsequently underwent CABG x7 vessels with mitral valve repair.  Postoperative course was complicated by hemoptysis, esophageal esophagitis, atrial fibrillation associated also with intermittent complete heart block.  He was also felt to have had a seizure  and was started on Keppra.  He also had acute renal insufficiency..  He also had induration and possible cellulitis of his right thigh leg wound. THE FOLLOWING IS MY CONTRIBUTION TO NOTE:     Patient was seen back in the office by Dr. Singer 6/2021 at which point he had not been using his compression socks and did have some ankle edema.  He was participating in cardiac rehab.  He had been notED to have some PVCs and a brief episode of tachycardia lasting 3 beats.  Metoprolol was increased due to hypertension.  Warfarin was continued.    Patient had follow-up echo 9/24/2021 showing normal LV systolic function with EF of 52%, mild concentric LVH, elevated left atrial pressure, mild to moderate aortic regurgitation with some calcification of the aortic valve, trace mitral regurgitation with mitral valve ring in place, mild tricuspid regurgitation with RVSP of 44 mmHg.        Patient presents to office today for follow-up appointment.  Patient reports he is doing well overall since last visit.  He completed cardiac rehab.  He did see nephrology who asked him to take the 40 mg of Lasix whenever he has weight gain which is usually about 3 to 4 days a week.  He felt like his fatigue improved after hospital discharge but has noticed this tiny little worse again recently.  He has been walking less recently, usually 3 to 4 days a week for 30 to 60 minutes at a time.  He does see sleep medicine in October for follow-up, continues to use CPAP nightly.  He might get a rare SPLIT-second twinge of discomfort to his chest that occurs randomly at rest, nothing anginal or exertional in nature, sounds more musculoskeletal.  Denies anY shortness of breath, shortness of breath with exertion, palpitations, racing heartbeat sensation, dizziness, syncope, near or near syncope.  Lower extremity edema has improved with Lasix        Past Medical History:   Diagnosis Date   • Acute blood loss anemia    • Atrial fibrillation (CMS/HCC)    •  Bilateral lower extremity edema    • Bradycardia following surgery    • CAD (coronary artery disease)    • Chest discomfort    • Elevated troponin    • Focal motor seizure (CMS/HCC)    • Generalized tonic-clonic seizure (CMS/HCC)    • GERD (gastroesophageal reflux disease)    • Iritis of right eye    • Ischemic cardiomyopathy    • Mitral valve insufficiency    • Post-ictal state (CMS/HCC)    • Pulmonary hypertension (CMS/HCC)    • PVC (premature ventricular contraction)    • Rheumatic fever    • S/P CABG x 7    • S/P mitral valve repair    • Severe obstructive sleep apnea    • Shoulder fracture, right      Past Surgical History:   Procedure Laterality Date   • CARDIAC CATHETERIZATION N/A 2/19/2021    Procedure: Coronary angiography;  Surgeon: Bry Nails MD;  Location: [facility] CATH INVASIVE LOCATION;  Service: Cardiovascular;  Laterality: N/A;   • CARDIAC CATHETERIZATION N/A 2/19/2021    Procedure: Left heart cath;  Surgeon: Bry Nails MD;  Location: [facility] CATH INVASIVE LOCATION;  Service: Cardiovascular;  Laterality: N/A;   • CARDIAC CATHETERIZATION N/A 2/19/2021    Procedure: Right Heart Cath;  Surgeon: Bry Nails MD;  Location: [facility] CATH INVASIVE LOCATION;  Service: Cardiovascular;  Laterality: N/A;   • CARDIAC CATHETERIZATION N/A 2/19/2021    Procedure: Left ventriculography;  Surgeon: Bry Nails MD;  Location: [facility] CATH INVASIVE LOCATION;  Service: Cardiovascular;  Laterality: N/A;   • CORONARY ARTERY BYPASS GRAFT N/A 2/22/2021    Procedure: BK, MIDLINE STERNOTOMY, CORONARY ARTERY BYPASS X 7 UTILIZING THE LEFT INTERNAL MAMMARY ARTERY AND THE RIGHT SAPHENOUS VEIN, MITRAL VALVE REPAIR, PRP;  Surgeon: Jr Shyam Echavarria MD;  Location: [facility] OR;  Service: Cardiothoracic;  Laterality: N/A;   • ENDOSCOPY N/A 8/16/2018    Erosive gastritis, diverticulosis   • ENDOSCOPY N/A 2/26/2021    Procedure: ESOPHAGOGASTRODUODENOSCOPY AT BEDSIDE WITH HOT SNARE  POLYPECTOMY AND CLIP PLACEMENT X1;  Surgeon: Jono Laboy MD;  Location: [facility] ENDOSCOPY;  Service: Gastroenterology;  Laterality: N/A;  PRE-  GI BLEED  POST- GASTRITIS, ESOPHAGITIS, POLYP   • HERNIA REPAIR     • SHOULDER CLOSED REDUCTION Right 3/16/2018    Procedure: RIGHT SHOULDER CLOSED REDUCTION;  Surgeon: Kj Garcia MD;  Location: [facility] OR;  Service: Orthopedics   • TONSILLECTOMY     • TOTAL SHOULDER ARTHROPLASTY W/ DISTAL CLAVICLE EXCISION Right 3/22/2018    Procedure: Reverse Total Shoulder Arthroplsty;  Surgeon: Kj Garcia MD;  Location: [facility] OR;  Service: Orthopedics   • TRANSESOPHAGEAL ECHOCARDIOGRAM (BK) N/A 2/22/2021    Procedure: TRANSESOPHAGEAL ECHOCARDIOGRAM;  Surgeon: Jr Shyam Echavarria MD;  Location: [facility] OR;  Service: Cardiothoracic;  Laterality: N/A;     Outpatient Medications Prior to Visit   Medication Sig Dispense Refill   • acetaminophen (TYLENOL) 500 MG tablet Take 2 tablets by mouth 3 (Three) Times a Day. (Patient taking differently: Take 1,000 mg by mouth 3 (Three) Times a Day As Needed.)     • aspirin 81 MG EC tablet Take 1 tablet by mouth Daily. 90 tablet 1   • furosemide (Lasix) 40 MG tablet Take 1 tablet by mouth Daily As Needed (for weight gain or shortness of breath). 30 tablet 1   • metoprolol succinate XL (TOPROL-XL) 50 MG 24 hr tablet Take 0.5 tablets by mouth Daily. 30 tablet 6   • pantoprazole (PROTONIX) 40 MG EC tablet Take 1 tablet by mouth 2 (Two) Times a Day Before Meals. 60 tablet 1   • potassium chloride 10 MEQ CR tablet Take 1 tablet by mouth Daily As Needed (Take with Lasix (furosemide)). With Lasix (furosemide) 30 tablet 1   • Psyllium (METAMUCIL FIBER PO) Take  by mouth As Needed.     • sucralfate (CARAFATE) 1 g tablet TAKE 1 TABLET BY MOUTH 4 TIMES DAILY BEFORE MEALS AND AT BEDTIME     • VITAMIN D PO Take 2,000 Units by mouth Daily.     • warfarin (COUMADIN) 2 MG tablet Take one tablet by mouth daily or as directed by the  "[facility] (Patient taking differently: Take one tablet by mouth daily except thurs  whichis  1/2 tab or as directed by the [facility]) 90 tablet 1     No facility-administered medications prior to visit.       Allergies as of 09/27/2021   • (No Known Allergies)     Social History     Socioeconomic History   • Marital status:      Spouse name: Not on file   • Number of children: Not on file   • Years of education: Not on file   • Highest education level: Not on file   Tobacco Use   • Smoking status: Never Smoker   • Smokeless tobacco: Never Used   Vaping Use   • Vaping Use: Never used   Substance and Sexual Activity   • Alcohol use: Yes     Comment: occasional   • Drug use: No   • Sexual activity: Defer     Family History   Problem Relation Age of Onset   • Malig Hyperthermia Neg Hx        Review of Systems   Constitutional: Positive for malaise/fatigue.   Cardiovascular:        SEE HPI   Respiratory: Negative for shortness of breath.    Hematologic/Lymphatic: Negative for bleeding problem.   Musculoskeletal: Negative for falls.   Gastrointestinal: Negative for melena.   Genitourinary: Negative for hematuria.   Neurological: Negative for dizziness.   Psychiatric/Behavioral: Negative for altered mental status.          Objective:     Vitals:    09/27/21 0908   BP: 118/52   Pulse: 68   Weight: 81.3 kg (179 lb 3.2 oz)   Height: 170.2 cm (67\")     Body mass index is 28.07 kg/m².      PHYSICAL EXAM:  Constitutional:       General: Not in acute distress.     Appearance: Well-developed. Not diaphoretic.   Eyes:      Pupils: Pupils are equal, round, and reactive to light.   HENT:      Head: Normocephalic and atraumatic.   Pulmonary:      Effort: Pulmonary effort is normal. No respiratory distress.      Breath sounds: Normal breath sounds. No wheezing. No rales.   Cardiovascular:      Normal rate. Regular rhythm.      Murmurs: There is no murmur.      No gallop. No click. No rub. "   Edema:     Peripheral edema absent.   Abdominal:      General: Bowel sounds are normal. There is no distension.      Palpations: Abdomen is soft.   Musculoskeletal: Normal range of motion.         General: No tenderness or deformity.      Cervical back: Neck supple. Skin:     General: Skin is warm and dry.      Findings: No erythema or rash.   Neurological:      Mental Status: Alert and oriented to person, place, and time.   Psychiatric:         Behavior: Behavior normal.         Judgment: Judgment normal.             ECG 12 Lead    Date/Time: 9/27/2021 9:23 AM  Performed by: Radha Rodríguez DNP, APRN  Authorized by: Radha Rodríguez DNP, APRN   Comparison: compared with previous ECG from 6/25/2021  Rhythm: sinus rhythm  BPM: 68  Conduction: 1st degree AV block  Comments: No significant changes from previous EKG              Assessment:       Diagnosis Plan   1. Coronary artery disease involving native coronary artery of native heart without angina pectoris     2. S/P CABG (coronary artery bypass graft)     3. H/O mitral valve repair     4. PAF (paroxysmal atrial fibrillation) (CMS/[facility])           Plan:     1. Pulmonary hypertension: RVSP remain mildly elevated 9/2021 echo at 44 mmHg.  Follows with nephrology I am taking 40 mg of Lasix about 3 to 4 days a week when he is weight gain but still has some swelling though it has improved with increased Lasix.  Would like to check BMP and proBNP today.  If proBNP elevated then would like to consider seeing if he can tolerate possible daily diuretic dose with careful monitoring of kidney function.  We will continue to monitor salt intake closely as well, he is very diligent about this.  2. Fatigue: Likely multifactorial.  See sleep medicine next month and do recommend keeping this appointment.  Also recommended increasing walking routine again as he has not been doing this as much recently.  We will continue to monitor heart rate and blood pressure at home and call  for any low readings.  He will call if symptoms worsen or fail to improve.  Would consider possible monitor study if symptoms worsen or fail to improve.  3. Recurrent postoperative atrial fibrillation: With history of bradycardia postoperatively.  Metoprolol increased last visit due to hypertension.  Remains on warfarin.  Denies falls or abnormal bleeding.  4. NSTEMI with subsequent multivessel CABG with cardiomyopathy: EF returned to normal at 52% on follow-up 9/2021 echo.  Denies anginal symptoms.  Takes Lasix potassium once a week.    5. Status post mitral valve repair: No significant mitral stenosis and trace mitral regurgitation seen on follow-up 9/2021 echo.  6. Frequent PVCs: These have been going on for years, even before surgery.  He feels that these are asymptomatic.  7. Postoperative hematemesis with EGD showing grade D esophagitis: No recurrent issues  8. Chronic renal insufficiency: Follows with Dr. Rodríguez with nephrology  9. Superficial vein thrombosis  10. Severe obstructive sleep apnea on CPAP    Patient to follow-up with Dr. Singer in 4 months or sooner if needed for any new, recurrent, or worsening symptoms or other issues or concerns.  Discussed in detail signs/symptoms warrant sooner call or follow-up to the office.        Records reviewed including but not limited to 9/2021 echo, 4/2021 echo, 6/2021 EKG.             Your medication list          Accurate as of September 27, 2021  9:24 AM. If you have any questions, ask your nurse or doctor.            CHANGE how you take these medications      Instructions Last Dose Given Next Dose Due   acetaminophen 500 MG tablet  Commonly known as: TYLENOL  What changed:   · when to take this  · reasons to take this      Take 2 tablets by mouth 3 (Three) Times a Day.       warfarin 2 MG tablet  Commonly known as: COUMADIN  What changed: additional instructions      Take one tablet by mouth daily or as directed by the [facility]           CONTINUE taking these medications      Instructions Last Dose Given Next Dose Due   aspirin 81 MG EC tablet      Take 1 tablet by mouth Daily.       furosemide 40 MG tablet  Commonly known as: Lasix      Take 1 tablet by mouth Daily As Needed (for weight gain or shortness of breath).       METAMUCIL FIBER PO      Take  by mouth As Needed.       metoprolol succinate XL 50 MG 24 hr tablet  Commonly known as: TOPROL-XL      Take 0.5 tablets by mouth Daily.       pantoprazole 40 MG EC tablet  Commonly known as: PROTONIX      Take 1 tablet by mouth 2 (Two) Times a Day Before Meals.       potassium chloride 10 MEQ CR tablet      Take 1 tablet by mouth Daily As Needed (Take with Lasix (furosemide)). With Lasix (furosemide)       sucralfate 1 g tablet  Commonly known as: CARAFATE      TAKE 1 TABLET BY MOUTH 4 TIMES DAILY BEFORE MEALS AND AT BEDTIME       VITAMIN D PO      Take 2,000 Units by mouth Daily.              I did NOT stop or change the above medications.  Patient's medication list was updated to reflect medications they are currently taking including medication changes made by other providers.            Thanks,    Radha Rodríguez, ARCELIA, APRN  09/27/2021         Dictated utilizing Dragon dictation

## 2021-09-28 ENCOUNTER — LAB (OUTPATIENT)
Dept: LAB | Facility: HOSPITAL | Age: 76
End: 2021-09-28

## 2021-09-28 LAB
ANION GAP SERPL CALCULATED.3IONS-SCNC: 8.6 MMOL/L (ref 5–15)
BUN SERPL-MCNC: 26 MG/DL (ref 8–23)
BUN/CREAT SERPL: 14.9 (ref 7–25)
CALCIUM SPEC-SCNC: 9.2 MG/DL (ref 8.6–10.5)
CHLORIDE SERPL-SCNC: 101 MMOL/L (ref 98–107)
CO2 SERPL-SCNC: 27.4 MMOL/L (ref 22–29)
CREAT SERPL-MCNC: 1.74 MG/DL (ref 0.76–1.27)
GFR SERPL CREATININE-BSD FRML MDRD: 38 ML/MIN/1.73
GLUCOSE SERPL-MCNC: 101 MG/DL (ref 65–99)
NT-PROBNP SERPL-MCNC: 1966 PG/ML (ref 0–1800)
POTASSIUM SERPL-SCNC: 4.3 MMOL/L (ref 3.5–5.2)
SODIUM SERPL-SCNC: 137 MMOL/L (ref 136–145)

## 2021-09-28 PROCEDURE — 80048 BASIC METABOLIC PNL TOTAL CA: CPT | Performed by: NURSE PRACTITIONER

## 2021-09-28 PROCEDURE — 36415 COLL VENOUS BLD VENIPUNCTURE: CPT | Performed by: NURSE PRACTITIONER

## 2021-09-28 PROCEDURE — 83880 ASSAY OF NATRIURETIC PEPTIDE: CPT | Performed by: NURSE PRACTITIONER

## 2021-09-29 ENCOUNTER — ANTICOAGULATION VISIT (OUTPATIENT)
Dept: PHARMACY | Facility: HOSPITAL | Age: 76
End: 2021-09-29

## 2021-09-29 DIAGNOSIS — I48.0 PAF (PAROXYSMAL ATRIAL FIBRILLATION) (HCC): Primary | ICD-10-CM

## 2021-09-29 LAB
INR PPP: 3 (ref 0.91–1.09)
PROTHROMBIN TIME: 36.2 SECONDS (ref 10–13.8)

## 2021-09-29 PROCEDURE — 85610 PROTHROMBIN TIME: CPT

## 2021-09-29 PROCEDURE — 36416 COLLJ CAPILLARY BLOOD SPEC: CPT

## 2021-09-29 NOTE — PROGRESS NOTES
Anticoagulation Clinic Progress Note    Anticoagulation Summary  As of 9/29/2021    INR goal:  2.0-3.0   TTR:  57.5 % (5.5 mo)   INR used for dosing:  3.0 (9/29/2021)   Warfarin maintenance plan:  2 mg every day   Weekly warfarin total:  14 mg   No change documented:  Brenda Whitney   Plan last modified:  Eduar Weeks, PharmD (8/4/2021)   Next INR check:  10/27/2021   Priority:  High   Target end date:      Indications    PAF (paroxysmal atrial fibrillation) (CMS/HCC) [I48.0]             Anticoagulation Episode Summary     INR check location:      Preferred lab:      Send INR reminders to:  [facility]    Comments:        Anticoagulation Care Providers     Provider Role Specialty Phone number    Jimena Singer MD Referring Cardiology [phone]          Clinic Interview:  Patient Findings     Negatives:  Signs/symptoms of thrombosis, Signs/symptoms of bleeding,   Laboratory test error suspected, Change in health, Change in alcohol use,   Change in activity, Upcoming invasive procedure, Emergency department   visit, Upcoming dental procedure, Missed doses, Extra doses, Change in   medications, Change in diet/appetite, Hospital admission, Bruising, Other   complaints      Clinical Outcomes     Negatives:  Major bleeding event, Thromboembolic event,   Anticoagulation-related hospital admission, Anticoagulation-related ED   visit, Anticoagulation-related fatality        INR History:  Anticoagulation Monitoring 8/18/2021 9/1/2021 9/29/2021   INR 2.2 2.0 3.0   INR Date 8/18/2021 9/1/2021 9/29/2021   INR Goal 2.0-3.0 2.0-3.0 2.0-3.0   Trend Same Same Same   Last Week Total 14 mg 14 mg 14 mg   Next Week Total 14 mg 14 mg 14 mg   Sun 2 mg 2 mg 2 mg   Mon 2 mg 2 mg 2 mg   Tue 2 mg 2 mg 2 mg   Wed 2 mg 2 mg 2 mg   Thu 2 mg 2 mg 2 mg   Fri 2 mg 2 mg 2 mg   Sat 2 mg 2 mg 2 mg   Visit Report - - -   Some recent data might be hidden       Plan:  1. INR is therapeutic today- see above in Anticoagulation Summary.    Will instruct Rodolfo Grover to continue their warfarin regimen- see above in Anticoagulation Summary.  2. Follow up in 4 weeks.  3. Patient declines warfarin refills.  4. Verbal and written information provided. Patient expresses understanding and has no further questions at this time.    Brenda Whitney

## 2021-09-30 ENCOUNTER — TELEPHONE (OUTPATIENT)
Dept: CARDIOLOGY | Facility: CLINIC | Age: 76
End: 2021-09-30

## 2021-10-01 ENCOUNTER — TELEPHONE (OUTPATIENT)
Dept: FAMILY MEDICINE CLINIC | Facility: CLINIC | Age: 76
End: 2021-10-01

## 2021-10-01 NOTE — TELEPHONE ENCOUNTER
Caller: Rodolfo Grover    Relationship: Self      Medication requested (name and dosage): pantoprazole (PROTONIX) 40 MG EC tablet    Pharmacy where request should be sent: [facility] [address] [place] [id number] [facility] - [phone]  - [phone]   [phone]    Additional details provided by patient: PATIENT WAS TOLD BY THE PHARMACY THAT THEY NEED AUTHORIZATION FROM THE DOCTOR BEFORE THEY CAN REFILL. PLEASE CALL PATIENT AND ADVISE.  Best call back number: [phone]    Does the patient have less than a 3 day supply:  [x] Yes  [] No    Mely Morrison Rep   10/01/21 12:39 EDT

## 2021-10-01 NOTE — TELEPHONE ENCOUNTER
Rx Refill Note  Requested Prescriptions      No prescriptions requested or ordered in this encounter      Last office visit with prescribing clinician: 6/10/2021      Next office visit with prescribing clinician: 11/11/2021            Lyn Llanos LPN  10/01/21, 14:21 EDT

## 2021-10-04 RX ORDER — PANTOPRAZOLE SODIUM 40 MG/1
40 TABLET, DELAYED RELEASE ORAL
Qty: 180 TABLET | Refills: 1 | Status: SHIPPED | OUTPATIENT
Start: 2021-10-04

## 2021-10-06 ENCOUNTER — OFFICE VISIT (OUTPATIENT)
Dept: SLEEP MEDICINE | Facility: HOSPITAL | Age: 76
End: 2021-10-06

## 2021-10-06 ENCOUNTER — TELEPHONE (OUTPATIENT)
Dept: SLEEP MEDICINE | Facility: HOSPITAL | Age: 76
End: 2021-10-06

## 2021-10-06 VITALS
DIASTOLIC BLOOD PRESSURE: 69 MMHG | HEIGHT: 67 IN | BODY MASS INDEX: 27.62 KG/M2 | SYSTOLIC BLOOD PRESSURE: 139 MMHG | HEART RATE: 60 BPM | OXYGEN SATURATION: 100 % | WEIGHT: 176 LBS

## 2021-10-06 DIAGNOSIS — G47.33 OBSTRUCTIVE SLEEP APNEA: Primary | ICD-10-CM

## 2021-10-06 DIAGNOSIS — E66.3 OVERWEIGHT (BMI 25.0-29.9): ICD-10-CM

## 2021-10-06 PROCEDURE — G0463 HOSPITAL OUTPT CLINIC VISIT: HCPCS

## 2021-10-06 NOTE — PROGRESS NOTES
[facility]  Telephone: [phone] / Fax: [phone] [place]  Telephone: [phone] / Fax: [phone] Lauren Ruiz    PCP: Domenica Merida MD    Reason for visit: COLEMAN f/u    Rodolfo Grover is a 76 y.o.male  was last seen at [facility] sleep lab in 2019. He saw Dr Izquierdo during admit for CABG. He was given new mask with memory foam. It seemed to help. He reports sinus drainage. Nose is stuffed up. He can feel pressure in the sinuses.  His current machine is <5 years.      Pressures appear comfortable. Current settings are 10-15cm. Sleep quality has improved on CPAP and excessive sleepiness/snoring is no longer an issue.     SH- no tobacco, no alcohol, 1 cup of coffee per day, no energy drinks.    ROS- +post nasal drip, rest is negative    D[facility]    Current Medications:    Current Outpatient Medications:   •  acetaminophen (TYLENOL) 500 MG tablet, Take 2 tablets by mouth 3 (Three) Times a Day. (Patient taking differently: Take 1,000 mg by mouth 3 (Three) Times a Day As Needed.), Disp: , Rfl:   •  aspirin 81 MG EC tablet, Take 1 tablet by mouth Daily., Disp: 90 tablet, Rfl: 1  •  furosemide (Lasix) 40 MG tablet, Take 1 tablet by mouth Daily As Needed (for weight gain or shortness of breath)., Disp: 30 tablet, Rfl: 1  •  metoprolol succinate XL (TOPROL-XL) 50 MG 24 hr tablet, Take 0.5 tablets by mouth Daily., Disp: 30 tablet, Rfl: 6  •  pantoprazole (PROTONIX) 40 MG EC tablet, Take 1 tablet by mouth 2 (Two) Times a Day Before Meals., Disp: 180 tablet, Rfl: 1  •  potassium chloride 10 MEQ CR tablet, Take 1 tablet by mouth Daily As Needed (Take with Lasix (furosemide)). With Lasix (furosemide), Disp: 30 tablet, Rfl: 1  •  Psyllium (METAMUCIL FIBER PO), Take  by mouth As Needed., Disp: , Rfl:   •  sucralfate (CARAFATE) 1 g tablet, TAKE 1 TABLET BY MOUTH 4 TIMES DAILY BEFORE MEALS AND AT BEDTIME, Disp: , Rfl:   •  VITAMIN D PO, Take 2,000 Units by mouth Daily., Disp: , Rfl:   •  warfarin  "(COUMADIN) 2 MG tablet, Take one tablet by mouth daily or as directed by the [facility] (Patient taking differently: Take one tablet by mouth daily except thurs  whichis  1/2 tab or as directed by the [facility]), Disp: 90 tablet, Rfl: 1   also entered in Sleep Questionnaire    Patient  has a past medical history of Acute blood loss anemia, Atrial fibrillation (CMS/HCC), Bilateral lower extremity edema, Bradycardia following surgery, CAD (coronary artery disease), Chest discomfort, Elevated troponin, Focal motor seizure (CMS/HCC), Generalized tonic-clonic seizure (CMS/HCC), GERD (gastroesophageal reflux disease), Iritis of right eye, Ischemic cardiomyopathy, Mitral valve insufficiency, Post-ictal state (CMS/HCC), Pulmonary hypertension (CMS/HCC), PVC (premature ventricular contraction), Rheumatic fever, S/P CABG x 7, S/P mitral valve repair, Severe obstructive sleep apnea, and Shoulder fracture, right.    I have reviewed the Past Medical History, Past Surgical History, Social History and Family History.    Vital Signs /69   Pulse 60   Ht 170.2 cm (67\")   Wt 79.8 kg (176 lb)   SpO2 100%   BMI 27.57 kg/m²  Body mass index is 27.57 kg/m².    General Alert and oriented. No acute distress noted   Pharynx/Throat Class IV  Mallampati airway, large tongue, no evidence of redundant lateral pharyngeal tissue. No oral lesions. No thrush. Moist mucous membranes.   Head Normocephalic. Symmetrical. Atraumatic.    Nose No septal deviation. No drainage   Chest Wall Normal shape. Symmetric expansion with respiration. No tenderness.   Neck Trachea midline, no thyromegaly or adenopathy    Lungs Clear to auscultation bilaterally. No wheezes. No rhonchi. No rales. Respirations regular, even and unlabored.   Heart Regular rhythm and normal rate. Normal S1 and S2. No murmur   Abdomen Soft, non-tender and non-distended. Normal bowel sounds. No masses.   Extremities Moves all extremities well. " No edema   Psychiatric Normal mood and affect.     Testing:  · Download 7/7/21-10/4/21 100% use with avg nightly use of 6 hr and 27 min on auto CPAP 10-15cm, AHI 8.9. avg pr 14.9.    Study:  Shaan HST- AHI 40, lowest desaturation 70s; 68min of sats <90%        Impression:  1. Obstructive sleep apnea    2. Overweight (BMI 25.0-29.9)          Plan:  Increase CPAP to 12-18cm in view of mildly elevated AHI  Start Flonase, 1 spray BID to treat nasal congestion  Continue changing mask regularly.     I will send supplies renewal order to DME.  Weight loss will be beneficial.        Thank you for allowing me to participate in your patient's care.      NKECHI Swanson  [facility]  Phone: [phone]      Part of this note may be an electronic transcription/translation of spoken language to printed text using the Dragon Dictation System.

## 2021-10-06 NOTE — TELEPHONE ENCOUNTER
Received pressure change for this patient. Pressure changed to 12 - 18 from 10 - 15. Change completed in [facility], order sent to scan.

## 2021-10-12 DIAGNOSIS — I49.3 FREQUENT PVCS: ICD-10-CM

## 2021-10-12 NOTE — TELEPHONE ENCOUNTER
Rx Refill Note  Requested Prescriptions     Pending Prescriptions Disp Refills   • metoprolol succinate XL (TOPROL-XL) 50 MG 24 hr tablet 30 tablet 6     Sig: Take 0.5 tablets by mouth Daily.      Last office visit with prescribing clinician: 6/10/2021      Next office visit with prescribing clinician: 11/11/2021            Lyn Llanos LPN  10/12/21, 13:54 EDT

## 2021-10-12 NOTE — TELEPHONE ENCOUNTER
Caller: Rodolfo Grover    Relationship: Self    Medication requested (name and dosage): metoprolol succinate XL (TOPROL-XL) 50 MG 24 hr tablet    Pharmacy where request should be sent: [address] [address] [facility] [phone] [facility] [phone]      Additional details provided by patient: PATIENT HAS A 3 DAY SUPPLY     Best call back number: [phone]    Does the patient have less than a 3 day supply:  [] Yes  [x] No    Mely Collins Rep   10/12/21 11:40 EDT

## 2021-10-13 RX ORDER — METOPROLOL SUCCINATE 50 MG/1
25 TABLET, EXTENDED RELEASE ORAL DAILY
Qty: 45 TABLET | Refills: 1 | Status: SHIPPED | OUTPATIENT
Start: 2021-10-13 | End: 2021-11-24 | Stop reason: SDUPTHER

## 2021-10-21 ENCOUNTER — OFFICE VISIT (OUTPATIENT)
Dept: GASTROENTEROLOGY | Facility: CLINIC | Age: 76
End: 2021-10-21

## 2021-10-21 VITALS
TEMPERATURE: 97.7 F | SYSTOLIC BLOOD PRESSURE: 144 MMHG | WEIGHT: 179.2 LBS | HEART RATE: 70 BPM | DIASTOLIC BLOOD PRESSURE: 64 MMHG | BODY MASS INDEX: 28.12 KG/M2 | HEIGHT: 67 IN

## 2021-10-21 DIAGNOSIS — Z95.1 S/P CABG X 7: Primary | ICD-10-CM

## 2021-10-21 DIAGNOSIS — K21.00 GASTROESOPHAGEAL REFLUX DISEASE WITH ESOPHAGITIS, UNSPECIFIED WHETHER HEMORRHAGE: ICD-10-CM

## 2021-10-21 DIAGNOSIS — K21.01 GASTROESOPHAGEAL REFLUX DISEASE WITH ESOPHAGITIS AND HEMORRHAGE: ICD-10-CM

## 2021-10-21 DIAGNOSIS — K63.5 POLYP OF COLON, UNSPECIFIED PART OF COLON, UNSPECIFIED TYPE: ICD-10-CM

## 2021-10-21 DIAGNOSIS — D50.0 IRON DEFICIENCY ANEMIA DUE TO CHRONIC BLOOD LOSS: ICD-10-CM

## 2021-10-21 PROCEDURE — 99214 OFFICE O/P EST MOD 30 MIN: CPT | Performed by: INTERNAL MEDICINE

## 2021-10-21 NOTE — PROGRESS NOTES
Chief Complaint   Patient presents with   • Heartburn       History of Present Illness:   76 y.o. male        He was last seen in 4 of 2021.  My assessment and plan was as follows:  Assessment:  1.  History of grade D esophagitis noted on 2 of 2021 EGD.  2.  Patient has a history of colon polyps. Last c/s several yrs ago.   3. H/o iron def Anemia. He is heme negative today and his ferritin level is now normal.   4. CRI     Recommendations:  1. CBC - I will defer this to Dr. Merida who he is seeing this week.   2. We discussed the pros and cons of doing a colonoscopy in a 75 yo. I wouldn't want to do it for 6 mos since he recently had CABG.  3. F/U 6 mos.   4. Finish the carafate and stop it. Continue the Pantoprazole. We disccussed the pros and cons of being on a PPI especially given his CRI. He will f/u with his Nephrologist.        He has fluid in his legs and fluid around heart. The Nephrologist isn't concerned about long term PPI use given his Grade D esophagitis found during 2/21 EGD. No rectal bleeding or melena. Rare diarrhea, no constipation. Weight stable. On Protonix 40 mg/day and carafate pills prn. NO dysphagia. No chest or abdominal pain.     Past Medical History:   Diagnosis Date   • Acute blood loss anemia    • Atrial fibrillation (HCC)    • Bilateral lower extremity edema    • Bradycardia following surgery    • CAD (coronary artery disease)    • Chest discomfort    • Elevated troponin    • Focal motor seizure ([facility])    • Generalized tonic-clonic seizure ([facility])    • GERD (gastroesophageal reflux disease)    • Iritis of right eye    • Ischemic cardiomyopathy    • Mitral valve insufficiency    • Post-ictal state (HCC)    • Pulmonary hypertension (HCC)    • PVC (premature ventricular contraction)    • Rheumatic fever    • S/P CABG x 7    • S/P mitral valve repair    • Severe obstructive sleep apnea    • Shoulder fracture, right        Past Surgical History:   Procedure Laterality Date   • CARDIAC  CATHETERIZATION N/A 2/19/2021    Procedure: Coronary angiography;  Surgeon: Bry Nails MD;  Location: [facility] CATH INVASIVE LOCATION;  Service: Cardiovascular;  Laterality: N/A;   • CARDIAC CATHETERIZATION N/A 2/19/2021    Procedure: Left heart cath;  Surgeon: Bry Nails MD;  Location: [facility] CATH INVASIVE LOCATION;  Service: Cardiovascular;  Laterality: N/A;   • CARDIAC CATHETERIZATION N/A 2/19/2021    Procedure: Right Heart Cath;  Surgeon: Bry Nails MD;  Location: [facility] CATH INVASIVE LOCATION;  Service: Cardiovascular;  Laterality: N/A;   • CARDIAC CATHETERIZATION N/A 2/19/2021    Procedure: Left ventriculography;  Surgeon: Bry Nails MD;  Location: [facility] CATH INVASIVE LOCATION;  Service: Cardiovascular;  Laterality: N/A;   • CORONARY ARTERY BYPASS GRAFT N/A 2/22/2021    Procedure: BK, MIDLINE STERNOTOMY, CORONARY ARTERY BYPASS X 7 UTILIZING THE LEFT INTERNAL MAMMARY ARTERY AND THE RIGHT SAPHENOUS VEIN, MITRAL VALVE REPAIR, PRP;  Surgeon: Jr Shyam Echavarria MD;  Location: [facility] OR;  Service: Cardiothoracic;  Laterality: N/A;   • ENDOSCOPY N/A 8/16/2018    Erosive gastritis, diverticulosis   • ENDOSCOPY N/A 2/26/2021    Procedure: ESOPHAGOGASTRODUODENOSCOPY AT BEDSIDE WITH HOT SNARE POLYPECTOMY AND CLIP PLACEMENT X1;  Surgeon: Jono Laboy MD;  Location: [facility] ENDOSCOPY;  Service: Gastroenterology;  Laterality: N/A;  PRE-  GI BLEED  POST- GASTRITIS, ESOPHAGITIS, POLYP   • HERNIA REPAIR     • SHOULDER CLOSED REDUCTION Right 3/16/2018    Procedure: RIGHT SHOULDER CLOSED REDUCTION;  Surgeon: Kj Garcia MD;  Location: [facility] OR;  Service: Orthopedics   • TONSILLECTOMY     • TOTAL SHOULDER ARTHROPLASTY W/ DISTAL CLAVICLE EXCISION Right 3/22/2018    Procedure: Reverse Total Shoulder Arthroplsty;  Surgeon: Kj Garcia MD;  Location: [facility] MAIN OR;  Service: Orthopedics   • TRANSESOPHAGEAL ECHOCARDIOGRAM (BK) N/A 2/22/2021     Procedure: TRANSESOPHAGEAL ECHOCARDIOGRAM;  Surgeon: Jr Shyam Echavarria MD;  Location: [facility];  Service: Cardiothoracic;  Laterality: N/A;         Current Outpatient Medications:   •  acetaminophen (TYLENOL) 500 MG tablet, Take 2 tablets by mouth 3 (Three) Times a Day. (Patient taking differently: Take 1,000 mg by mouth 3 (Three) Times a Day As Needed.), Disp: , Rfl:   •  aspirin 81 MG EC tablet, Take 1 tablet by mouth Daily., Disp: 90 tablet, Rfl: 1  •  furosemide (Lasix) 40 MG tablet, Take 1 tablet by mouth Daily As Needed (for weight gain or shortness of breath). (Patient taking differently: Take 40 mg by mouth As Needed (for weight gain or shortness of breath).), Disp: 30 tablet, Rfl: 1  •  metoprolol succinate XL (TOPROL-XL) 50 MG 24 hr tablet, Take 0.5 tablets by mouth Daily. (Patient taking differently: Take 50 mg by mouth Daily.), Disp: 45 tablet, Rfl: 1  •  pantoprazole (PROTONIX) 40 MG EC tablet, Take 1 tablet by mouth 2 (Two) Times a Day Before Meals., Disp: 180 tablet, Rfl: 1  •  potassium chloride 10 MEQ CR tablet, Take 1 tablet by mouth Daily As Needed (Take with Lasix (furosemide)). With Lasix (furosemide), Disp: 30 tablet, Rfl: 1  •  Psyllium (METAMUCIL FIBER PO), Take  by mouth As Needed., Disp: , Rfl:   •  sucralfate (CARAFATE) 1 g tablet, As Needed., Disp: , Rfl:   •  VITAMIN D PO, Take 2,000 Units by mouth Daily., Disp: , Rfl:   •  warfarin (COUMADIN) 2 MG tablet, Take one tablet by mouth daily or as directed by the [facility] (Patient taking differently: Take 2 mg by mouth Daily.), Disp: 90 tablet, Rfl: 1    No Known Allergies    Family History   Problem Relation Age of Onset   • Malig Hyperthermia Neg Hx        Social History     Socioeconomic History   • Marital status:    Tobacco Use   • Smoking status: Never Smoker   • Smokeless tobacco: Never Used   Vaping Use   • Vaping Use: Never used   Substance and Sexual Activity   • Alcohol use: Yes     Comment:  occasional   • Drug use: No   • Sexual activity: Defer       Review of Systems   Gastrointestinal: Negative for abdominal pain, constipation and diarrhea.   All other systems reviewed and are negative.    Pertinent positives and negatives documented in the HPI and all other systems reviewed and were found to be negative.  Vitals:    10/21/21 0914   BP: 144/64   Pulse: 70   Temp: 97.7 °F (36.5 °C)       Physical Exam  Vitals reviewed.   Constitutional:       General: He is not in acute distress.     Appearance: Normal appearance. He is well-developed. He is not diaphoretic.   HENT:      Head: Normocephalic and atraumatic. Hair is normal.      Right Ear: Hearing, tympanic membrane, ear canal and external ear normal.      Left Ear: Hearing, tympanic membrane, ear canal and external ear normal.      Nose: Nose normal. No nasal deformity.      Mouth/Throat:      Mouth: Mucous membranes are moist. No oral lesions.      Pharynx: Uvula midline. No uvula swelling.   Eyes:      General: Lids are normal. No scleral icterus.        Right eye: No discharge.         Left eye: No discharge.      Extraocular Movements: Extraocular movements intact.      Right eye: Normal extraocular motion and no nystagmus.      Left eye: Normal extraocular motion and no nystagmus.      Conjunctiva/sclera: Conjunctivae normal.      Pupils: Pupils are equal, round, and reactive to light.   Neck:      Thyroid: No thyromegaly.      Vascular: No JVD.   Cardiovascular:      Rate and Rhythm: Normal rate and regular rhythm.      Pulses: Normal pulses.      Heart sounds: Normal heart sounds. No murmur heard.  No gallop.    Pulmonary:      Effort: Pulmonary effort is normal. No respiratory distress.      Breath sounds: Normal breath sounds. No wheezing or rales.   Chest:      Chest wall: No tenderness.   Abdominal:      General: Bowel sounds are normal. There is no distension.      Palpations: Abdomen is soft. There is no mass.      Tenderness: There is no  abdominal tenderness. There is no guarding.      Hernia: No hernia is present.   Genitourinary:     Rectum: Normal. Guaiac result negative.   Musculoskeletal:         General: No tenderness or deformity. Normal range of motion.      Cervical back: Normal range of motion and neck supple.   Lymphadenopathy:      Cervical: No cervical adenopathy.   Skin:     General: Skin is warm and dry.      Findings: No rash.   Neurological:      Mental Status: He is alert and oriented to person, place, and time.      Cranial Nerves: No cranial nerve deficit.      Motor: No abnormal muscle tone.      Coordination: Coordination normal.      Deep Tendon Reflexes: Reflexes are normal and symmetric. Reflexes normal.   Psychiatric:         Mood and Affect: Mood normal.         Behavior: Behavior normal.         Thought Content: Thought content normal.         Judgment: Judgment normal.         Diagnoses and all orders for this visit:    1. S/P CABG x 7 (Primary)  -     CBC & Differential  -     Comprehensive Metabolic Panel  -     Ferritin  -     Iron Profile  -     Protime-INR    2. Gastroesophageal reflux disease with esophagitis and hemorrhage  -     CBC & Differential  -     Comprehensive Metabolic Panel  -     Ferritin  -     Iron Profile  -     Protime-INR    3. Iron deficiency anemia due to chronic blood loss  -     CBC & Differential  -     Comprehensive Metabolic Panel  -     Ferritin  -     Iron Profile  -     Protime-INR    4. Polyp of colon, unspecified part of colon, unspecified type  -     CBC & Differential  -     Comprehensive Metabolic Panel  -     Ferritin  -     Iron Profile  -     Protime-INR    5. Gastroesophageal reflux disease with esophagitis, unspecified whether hemorrhage  -     CBC & Differential  -     Comprehensive Metabolic Panel  -     Ferritin  -     Iron Profile  -     Protime-INR      Assessment:  1.  History of grade D esophagitis noted on 2 of 2021 EGD.  2.  Patient has a history of colon polyps. Last  c/s several yrs ago.   3. H/o iron def Anemia. He is heme negative today.  4. CRI  5. On warfarin for a fib.     Recommend:  1. CBC, CMP, ferritin, TIBC, PT  2. I recommend an EGD (to document healing of GERD esophagitis) and colonoscopy. He wants to wait till after the labs come back to decide if we should do scopes or not. He would need to come off coumadin for 4 days prior to scopes.   3. F/u 3 mos.     No follow-ups on file.    Edgar Allen MD  10/21/2021

## 2021-10-22 ENCOUNTER — ANTICOAGULATION VISIT (OUTPATIENT)
Dept: PHARMACY | Facility: HOSPITAL | Age: 76
End: 2021-10-22

## 2021-10-22 DIAGNOSIS — I48.0 PAF (PAROXYSMAL ATRIAL FIBRILLATION) (HCC): Primary | ICD-10-CM

## 2021-10-22 LAB
ALBUMIN SERPL-MCNC: 4 G/DL (ref 3.7–4.7)
ALBUMIN/GLOB SERPL: 1.5 {RATIO} (ref 1.2–2.2)
ALP SERPL-CCNC: 100 IU/L (ref 44–121)
ALT SERPL-CCNC: 30 IU/L (ref 0–44)
AST SERPL-CCNC: 25 IU/L (ref 0–40)
BASOPHILS # BLD AUTO: 0.1 X10E3/UL (ref 0–0.2)
BASOPHILS NFR BLD AUTO: 1 %
BILIRUB SERPL-MCNC: 0.4 MG/DL (ref 0–1.2)
BUN SERPL-MCNC: 29 MG/DL (ref 8–27)
BUN/CREAT SERPL: 16 (ref 10–24)
CALCIUM SERPL-MCNC: 8.8 MG/DL (ref 8.6–10.2)
CHLORIDE SERPL-SCNC: 104 MMOL/L (ref 96–106)
CO2 SERPL-SCNC: 25 MMOL/L (ref 20–29)
CREAT SERPL-MCNC: 1.78 MG/DL (ref 0.76–1.27)
EOSINOPHIL # BLD AUTO: 0.3 X10E3/UL (ref 0–0.4)
EOSINOPHIL NFR BLD AUTO: 3 %
ERYTHROCYTE [DISTWIDTH] IN BLOOD BY AUTOMATED COUNT: 11.8 % (ref 11.6–15.4)
FERRITIN SERPL-MCNC: 185 NG/ML (ref 30–400)
GLOBULIN SER CALC-MCNC: 2.6 G/DL (ref 1.5–4.5)
GLUCOSE SERPL-MCNC: 101 MG/DL (ref 65–99)
HCT VFR BLD AUTO: 36.2 % (ref 37.5–51)
HGB BLD-MCNC: 11.8 G/DL (ref 13–17.7)
IMM GRANULOCYTES # BLD AUTO: 0.2 X10E3/UL (ref 0–0.1)
IMM GRANULOCYTES NFR BLD AUTO: 2 %
INR PPP: 3.2 (ref 0.9–1.2)
IRON SATN MFR SERPL: 35 % (ref 15–55)
IRON SERPL-MCNC: 107 UG/DL (ref 38–169)
LYMPHOCYTES # BLD AUTO: 1.6 X10E3/UL (ref 0.7–3.1)
LYMPHOCYTES NFR BLD AUTO: 18 %
MCH RBC QN AUTO: 31.8 PG (ref 26.6–33)
MCHC RBC AUTO-ENTMCNC: 32.6 G/DL (ref 31.5–35.7)
MCV RBC AUTO: 98 FL (ref 79–97)
MONOCYTES # BLD AUTO: 0.8 X10E3/UL (ref 0.1–0.9)
MONOCYTES NFR BLD AUTO: 9 %
NEUTROPHILS # BLD AUTO: 6.1 X10E3/UL (ref 1.4–7)
NEUTROPHILS NFR BLD AUTO: 67 %
PLATELET # BLD AUTO: 185 X10E3/UL (ref 150–450)
POTASSIUM SERPL-SCNC: 4.1 MMOL/L (ref 3.5–5.2)
PROT SERPL-MCNC: 6.6 G/DL (ref 6–8.5)
PROTHROMBIN TIME: 33.2 SEC (ref 9.1–12)
RBC # BLD AUTO: 3.71 X10E6/UL (ref 4.14–5.8)
SODIUM SERPL-SCNC: 142 MMOL/L (ref 134–144)
TIBC SERPL-MCNC: 306 UG/DL (ref 250–450)
UIBC SERPL-MCNC: 199 UG/DL (ref 111–343)
WBC # BLD AUTO: 8.9 X10E3/UL (ref 3.4–10.8)

## 2021-10-22 NOTE — PROGRESS NOTES
Anticoagulation Clinic Progress Note    Anticoagulation Summary  As of 10/22/2021    INR goal:  2.0-3.0   TTR:  50.8 % (6.3 mo)   INR used for dosing:  3.2 (10/21/2021)   Warfarin maintenance plan:  2 mg every day   Weekly warfarin total:  14 mg   Plan last modified:  Eduar Weeks, PharmD (8/4/2021)   Next INR check:  11/2/2021   Priority:  High   Target end date:      Indications    PAF (paroxysmal atrial fibrillation) (HCC) [I48.0]             Anticoagulation Episode Summary     INR check location:      Preferred lab:      Send INR reminders to:  [facility] CLINICAL [place]    Comments:        Anticoagulation Care Providers     Provider Role Specialty Phone number    Jimena Singer MD Referring Cardiology [phone]          Clinic Interview:  Patient Findings     Negatives:  Signs/symptoms of thrombosis, Signs/symptoms of bleeding,   Laboratory test error suspected, Change in health, Change in alcohol use,   Change in activity, Upcoming invasive procedure, Emergency department   visit, Upcoming dental procedure, Missed doses, Extra doses, Change in   medications, Change in diet/appetite, Hospital admission, Bruising, Other   complaints      Clinical Outcomes     Negatives:  Major bleeding event, Thromboembolic event,   Anticoagulation-related hospital admission, Anticoagulation-related ED   visit, Anticoagulation-related fatality        INR History:  Anticoagulation Monitoring 9/1/2021 9/29/2021 10/22/2021   INR 2.0 3.0 3.2   INR Date 9/1/2021 9/29/2021 10/21/2021   INR Goal 2.0-3.0 2.0-3.0 2.0-3.0   Trend Same Same Same   Last Week Total 14 mg 14 mg 14 mg   Next Week Total 14 mg 14 mg 13 mg   Sun 2 mg 2 mg 2 mg   Mon 2 mg 2 mg 2 mg   Tue 2 mg 2 mg 2 mg   Wed 2 mg 2 mg 2 mg   Thu 2 mg 2 mg 2 mg   Fri 2 mg 2 mg 1 mg (10/22); Otherwise 2 mg   Sat 2 mg 2 mg 2 mg   Visit Report - - -   Some recent data might be hidden       Plan:  1. INR is Supratherapeutic today- see above in Anticoagulation Summary.   Will  instruct Rodolfo FRANCO Reilly to Change their warfarin regimen- see above in Anticoagulation Summary.  2. Follow up in 2 weeks  3. They have been instructed to call if any changes in medications, doses, concerns, etc. Patient expresses understanding and has no further questions at this time.    Eduar Weeks, PharmD

## 2021-10-27 ENCOUNTER — APPOINTMENT (OUTPATIENT)
Dept: PHARMACY | Facility: HOSPITAL | Age: 76
End: 2021-10-27

## 2021-10-31 NOTE — PROGRESS NOTES
10/30/21       Tell him that his labs showed that he is mildly anemic with a Hgb of 11.8 (which is fairly stable). His kidney function is abnormal but stable with a creatinine of 1.78 and BUN of 29. Your INR is 3.2.         Would he want to have an EGD and colonoscopy to evaluate his iron deficiency anemia and h/o colon polyps. If we did this he would need to be off of warfarin for 4 days prior to the scopes (if OK with Dr. Domenica Merida?). Have him think about whether he would want to have the EGD and colonoscopy. We could discuss further when I see him next in the office.        Send a copy of these labs and this note to his PCP.   Lorenzo samson

## 2021-11-01 ENCOUNTER — TELEPHONE (OUTPATIENT)
Dept: GASTROENTEROLOGY | Facility: CLINIC | Age: 76
End: 2021-11-01

## 2021-11-01 DIAGNOSIS — D50.0 IRON DEFICIENCY ANEMIA DUE TO CHRONIC BLOOD LOSS: Primary | ICD-10-CM

## 2021-11-01 DIAGNOSIS — K63.5 POLYP OF COLON, UNSPECIFIED PART OF COLON, UNSPECIFIED TYPE: ICD-10-CM

## 2021-11-01 NOTE — TELEPHONE ENCOUNTER
Call to pt.  Advise per DR Allen note.  Verb understanding.     States would like to proceed with EGD and c/s.  Advise pt will check for warfarin clearance with DR Merida, and then contact office if ok to proceed.  Verb understanding.     Message to DR Domenica Merida.

## 2021-11-01 NOTE — TELEPHONE ENCOUNTER
----- Message from Edgar Allen MD sent at 10/30/2021 10:44 PM EDT -----  10/30/21       Tell him that his labs showed that he is mildly anemic with a Hgb of 11.8 (which is fairly stable). His kidney function is abnormal but stable with a creatinine of 1.78 and BUN of 29. Your INR is 3.2.         Would he want to have an EGD and colonoscopy to evaluate his iron deficiency anemia and h/o colon polyps. If we did this he would need to be off of warfarin for 4 days prior to the scopes (if OK with Dr. Domenica Merida?). Have him think about whether he would want to have the EGD and colonoscopy. We could discuss further when I see him next in the office.        Send a copy of these labs and this note to his PCP.   Lorenzo samson

## 2021-11-03 ENCOUNTER — ANTICOAGULATION VISIT (OUTPATIENT)
Dept: PHARMACY | Facility: HOSPITAL | Age: 76
End: 2021-11-03

## 2021-11-03 DIAGNOSIS — I48.0 PAF (PAROXYSMAL ATRIAL FIBRILLATION) (HCC): Primary | ICD-10-CM

## 2021-11-03 LAB
INR PPP: 4.4 (ref 0.91–1.09)
PROTHROMBIN TIME: 52.4 SECONDS (ref 10–13.8)

## 2021-11-03 PROCEDURE — G0463 HOSPITAL OUTPT CLINIC VISIT: HCPCS

## 2021-11-03 PROCEDURE — 85610 PROTHROMBIN TIME: CPT

## 2021-11-03 PROCEDURE — 36416 COLLJ CAPILLARY BLOOD SPEC: CPT

## 2021-11-03 NOTE — PROGRESS NOTES
Anticoagulation Clinic Progress Note    Anticoagulation Summary  As of 11/3/2021    INR goal:  2.0-3.0   TTR:  47.5 % (6.7 mo)   INR used for dosin.4 (11/3/2021)   Warfarin maintenance plan:  2 mg every day   Weekly warfarin total:  14 mg   Plan last modified:  Eduar Weeks, PharmD (2021)   Next INR check:  2021   Priority:  High   Target end date:      Indications    PAF (paroxysmal atrial fibrillation) (HCC) [I48.0]             Anticoagulation Episode Summary     INR check location:      Preferred lab:      Send INR reminders to:   [facility] CLIN[facility]    Comments:        Anticoagulation Care Providers     Provider Role Specialty Phone number    Jimena Singer MD Referring Cardiology [phone]          Clinic Interview:  Patient Findings     Positives:  Change in medications, Other complaints    Negatives:  Signs/symptoms of thrombosis, Signs/symptoms of bleeding,   Laboratory test error suspected, Change in health, Change in alcohol use,   Change in activity, Upcoming invasive procedure, Emergency department   visit, Upcoming dental procedure, Missed doses, Extra doses, Change in   diet/appetite, Hospital admission, Bruising    Comments:   swollen ankles, taking extra lasix, no other changes, denies   bleeding/bruising except cut on his thumb while cutting bread few days ago        Clinical Outcomes     Negatives:  Major bleeding event, Thromboembolic event,   Anticoagulation-related hospital admission, Anticoagulation-related ED   visit, Anticoagulation-related fatality    Comments:   swollen ankles, taking extra lasix, no other changes, denies   bleeding/bruising except cut on his thumb while cutting bread few days ago          INR History:  Anticoagulation Monitoring 2021 10/22/2021 11/3/2021   INR 3.0 3.2 4.4   INR Date 2021 10/21/2021 11/3/2021   INR Goal 2.0-3.0 2.0-3.0 2.0-3.0   Trend Same Same Same   Last Week Total 14 mg 14 mg 14 mg   Next Week Total 14 mg 13 mg 10 mg    Sun 2 mg 2 mg 2 mg   Mon 2 mg 2 mg -   Tue 2 mg 2 mg -   Wed 2 mg 2 mg Hold (11/3)   Thu 2 mg 2 mg Hold (11/4)   Fri 2 mg 1 mg (10/22); Otherwise 2 mg 2 mg   Sat 2 mg 2 mg 2 mg   Visit Report - - -   Some recent data might be hidden       Plan:  1. INR is Supratherapeutic today- see above in Anticoagulation Summary.  Will instruct Rodolfo Grover to Change their warfarin regimen- see above in Anticoagulation Summary. Patient reported swollen ankles and taking extra lasix than usual. No other changes. Denies any bleeding/bruising except recent cut on his thumb while cutting bread. Plan to hold warfarin dose today and tomorrow, then resume 2 mg daily. Advised patient to avoid using sharp knives/tools or any activities that can result in injury until his INR returns to normal range.   2. Follow up in 5 days  3. Verbal and written information provided. Patient expresses understanding and has no further questions at this time.    Rosa Morin, PharmD

## 2021-11-03 NOTE — TELEPHONE ENCOUNTER
I sent a note to his cardiology team to just ensure this is ok with them as well. I will let pt know when I hear back from Dr. Singer.

## 2021-11-04 ENCOUNTER — TELEPHONE (OUTPATIENT)
Dept: CARDIOLOGY | Facility: CLINIC | Age: 76
End: 2021-11-04

## 2021-11-04 NOTE — TELEPHONE ENCOUNTER
Left detailed VM for this patient advising that waiting on cards to give ok on coumadin since [facility] does not monitor.

## 2021-11-05 NOTE — TELEPHONE ENCOUNTER
----- Message from Domenica Merida MD sent at 11/3/2021 11:11 AM EDT -----  Regarding: going off warfarin  Hello!    We share Mr. Grover as a pt.   He has new anemia and was recommended to consider EGD and colonoscopy with Dr. Allen to evaluate. Pt would like to proceed. He has complicated medical hx including CABGx7, PAF, and mitral valve repair and upper GI bleed during hospitalization for CABG. His CABG was 2/2021. Is there any need to wait on scopes or reason he can't hold his warfarin at this time for his scopes. Request is to hold warfarin for 4 days prior to the scope.   Thanks for your input.   Domenica Merida

## 2021-11-05 NOTE — TELEPHONE ENCOUNTER
See DR Jimena Singer note of 11/4 authorizing holding Warfarin.  In process on that note of contacting pt to advise of same.     Case request placed for egd and c/s - message to DR Allen.

## 2021-11-08 ENCOUNTER — TELEPHONE (OUTPATIENT)
Dept: GASTROENTEROLOGY | Facility: CLINIC | Age: 76
End: 2021-11-08

## 2021-11-08 ENCOUNTER — ANTICOAGULATION VISIT (OUTPATIENT)
Dept: PHARMACY | Facility: HOSPITAL | Age: 76
End: 2021-11-08

## 2021-11-08 DIAGNOSIS — I48.0 PAF (PAROXYSMAL ATRIAL FIBRILLATION) (HCC): Primary | ICD-10-CM

## 2021-11-08 PROBLEM — K63.5 POLYP OF COLON: Status: ACTIVE | Noted: 2021-11-08

## 2021-11-08 PROBLEM — D50.0 IRON DEFICIENCY ANEMIA DUE TO CHRONIC BLOOD LOSS: Status: ACTIVE | Noted: 2021-11-08

## 2021-11-08 LAB
INR PPP: 2.1 (ref 0.91–1.09)
PROTHROMBIN TIME: 25.4 SECONDS (ref 10–13.8)

## 2021-11-08 PROCEDURE — 36416 COLLJ CAPILLARY BLOOD SPEC: CPT

## 2021-11-08 PROCEDURE — G0463 HOSPITAL OUTPT CLINIC VISIT: HCPCS

## 2021-11-08 PROCEDURE — 85610 PROTHROMBIN TIME: CPT

## 2021-11-08 NOTE — PROGRESS NOTES
Anticoagulation Clinic Progress Note    Anticoagulation Summary  As of 2021    INR goal:  2.0-3.0   TTR:  47.3 % (6.9 mo)   INR used for dosin.1 (2021)   Warfarin maintenance plan:  1 mg every Thu; 2 mg all other days   Weekly warfarin total:  13 mg   Plan last modified:  Eduar Weeks, PharmD (2021)   Next INR check:  2021   Priority:  High   Target end date:      Indications    PAF (paroxysmal atrial fibrillation) (HCC) [I48.0]             Anticoagulation Episode Summary     INR check location:      Preferred lab:      Send INR reminders to:  [facility]    Comments:        Anticoagulation Care Providers     Provider Role Specialty Phone number    Jimena Singer MD Referring Cardiology [phone]          Clinic Interview:  Patient Findings     Negatives:  Signs/symptoms of thrombosis, Signs/symptoms of bleeding,   Laboratory test error suspected, Change in health, Change in alcohol use,   Change in activity, Upcoming invasive procedure, Emergency department   visit, Upcoming dental procedure, Missed doses, Extra doses, Change in   medications, Change in diet/appetite, Hospital admission, Bruising, Other   complaints    Comments:  Reports improvement in BLE edema.       Clinical Outcomes     Negatives:  Major bleeding event, Thromboembolic event,   Anticoagulation-related hospital admission, Anticoagulation-related ED   visit, Anticoagulation-related fatality    Comments:  Reports improvement in BLE edema.         INR History:  Anticoagulation Monitoring 10/22/2021 11/3/2021 2021   INR 3.2 4.4 2.1   INR Date 10/21/2021 11/3/2021 2021   INR Goal 2.0-3.0 2.0-3.0 2.0-3.0   Trend Same Same Down   Last Week Total 14 mg 14 mg 10 mg   Next Week Total 13 mg 10 mg 13 mg   Sun 2 mg 2 mg 2 mg   Mon 2 mg - 2 mg   Tue 2 mg - 2 mg   Wed 2 mg Hold (11/3) 2 mg   Thu 2 mg Hold () 1 mg   Fri 1 mg (10/22); Otherwise 2 mg 2 mg 2 mg   Sat 2 mg 2 mg 2 mg   Visit Report - - -    Some recent data might be hidden       Plan:  1. INR is Therapeutic today- see above in Anticoagulation Summary.  Will instruct Rodolfo Grover to Change their warfarin regimen- see above in Anticoagulation Summary.  2. Follow up in 2 weeks  3. Patient declines warfarin refills.  4. Verbal and written information provided. Patient expresses understanding and has no further questions at this time.    Eduar Weeks, PharmD

## 2021-11-08 NOTE — TELEPHONE ENCOUNTER
Call to pt.  Advise clearance has been provided by Dr Singer to hold coumadin for 5 days prior to scopes to be scheduled.  Verb understanding.     Advise  will contact to arrange.

## 2021-11-11 ENCOUNTER — OFFICE VISIT (OUTPATIENT)
Dept: FAMILY MEDICINE CLINIC | Facility: CLINIC | Age: 76
End: 2021-11-11

## 2021-11-11 VITALS
HEART RATE: 59 BPM | BODY MASS INDEX: 27.78 KG/M2 | SYSTOLIC BLOOD PRESSURE: 124 MMHG | HEIGHT: 67 IN | RESPIRATION RATE: 17 BRPM | WEIGHT: 177 LBS | DIASTOLIC BLOOD PRESSURE: 58 MMHG | TEMPERATURE: 98 F | OXYGEN SATURATION: 92 %

## 2021-11-11 DIAGNOSIS — R42 VERTIGO: ICD-10-CM

## 2021-11-11 DIAGNOSIS — L98.9 NON-HEALING SKIN LESION OF NOSE: Primary | ICD-10-CM

## 2021-11-11 DIAGNOSIS — R26.89 BALANCE DISORDER: ICD-10-CM

## 2021-11-11 DIAGNOSIS — R21 RASH OF FACE: ICD-10-CM

## 2021-11-11 PROCEDURE — 99214 OFFICE O/P EST MOD 30 MIN: CPT | Performed by: FAMILY MEDICINE

## 2021-11-11 RX ORDER — FLUTICASONE PROPIONATE 0.05 %
1 CREAM (GRAM) TOPICAL 2 TIMES DAILY
Qty: 30 G | Refills: 0 | Status: SHIPPED | OUTPATIENT
Start: 2021-11-11

## 2021-11-11 NOTE — PROGRESS NOTES
"Chief Complaint  Hypertension    Subjective          Rodolfo Grover presents to [facility]  History of Present Illness  Nasal bridge rash  There for months, comes and goes noticed back in March.  Using ointment that helps  Thinks could be related to CPAP wears a band aid.  That helps some too but keeps coming back.     He does not want to get the COVID booster because he said he didn't know the RNA could jump into his body's RNA.     Having the colonoscopy and this has been discussed amongst his providers and he is ok to go off the warfarin for the recommended 5 days prior to the scope.     [facility] Start[facility] ein July.  He is wearing compression socks    Objective   Vital Signs:   /58 (BP Location: Right arm, Patient Position: Sitting, Cuff Size: Adult)   Pulse 59   Temp 98 °F (36.7 °C) (Temporal)   Resp 17   Ht 170.2 cm (67\")   Wt 80.3 kg (177 lb)   SpO2 92%   BMI 27.72 kg/m²     Physical Exam  Vitals reviewed.   Constitutional:       General: He is not in acute distress.  Eyes:      General: No scleral icterus.        Right eye: No discharge.         Left eye: No discharge.      Conjunctiva/sclera: Conjunctivae normal.   Cardiovascular:      Rate and Rhythm: Normal rate and regular rhythm.      Heart sounds: Normal heart sounds. No murmur heard.  No friction rub. No gallop.    Pulmonary:      Effort: Pulmonary effort is normal. No respiratory distress.      Breath sounds: Normal breath sounds. No wheezing.   Skin:     Comments: Over the bridge of the nose right where his mask falls he has an area that is pinkened to mildly erythematous and well demarcated. Some tiny vessels visible bilaterally. The area I about 1.5 cm and slightly raised off the nose.    Neurological:      Mental Status: He is alert and oriented to person, place, and time.   Psychiatric:         Behavior: Behavior normal.        Result Review :                 Assessment and Plan  "   Diagnoses and all orders for this visit:    1. Non-healing skin lesion of nose (Primary)  -     Ambulatory Referral to Dermatology    2. Vertigo  -     Ambulatory Referral to Physical Therapy Vestibular    3. Balance disorder  -     Ambulatory Referral to Physical Therapy Vestibular    4. Rash of face    Other orders  -     fluticasone (CUTIVATE) 0.05 % cream; Apply 1 application topically to the appropriate area as directed 2 (Two) Times a Day.  Dispense: 30 g; Refill: 0        Follow Up   No follow-ups on file.  Patient was given instructions and counseling regarding his condition or for health maintenance advice. Please see specific information pulled into the AVS if appropriate.     Concern over this area on the nose. Maybe BCC but then says comes and gets better and worse, never goes away, may be more consistent with SCC. Also right at the spot where his mask falls so could be related to chronic irritation. Noticed it first back in March 2021 after he was d/c'd from the hospital.   Going to give cream to try, uses sparingly and get him in to dermatology.     Related to his vertigo symptoms we are going to address this ongoing, mild, underlying issue that does put him at risk for falls with PT. He has heard of this PT and really would like to try it.     Also we discussed the concerns with the COVID vaccine and I reassured him on how the vaccine works and encouraged him to get the booster to help with immunity.

## 2021-11-19 ENCOUNTER — TELEPHONE (OUTPATIENT)
Dept: GASTROENTEROLOGY | Facility: CLINIC | Age: 76
End: 2021-11-19

## 2021-11-19 NOTE — TELEPHONE ENCOUNTER
I talked to the patient's PCP, Dr. Domenica Merida.  Since Dr. Merida had seen him this week I was wondering if Dr. Merida felt that the patient could tolerate the EGD and colonoscopy which I am scheduled to do on him on 12/7/21.  She felt that he would tolerate it.  She also had talked to patient's cardiologist (Dr. Singer) who felt that the patient could tolerate being off of the warfarin for 4 days prior to having the EGD and colonoscopy. mali

## 2021-11-23 ENCOUNTER — HOSPITAL ENCOUNTER (OUTPATIENT)
Dept: PHYSICAL THERAPY | Facility: HOSPITAL | Age: 76
Setting detail: THERAPIES SERIES
Discharge: HOME | End: 2021-11-23

## 2021-11-23 DIAGNOSIS — R42 VERTIGO: ICD-10-CM

## 2021-11-23 DIAGNOSIS — R26.89 BALANCE DISORDER: ICD-10-CM

## 2021-11-23 PROCEDURE — 97161 PT EVAL LOW COMPLEX 20 MIN: CPT | Performed by: PHYSICAL THERAPIST

## 2021-11-23 PROCEDURE — 97112 NEUROMUSCULAR REEDUCATION: CPT | Performed by: PHYSICAL THERAPIST

## 2021-11-23 NOTE — THERAPY EVALUATION
Outpatient Physical Therapy Vestibular Initial Evaluation  [facility]     Patient Name: Rodolfo Grover  : 1945  MRN: [id number]  Today's Date: 2021      Visit Date: 2021    Patient Active Problem List   Diagnosis   • Acute blood loss anemia   • Fracture of fifth metacarpal bone of right hand   • Closed fracture dislocation of right shoulder   • Gastroesophageal reflux disease without esophagitis   • DNR (do not resuscitate)   • Bradycardia following surgery   • Frequent PVCs   • Proximal humerus fracture   • Osteoarthritis   • Severe obstructive sleep apnea   • Pure hypercholesterolemia   • Pulmonary hypertension (HCC)   • LARA (dyspnea on exertion)   • Localized edema   • Chest discomfort   • Elevated troponin   • Abnormal EKG   • Coronary artery disease involving native coronary artery of native heart without angina pectoris   • Focal motor seizure (HCC)   • Generalized tonic-clonic seizure (HCC)   • Post-ictal state (HCC)   • Coffee ground emesis   • S/P CABG (coronary artery bypass graft)   • Mitral regurgitation   • H/O mitral valve repair   • PAF (paroxysmal atrial fibrillation) (HCC)   • Ischemic cardiomyopathy   • Essential hypertension   • Non-ischemic cardiomyopathy (HCC)   • Iron deficiency anemia due to chronic blood loss   • Polyp of colon        Past Medical History:   Diagnosis Date   • Acute blood loss anemia    • Atrial fibrillation (HCC)    • Bilateral lower extremity edema    • Bradycardia following surgery    • CAD (coronary artery disease)    • Chest discomfort    • Elevated troponin    • Focal motor seizure (HCC)    • Generalized tonic-clonic seizure (HCC)    • GERD (gastroesophageal reflux disease)    • Iritis of right eye    • Ischemic cardiomyopathy    • Mitral valve insufficiency    • Post-ictal state (HCC)    • Pulmonary hypertension (HCC)    • PVC (premature ventricular contraction)    • Rheumatic fever    • S/P CABG x 7    • S/P mitral valve repair    • Severe  obstructive sleep apnea    • Shoulder fracture, right         Past Surgical History:   Procedure Laterality Date   • CARDIAC CATHETERIZATION N/A 2/19/2021    Procedure: Coronary angiography;  Surgeon: Bry Nails MD;  [facility];  Service: Cardiovascular;  Laterality: N/A;   • CARDIAC CATHETERIZATION N/A 2/19/2021    Procedure: Left heart cath;  Surgeon: Bry Nails MD;  [facility];  Service: Cardiovascular;  Laterality: N/A;   • CARDIAC CATHETERIZATION N/A 2/19/2021    Procedure: Right Heart Cath;  Surgeon: Bry Nails MD;  [facility];  Service: Cardiovascular;  Laterality: N/A;   • CARDIAC CATHETERIZATION N/A 2/19/2021    Procedure: Left ventriculography;  Surgeon: Bry Nails MD;  Location: [facility] CATH INVASIVE LOCATION;  Service: Cardiovascular;  Laterality: N/A;   • CORONARY ARTERY BYPASS GRAFT N/A 2/22/2021    Procedure: BK, MIDLINE STERNOTOMY, CORONARY ARTERY BYPASS X 7 UTILIZING THE LEFT INTERNAL MAMMARY ARTERY AND THE RIGHT SAPHENOUS VEIN, MITRAL VALVE REPAIR, PRP;  Surgeon: Jr Shyam Echavarria MD;  Location: [facility] OR;  Service: Cardiothoracic;  Laterality: N/A;   • ENDOSCOPY N/A 8/16/2018    Erosive gastritis, diverticulosis   • ENDOSCOPY N/A 2/26/2021    Procedure: ESOPHAGOGASTRODUODENOSCOPY AT BEDSIDE WITH HOT SNARE POLYPECTOMY AND CLIP PLACEMENT X1;  Surgeon: Jono Laboy MD;  Location: [facility] ENDOSCOPY;  Service: Gastroenterology;  Laterality: N/A;  PRE-  GI BLEED  POST- GASTRITIS, ESOPHAGITIS, POLYP   • HERNIA REPAIR     • SHOULDER CLOSED REDUCTION Right 3/16/2018    Procedure: RIGHT SHOULDER CLOSED REDUCTION;  Surgeon: Kj Garcia MD;  Location: [facility] OR;  Service: Orthopedics   • TONSILLECTOMY     • TOTAL SHOULDER ARTHROPLASTY W/ DISTAL CLAVICLE EXCISION Right 3/22/2018    Procedure: Reverse Total Shoulder Arthroplsty;  Surgeon: Kj HUNG  "MD Radha;  Location: [facility];  Service: Orthopedics   • TRANSESOPHAGEAL ECHOCARDIOGRAM (BK) N/A 2/22/2021    Procedure: TRANSESOPHAGEAL ECHOCARDIOGRAM;  Surgeon: Jr Shyam Echavarria MD;  Location: [facility];  Service: Cardiothoracic;  Laterality: N/A;         Visit Dx:     ICD-10-CM ICD-9-CM   1. Vertigo  R42 780.4   2. Balance disorder  R26.89 781.99        Patient History     Row Name 11/23/21 1100             History    Chief Complaint Dizziness  -GR      Brief Description of Current Complaint 35 yr history of vertigo. He has had several attacks over the years and severity has decreased to some degree. He c/o a constant \"under the surface\" dizziness that is ready to start but it doesn't becuase he knows how to control his movement. He is sensitive to rolling to the R or standing up. Does take meclizine when he has a flare up which helps.  He c/o a spinning sensation when it happens.  He has had treatments at an ENT office in the past and is typically nauseous/vomitting when performed but gets relief from the veritgo.  He feels unsteady with gait, denies falls or need for AD.  He does have chronic tinnitus.  He mentioned his chronic vertigo in a recent visit with his PCP which is how he was referred to PT. He did have a heart attack with 7 bypasses this past February.  -GR      Patient/Caregiver Goals Relief from dizziness  -GR      Occupation/sports/leisure activities Walks for exercise; did cardiac rehab after his heart attack.  -GR              Pain     Pain Location --  no associated pain  -GR              Fall Risk Assessment    Any falls in the past year: No  -GR              Services    Do you plan to receive Home Health services in the near future No  -GR              Daily Activities    Primary Language English  -GR      How does patient learn best? Listening; Reading; Demonstration  -GR      Pt Participated in POC and Goals Yes  -GR              Safety    Are you being hurt, hit, or " frightened by anyone at home or in your life? No  -GR      Are you being neglected by a caregiver No  -GR            User Key  (r) = Recorded By, (t) = Taken By, (c) = Cosigned By    Initials Name Provider Type    Gaurav Borges, PT Physical Therapist                 Vestibular Eval     Row Name 11/23/21 1300             Occulomotor Exam Fixation Present    Occular ROM Normal  -GR      Spontaneous Nystagmus Absent  -GR      Gaze-induced Nystagmus Absent  -GR      Smooth Pursuit Saccadic  -GR      Saccades Overshoots  -GR      Convergence Abnormal  -GR              Vestibulo-Occular Reflex (VOR)    VOR to Fast Head Movement/Head Thrust Test Normal  -GR      VOR Cancellation Corrective saccades  -GR              High-level Balance    High-level Balance Other modified CTSIB 91/120  -GR              Cervicogenic Assessment    Cervicogenic Assess >60 when cued; hesistant to move  -GR            User Key  (r) = Recorded By, (t) = Taken By, (c) = Cosigned By    Initials Name Provider Type    Gaurav Borges, PT Physical Therapist                            Therapy Education  Education Details: initiated [facility] HEP - VOR yaw and pitch, reviewed expectations of PT and history of vestibular dysfunction       OP Exercises     Row Name 11/23/21 1300             Total Minutes    06692 -  PT Neuromuscular Reeducation Minutes 10  -GR              Exercise 1    Exercise Name 1 VORx1 yaw seated  -GR      Cueing 1 Demo  -GR      Sets 1 1  -GR      Reps 1 1  -GR      Time 1 30 seconds  -GR              Exercise 2    Exercise Name 2 VORx1 pitch seated  -GR      Cueing 2 Demo  -GR      Sets 2 1  -GR      Reps 2 1  -GR      Time 2 30 seconds  -GR            User Key  (r) = Recorded By, (t) = Taken By, (c) = Cosigned By    Initials Name Provider Type    Gaurav Borges, PT Physical Therapist                             PT OP Goals     Row Name 11/23/21 1200          PT Short Term Goals    STG Date to Achieve 12/23/21   -GR     STG 1 Patient will be independent with initial HEP.  -GR     STG 1 Progress New  -GR     STG 2 Patient will report no worse with VOR.  -GR     STG 2 Progress New  -GR            Long Term Goals    LTG Date to Achieve 01/22/22  -GR     LTG 1 Patient will remain negative for BPPV all canals.  -GR     LTG 1 Progress New  -GR     LTG 2 Patient will score >/=19/24 on the DGI to indicated decreased fall risk.  -GR     LTG 2 Progress New  -GR     LTG 3 Patient will score </=12/100 on the dizziness handicap inventory to indicate improved perceived positional tolerance.  -GR     LTG 3 Progress New  -GR            Time Calculation    PT Goal Re-Cert Due Date 02/21/22  -GR           User Key  (r) = Recorded By, (t) = Taken By, (c) = Cosigned By    Initials Name Provider Type    Gaurav Borges, PT Physical Therapist                 PT Assessment/Plan     Row Name 11/23/21 1300          PT Assessment    Functional Limitations Impaired gait; Limitations in community activities; Performance in leisure activities  -GR     Impairments Balance  -GR     Assessment Comments 77 y/o M referred for vestibular evaluation of chronic vertigo x 35 years, intermittent and impaired balance. He presents with gait dysfunction, scoring 12/24 on the dynamic gait index where scores <19 are indicative of fall risk. He is sensitive to positional changes, specifically rolling to R and bending over. BPPV screen was deferred on this date secondary to prior nausea/vomitting and upcoming holidays. Will assess if needed at a future date. PMH pertinent for chronic vertigo, HTN, MI, OA.  His condition is stable. Recommend skiled PT to address functional impairments.Thank you for this referral.  -GR     Please refer to paper survey for additional self-reported information No  -GR     Rehab Potential Good  -GR     Patient/caregiver participated in establishment of treatment plan and goals Yes  -GR     Patient would benefit from skilled therapy  intervention Yes  -GR            PT Plan    PT Frequency 2x/week  -GR     Predicted Duration of Therapy Intervention (PT) 10 visits  -GR     Planned CPT's? PT EVAL LOW COMPLEXITY: 95525; PT RE-EVAL: 28461; PT THER PROC EA 15 MIN: 15329; PT THER ACT EA 15 MIN: 64026; PT MANUAL THERAPY EA 15 MIN: 56593; PT NEUROMUSC RE-EDUCATION EA 15 MIN: 17762; PT SELF CARE/HOME MGMT/TRAIN EA 15: 13234; PT HOT OR COLD PACK TREAT MCARE; PT CANALITH REPOSITIONIN  -GR     Physical Therapy Interventions (Optional Details) balance training; home exercise program; neuromuscular re-education; ROM (Range of Motion); strengthening; stretching; vestibular training  -GR     PT Plan Comments Assess response to HEP and initiate balance program and vestibular retraining. Screen BPPV as indicated.  -GR           User Key  (r) = Recorded By, (t) = Taken By, (c) = Cosigned By    Initials Name Provider Type    GR Gaurav Cortez, PT Physical Therapist                 Outcome Measure Options: Dizziness Handicap Inventory, Dynamic Gait Index (32/100)  Dynamic Gait Index (DGI)  Gait Level Surface: Mild impairment: walks 20’, uses assistive devices, slower speed, mild gait deviations  Change in Gait Speed: Mild impairment: Is able to change speed but demonstrates mild gait deviations, or no gait deviations but unable to achieve a significant change in velocity, or uses as assistive device  Gait with Horizontal Head Turns: Moderate impairment: Performs head turns with moderate change in gait velocity, slows down, staggers, but recovers, can continue to walk.  Gait with Vertical Head Turns: Moderate impairment: Performs head turns with moderate change in gait velocity, slows down,staggers, but recovers, can continue to walk.  Gait and Pivot Turn: Mild impairment: pivot turns safely in >3 seconds and stops with no loss of balance.  Step Over Obstacle: Moderate impairment: Is able to step over box but must stop, then step over. May require verbal  cueing.  Step Around Obstacles: Moderate impairment: Is able to clear cones but must significantly slow speed to accomplish task, or requires verbal cueing.  Steps: Mild impairment: Alternating feet, must use rail.  Dynamic Gait Index Score: 12      Time Calculation:   Start Time: 1130  Stop Time: 1215  Time Calculation (min): 45 min  Total Timed Code Minutes- PT: 10 minute(s)  Timed Charges  19504 -  PT Neuromuscular Reeducation Minutes: 10  Total Minutes  Timed Charges Total Minutes: 10   Total Minutes: 10   Therapy Charges for Today     Code Description Service Date Service Provider Modifiers Qty    72782784270 HC PT NEUROMUSC RE EDUCATION EA 15 MIN 11/23/2021 Gaurav Cortez, PT GP 1    59125844156 HC PT EVAL LOW COMPLEXITY 2 11/23/2021 Gaurav Cortez, PT GP 1          PT G-Codes  Outcome Measure Options: Dizziness Handicap Inventory, Dynamic Gait Index (32/100)         Gaurav Cortez, PT  11/23/2021

## 2021-11-24 DIAGNOSIS — I49.3 FREQUENT PVCS: ICD-10-CM

## 2021-11-24 RX ORDER — METOPROLOL SUCCINATE 50 MG/1
50 TABLET, EXTENDED RELEASE ORAL DAILY
Qty: 90 TABLET | Refills: 1 | Status: SHIPPED | OUTPATIENT
Start: 2021-11-24 | End: 2021-12-20 | Stop reason: SDUPTHER

## 2021-11-24 RX ORDER — WARFARIN SODIUM 2 MG/1
TABLET ORAL
Qty: 90 TABLET | Refills: 1 | Status: SHIPPED | OUTPATIENT
Start: 2021-11-24 | End: 2022-06-22 | Stop reason: SDUPTHER

## 2021-11-24 NOTE — TELEPHONE ENCOUNTER
Rx Refill Note  Requested Prescriptions     Pending Prescriptions Disp Refills   • metoprolol succinate XL (TOPROL-XL) 50 MG 24 hr tablet 45 tablet 1     Sig: Take 0.5 tablets by mouth Daily.   • warfarin (COUMADIN) 2 MG tablet 90 tablet 1     Sig: Take one tablet by mouth daily or as directed by the [facility]      Last office visit with prescribing clinician: 11/11/2021      Next office visit with prescribing clinician: 5/12/2022            Violetta Weaver MA  11/24/21, 09:15 EST

## 2021-11-24 NOTE — TELEPHONE ENCOUNTER
Caller: Rodolfo Grover SALVADOR    Relationship: Self    Best call back number: [phone]     Requested Prescriptions:   Requested Prescriptions     Pending Prescriptions Disp Refills   • metoprolol succinate XL (TOPROL-XL) 50 MG 24 hr tablet 45 tablet 1     Sig: Take 0.5 tablets by mouth Daily.   • warfarin (COUMADIN) 2 MG tablet 90 tablet 1     Sig: Take one tablet by mouth daily or as directed by the [facility]      Pharmacy where request should be sent: [id number] [place] [address] [phone] [place] [phone]      Additional details provided by patient:     Does the patient have less than a 3 day supply:  [x] Yes  [] No    Mely Perdomo Rep   11/24/21 09:07 EST

## 2021-11-30 ENCOUNTER — ANTICOAGULATION VISIT (OUTPATIENT)
Dept: PHARMACY | Facility: HOSPITAL | Age: 76
End: 2021-11-30

## 2021-11-30 ENCOUNTER — TELEPHONE (OUTPATIENT)
Dept: PHARMACY | Facility: HOSPITAL | Age: 76
End: 2021-11-30

## 2021-11-30 DIAGNOSIS — I48.0 PAF (PAROXYSMAL ATRIAL FIBRILLATION) (HCC): Primary | ICD-10-CM

## 2021-11-30 LAB
INR PPP: 3.7 (ref 0.91–1.09)
PROTHROMBIN TIME: 43.9 SECONDS (ref 10–13.8)

## 2021-11-30 PROCEDURE — 85610 PROTHROMBIN TIME: CPT

## 2021-11-30 PROCEDURE — 36416 COLLJ CAPILLARY BLOOD SPEC: CPT

## 2021-11-30 PROCEDURE — G0463 HOSPITAL OUTPT CLINIC VISIT: HCPCS

## 2021-11-30 NOTE — PATIENT INSTRUCTIONS
Hold tonight;s dose. Take tomorrow's 2mg dose, then hold 5 days for colonoscopy on Dec 7th . Boost with 3mg on Dec 7th and 8th . Follow up on Friday Dec 10th

## 2021-11-30 NOTE — TELEPHONE ENCOUNTER
Colonoscopy scheduled for Mr Rodolfo Grover on Dec 7th per Dr Allen.  Patient is to hold warfarin for 5 days before procedure (Dec 2nd thru Dec 6th)    Patient instructed to take warfarin boost of 3mg on evening of  Dec 7th and 8th. Follow up for INR appt in [facility] on Dec 10th    Patient stated that they have no history of strokes.     If these instructions are not sufficient for Mr Rodolfo Grover's anticoagulation dosing care through his scheduled colonoscopy, please contact us.  Thank you,  Christina Gil, PharmD, BCACP

## 2021-11-30 NOTE — PROGRESS NOTES
Anticoagulation Clinic Progress Note    Anticoagulation Summary  As of 11/30/2021    INR goal:  2.0-3.0   TTR:  48.2 % (7.6 mo)   INR used for dosing:  3.7 (11/30/2021)   Warfarin maintenance plan:  1 mg every Thu; 2 mg all other days   Weekly warfarin total:  13 mg   Plan last modified:  Eduar Weeks, PharmD (11/8/2021)   Next INR check:  12/10/2021   Priority:  High   Target end date:      Indications    PAF (paroxysmal atrial fibrillation) (HCC) [I48.0]             Anticoagulation Episode Summary     INR check location:      Preferred lab:      Send INR reminders to:   [facility]    Comments:        Anticoagulation Care Providers     Provider Role Specialty Phone number    Jimena Singer MD Referring Cardiology [phone]          Clinic Interview:      INR History:  Anticoagulation Monitoring 11/3/2021 11/8/2021 11/30/2021   INR 4.4 2.1 3.7   INR Date 11/3/2021 11/8/2021 11/30/2021   INR Goal 2.0-3.0 2.0-3.0 2.0-3.0   Trend Same Down Same   Last Week Total 14 mg 10 mg 13 mg   Next Week Total 10 mg 13 mg 2 mg   Sun 2 mg 2 mg Hold (12/5)   Mon - 2 mg Hold (12/6)   Tue - 2 mg Hold (11/30), 3 mg (12/7)   Wed Hold (11/3) 2 mg 3 mg (12/8); Otherwise 2 mg   Thu Hold (11/4) 1 mg Hold (12/2); Otherwise 1 mg   Fri 2 mg 2 mg Hold (12/3)   Sat 2 mg 2 mg Hold (12/4)   Visit Report - - -   Some recent data might be hidden       Plan:  1. INR is Supratherapeutic today- see above in Anticoagulation Summary.  Hold tonight;s dose. Take tomorrow's 2mg dose, then hold 5 days for colonoscopy on Dec 7th . Boost with 3mg on Dec 8th and 9th . see above in Anticoagulation Summary.  2. Follow up in Dec 10th  3. Patient declines warfarin refills.  4. Verbal and written information provided. Patient expresses understanding and has no further questions at this time.    Meggan Gil [facility]

## 2021-12-01 ENCOUNTER — TRANSCRIBE ORDERS (OUTPATIENT)
Dept: GASTROENTEROLOGY | Facility: CLINIC | Age: 76
End: 2021-12-01

## 2021-12-01 DIAGNOSIS — Z01.818 OTHER SPECIFIED PRE-OPERATIVE EXAMINATION: Primary | ICD-10-CM

## 2021-12-04 ENCOUNTER — LAB (OUTPATIENT)
Dept: LAB | Facility: HOSPITAL | Age: 76
End: 2021-12-04

## 2021-12-04 DIAGNOSIS — Z01.818 OTHER SPECIFIED PRE-OPERATIVE EXAMINATION: Primary | ICD-10-CM

## 2021-12-04 LAB — SARS-COV-2 ORF1AB RESP QL NAA+PROBE: NOT DETECTED

## 2021-12-04 PROCEDURE — U0004 COV-19 TEST NON-CDC HGH THRU: HCPCS

## 2021-12-04 PROCEDURE — C9803 HOPD COVID-19 SPEC COLLECT: HCPCS

## 2021-12-07 ENCOUNTER — ANESTHESIA (OUTPATIENT)
Dept: GASTROENTEROLOGY | Facility: HOSPITAL | Age: 76
End: 2021-12-07

## 2021-12-07 ENCOUNTER — HOSPITAL ENCOUNTER (OUTPATIENT)
Facility: HOSPITAL | Age: 76
Setting detail: HOSPITAL OUTPATIENT SURGERY
Discharge: HOME | End: 2021-12-07
Attending: INTERNAL MEDICINE | Admitting: INTERNAL MEDICINE

## 2021-12-07 ENCOUNTER — ANESTHESIA EVENT (OUTPATIENT)
Dept: GASTROENTEROLOGY | Facility: HOSPITAL | Age: 76
End: 2021-12-07

## 2021-12-07 VITALS
TEMPERATURE: 98.1 F | OXYGEN SATURATION: 97 % | HEART RATE: 63 BPM | DIASTOLIC BLOOD PRESSURE: 67 MMHG | RESPIRATION RATE: 16 BRPM | SYSTOLIC BLOOD PRESSURE: 115 MMHG

## 2021-12-07 DIAGNOSIS — D50.0 IRON DEFICIENCY ANEMIA DUE TO CHRONIC BLOOD LOSS: ICD-10-CM

## 2021-12-07 DIAGNOSIS — K63.5 POLYP OF COLON, UNSPECIFIED PART OF COLON, UNSPECIFIED TYPE: ICD-10-CM

## 2021-12-07 LAB
ALBUMIN SERPL-MCNC: 4.3 G/DL (ref 3.5–5.2)
ALBUMIN/GLOB SERPL: 1.4 G/DL
ALP SERPL-CCNC: 107 U/L (ref 39–117)
ALT SERPL W P-5'-P-CCNC: 28 U/L (ref 1–41)
ANION GAP SERPL CALCULATED.3IONS-SCNC: 13.2 MMOL/L (ref 5–15)
AST SERPL-CCNC: 20 U/L (ref 1–40)
BASOPHILS # BLD AUTO: 0.06 10*3/MM3 (ref 0–0.2)
BASOPHILS NFR BLD AUTO: 0.7 % (ref 0–1.5)
BILIRUB SERPL-MCNC: 0.5 MG/DL (ref 0–1.2)
BUN SERPL-MCNC: 23 MG/DL (ref 8–23)
BUN/CREAT SERPL: 14.1 (ref 7–25)
CALCIUM SPEC-SCNC: 9.4 MG/DL (ref 8.6–10.5)
CHLORIDE SERPL-SCNC: 104 MMOL/L (ref 98–107)
CO2 SERPL-SCNC: 25.8 MMOL/L (ref 22–29)
CREAT SERPL-MCNC: 1.63 MG/DL (ref 0.76–1.27)
DEPRECATED RDW RBC AUTO: 43.2 FL (ref 37–54)
EOSINOPHIL # BLD AUTO: 0.24 10*3/MM3 (ref 0–0.4)
EOSINOPHIL NFR BLD AUTO: 2.9 % (ref 0.3–6.2)
ERYTHROCYTE [DISTWIDTH] IN BLOOD BY AUTOMATED COUNT: 11.9 % (ref 12.3–15.4)
GFR SERPL CREATININE-BSD FRML MDRD: 41 ML/MIN/1.73
GLOBULIN UR ELPH-MCNC: 3 GM/DL
GLUCOSE SERPL-MCNC: 84 MG/DL (ref 65–99)
HCT VFR BLD AUTO: 37.9 % (ref 37.5–51)
HGB BLD-MCNC: 12.5 G/DL (ref 13–17.7)
IMM GRANULOCYTES # BLD AUTO: 0.15 10*3/MM3 (ref 0–0.05)
IMM GRANULOCYTES NFR BLD AUTO: 1.8 % (ref 0–0.5)
INR PPP: 1.33 (ref 0.9–1.1)
LYMPHOCYTES # BLD AUTO: 1.46 10*3/MM3 (ref 0.7–3.1)
LYMPHOCYTES NFR BLD AUTO: 17.9 % (ref 19.6–45.3)
MAGNESIUM SERPL-MCNC: 2 MG/DL (ref 1.6–2.4)
MCH RBC QN AUTO: 32.9 PG (ref 26.6–33)
MCHC RBC AUTO-ENTMCNC: 33 G/DL (ref 31.5–35.7)
MCV RBC AUTO: 99.7 FL (ref 79–97)
MONOCYTES # BLD AUTO: 0.78 10*3/MM3 (ref 0.1–0.9)
MONOCYTES NFR BLD AUTO: 9.6 % (ref 5–12)
NEUTROPHILS NFR BLD AUTO: 5.45 10*3/MM3 (ref 1.7–7)
NEUTROPHILS NFR BLD AUTO: 67.1 % (ref 42.7–76)
NRBC BLD AUTO-RTO: 0 /100 WBC (ref 0–0.2)
PLATELET # BLD AUTO: 202 10*3/MM3 (ref 140–450)
PMV BLD AUTO: 10.1 FL (ref 6–12)
POTASSIUM SERPL-SCNC: 4.1 MMOL/L (ref 3.5–5.2)
PROT SERPL-MCNC: 7.3 G/DL (ref 6–8.5)
PROTHROMBIN TIME: 16.2 SECONDS (ref 11.7–14.2)
RBC # BLD AUTO: 3.8 10*6/MM3 (ref 4.14–5.8)
SODIUM SERPL-SCNC: 143 MMOL/L (ref 136–145)
WBC NRBC COR # BLD: 8.14 10*3/MM3 (ref 3.4–10.8)

## 2021-12-07 PROCEDURE — 43239 EGD BIOPSY SINGLE/MULTIPLE: CPT | Performed by: INTERNAL MEDICINE

## 2021-12-07 PROCEDURE — 45380 COLONOSCOPY AND BIOPSY: CPT | Performed by: INTERNAL MEDICINE

## 2021-12-07 PROCEDURE — 25010000002 PROPOFOL 10 MG/ML EMULSION: Performed by: ANESTHESIOLOGY

## 2021-12-07 PROCEDURE — 83735 ASSAY OF MAGNESIUM: CPT | Performed by: INTERNAL MEDICINE

## 2021-12-07 PROCEDURE — 85025 COMPLETE CBC W/AUTO DIFF WBC: CPT | Performed by: INTERNAL MEDICINE

## 2021-12-07 PROCEDURE — 85610 PROTHROMBIN TIME: CPT | Performed by: INTERNAL MEDICINE

## 2021-12-07 PROCEDURE — 80053 COMPREHEN METABOLIC PANEL: CPT | Performed by: INTERNAL MEDICINE

## 2021-12-07 PROCEDURE — 88305 TISSUE EXAM BY PATHOLOGIST: CPT | Performed by: INTERNAL MEDICINE

## 2021-12-07 RX ORDER — SODIUM CHLORIDE, SODIUM LACTATE, POTASSIUM CHLORIDE, CALCIUM CHLORIDE 600; 310; 30; 20 MG/100ML; MG/100ML; MG/100ML; MG/100ML
30 INJECTION, SOLUTION INTRAVENOUS CONTINUOUS PRN
Status: DISCONTINUED | OUTPATIENT
Start: 2021-12-07 | End: 2021-12-07 | Stop reason: HOSPADM

## 2021-12-07 RX ORDER — PROPOFOL 10 MG/ML
VIAL (ML) INTRAVENOUS AS NEEDED
Status: DISCONTINUED | OUTPATIENT
Start: 2021-12-07 | End: 2021-12-07 | Stop reason: SURG

## 2021-12-07 RX ORDER — LIDOCAINE HYDROCHLORIDE 20 MG/ML
INJECTION, SOLUTION INFILTRATION; PERINEURAL AS NEEDED
Status: DISCONTINUED | OUTPATIENT
Start: 2021-12-07 | End: 2021-12-07 | Stop reason: SURG

## 2021-12-07 RX ORDER — PROPOFOL 10 MG/ML
VIAL (ML) INTRAVENOUS CONTINUOUS PRN
Status: DISCONTINUED | OUTPATIENT
Start: 2021-12-07 | End: 2021-12-07 | Stop reason: SURG

## 2021-12-07 RX ADMIN — Medication 100 MCG/KG/MIN: at 14:26

## 2021-12-07 RX ADMIN — SODIUM CHLORIDE, POTASSIUM CHLORIDE, SODIUM LACTATE AND CALCIUM CHLORIDE 30 ML/HR: 600; 310; 30; 20 INJECTION, SOLUTION INTRAVENOUS at 13:32

## 2021-12-07 RX ADMIN — LIDOCAINE HYDROCHLORIDE 100 MG: 20 INJECTION, SOLUTION INFILTRATION; PERINEURAL at 14:24

## 2021-12-07 RX ADMIN — PROPOFOL 100 MG: 10 INJECTION, EMULSION INTRAVENOUS at 14:26

## 2021-12-07 RX ADMIN — SODIUM CHLORIDE, POTASSIUM CHLORIDE, SODIUM LACTATE AND CALCIUM CHLORIDE: 600; 310; 30; 20 INJECTION, SOLUTION INTRAVENOUS at 13:29

## 2021-12-07 NOTE — ANESTHESIA POSTPROCEDURE EVALUATION
Patient: Rodolfo Grover    Procedure Summary     Date: 12/07/21 Room / Location: [facility] ENDOSCOPY 5 / [facility] ENDOSCOPY    Anesthesia Start: 1421 Anesthesia Stop: 1459    Procedures:       ESOPHAGOGASTRODUODENOSCOPY with COLD  BIOPSIES (N/A Esophagus)      COLONOSCOPY to cecum with cold biopsy polypectomy (N/A ) Diagnosis:       Iron deficiency anemia due to chronic blood loss      Polyp of colon, unspecified part of colon, unspecified type      (Iron deficiency anemia due to chronic blood loss [D50.0])      (Polyp of colon, unspecified part of colon, unspecified type [K63.5])    Surgeons: Edgar Allen MD Provider: rBy Landa MD    Anesthesia Type: MAC ASA Status: 4          Anesthesia Type: MAC    Vitals  Vitals Value Taken Time   /67 12/07/21 1518   Temp     Pulse 63 12/07/21 1518   Resp 16 12/07/21 1518   SpO2 97 % 12/07/21 1518           Post Anesthesia Care and Evaluation    Patient location during evaluation: bedside  Patient participation: complete - patient participated  Level of consciousness: sleepy but conscious  Pain score: 0  Pain management: adequate  Airway patency: patent  Anesthetic complications: No anesthetic complications    Cardiovascular status: acceptable  Respiratory status: acceptable  Hydration status: acceptable    Comments: /67 (BP Location: Left arm, Patient Position: Lying)   Pulse 63   Temp 36.7 °C (98.1 °F) (Oral)   Resp 16   SpO2 97%

## 2021-12-07 NOTE — H&P
Chief Complaint   Patient presents with   • Heartburn         History of Present Illness:   76 y.o. male        He was last seen in 4 of 2021.  My assessment and plan was as follows:  Assessment:  1.  History of grade D esophagitis noted on 2 of 2021 EGD.  2.  Patient has a history of colon polyps. Last c/s several yrs ago.   3. H/o iron def Anemia. He is heme negative today and his ferritin level is now normal.   4. CRI     Recommendations:  1. CBC - I will defer this to Dr. Merida who he is seeing this week.   2. We discussed the pros and cons of doing a colonoscopy in a 75 yo. I wouldn't want to do it for 6 mos since he recently had CABG.  3. F/U 6 mos.   4. Finish the carafate and stop it. Continue the Pantoprazole. We disccussed the pros and cons of being on a PPI especially given his CRI. He will f/u with his Nephrologist.        He has fluid in his legs and fluid around heart. The Nephrologist isn't concerned about long term PPI use given his Grade D esophagitis found during 2/21 EGD. No rectal bleeding or melena. Rare diarrhea, no constipation. Weight stable. On Protonix 40 mg/day and carafate pills prn. NO dysphagia. No chest or abdominal pain.      Medical History        Past Medical History:   Diagnosis Date   • Acute blood loss anemia     • Atrial fibrillation (HCC)     • Bilateral lower extremity edema     • Bradycardia following surgery     • CAD (coronary artery disease)     • Chest discomfort     • Elevated troponin     • Focal motor seizure ([facility])     • Generalized tonic-clonic seizure ([facility])     • GERD (gastroesophageal reflux disease)     • Iritis of right eye     • Ischemic cardiomyopathy     • Mitral valve insufficiency     • Post-ictal state ([facility])     • Pulmonary hypertension ([facility])     • PVC (premature ventricular contraction)     • Rheumatic fever     • S/P CABG x 7     • S/P mitral valve repair     • Severe obstructive sleep apnea     • Shoulder fracture, right              Surgical History          Past Surgical History:   Procedure Laterality Date   • CARDIAC CATHETERIZATION N/A 2/19/2021     Procedure: Coronary angiography;  Surgeon: Bry Nails MD;  Location: [facility] CATH INVASIVE LOCATION;  Service: Cardiovascular;  Laterality: N/A;   • CARDIAC CATHETERIZATION N/A 2/19/2021     Procedure: Left heart cath;  Surgeon: Bry Nails MD;  Location: [facility] CATH INVASIVE LOCATION;  Service: Cardiovascular;  Laterality: N/A;   • CARDIAC CATHETERIZATION N/A 2/19/2021     Procedure: Right Heart Cath;  Surgeon: Bry Nails MD;  Location: [facility] CATH INVASIVE LOCATION;  Service: Cardiovascular;  Laterality: N/A;   • CARDIAC CATHETERIZATION N/A 2/19/2021     Procedure: Left ventriculography;  Surgeon: Bry Nails MD;  Location: [facility] CATH INVASIVE LOCATION;  Service: Cardiovascular;  Laterality: N/A;   • CORONARY ARTERY BYPASS GRAFT N/A 2/22/2021     Procedure: BK, MIDLINE STERNOTOMY, CORONARY ARTERY BYPASS X 7 UTILIZING THE LEFT INTERNAL MAMMARY ARTERY AND THE RIGHT SAPHENOUS VEIN, MITRAL VALVE REPAIR, PRP;  Surgeon: Jr Shyam Echavarria MD;  Location: [facility] OR;  Service: Cardiothoracic;  Laterality: N/A;   • ENDOSCOPY N/A 8/16/2018     Erosive gastritis, diverticulosis   • ENDOSCOPY N/A 2/26/2021     Procedure: ESOPHAGOGASTRODUODENOSCOPY AT BEDSIDE WITH HOT SNARE POLYPECTOMY AND CLIP PLACEMENT X1;  Surgeon: Jono Laboy MD;  Location: [facility] ENDOSCOPY;  Service: Gastroenterology;  Laterality: N/A;  PRE-  GI BLEED  POST- GASTRITIS, ESOPHAGITIS, POLYP   • HERNIA REPAIR       • SHOULDER CLOSED REDUCTION Right 3/16/2018     Procedure: RIGHT SHOULDER CLOSED REDUCTION;  Surgeon: Kj Garcia MD;  Location: [facility] OR;  Service: Orthopedics   • TONSILLECTOMY       • TOTAL SHOULDER ARTHROPLASTY W/ DISTAL CLAVICLE EXCISION Right 3/22/2018     Procedure: Reverse Total Shoulder Arthroplsty;  Surgeon: Kj Garcia MD;  Location: [facility] MAIN OR;  Service:  Orthopedics   • TRANSESOPHAGEAL ECHOCARDIOGRAM (BK) N/A 2/22/2021     Procedure: TRANSESOPHAGEAL ECHOCARDIOGRAM;  Surgeon: Jr Shyam Echavarria MD;  Location: [facility];  Service: Cardiothoracic;  Laterality: N/A;               Current Outpatient Medications:   •  acetaminophen (TYLENOL) 500 MG tablet, Take 2 tablets by mouth 3 (Three) Times a Day. (Patient taking differently: Take 1,000 mg by mouth 3 (Three) Times a Day As Needed.), Disp: , Rfl:   •  aspirin 81 MG EC tablet, Take 1 tablet by mouth Daily., Disp: 90 tablet, Rfl: 1  •  furosemide (Lasix) 40 MG tablet, Take 1 tablet by mouth Daily As Needed (for weight gain or shortness of breath). (Patient taking differently: Take 40 mg by mouth As Needed (for weight gain or shortness of breath).), Disp: 30 tablet, Rfl: 1  •  metoprolol succinate XL (TOPROL-XL) 50 MG 24 hr tablet, Take 0.5 tablets by mouth Daily. (Patient taking differently: Take 50 mg by mouth Daily.), Disp: 45 tablet, Rfl: 1  •  pantoprazole (PROTONIX) 40 MG EC tablet, Take 1 tablet by mouth 2 (Two) Times a Day Before Meals., Disp: 180 tablet, Rfl: 1  •  potassium chloride 10 MEQ CR tablet, Take 1 tablet by mouth Daily As Needed (Take with Lasix (furosemide)). With Lasix (furosemide), Disp: 30 tablet, Rfl: 1  •  Psyllium (METAMUCIL FIBER PO), Take  by mouth As Needed., Disp: , Rfl:   •  sucralfate (CARAFATE) 1 g tablet, As Needed., Disp: , Rfl:   •  VITAMIN D PO, Take 2,000 Units by mouth Daily., Disp: , Rfl:   •  warfarin (COUMADIN) 2 MG tablet, Take one tablet by mouth daily or as directed by the [facility] (Patient taking differently: Take 2 mg by mouth Daily.), Disp: 90 tablet, Rfl: 1     No Known Allergies           Family History   Problem Relation Age of Onset   • Malig Hyperthermia Neg Hx           Social History   Social History            Socioeconomic History   • Marital status:    Tobacco Use   • Smoking status: Never Smoker   • Smokeless tobacco: Never  Used   Vaping Use   • Vaping Use: Never used   Substance and Sexual Activity   • Alcohol use: Yes       Comment: occasional   • Drug use: No   • Sexual activity: Defer            Review of Systems   Gastrointestinal: Negative for abdominal pain, constipation and diarrhea.   All other systems reviewed and are negative.     Pertinent positives and negatives documented in the HPI and all other systems reviewed and were found to be negative.      Vitals:     10/21/21 0914   BP: 144/64   Pulse: 70   Temp: 97.7 °F (36.5 °C)         Physical Exam  Vitals reviewed.   Constitutional:       General: He is not in acute distress.     Appearance: Normal appearance. He is well-developed. He is not diaphoretic.   HENT:      Head: Normocephalic and atraumatic. Hair is normal.      Right Ear: Hearing, tympanic membrane, ear canal and external ear normal.      Left Ear: Hearing, tympanic membrane, ear canal and external ear normal.      Nose: Nose normal. No nasal deformity.      Mouth/Throat:      Mouth: Mucous membranes are moist. No oral lesions.      Pharynx: Uvula midline. No uvula swelling.   Eyes:      General: Lids are normal. No scleral icterus.        Right eye: No discharge.         Left eye: No discharge.      Extraocular Movements: Extraocular movements intact.      Right eye: Normal extraocular motion and no nystagmus.      Left eye: Normal extraocular motion and no nystagmus.      Conjunctiva/sclera: Conjunctivae normal.      Pupils: Pupils are equal, round, and reactive to light.   Neck:      Thyroid: No thyromegaly.      Vascular: No JVD.   Cardiovascular:      Rate and Rhythm: Normal rate and regular rhythm.      Pulses: Normal pulses.      Heart sounds: Normal heart sounds. No murmur heard.  No gallop.    Pulmonary:      Effort: Pulmonary effort is normal. No respiratory distress.      Breath sounds: Normal breath sounds. No wheezing or rales.   Chest:      Chest wall: No tenderness.   Abdominal:      General:  Bowel sounds are normal. There is no distension.      Palpations: Abdomen is soft. There is no mass.      Tenderness: There is no abdominal tenderness. There is no guarding.      Hernia: No hernia is present.   Genitourinary:     Rectum: Normal. Guaiac result negative.   Musculoskeletal:         General: No tenderness or deformity. Normal range of motion.      Cervical back: Normal range of motion and neck supple.   Lymphadenopathy:      Cervical: No cervical adenopathy.   Skin:     General: Skin is warm and dry.      Findings: No rash.   Neurological:      Mental Status: He is alert and oriented to person, place, and time.      Cranial Nerves: No cranial nerve deficit.      Motor: No abnormal muscle tone.      Coordination: Coordination normal.      Deep Tendon Reflexes: Reflexes are normal and symmetric. Reflexes normal.   Psychiatric:         Mood and Affect: Mood normal.         Behavior: Behavior normal.         Thought Content: Thought content normal.         Judgment: Judgment normal.            Diagnoses and all orders for this visit:     1. S/P CABG x 7 (Primary)  -     CBC & Differential  -     Comprehensive Metabolic Panel  -     Ferritin  -     Iron Profile  -     Protime-INR     2. Gastroesophageal reflux disease with esophagitis and hemorrhage  -     CBC & Differential  -     Comprehensive Metabolic Panel  -     Ferritin  -     Iron Profile  -     Protime-INR     3. Iron deficiency anemia due to chronic blood loss  -     CBC & Differential  -     Comprehensive Metabolic Panel  -     Ferritin  -     Iron Profile  -     Protime-INR     4. Polyp of colon, unspecified part of colon, unspecified type  -     CBC & Differential  -     Comprehensive Metabolic Panel  -     Ferritin  -     Iron Profile  -     Protime-INR     5. Gastroesophageal reflux disease with esophagitis, unspecified whether hemorrhage  -     CBC & Differential  -     Comprehensive Metabolic Panel  -     Ferritin  -     Iron Profile  -      Protime-INR        Assessment:  1.  History of grade D esophagitis noted on 2 of 2021 EGD.  2.  Patient has a history of colon polyps. Last c/s several yrs ago.   3. H/o iron def Anemia. He is heme negative today.  4. CRI  5. On warfarin for a fib.      Recommend:  1. CBC, CMP, ferritin, TIBC, PT  2. I recommend an EGD (to document healing of GERD esophagitis) and colonoscopy. He wants to wait till after the labs come back to decide if we should do scopes or not. He would need to come off coumadin for 4 days prior to scopes.   3. F/u 3 mos.      No follow-ups on file.     12/7/21 No change from the above H and P.   Edgar Allen MD

## 2021-12-07 NOTE — DISCHARGE INSTRUCTIONS

## 2021-12-07 NOTE — ANESTHESIA PREPROCEDURE EVALUATION
Anesthesia Evaluation     Patient summary reviewed and Nursing notes reviewed   NPO Solid Status: > 8 hours  NPO Liquid Status: > 2 hours           Airway   Mallampati: II  Neck ROM: full  No difficulty expected  Dental - normal exam     Pulmonary     breath sounds clear to auscultation  (+) shortness of breath, sleep apnea,   Cardiovascular     Rhythm: regular    (+) hypertension, valvular problems/murmurs (s/p MVR), CAD, CABG >6 Months, dysrhythmias Atrial Fib, LARA, hyperlipidemia,       Neuro/Psych  (+) seizures,     GI/Hepatic/Renal/Endo    (+)  GERD, GI bleeding ,     Musculoskeletal     Abdominal    Substance History      OB/GYN          Other   arthritis,                      Anesthesia Plan    ASA 4     MAC     intravenous induction     Anesthetic plan, all risks, benefits, and alternatives have been provided, discussed and informed consent has been obtained with: patient.

## 2021-12-09 LAB
LAB AP CASE REPORT: NORMAL
PATH REPORT.FINAL DX SPEC: NORMAL
PATH REPORT.GROSS SPEC: NORMAL

## 2021-12-10 ENCOUNTER — ANTICOAGULATION VISIT (OUTPATIENT)
Dept: PHARMACY | Facility: HOSPITAL | Age: 76
End: 2021-12-10

## 2021-12-10 DIAGNOSIS — I48.0 PAF (PAROXYSMAL ATRIAL FIBRILLATION) (HCC): Primary | ICD-10-CM

## 2021-12-10 LAB
INR PPP: 1.6 (ref 0.91–1.09)
PROTHROMBIN TIME: 19.5 SECONDS (ref 10–13.8)

## 2021-12-10 PROCEDURE — 36416 COLLJ CAPILLARY BLOOD SPEC: CPT

## 2021-12-10 PROCEDURE — G0463 HOSPITAL OUTPT CLINIC VISIT: HCPCS

## 2021-12-10 PROCEDURE — 85610 PROTHROMBIN TIME: CPT

## 2021-12-10 NOTE — PROGRESS NOTES
Anticoagulation Clinic Progress Note    Anticoagulation Summary  As of 12/10/2021    INR goal:  2.0-3.0   TTR:  47.4 % (7.9 mo)   INR used for dosin.6 (12/10/2021)   Warfarin maintenance plan:  1 mg every Thu; 2 mg all other days   Weekly warfarin total:  13 mg   Plan last modified:  Eduar Weeks, PharmD (2021)   Next INR check:  2021   Priority:  High   Target end date:      Indications    PAF (paroxysmal atrial fibrillation) (HCC) [I48.0]             Anticoagulation Episode Summary     INR check location:      Preferred lab:      Send INR reminders to:  [facility]    Comments:        Anticoagulation Care Providers     Provider Role Specialty Phone number    Jimena Singer MD Referring Cardiology [phone]          Clinic Interview:  Patient Findings     Positives:  Missed doses    Negatives:  Signs/symptoms of thrombosis, Signs/symptoms of bleeding,   Laboratory test error suspected, Change in health, Change in alcohol use,   Change in activity, Upcoming invasive procedure, Emergency department   visit, Upcoming dental procedure, Extra doses, Change in medications,   Change in diet/appetite, Hospital admission, Bruising, Other complaints    Comments:  Patient had been holding for colonoscopy       Clinical Outcomes     Negatives:  Major bleeding event, Thromboembolic event,   Anticoagulation-related hospital admission, Anticoagulation-related ED   visit, Anticoagulation-related fatality    Comments:  Patient had been holding for colonoscopy         INR History:  Anticoagulation Monitoring 2021 2021 12/10/2021   INR 2.1 3.7 1.6   INR Date 2021 2021 12/10/2021   INR Goal 2.0-3.0 2.0-3.0 2.0-3.0   Trend Down Same Same   Last Week Total 10 mg 13 mg 7 mg   Next Week Total 13 mg 2 mg 14 mg   Sun 2 mg Hold () 2 mg   Mon 2 mg Hold () 2 mg   Tue 2 mg Hold (), 3 mg () 2 mg   Wed 2 mg 3 mg (); Otherwise 2 mg 2 mg   Thu 1 mg Hold (); Otherwise  1 mg 1 mg   Fri 2 mg Hold (12/3) 3 mg (12/10); Otherwise 2 mg   Sat 2 mg Hold (12/4) 2 mg   Visit Report - - -   Some recent data might be hidden       Plan:  1. INR is Subtherapeutic today- see above in Anticoagulation Summary.  Will instruct Rodolfo Grover to Increase their warfarin regimen- see above in Anticoagulation Summary.  2. Follow up in 1.5 weeks (Patient going to [place] tomorrow and will be there fore a week). INR is low due to holding for colonoscopy and should continue to rise within a couple of days with a boost.   3. Patient declines warfarin refills.  4. Verbal and written information provided. Patient expresses understanding and has no further questions at this time.    Luci Hale [facility]

## 2021-12-12 NOTE — PROGRESS NOTES
12/12/21       Tell him that path from the EGD showed mild inflammation of the stomach but no evidence of helicobacter pylori.        The colon polyp that was removed was not cancerous and not precancerous. I recommend thinking about a repeat colonoscopy in 5 yrs.        FAX to his PCP.   Tom. kjshane

## 2021-12-13 ENCOUNTER — TELEPHONE (OUTPATIENT)
Dept: GASTROENTEROLOGY | Facility: CLINIC | Age: 76
End: 2021-12-13

## 2021-12-13 NOTE — TELEPHONE ENCOUNTER
VM to pt with request to contact office.     C/s for 12/7/26 placed in recall and HM.     Update to Dr Domenica Merida.

## 2021-12-13 NOTE — TELEPHONE ENCOUNTER
----- Message from Edgar Allen MD sent at 12/12/2021  5:17 PM EST -----  12/12/21       Tell him that path from the EGD showed mild inflammation of the stomach but no evidence of helicobacter pylori.        The colon polyp that was removed was not cancerous and not precancerous. I recommend thinking about a repeat colonoscopy in 5 yrs.        FAX to his PCP.   Thx. kjh

## 2021-12-20 ENCOUNTER — OFFICE VISIT (OUTPATIENT)
Dept: CARDIOLOGY | Facility: CLINIC | Age: 76
End: 2021-12-20

## 2021-12-20 ENCOUNTER — ANTICOAGULATION VISIT (OUTPATIENT)
Dept: PHARMACY | Facility: HOSPITAL | Age: 76
End: 2021-12-20

## 2021-12-20 VITALS
SYSTOLIC BLOOD PRESSURE: 132 MMHG | WEIGHT: 177 LBS | HEART RATE: 63 BPM | DIASTOLIC BLOOD PRESSURE: 68 MMHG | BODY MASS INDEX: 27.78 KG/M2 | HEIGHT: 67 IN

## 2021-12-20 DIAGNOSIS — I25.5 ISCHEMIC CARDIOMYOPATHY: ICD-10-CM

## 2021-12-20 DIAGNOSIS — Z95.1 S/P CABG (CORONARY ARTERY BYPASS GRAFT): ICD-10-CM

## 2021-12-20 DIAGNOSIS — I42.8 NON-ISCHEMIC CARDIOMYOPATHY (HCC): ICD-10-CM

## 2021-12-20 DIAGNOSIS — I48.0 PAF (PAROXYSMAL ATRIAL FIBRILLATION) (HCC): Primary | ICD-10-CM

## 2021-12-20 DIAGNOSIS — I44.0 FIRST DEGREE HEART BLOCK: ICD-10-CM

## 2021-12-20 DIAGNOSIS — Z98.890 H/O MITRAL VALVE REPAIR: ICD-10-CM

## 2021-12-20 DIAGNOSIS — I49.3 FREQUENT PVCS: ICD-10-CM

## 2021-12-20 DIAGNOSIS — I25.10 CORONARY ARTERY DISEASE INVOLVING NATIVE CORONARY ARTERY OF NATIVE HEART WITHOUT ANGINA PECTORIS: Primary | ICD-10-CM

## 2021-12-20 DIAGNOSIS — I48.0 PAF (PAROXYSMAL ATRIAL FIBRILLATION) (HCC): ICD-10-CM

## 2021-12-20 DIAGNOSIS — E78.5 HYPERLIPIDEMIA LDL GOAL <70: ICD-10-CM

## 2021-12-20 DIAGNOSIS — I10 ESSENTIAL HYPERTENSION: ICD-10-CM

## 2021-12-20 LAB
INR PPP: 2.8 (ref 0.91–1.09)
PROTHROMBIN TIME: 33.5 SECONDS (ref 10–13.8)

## 2021-12-20 PROCEDURE — 85610 PROTHROMBIN TIME: CPT

## 2021-12-20 PROCEDURE — 93000 ELECTROCARDIOGRAM COMPLETE: CPT | Performed by: INTERNAL MEDICINE

## 2021-12-20 PROCEDURE — 36416 COLLJ CAPILLARY BLOOD SPEC: CPT

## 2021-12-20 PROCEDURE — 99214 OFFICE O/P EST MOD 30 MIN: CPT | Performed by: INTERNAL MEDICINE

## 2021-12-20 RX ORDER — METOPROLOL SUCCINATE 25 MG/1
50 TABLET, EXTENDED RELEASE ORAL DAILY
Qty: 90 TABLET | Refills: 3 | Status: SHIPPED | OUTPATIENT
Start: 2021-12-20 | End: 2022-01-13

## 2021-12-20 NOTE — PROGRESS NOTES
Anticoagulation Clinic Progress Note    Anticoagulation Summary  As of 2021    INR goal:  2.0-3.0   TTR:  48.1 % (8.3 mo)   INR used for dosin.8 (2021)   Warfarin maintenance plan:  1 mg every Thu; 2 mg all other days   Weekly warfarin total:  13 mg   No change documented:  Luci Hale RPH   Plan last modified:  Eduar Weeks, PharmD (2021)   Next INR check:  1/10/2022   Priority:  High   Target end date:      Indications    PAF (paroxysmal atrial fibrillation) (HCC) [I48.0]             Anticoagulation Episode Summary     INR check location:      Preferred lab:      Send INR reminders to:   [facility]    Comments:        Anticoagulation Care Providers     Provider Role Specialty Phone number    Jimena Singer MD Referring Cardiology [phone]          Clinic Interview:  Patient Findings     Negatives:  Signs/symptoms of thrombosis, Signs/symptoms of bleeding,   Laboratory test error suspected, Change in health, Change in alcohol use,   Change in activity, Upcoming invasive procedure, Emergency department   visit, Upcoming dental procedure, Missed doses, Extra doses, Change in   medications, Change in diet/appetite, Hospital admission, Bruising, Other   complaints      Clinical Outcomes     Negatives:  Major bleeding event, Thromboembolic event,   Anticoagulation-related hospital admission, Anticoagulation-related ED   visit, Anticoagulation-related fatality        INR History:  Anticoagulation Monitoring 2021 12/10/2021 2021   INR 3.7 1.6 2.8   INR Date 2021 12/10/2021 2021   INR Goal 2.0-3.0 2.0-3.0 2.0-3.0   Trend Same Same Same   Last Week Total 13 mg 7 mg 13 mg   Next Week Total 2 mg 14 mg 13 mg   Sun Hold () 2 mg 2 mg   Mon Hold () 2 mg 2 mg   Tue Hold (), 3 mg () 2 mg 2 mg   Wed 3 mg (); Otherwise 2 mg 2 mg 2 mg   Thu Hold (); Otherwise 1 mg 1 mg 1 mg   Fri Hold (12/3) 3 mg (12/10); Otherwise 2 mg 2 mg   Sat Hold  (12/4) 2 mg 2 mg   Visit Report - - -   Some recent data might be hidden       Plan:  1. INR is Therapeutic today- see above in Anticoagulation Summary.  Will instruct Rodolfo Grover to Continue their warfarin regimen- see above in Anticoagulation Summary.  2. Follow up in 3 weeks  3. Patient declines warfarin refills.  4. Verbal and written information provided. Patient expresses understanding and has no further questions at this time.    Luci Hale [facility]

## 2021-12-20 NOTE — PROGRESS NOTES
,Date of Office Visit: 2021  Encounter Provider: Jimena Singer MD  Place of Service: [facility] CARDIOLOGY  Patient Name: Rodolfo Grover  :1945    Chief complaint  Pulmonary hypertension, coronary artery disease, atrial fibrillation, valvular heart disease    History of Present Illness  Patient is a 76-year-old gentleman with history of rheumatic fever, GE reflux disease who was seen at [facility] in 2018 after humeral fracture following a fall.  At that time he was noted to have ventricular bigeminy with normal potassium and magnesium levels.  He was hypertensive at the time.  He had an echocardiogram that showed normal systolic function with normal diastolic function, mild left atrial enlargement saline study was indeterminate.  There was aortic valve sclerosis with mild aortic regurgitation and trivial mitral and tricuspid regurgitation with mild pulmonary hypertension with an RV systolic pressure 42 mmHg.  A stress perfusion study was negative for ischemia.  Follow-up echocardiogram in 2020 showed an ejection fraction of 55% with normal diastolic function, mild aortic mitral valve regurgitation, mild to moderate tricuspid regurgitation with an RV systolic pressure increased further to 48 mmHg.  He presented in 2020 with complaints of chest discomfort and non-STEMI.  Echo showed new wall motion abnormalities as well as pulmonary hypertension( RVSP 61 mmHg).  Subsequent cardiac catheterization showed multivessel coronary artery disease.  Subsequently underwent CABG x7 vessels with mitral valve repair.  Postoperative course was complicated by hemoptysis, esophageal esophagitis, atrial fibrillation associated also with intermittent complete heart block.  He was also felt to have had a seizure and was started on Keppra.  He also had acute renal insufficiency.  He also had induration and possible cellulitis of his right thigh leg wound.  Echocardiogram  9/2021 showed ejection fraction 52% with mild left ventricle hypertrophy, diastolic dysfunction present mild aortic valve regurgitation noted trivial mitral valve regurgitation with mitral valve repair.  Mild tricuspid regurgitation with an RV systolic pressure 40 mmHg.    Since last visit he has had no chest pain palpitations syncope near syncope edema has improved he is using CPAP consistently.  He is walking 30 minutes 3 times a week.  Blood work 12/2020 normal CMP except for creatinine 1.63 INR slightly labile most recently 2.8.  Hemoglobin slightly low at 12.5.    Past Medical History:   Diagnosis Date   • Acute blood loss anemia    • Atrial fibrillation (formerly [facility])    • Bilateral lower extremity edema    • Bradycardia following surgery    • CAD (coronary artery disease)    • Chest discomfort    • Elevated troponin    • Focal motor seizure (formerly [facility])    • Generalized tonic-clonic seizure (formerly [facility])    • GERD (gastroesophageal reflux disease)    • Iritis of right eye    • Ischemic cardiomyopathy    • Mitral valve insufficiency    • Post-ictal state (formerly [facility])    • Pulmonary hypertension (formerly [facility])    • PVC (premature ventricular contraction)    • Rheumatic fever    • S/P CABG x 7    • S/P mitral valve repair    • Severe obstructive sleep apnea    • Shoulder fracture, right      Past Surgical History:   Procedure Laterality Date   • CARDIAC CATHETERIZATION N/A 2/19/2021    Procedure: Coronary angiography;  Surgeon: Bry Nails MD;  Location: [facility] CATH INVASIVE LOCATION;  Service: Cardiovascular;  Laterality: N/A;   • CARDIAC CATHETERIZATION N/A 2/19/2021    Procedure: Left heart cath;  Surgeon: Bry Nails MD;  Location: [facility] CATH INVASIVE LOCATION;  Service: Cardiovascular;  Laterality: N/A;   • CARDIAC CATHETERIZATION N/A 2/19/2021    Procedure: Right Heart Cath;  Surgeon: Bry Nails MD;  Location: [facility] CATH INVASIVE LOCATION;  Service: Cardiovascular;  Laterality: N/A;   • CARDIAC CATHETERIZATION  N/A 2/19/2021    Procedure: Left ventriculography;  Surgeon: Bry Nails MD;  Location: [facility] CATH INVASIVE LOCATION;  Service: Cardiovascular;  Laterality: N/A;   • COLONOSCOPY N/A 12/7/2021    Procedure: COLONOSCOPY to cecum with cold biopsy polypectomy;  Surgeon: Edgar Allen MD;  Location: [facility] ENDOSCOPY;  Service: Gastroenterology;  Laterality: N/A;  IRON DEFICIENCY ANEMIA, H/O COLON POLYPS  --   DIVERTICULOSIS, polyp, hemorrhoids   • CORONARY ARTERY BYPASS GRAFT N/A 2/22/2021    Procedure: BK, MIDLINE STERNOTOMY, CORONARY ARTERY BYPASS X 7 UTILIZING THE LEFT INTERNAL MAMMARY ARTERY AND THE RIGHT SAPHENOUS VEIN, MITRAL VALVE REPAIR, PRP;  Surgeon: Jr Shyam Echavarria MD;  Location: [facility] MAIN OR;  Service: Cardiothoracic;  Laterality: N/A;   • ENDOSCOPY N/A 8/16/2018    Erosive gastritis, diverticulosis   • ENDOSCOPY N/A 2/26/2021    Procedure: ESOPHAGOGASTRODUODENOSCOPY AT BEDSIDE WITH HOT SNARE POLYPECTOMY AND CLIP PLACEMENT X1;  Surgeon: Jono Laboy MD;  Location: [facility] ENDOSCOPY;  Service: Gastroenterology;  Laterality: N/A;  PRE-  GI BLEED  POST- GASTRITIS, ESOPHAGITIS, POLYP   • ENDOSCOPY N/A 12/7/2021    Procedure: ESOPHAGOGASTRODUODENOSCOPY with COLD  BIOPSIES;  Surgeon: Edgar Allen MD;  Location: [facility] ENDOSCOPY;  Service: Gastroenterology;  Laterality: N/A;  IRON DEFICIENCY ANEMIA, H/O ULCERATIVE ESOPHAGITIS  --GASTRITIS, DUODENAL POLYP   • HERNIA REPAIR     • SHOULDER CLOSED REDUCTION Right 3/16/2018    Procedure: RIGHT SHOULDER CLOSED REDUCTION;  Surgeon: Kj Garcia MD;  Location: [facility] OR;  Service: Orthopedics   • TONSILLECTOMY     • TOTAL SHOULDER ARTHROPLASTY W/ DISTAL CLAVICLE EXCISION Right 3/22/2018    Procedure: Reverse Total Shoulder Arthroplsty;  Surgeon: Kj Garcia MD;  Location: [facility] MAIN OR;  Service: Orthopedics   • TRANSESOPHAGEAL ECHOCARDIOGRAM (BK) N/A 2/22/2021    Procedure: TRANSESOPHAGEAL ECHOCARDIOGRAM;  Surgeon: Jr Jericho  Shyam HUNG MD;  Location: [facility] OR;  Service: Cardiothoracic;  Laterality: N/A;     Outpatient Medications Prior to Visit   Medication Sig Dispense Refill   • acetaminophen (TYLENOL) 500 MG tablet Take 2 tablets by mouth 3 (Three) Times a Day. (Patient taking differently: Take 1,000 mg by mouth 3 (Three) Times a Day As Needed.)     • aspirin 81 MG EC tablet Take 1 tablet by mouth Daily. 90 tablet 1   • Cholecalciferol 50 MCG (2000 UT) capsule Take 1 capsule by mouth Daily.     • fluticasone (CUTIVATE) 0.05 % cream Apply 1 application topically to the appropriate area as directed 2 (Two) Times a Day. 30 g 0   • furosemide (Lasix) 40 MG tablet Take 1 tablet by mouth Daily As Needed (for weight gain or shortness of breath). (Patient taking differently: Take 40 mg by mouth As Needed (for weight gain or shortness of breath).) 30 tablet 1   • pantoprazole (PROTONIX) 40 MG EC tablet Take 1 tablet by mouth 2 (Two) Times a Day Before Meals. 180 tablet 1   • potassium chloride 10 MEQ CR tablet Take 1 tablet by mouth Daily As Needed (Take with Lasix (furosemide)). With Lasix (furosemide) 30 tablet 1   • Psyllium (METAMUCIL FIBER PO) Take  by mouth As Needed.     • sucralfate (CARAFATE) 1 g tablet As Needed.     • warfarin (COUMADIN) 2 MG tablet Take one tablet by mouth daily or as directed by the [facility] 90 tablet 1   • metoprolol succinate XL (TOPROL-XL) 50 MG 24 hr tablet Take 1 tablet by mouth Daily. 90 tablet 1   • VITAMIN D PO Take 2,000 Units by mouth Daily.       No facility-administered medications prior to visit.       Allergies as of 12/20/2021   • (No Known Allergies)     Social History     Socioeconomic History   • Marital status:    Tobacco Use   • Smoking status: Never Smoker   • Smokeless tobacco: Never Used   Vaping Use   • Vaping Use: Never used   Substance and Sexual Activity   • Alcohol use: Yes     Comment: occasional   • Drug use: No   • Sexual activity: Defer     Family  "History   Problem Relation Age of Onset   • Malig Hyperthermia Neg Hx      Review of Systems   Constitutional: Negative for chills, fever, weight gain and weight loss.   Cardiovascular: Negative for leg swelling.   Respiratory: Negative for cough, snoring and wheezing.    Hematologic/Lymphatic: Negative for bleeding problem. Does not bruise/bleed easily.   Skin: Negative for color change.   Musculoskeletal: Negative for falls, joint pain and myalgias.   Gastrointestinal: Negative for melena.   Genitourinary: Negative for hematuria.   Neurological: Negative for excessive daytime sleepiness.   Psychiatric/Behavioral: Negative for depression. The patient is not nervous/anxious.         Objective:     Vitals:    12/20/21 1159   BP: 132/68   Pulse: 63   Weight: 80.3 kg (177 lb)   Height: 170.2 cm (67\")     Body mass index is 27.72 kg/m².    Vitals reviewed.   Constitutional:       Appearance: Well-developed.   Eyes:      General: No scleral icterus.        Right eye: No discharge.      Conjunctiva/sclera: Conjunctivae normal.      Pupils: Pupils are equal, round, and reactive to light.   HENT:      Head: Normocephalic.      Nose: Nose normal.   Neck:      Thyroid: No thyromegaly.      Vascular: No JVD.   Pulmonary:      Effort: Pulmonary effort is normal. No respiratory distress.      Breath sounds: Normal breath sounds. No wheezing. No rales.   Cardiovascular:      Normal rate. Regular rhythm. Normal S1. Normal S2.      Murmurs: There is no murmur.      No gallop.   Pulses:     Intact distal pulses.   Edema:     Peripheral edema absent.   Abdominal:      General: Bowel sounds are normal. There is no distension.      Palpations: Abdomen is soft.      Tenderness: There is no abdominal tenderness. There is no rebound.   Musculoskeletal: Normal range of motion.         General: No tenderness.      Cervical back: Normal range of motion and neck supple. Skin:     General: Skin is warm and dry.      Findings: No erythema or " rash.   Neurological:      Mental Status: Alert and oriented to person, place, and time.   Psychiatric:         Behavior: Behavior normal.         Thought Content: Thought content normal.         Judgment: Judgment normal.       Lab Review:     ECG 12 Lead    Date/Time: 12/20/2021 12:14 PM  Performed by: Jimena Singer MD  Authorized by: Jimena Singer MD   Comparison: compared with previous ECG   Similar to previous ECG  Rhythm: sinus rhythm  Conduction: 1st degree AV block  Other findings: non-specific ST-T wave changes and left ventricular hypertrophy    Clinical impression: abnormal EKG          Assessment:       Diagnosis Plan   1. Coronary artery disease involving native coronary artery of native heart without angina pectoris  ECG 12 Lead   2. Hyperlipidemia LDL goal <70  Lipid Panel   3. Frequent PVCs  metoprolol succinate XL (TOPROL-XL) 25 MG 24 hr tablet   4. First degree heart block  ECG 12 Lead   5. S/P CABG (coronary artery bypass graft)     6. PAF (paroxysmal atrial fibrillation) ([facility])     7. H/O mitral valve repair     8. Ischemic cardiomyopathy     9. Essential hypertension     10. Non-ischemic cardiomyopathy (HCC)       Plan:       1.  Recurrent postoperative atrial fibrillation with history of bradycardia postoperatively.  EKG shows sinus rhythm with prolonged PA with first-degree AV block.  Will decrease Toprol Toprol-XL to 25 mg day return in 1 week for repeat EKG  2.  History of non-STEMI with subsequent multivessel CABG with cardiomyopathy 2/2021.  Clinically doing well.  Continue risk factor modification follow-up in 1 year.  3.  Status post mitral valve repair, as above.  He is aware of and does follow SBE prophylaxis.  4.  Postoperative hematemesis with recent EGD showing grade D esophagitis.  No recurrent problems.  5.  Chronic renal insufficiency.  Renal labs stable.  6.  Superficial vein thrombosis, clinically seems improved by description.  7.  Pulmonary hypertension, improved and mild on  echo 9/2021  8.  Severe obstructive sleep apnea, on CPAP  9.  Dyslipidemia.  He is not scheduled to see Dr. Merida till next year and would like follow-up of his lipids.  We will check a lipid panel to be done in 1 week.    Time Spent: I spent 35 minutes caring for Rodoflo on this date of service. This time includes time spent by me in the following activities: preparing for the visit, reviewing tests, obtaining and/or reviewing a separately obtained history, performing a medically appropriate examination and/or evaluation, counseling and educating the patient/family/caregiver, ordering medications, tests, or procedures, documenting information in the medical record and independently interpreting results and communicating that information with the patient/family/caregiver.   I spent 1 minutes on the separately reported service of ECG. This time is not included in the time used to support the E/M service also reported today.        Your medication list          Accurate as of December 20, 2021 11:59 PM. If you have any questions, ask your nurse or doctor.            CHANGE how you take these medications      Instructions Last Dose Given Next Dose Due   acetaminophen 500 MG tablet  Commonly known as: TYLENOL  What changed:   · when to take this  · reasons to take this      Take 2 tablets by mouth 3 (Three) Times a Day.       furosemide 40 MG tablet  Commonly known as: Lasix  What changed: when to take this      Take 1 tablet by mouth Daily As Needed (for weight gain or shortness of breath).       metoprolol succinate XL 25 MG 24 hr tablet  Commonly known as: TOPROL-XL  What changed: medication strength  Changed by: Jimena Singer MD      Take 2 tablets by mouth Daily.          CONTINUE taking these medications      Instructions Last Dose Given Next Dose Due   aspirin 81 MG EC tablet      Take 1 tablet by mouth Daily.       Cholecalciferol 50 MCG (2000 UT) capsule      Take 1 capsule by mouth Daily.       fluticasone 0.05 %  cream  Commonly known as: CUTIVATE      Apply 1 application topically to the appropriate area as directed 2 (Two) Times a Day.       METAMUCIL FIBER PO      Take  by mouth As Needed.       pantoprazole 40 MG EC tablet  Commonly known as: PROTONIX      Take 1 tablet by mouth 2 (Two) Times a Day Before Meals.       potassium chloride 10 MEQ CR tablet      Take 1 tablet by mouth Daily As Needed (Take with Lasix (furosemide)). With Lasix (furosemide)       sucralfate 1 g tablet  Commonly known as: CARAFATE      As Needed.       warfarin 2 MG tablet  Commonly known as: COUMADIN      Take one tablet by mouth daily or as directed by the [facility]             Where to Get Your Medications      These medications were sent to [facility] [address] [place] [id number] [facility] [phone] [facility] [phone]   [id number] [place] [address]    Phone: [phone]   · metoprolol succinate XL 25 MG 24 hr tablet         Patient is no longer taking -.  I corrected the med list to reflect this.  I did not stop these medications.      Dictated utilizing Dragon dictation

## 2021-12-21 ENCOUNTER — HOSPITAL ENCOUNTER (OUTPATIENT)
Dept: PHYSICAL THERAPY | Facility: HOSPITAL | Age: 76
Setting detail: THERAPIES SERIES
Discharge: HOME | End: 2021-12-21

## 2021-12-21 DIAGNOSIS — R42 VERTIGO: Primary | ICD-10-CM

## 2021-12-21 DIAGNOSIS — R26.89 BALANCE DISORDER: ICD-10-CM

## 2021-12-21 PROCEDURE — 97112 NEUROMUSCULAR REEDUCATION: CPT | Performed by: PHYSICAL THERAPIST

## 2021-12-21 PROCEDURE — 97110 THERAPEUTIC EXERCISES: CPT | Performed by: PHYSICAL THERAPIST

## 2021-12-21 NOTE — THERAPY TREATMENT NOTE
Outpatient Physical Therapy Vestibular Treatment Note  [facility]     Patient Name: Rodolfo Grover  : 1945  MRN: [id number]  Today's Date: 2021      Visit Date: 2021    Visit Dx:     ICD-10-CM ICD-9-CM   1. Vertigo  R42 780.4   2. Balance disorder  R26.89 781.99       Patient Active Problem List   Diagnosis   • Acute blood loss anemia   • Fracture of fifth metacarpal bone of right hand   • Closed fracture dislocation of right shoulder   • Gastroesophageal reflux disease without esophagitis   • DNR (do not resuscitate)   • Bradycardia following surgery   • Frequent PVCs   • Proximal humerus fracture   • Osteoarthritis   • Severe obstructive sleep apnea   • Pure hypercholesterolemia   • Pulmonary hypertension (HCC)   • LARA (dyspnea on exertion)   • Localized edema   • Chest discomfort   • Elevated troponin   • Abnormal EKG   • Coronary artery disease involving native coronary artery of native heart without angina pectoris   • Focal motor seizure (HCC)   • Generalized tonic-clonic seizure (HCC)   • Post-ictal state (HCC)   • Coffee ground emesis   • S/P CABG (coronary artery bypass graft)   • Mitral regurgitation   • H/O mitral valve repair   • PAF (paroxysmal atrial fibrillation) (HCC)   • Ischemic cardiomyopathy   • Essential hypertension   • Non-ischemic cardiomyopathy (HCC)   • Iron deficiency anemia due to chronic blood loss   • Polyp of colon                        PT Assessment/Plan     Row Name 21 1000          PT Assessment    Assessment Comments Patient returns for 1st follow up. Denies acute vertigo therefore focused in on proprioception training, lower quarter strength and vestibular acuity.  Of note is excessive pronation in SLS on RLE. Updated HEP and patient receptive.  -GR            PT Plan    PT Plan Comments Progress with strength and functional balance, BPPV screen as needed.  -GR           User Key  (r) = Recorded By, (t) = Taken By, (c) = Cosigned By    Initials Name  Provider Type    GR Gaurav Cortez, PT Physical Therapist                    OP Exercises     Row Name 12/21/21 1048 12/21/21 1000          Subjective Comments    Subjective Comments -- States he had one episode of vertigo the Friday after eval, resolved with Meclizine and rest. Overall is actually feeling better; would like to focus in on balance more.  -GR            Subjective Pain    Able to rate subjective pain? -- yes  -GR     Pre-Treatment Pain Level -- 0  -GR     Post-Treatment Pain Level -- 0  -GR            Total Minutes    06050 - PT Therapeutic Exercise Minutes 26  -GR --     36649 -  PT Neuromuscular Reeducation Minutes 20  -GR --            Exercise 1    Exercise Name 1 -- VORx1 yaw seated  -GR     Cueing 1 -- Demo  -GR     Sets 1 -- 1  -GR     Reps 1 -- 1  -GR     Time 1 -- 30 seconds  -GR            Exercise 2    Exercise Name 2 -- VORx1 pitch seated  -GR     Cueing 2 -- Demo  -GR     Sets 2 -- 1  -GR     Reps 2 -- 1  -GR     Time 2 -- 30 seconds  -GR            Exercise 3    Exercise Name 3 -- Nustep  -GR     Time 3 -- 5 min  -GR     Additional Comments -- L5 UELE  -GR            Exercise 4    Exercise Name 4 -- Standing hip abd  -GR     Cueing 4 -- Demo  -GR     Sets 4 -- 2  -GR     Reps 4 -- 10  -GR     Additional Comments -- 2# each  -GR            Exercise 5    Exercise Name 5 -- Standing HS curl  -GR     Cueing 5 -- Demo  -GR     Sets 5 -- 2  -GR     Reps 5 -- 10  -GR     Additional Comments -- 2# each  -GR            Exercise 6    Exercise Name 6 -- LAQ  -GR     Cueing 6 -- Demo  -GR     Sets 6 -- 2  -GR     Reps 6 -- 10  -GR     Additional Comments -- 2#  -GR            Exercise 7    Exercise Name 7 -- Airex - normal Abimbola  -GR     Cueing 7 -- Demo  -GR     Sets 7 -- 2  -GR     Reps 7 -- 1  -GR     Time 7 -- 30 seconds  -GR     Additional Comments -- EO/EC  -GR            Exercise 8    Exercise Name 8 -- Airex - rhomberg  -GR     Cueing 8 -- Demo  -GR     Sets 8 -- 2  -GR     Reps 8 -- 1   -GR     Time 8 -- 30 seconds  -GR     Additional Comments -- EO/EC  -GR            Exercise 9    Exercise Name 9 -- Airex - march in place  -GR     Cueing 9 -- Demo  -GR     Sets 9 -- 1  -GR     Reps 9 -- 2  -GR     Time 9 -- 30 seconds  -GR     Additional Comments -- single UE support  -GR            Exercise 10    Exercise Name 10 -- Rockerboard- static stance AP/ML  -GR     Cueing 10 -- Demo  -GR     Sets 10 -- 1  -GR     Reps 10 -- 2  -GR     Time 10 -- 1 min  -GR     Additional Comments -- each direction; single to double UE support  -GR            Exercise 11    Exercise Name 11 -- DF stretch on rockerboard  -GR     Sets 11 -- 1  -GR     Reps 11 -- 3  -GR     Time 11 -- 30 seconds  -GR           User Key  (r) = Recorded By, (t) = Taken By, (c) = Cosigned By    Initials Name Provider Type    Gaurav Borges, PT Physical Therapist                             PT OP Goals     Row Name 12/21/21 1000          PT Short Term Goals    STG Date to Achieve 12/23/21  -GR     STG 1 Patient will be independent with initial HEP.  -GR     STG 1 Progress Ongoing  -GR     STG 2 Patient will report no worse with VOR.  -GR     STG 2 Progress Ongoing  -GR            Long Term Goals    LTG Date to Achieve 01/22/22  -GR     LTG 1 Patient will remain negative for BPPV all canals.  -GR     LTG 1 Progress Ongoing  -GR     LTG 2 Patient will score >/=19/24 on the DGI to indicated decreased fall risk.  -GR     LTG 2 Progress Ongoing  -GR     LTG 3 Patient will score </=12/100 on the dizziness handicap inventory to indicate improved perceived positional tolerance.  -GR     LTG 3 Progress Ongoing  -GR           User Key  (r) = Recorded By, (t) = Taken By, (c) = Cosigned By    Initials Name Provider Type    Gaurav Borges, PT Physical Therapist                Therapy Education  Education Details: [id number] medbridge              Time Calculation:   Start Time: 1000  Stop Time: 1046  Time Calculation (min): 46 min  Timed  Charges  69588 - PT Therapeutic Exercise Minutes: 26  02040 -  PT Neuromuscular Reeducation Minutes: 20  Total Minutes  Timed Charges Total Minutes: 46   Total Minutes: 46   Therapy Charges for Today     Code Description Service Date Service Provider Modifiers Qty    48514612113 HC PT NEUROMUSC RE EDUCATION EA 15 MIN 12/21/2021 Gaurav Cortez, PT GP 1    05489132541 HC PT THER PROC EA 15 MIN 12/21/2021 Gaurav Cortez, PT GP 2                   Gaurav Cortez, PT  12/21/2021

## 2021-12-29 ENCOUNTER — CLINICAL SUPPORT (OUTPATIENT)
Dept: CARDIOLOGY | Facility: CLINIC | Age: 76
End: 2021-12-29

## 2021-12-29 ENCOUNTER — TELEPHONE (OUTPATIENT)
Dept: CARDIOLOGY | Facility: CLINIC | Age: 76
End: 2021-12-29

## 2021-12-29 ENCOUNTER — LAB (OUTPATIENT)
Dept: LAB | Facility: HOSPITAL | Age: 76
End: 2021-12-29

## 2021-12-29 ENCOUNTER — HOSPITAL ENCOUNTER (OUTPATIENT)
Dept: PHYSICAL THERAPY | Facility: HOSPITAL | Age: 76
Setting detail: THERAPIES SERIES
Discharge: HOME | End: 2021-12-29

## 2021-12-29 DIAGNOSIS — E78.5 HYPERLIPIDEMIA LDL GOAL <70: ICD-10-CM

## 2021-12-29 DIAGNOSIS — I44.0 1ST DEGREE AV BLOCK: Primary | ICD-10-CM

## 2021-12-29 DIAGNOSIS — R42 VERTIGO: Primary | ICD-10-CM

## 2021-12-29 DIAGNOSIS — R26.89 BALANCE DISORDER: ICD-10-CM

## 2021-12-29 LAB
CHOLEST SERPL-MCNC: 247 MG/DL (ref 0–200)
HDLC SERPL-MCNC: 71 MG/DL (ref 40–60)
LDLC SERPL CALC-MCNC: 163 MG/DL (ref 0–100)
LDLC/HDLC SERPL: 2.27 {RATIO}
TRIGL SERPL-MCNC: 75 MG/DL (ref 0–150)
VLDLC SERPL-MCNC: 13 MG/DL (ref 5–40)

## 2021-12-29 PROCEDURE — 97112 NEUROMUSCULAR REEDUCATION: CPT | Performed by: PHYSICAL THERAPIST

## 2021-12-29 PROCEDURE — 80061 LIPID PANEL: CPT

## 2021-12-29 PROCEDURE — 93000 ELECTROCARDIOGRAM COMPLETE: CPT | Performed by: INTERNAL MEDICINE

## 2021-12-29 PROCEDURE — 97110 THERAPEUTIC EXERCISES: CPT | Performed by: PHYSICAL THERAPIST

## 2021-12-29 PROCEDURE — 36415 COLL VENOUS BLD VENIPUNCTURE: CPT

## 2021-12-29 NOTE — THERAPY PROGRESS REPORT/RE-CERT
Outpatient Physical Therapy Vestibular Progress Note  [facility]     Patient Name: Rodolfo Grover  : 1945  MRN: [id number]  Today's Date: 2021      Visit Date: 2021    Patient Active Problem List   Diagnosis   • Acute blood loss anemia   • Fracture of fifth metacarpal bone of right hand   • Closed fracture dislocation of right shoulder   • Gastroesophageal reflux disease without esophagitis   • DNR (do not resuscitate)   • Bradycardia following surgery   • Frequent PVCs   • Proximal humerus fracture   • Osteoarthritis   • Severe obstructive sleep apnea   • Pure hypercholesterolemia   • Pulmonary hypertension (HCC)   • LARA (dyspnea on exertion)   • Localized edema   • Chest discomfort   • Elevated troponin   • Abnormal EKG   • Coronary artery disease involving native coronary artery of native heart without angina pectoris   • Focal motor seizure (HCC)   • Generalized tonic-clonic seizure (HCC)   • Post-ictal state (HCC)   • Coffee ground emesis   • S/P CABG (coronary artery bypass graft)   • Mitral regurgitation   • H/O mitral valve repair   • PAF (paroxysmal atrial fibrillation) (HCC)   • Ischemic cardiomyopathy   • Essential hypertension   • Non-ischemic cardiomyopathy (HCC)   • Iron deficiency anemia due to chronic blood loss   • Polyp of colon        Past Medical History:   Diagnosis Date   • Acute blood loss anemia    • Atrial fibrillation (HCC)    • Bilateral lower extremity edema    • Bradycardia following surgery    • CAD (coronary artery disease)    • Chest discomfort    • Elevated troponin    • Focal motor seizure (HCC)    • Generalized tonic-clonic seizure (HCC)    • GERD (gastroesophageal reflux disease)    • Iritis of right eye    • Ischemic cardiomyopathy    • Mitral valve insufficiency    • Post-ictal state (HCC)    • Pulmonary hypertension (HCC)    • PVC (premature ventricular contraction)    • Rheumatic fever    • S/P CABG x 7    • S/P mitral valve repair    • Severe  obstructive sleep apnea    • Shoulder fracture, right         Past Surgical History:   Procedure Laterality Date   • CARDIAC CATHETERIZATION N/A 2/19/2021    Procedure: Coronary angiography;  Surgeon: Bry Nails MD;  Location: [facility] CATH INVASIVE LOCATION;  Service: Cardiovascular;  Laterality: N/A;   • CARDIAC CATHETERIZATION N/A 2/19/2021    Procedure: Left heart cath;  Surgeon: Bry Nails MD;  Location: [facility] CATH INVASIVE LOCATION;  Service: Cardiovascular;  Laterality: N/A;   • CARDIAC CATHETERIZATION N/A 2/19/2021    Procedure: Right Heart Cath;  Surgeon: Bry Nails MD;  Location: [facility] CATH INVASIVE LOCATION;  Service: Cardiovascular;  Laterality: N/A;   • CARDIAC CATHETERIZATION N/A 2/19/2021    Procedure: Left ventriculography;  Surgeon: Bry Nails MD;  Location: [facility] CATH INVASIVE LOCATION;  Service: Cardiovascular;  Laterality: N/A;   • COLONOSCOPY N/A 12/7/2021    Procedure: COLONOSCOPY to cecum with cold biopsy polypectomy;  Surgeon: Edgar Allen MD;  Location: [facility] ENDOSCOPY;  Service: Gastroenterology;  Laterality: N/A;  IRON DEFICIENCY ANEMIA, H/O COLON POLYPS  --   DIVERTICULOSIS, polyp, hemorrhoids   • CORONARY ARTERY BYPASS GRAFT N/A 2/22/2021    Procedure: BK, MIDLINE STERNOTOMY, CORONARY ARTERY BYPASS X 7 UTILIZING THE LEFT INTERNAL MAMMARY ARTERY AND THE RIGHT SAPHENOUS VEIN, MITRAL VALVE REPAIR, PRP;  Surgeon: Jr Shyam Echavarria MD;  Location: [facility] MAIN OR;  Service: Cardiothoracic;  Laterality: N/A;   • ENDOSCOPY N/A 8/16/2018    Erosive gastritis, diverticulosis   • ENDOSCOPY N/A 2/26/2021    Procedure: ESOPHAGOGASTRODUODENOSCOPY AT BEDSIDE WITH HOT SNARE POLYPECTOMY AND CLIP PLACEMENT X1;  Surgeon: Jono Laboy MD;  Location: [facility] ENDOSCOPY;  Service: Gastroenterology;  Laterality: N/A;  PRE-  GI BLEED  POST- GASTRITIS, ESOPHAGITIS, POLYP   • ENDOSCOPY N/A 12/7/2021    Procedure: ESOPHAGOGASTRODUODENOSCOPY with COLD   BIOPSIES;  Surgeon: Edgar Allen MD;  Location: [facility] ENDOSCOPY;  Service: Gastroenterology;  Laterality: N/A;  IRON DEFICIENCY ANEMIA, H/O ULCERATIVE ESOPHAGITIS  --GASTRITIS, DUODENAL POLYP   • HERNIA REPAIR     • SHOULDER CLOSED REDUCTION Right 3/16/2018    Procedure: RIGHT SHOULDER CLOSED REDUCTION;  Surgeon: Kj Garcia MD;  Location: [facility];  Service: Orthopedics   • TONSILLECTOMY     • TOTAL SHOULDER ARTHROPLASTY W/ DISTAL CLAVICLE EXCISION Right 3/22/2018    Procedure: Reverse Total Shoulder Arthroplsty;  Surgeon: Kj Garcia MD;  Location: [facility] OR;  Service: Orthopedics   • TRANSESOPHAGEAL ECHOCARDIOGRAM (BK) N/A 2/22/2021    Procedure: TRANSESOPHAGEAL ECHOCARDIOGRAM;  Surgeon: Jr Shyam Echavarria MD;  Location: [facility] OR;  Service: Cardiothoracic;  Laterality: N/A;         Visit Dx:     ICD-10-CM ICD-9-CM   1. Vertigo  R42 780.4   2. Balance disorder  R26.89 781.99                           Therapy Education  Education Details: current progress       OP Exercises     Row Name 12/29/21 0918             Subjective Comments    Subjective Comments No major changes- had blood work this morning and ECG this afternoon. Was less compliant than he had hoped over the holidays.  -GR              Subjective Pain    Able to rate subjective pain? yes  -GR      Pre-Treatment Pain Level 0  -GR              Total Minutes    89208 - PT Therapeutic Exercise Minutes 20  -GR      94431 -  PT Neuromuscular Reeducation Minutes 20  -GR              Exercise 3    Exercise Name 3 Nustep  -GR      Time 3 5 min  -GR              Exercise 4    Exercise Name 4 Standing hip abd  -GR      Cueing 4 Demo  -GR      Sets 4 2  -GR      Reps 4 10  -GR      Additional Comments 3# each airex  -GR              Exercise 5    Exercise Name 5 Standing HS curl  -GR      Cueing 5 Demo  -GR      Sets 5 2  -GR      Reps 5 10  -GR      Additional Comments 3# each airex  -GR              Exercise 6    Exercise Name 6 LAQ  " -GR      Cueing 6 Demo  -GR      Sets 6 2  -GR      Reps 6 10  -GR      Additional Comments 3#  -GR              Exercise 7    Exercise Name 7 Airex - rhomberg  -GR      Cueing 7 Demo  -GR      Sets 7 2  -GR      Reps 7 1  -GR      Time 7 30 seconds  -GR      Additional Comments EO/EC  -GR              Exercise 8    Exercise Name 8 Airex - rhomberg  -GR      Cueing 8 Demo  -GR      Sets 8 2  -GR      Reps 8 1  -GR      Time 8 30 seconds  -GR      Additional Comments EO/EC  -GR              Exercise 9    Exercise Name 9 Airex - march in place  -GR      Cueing 9 Demo  -GR      Sets 9 1  -GR      Reps 9 2  -GR      Time 9 30 seconds  -GR      Additional Comments 3# ankle weights  -GR              Exercise 10    Exercise Name 10 Rockerboard- static stance AP/ML  -GR      Cueing 10 Demo  -GR      Sets 10 1  -GR      Reps 10 2  -GR      Time 10 1 min  -GR              Exercise 11    Exercise Name 11 DF stretch on rockerboard  -GR      Sets 11 1  -GR      Reps 11 3  -GR      Time 11 30 seconds  -GR              Exercise 12    Exercise Name 12 Stride stance - front foot 6\" step  -GR      Reps 12 20  -GR      Additional Comments holding L2  med ball  -GR              Exercise 13    Exercise Name 13 Hurdles - fwd and lateral step overs  -GR      Reps 13 4 laps // bars each  -GR      Additional Comments fingertip support  -GR              Exercise 14    Exercise Name 14 Leg press- DL/SL  -GR      Cueing 14 Demo  -GR      Sets 14 2  -GR      Reps 14 10  -GR      Additional Comments each; 90#/65#  -GR              Exercise 15    Exercise Name 15 Cone tap matrix (3)  -GR      Cueing 15 Demo  -GR      Sets 15 1  -GR      Reps 15 10  -GR      Additional Comments each leg  -GR            User Key  (r) = Recorded By, (t) = Taken By, (c) = Cosigned By    Initials Name Provider Type    GR Gaurav Cortez, PT Physical Therapist                             PT OP Goals     Row Name 12/29/21 0900          PT Short Term Goals    STG Date " to Achieve 12/23/21  -GR     STG 1 Patient will be independent with initial HEP.  -GR     STG 1 Progress Met  -GR     STG 2 Patient will report no worse with VOR.  -GR     STG 2 Progress Met  -GR            Long Term Goals    LTG Date to Achieve 01/22/22  -GR     LTG 1 Patient will remain negative for BPPV all canals.  -GR     LTG 1 Progress Ongoing  -GR     LTG 2 Patient will score >/=19/24 on the DGI to indicated decreased fall risk.  -GR     LTG 2 Progress Ongoing  -GR     LTG 3 Patient will score </=12/100 on the dizziness handicap inventory to indicate improved perceived positional tolerance.  -GR     LTG 3 Progress Ongoing  -GR           User Key  (r) = Recorded By, (t) = Taken By, (c) = Cosigned By    Initials Name Provider Type    Gaurav Borges, PT Physical Therapist                 PT Assessment/Plan     Row Name 12/29/21 0900          PT Assessment    Assessment Comments Mr. Grover has attended 3 sessions of skilled PT. He has met 2/2 STGs and 0/3 LTGs. Visits and progression have been limited 2/2 patient availability with holidays/travel/etc. He remains appropriate for skilled PT to address LTGs. Thank you for this referral.  -GR            PT Plan    PT Plan Comments Continue 1-2x4.  -GR           User Key  (r) = Recorded By, (t) = Taken By, (c) = Cosigned By    Initials Name Provider Type    Gaurav Borges PT Physical Therapist                           Time Calculation:   Start Time: 0914  Stop Time: 0955  Time Calculation (min): 41 min  Total Timed Code Minutes- PT: 40 minute(s)  Timed Charges  22819 - PT Therapeutic Exercise Minutes: 20  62206 -  PT Neuromuscular Reeducation Minutes: 20  Total Minutes  Timed Charges Total Minutes: 40   Total Minutes: 40   Therapy Charges for Today     Code Description Service Date Service Provider Modifiers Qty    26684234864 HC PT NEUROMUSC RE EDUCATION EA 15 MIN 12/29/2021 Gaurav Cortez, PT GP 2    47416832474 HC PT THER PROC EA 15 MIN 12/29/2021  Diego, Gaurav CADE, PT GP 1                    Gaurav Cortez, PT  12/29/2021

## 2021-12-30 DIAGNOSIS — E78.5 HYPERLIPIDEMIA LDL GOAL <70: Primary | ICD-10-CM

## 2021-12-30 RX ORDER — ATORVASTATIN CALCIUM 10 MG/1
10 TABLET, FILM COATED ORAL DAILY
Qty: 30 TABLET | Refills: 11 | Status: SHIPPED | OUTPATIENT
Start: 2021-12-30 | End: 2023-01-18 | Stop reason: SDUPTHER

## 2022-01-03 ENCOUNTER — HOSPITAL ENCOUNTER (OUTPATIENT)
Dept: PHYSICAL THERAPY | Facility: HOSPITAL | Age: 77
Setting detail: THERAPIES SERIES
Discharge: HOME | End: 2022-01-03

## 2022-01-03 DIAGNOSIS — R26.89 BALANCE DISORDER: ICD-10-CM

## 2022-01-03 DIAGNOSIS — R42 VERTIGO: Primary | ICD-10-CM

## 2022-01-03 PROCEDURE — 97112 NEUROMUSCULAR REEDUCATION: CPT | Performed by: PHYSICAL THERAPIST

## 2022-01-03 PROCEDURE — 97110 THERAPEUTIC EXERCISES: CPT | Performed by: PHYSICAL THERAPIST

## 2022-01-03 NOTE — THERAPY TREATMENT NOTE
Outpatient Physical Therapy Ortho Treatment Note  [facility]     Patient Name: Rodolfo Grover  : 1945  MRN: [id number]  Today's Date: 1/3/2022      Visit Date: 2022    Visit Dx:    ICD-10-CM ICD-9-CM   1. Vertigo  R42 780.4   2. Balance disorder  R26.89 781.99       Patient Active Problem List   Diagnosis   • Acute blood loss anemia   • Fracture of fifth metacarpal bone of right hand   • Closed fracture dislocation of right shoulder   • Gastroesophageal reflux disease without esophagitis   • DNR (do not resuscitate)   • Bradycardia following surgery   • Frequent PVCs   • Proximal humerus fracture   • Osteoarthritis   • Severe obstructive sleep apnea   • Pure hypercholesterolemia   • Pulmonary hypertension (HCC)   • LARA (dyspnea on exertion)   • Localized edema   • Chest discomfort   • Elevated troponin   • Abnormal EKG   • Coronary artery disease involving native coronary artery of native heart without angina pectoris   • Focal motor seizure (HCC)   • Generalized tonic-clonic seizure (HCC)   • Post-ictal state (HCC)   • Coffee ground emesis   • S/P CABG (coronary artery bypass graft)   • Mitral regurgitation   • H/O mitral valve repair   • PAF (paroxysmal atrial fibrillation) (HCC)   • Ischemic cardiomyopathy   • Essential hypertension   • Non-ischemic cardiomyopathy (HCC)   • Iron deficiency anemia due to chronic blood loss   • Polyp of colon        Past Medical History:   Diagnosis Date   • Acute blood loss anemia    • Atrial fibrillation (HCC)    • Bilateral lower extremity edema    • Bradycardia following surgery    • CAD (coronary artery disease)    • Chest discomfort    • Elevated troponin    • Focal motor seizure (HCC)    • Generalized tonic-clonic seizure (HCC)    • GERD (gastroesophageal reflux disease)    • Iritis of right eye    • Ischemic cardiomyopathy    • Mitral valve insufficiency    • Post-ictal state (HCC)    • Pulmonary hypertension (HCC)    • PVC (premature ventricular  contraction)    • Rheumatic fever    • S/P CABG x 7    • S/P mitral valve repair    • Severe obstructive sleep apnea    • Shoulder fracture, right         Past Surgical History:   Procedure Laterality Date   • CARDIAC CATHETERIZATION N/A 2/19/2021    Procedure: Coronary angiography;  Surgeon: Bry Nails MD;  Location: [facility] CATH INVASIVE LOCATION;  Service: Cardiovascular;  Laterality: N/A;   • CARDIAC CATHETERIZATION N/A 2/19/2021    Procedure: Left heart cath;  Surgeon: Bry Nails MD;  Location: [facility] CATH INVASIVE LOCATION;  Service: Cardiovascular;  Laterality: N/A;   • CARDIAC CATHETERIZATION N/A 2/19/2021    Procedure: Right Heart Cath;  Surgeon: Bry Nails MD;  Location: [facility] CATH INVASIVE LOCATION;  Service: Cardiovascular;  Laterality: N/A;   • CARDIAC CATHETERIZATION N/A 2/19/2021    Procedure: Left ventriculography;  Surgeon: Bry Nails MD;  Location: [facility] CATH INVASIVE LOCATION;  Service: Cardiovascular;  Laterality: N/A;   • COLONOSCOPY N/A 12/7/2021    Procedure: COLONOSCOPY to cecum with cold biopsy polypectomy;  Surgeon: Edgar Allen MD;  Location: [facility] ENDOSCOPY;  Service: Gastroenterology;  Laterality: N/A;  IRON DEFICIENCY ANEMIA, H/O COLON POLYPS  --   DIVERTICULOSIS, polyp, hemorrhoids   • CORONARY ARTERY BYPASS GRAFT N/A 2/22/2021    Procedure: BK, MIDLINE STERNOTOMY, CORONARY ARTERY BYPASS X 7 UTILIZING THE LEFT INTERNAL MAMMARY ARTERY AND THE RIGHT SAPHENOUS VEIN, MITRAL VALVE REPAIR, PRP;  Surgeon: Jr Shyam Echavarria MD;  Location: [facility] MAIN OR;  Service: Cardiothoracic;  Laterality: N/A;   • ENDOSCOPY N/A 8/16/2018    Erosive gastritis, diverticulosis   • ENDOSCOPY N/A 2/26/2021    Procedure: ESOPHAGOGASTRODUODENOSCOPY AT BEDSIDE WITH HOT SNARE POLYPECTOMY AND CLIP PLACEMENT X1;  Surgeon: Jono Laboy MD;  Location: [facility] ENDOSCOPY;  Service: Gastroenterology;  Laterality: N/A;  PRE-  GI BLEED  POST- GASTRITIS,  ESOPHAGITIS, POLYP   • ENDOSCOPY N/A 12/7/2021    Procedure: ESOPHAGOGASTRODUODENOSCOPY with COLD  BIOPSIES;  Surgeon: Edgar Allen MD;  Location: [facility] ENDOSCOPY;  Service: Gastroenterology;  Laterality: N/A;  IRON DEFICIENCY ANEMIA, H/O ULCERATIVE ESOPHAGITIS  --GASTRITIS, DUODENAL POLYP   • HERNIA REPAIR     • SHOULDER CLOSED REDUCTION Right 3/16/2018    Procedure: RIGHT SHOULDER CLOSED REDUCTION;  Surgeon: Kj Garcia MD;  Location: [facility] OR;  Service: Orthopedics   • TONSILLECTOMY     • TOTAL SHOULDER ARTHROPLASTY W/ DISTAL CLAVICLE EXCISION Right 3/22/2018    Procedure: Reverse Total Shoulder Arthroplsty;  Surgeon: Kj Garcia MD;  Location: [facility] OR;  Service: Orthopedics   • TRANSESOPHAGEAL ECHOCARDIOGRAM (BK) N/A 2/22/2021    Procedure: TRANSESOPHAGEAL ECHOCARDIOGRAM;  Surgeon: Jr Shyam Echavarria MD;  Location: [facility];  Service: Cardiothoracic;  Laterality: N/A;                        PT Assessment/Plan     Row Name 01/03/22 1000          PT Assessment    Assessment Comments Progressed to tandem stance and added walls quats. Note increase in anterior displacement with balance vs retropulsion, likely due to shift in posterior chain strenght since starting PT.  -GR            PT Plan    PT Plan Comments Continue POC.  -GR           User Key  (r) = Recorded By, (t) = Taken By, (c) = Cosigned By    Initials Name Provider Type    GR Gaurav Cortez, PT Physical Therapist                   OP Exercises     Row Name 01/03/22 1000             Subjective Comments    Subjective Comments Felt good after last session.  -GR              Total Minutes    98876 - PT Therapeutic Exercise Minutes 23  -GR      38683 -  PT Neuromuscular Reeducation Minutes 20  -GR              Exercise 3    Exercise Name 3 Nustep  -GR      Time 3 5 min  -GR      Additional Comments L5 UE/LE  -GR              Exercise 4    Exercise Name 4 Standing hip abd  -GR      Cueing 4 Demo  -GR      Sets 4 2  -GR    "   Reps 4 10  -GR      Additional Comments 3# each airex  -GR              Exercise 5    Exercise Name 5 Standing HS curl  -GR      Cueing 5 Demo  -GR      Sets 5 2  -GR      Reps 5 10  -GR      Additional Comments 3# each airex  -GR              Exercise 6    Exercise Name 6 LAQ  -GR      Cueing 6 Demo  -GR      Sets 6 2  -GR      Reps 6 10  -GR      Additional Comments 3#  -GR              Exercise 7    Exercise Name 7 Airex - rhomberg  -GR      Cueing 7 Demo  -GR      Sets 7 2  -GR      Reps 7 1  -GR      Time 7 30 seconds  -GR      Additional Comments EO/EC  -GR              Exercise 8    Exercise Name 8 Airex - tandem  -GR      Cueing 8 Demo  -GR      Sets 8 2  -GR      Reps 8 1  -GR      Time 8 30 seconds  -GR      Additional Comments EO/EC  -GR              Exercise 9    Exercise Name 9 Airex - march in place  -GR      Cueing 9 Demo  -GR      Sets 9 1  -GR      Reps 9 2  -GR      Time 9 30 seconds  -GR      Additional Comments 3# ankle weights  -GR              Exercise 10    Exercise Name 10 Rockerboard- static stance AP/ML  -GR      Cueing 10 Demo  -GR      Sets 10 1  -GR      Reps 10 2  -GR      Time 10 1 min  -GR              Exercise 11    Exercise Name 11 DF stretch on rockerboard  -GR      Sets 11 1  -GR      Reps 11 3  -GR      Time 11 30 seconds  -GR              Exercise 12    Exercise Name 12 Stride stance - front foot 8\" step  -GR      Reps 12 20  -GR      Additional Comments holding L2  med ball  -GR              Exercise 13    Exercise Name 13 Hurdles - fwd and lateral step overs  -GR      Reps 13 4 laps // bars each  -GR      Additional Comments no UE support  -GR              Exercise 14    Exercise Name 14 Leg press- DL/SL  -GR      Cueing 14 Demo  -GR      Sets 14 2  -GR      Reps 14 10  -GR      Additional Comments each; 100#/80#  -GR              Exercise 15    Exercise Name 15 Wall squats swiss ball  -GR      Cueing 15 Demo  -GR      Sets 15 2  -GR      Reps 15 10  -GR      Additional " Comments --  -JOVITA            User Key  (r) = Recorded By, (t) = Taken By, (c) = Cosigned By    Initials Name Provider Type    Gaurav Borges, PT Physical Therapist                                                Time Calculation:   Start Time: 1001  Stop Time: 1044  Time Calculation (min): 43 min  Total Timed Code Minutes- PT: 43 minute(s)  Timed Charges  27135 - PT Therapeutic Exercise Minutes: 23  71610 -  PT Neuromuscular Reeducation Minutes: 20  Total Minutes  Timed Charges Total Minutes: 43   Total Minutes: 43  Therapy Charges for Today     Code Description Service Date Service Provider Modifiers Qty    44610183016  PT NEUROMUSC RE EDUCATION EA 15 MIN 1/3/2022 Gaurav Cortez, PT GP 1    [id number] HC PT THER PROC EA 15 MIN 1/3/2022 Gaurav Cortez, PT GP 2                    Gaurav Cortez, GA  1/3/2022

## 2022-01-06 ENCOUNTER — HOSPITAL ENCOUNTER (OUTPATIENT)
Dept: PHYSICAL THERAPY | Facility: HOSPITAL | Age: 77
Setting detail: THERAPIES SERIES
Discharge: HOME | End: 2022-01-06

## 2022-01-06 DIAGNOSIS — R26.89 BALANCE DISORDER: ICD-10-CM

## 2022-01-06 DIAGNOSIS — R42 VERTIGO: Primary | ICD-10-CM

## 2022-01-06 PROCEDURE — 97110 THERAPEUTIC EXERCISES: CPT | Performed by: PHYSICAL THERAPIST

## 2022-01-06 NOTE — THERAPY TREATMENT NOTE
Outpatient Physical Therapy Vestibular Treatment Note  [facility]     Patient Name: Rodolfo Grover  : 1945  MRN: [id number]  Today's Date: 2022      Visit Date: 2022    Visit Dx:     ICD-10-CM ICD-9-CM   1. Vertigo  R42 780.4   2. Balance disorder  R26.89 781.99       Patient Active Problem List   Diagnosis   • Acute blood loss anemia   • Fracture of fifth metacarpal bone of right hand   • Closed fracture dislocation of right shoulder   • Gastroesophageal reflux disease without esophagitis   • DNR (do not resuscitate)   • Bradycardia following surgery   • Frequent PVCs   • Proximal humerus fracture   • Osteoarthritis   • Severe obstructive sleep apnea   • Pure hypercholesterolemia   • Pulmonary hypertension (HCC)   • LARA (dyspnea on exertion)   • Localized edema   • Chest discomfort   • Elevated troponin   • Abnormal EKG   • Coronary artery disease involving native coronary artery of native heart without angina pectoris   • Focal motor seizure (HCC)   • Generalized tonic-clonic seizure (HCC)   • Post-ictal state (HCC)   • Coffee ground emesis   • S/P CABG (coronary artery bypass graft)   • Mitral regurgitation   • H/O mitral valve repair   • PAF (paroxysmal atrial fibrillation) (HCC)   • Ischemic cardiomyopathy   • Essential hypertension   • Non-ischemic cardiomyopathy (HCC)   • Iron deficiency anemia due to chronic blood loss   • Polyp of colon                        PT Assessment/Plan     Row Name 22 1000          PT Assessment    Assessment Comments Abbreviated session per late arrival.  Patient reported significant challenge increase with 4# vs previous 3# but did not require rest break.  -GR            PT Plan    PT Plan Comments Resume compliant surface training if time allots.  -GR           User Key  (r) = Recorded By, (t) = Taken By, (c) = Cosigned By    Initials Name Provider Type    Gaurav Borges, PT Physical Therapist                    OP Exercises     Row Name  01/06/22 0900             Subjective Comments    Subjective Comments Appt time 0915 arrival time 0940 - mixed up appt time in his head.  Arrives without pain or new complaints.  -GR              Subjective Pain    Able to rate subjective pain? yes  -GR      Pre-Treatment Pain Level 0  -GR              Total Minutes    78582 - PT Therapeutic Exercise Minutes 25  -GR              Exercise 1    Exercise Name 1 Tandem walk  -GR      Reps 1 3 laps  -GR      Additional Comments barre  -GR              Exercise 2    Exercise Name 2 Hurdles step overs - fwd/lateral  -GR      Reps 2 3 laps  -GR      Additional Comments barre  -GR              Exercise 3    Exercise Name 3 held nustep per time  -GR              Exercise 4    Exercise Name 4 Standing hip abd  -GR      Cueing 4 Demo  -GR      Sets 4 2  -GR      Reps 4 10  -GR      Additional Comments 4# each airex  -GR              Exercise 5    Exercise Name 5 Standing HS curl  -GR      Cueing 5 Demo  -GR      Sets 5 2  -GR      Reps 5 10  -GR      Additional Comments 4# each airex  -GR              Exercise 6    Exercise Name 6 LAQ  -GR      Cueing 6 Demo  -GR      Sets 6 2  -GR      Reps 6 10  -GR      Additional Comments 4#  -GR              Exercise 7    Exercise Name 7 SAQ  -GR      Cueing 7 Demo  -GR      Sets 7 2  -GR      Reps 7 10  -GR      Additional Comments 4#  -GR              Exercise 8    Exercise Name 8 Bridge  -GR      Cueing 8 Demo  -GR      Sets 8 2  -GR      Reps 8 10  -GR      Additional Comments swiss ball  -GR            User Key  (r) = Recorded By, (t) = Taken By, (c) = Cosigned By    Initials Name Provider Type    GR Gaurav Cortez, PT Physical Therapist                             PT OP Goals     Row Name 01/06/22 1000          PT Short Term Goals    STG Date to Achieve 12/23/21  -GR     STG 1 Patient will be independent with initial HEP.  -GR     STG 1 Progress Met  -GR     STG 2 Patient will report no worse with VOR.  -GR     STG 2 Progress Met   -            Long Term Goals    LTG Date to Achieve 01/22/22  -GR     LTG 1 Patient will remain negative for BPPV all canals.  -GR     LTG 1 Progress Ongoing  -GR     LTG 2 Patient will score >/=19/24 on the DGI to indicated decreased fall risk.  -GR     LTG 2 Progress Ongoing  -GR     LTG 3 Patient will score </=12/100 on the dizziness handicap inventory to indicate improved perceived positional tolerance.  -     LTG 3 Progress Ongoing  -GR           User Key  (r) = Recorded By, (t) = Taken By, (c) = Cosigned By    Initials Name Provider Type     Gaurav Cortez, PT Physical Therapist                               Time Calculation:   Start Time: 0940  Stop Time: 1005  Time Calculation (min): 25 min  Total Timed Code Minutes- PT: 25 minute(s)  Timed Charges  37433 - PT Therapeutic Exercise Minutes: 25  Total Minutes  Timed Charges Total Minutes: 25   Total Minutes: 25   Therapy Charges for Today     Code Description Service Date Service Provider Modifiers Qty    02890781918 HC PT THER PROC EA 15 MIN 1/6/2022 Gaurav Cortez, PT GP 2                   Gaurav Cortez, PT  1/6/2022

## 2022-01-08 ENCOUNTER — TELEPHONE (OUTPATIENT)
Dept: CARDIOLOGY | Facility: CLINIC | Age: 77
End: 2022-01-08

## 2022-01-10 ENCOUNTER — HOSPITAL ENCOUNTER (OUTPATIENT)
Dept: PHYSICAL THERAPY | Facility: HOSPITAL | Age: 77
Setting detail: THERAPIES SERIES
Discharge: HOME | End: 2022-01-10

## 2022-01-10 ENCOUNTER — ANTICOAGULATION VISIT (OUTPATIENT)
Dept: PHARMACY | Facility: HOSPITAL | Age: 77
End: 2022-01-10

## 2022-01-10 DIAGNOSIS — R42 VERTIGO: Primary | ICD-10-CM

## 2022-01-10 DIAGNOSIS — I48.0 PAF (PAROXYSMAL ATRIAL FIBRILLATION): Primary | ICD-10-CM

## 2022-01-10 DIAGNOSIS — R26.89 BALANCE DISORDER: ICD-10-CM

## 2022-01-10 LAB
INR PPP: 3.2 (ref 0.91–1.09)
PROTHROMBIN TIME: 38.6 SECONDS (ref 10–13.8)

## 2022-01-10 PROCEDURE — 85610 PROTHROMBIN TIME: CPT

## 2022-01-10 PROCEDURE — 36416 COLLJ CAPILLARY BLOOD SPEC: CPT

## 2022-01-10 PROCEDURE — G0463 HOSPITAL OUTPT CLINIC VISIT: HCPCS

## 2022-01-10 PROCEDURE — 97110 THERAPEUTIC EXERCISES: CPT | Performed by: PHYSICAL THERAPIST

## 2022-01-10 PROCEDURE — 97112 NEUROMUSCULAR REEDUCATION: CPT | Performed by: PHYSICAL THERAPIST

## 2022-01-10 NOTE — THERAPY TREATMENT NOTE
Outpatient Physical Therapy Vestibular Treatment Note  [facility]     Patient Name: Rodolfo Grover  : 1945  MRN: [id number]  Today's Date: 1/10/2022      Visit Date: 01/10/2022    Visit Dx:     ICD-10-CM ICD-9-CM   1. Vertigo  R42 780.4   2. Balance disorder  R26.89 781.99       Patient Active Problem List   Diagnosis   • Acute blood loss anemia   • Fracture of fifth metacarpal bone of right hand   • Closed fracture dislocation of right shoulder   • Gastroesophageal reflux disease without esophagitis   • DNR (do not resuscitate)   • Bradycardia following surgery   • Frequent PVCs   • Proximal humerus fracture   • Osteoarthritis   • Severe obstructive sleep apnea   • Pure hypercholesterolemia   • Pulmonary hypertension (HCC)   • LARA (dyspnea on exertion)   • Localized edema   • Chest discomfort   • Elevated troponin   • Abnormal EKG   • Coronary artery disease involving native coronary artery of native heart without angina pectoris   • Focal motor seizure (HCC)   • Generalized tonic-clonic seizure (HCC)   • Post-ictal state (HCC)   • Coffee ground emesis   • S/P CABG (coronary artery bypass graft)   • Mitral regurgitation   • H/O mitral valve repair   • PAF (paroxysmal atrial fibrillation) (HCC)   • Ischemic cardiomyopathy   • Essential hypertension   • Non-ischemic cardiomyopathy (HCC)   • Iron deficiency anemia due to chronic blood loss   • Polyp of colon                        PT Assessment/Plan     Row Name 01/10/22 1000          PT Assessment    Assessment Comments No rest breaks required this visit. Resumed all exercises and added flexibility for core/lower quarter with appropriate stretch response reported. Patient is making steady progress towards goals. Assess for dc next visit.  -GR            PT Plan    PT Plan Comments Assess for dc.  -GR           User Key  (r) = Recorded By, (t) = Taken By, (c) = Cosigned By    Initials Name Provider Type    Gaurav Borges, PT Physical Therapist                     OP Exercises     Row Name 01/10/22 1000             Subjective Comments    Subjective Comments Quiet weekend, vertigo remains well controlled, no significant fatigue following last visit.  -GR              Subjective Pain    Able to rate subjective pain? yes  -GR      Pre-Treatment Pain Level 0  -GR              Total Minutes    09869 - PT Therapeutic Exercise Minutes 20  -GR      66990 -  PT Neuromuscular Reeducation Minutes 20  -GR              Exercise 1    Exercise Name 1 Tandem walk  -GR      Reps 1 3 laps  -GR      Additional Comments // bars  -GR              Exercise 2    Exercise Name 2 Hurdles step overs - fwd/lateral  -GR      Reps 2 3 laps  -GR      Additional Comments // bars - cues to step over when moving laterally  -GR              Exercise 3    Exercise Name 3 Nustep  -GR      Time 3 5 min  -GR      Additional Comments L 6 UE/LE  -GR              Exercise 4    Exercise Name 4 Standing hip abd  -GR      Cueing 4 Demo  -GR      Sets 4 2  -GR      Reps 4 10  -GR      Additional Comments 4# each airex  -GR              Exercise 5    Exercise Name 5 Standing HS curl  -GR      Cueing 5 Demo  -GR      Sets 5 2  -GR      Reps 5 10  -GR      Additional Comments 4# each airex  -GR              Exercise 6    Exercise Name 6 LAQ  -GR      Cueing 6 Demo  -GR      Sets 6 2  -GR      Reps 6 10  -GR      Additional Comments 4#  -GR              Exercise 7    Exercise Name 7 SAQ  -GR      Cueing 7 Demo  -GR      Sets 7 2  -GR      Reps 7 10  -GR      Additional Comments 4#  -GR              Exercise 8    Exercise Name 8 Bridge  -GR      Cueing 8 Demo  -GR      Sets 8 2  -GR      Reps 8 10  -GR      Additional Comments swiss ball  -GR              Exercise 9    Exercise Name 9 Airex - march in place  -GR      Cueing 9 Demo  -GR      Sets 9 1  -GR      Reps 9 20  -GR      Additional Comments 4#  -GR              Exercise 10    Exercise Name 10 Rockerboard- static stance AP/ML  -GR      Cueing 10 Demo  -GR  "     Sets 10 1  -GR      Reps 10 2  -GR      Time 10 1 min  -GR              Exercise 11    Exercise Name 11 DF stretch on rockerboard  -GR      Sets 11 1  -GR      Reps 11 3  -GR      Time 11 30 seconds  -GR              Exercise 12    Exercise Name 12 Stride stance - front foot 8\" step  -GR      Reps 12 20 each  -GR      Additional Comments holding L2  med ball  -GR              Exercise 13    Exercise Name 13 HS stretch supine  -GR      Cueing 13 Demo  -GR      Sets 13 1  -GR      Reps 13 3  -GR      Time 13 20 seconds  -GR      Additional Comments each side  -GR              Exercise 14    Exercise Name 14 Leg press- DL/SL  -GR      Cueing 14 Demo  -GR      Sets 14 2  -GR      Reps 14 10  -GR      Additional Comments each; 100#/80#  -GR              Exercise 16    Exercise Name 16 LTR swiss ball  -GR      Cueing 16 Demo  -GR      Sets 16 2  -GR      Reps 16 10  -GR              Exercise 17    Exercise Name 17 HL clam B and uni  -GR      Cueing 17 Demo  -GR      Sets 17 1  -GR      Reps 17 20  -GR      Additional Comments RTB  -GR            User Key  (r) = Recorded By, (t) = Taken By, (c) = Cosigned By    Initials Name Provider Type    Gaurav Borges, PT Physical Therapist                             PT OP Goals     Row Name 01/10/22 1000          PT Short Term Goals    STG Date to Achieve 12/23/21  -GR     STG 1 Patient will be independent with initial HEP.  -GR     STG 1 Progress Met  -GR     STG 2 Patient will report no worse with VOR.  -GR     STG 2 Progress Met  -GR            Long Term Goals    LTG Date to Achieve 01/22/22  -GR     LTG 1 Patient will remain negative for BPPV all canals.  -GR     LTG 1 Progress Ongoing  -GR     LTG 2 Patient will score >/=19/24 on the DGI to indicated decreased fall risk.  -GR     LTG 2 Progress Ongoing  -GR     LTG 3 Patient will score </=12/100 on the dizziness handicap inventory to indicate improved perceived positional tolerance.  -GR     LTG 3 Progress Ongoing  " -JOVITA           User Key  (r) = Recorded By, (t) = Taken By, (c) = Cosigned By    Initials Name Provider Type    Gaurav Borges, PT Physical Therapist                               Time Calculation:   Start Time: 0959  Stop Time: 1039  Time Calculation (min): 40 min  Total Timed Code Minutes- PT: 40 minute(s)  Timed Charges  72772 - PT Therapeutic Exercise Minutes: 20  60006 -  PT Neuromuscular Reeducation Minutes: 20  Total Minutes  Timed Charges Total Minutes: 40   Total Minutes: 40   Therapy Charges for Today     Code Description Service Date Service Provider Modifiers Qty    71819714522 HC PT NEUROMUSC RE EDUCATION EA 15 MIN 1/10/2022 Gaurav Cortez, PT GP 2    91217755714 HC PT THER PROC EA 15 MIN 1/10/2022 Gaurav Cortez, PT GP 1                   Gaurav Cortez, PT  1/10/2022

## 2022-01-10 NOTE — PROGRESS NOTES
Anticoagulation Clinic Progress Note    Anticoagulation Summary  As of 1/10/2022    INR goal:  2.0-3.0   TTR:  48.3 % (9 mo)   INR used for dosing:  3.2 (1/10/2022)   Warfarin maintenance plan:  1 mg every Thu; 2 mg all other days   Weekly warfarin total:  13 mg   Plan last modified:  Eduar Weeks, PharmD (11/8/2021)   Next INR check:  1/24/2022   Priority:  High   Target end date:      Indications    PAF (paroxysmal atrial fibrillation) (HCC) [I48.0]             Anticoagulation Episode Summary     INR check location:      Preferred lab:      Send INR reminders to:   [facility]    Comments:        Anticoagulation Care Providers     Provider Role Specialty Phone number    Jimena Singer MD Referring Cardiology [phone]          Clinic Interview:  Patient Findings     Positives:  Change in health, Change in medications    Negatives:  Signs/symptoms of thrombosis, Signs/symptoms of bleeding,   Laboratory test error suspected, Change in alcohol use, Change in   activity, Upcoming invasive procedure, Emergency department visit,   Upcoming dental procedure, Missed doses, Extra doses, Change in   diet/appetite, Hospital admission, Bruising, Other complaints    Comments:  Started atorvastatin x 2 weeks ago. No longer taking   metoprolol. Increase use of furosemide due to increase in fluid retention.         Clinical Outcomes     Negatives:  Major bleeding event, Thromboembolic event,   Anticoagulation-related hospital admission, Anticoagulation-related ED   visit, Anticoagulation-related fatality          INR History:  Anticoagulation Monitoring 12/10/2021 12/20/2021 1/10/2022   INR 1.6 2.8 3.2   INR Date 12/10/2021 12/20/2021 1/10/2022   INR Goal 2.0-3.0 2.0-3.0 2.0-3.0   Trend Same Same Same   Last Week Total 7 mg 13 mg 13 mg   Next Week Total 14 mg 13 mg 12 mg   Sun 2 mg 2 mg 2 mg   Mon 2 mg 2 mg 1 mg (1/10); Otherwise 2 mg   Tue 2 mg 2 mg 2 mg   Wed 2 mg 2 mg 2 mg   Thu 1 mg 1 mg 1 mg   Fri 3 mg  (12/10); Otherwise 2 mg 2 mg 2 mg   Sat 2 mg 2 mg 2 mg   Visit Report - - -   Some recent data might be hidden       Plan:  1. INR is Supratherapeutic today- see above in Anticoagulation Summary.  Will instruct Rodolfo Grover to Take 0.5 tablet (1 mg) today and then continue their warfarin regimen- see above in Anticoagulation Summary.  2. Follow up in 2 weeks  3. Patient declines warfarin refills.  4. Verbal and written information provided. Patient expresses understanding and has no further questions at this time.    Liz Mancilla, PharmD

## 2022-01-13 ENCOUNTER — CLINICAL SUPPORT (OUTPATIENT)
Dept: CARDIOLOGY | Facility: CLINIC | Age: 77
End: 2022-01-13

## 2022-01-13 ENCOUNTER — HOSPITAL ENCOUNTER (OUTPATIENT)
Dept: PHYSICAL THERAPY | Facility: HOSPITAL | Age: 77
Setting detail: THERAPIES SERIES
Discharge: HOME | End: 2022-01-13

## 2022-01-13 DIAGNOSIS — I44.0 FIRST DEGREE HEART BLOCK: Primary | ICD-10-CM

## 2022-01-13 DIAGNOSIS — R42 VERTIGO: Primary | ICD-10-CM

## 2022-01-13 DIAGNOSIS — R26.89 BALANCE DISORDER: ICD-10-CM

## 2022-01-13 PROCEDURE — 97112 NEUROMUSCULAR REEDUCATION: CPT | Performed by: PHYSICAL THERAPIST

## 2022-01-13 PROCEDURE — 93000 ELECTROCARDIOGRAM COMPLETE: CPT | Performed by: NURSE PRACTITIONER

## 2022-01-13 PROCEDURE — 97110 THERAPEUTIC EXERCISES: CPT | Performed by: PHYSICAL THERAPIST

## 2022-01-13 NOTE — PROGRESS NOTES
Procedure     ECG 12 Lead    Date/Time: 1/13/2022 10:06 AM  Performed by: Ayla Sanchez APRN  Authorized by: Ayla Sanchez APRN   Comparison: compared with previous ECG from 12/20/2021  Similar to previous ECG  Comparison to previous ECG: OR interval unchanged  Rhythm: sinus rhythm  Rate: normal  BPM: 73  Conduction: 1st degree AV block  Comments:  ms,  ms           Patient here for EKG today after stopping Metoprolol      Per Ayla ARBOLEDA, EKG today with no changes.  Continue off the Metoprolol.     Patient verbalized understanding.

## 2022-01-13 NOTE — PROGRESS NOTES
I have supervised and reviewed the notes, assessments, and/or procedures performed by our Pharmacy Resident. The documented assessment and plan were developed cooperatively. I concur with the documentation of this patient encounter.    Allyssa Ramirez RPH

## 2022-01-13 NOTE — THERAPY TREATMENT NOTE
Outpatient Physical Therapy Vestibular Treatment Note  [facility]     Patient Name: Rdoolfo Grover  : 1945  MRN: [id number]  Today's Date: 2022      Visit Date: 2022    Visit Dx:     ICD-10-CM ICD-9-CM   1. Vertigo  R42 780.4   2. Balance disorder  R26.89 781.99       Patient Active Problem List   Diagnosis   • Acute blood loss anemia   • Fracture of fifth metacarpal bone of right hand   • Closed fracture dislocation of right shoulder   • Gastroesophageal reflux disease without esophagitis   • DNR (do not resuscitate)   • Bradycardia following surgery   • Frequent PVCs   • Proximal humerus fracture   • Osteoarthritis   • Severe obstructive sleep apnea   • Pure hypercholesterolemia   • Pulmonary hypertension (HCC)   • LARA (dyspnea on exertion)   • Localized edema   • Chest discomfort   • Elevated troponin   • Abnormal EKG   • Coronary artery disease involving native coronary artery of native heart without angina pectoris   • Focal motor seizure (HCC)   • Generalized tonic-clonic seizure (HCC)   • Post-ictal state (HCC)   • Coffee ground emesis   • S/P CABG (coronary artery bypass graft)   • Mitral regurgitation   • H/O mitral valve repair   • PAF (paroxysmal atrial fibrillation) (HCC)   • Ischemic cardiomyopathy   • Essential hypertension   • Non-ischemic cardiomyopathy (HCC)   • Iron deficiency anemia due to chronic blood loss   • Polyp of colon                        PT Assessment/Plan     Row Name 22 1100          PT Assessment    Assessment Comments Advanced repetitions vs weights this visit. Patient noting increased sway/instability with fatigue.  -GR            PT Plan    PT Plan Comments Reassess in 30 days after returns from [place].  -GR           User Key  (r) = Recorded By, (t) = Taken By, (c) = Cosigned By    Initials Name Provider Type    Gaurav Borges, PT Physical Therapist                    OP Exercises     Row Name 22 1000             Subjective Comments     Subjective Comments Arrived 10' late due to EKG test running over.  Going out of town for a month.  -GR              Subjective Pain    Able to rate subjective pain? yes  -GR      Pre-Treatment Pain Level 0  -GR              Total Minutes    45658 - PT Therapeutic Exercise Minutes 15  -GR      19511 -  PT Neuromuscular Reeducation Minutes 15  -GR              Exercise 1    Exercise Name 1 Tandem walk  -GR      Reps 1 3 laps  -GR      Additional Comments counter  -GR              Exercise 2    Exercise Name 2 Hurdles step overs - fwd/lateral  -GR      Reps 2 3 laps  -GR      Additional Comments counter  -GR              Exercise 3    Exercise Name 3 Nustep  -GR      Time 3 5 min  -GR      Additional Comments L 6 UE/LE  -GR              Exercise 4    Exercise Name 4 Standing hip abd  -GR      Cueing 4 Demo  -GR      Sets 4 3  -GR      Reps 4 10  -GR      Additional Comments 4# each airex  -GR              Exercise 5    Exercise Name 5 Standing HS curl  -GR      Cueing 5 Demo  -GR      Sets 5 3  -GR      Reps 5 10  -GR      Additional Comments 4# each airex  -GR              Exercise 6    Exercise Name 6 LAQ  -GR      Cueing 6 Demo  -GR      Sets 6 3  -GR      Reps 6 10  -GR      Additional Comments 4#  -GR              Exercise 7    Exercise Name 7 SAQ  -GR      Cueing 7 Demo  -GR      Sets 7 3  -GR      Reps 7 10  -GR      Additional Comments 4#  -GR              Exercise 8    Exercise Name 8 Bridge  -GR      Cueing 8 Demo  -GR      Sets 8 2  -GR      Reps 8 10  -GR      Additional Comments swiss ball  -GR              Exercise 9    Exercise Name 9 Airex - march in place  -GR      Cueing 9 Demo  -GR      Sets 9 2  -GR      Reps 9 20  -GR      Additional Comments 4#  -GR              Exercise 10    Exercise Name 10 Rockerboard- static stance AP/ML  -GR      Cueing 10 Demo  -GR      Sets 10 1  -GR      Reps 10 2  -GR      Time 10 1 min  -GR              Exercise 11    Exercise Name 11 --  -GR      Sets 11 --  -GR       Reps 11 --  -GR      Time 11 --  -GR              Exercise 12    Exercise Name 12 --  -GR      Reps 12 --  -GR      Additional Comments --  -GR              Exercise 13    Exercise Name 13 HS stretch supine  -GR      Cueing 13 Demo  -GR      Sets 13 1  -GR      Reps 13 3  -GR      Time 13 20 seconds  -GR      Additional Comments each side  -GR              Exercise 14    Exercise Name 14 --  -GR      Cueing 14 --  -GR      Sets 14 --  -GR      Reps 14 --  -GR      Additional Comments --  -GR              Exercise 16    Exercise Name 16 --  -GR      Cueing 16 --  -GR      Sets 16 --  -GR      Reps 16 --  -GR              Exercise 17    Exercise Name 17 --  -GR      Cueing 17 --  -GR      Sets 17 --  -GR      Reps 17 --  -GR      Additional Comments --  -GR            User Key  (r) = Recorded By, (t) = Taken By, (c) = Cosigned By    Initials Name Provider Type    Gaurav Borges, PT Physical Therapist                                Therapy Education  Education Details: updated CONEXANCE MD HEP              Time Calculation:   Start Time: 1010  Stop Time: 1040  Time Calculation (min): 30 min  Total Timed Code Minutes- PT: 30 minute(s)  Timed Charges  55875 - PT Therapeutic Exercise Minutes: 15  44781 -  PT Neuromuscular Reeducation Minutes: 15  Total Minutes  Timed Charges Total Minutes: 30   Total Minutes: 30   Therapy Charges for Today     Code Description Service Date Service Provider Modifiers Qty    90396645660 HC PT NEUROMUSC RE EDUCATION EA 15 MIN 1/13/2022 Gaurav Cortez, PT GP 1    68055554892 HC PT THER PROC EA 15 MIN 1/13/2022 Gaurav Cortez, PT GP 1                   Gaurav Cortez, [place]  1/13/2022

## 2022-01-24 ENCOUNTER — LAB (OUTPATIENT)
Dept: LAB | Facility: HOSPITAL | Age: 77
End: 2022-01-24

## 2022-01-24 ENCOUNTER — ANTICOAGULATION VISIT (OUTPATIENT)
Dept: PHARMACY | Facility: HOSPITAL | Age: 77
End: 2022-01-24

## 2022-01-24 ENCOUNTER — OFFICE VISIT (OUTPATIENT)
Dept: GASTROENTEROLOGY | Facility: CLINIC | Age: 77
End: 2022-01-24

## 2022-01-24 VITALS
SYSTOLIC BLOOD PRESSURE: 153 MMHG | BODY MASS INDEX: 28.6 KG/M2 | DIASTOLIC BLOOD PRESSURE: 80 MMHG | HEIGHT: 67 IN | WEIGHT: 182.2 LBS | HEART RATE: 70 BPM | OXYGEN SATURATION: 97 % | TEMPERATURE: 96.6 F

## 2022-01-24 DIAGNOSIS — Z95.1 S/P CABG X 7: ICD-10-CM

## 2022-01-24 DIAGNOSIS — I48.0 PAF (PAROXYSMAL ATRIAL FIBRILLATION): Primary | ICD-10-CM

## 2022-01-24 DIAGNOSIS — K63.5 POLYP OF COLON, UNSPECIFIED PART OF COLON, UNSPECIFIED TYPE: ICD-10-CM

## 2022-01-24 DIAGNOSIS — D50.0 IRON DEFICIENCY ANEMIA DUE TO CHRONIC BLOOD LOSS: Primary | ICD-10-CM

## 2022-01-24 DIAGNOSIS — K21.01 GASTROESOPHAGEAL REFLUX DISEASE WITH ESOPHAGITIS AND HEMORRHAGE: ICD-10-CM

## 2022-01-24 LAB
ALBUMIN SERPL-MCNC: 4 G/DL (ref 3.5–5.2)
ALBUMIN/GLOB SERPL: 1.5 G/DL
ALP SERPL-CCNC: 76 U/L (ref 39–117)
ALT SERPL W P-5'-P-CCNC: 17 U/L (ref 1–41)
ANION GAP SERPL CALCULATED.3IONS-SCNC: 12 MMOL/L (ref 5–15)
AST SERPL-CCNC: 15 U/L (ref 1–40)
BASOPHILS # BLD AUTO: 0.06 10*3/MM3 (ref 0–0.2)
BASOPHILS NFR BLD AUTO: 0.7 % (ref 0–1.5)
BILIRUB SERPL-MCNC: 0.4 MG/DL (ref 0–1.2)
BUN SERPL-MCNC: 24 MG/DL (ref 8–23)
BUN/CREAT SERPL: 14.4 (ref 7–25)
CALCIUM SPEC-SCNC: 9 MG/DL (ref 8.6–10.5)
CHLORIDE SERPL-SCNC: 104 MMOL/L (ref 98–107)
CO2 SERPL-SCNC: 26 MMOL/L (ref 22–29)
CREAT SERPL-MCNC: 1.67 MG/DL (ref 0.76–1.27)
DEPRECATED RDW RBC AUTO: 42.3 FL (ref 37–54)
EOSINOPHIL # BLD AUTO: 0.28 10*3/MM3 (ref 0–0.4)
EOSINOPHIL NFR BLD AUTO: 3.2 % (ref 0.3–6.2)
ERYTHROCYTE [DISTWIDTH] IN BLOOD BY AUTOMATED COUNT: 11.7 % (ref 12.3–15.4)
FERRITIN SERPL-MCNC: 139 NG/ML (ref 30–400)
GFR SERPL CREATININE-BSD FRML MDRD: 40 ML/MIN/1.73
GLOBULIN UR ELPH-MCNC: 2.6 GM/DL
GLUCOSE SERPL-MCNC: 97 MG/DL (ref 65–99)
HCT VFR BLD AUTO: 35 % (ref 37.5–51)
HGB BLD-MCNC: 11.3 G/DL (ref 13–17.7)
IMM GRANULOCYTES # BLD AUTO: 0.07 10*3/MM3 (ref 0–0.05)
IMM GRANULOCYTES NFR BLD AUTO: 0.8 % (ref 0–0.5)
INR PPP: 2.7 (ref 0.91–1.09)
IRON 24H UR-MRATE: 87 MCG/DL (ref 59–158)
IRON SATN MFR SERPL: 24 % (ref 20–50)
LYMPHOCYTES # BLD AUTO: 1.41 10*3/MM3 (ref 0.7–3.1)
LYMPHOCYTES NFR BLD AUTO: 16 % (ref 19.6–45.3)
MAGNESIUM SERPL-MCNC: 1.7 MG/DL (ref 1.6–2.4)
MCH RBC QN AUTO: 32 PG (ref 26.6–33)
MCHC RBC AUTO-ENTMCNC: 32.3 G/DL (ref 31.5–35.7)
MCV RBC AUTO: 99.2 FL (ref 79–97)
MONOCYTES # BLD AUTO: 0.76 10*3/MM3 (ref 0.1–0.9)
MONOCYTES NFR BLD AUTO: 8.6 % (ref 5–12)
NEUTROPHILS NFR BLD AUTO: 6.22 10*3/MM3 (ref 1.7–7)
NEUTROPHILS NFR BLD AUTO: 70.7 % (ref 42.7–76)
NRBC BLD AUTO-RTO: 0 /100 WBC (ref 0–0.2)
PLATELET # BLD AUTO: 174 10*3/MM3 (ref 140–450)
PMV BLD AUTO: 10.3 FL (ref 6–12)
POTASSIUM SERPL-SCNC: 4.1 MMOL/L (ref 3.5–5.2)
PROT SERPL-MCNC: 6.6 G/DL (ref 6–8.5)
PROTHROMBIN TIME: 32.2 SECONDS (ref 10–13.8)
RBC # BLD AUTO: 3.53 10*6/MM3 (ref 4.14–5.8)
SODIUM SERPL-SCNC: 142 MMOL/L (ref 136–145)
TIBC SERPL-MCNC: 362 MCG/DL (ref 298–536)
TRANSFERRIN SERPL-MCNC: 243 MG/DL (ref 200–360)
WBC NRBC COR # BLD: 8.8 10*3/MM3 (ref 3.4–10.8)

## 2022-01-24 PROCEDURE — 82728 ASSAY OF FERRITIN: CPT | Performed by: INTERNAL MEDICINE

## 2022-01-24 PROCEDURE — 80053 COMPREHEN METABOLIC PANEL: CPT | Performed by: INTERNAL MEDICINE

## 2022-01-24 PROCEDURE — 83540 ASSAY OF IRON: CPT | Performed by: INTERNAL MEDICINE

## 2022-01-24 PROCEDURE — 36416 COLLJ CAPILLARY BLOOD SPEC: CPT

## 2022-01-24 PROCEDURE — 85610 PROTHROMBIN TIME: CPT

## 2022-01-24 PROCEDURE — 83735 ASSAY OF MAGNESIUM: CPT | Performed by: INTERNAL MEDICINE

## 2022-01-24 PROCEDURE — 99214 OFFICE O/P EST MOD 30 MIN: CPT | Performed by: INTERNAL MEDICINE

## 2022-01-24 PROCEDURE — 85025 COMPLETE CBC W/AUTO DIFF WBC: CPT | Performed by: INTERNAL MEDICINE

## 2022-01-24 PROCEDURE — 36415 COLL VENOUS BLD VENIPUNCTURE: CPT | Performed by: INTERNAL MEDICINE

## 2022-01-24 PROCEDURE — 84466 ASSAY OF TRANSFERRIN: CPT | Performed by: INTERNAL MEDICINE

## 2022-01-24 NOTE — PROGRESS NOTES
Anticoagulation Clinic Progress Note    Anticoagulation Summary  As of 2022    INR goal:  2.0-3.0   TTR:  48.9 % (9.4 mo)   INR used for dosin.7 (2022)   Warfarin maintenance plan:  1 mg every Thu; 2 mg all other days   Weekly warfarin total:  13 mg   No change documented:  Luci Hale RPH   Plan last modified:  Eduar Weeks, PharmD (2021)   Next INR check:  2022   Priority:  High   Target end date:      Indications    PAF (paroxysmal atrial fibrillation) (HCC) [I48.0]             Anticoagulation Episode Summary     INR check location:      Preferred lab:      Send INR reminders to:   [facility]    Comments:        Anticoagulation Care Providers     Provider Role Specialty Phone number    Jimena Singer MD Referring Cardiology [phone]          Clinic Interview:  Patient Findings     Negatives:  Signs/symptoms of thrombosis, Signs/symptoms of bleeding,   Laboratory test error suspected, Change in health, Change in alcohol use,   Change in activity, Upcoming invasive procedure, Emergency department   visit, Upcoming dental procedure, Missed doses, Extra doses, Change in   medications, Change in diet/appetite, Hospital admission, Bruising, Other   complaints      Clinical Outcomes     Negatives:  Major bleeding event, Thromboembolic event,   Anticoagulation-related hospital admission, Anticoagulation-related ED   visit, Anticoagulation-related fatality        INR History:  Anticoagulation Monitoring 2021 1/10/2022 2022   INR 2.8 3.2 2.7   INR Date 2021 1/10/2022 2022   INR Goal 2.0-3.0 2.0-3.0 2.0-3.0   Trend Same Same Same   Last Week Total 13 mg 13 mg 13 mg   Next Week Total 13 mg 12 mg 13 mg   Sun 2 mg 2 mg 2 mg   Mon 2 mg 1 mg (1/10); Otherwise 2 mg 2 mg   Tue 2 mg 2 mg 2 mg   Wed 2 mg 2 mg 2 mg   Thu 1 mg 1 mg 1 mg   Fri 2 mg 2 mg 2 mg   Sat 2 mg 2 mg 2 mg   Visit Report - - -   Some recent data might be hidden       Plan:  1. INR is  Therapeutic today- see above in Anticoagulation Summary.  Will instruct Rodolfo Grover to Continue their warfarin regimen- see above in Anticoagulation Summary.  2. Follow up in 33 weeks  3. Patient declines warfarin refills.  4. Verbal and written information provided. Patient expresses understanding and has no further questions at this time.    Luci Hale, [facility]

## 2022-01-24 NOTE — PROGRESS NOTES
Chief Complaint   Patient presents with   • Follow-up     anemia       History of Present Illness:   76 y.o. male        I last saw the patient in 12/2021.  At that time my assessment and plan was as follows:  Assessment:  1.  History of grade D esophagitis noted on 2 of 2021 EGD.  2.  Patient has a history of colon polyps. Last c/s several yrs ago.   3. H/o iron def Anemia. He is heme negative today.  4. CRI  5. On warfarin for a fib.      Recommend:  1. CBC, CMP, ferritin, TIBC, PT  2. I recommend an EGD (to document healing of GERD esophagitis) and colonoscopy. He wants to wait till after the labs come back to decide if we should do scopes or not. He would need to come off coumadin for 4 days prior to scopes.   3. F/u 3 mos.        He last had an EGD and colonoscopy in 12/2021.  The EGD showed that the esophagitis had healed.  There was one small duodenal polyp that I biopsied.  Pathology from the EGD was unrevealing.  I recommended a repeat colonoscopy in 5 years.       He decreased the Protonix 40 mg bid to once/ day and notices breakthru GERD occaisonally. NO abdominal or chest pain. No dysphagai. No diarrhea. Stool more dry but no consitpation. No nasuea or vomiting. No rectal bleeding or melena. Denies NSAIDs use.     Past Medical History:   Diagnosis Date   • Acute blood loss anemia    • Atrial fibrillation (HCC)    • Bilateral lower extremity edema    • Bradycardia following surgery    • CAD (coronary artery disease)    • Chest discomfort    • Colon polyp    • Elevated troponin    • Focal motor seizure (HCC)    • Generalized tonic-clonic seizure (HCC)    • GERD (gastroesophageal reflux disease)    • Iritis of right eye    • Ischemic cardiomyopathy    • Mitral valve insufficiency    • Post-ictal state (HCC)    • Pulmonary hypertension (HCC)    • PVC (premature ventricular contraction)    • Rheumatic fever    • S/P CABG x 7    • S/P mitral valve repair    • Severe obstructive sleep apnea    • Shoulder  fracture, right        Past Surgical History:   Procedure Laterality Date   • CARDIAC CATHETERIZATION N/A 2/19/2021    Procedure: Coronary angiography;  Surgeon: Bry Nails MD;  Location: [facility] CATH INVASIVE LOCATION;  Service: Cardiovascular;  Laterality: N/A;   • CARDIAC CATHETERIZATION N/A 2/19/2021    Procedure: Left heart cath;  Surgeon: Bry Nails MD;  Location: [facility] CATH INVASIVE LOCATION;  Service: Cardiovascular;  Laterality: N/A;   • CARDIAC CATHETERIZATION N/A 2/19/2021    Procedure: Right Heart Cath;  Surgeon: Bry Nails MD;  Location: [facility] CATH INVASIVE LOCATION;  Service: Cardiovascular;  Laterality: N/A;   • CARDIAC CATHETERIZATION N/A 2/19/2021    Procedure: Left ventriculography;  Surgeon: Bry Nails MD;  Location: [facility] CATH INVASIVE LOCATION;  Service: Cardiovascular;  Laterality: N/A;   • COLONOSCOPY N/A 12/7/2021    Procedure: COLONOSCOPY to cecum with cold biopsy polypectomy;  Surgeon: Edgar Allen MD;  Location: [facility] ENDOSCOPY;  Service: Gastroenterology;  Laterality: N/A;  IRON DEFICIENCY ANEMIA, H/O COLON POLYPS  --   DIVERTICULOSIS, polyp, hemorrhoids   • CORONARY ARTERY BYPASS GRAFT N/A 2/22/2021    Procedure: BK, MIDLINE STERNOTOMY, CORONARY ARTERY BYPASS X 7 UTILIZING THE LEFT INTERNAL MAMMARY ARTERY AND THE RIGHT SAPHENOUS VEIN, MITRAL VALVE REPAIR, PRP;  Surgeon: Jr Shyam Echavarria MD;  Location: [facility] MAIN OR;  Service: Cardiothoracic;  Laterality: N/A;   • ENDOSCOPY N/A 8/16/2018    Erosive gastritis, diverticulosis   • ENDOSCOPY N/A 2/26/2021    Procedure: ESOPHAGOGASTRODUODENOSCOPY AT BEDSIDE WITH HOT SNARE POLYPECTOMY AND CLIP PLACEMENT X1;  Surgeon: Jono Laboy MD;  Location: [facility] ENDOSCOPY;  Service: Gastroenterology;  Laterality: N/A;  PRE-  GI BLEED  POST- GASTRITIS, ESOPHAGITIS, POLYP   • ENDOSCOPY N/A 12/7/2021    Procedure: ESOPHAGOGASTRODUODENOSCOPY with COLD  BIOPSIES;  Surgeon: Edgar Allen MD;   Location: [facility] ENDOSCOPY;  Service: Gastroenterology;  Laterality: N/A;  IRON DEFICIENCY ANEMIA, H/O ULCERATIVE ESOPHAGITIS  --GASTRITIS, DUODENAL POLYP   • HERNIA REPAIR     • SHOULDER CLOSED REDUCTION Right 3/16/2018    Procedure: RIGHT SHOULDER CLOSED REDUCTION;  Surgeon: Kj Garcia MD;  Location: [facility] MAIN OR;  Service: Orthopedics   • TONSILLECTOMY     • TOTAL SHOULDER ARTHROPLASTY W/ DISTAL CLAVICLE EXCISION Right 3/22/2018    Procedure: Reverse Total Shoulder Arthroplsty;  Surgeon: Kj Garcia MD;  Location: [facility] OR;  Service: Orthopedics   • TRANSESOPHAGEAL ECHOCARDIOGRAM (BK) N/A 2/22/2021    Procedure: TRANSESOPHAGEAL ECHOCARDIOGRAM;  Surgeon: Jr Shyam Echavarria MD;  Location: [facility] OR;  Service: Cardiothoracic;  Laterality: N/A;         Current Outpatient Medications:   •  acetaminophen (TYLENOL) 500 MG tablet, Take 2 tablets by mouth 3 (Three) Times a Day. (Patient taking differently: Take 1,000 mg by mouth 3 (Three) Times a Day As Needed.), Disp: , Rfl:   •  aspirin 81 MG EC tablet, Take 1 tablet by mouth Daily., Disp: 90 tablet, Rfl: 1  •  atorvastatin (LIPITOR) 10 MG tablet, Take 1 tablet by mouth Daily., Disp: 30 tablet, Rfl: 11  •  Cholecalciferol 50 MCG (2000 UT) capsule, Take 1 capsule by mouth Daily., Disp: , Rfl:   •  fluticasone (CUTIVATE) 0.05 % cream, Apply 1 application topically to the appropriate area as directed 2 (Two) Times a Day., Disp: 30 g, Rfl: 0  •  furosemide (Lasix) 40 MG tablet, Take 1 tablet by mouth Daily As Needed (for weight gain or shortness of breath). (Patient taking differently: Take 40 mg by mouth As Needed (for weight gain or shortness of breath).), Disp: 30 tablet, Rfl: 1  •  pantoprazole (PROTONIX) 40 MG EC tablet, Take 1 tablet by mouth 2 (Two) Times a Day Before Meals. (Patient taking differently: Take 40 mg by mouth Daily.), Disp: 180 tablet, Rfl: 1  •  potassium chloride 10 MEQ CR tablet, Take 1 tablet by mouth Daily As Needed  (Take with Lasix (furosemide)). With Lasix (furosemide), Disp: 30 tablet, Rfl: 1  •  Psyllium (METAMUCIL FIBER PO), Take  by mouth As Needed., Disp: , Rfl:   •  sucralfate (CARAFATE) 1 g tablet, As Needed., Disp: , Rfl:   •  warfarin (COUMADIN) 2 MG tablet, Take one tablet by mouth daily or as directed by the [facility], Disp: 90 tablet, Rfl: 1    No Known Allergies    Family History   Problem Relation Age of Onset   • Malig Hyperthermia Neg Hx        Social History     Socioeconomic History   • Marital status:    Tobacco Use   • Smoking status: Never Smoker   • Smokeless tobacco: Never Used   Vaping Use   • Vaping Use: Never used   Substance and Sexual Activity   • Alcohol use: Yes     Comment: occasional   • Drug use: No   • Sexual activity: Defer       Review of Systems   Gastrointestinal: Negative for abdominal pain.   All other systems reviewed and are negative.    Pertinent positives and negatives documented in the HPI and all other systems reviewed and were found to be negative.  Vitals:    01/24/22 0912   BP: 153/80   Pulse: 70   Temp: 96.6 °F (35.9 °C)   SpO2: 97%       Physical Exam  Vitals reviewed.   Constitutional:       General: He is not in acute distress.     Appearance: Normal appearance. He is well-developed. He is not diaphoretic.   HENT:      Head: Normocephalic and atraumatic. Hair is normal.      Right Ear: Hearing, tympanic membrane, ear canal and external ear normal.      Left Ear: Hearing, tympanic membrane, ear canal and external ear normal.      Nose: Nose normal. No nasal deformity.      Mouth/Throat:      Mouth: Mucous membranes are moist. No oral lesions.      Pharynx: Uvula midline. No uvula swelling.   Eyes:      General: Lids are normal. No scleral icterus.        Right eye: No discharge.         Left eye: No discharge.      Extraocular Movements: Extraocular movements intact.      Right eye: Normal extraocular motion and no nystagmus.      Left eye: Normal  extraocular motion and no nystagmus.      Conjunctiva/sclera: Conjunctivae normal.      Pupils: Pupils are equal, round, and reactive to light.   Neck:      Thyroid: No thyromegaly.      Vascular: No JVD.   Cardiovascular:      Rate and Rhythm: Normal rate and regular rhythm.      Pulses: Normal pulses.      Heart sounds: Normal heart sounds. No murmur heard.  No gallop.    Pulmonary:      Effort: Pulmonary effort is normal. No respiratory distress.      Breath sounds: Normal breath sounds. No wheezing or rales.   Chest:      Chest wall: No tenderness.   Abdominal:      General: Bowel sounds are normal. There is no distension.      Palpations: Abdomen is soft. There is no mass.      Tenderness: There is no abdominal tenderness. There is no guarding.      Hernia: No hernia is present.   Genitourinary:     Rectum: Normal. Guaiac result negative.   Musculoskeletal:         General: No tenderness or deformity. Normal range of motion.      Cervical back: Normal range of motion and neck supple.   Lymphadenopathy:      Cervical: No cervical adenopathy.   Skin:     General: Skin is warm and dry.      Findings: No rash.   Neurological:      Mental Status: He is alert and oriented to person, place, and time.      Cranial Nerves: No cranial nerve deficit.      Motor: No abnormal muscle tone.      Coordination: Coordination normal.      Deep Tendon Reflexes: Reflexes are normal and symmetric. Reflexes normal.   Psychiatric:         Mood and Affect: Mood normal.         Behavior: Behavior normal.         Thought Content: Thought content normal.         Judgment: Judgment normal.         Diagnoses and all orders for this visit:    1. Iron deficiency anemia due to chronic blood loss (Primary)  -     CBC & Differential  -     Comprehensive Metabolic Panel  -     Ferritin  -     Iron Profile  -     Magnesium    2. Polyp of colon, unspecified part of colon, unspecified type  -     CBC & Differential  -     Comprehensive Metabolic  Panel  -     Ferritin  -     Iron Profile  -     Magnesium    3. Gastroesophageal reflux disease with esophagitis and hemorrhage  -     CBC & Differential  -     Comprehensive Metabolic Panel  -     Ferritin  -     Iron Profile  -     Magnesium    4. S/P CABG x 7      Assessment:  1. History of grade D esophagitis noted on 2 of 2021 EGD.  The esophagitis had healed on the 12 of 2021 EGD.  2.  His last colonoscopy was in 12 of 2021.  I had recommended a repeat colonoscopy in 5 years.  3.  History of iron deficiency anemia. His stool is heme negative today.   4.  CRI  5. On warfarin for a fib   6.     Recommendations:  1. CBC, CMP, ferritin, TIBC, Mg  2. Continue Protonix 40 mg/AM. I would love to get him off of PPI's if possible? Could we decrease to OTC strength PPI?h  3. Take Pepcid 20 mg/in the afternoon if needed for breakthru symptoms.  4. F/u 4 mos.     Return in about 4 months (around 5/24/2022).    Edgar Allen MD  1/24/2022

## 2022-01-25 ENCOUNTER — TELEPHONE (OUTPATIENT)
Dept: FAMILY MEDICINE CLINIC | Facility: CLINIC | Age: 77
End: 2022-01-25

## 2022-01-25 NOTE — TELEPHONE ENCOUNTER
Caller: Reilly Rodolfo L    Relationship: Self    Best call back number: [phone]    Requested Prescriptions:   Requested Prescriptions     Pending Prescriptions Disp Refills   • furosemide (Lasix) 40 MG tablet 30 tablet 1     Sig: Take 1 tablet by mouth Daily As Needed (for weight gain or shortness of breath).   • potassium chloride 10 MEQ CR tablet 30 tablet 1     Sig: Take 1 tablet by mouth Daily As Needed (Take with Lasix (furosemide)). With Lasix (furosemide)        Pharmacy where request should be sent: [id number] [place] [address] [phone] [facility] [phone]      Additional details provided by patient:  IS GOING OUT OF TOWN IN THE MORNING, SO HE NEEDS IT SENT ASAP    Does the patient have less than a 3 day supply:  [x] Yes  [] No    Mely Sales Rep   01/25/22 13:26 EST

## 2022-01-27 RX ORDER — FUROSEMIDE 40 MG/1
40 TABLET ORAL DAILY PRN
Qty: 30 TABLET | Refills: 1 | Status: SHIPPED | OUTPATIENT
Start: 2022-01-27 | End: 2022-09-22 | Stop reason: SDUPTHER

## 2022-01-27 RX ORDER — POTASSIUM CHLORIDE 750 MG/1
10 TABLET, FILM COATED, EXTENDED RELEASE ORAL DAILY PRN
Qty: 30 TABLET | Refills: 1 | Status: SHIPPED | OUTPATIENT
Start: 2022-01-27 | End: 2022-09-22 | Stop reason: SDUPTHER

## 2022-01-27 NOTE — PROGRESS NOTES
01/27/22       Tell him that his lab work showed that his kidney function (serum creatinine of 1.67 with a BUN of 24) is mildly abnormal but is stable from what it is been over the last 8 months.  His CBC shows his hemoglobin is mildly low with a hemoglobin of 11.3.  His hemoglobin has been low over the last 11 months.       Please send a copy of this report to his PCP.  Lorenzo samson

## 2022-01-28 ENCOUNTER — TELEPHONE (OUTPATIENT)
Dept: GASTROENTEROLOGY | Facility: CLINIC | Age: 77
End: 2022-01-28

## 2022-02-14 ENCOUNTER — ANTICOAGULATION VISIT (OUTPATIENT)
Dept: PHARMACY | Facility: HOSPITAL | Age: 77
End: 2022-02-14

## 2022-02-14 DIAGNOSIS — I48.0 PAF (PAROXYSMAL ATRIAL FIBRILLATION): Primary | ICD-10-CM

## 2022-02-14 LAB
INR PPP: 2.8 (ref 0.91–1.09)
PROTHROMBIN TIME: 33.2 SECONDS (ref 10–13.8)

## 2022-02-14 PROCEDURE — 85610 PROTHROMBIN TIME: CPT

## 2022-02-14 PROCEDURE — 36416 COLLJ CAPILLARY BLOOD SPEC: CPT

## 2022-02-14 NOTE — PROGRESS NOTES
Anticoagulation Clinic Progress Note    Anticoagulation Summary  As of 2022    INR goal:  2.0-3.0   TTR:  52.4 % (10.1 mo)   INR used for dosin.8 (2022)   Warfarin maintenance plan:  1 mg every Thu; 2 mg all other days   Weekly warfarin total:  13 mg   No change documented:  Luci Hale RPH   Plan last modified:  Eduar Weeks, PharmD (2021)   Next INR check:  3/14/2022   Priority:  High   Target end date:      Indications    PAF (paroxysmal atrial fibrillation) (HCC) [I48.0]             Anticoagulation Episode Summary     INR check location:      Preferred lab:      Send INR reminders to:   [facility]    Comments:        Anticoagulation Care Providers     Provider Role Specialty Phone number    Jimena Signer MD Referring Cardiology [phone]          Clinic Interview:  Patient Findings     Negatives:  Signs/symptoms of thrombosis, Signs/symptoms of bleeding,   Laboratory test error suspected, Change in health, Change in alcohol use,   Change in activity, Upcoming invasive procedure, Emergency department   visit, Upcoming dental procedure, Missed doses, Extra doses, Change in   medications, Change in diet/appetite, Hospital admission, Bruising, Other   complaints      Clinical Outcomes     Negatives:  Major bleeding event, Thromboembolic event,   Anticoagulation-related hospital admission, Anticoagulation-related ED   visit, Anticoagulation-related fatality        INR History:  Anticoagulation Monitoring 1/10/2022 2022 2022   INR 3.2 2.7 2.8   INR Date 1/10/2022 2022 2022   INR Goal 2.0-3.0 2.0-3.0 2.0-3.0   Trend Same Same Same   Last Week Total 13 mg 13 mg 13 mg   Next Week Total 12 mg 13 mg 13 mg   Sun 2 mg 2 mg 2 mg   Mon 1 mg (1/10); Otherwise 2 mg 2 mg 2 mg   Tue 2 mg 2 mg 2 mg   Wed 2 mg 2 mg 2 mg   Thu 1 mg 1 mg 1 mg   Fri 2 mg 2 mg 2 mg   Sat 2 mg 2 mg 2 mg   Visit Report - - -   Some recent data might be hidden       Plan:  1. INR is  Therapeutic today- see above in Anticoagulation Summary.  Will instruct Rodolfo Grover to Continue their warfarin regimen- see above in Anticoagulation Summary.  2. Follow up in 4 weeks  3. Patient declines warfarin refills.  4. Verbal and written information provided. Patient expresses understanding and has no further questions at this time.    Luci Hale, formerly [facility]

## 2022-02-16 ENCOUNTER — APPOINTMENT (OUTPATIENT)
Dept: PHYSICAL THERAPY | Facility: HOSPITAL | Age: 77
End: 2022-02-16

## 2022-02-23 ENCOUNTER — HOSPITAL ENCOUNTER (OUTPATIENT)
Dept: PHYSICAL THERAPY | Facility: HOSPITAL | Age: 77
Setting detail: THERAPIES SERIES
Discharge: HOME | End: 2022-02-23

## 2022-02-23 DIAGNOSIS — R26.89 BALANCE DISORDER: ICD-10-CM

## 2022-02-23 DIAGNOSIS — R42 VERTIGO: Primary | ICD-10-CM

## 2022-02-23 PROCEDURE — 97112 NEUROMUSCULAR REEDUCATION: CPT | Performed by: PHYSICAL THERAPIST

## 2022-02-23 PROCEDURE — 97110 THERAPEUTIC EXERCISES: CPT | Performed by: PHYSICAL THERAPIST

## 2022-02-23 NOTE — THERAPY RE-EVALUATION
Outpatient Physical Therapy Vestibular Re-Evaluation  [facility]     Patient Name: Rodolfo Grover  : 1945  MRN: [id number]  Today's Date: 2022      Visit Date: 2022    Patient Active Problem List   Diagnosis   • Acute blood loss anemia   • Fracture of fifth metacarpal bone of right hand   • Closed fracture dislocation of right shoulder   • Gastroesophageal reflux disease without esophagitis   • DNR (do not resuscitate)   • Bradycardia following surgery   • Frequent PVCs   • Proximal humerus fracture   • Osteoarthritis   • Severe obstructive sleep apnea   • Pure hypercholesterolemia   • Pulmonary hypertension (HCC)   • LARA (dyspnea on exertion)   • Localized edema   • Chest discomfort   • Elevated troponin   • Abnormal EKG   • Coronary artery disease involving native coronary artery of native heart without angina pectoris   • Focal motor seizure (HCC)   • Generalized tonic-clonic seizure (HCC)   • Post-ictal state (HCC)   • Coffee ground emesis   • S/P CABG (coronary artery bypass graft)   • Mitral regurgitation   • H/O mitral valve repair   • PAF (paroxysmal atrial fibrillation) (HCC)   • Ischemic cardiomyopathy   • Essential hypertension   • Non-ischemic cardiomyopathy (HCC)   • Iron deficiency anemia due to chronic blood loss   • Polyp of colon        Past Medical History:   Diagnosis Date   • Acute blood loss anemia    • Atrial fibrillation (HCC)    • Bilateral lower extremity edema    • Bradycardia following surgery    • CAD (coronary artery disease)    • Chest discomfort    • Colon polyp    • Elevated troponin    • Focal motor seizure (HCC)    • Generalized tonic-clonic seizure (HCC)    • GERD (gastroesophageal reflux disease)    • Iritis of right eye    • Ischemic cardiomyopathy    • Mitral valve insufficiency    • Post-ictal state (HCC)    • Pulmonary hypertension (HCC)    • PVC (premature ventricular contraction)    • Rheumatic fever    • S/P CABG x 7    • S/P mitral valve repair    •  Severe obstructive sleep apnea    • Shoulder fracture, right         Past Surgical History:   Procedure Laterality Date   • CARDIAC CATHETERIZATION N/A 2/19/2021    Procedure: Coronary angiography;  Surgeon: Bry Nails MD;  Location: [facility] CATH INVASIVE LOCATION;  Service: Cardiovascular;  Laterality: N/A;   • CARDIAC CATHETERIZATION N/A 2/19/2021    Procedure: Left heart cath;  Surgeon: Bry Nails MD;  Location: [facility] CATH INVASIVE LOCATION;  Service: Cardiovascular;  Laterality: N/A;   • CARDIAC CATHETERIZATION N/A 2/19/2021    Procedure: Right Heart Cath;  Surgeon: Bry Nails MD;  Location: [facility] CATH INVASIVE LOCATION;  Service: Cardiovascular;  Laterality: N/A;   • CARDIAC CATHETERIZATION N/A 2/19/2021    Procedure: Left ventriculography;  Surgeon: Bry Nails MD;  Location: [facility] CATH INVASIVE LOCATION;  Service: Cardiovascular;  Laterality: N/A;   • COLONOSCOPY N/A 12/7/2021    Procedure: COLONOSCOPY to cecum with cold biopsy polypectomy;  Surgeon: Edgar Allen MD;  Location: [facility] ENDOSCOPY;  Service: Gastroenterology;  Laterality: N/A;  IRON DEFICIENCY ANEMIA, H/O COLON POLYPS  --   DIVERTICULOSIS, polyp, hemorrhoids   • CORONARY ARTERY BYPASS GRAFT N/A 2/22/2021    Procedure: BK, MIDLINE STERNOTOMY, CORONARY ARTERY BYPASS X 7 UTILIZING THE LEFT INTERNAL MAMMARY ARTERY AND THE RIGHT SAPHENOUS VEIN, MITRAL VALVE REPAIR, PRP;  Surgeon: Jr Shyam Echavarria MD;  Location: [facility] MAIN OR;  Service: Cardiothoracic;  Laterality: N/A;   • ENDOSCOPY N/A 8/16/2018    Erosive gastritis, diverticulosis   • ENDOSCOPY N/A 2/26/2021    Procedure: ESOPHAGOGASTRODUODENOSCOPY AT BEDSIDE WITH HOT SNARE POLYPECTOMY AND CLIP PLACEMENT X1;  Surgeon: Jono Laboy MD;  Location: [facility] ENDOSCOPY;  Service: Gastroenterology;  Laterality: N/A;  PRE-  GI BLEED  POST- GASTRITIS, ESOPHAGITIS, POLYP   • ENDOSCOPY N/A 12/7/2021    Procedure: ESOPHAGOGASTRODUODENOSCOPY with  COLD  BIOPSIES;  Surgeon: Edgar Allen MD;  Location: [facility] ENDOSCOPY;  Service: Gastroenterology;  Laterality: N/A;  IRON DEFICIENCY ANEMIA, H/O ULCERATIVE ESOPHAGITIS  --GASTRITIS, DUODENAL POLYP   • HERNIA REPAIR     • SHOULDER CLOSED REDUCTION Right 3/16/2018    Procedure: RIGHT SHOULDER CLOSED REDUCTION;  Surgeon: Kj Garcia MD;  Location: [facility] OR;  Service: Orthopedics   • TONSILLECTOMY     • TOTAL SHOULDER ARTHROPLASTY W/ DISTAL CLAVICLE EXCISION Right 3/22/2018    Procedure: Reverse Total Shoulder Arthroplsty;  Surgeon: Kj Garcia MD;  Location: [facility] OR;  Service: Orthopedics   • TRANSESOPHAGEAL ECHOCARDIOGRAM (BK) N/A 2/22/2021    Procedure: TRANSESOPHAGEAL ECHOCARDIOGRAM;  Surgeon: Jr Shyam Echavarria MD;  Location: [facility] OR;  Service: Cardiothoracic;  Laterality: N/A;         Visit Dx:     ICD-10-CM ICD-9-CM   1. Vertigo  R42 780.4   2. Balance disorder  R26.89 781.99                           Therapy Education  Education Details: resume HEP       OP Exercises     Row Name 02/23/22 0900             Subjective Comments    Subjective Comments Patient reeturns after 30+ day trip to [place] reporting no regression but did not keep up with exercises as he had hoped. Mostly just walked. Got some new parts to his CPAP machine and his having trouble adjusting his sleep so he doesn't feel as good today.  -GR              Subjective Pain    Able to rate subjective pain? yes  -GR      Pre-Treatment Pain Level 0  -GR              Total Minutes    32146 - PT Therapeutic Exercise Minutes 20  -GR      46459 -  PT Neuromuscular Reeducation Minutes 20  -GR              Exercise 1    Exercise Name 1 Tandem walk  -GR      Reps 1 3 laps  -GR              Exercise 2    Exercise Name 2 Hurdles step overs - fwd/lateral  -GR      Reps 2 3 laps  -GR              Exercise 3    Exercise Name 3 Nustep  -GR      Time 3 5 min  -GR      Additional Comments L UE/LE  -GR              Exercise 4    Exercise  Name 4 Standing hip abd  -GR      Cueing 4 Demo  -GR      Sets 4 3  -GR      Reps 4 10  -GR      Additional Comments 4# each airex  -GR              Exercise 5    Exercise Name 5 Standing HS curl  -GR      Cueing 5 Demo  -GR      Sets 5 3  -GR      Reps 5 10  -GR      Additional Comments 4# each airex  -GR              Exercise 6    Exercise Name 6 LAQ  -GR      Cueing 6 Demo  -GR      Sets 6 3  -GR      Reps 6 10  -GR      Additional Comments 5#  -GR              Exercise 7    Exercise Name 7 SAQ  -GR      Cueing 7 Demo  -GR      Sets 7 3  -GR      Reps 7 10  -GR      Additional Comments 5#  -GR              Exercise 8    Exercise Name 8 Bridge  -GR      Cueing 8 Demo  -GR      Sets 8 2  -GR      Reps 8 10  -GR      Additional Comments swiss ball  -GR              Exercise 9    Exercise Name 9 Airex - march in place  -GR      Cueing 9 Demo  -GR      Sets 9 2  -GR      Reps 9 20  -GR              Exercise 10    Exercise Name 10 Rockerboard- static stance AP/ML  -GR      Cueing 10 Demo  -GR      Sets 10 1  -GR      Reps 10 2  -GR      Time 10 1 min  -GR              Exercise 13    Exercise Name 13 HS stretch standing at step  -GR      Cueing 13 Demo  -GR      Sets 13 1  -GR      Reps 13 3  -GR      Time 13 20 seconds  -GR      Additional Comments each side  -GR              Exercise 14    Exercise Name 14 Gastroc stretch at step  -GR      Cueing 14 Demo  -GR      Sets 14 1  -GR      Reps 14 3  -GR      Time 14 20 seconds  -GR      Additional Comments each side  -GR            User Key  (r) = Recorded By, (t) = Taken By, (c) = Cosigned By    Initials Name Provider Type    GR Gaurav Cortez, PT Physical Therapist                             PT OP Goals     Row Name 02/23/22 0900          PT Short Term Goals    STG Date to Achieve 12/23/21  -GR     STG 1 Patient will be independent with initial HEP.  -GR     STG 1 Progress Met  -GR     STG 2 Patient will report no worse with VOR.  -GR     STG 2 Progress Met  -GR             Long Term Goals    LTG Date to Achieve 01/22/22  -GR     LTG 1 Patient will remain negative for BPPV all canals.  -GR     LTG 1 Progress Met  -GR     LTG 1 Progress Comments denies vertigo  -GR     LTG 2 Patient will score >/=19/24 on the DGI to indicated decreased fall risk.  -GR     LTG 2 Progress Ongoing  -GR     LTG 2 Progress Comments 14/24  -GR     LTG 3 Patient will score </=12/100 on the dizziness handicap inventory to indicate improved perceived positional tolerance.  -GR     LTG 3 Progress Met  -GR     LTG 3 Progress Comments 0/100  -GR           User Key  (r) = Recorded By, (t) = Taken By, (c) = Cosigned By    Initials Name Provider Type    Gaurav Borges, PT Physical Therapist                 PT Assessment/Plan     Row Name 02/23/22 1000          PT Assessment    Assessment Comments Mr. Grover has attended 8 sessions of skilled PT for vestibular therapy since 11/23/21. There have been some gaps in care due to COVID precautions and out of town trips.  Patient's vertigo is well controlled (null). He continues to demonstrate instability with gait as evidenced by score of 14/24 on the DGI where less than 19 is indicative of fall risk. This is a 2 pt improvement from his baseline of 12/24.  He has met 2/2 STGs and 2/3 LTGs. Recommend resume PT at 1/week for 3-4 weeks.  -GR            PT Plan    PT Plan Comments Resume 1x/week x 3-4 weeks.  -GR           User Key  (r) = Recorded By, (t) = Taken By, (c) = Cosigned By    Initials Name Provider Type    Gaurav Borges, PT Physical Therapist                 Outcome Measure Options: Dynamic Gait Index  Dynamic Gait Index (DGI)  Gait Level Surface: Mild impairment: walks 20’, uses assistive devices, slower speed, mild gait deviations  Change in Gait Speed: Moderate impairment: Makes only minor adjustments to walking speed, or accomplishes a change in speed with significant gait deviations, or changes speed but loses balance but is able to recover and  continue walking.  Gait with Horizontal Head Turns: Moderate impairment: Performs head turns with moderate change in gait velocity, slows down, staggers, but recovers, can continue to walk.  Gait with Vertical Head Turns: Mild impairment: Performs head turns smoothly with slight change in gait velocity, i.e. minor disruption to smooth gait path or uses walking aid.  Gait and Pivot Turn: Moderate impairment: Turns slowly, requires verbal cueing, requires several small steps to catch balance following turn and stop.  Step Over Obstacle: Mild impairment: Is able to step over shoe box, but must slow down and adjust steps to clear box safely.  Step Around Obstacles: Normal: Is able to walk safely around cones safely without changing gait speed, no evidence of imbalance.  Steps: Mild impairment: Alternating feet, must use rail.  Dynamic Gait Index Score: 14      Time Calculation:   Start Time: 0915  Stop Time: 1000  Time Calculation (min): 45 min  Total Timed Code Minutes- PT: 40 minute(s)  Timed Charges  32590 - PT Therapeutic Exercise Minutes: 20  69831 -  PT Neuromuscular Reeducation Minutes: 20  Total Minutes  Timed Charges Total Minutes: 40   Total Minutes: 40   Therapy Charges for Today     Code Description Service Date Service Provider Modifiers Qty    52162049966 HC PT NEUROMUSC RE EDUCATION EA 15 MIN 2/23/2022 Gaurav Cortez, PT GP 2    84601199403 HC PT THER PROC EA 15 MIN 2/23/2022 Gaurav Cortez, PT GP 1          PT G-Codes  Outcome Measure Options: Dynamic Gait Index         Gaurav Cortez, PT  2/23/2022

## 2022-03-01 ENCOUNTER — HOSPITAL ENCOUNTER (OUTPATIENT)
Dept: PHYSICAL THERAPY | Facility: HOSPITAL | Age: 77
Setting detail: THERAPIES SERIES
Discharge: HOME | End: 2022-03-01

## 2022-03-01 DIAGNOSIS — R42 VERTIGO: Primary | ICD-10-CM

## 2022-03-01 DIAGNOSIS — R26.89 BALANCE DISORDER: ICD-10-CM

## 2022-03-01 PROCEDURE — 97112 NEUROMUSCULAR REEDUCATION: CPT | Performed by: PHYSICAL THERAPIST

## 2022-03-01 PROCEDURE — 97110 THERAPEUTIC EXERCISES: CPT | Performed by: PHYSICAL THERAPIST

## 2022-03-01 NOTE — THERAPY TREATMENT NOTE
Outpatient Physical Therapy Vestibular Treatment Note  [facility]     Patient Name: Rodolfo Grover  : 1945  MRN: [id number]  Today's Date: 3/1/2022      Visit Date: 2022    Visit Dx:     ICD-10-CM ICD-9-CM   1. Vertigo  R42 780.4   2. Balance disorder  R26.89 781.99       Patient Active Problem List   Diagnosis   • Acute blood loss anemia   • Fracture of fifth metacarpal bone of right hand   • Closed fracture dislocation of right shoulder   • Gastroesophageal reflux disease without esophagitis   • DNR (do not resuscitate)   • Bradycardia following surgery   • Frequent PVCs   • Proximal humerus fracture   • Osteoarthritis   • Severe obstructive sleep apnea   • Pure hypercholesterolemia   • Pulmonary hypertension (HCC)   • LARA (dyspnea on exertion)   • Localized edema   • Chest discomfort   • Elevated troponin   • Abnormal EKG   • Coronary artery disease involving native coronary artery of native heart without angina pectoris   • Focal motor seizure (HCC)   • Generalized tonic-clonic seizure (HCC)   • Post-ictal state (HCC)   • Coffee ground emesis   • S/P CABG (coronary artery bypass graft)   • Mitral regurgitation   • H/O mitral valve repair   • PAF (paroxysmal atrial fibrillation) (HCC)   • Ischemic cardiomyopathy   • Essential hypertension   • Non-ischemic cardiomyopathy (HCC)   • Iron deficiency anemia due to chronic blood loss   • Polyp of colon                        PT Assessment/Plan     Row Name 22 0900          PT Assessment    Assessment Comments Advanced weights this visit. Patient requires repetitive verbal and tactile cueing to avoid compensatory movment especially when attempting to isolate glut medius.  Making minor improvements in stability on rocker board as with less UE support.  -GR            PT Plan    PT Plan Comments Assess response to progression of weights.  -GR           User Key  (r) = Recorded By, (t) = Taken By, (c) = Cosigned By    Initials Name Provider Type     "GR Gaurav Cortez, PT Physical Therapist                    OP Exercises     Row Name 03/01/22 0900             Subjective Comments    Subjective Comments Patient had a \"quiet weekend\" and denies soreness from returning to PT last week.  -GR              Subjective Pain    Able to rate subjective pain? yes  -GR      Pre-Treatment Pain Level 0  -GR              Total Minutes    84466 - PT Therapeutic Exercise Minutes 30  -GR      08114 -  PT Neuromuscular Reeducation Minutes 12  -GR              Exercise 1    Exercise Name 1 Tandem walk  -GR      Reps 1 3 laps  -GR      Additional Comments beam  -GR              Exercise 2    Exercise Name 2 Hurdles step overs - fwd/lateral  -GR      Reps 2 3 laps  -GR      Additional Comments fingertips as needed on // bars  -GR              Exercise 3    Exercise Name 3 Nustep  -GR      Time 3 5 min  -GR      Additional Comments L7 x 3 min, L5 x 2 min UE/LE  -GR              Exercise 4    Exercise Name 4 Standing hip abd  -GR      Cueing 4 Demo  -GR      Sets 4 3  -GR      Reps 4 10  -GR      Additional Comments 5# each airex  -GR              Exercise 5    Exercise Name 5 Standing HS curl  -GR      Cueing 5 Demo  -GR      Sets 5 3  -GR      Reps 5 10  -GR      Additional Comments 5# each airex  -GR              Exercise 6    Exercise Name 6 LAQ  -GR      Cueing 6 Demo  -GR      Sets 6 3  -GR      Reps 6 10  -GR      Additional Comments 5#  -GR              Exercise 7    Exercise Name 7 SAQ  -GR      Cueing 7 Demo  -GR      Sets 7 3  -GR      Reps 7 10  -GR      Additional Comments 5#  -GR              Exercise 8    Exercise Name 8 Bridge  -GR      Cueing 8 Demo  -GR      Sets 8 2  -GR      Reps 8 10  -GR      Additional Comments swiss ball  -GR              Exercise 9    Exercise Name 9 [place] in // bars  -GR      Reps 9 3 laps  -GR              Exercise 10    Exercise Name 10 Rockerboard- static stance AP/ML  -GR      Cueing 10 Demo  -GR      Sets 10 1  -GR      Reps 10 2  " -GR      Time 10 1 min  -GR              Exercise 11    Exercise Name 11 leg press DL/SL  -GR      Cueing 11 Demo  -GR      Sets 11 210  -GR      Additional Comments 100#/80# each  -GR              Exercise 13    Exercise Name 13 HS stretch standing at step  -GR      Cueing 13 Demo  -GR      Sets 13 1  -GR      Reps 13 3  -GR      Time 13 20 seconds  -GR              Exercise 14    Exercise Name 14 Gastroc stretch at step  -GR      Cueing 14 Demo  -GR      Sets 14 1  -GR      Reps 14 3  -GR      Time 14 20 seconds  -GR      Additional Comments each side  -GR            User Key  (r) = Recorded By, (t) = Taken By, (c) = Cosigned By    Initials Name Provider Type    Gaurav Borges, PT Physical Therapist                             PT OP Goals     Row Name 03/01/22 0900          PT Short Term Goals    STG Date to Achieve 12/23/21  -GR     STG 1 Patient will be independent with initial HEP.  -GR     STG 1 Progress Met  -GR     STG 2 Patient will report no worse with VOR.  -GR     STG 2 Progress Met  -GR            Long Term Goals    LTG Date to Achieve 01/22/22  -GR     LTG 1 Patient will remain negative for BPPV all canals.  -GR     LTG 1 Progress Met  -GR     LTG 2 Patient will score >/=19/24 on the DGI to indicated decreased fall risk.  -GR     LTG 2 Progress Ongoing  -GR     LTG 3 Patient will score </=12/100 on the dizziness handicap inventory to indicate improved perceived positional tolerance.  -GR     LTG 3 Progress Met  -GR           User Key  (r) = Recorded By, (t) = Taken By, (c) = Cosigned By    Initials Name Provider Type    Gaurav Borges, PT Physical Therapist                               Time Calculation:   Start Time: 0917  Stop Time: 1000  Time Calculation (min): 43 min  Total Timed Code Minutes- PT: 42 minute(s)  Timed Charges  23087 - PT Therapeutic Exercise Minutes: 30  85941 -  PT Neuromuscular Reeducation Minutes: 12  Total Minutes  Timed Charges Total Minutes: 42   Total Minutes: 42    Therapy Charges for Today     Code Description Service Date Service Provider Modifiers Qty    53655045801 HC PT NEUROMUSC RE EDUCATION EA 15 MIN 3/1/2022 Gaurav Cortez, PT GP 1    91260849788 HC PT THER PROC EA 15 MIN 3/1/2022 Gaurav Cortez, PT GP 2                   Gaurav Cortez, PT  3/1/2022

## 2022-03-03 ENCOUNTER — OFFICE VISIT (OUTPATIENT)
Dept: CARDIOLOGY | Facility: CLINIC | Age: 77
End: 2022-03-03

## 2022-03-03 ENCOUNTER — TELEPHONE (OUTPATIENT)
Dept: CARDIOLOGY | Facility: HOSPITAL | Age: 77
End: 2022-03-03

## 2022-03-03 VITALS
HEIGHT: 67 IN | WEIGHT: 177 LBS | SYSTOLIC BLOOD PRESSURE: 140 MMHG | RESPIRATION RATE: 16 BRPM | OXYGEN SATURATION: 97 % | DIASTOLIC BLOOD PRESSURE: 80 MMHG | BODY MASS INDEX: 27.78 KG/M2 | HEART RATE: 47 BPM

## 2022-03-03 DIAGNOSIS — I25.10 CORONARY ARTERY DISEASE INVOLVING NATIVE CORONARY ARTERY OF NATIVE HEART WITHOUT ANGINA PECTORIS: Primary | ICD-10-CM

## 2022-03-03 DIAGNOSIS — I44.0 1ST DEGREE AV BLOCK: ICD-10-CM

## 2022-03-03 DIAGNOSIS — I48.0 PAF (PAROXYSMAL ATRIAL FIBRILLATION): ICD-10-CM

## 2022-03-03 DIAGNOSIS — I10 ESSENTIAL HYPERTENSION: ICD-10-CM

## 2022-03-03 DIAGNOSIS — E78.2 HYPERLIPEMIA, MIXED: ICD-10-CM

## 2022-03-03 DIAGNOSIS — Z98.890 H/O MITRAL VALVE REPAIR: ICD-10-CM

## 2022-03-03 PROCEDURE — 93000 ELECTROCARDIOGRAM COMPLETE: CPT | Performed by: NURSE PRACTITIONER

## 2022-03-03 PROCEDURE — 99214 OFFICE O/P EST MOD 30 MIN: CPT | Performed by: NURSE PRACTITIONER

## 2022-03-03 RX ORDER — NITROGLYCERIN 0.4 MG/1
TABLET SUBLINGUAL
Qty: 25 TABLET | Refills: 1 | Status: SHIPPED | OUTPATIENT
Start: 2022-03-03

## 2022-03-03 NOTE — TELEPHONE ENCOUNTER
Patient called this morning to report an episode of chest pressure that happened yesterday while driving.  Felt like a pressure in center of his chest that went down his arm to just below his shoulder.  Discomfort lasted 5-10 minutes.  He pulled over and sat until the pain went away. No other symptoms associated with the pain.    Today, he is pain free and when I asked, he took his vitals and BP was 120/72 .  Normal HR for him is in the 70s.  He's concerned that lately his HR has been this high when checked.    I added him to your schedule for 3pm today and notified medical records.    Thank you,  Amy Cavazos RN  [facility]  Triage

## 2022-03-03 NOTE — PROGRESS NOTES
[facility] Cardiology   [address], [address]  [place], [place], [address]    [phone]  [email or web address]           OUTPATIENT CLINIC FOLLOW UP NOTE    Patient Care Team:  Patient Care Team:  Domenica Merida MD as PCP - General (Family Medicine)  Mitch Mendiola MD as Consulting Physician (Pulmonary Disease)  Edgar Allen MD as Consulting Physician (Gastroenterology)  Jimena Singer MD as Consulting Physician (Cardiology)    Subjective:      Chief Complaint   Patient presents with   • Chest Pain   • Rapid Heart Rate       HPI:    Rodolfo Grover is a 77 y.o. male with history of rheumatic fever, GE reflux disease who was seen at [facility] in March 2018 after humeral fracture following a fall.  At that time he was noted to have ventricular bigeminy with normal potassium and magnesium levels.  He was hypertensive at the time.  He had an echocardiogram that showed normal systolic function with normal diastolic function, mild left atrial enlargement saline study was indeterminate.  There was aortic valve sclerosis with mild aortic regurgitation and trivial mitral and tricuspid regurgitation with mild pulmonary hypertension with an RV systolic pressure 42 mmHg.  A stress perfusion study was negative for ischemia.  Follow-up echocardiogram in August 2020 showed an ejection fraction of 55% with normal diastolic function, mild aortic mitral valve regurgitation, mild to moderate tricuspid regurgitation with an RV systolic pressure increased further to 48 mmHg.  He presented in February 2020 with complaints of chest discomfort and non-STEMI.  Echo showed new wall motion abnormalities as well as pulmonary hypertension( RVSP 61 mmHg).  Subsequent cardiac catheterization showed multivessel coronary artery disease.  Subsequently underwent CABG x7 vessels with mitral valve repair.  Postoperative course was complicated by hemoptysis, esophageal esophagitis, atrial fibrillation associated also with intermittent  complete heart block.  He was also felt to have had a seizure and was started on Keppra.  He also had acute renal insufficiency.  He also had induration and possible cellulitis of his right thigh leg wound.  Echocardiogram 9/2021 showed ejection fraction 52% with mild left ventricle hypertrophy, diastolic dysfunction present mild aortic valve regurgitation noted trivial mitral valve regurgitation with mitral valve repair.  Mild tricuspid regurgitation with an RV systolic pressure 40 mmHg.  During his 12/2021 visit he was noted to have a long first-degree AV block.  His metoprolol was decreased and then later completely discontinued without improvement.    He presents today for follow-up.  His primary cardiologist is Dr. Singer.    Since the patient was last seen he reports that he has had one episode of chest pain with radiation to his left jaw that occurred yesterday.  He reports it lasted 5 to 10 minutes before resolving spontaneously.  He did take aspirin x2.  He has had some mild dyspnea with exertion, but this has not been significant.  He denies palpitations, lower extremity edema, lightheadedness, or syncope.  His blood pressure and heart rate are typically within normal limits.  He continues to remain active.       Review of Systems:  Positive for chest pain, dyspnea  Negative for lower extremity edema, palpitations, syncope.     PFSH:  Patient Active Problem List   Diagnosis   • Acute blood loss anemia   • Fracture of fifth metacarpal bone of right hand   • Closed fracture dislocation of right shoulder   • Gastroesophageal reflux disease without esophagitis   • DNR (do not resuscitate)   • Bradycardia following surgery   • Frequent PVCs   • Proximal humerus fracture   • Osteoarthritis   • Severe obstructive sleep apnea   • Pure hypercholesterolemia   • Pulmonary hypertension (HCC)   • LARA (dyspnea on exertion)   • Localized edema   • Chest discomfort   • Elevated troponin   • Abnormal EKG   • Coronary artery  disease involving native coronary artery of native heart without angina pectoris   • Focal motor seizure (HCC)   • Generalized tonic-clonic seizure (HCC)   • Post-ictal state (HCC)   • Coffee ground emesis   • S/P CABG (coronary artery bypass graft)   • Mitral regurgitation   • H/O mitral valve repair   • PAF (paroxysmal atrial fibrillation) (HCC)   • Ischemic cardiomyopathy   • Essential hypertension   • Non-ischemic cardiomyopathy (HCC)   • Iron deficiency anemia due to chronic blood loss   • Polyp of colon         Current Outpatient Medications:   •  acetaminophen (TYLENOL) 500 MG tablet, Take 2 tablets by mouth 3 (Three) Times a Day. (Patient taking differently: Take 1,000 mg by mouth 3 (Three) Times a Day As Needed.), Disp: , Rfl:   •  aspirin 81 MG EC tablet, Take 1 tablet by mouth Daily., Disp: 90 tablet, Rfl: 1  •  atorvastatin (LIPITOR) 10 MG tablet, Take 1 tablet by mouth Daily., Disp: 30 tablet, Rfl: 11  •  Cholecalciferol 50 MCG (2000 UT) capsule, Take 1 capsule by mouth Daily., Disp: , Rfl:   •  fluticasone (CUTIVATE) 0.05 % cream, Apply 1 application topically to the appropriate area as directed 2 (Two) Times a Day., Disp: 30 g, Rfl: 0  •  furosemide (Lasix) 40 MG tablet, Take 1 tablet by mouth Daily As Needed (for weight gain or shortness of breath)., Disp: 30 tablet, Rfl: 1  •  pantoprazole (PROTONIX) 40 MG EC tablet, Take 1 tablet by mouth 2 (Two) Times a Day Before Meals. (Patient taking differently: Take 40 mg by mouth Daily.), Disp: 180 tablet, Rfl: 1  •  potassium chloride 10 MEQ CR tablet, Take 1 tablet by mouth Daily As Needed (Take with Lasix (furosemide)). With Lasix (furosemide), Disp: 30 tablet, Rfl: 1  •  Psyllium (METAMUCIL FIBER PO), Take  by mouth As Needed., Disp: , Rfl:   •  sucralfate (CARAFATE) 1 g tablet, As Needed., Disp: , Rfl:   •  warfarin (COUMADIN) 2 MG tablet, Take one tablet by mouth daily or as directed by the [facility], Disp: 90 tablet, Rfl: 1  •   "nitroglycerin (NITROSTAT) 0.4 MG SL tablet, 1 under the tongue as needed for angina, may repeat q5mins for up three doses, Disp: 25 tablet, Rfl: 1    No Known Allergies     reports that he has never smoked. He has never used smokeless tobacco.      Objective:   Physical exam:  /80 (BP Location: Right arm, Patient Position: Sitting, Cuff Size: Adult)   Pulse (!) 47   Resp 16   Ht 170.2 cm (67.01\")   Wt 80.3 kg (177 lb)   SpO2 97%   BMI 27.72 kg/m²   CONSTITUTIONAL: No acute distress  RESPIRATORY: Normal effort. Clear to auscultation bilaterally without wheezing or rales  CARDIOVASCULAR: Carotids with normal upstrokes without bruits.  Regular rate and rhythm with normal S1 and S2. Without murmur, gallop or rub. Normal radial pulse.     Labs:    BUN   Date Value Ref Range Status   01/24/2022 24 (H) 8 - 23 mg/dL Final     Creatinine   Date Value Ref Range Status   01/24/2022 1.67 (H) 0.76 - 1.27 mg/dL Final   05/19/2021 1.80 (H) 0.60 - 1.30 mg/dL Final     Comment:     Serial Number: [id number]:  [id number]     Potassium   Date Value Ref Range Status   01/24/2022 4.1 3.5 - 5.2 mmol/L Final     ALT (SGPT)   Date Value Ref Range Status   01/24/2022 17 1 - 41 U/L Final     AST (SGOT)   Date Value Ref Range Status   01/24/2022 15 1 - 40 U/L Final       Lab Results   Component Value Date    CHOL 247 (H) 12/29/2021     Lab Results   Component Value Date    TRIG 75 12/29/2021     Lab Results   Component Value Date    HDL 71 (H) 12/29/2021     Lab Results   Component Value Date     (H) 12/29/2021     No components found for: LDLDIRECTC    Diagnostic Data:      ECG 12 Lead    Date/Time: 3/3/2022 3:56 PM  Performed by: Ayla Sanchez APRN  Authorized by: Ayla Sanchez APRN   Comparison: compared with previous ECG from 1/13/2022  Similar to previous ECG  Rhythm: sinus rhythm  Rate: normal  BPM: 72  Conduction: 1st degree AV block  Comments: QRS 92 ms,  ms              Results for orders placed during " the hospital encounter of 09/24/21    Adult Transthoracic Echo Complete W/ Cont if Necessary Per Protocol    Interpretation Summary  · Calculated left ventricular EF = 52% Estimated left ventricular EF = 52% Estimated left ventricular EF was in agreement with the calculated left ventricular EF. Left ventricular systolic function is normal. The left ventricular cavity is moderately dilated. Left ventricular wall thickness is consistent with mild concentric hypertrophy. All left ventricular wall segments contract normally. Left ventricular diastolic dysfunction is noted. Elevated left atrial pressure.  · There is moderate calcification of the aortic valve. The aortic valve appears trileaflet. Mild to moderate aortic valve regurgitation is present. No aortic valve stenosis is present.  · Trace mitral valve regurgitation is present. No significant mitral valve stenosis is present. Fixed posterior leaflet findings are consistent with surgical mitral valve repair. There is a mitral valve ring present.  · Mild tricuspid valve regurgitation is present. Estimated right ventricular systolic pressure from tricuspid regurgitation is mildly elevated (35-45 mmHg). Calculated right ventricular systolic pressure from tricuspid regurgitation is 44 mmHg.      Assessment and Plan:   Diagnoses and all orders for this visit:    Coronary artery disease involving native coronary artery of native heart without angina pectoris (Primary)  Mixed hyperlipidemia   -Patient with one episode of chest pain.  Discussed stress testing at this time, but the patient wished to defer for now.  He would like to see if he has additional episodes.  -Not on beta-blocker due to significant first-degree AVB.  -Continue aspirin, statin  -Begin sublingual nitroglycerin as needed for severe chest pain.    Essential hypertension  -At goal, off of antihypertensives.    PAF (paroxysmal atrial fibrillation) ([facility])  1st degree AV block  -Maintaining sinus rhythm.  -No  longer on beta-blocker due to long first-degree AVB.    H/O mitral valve repair  -Doing well clinically.  We will continue to monitor.    - Return for Next scheduled follow up in June as scheduled.    Electronically signed by NKECHI Victor, 03/03/22, 3:51 PM EST.

## 2022-03-08 ENCOUNTER — HOSPITAL ENCOUNTER (OUTPATIENT)
Dept: PHYSICAL THERAPY | Facility: HOSPITAL | Age: 77
Setting detail: THERAPIES SERIES
Discharge: HOME | End: 2022-03-08

## 2022-03-08 DIAGNOSIS — R42 VERTIGO: Primary | ICD-10-CM

## 2022-03-08 DIAGNOSIS — R26.89 BALANCE DISORDER: ICD-10-CM

## 2022-03-08 PROCEDURE — 97112 NEUROMUSCULAR REEDUCATION: CPT | Performed by: PHYSICAL THERAPIST

## 2022-03-08 PROCEDURE — 97110 THERAPEUTIC EXERCISES: CPT | Performed by: PHYSICAL THERAPIST

## 2022-03-08 NOTE — THERAPY TREATMENT NOTE
Outpatient Physical Therapy Vestibular Treatment Note  [facility]     Patient Name: Rodolfo Grover  : 1945  MRN: [id number]  Today's Date: 3/8/2022      Visit Date: 2022    Visit Dx:     ICD-10-CM ICD-9-CM   1. Vertigo  R42 780.4   2. Balance disorder  R26.89 781.99       Patient Active Problem List   Diagnosis   • Acute blood loss anemia   • Fracture of fifth metacarpal bone of right hand   • Closed fracture dislocation of right shoulder   • Gastroesophageal reflux disease without esophagitis   • DNR (do not resuscitate)   • Bradycardia following surgery   • Frequent PVCs   • Proximal humerus fracture   • Osteoarthritis   • Severe obstructive sleep apnea   • Pure hypercholesterolemia   • Pulmonary hypertension (HCC)   • LARA (dyspnea on exertion)   • Localized edema   • Chest discomfort   • Elevated troponin   • Abnormal EKG   • Coronary artery disease involving native coronary artery of native heart without angina pectoris   • Focal motor seizure (HCC)   • Generalized tonic-clonic seizure (HCC)   • Post-ictal state (HCC)   • Coffee ground emesis   • S/P CABG (coronary artery bypass graft)   • Mitral regurgitation   • H/O mitral valve repair   • PAF (paroxysmal atrial fibrillation) (HCC)   • Ischemic cardiomyopathy   • Essential hypertension   • Non-ischemic cardiomyopathy (HCC)   • Iron deficiency anemia due to chronic blood loss   • Polyp of colon                        PT Assessment/Plan     Row Name 22 1100          PT Assessment    Assessment Comments Notable fatigue reached by end of session but no rest breaks requested throughout bulk of visit.  Encouraging to continue focus on home HEP in order to successfully d/c.  -GR            PT Plan    PT Plan Comments Increase to 7# weights.  -GR           User Key  (r) = Recorded By, (t) = Taken By, (c) = Cosigned By    Initials Name Provider Type    Gaurav Borges, PT Physical Therapist                    OP Exercises     Row Name  03/08/22 0900             Subjective Comments    Subjective Comments Has been trying to do his exercises more regularly at home.  -GR              Subjective Pain    Able to rate subjective pain? yes  -GR      Pre-Treatment Pain Level 0  -GR              Total Minutes    92532 - PT Therapeutic Exercise Minutes 30  -GR      75881 -  PT Neuromuscular Reeducation Minutes 15  -GR              Exercise 1    Exercise Name 1 Sidestepping  -GR      Reps 1 3 laps  -GR      Additional Comments RTB at knees  -GR              Exercise 2    Exercise Name 2 Hurdles step overs  -GR      Reps 2 3 laps  -GR      Additional Comments retro and lateral; fingertip support  -GR              Exercise 3    Exercise Name 3 Nustep  -GR      Time 3 5 min  -GR      Additional Comments L6 UE/LE  -GR              Exercise 4    Exercise Name 4 Standing hip abd  -GR      Cueing 4 Demo  -GR      Sets 4 3  -GR      Reps 4 10  -GR      Additional Comments 5# each airex  -GR              Exercise 5    Exercise Name 5 Standing HS curl  -GR      Cueing 5 Demo  -GR      Sets 5 3  -GR      Reps 5 10  -GR      Additional Comments 5# each airex  -GR              Exercise 6    Exercise Name 6 LAQ  -GR      Cueing 6 Demo  -GR      Sets 6 3  -GR      Reps 6 10  -GR      Additional Comments 5#  -GR              Exercise 7    Exercise Name 7 SAQ  -GR      Cueing 7 Demo  -GR      Sets 7 3  -GR      Reps 7 10  -GR      Additional Comments 5#  -GR              Exercise 8    Exercise Name 8 Bridge  -GR      Cueing 8 Demo  -GR      Sets 8 2  -GR      Reps 8 10  -GR              Exercise 9    Exercise Name 9 [place]ari[place] in // bars  -GR      Reps 9 3 laps  -GR              Exercise 11    Exercise Name 11 leg press DL/SL  -GR      Cueing 11 Demo  -GR      Sets 11 2  -GR      Reps 11 10  -GR      Additional Comments 100#/80# each  -GR              Exercise 12    Exercise Name 12 March in place/hip flex  -GR      Reps 12 20 each  -GR      Additional Comments 5# on airex  -GR               Exercise 13    Exercise Name 13 HS stretch standing at step  -GR      Cueing 13 Demo  -GR      Sets 13 1  -GR      Reps 13 3  -GR      Time 13 20 seconds  -GR              Exercise 14    Exercise Name 14 Gastroc stretch at step  -GR      Cueing 14 Demo  -GR      Sets 14 1  -GR      Reps 14 3  -GR      Time 14 20 seconds  -GR            User Key  (r) = Recorded By, (t) = Taken By, (c) = Cosigned By    Initials Name Provider Type    Gaurav Borges, PT Physical Therapist                             PT OP Goals     Row Name 03/08/22 1100          PT Short Term Goals    STG Date to Achieve 12/23/21  -GR     STG 1 Patient will be independent with initial HEP.  -GR     STG 1 Progress Met  -GR     STG 2 Patient will report no worse with VOR.  -GR     STG 2 Progress Met  -GR            Long Term Goals    LTG Date to Achieve 01/22/22  -GR     LTG 1 Patient will remain negative for BPPV all canals.  -GR     LTG 1 Progress Met  -GR     LTG 2 Patient will score >/=19/24 on the DGI to indicated decreased fall risk.  -GR     LTG 2 Progress Ongoing  -GR     LTG 3 Patient will score </=12/100 on the dizziness handicap inventory to indicate improved perceived positional tolerance.  -GR     LTG 3 Progress Met  -GR           User Key  (r) = Recorded By, (t) = Taken By, (c) = Cosigned By    Initials Name Provider Type    Gaurav Borges, PT Physical Therapist                               Time Calculation:   Start Time: 0920  Stop Time: 1005  Time Calculation (min): 45 min  Total Timed Code Minutes- PT: 45 minute(s)  Timed Charges  00019 - PT Therapeutic Exercise Minutes: 30  84581 -  PT Neuromuscular Reeducation Minutes: 15  Total Minutes  Timed Charges Total Minutes: 45   Total Minutes: 45   Therapy Charges for Today     Code Description Service Date Service Provider Modifiers Qty    45198804688 HC PT NEUROMUSC RE EDUCATION EA 15 MIN 3/8/2022 Gaurav Cortez, PT GP 1    81951044531 HC PT THER PROC EA 15 MIN  3/8/2022 Gaurav Cortez, PT GP 2                   Gaurav Cortez, PT  3/8/2022

## 2022-03-14 ENCOUNTER — ANTICOAGULATION VISIT (OUTPATIENT)
Dept: PHARMACY | Facility: HOSPITAL | Age: 77
End: 2022-03-14

## 2022-03-14 DIAGNOSIS — I48.0 PAF (PAROXYSMAL ATRIAL FIBRILLATION): Primary | ICD-10-CM

## 2022-03-14 LAB
INR PPP: 3.4 (ref 0.91–1.09)
PROTHROMBIN TIME: 40.5 SECONDS (ref 10–13.8)

## 2022-03-14 PROCEDURE — 36416 COLLJ CAPILLARY BLOOD SPEC: CPT

## 2022-03-14 PROCEDURE — G0463 HOSPITAL OUTPT CLINIC VISIT: HCPCS

## 2022-03-14 PROCEDURE — 85610 PROTHROMBIN TIME: CPT

## 2022-03-14 NOTE — PROGRESS NOTES
Anticoagulation Clinic Progress Note    Anticoagulation Summary  As of 3/14/2022    INR goal:  2.0-3.0   TTR:  50.8 % (11.1 mo)   INR used for dosing:  3.4 (3/14/2022)   Warfarin maintenance plan:  1 mg every Thu; 2 mg all other days   Weekly warfarin total:  13 mg   Plan last modified:  Eduar Weeks, PharmD (11/8/2021)   Next INR check:  3/28/2022   Priority:  High   Target end date:      Indications    PAF (paroxysmal atrial fibrillation) (HCC) [I48.0]             Anticoagulation Episode Summary     INR check location:      Preferred lab:      Send INR reminders to:   [facility]    Comments:        Anticoagulation Care Providers     Provider Role Specialty Phone number    Jimena Singer MD Referring Cardiology [phone]          Clinic Interview:      INR History:  Anticoagulation Monitoring 1/24/2022 2/14/2022 3/14/2022   INR 2.7 2.8 3.4   INR Date 1/24/2022 2/14/2022 3/14/2022   INR Goal 2.0-3.0 2.0-3.0 2.0-3.0   Trend Same Same Same   Last Week Total 13 mg 13 mg 13 mg   Next Week Total 13 mg 13 mg 12 mg   Sun 2 mg 2 mg 2 mg   Mon 2 mg 2 mg 1 mg (3/14); Otherwise 2 mg   Tue 2 mg 2 mg 2 mg   Wed 2 mg 2 mg 2 mg   Thu 1 mg 1 mg 1 mg   Fri 2 mg 2 mg 2 mg   Sat 2 mg 2 mg 2 mg   Visit Report - - -   Some recent data might be hidden       Plan:  1. INR is Supratherapeutic today- see above in Anticoagulation Summary.   Pt reports some edema this morning, friends over this weekend and maybe more salty foods than usual, pt has a prn Lasix Rx, pt will take a lasix for edema. Instructed pt to take only 1mg today, then cont same,- see above in Anticoagulation Summary.  2. Follow up in 2 weeks  3. Patient declines warfarin refills.  4. Verbal and written information provided. Patient expresses understanding and has no further questions at this time.    Meggan Gil [facility]

## 2022-03-23 ENCOUNTER — HOSPITAL ENCOUNTER (OUTPATIENT)
Dept: PHYSICAL THERAPY | Facility: HOSPITAL | Age: 77
Setting detail: THERAPIES SERIES
Discharge: HOME | End: 2022-03-23

## 2022-03-23 DIAGNOSIS — R26.89 BALANCE DISORDER: ICD-10-CM

## 2022-03-23 DIAGNOSIS — R42 VERTIGO: Primary | ICD-10-CM

## 2022-03-23 PROCEDURE — 97110 THERAPEUTIC EXERCISES: CPT | Performed by: PHYSICAL THERAPIST

## 2022-03-23 PROCEDURE — 97112 NEUROMUSCULAR REEDUCATION: CPT | Performed by: PHYSICAL THERAPIST

## 2022-03-23 NOTE — THERAPY DISCHARGE NOTE
Outpatient Physical Therapy Vestibular Treatment Note/Discharge Summary  [facility]     Patient Name: Rodolfo Grover  : 1945  MRN: [id number]  Today's Date: 3/23/2022      Visit Date: 2022    Visit Dx:     ICD-10-CM ICD-9-CM   1. Vertigo  R42 780.4   2. Balance disorder  R26.89 781.99       Patient Active Problem List   Diagnosis   • Acute blood loss anemia   • Fracture of fifth metacarpal bone of right hand   • Closed fracture dislocation of right shoulder   • Gastroesophageal reflux disease without esophagitis   • DNR (do not resuscitate)   • Bradycardia following surgery   • Frequent PVCs   • Proximal humerus fracture   • Osteoarthritis   • Severe obstructive sleep apnea   • Pure hypercholesterolemia   • Pulmonary hypertension (HCC)   • LARA (dyspnea on exertion)   • Localized edema   • Chest discomfort   • Elevated troponin   • Abnormal EKG   • Coronary artery disease involving native coronary artery of native heart without angina pectoris   • Focal motor seizure (HCC)   • Generalized tonic-clonic seizure (HCC)   • Post-ictal state (HCC)   • Coffee ground emesis   • S/P CABG (coronary artery bypass graft)   • Mitral regurgitation   • H/O mitral valve repair   • PAF (paroxysmal atrial fibrillation) (HCC)   • Ischemic cardiomyopathy   • Essential hypertension   • Non-ischemic cardiomyopathy (HCC)   • Iron deficiency anemia due to chronic blood loss   • Polyp of colon                         PT Assessment/Plan     Row Name 22 1100          PT Assessment    Assessment Comments Mr. Grover has attended 11 sessions of skilled PT for vestibular therapy since 19.  Patient's vertigo remains well controlled (null). Score on the DGI has improved to 17/24 (was 14 where less than 19 is indicative of fall risk).  He has met 2/2 STGs and 2/3 LTGs. Patient verbalizes independence with HEP and plan to discharge on this date. Thank you for this referral.  -GR            PT Plan    PT Plan Comments DC to  I HEP.  -GR           User Key  (r) = Recorded By, (t) = Taken By, (c) = Cosigned By    Initials Name Provider Type    GR Gaurav Cortez, PT Physical Therapist                      OP Exercises     Row Name 03/23/22 1000             Subjective Comments    Subjective Comments Thinks he is doing better. Walking more outside walking.  -GR              Subjective Pain    Able to rate subjective pain? yes  -GR      Pre-Treatment Pain Level 0  -GR              Total Minutes    55718 - PT Therapeutic Exercise Minutes 29  -GR      34531 -  PT Neuromuscular Reeducation Minutes 16  -GR              Exercise 1    Exercise Name 1 Sidestepping  -GR      Reps 1 3 laps  -GR      Additional Comments RTB at knees  -GR              Exercise 2    Exercise Name 2 Hurdles step overs  -GR      Reps 2 3 laps  -GR              Exercise 3    Exercise Name 3 Nustep  -GR      Time 3 5 min  -GR      Additional Comments L6 UE/LE  -GR              Exercise 4    Exercise Name 4 Standing hip abd  -GR      Cueing 4 Demo  -GR      Sets 4 3  -GR      Reps 4 10  -GR      Additional Comments 5# each airex  -GR              Exercise 5    Exercise Name 5 Standing HS curl  -GR      Cueing 5 Demo  -GR      Sets 5 3  -GR      Reps 5 10  -GR      Additional Comments 5# each airex  -GR              Exercise 6    Exercise Name 6 LAQ  -GR      Cueing 6 Demo  -GR      Sets 6 3  -GR      Reps 6 10  -GR      Additional Comments 7#  -GR              Exercise 7    Exercise Name 7 SAQ  -GR      Cueing 7 Demo  -GR      Sets 7 3  -GR      Reps 7 10  -GR      Additional Comments 7#  -GR              Exercise 8    Exercise Name 8 Bridge  -GR      Cueing 8 Demo  -GR      Sets 8 2  -GR      Reps 8 10  -GR      Additional Comments swiss ball  -GR              Exercise 9    Exercise Name 9 [place]ari[place] in // bars  -GR      Reps 9 4 laps  -GR              Exercise 11    Exercise Name 11 leg press DL/SL  -GR      Cueing 11 Demo  -GR      Sets 11 2  -GR      Reps 11 10  -GR       Additional Comments 100#/80# each  -GR              Exercise 12    Exercise Name 12 March in place/hip flex  -GR      Reps 12 20 each  -GR      Additional Comments 5# on airex  -GR              Exercise 13    Exercise Name 13 HS stretch standing at step  -GR      Cueing 13 Demo  -GR      Sets 13 1  -GR      Reps 13 3  -GR      Time 13 20 seconds  -GR              Exercise 14    Exercise Name 14 Gastroc stretch at step  -GR      Cueing 14 Demo  -GR      Sets 14 1  -GR      Reps 14 3  -GR      Time 14 20 seconds  -GR            User Key  (r) = Recorded By, (t) = Taken By, (c) = Cosigned By    Initials Name Provider Type    Gaurav Borges, PT Physical Therapist                               PT OP Goals     Row Name 03/23/22 1000          PT Short Term Goals    STG Date to Achieve 12/23/21  -GR     STG 1 Patient will be independent with initial HEP.  -GR     STG 1 Progress Met  -GR     STG 2 Patient will report no worse with VOR.  -GR     STG 2 Progress Met  -GR            Long Term Goals    LTG Date to Achieve 01/22/22  -GR     LTG 1 Patient will remain negative for BPPV all canals.  -GR     LTG 1 Progress Met  -GR     LTG 2 Patient will score >/=19/24 on the DGI to indicated decreased fall risk.  -GR     LTG 2 Progress Not Met  -GR     LTG 2 Progress Comments 17/24  -GR     LTG 3 Patient will score </=12/100 on the dizziness handicap inventory to indicate improved perceived positional tolerance.  -GR     LTG 3 Progress Met  -GR           User Key  (r) = Recorded By, (t) = Taken By, (c) = Cosigned By    Initials Name Provider Type    Gaurav Borges, PT Physical Therapist                     Outcome Measure Options: Dynamic Gait Index  Dynamic Gait Index (DGI)  Gait Level Surface: Mild impairment: walks 20’, uses assistive devices, slower speed, mild gait deviations  Change in Gait Speed: Mild impairment: Is able to change speed but demonstrates mild gait deviations, or no gait deviations but unable to  achieve a significant change in velocity, or uses as assistive device  Gait with Horizontal Head Turns: Moderate impairment: Performs head turns with moderate change in gait velocity, slows down, staggers, but recovers, can continue to walk.  Gait with Vertical Head Turns: Moderate impairment: Performs head turns with moderate change in gait velocity, slows down,staggers, but recovers, can continue to walk.  Gait and Pivot Turn: Normal: Pivot turns safely within 3 seconds and stops quickly with no loss of balance.  Step Over Obstacle: Normal: Is able to step over box without changing gait speed, no evidence for imbalance.  Step Around Obstacles: Normal: Is able to walk safely around cones safely without changing gait speed, no evidence of imbalance.  Steps: Mild impairment: Alternating feet, must use rail.  Dynamic Gait Index Score: 17      Time Calculation:   Start Time: 1005  Stop Time: 1050  Time Calculation (min): 45 min  Total Timed Code Minutes- PT: 45 minute(s)  Timed Charges  79289 - PT Therapeutic Exercise Minutes: 29  34854 -  PT Neuromuscular Reeducation Minutes: 16  Total Minutes  Timed Charges Total Minutes: 45   Total Minutes: 45     Therapy Charges for Today     Code Description Service Date Service Provider Modifiers Qty    41310060757 HC PT NEUROMUSC RE EDUCATION EA 15 MIN 3/23/2022 Gaurav Cortez, PT GP 1    91532785472 HC PT THER PROC EA 15 MIN 3/23/2022 Gaurav Cortez, PT GP 2          PT G-Codes  Outcome Measure Options: Dynamic Gait Index     OP PT Discharge Summary  Date of Discharge: 03/23/22  Reason for Discharge: Maximum functional potential achieved  Outcomes Achieved: Patient able to partially acheive established goals  Discharge Destination: Home with home program        Gaurav Cortez, PT  3/23/2022

## 2022-03-28 ENCOUNTER — ANTICOAGULATION VISIT (OUTPATIENT)
Dept: PHARMACY | Facility: HOSPITAL | Age: 77
End: 2022-03-28

## 2022-03-28 DIAGNOSIS — I48.0 PAF (PAROXYSMAL ATRIAL FIBRILLATION): Primary | ICD-10-CM

## 2022-03-28 LAB
INR PPP: 2.7 (ref 0.91–1.09)
PROTHROMBIN TIME: 32.8 SECONDS (ref 10–13.8)

## 2022-03-28 PROCEDURE — 85610 PROTHROMBIN TIME: CPT

## 2022-03-28 PROCEDURE — 36416 COLLJ CAPILLARY BLOOD SPEC: CPT

## 2022-03-28 NOTE — PROGRESS NOTES
Anticoagulation Clinic Progress Note    Anticoagulation Summary  As of 3/28/2022    INR goal:  2.0-3.0   TTR:  50.4 % (11.5 mo)   INR used for dosin.7 (3/28/2022)   Warfarin maintenance plan:  1 mg every Thu; 2 mg all other days   Weekly warfarin total:  13 mg   No change documented:  Dhara Ibarra, Pharmacy Technician   Plan last modified:  Eduar Weeks, PharmD (2021)   Next INR check:  2022   Priority:  High   Target end date:      Indications    PAF (paroxysmal atrial fibrillation) (HCC) [I48.0]             Anticoagulation Episode Summary     INR check location:      Preferred lab:      Send INR reminders to:   [facility]    Comments:        Anticoagulation Care Providers     Provider Role Specialty Phone number    Jimena Singer MD Referring Cardiology [phone]          Clinic Interview:  Patient Findings     Negatives:  Signs/symptoms of thrombosis, Signs/symptoms of bleeding,   Laboratory test error suspected, Change in health, Change in alcohol use,   Change in activity, Upcoming invasive procedure, Emergency department   visit, Upcoming dental procedure, Missed doses, Extra doses, Change in   medications, Change in diet/appetite, Hospital admission, Bruising, Other   complaints      Clinical Outcomes     Negatives:  Major bleeding event, Thromboembolic event,   Anticoagulation-related hospital admission, Anticoagulation-related ED   visit, Anticoagulation-related fatality        INR History:  Anticoagulation Monitoring 2022 3/14/2022 3/28/2022   INR 2.8 3.4 2.7   INR Date 2022 3/14/2022 3/28/2022   INR Goal 2.0-3.0 2.0-3.0 2.0-3.0   Trend Same Same Same   Last Week Total 13 mg 13 mg 13 mg   Next Week Total 13 mg 12 mg 13 mg   Sun 2 mg 2 mg 2 mg   Mon 2 mg 1 mg (3/14); Otherwise 2 mg 2 mg   Tue 2 mg 2 mg 2 mg   Wed 2 mg 2 mg 2 mg   Thu 1 mg 1 mg 1 mg   Fri 2 mg 2 mg 2 mg   Sat 2 mg 2 mg 2 mg   Visit Report - - -   Some recent data might be hidden       Plan:  1.  INR is therapeutic today- see above in Anticoagulation Summary.   Will instruct Rodolfo Grover to continue their warfarin regimen- see above in Anticoagulation Summary.  2. Follow up in 4 weeks.  3. Patient declines warfarin refills.  4. Verbal and written information provided. Patient expresses understanding and has no further questions at this time.    Dhara Ibarra, Pharmacy Technician

## 2022-04-20 ENCOUNTER — ANTICOAGULATION VISIT (OUTPATIENT)
Dept: PHARMACY | Facility: HOSPITAL | Age: 77
End: 2022-04-20

## 2022-04-20 DIAGNOSIS — I48.0 PAF (PAROXYSMAL ATRIAL FIBRILLATION): Primary | ICD-10-CM

## 2022-04-20 LAB
INR PPP: 2.6 (ref 0.91–1.09)
PROTHROMBIN TIME: 31.5 SECONDS (ref 10–13.8)

## 2022-04-20 PROCEDURE — 85610 PROTHROMBIN TIME: CPT

## 2022-04-20 PROCEDURE — 36416 COLLJ CAPILLARY BLOOD SPEC: CPT

## 2022-04-20 NOTE — PROGRESS NOTES
Anticoagulation Clinic Progress Note    Anticoagulation Summary  As of 2022    INR goal:  2.0-3.0   TTR:  53.5 % (1 y)   INR used for dosin.6 (2022)   Warfarin maintenance plan:  1 mg every Thu; 2 mg all other days   Weekly warfarin total:  13 mg   No change documented:  Ivelisse Ballard, Pharmacy Intern   Plan last modified:  Eduar Weeks, PharmD (2021)   Next INR check:  2022   Priority:  High   Target end date:      Indications    PAF (paroxysmal atrial fibrillation) (HCC) [I48.0]             Anticoagulation Episode Summary     INR check location:      Preferred lab:      Send INR reminders to:   [facility]    Comments:        Anticoagulation Care Providers     Provider Role Specialty Phone number    Jimena Singer MD Referring Cardiology [phone]          Clinic Interview:   Patient Findings     Negatives:  Signs/symptoms of thrombosis, Signs/symptoms of bleeding,   Laboratory test error suspected, Change in health, Change in alcohol use,   Change in activity, Upcoming invasive procedure, Emergency department   visit, Upcoming dental procedure, Missed doses, Extra doses, Change in   medications, Change in diet/appetite, Hospital admission, Bruising, Other   complaints      Clinical Outcomes     Negatives:  Major bleeding event, Thromboembolic event,   Anticoagulation-related hospital admission, Anticoagulation-related ED   visit, Anticoagulation-related fatality        INR History:  Anticoagulation Monitoring 3/14/2022 3/28/2022 2022   INR 3.4 2.7 2.6   INR Date 3/14/2022 3/28/2022 2022   INR Goal 2.0-3.0 2.0-3.0 2.0-3.0   Trend Same Same Same   Last Week Total 13 mg 13 mg 13 mg   Next Week Total 12 mg 13 mg 13 mg   Sun 2 mg 2 mg 2 mg   Mon 1 mg (3/14); Otherwise 2 mg 2 mg 2 mg   Tue 2 mg 2 mg 2 mg   Wed 2 mg 2 mg 2 mg   Thu 1 mg 1 mg 1 mg   Fri 2 mg 2 mg 2 mg   Sat 2 mg 2 mg 2 mg   Visit Report - - -   Some recent data might be hidden       Plan:  1. INR is  Therapeutic today- see above in Anticoagulation Summary.  Will instruct Rodolfo Grover to Continue their warfarin regimen- see above in Anticoagulation Summary.  2. Follow up in 1 month  3. Patient declines warfarin refills.  4. Verbal and written information provided. Patient expresses understanding and has no further questions at this time.    Ivelisse Ballard, Pharmacy Intern

## 2022-04-20 NOTE — PROGRESS NOTES
I have supervised and reviewed the notes, assessments, and/or procedures performed by our pharmacy student. The documented assessment and plan were developed cooperatively. I concur with the documentation of this patient encounter.

## 2022-04-22 ENCOUNTER — OFFICE VISIT (OUTPATIENT)
Dept: SLEEP MEDICINE | Facility: HOSPITAL | Age: 77
End: 2022-04-22

## 2022-04-22 VITALS
SYSTOLIC BLOOD PRESSURE: 137 MMHG | DIASTOLIC BLOOD PRESSURE: 78 MMHG | HEART RATE: 78 BPM | HEIGHT: 67 IN | OXYGEN SATURATION: 100 % | BODY MASS INDEX: 28.25 KG/M2 | WEIGHT: 180 LBS

## 2022-04-22 DIAGNOSIS — R09.81 NASAL CONGESTION: ICD-10-CM

## 2022-04-22 DIAGNOSIS — G47.33 OBSTRUCTIVE SLEEP APNEA: Primary | ICD-10-CM

## 2022-04-22 PROCEDURE — G0463 HOSPITAL OUTPT CLINIC VISIT: HCPCS

## 2022-04-22 NOTE — PROGRESS NOTES
[facility] SLEEP MEDICINE  [address]  [address]  [place] [address] [phone]    PCP: Domenica Merida MD    Reason for visit:  Sleep disorders: COLEMAN    Rodolfo is a 77 y.o.male who was seen in the [facility] Center today. Routine fu. His pressures were increased from 10-18 to 12-18 last visit. He has rhinorrhea in mornings. Uses prn otc meds. He sleeps from 10p to 6a. Rested if he gets 6 hrs sleep. Using nasal mask, comfortable. No air leaks.  [place] Sleepiness Scale is 4. Caffeine 2 per day. Alcohol 1 per week.    Rodolfo  reports that he has never smoked. He has never used smokeless tobacco.    Pertinent Positive Review of Systems of PND, acid reflux  Rest of Review of Systems was negative as recorded in Sleep Questionnaire.    Patient  has a past medical history of Acute blood loss anemia, Atrial fibrillation (HCC), Bilateral lower extremity edema, Bradycardia following surgery, CAD (coronary artery disease), Chest discomfort, Colon polyp, Elevated troponin, Focal motor seizure (HCC), Generalized tonic-clonic seizure (HCC), GERD (gastroesophageal reflux disease), Iritis of right eye, Ischemic cardiomyopathy, Mitral valve insufficiency, Post-ictal state (HCC), Pulmonary hypertension (HCC), PVC (premature ventricular contraction), Rheumatic fever, S/P CABG x 7, S/P mitral valve repair, Severe obstructive sleep apnea, and Shoulder fracture, right.     Current Medications:    Current Outpatient Medications:   •  acetaminophen (TYLENOL) 500 MG tablet, Take 2 tablets by mouth 3 (Three) Times a Day. (Patient taking differently: Take 1,000 mg by mouth 3 (Three) Times a Day As Needed.), Disp: , Rfl:   •  aspirin 81 MG EC tablet, Take 1 tablet by mouth Daily., Disp: 90 tablet, Rfl: 1  •  atorvastatin (LIPITOR) 10 MG tablet, Take 1 tablet by mouth Daily., Disp: 30 tablet, Rfl: 11  •  Cholecalciferol 50 MCG (2000 UT) capsule, Take 1 capsule by mouth Daily., Disp: , Rfl:   •  fluticasone  "(CUTIVATE) 0.05 % cream, Apply 1 application topically to the appropriate area as directed 2 (Two) Times a Day., Disp: 30 g, Rfl: 0  •  furosemide (Lasix) 40 MG tablet, Take 1 tablet by mouth Daily As Needed (for weight gain or shortness of breath)., Disp: 30 tablet, Rfl: 1  •  nitroglycerin (NITROSTAT) 0.4 MG SL tablet, 1 under the tongue as needed for angina, may repeat q5mins for up three doses, Disp: 25 tablet, Rfl: 1  •  pantoprazole (PROTONIX) 40 MG EC tablet, Take 1 tablet by mouth 2 (Two) Times a Day Before Meals. (Patient taking differently: Take 40 mg by mouth Daily.), Disp: 180 tablet, Rfl: 1  •  potassium chloride 10 MEQ CR tablet, Take 1 tablet by mouth Daily As Needed (Take with Lasix (furosemide)). With Lasix (furosemide), Disp: 30 tablet, Rfl: 1  •  Psyllium (METAMUCIL FIBER PO), Take  by mouth As Needed., Disp: , Rfl:   •  sucralfate (CARAFATE) 1 g tablet, As Needed., Disp: , Rfl:   •  warfarin (COUMADIN) 2 MG tablet, Take one tablet by mouth daily or as directed by the [facility], Disp: 90 tablet, Rfl: 1   also entered in Sleep Questionnaire         Vital Signs: /78   Pulse 78   Ht 170.2 cm (67.01\")   Wt 81.6 kg (180 lb)   SpO2 100%   BMI 28.18 kg/m²     Body mass index is 28.18 kg/m².       Tongue: Normal       Dentition: good       Pharynx: Posterior pharyngeal pillars are wide   Mallampatti: II (hard and soft palate, upper portion of tonsils anduvula visible)        General: Alert. Cooperative. Well developed. No acute distress.             Head:  Normocephalic. Symmetrical. Atraumatic.              Nose: No septal deviation. No drainage.          Throat: No oral lesions. No thrush. Moist mucous membranes.    Chest Wall:  Normal shape. Symmetric expansion with respiration. No tenderness.             Neck:  Trachea midline.           Lungs:  Clear to auscultation bilaterally. No wheezes. No rhonchi. No rales. Respirations regular, even and unlabored.            Heart: "  Regular rhythm and normal rate. Normal S1 and S2. No murmur.     Abdomen:  Soft, non-tender and non-distended. Normal bowel sounds. No masses.  Extremities:  Moves all extremities well. No edema.    Psychiatric: Normal mood and affect.    Diagnostic data available to date is as below and was reviewed on current visit:  · Shaan HST- AHI 40, lowest desaturation 70s; 68min of sats <90%    · Overnight oximetry on CPAP RA-August 2018- no desaturation  · 6/3/20: Overnight titration polysomnogram study from 10:45 PM to 5:02 AM.  Sleep efficiency 87.5% with 5.51 hours total sleep time.  Sleep distribution shows minimal slow-wave sleep at 1.2%.  Reduced REM sleep at 15.1%.  Oxygen saturation remained above 88%.  Arousal index 10.3.  PLM index 9.4 with PLM arousal index 0.5.  CPAP was increased from 5 to 17 cm water pressure.  Bilevel also tried at 18/14.  Maximum sleep recorded at 11 cm water pressure with AHI less than 5.  Patient slept supine for the entire night.    Most current available usage data reviewed on 04/22/2022:  · 89% compliance average 6 hours 30 minutes AHI 5.3 average pressure 17.4 auto CPAP 12-18    DME Company: [employer]    Prescription to [facility] for replacement supplies as below:    nasal mask      Description Replacement    Nasal PILLOWS      A 7034 Nasal Pillows  every 3 mth    A 7033 Repl Nasal Pillows  2 per mth    Nasal MASK/CUSHION     x A 7034 Nasal Mask/Cushion  every 3 mth   x A 7032 Repl Nasal Mask/Cushion  2 per mth    Full Face MASK      A 7030 Full Face Mask  every 3 mth    A 7031 Repl Face Mask  1 per mth      A 4604 Heated Tubing  every 3 mth    A 7037 Standard Tubing  every 3 mth   x A 7035 Headgear  every 3 mth   x A 7046 Repl Humidifier Chamber  every 6 yrs   x A 7038 Disposable Filters  2 per mth   x A 7039 Non-disposable Filter  every 6 mth   x A 7036 Chin Strap  every 6 mth         No orders of the defined types were placed in this encounter.         Impression:  1. Obstructive  sleep apnea    2. Nasal congestion        Plan:  Rodolfo is compliant. Not using flonase - advised to use qhs alongwith some claritin qhs.  This may keep his nasal congestion at bay and prevent dry mouth.  Otherwise he is doing well and his AHI is acceptable.  Keep same pressures.    I reiterated the importance of effective treatment of obstructive sleep apnea with PAP machine.  Cardiovascular health risks of untreated sleep apnea were again reviewed.  Patient was asked to remain cautious if there is persistent hypersomnolence. The benefit of weight loss in reducing severity of obstructive sleep apnea was discussed.  Patient would benefit from adhering to a strict diet to achieve ideal BMI.     Change of PAP supplies regularly is important for effective use.  Change of cushion on the mask or plugs on nasal pillows along with disposable filters once every month and change of mask frame, tubing, headgear and Velcro straps every 6 months at the minimum was reiterated.    This patient is compliant with PAP machine and benefits from its use.  Apnea hypopneas index is corrected/improved.  Daytime hypersomnolence has resolved.     Patient will follow up in this clinic in 1 year APRN    Thank you for allowing me to participate in your patient's care.    Electronically signed by Mitch Mendiola MD, 04/22/22, 9:09 AM EDT.    Part of this note may be an electronic transcription/translation of spoken language to printed text using the Dragon Dictation System.

## 2022-05-25 ENCOUNTER — OFFICE VISIT (OUTPATIENT)
Dept: GASTROENTEROLOGY | Facility: CLINIC | Age: 77
End: 2022-05-25

## 2022-05-25 ENCOUNTER — ANTICOAGULATION VISIT (OUTPATIENT)
Dept: PHARMACY | Facility: HOSPITAL | Age: 77
End: 2022-05-25

## 2022-05-25 VITALS
DIASTOLIC BLOOD PRESSURE: 76 MMHG | SYSTOLIC BLOOD PRESSURE: 152 MMHG | TEMPERATURE: 97.5 F | HEART RATE: 58 BPM | HEIGHT: 67 IN | WEIGHT: 186.4 LBS | BODY MASS INDEX: 29.26 KG/M2

## 2022-05-25 DIAGNOSIS — K21.01 GASTROESOPHAGEAL REFLUX DISEASE WITH ESOPHAGITIS AND HEMORRHAGE: ICD-10-CM

## 2022-05-25 DIAGNOSIS — K63.5 POLYP OF COLON, UNSPECIFIED PART OF COLON, UNSPECIFIED TYPE: ICD-10-CM

## 2022-05-25 DIAGNOSIS — D64.9 ANEMIA, UNSPECIFIED TYPE: ICD-10-CM

## 2022-05-25 DIAGNOSIS — Z95.1 S/P CABG X 7: ICD-10-CM

## 2022-05-25 DIAGNOSIS — D50.0 IRON DEFICIENCY ANEMIA DUE TO CHRONIC BLOOD LOSS: Primary | ICD-10-CM

## 2022-05-25 DIAGNOSIS — I48.0 PAF (PAROXYSMAL ATRIAL FIBRILLATION): Primary | ICD-10-CM

## 2022-05-25 LAB
INR PPP: 2.1 (ref 0.91–1.09)
PROTHROMBIN TIME: 25.3 SECONDS (ref 10–13.8)

## 2022-05-25 PROCEDURE — 36416 COLLJ CAPILLARY BLOOD SPEC: CPT

## 2022-05-25 PROCEDURE — 99214 OFFICE O/P EST MOD 30 MIN: CPT | Performed by: INTERNAL MEDICINE

## 2022-05-25 PROCEDURE — 85610 PROTHROMBIN TIME: CPT

## 2022-05-25 NOTE — PROGRESS NOTES
Anticoagulation Clinic Progress Note    Anticoagulation Summary  As of 2022    INR goal:  2.0-3.0   TTR:  57.5 % (1.1 y)   INR used for dosin.1 (2022)   Warfarin maintenance plan:  1 mg every Thu; 2 mg all other days   Weekly warfarin total:  13 mg   No change documented:  Dhara Ibarra, Pharmacy Technician   Plan last modified:  Eduar Weeks, PharmD (2021)   Next INR check:  2022   Priority:  High   Target end date:      Indications    PAF (paroxysmal atrial fibrillation) (HCC) [I48.0]             Anticoagulation Episode Summary     INR check location:      Preferred lab:      Send INR reminders to:   [facility]    Comments:        Anticoagulation Care Providers     Provider Role Specialty Phone number    Jimena Singer MD Referring Cardiology [phone]          Clinic Interview:  Patient Findings     Negatives:  Signs/symptoms of thrombosis, Signs/symptoms of bleeding,   Laboratory test error suspected, Change in health, Change in alcohol use,   Change in activity, Upcoming invasive procedure, Emergency department   visit, Upcoming dental procedure, Missed doses, Extra doses, Change in   medications, Change in diet/appetite, Hospital admission, Bruising, Other   complaints      Clinical Outcomes     Negatives:  Major bleeding event, Thromboembolic event,   Anticoagulation-related hospital admission, Anticoagulation-related ED   visit, Anticoagulation-related fatality        INR History:  Anticoagulation Monitoring 3/28/2022 2022 2022   INR 2.7 2.6 2.1   INR Date 3/28/2022 2022 2022   INR Goal 2.0-3.0 2.0-3.0 2.0-3.0   Trend Same Same Same   Last Week Total 13 mg 13 mg 13 mg   Next Week Total 13 mg 13 mg 13 mg   Sun 2 mg 2 mg 2 mg   Mon 2 mg 2 mg 2 mg   Tue 2 mg 2 mg 2 mg   Wed 2 mg 2 mg 2 mg   Thu 1 mg 1 mg 1 mg   Fri 2 mg 2 mg 2 mg   Sat 2 mg 2 mg 2 mg   Visit Report - - -   Some recent data might be hidden       Plan:  1. INR is therapeutic today-  see above in Anticoagulation Summary.   Will instruct Rodolfo Grover to continue their warfarin regimen- see above in Anticoagulation Summary.  2. Follow up in 4 weeks.  3. Patient declines warfarin refills.  4. Verbal and written information provided. Patient expresses understanding and has no further questions at this time.    Dhara Ibarra, Pharmacy Technician

## 2022-05-25 NOTE — PROGRESS NOTES
Chief Complaint   Patient presents with   • Anemia       History of Present Illness:   77 y.o. male        He last had an EGD and colonoscopy in 12/2021.  The EGD showed that the esophagitis had healed.  There was one small duodenal polyp that I biopsied.  Pathology from the EGD was unrevealing.  I recommended a repeat colonoscopy in 5 years.       I last saw him in 1 of 2022:  Assessment:  1. History of grade D esophagitis noted on 2 of 2021 EGD.  The esophagitis had healed on the 12 of 2021 EGD.  2.  His last colonoscopy was in 12 of 2021.  I had recommended a repeat colonoscopy in 5 years.  3.  History of iron deficiency anemia. His stool is heme negative today.   4.  CRI  5. On warfarin for a fib      Recommendations:  1. CBC, CMP, ferritin, TIBC, Mg  2. Continue Protonix 40 mg/AM. I would love to get him off of PPI's if possible? Could we decrease to OTC strength PPI?h  3. Take Pepcid 20 mg/in the afternoon if needed for breakthru symptoms.  4. F/u 4 mos.        He takes Prilosec OTC 20 mg/day and has no heartburn. NO dysphagia. No chest or abdominal pain. NO melena or rectal bleeding. No diarrhea or constipation. He has gained weight. He has had anemia since he was a kid. Has not seen a Hematologist. No NSAIDs use. Nonsmoker. ETOH: occasional.     Past Medical History:   Diagnosis Date   • Acute blood loss anemia    • Atrial fibrillation (HCC)    • Bilateral lower extremity edema    • Bradycardia following surgery    • CAD (coronary artery disease)    • Chest discomfort    • Colon polyp    • Elevated troponin    • Focal motor seizure (HCC)    • Generalized tonic-clonic seizure (HCC)    • GERD (gastroesophageal reflux disease)    • Iritis of right eye    • Ischemic cardiomyopathy    • Mitral valve insufficiency    • Post-ictal state (HCC)    • Pulmonary hypertension (HCC)    • PVC (premature ventricular contraction)    • Rheumatic fever    • S/P CABG x 7    • S/P mitral valve repair    • Severe obstructive  sleep apnea    • Shoulder fracture, right        Past Surgical History:   Procedure Laterality Date   • CARDIAC CATHETERIZATION N/A 2/19/2021    Procedure: Coronary angiography;  Surgeon: Bry Nails MD;  Location: [facility] CATH INVASIVE LOCATION;  Service: Cardiovascular;  Laterality: N/A;   • CARDIAC CATHETERIZATION N/A 2/19/2021    Procedure: Left heart cath;  Surgeon: Bry Nails MD;  Location: [facility] CATH INVASIVE LOCATION;  Service: Cardiovascular;  Laterality: N/A;   • CARDIAC CATHETERIZATION N/A 2/19/2021    Procedure: Right Heart Cath;  Surgeon: Bry Nails MD;  Location: [facility] CATH INVASIVE LOCATION;  Service: Cardiovascular;  Laterality: N/A;   • CARDIAC CATHETERIZATION N/A 2/19/2021    Procedure: Left ventriculography;  Surgeon: Bry Nails MD;  Location: [facility] CATH INVASIVE LOCATION;  Service: Cardiovascular;  Laterality: N/A;   • COLONOSCOPY N/A 12/7/2021    Procedure: COLONOSCOPY to cecum with cold biopsy polypectomy;  Surgeon: Edgar Allen MD;  Location: [facility] ENDOSCOPY;  Service: Gastroenterology;  Laterality: N/A;  IRON DEFICIENCY ANEMIA, H/O COLON POLYPS  --   DIVERTICULOSIS, polyp, hemorrhoids   • CORONARY ARTERY BYPASS GRAFT N/A 2/22/2021    Procedure: BK, MIDLINE STERNOTOMY, CORONARY ARTERY BYPASS X 7 UTILIZING THE LEFT INTERNAL MAMMARY ARTERY AND THE RIGHT SAPHENOUS VEIN, MITRAL VALVE REPAIR, PRP;  Surgeon: Jr Shyam Echavarria MD;  Location: [facility] MAIN OR;  Service: Cardiothoracic;  Laterality: N/A;   • ENDOSCOPY N/A 8/16/2018    Erosive gastritis, diverticulosis   • ENDOSCOPY N/A 2/26/2021    Procedure: ESOPHAGOGASTRODUODENOSCOPY AT BEDSIDE WITH HOT SNARE POLYPECTOMY AND CLIP PLACEMENT X1;  Surgeon: Jono Laboy MD;  Location: [facility] ENDOSCOPY;  Service: Gastroenterology;  Laterality: N/A;  PRE-  GI BLEED  POST- GASTRITIS, ESOPHAGITIS, POLYP   • ENDOSCOPY N/A 12/7/2021    Procedure: ESOPHAGOGASTRODUODENOSCOPY with COLD  BIOPSIES;   Surgeon: Edgar Allen MD;  Location: [facility] ENDOSCOPY;  Service: Gastroenterology;  Laterality: N/A;  IRON DEFICIENCY ANEMIA, H/O ULCERATIVE ESOPHAGITIS  --GASTRITIS, DUODENAL POLYP   • HERNIA REPAIR     • SHOULDER CLOSED REDUCTION Right 3/16/2018    Procedure: RIGHT SHOULDER CLOSED REDUCTION;  Surgeon: Kj Garcia MD;  Location: [facility] OR;  Service: Orthopedics   • TONSILLECTOMY     • TOTAL SHOULDER ARTHROPLASTY W/ DISTAL CLAVICLE EXCISION Right 3/22/2018    Procedure: Reverse Total Shoulder Arthroplsty;  Surgeon: Kj Garcia MD;  Location: [facility] MAIN OR;  Service: Orthopedics   • TRANSESOPHAGEAL ECHOCARDIOGRAM (BK) N/A 2/22/2021    Procedure: TRANSESOPHAGEAL ECHOCARDIOGRAM;  Surgeon: Jr Shyam Echavarria MD;  Location: [facility] OR;  Service: Cardiothoracic;  Laterality: N/A;         Current Outpatient Medications:   •  acetaminophen (TYLENOL) 500 MG tablet, Take 2 tablets by mouth 3 (Three) Times a Day. (Patient taking differently: Take 1,000 mg by mouth 3 (Three) Times a Day As Needed.), Disp: , Rfl:   •  aspirin 81 MG EC tablet, Take 1 tablet by mouth Daily., Disp: 90 tablet, Rfl: 1  •  atorvastatin (LIPITOR) 10 MG tablet, Take 1 tablet by mouth Daily., Disp: 30 tablet, Rfl: 11  •  Cholecalciferol 50 MCG (2000 UT) capsule, Take 1 capsule by mouth Daily., Disp: , Rfl:   •  fluticasone (CUTIVATE) 0.05 % cream, Apply 1 application topically to the appropriate area as directed 2 (Two) Times a Day. (Patient taking differently: Apply 1 application topically to the appropriate area as directed As Needed.), Disp: 30 g, Rfl: 0  •  furosemide (Lasix) 40 MG tablet, Take 1 tablet by mouth Daily As Needed (for weight gain or shortness of breath)., Disp: 30 tablet, Rfl: 1  •  nitroglycerin (NITROSTAT) 0.4 MG SL tablet, 1 under the tongue as needed for angina, may repeat q5mins for up three doses, Disp: 25 tablet, Rfl: 1  •  Omeprazole Magnesium (PRILOSEC OTC PO), Take 20 mg by mouth Daily., Disp: , Rfl:    •  pantoprazole (PROTONIX) 40 MG EC tablet, Take 1 tablet by mouth 2 (Two) Times a Day Before Meals. (Patient taking differently: Take 40 mg by mouth As Needed.), Disp: 180 tablet, Rfl: 1  •  potassium chloride 10 MEQ CR tablet, Take 1 tablet by mouth Daily As Needed (Take with Lasix (furosemide)). With Lasix (furosemide), Disp: 30 tablet, Rfl: 1  •  Psyllium (METAMUCIL FIBER PO), Take  by mouth As Needed., Disp: , Rfl:   •  warfarin (COUMADIN) 2 MG tablet, Take one tablet by mouth daily or as directed by the [facility], Disp: 90 tablet, Rfl: 1  •  sucralfate (CARAFATE) 1 g tablet, As Needed., Disp: , Rfl:     No Known Allergies    Family History   Problem Relation Age of Onset   • Malig Hyperthermia Neg Hx        Social History     Socioeconomic History   • Marital status:    Tobacco Use   • Smoking status: Never Smoker   • Smokeless tobacco: Never Used   Vaping Use   • Vaping Use: Never used   Substance and Sexual Activity   • Alcohol use: Yes     Comment: occasional   • Drug use: No   • Sexual activity: Defer       Review of Systems   Gastrointestinal: Negative for abdominal pain, anal bleeding and blood in stool.   All other systems reviewed and are negative.    Pertinent positives and negatives documented in the HPI and all other systems reviewed and were found to be negative.  Vitals:    05/25/22 1343   BP: 152/76   Pulse: 58   Temp: 97.5 °F (36.4 °C)       Physical Exam  Vitals reviewed.   Constitutional:       General: He is not in acute distress.     Appearance: Normal appearance. He is well-developed. He is not diaphoretic.   HENT:      Head: Normocephalic and atraumatic. Hair is normal.      Right Ear: Hearing, tympanic membrane, ear canal and external ear normal.      Left Ear: Hearing, tympanic membrane, ear canal and external ear normal.      Nose: Nose normal. No nasal deformity.      Mouth/Throat:      Mouth: Mucous membranes are moist. No oral lesions.      Pharynx: Uvula  midline. No uvula swelling.   Eyes:      General: Lids are normal. No scleral icterus.        Right eye: No discharge.         Left eye: No discharge.      Extraocular Movements: Extraocular movements intact.      Right eye: Normal extraocular motion and no nystagmus.      Left eye: Normal extraocular motion and no nystagmus.      Conjunctiva/sclera: Conjunctivae normal.      Pupils: Pupils are equal, round, and reactive to light.   Neck:      Thyroid: No thyromegaly.      Vascular: No JVD.   Cardiovascular:      Rate and Rhythm: Normal rate and regular rhythm.      Pulses: Normal pulses.      Heart sounds: Normal heart sounds. No murmur heard.    No gallop.   Pulmonary:      Effort: Pulmonary effort is normal. No respiratory distress.      Breath sounds: Normal breath sounds. No wheezing or rales.   Chest:      Chest wall: No tenderness.   Abdominal:      General: Bowel sounds are normal. There is no distension.      Palpations: Abdomen is soft. There is no mass.      Tenderness: There is no abdominal tenderness. There is no guarding.      Hernia: No hernia is present.   Genitourinary:     Rectum: Normal. Guaiac result negative.   Musculoskeletal:         General: No tenderness or deformity. Normal range of motion.      Cervical back: Normal range of motion and neck supple.   Lymphadenopathy:      Cervical: No cervical adenopathy.   Skin:     General: Skin is warm and dry.      Findings: No rash.   Neurological:      Mental Status: He is alert and oriented to person, place, and time.      Cranial Nerves: No cranial nerve deficit.      Motor: No abnormal muscle tone.      Coordination: Coordination normal.      Deep Tendon Reflexes: Reflexes are normal and symmetric. Reflexes normal.   Psychiatric:         Mood and Affect: Mood normal.         Behavior: Behavior normal.         Thought Content: Thought content normal.         Judgment: Judgment normal.         Diagnoses and all orders for this visit:    1. Iron  deficiency anemia due to chronic blood loss (Primary)  -     CBC & Differential  -     Comprehensive Metabolic Panel  -     Magnesium    2. Polyp of colon, unspecified part of colon, unspecified type  -     CBC & Differential  -     Comprehensive Metabolic Panel  -     Magnesium    3. Gastroesophageal reflux disease with esophagitis and hemorrhage  -     CBC & Differential  -     Comprehensive Metabolic Panel  -     Magnesium    4. S/P CABG x 7  -     CBC & Differential  -     Comprehensive Metabolic Panel  -     Magnesium    5. Anemia, unspecified type  -     CBC & Differential  -     Comprehensive Metabolic Panel  -     Magnesium      Assessment:  1. H/o anemia  2.  History of grade D esophagitis noted on 2 of 2021 EGD.  The esophagitis had healed on the 12 of 2021 EGD. He is now on Omeprazole 20 mg/day.   2.  His last colonoscopy was in 12 of 2021.  I had recommended a repeat colonoscopy in 5 years.  3.  History of iron deficiency anemia. His stool is heme negative today.   4.  CRI - He sees Dr. Nagy (Nephrologist).  5. On warfarin for a fib     Recommendations:  1. CBC, CMP, Mg  2. F/u 6 mos.     No follow-ups on file.    Edgar Allen MD  5/25/2022

## 2022-05-26 LAB
ALBUMIN SERPL-MCNC: 4 G/DL (ref 3.7–4.7)
ALBUMIN/GLOB SERPL: 1.8 {RATIO} (ref 1.2–2.2)
ALP SERPL-CCNC: 99 IU/L (ref 44–121)
ALT SERPL-CCNC: 21 IU/L (ref 0–44)
AST SERPL-CCNC: 26 IU/L (ref 0–40)
BASOPHILS # BLD AUTO: 0.1 X10E3/UL (ref 0–0.2)
BASOPHILS NFR BLD AUTO: 1 %
BILIRUB SERPL-MCNC: <0.2 MG/DL (ref 0–1.2)
BUN SERPL-MCNC: 31 MG/DL (ref 8–27)
BUN/CREAT SERPL: 18 (ref 10–24)
CALCIUM SERPL-MCNC: 9 MG/DL (ref 8.6–10.2)
CHLORIDE SERPL-SCNC: 104 MMOL/L (ref 96–106)
CO2 SERPL-SCNC: 24 MMOL/L (ref 20–29)
CREAT SERPL-MCNC: 1.74 MG/DL (ref 0.76–1.27)
EGFRCR SERPLBLD CKD-EPI 2021: 39 ML/MIN/1.73
EOSINOPHIL # BLD AUTO: 0.3 X10E3/UL (ref 0–0.4)
EOSINOPHIL NFR BLD AUTO: 4 %
ERYTHROCYTE [DISTWIDTH] IN BLOOD BY AUTOMATED COUNT: 12 % (ref 11.6–15.4)
GLOBULIN SER CALC-MCNC: 2.2 G/DL (ref 1.5–4.5)
GLUCOSE SERPL-MCNC: 99 MG/DL (ref 65–99)
HCT VFR BLD AUTO: 33.7 % (ref 37.5–51)
HGB BLD-MCNC: 10.9 G/DL (ref 13–17.7)
IMM GRANULOCYTES # BLD AUTO: 0.1 X10E3/UL (ref 0–0.1)
IMM GRANULOCYTES NFR BLD AUTO: 2 %
LYMPHOCYTES # BLD AUTO: 1.6 X10E3/UL (ref 0.7–3.1)
LYMPHOCYTES NFR BLD AUTO: 21 %
MAGNESIUM SERPL-MCNC: 1.9 MG/DL (ref 1.6–2.3)
MCH RBC QN AUTO: 31.6 PG (ref 26.6–33)
MCHC RBC AUTO-ENTMCNC: 32.3 G/DL (ref 31.5–35.7)
MCV RBC AUTO: 98 FL (ref 79–97)
MONOCYTES # BLD AUTO: 0.8 X10E3/UL (ref 0.1–0.9)
MONOCYTES NFR BLD AUTO: 11 %
NEUTROPHILS # BLD AUTO: 4.7 X10E3/UL (ref 1.4–7)
NEUTROPHILS NFR BLD AUTO: 61 %
PLATELET # BLD AUTO: 185 X10E3/UL (ref 150–450)
POTASSIUM SERPL-SCNC: 4.2 MMOL/L (ref 3.5–5.2)
PROT SERPL-MCNC: 6.2 G/DL (ref 6–8.5)
RBC # BLD AUTO: 3.45 X10E6/UL (ref 4.14–5.8)
SODIUM SERPL-SCNC: 141 MMOL/L (ref 134–144)
WBC # BLD AUTO: 7.6 X10E3/UL (ref 3.4–10.8)

## 2022-05-31 ENCOUNTER — TELEPHONE (OUTPATIENT)
Dept: GASTROENTEROLOGY | Facility: CLINIC | Age: 77
End: 2022-05-31

## 2022-05-31 NOTE — PROGRESS NOTES
05/30/22       Tell him that his labs show that he is still anemic with a Hgb of 10.9 (which is down slightly from where it was on 1/24/22). His kidney function (creatinine of 1.74) is abnormal but is stable. I would recommend that he either go see his PCP and discuss whether he should go see a Hematologist about his anemia or else we could refer him to a Hematologist to evaluate his anemia?       FAX to his PCP.   Brit[place]. kj

## 2022-05-31 NOTE — TELEPHONE ENCOUNTER
Called pt and advised of Dr Jones' note. Verb understanding.     Results sent to Dr Magnolia ar[facility].

## 2022-05-31 NOTE — TELEPHONE ENCOUNTER
----- Message from Edgar Allen MD sent at 5/30/2022  8:02 PM EDT -----  05/30/22       Tell him that his labs show that he is still anemic with a Hgb of 10.9 (which is down slightly from where it was on 1/24/22). His kidney function (creatinine of 1.74) is abnormal but is stable. I would recommend that he either go see his PCP and discuss whether he should go see a Hematologist about his anemia or else we could refer him to a Hematologist to evaluate his anemia?       FAX to his PCP.   Thx. kj

## 2022-06-01 DIAGNOSIS — D53.9 MACROCYTIC ANEMIA: Primary | ICD-10-CM

## 2022-06-01 DIAGNOSIS — Z12.5 PROSTATE CANCER SCREENING: ICD-10-CM
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
## 2022-06-19 DIAGNOSIS — D53.9 MACROCYTIC ANEMIA: Primary | ICD-10-CM

## 2022-06-22 ENCOUNTER — ANTICOAGULATION VISIT (OUTPATIENT)
Dept: PHARMACY | Facility: HOSPITAL | Age: 77
End: 2022-06-22

## 2022-06-22 DIAGNOSIS — I48.0 PAF (PAROXYSMAL ATRIAL FIBRILLATION): Primary | ICD-10-CM

## 2022-06-22 LAB
INR PPP: 3.1 (ref 0.91–1.09)
PROTHROMBIN TIME: 36.9 SECONDS (ref 10–13.8)

## 2022-06-22 PROCEDURE — 85610 PROTHROMBIN TIME: CPT

## 2022-06-22 PROCEDURE — 36416 COLLJ CAPILLARY BLOOD SPEC: CPT

## 2022-06-22 RX ORDER — WARFARIN SODIUM 2 MG/1
TABLET ORAL
Qty: 85 TABLET | Refills: 1 | Status: SHIPPED | OUTPATIENT
Start: 2022-06-22 | End: 2023-01-06 | Stop reason: SDUPTHER

## 2022-06-22 NOTE — PROGRESS NOTES
I have supervised and reviewed the notes, assessments, and/or procedures performed by our pharmacy student. The documented assessment and plan were developed cooperatively, and the plan was not implemented in my presence. I concur with the documentation of this patient encounter.

## 2022-06-22 NOTE — PROGRESS NOTES
Anticoagulation Clinic Progress Note    Anticoagulation Summary  As of 6/22/2022    INR goal:  2.0-3.0   TTR:  59.6 % (1.2 y)   INR used for dosing:  3.1 (6/22/2022)   Warfarin maintenance plan:  1 mg every Thu; 2 mg all other days   Weekly warfarin total:  13 mg   No change documented:  Jeaneth Vanessa, Pharmacy Intern   Plan last modified:  Eduar Weeks, PharmD (11/8/2021)   Next INR check:  7/20/2022   Priority:  High   Target end date:      Indications    PAF (paroxysmal atrial fibrillation) (HCC) [I48.0]             Anticoagulation Episode Summary     INR check location:      Preferred lab:      Send INR reminders to:   [facility]    Comments:        Anticoagulation Care Providers     Provider Role Specialty Phone number    Jimena Singer MD Referring Cardiology [phone]          Clinic Interview:  Patient Findings     Negatives:  Signs/symptoms of thrombosis, Signs/symptoms of bleeding,   Laboratory test error suspected, Change in health, Change in alcohol use,   Change in activity, Upcoming invasive procedure, Emergency department   visit, Upcoming dental procedure, Missed doses, Extra doses, Change in   medications, Change in diet/appetite, Hospital admission, Bruising, Other   complaints      Clinical Outcomes     Negatives:  Major bleeding event, Thromboembolic event,   Anticoagulation-related hospital admission, Anticoagulation-related ED   visit, Anticoagulation-related fatality        INR History:  Anticoagulation Monitoring 4/20/2022 5/25/2022 6/22/2022   INR 2.6 2.1 3.1   INR Date 4/20/2022 5/25/2022 6/22/2022   INR Goal 2.0-3.0 2.0-3.0 2.0-3.0   Trend Same Same Same   Last Week Total 13 mg 13 mg 13 mg   Next Week Total 13 mg 13 mg 13 mg   Sun 2 mg 2 mg 2 mg   Mon 2 mg 2 mg 2 mg   Tue 2 mg 2 mg 2 mg   Wed 2 mg 2 mg 2 mg   Thu 1 mg 1 mg 1 mg   Fri 2 mg 2 mg 2 mg   Sat 2 mg 2 mg 2 mg   Visit Report - - -   Some recent data might be hidden       Plan:  1. INR is Supratherapeutic today-  see above in Anticoagulation Summary.  Will instruct Rodolfo Grover to Continue their warfarin regimen- see above in Anticoagulation Summary.  2. Follow up in 4 weeks  3. Patient desires warfarin refills.  4. Verbal and written information provided. Patient expresses understanding and has no further questions at this time.    Jeaneth Vanessa, Pharmacy Intern

## 2022-06-23 ENCOUNTER — OFFICE VISIT (OUTPATIENT)
Dept: CARDIOLOGY | Facility: CLINIC | Age: 77
End: 2022-06-23

## 2022-06-23 VITALS
BODY MASS INDEX: 27.94 KG/M2 | DIASTOLIC BLOOD PRESSURE: 70 MMHG | HEART RATE: 69 BPM | OXYGEN SATURATION: 96 % | HEIGHT: 67 IN | WEIGHT: 178 LBS | SYSTOLIC BLOOD PRESSURE: 124 MMHG

## 2022-06-23 DIAGNOSIS — Z98.890 H/O MITRAL VALVE REPAIR: ICD-10-CM

## 2022-06-23 DIAGNOSIS — I25.10 CORONARY ARTERY DISEASE INVOLVING NATIVE CORONARY ARTERY OF NATIVE HEART WITHOUT ANGINA PECTORIS: Primary | ICD-10-CM

## 2022-06-23 DIAGNOSIS — I48.0 PAF (PAROXYSMAL ATRIAL FIBRILLATION): ICD-10-CM

## 2022-06-23 DIAGNOSIS — I44.0 FIRST DEGREE HEART BLOCK: ICD-10-CM

## 2022-06-23 DIAGNOSIS — I10 ESSENTIAL HYPERTENSION: ICD-10-CM

## 2022-06-23 DIAGNOSIS — E78.2 HYPERLIPEMIA, MIXED: ICD-10-CM

## 2022-06-23 PROCEDURE — 99214 OFFICE O/P EST MOD 30 MIN: CPT | Performed by: NURSE PRACTITIONER

## 2022-06-23 PROCEDURE — 93000 ELECTROCARDIOGRAM COMPLETE: CPT | Performed by: NURSE PRACTITIONER

## 2022-06-23 NOTE — PROGRESS NOTES
[facility]   [address], [address]  [place], [place], [address]    [phone]  WWW.[facility]           OUTPATIENT CLINIC FOLLOW UP NOTE    Patient Care Team:  Patient Care Team:  Domenica Merida MD as PCP - General (Family Medicine)  Mitch Mendiola MD as Consulting Physician (Pulmonary Disease)  Edgar Allen MD as Consulting Physician (Gastroenterology)  Jimena Singer MD as Consulting Physician (Cardiology)  Domenica Merida MD as Referring Physician (Family Medicine)    Subjective:      Chief Complaint   Patient presents with   • Follow-up   • Coronary Artery Disease   • Hypertension       HPI:    Rodolfo Grover is a 77 y.o. male with history of rheumatic fever, GE reflux disease who was seen at [facility] in March 2018 after humeral fracture following a fall.  At that time he was noted to have ventricular bigeminy with normal potassium and magnesium levels.  He was hypertensive at the time.  He had an echocardiogram that showed normal systolic function with normal diastolic function, mild left atrial enlargement saline study was indeterminate.  There was aortic valve sclerosis with mild aortic regurgitation and trivial mitral and tricuspid regurgitation with mild pulmonary hypertension with an RV systolic pressure 42 mmHg.  A stress perfusion study was negative for ischemia.  Follow-up echocardiogram in August 2020 showed an ejection fraction of 55% with normal diastolic function, mild aortic mitral valve regurgitation, mild to moderate tricuspid regurgitation with an RV systolic pressure increased further to 48 mmHg.  He presented in February 2020 with complaints of chest discomfort and non-STEMI.  Echo showed new wall motion abnormalities as well as pulmonary hypertension( RVSP 61 mmHg).  Subsequent cardiac catheterization showed multivessel coronary artery disease.  Subsequently underwent CABG x7 vessels with mitral valve repair.  Postoperative course was complicated by  hemoptysis, esophageal esophagitis, atrial fibrillation associated also with intermittent complete heart block.  He was also felt to have had a seizure and was started on Keppra.  He also had acute renal insufficiency.  He also had induration and possible cellulitis of his right thigh leg wound.  Echocardiogram 9/2021 showed ejection fraction 52% with mild left ventricle hypertrophy, diastolic dysfunction present mild aortic valve regurgitation noted trivial mitral valve regurgitation with mitral valve repair.  Mild tricuspid regurgitation with an RV systolic pressure 40 mmHg.  During his 12/2021 visit he was noted to have a long first-degree AV block.  His metoprolol was decreased and then later completely discontinued without improvement.    He presents today for follow-up.  His primary cardiologist is Dr. Singer.    Since the patient was last seen he reports that he has done well from a cardiac standpoint.  He has not had any recurrent episodes of chest pain.  He denies shortness of breath, palpitations, lightheadedness, or syncope.  Has chronic stable lower extremity edema that is unchanged.  Blood pressure has been at goal.  He continues to walk regularly for exercise.    Review of Systems:  Positive for lower extremity edema  Negative for exertional chest pain, dyspnea with exertion, palpitations, syncope.     PFSH:  Patient Active Problem List   Diagnosis   • Acute blood loss anemia   • Fracture of fifth metacarpal bone of right hand   • Closed fracture dislocation of right shoulder   • Gastroesophageal reflux disease without esophagitis   • DNR (do not resuscitate)   • Bradycardia following surgery   • Frequent PVCs   • Proximal humerus fracture   • Osteoarthritis   • Severe obstructive sleep apnea   • Pure hypercholesterolemia   • Pulmonary hypertension (HCC)   • LARA (dyspnea on exertion)   • Localized edema   • Chest discomfort   • Elevated troponin   • Abnormal EKG   • Coronary artery disease involving  native coronary artery of native heart without angina pectoris   • Focal motor seizure (HCC)   • Generalized tonic-clonic seizure (HCC)   • Post-ictal state (HCC)   • Coffee ground emesis   • S/P CABG (coronary artery bypass graft)   • Mitral regurgitation   • H/O mitral valve repair   • PAF (paroxysmal atrial fibrillation) (HCC)   • Ischemic cardiomyopathy   • Essential hypertension   • Non-ischemic cardiomyopathy (HCC)   • Iron deficiency anemia due to chronic blood loss   • Polyp of colon         Current Outpatient Medications:   •  acetaminophen (TYLENOL) 500 MG tablet, Take 2 tablets by mouth 3 (Three) Times a Day. (Patient taking differently: Take 1,000 mg by mouth 3 (Three) Times a Day As Needed.), Disp: , Rfl:   •  aspirin 81 MG EC tablet, Take 1 tablet by mouth Daily., Disp: 90 tablet, Rfl: 1  •  atorvastatin (LIPITOR) 10 MG tablet, Take 1 tablet by mouth Daily., Disp: 30 tablet, Rfl: 11  •  Cholecalciferol 50 MCG (2000 UT) capsule, Take 1 capsule by mouth Daily., Disp: , Rfl:   •  fluticasone (CUTIVATE) 0.05 % cream, Apply 1 application topically to the appropriate area as directed 2 (Two) Times a Day. (Patient taking differently: Apply 1 application topically to the appropriate area as directed As Needed.), Disp: 30 g, Rfl: 0  •  furosemide (Lasix) 40 MG tablet, Take 1 tablet by mouth Daily As Needed (for weight gain or shortness of breath)., Disp: 30 tablet, Rfl: 1  •  nitroglycerin (NITROSTAT) 0.4 MG SL tablet, 1 under the tongue as needed for angina, may repeat q5mins for up three doses, Disp: 25 tablet, Rfl: 1  •  Omeprazole Magnesium (PRILOSEC OTC PO), Take 20 mg by mouth Daily., Disp: , Rfl:   •  pantoprazole (PROTONIX) 40 MG EC tablet, Take 1 tablet by mouth 2 (Two) Times a Day Before Meals. (Patient taking differently: Take 40 mg by mouth As Needed.), Disp: 180 tablet, Rfl: 1  •  potassium chloride 10 MEQ CR tablet, Take 1 tablet by mouth Daily As Needed (Take with Lasix (furosemide)). With Lasix  "(furosemide), Disp: 30 tablet, Rfl: 1  •  Psyllium (METAMUCIL FIBER PO), Take  by mouth As Needed., Disp: , Rfl:   •  sucralfate (CARAFATE) 1 g tablet, As Needed., Disp: , Rfl:   •  warfarin (COUMADIN) 2 MG tablet, TAKE ONE-HALF OF A TABLET BY MOUTH ON THURS AND TAKE ONE TABLET BY MOUTH ALL OTHER DAYS OR AS DIRECTED, Disp: 85 tablet, Rfl: 1    No Known Allergies     reports that he has never smoked. He has never used smokeless tobacco.      Objective:   Physical exam:  /70   Pulse 69   Ht 170.2 cm (67\")   Wt 80.7 kg (178 lb)   SpO2 96%   BMI 27.88 kg/m²   CONSTITUTIONAL: No acute distress  RESPIRATORY: Normal effort. Clear to auscultation bilaterally without wheezing or rales  CARDIOVASCULAR: Carotids with normal upstrokes without bruits.  Regular rate and rhythm with normal S1 and S2. Without murmur, gallop or rub. Normal radial pulse.  There is no lower extremity edema bilaterally.  Posterior tibial pulses are 2+ bilaterally.    Labs:    BUN   Date Value Ref Range Status   05/25/2022 31 (H) 8 - 27 mg/dL Final   01/24/2022 24 (H) 8 - 23 mg/dL Final     Creatinine   Date Value Ref Range Status   05/25/2022 1.74 (H) 0.76 - 1.27 mg/dL Final   01/24/2022 1.67 (H) 0.76 - 1.27 mg/dL Final   05/19/2021 1.80 (H) 0.60 - 1.30 mg/dL Final     Comment:     Serial Number: [id number]:  [id number]     Potassium   Date Value Ref Range Status   05/25/2022 4.2 3.5 - 5.2 mmol/L Final   01/24/2022 4.1 3.5 - 5.2 mmol/L Final     ALT (SGPT)   Date Value Ref Range Status   05/25/2022 21 0 - 44 IU/L Final   01/24/2022 17 1 - 41 U/L Final     AST (SGOT)   Date Value Ref Range Status   05/25/2022 26 0 - 40 IU/L Final   01/24/2022 15 1 - 40 U/L Final       Lab Results   Component Value Date    CHOL 247 (H) 12/29/2021     Lab Results   Component Value Date    TRIG 75 12/29/2021     Lab Results   Component Value Date    HDL 71 (H) 12/29/2021     Lab Results   Component Value Date     (H) 12/29/2021     No components found " for: LDLDIRECTC    Diagnostic Data:      ECG 12 Lead    Date/Time: 6/23/2022 2:36 PM  Performed by: Ayla Sanchez APRN  Authorized by: Ayla Sanchez APRN   Comparison: compared with previous ECG from 3/3/2022  Similar to previous ECG  Rhythm: sinus rhythm  Ectopy: unifocal PVCs  Rate: normal  BPM: 66  Conduction: 1st degree AV block  Comments:  ms,  ms            Results for orders placed during the hospital encounter of 09/24/21    Adult Transthoracic Echo Complete W/ Cont if Necessary Per Protocol    Interpretation Summary  · Calculated left ventricular EF = 52% Estimated left ventricular EF = 52% Estimated left ventricular EF was in agreement with the calculated left ventricular EF. Left ventricular systolic function is normal. The left ventricular cavity is moderately dilated. Left ventricular wall thickness is consistent with mild concentric hypertrophy. All left ventricular wall segments contract normally. Left ventricular diastolic dysfunction is noted. Elevated left atrial pressure.  · There is moderate calcification of the aortic valve. The aortic valve appears trileaflet. Mild to moderate aortic valve regurgitation is present. No aortic valve stenosis is present.  · Trace mitral valve regurgitation is present. No significant mitral valve stenosis is present. Fixed posterior leaflet findings are consistent with surgical mitral valve repair. There is a mitral valve ring present.  · Mild tricuspid valve regurgitation is present. Estimated right ventricular systolic pressure from tricuspid regurgitation is mildly elevated (35-45 mmHg). Calculated right ventricular systolic pressure from tricuspid regurgitation is 44 mmHg.      Assessment and Plan:   Diagnoses and all orders for this visit:    Coronary artery disease involving native coronary artery of native heart without angina pectoris (Primary)  Mixed hyperlipidemia   -Has remained without angina since time of last visit.  -Continue  aspirin, statin, sublingual nitroglycerin as needed.  -Not on beta-blocker due to significant first-degree AVB.     Essential hypertension  -At goal, off of antihypertensives.  -Continue to monitor clinically.    PAF (paroxysmal atrial fibrillation) (HCC)  1st degree AV block  -No longer on beta-blocker due to long first-degree AVB.  -EKG today sinus rhythm.  -Continue warfarin.    H/O mitral valve repair  -Continues to do well clinically.  Continue to monitor for now.    - Return in about 6 months (around 12/23/2022) for Next scheduled follow up with Dr. Singer.    Electronically signed by NKECHI Victor, 06/23/22, 2:38 PM EDT.

## 2022-06-30 ENCOUNTER — LAB (OUTPATIENT)
Dept: LAB | Facility: HOSPITAL | Age: 77
End: 2022-06-30

## 2022-06-30 ENCOUNTER — CONSULT (OUTPATIENT)
Dept: ONCOLOGY | Facility: CLINIC | Age: 77
End: 2022-06-30

## 2022-06-30 VITALS
WEIGHT: 181.2 LBS | OXYGEN SATURATION: 98 % | HEART RATE: 75 BPM | HEIGHT: 67 IN | BODY MASS INDEX: 28.44 KG/M2 | SYSTOLIC BLOOD PRESSURE: 135 MMHG | RESPIRATION RATE: 14 BRPM | DIASTOLIC BLOOD PRESSURE: 72 MMHG | TEMPERATURE: 98.2 F

## 2022-06-30 DIAGNOSIS — N18.32 STAGE 3B CHRONIC KIDNEY DISEASE: ICD-10-CM

## 2022-06-30 DIAGNOSIS — D69.6 THROMBOCYTOPENIA: ICD-10-CM

## 2022-06-30 DIAGNOSIS — D53.9 MACROCYTIC ANEMIA: Primary | ICD-10-CM

## 2022-06-30 PROBLEM — N18.9 CKD (CHRONIC KIDNEY DISEASE): Status: ACTIVE | Noted: 2022-06-30

## 2022-06-30 LAB
BASOPHILS # BLD AUTO: 0.06 10*3/MM3 (ref 0–0.2)
BASOPHILS NFR BLD AUTO: 0.7 % (ref 0–1.5)
DEPRECATED RDW RBC AUTO: 45.8 FL (ref 37–54)
EOSINOPHIL # BLD AUTO: 0.34 10*3/MM3 (ref 0–0.4)
EOSINOPHIL NFR BLD AUTO: 3.9 % (ref 0.3–6.2)
ERYTHROCYTE [DISTWIDTH] IN BLOOD BY AUTOMATED COUNT: 12.6 % (ref 12.3–15.4)
HAPTOGLOB SERPL-MCNC: 212 MG/DL (ref 30–200)
HCT VFR BLD AUTO: 35 % (ref 37.5–51)
HGB BLD-MCNC: 11.5 G/DL (ref 13–17.7)
HGB RETIC QN AUTO: 36 PG (ref 29.8–36.1)
IMM GRANULOCYTES # BLD AUTO: 0.11 10*3/MM3 (ref 0–0.05)
IMM GRANULOCYTES NFR BLD AUTO: 1.3 % (ref 0–0.5)
IMM RETICS NFR: 15.7 % (ref 3–15.8)
LDH SERPL-CCNC: 187 U/L (ref 99–259)
LYMPHOCYTES # BLD AUTO: 1.45 10*3/MM3 (ref 0.7–3.1)
LYMPHOCYTES NFR BLD AUTO: 16.7 % (ref 19.6–45.3)
MCH RBC QN AUTO: 32.5 PG (ref 26.6–33)
MCHC RBC AUTO-ENTMCNC: 32.9 G/DL (ref 31.5–35.7)
MCV RBC AUTO: 98.9 FL (ref 79–97)
MONOCYTES # BLD AUTO: 0.85 10*3/MM3 (ref 0.1–0.9)
MONOCYTES NFR BLD AUTO: 9.8 % (ref 5–12)
NEUTROPHILS NFR BLD AUTO: 5.86 10*3/MM3 (ref 1.7–7)
NEUTROPHILS NFR BLD AUTO: 67.6 % (ref 42.7–76)
NRBC BLD AUTO-RTO: 0 /100 WBC (ref 0–0.2)
PLATELET # BLD AUTO: 136 10*3/MM3 (ref 140–450)
PLATELETS.RETICULATED NFR BLD AUTO: 3 % (ref 0.9–6.5)
PMV BLD AUTO: 10.6 FL (ref 6–12)
RBC # BLD AUTO: 3.54 10*6/MM3 (ref 4.14–5.8)
RETICS # AUTO: 0.05 10*6/MM3 (ref 0.02–0.13)
RETICS/RBC NFR AUTO: 1.43 % (ref 0.7–1.9)
WBC NRBC COR # BLD: 8.67 10*3/MM3 (ref 3.4–10.8)

## 2022-06-30 PROCEDURE — 85025 COMPLETE CBC W/AUTO DIFF WBC: CPT

## 2022-06-30 PROCEDURE — 85046 RETICYTE/HGB CONCENTRATE: CPT | Performed by: INTERNAL MEDICINE

## 2022-06-30 PROCEDURE — 83010 ASSAY OF HAPTOGLOBIN QUANT: CPT | Performed by: INTERNAL MEDICINE

## 2022-06-30 PROCEDURE — 85055 RETICULATED PLATELET ASSAY: CPT | Performed by: INTERNAL MEDICINE

## 2022-06-30 PROCEDURE — 83615 LACTATE (LD) (LDH) ENZYME: CPT | Performed by: INTERNAL MEDICINE

## 2022-06-30 PROCEDURE — 36415 COLL VENOUS BLD VENIPUNCTURE: CPT

## 2022-06-30 PROCEDURE — 99204 OFFICE O/P NEW MOD 45 MIN: CPT | Performed by: INTERNAL MEDICINE

## 2022-06-30 RX ORDER — METOPROLOL SUCCINATE 50 MG/1
50 TABLET, EXTENDED RELEASE ORAL DAILY
COMMUNITY
End: 2023-03-03 | Stop reason: SDUPTHER

## 2022-06-30 NOTE — PROGRESS NOTES
Subjective     REASON FOR CONSULTATION:  anemia  Provide an opinion on any further workup or treatment                             REQUESTING PHYSICIAN:  [place]    RECORDS OBTAINED:  Records of the patients history including those obtained from the referring provider were reviewed and summarized in detail.    HISTORY OF PRESENT ILLNESS:  The patient is a 77 y.o. year old male who is here for an opinion about the above issue.    History of Present Illness   This is a pleasant 77-year-old man with several medical comorbidities including coronary artery disease, hyperlipidemia, hypertension, paroxysmal atrial fibrillation anticoagulated with Coumadin, history of mitral valve repair, CKD 3, obstructive sleep apnea, pulmonary hypertension referred from his primary care provider for assessment of anemia.    Reviewing the patient's lab data available, he has had a somewhat chronic fluctuating anemia typically macrocytic since at least 2018.  His hemoglobin typically runs in the 10-12 range although he did drop lower in February 2021 when he was admitted for coffee-ground emesis secondary to gastritis and an ST elevation MI.  His hemoglobin over the past 1 year has been fairly stable in the 11-12 range with microcytic indices.  His white blood cell count and differential is normal other than mild increase immature granulocytes.  The platelets are typically normal although today mildly low 136.  He had a recent B12/folate level drawn 6/16/2022 both of which were normal range.  Iron studies in January 2022 were normal with a ferritin 139 and iron saturation 24%.  Most recent serum creatinine on 5/25/2022 was 1.74.  He had a normal total protein 6.2 and bilirubin less than 0.2.    The patient is only required transfusion support during previous GI bleed secondary to esophagitis.  He has required no follow-up transfusion support.  He has had recent EGD/colonoscopy and followed by Dr. Leticia RODRIGUEZ.  He does not see blood in the  stool or melanotic stool.    Patient does not have much in the way of symptoms such as shortness of breath with exertion, dizziness or lightheadedness.  He does have vertigo which does not seem related to anemia.    Past Medical History:   Diagnosis Date   • Acute blood loss anemia    • Arthritis    • Atrial fibrillation (HCC)    • Bilateral lower extremity edema    • Bradycardia following surgery    • CAD (coronary artery disease)    • Chest discomfort    • Colon polyp    • Elevated troponin    • Focal motor seizure ([facility])    • Generalized tonic-clonic seizure (HCC)    • GERD (gastroesophageal reflux disease)    • Iritis of right eye    • Ischemic cardiomyopathy    • Mitral valve insufficiency    • Post-ictal state (HCC)    • Pulmonary hypertension (HCC)    • PVC (premature ventricular contraction)    • Rheumatic fever    • S/P CABG x 7    • S/P mitral valve repair    • Severe obstructive sleep apnea    • Shoulder fracture, right         Past Surgical History:   Procedure Laterality Date   • CARDIAC CATHETERIZATION N/A 2/19/2021    Procedure: Coronary angiography;  Surgeon: Bry Nails MD;  Location: [facility] INVASIVE LOCATION;  Service: Cardiovascular;  Laterality: N/A;   • CARDIAC CATHETERIZATION N/A 2/19/2021    Procedure: Left heart cath;  Surgeon: Bry Nails MD;  Location: [facility] CATH INVASIVE LOCATION;  Service: Cardiovascular;  Laterality: N/A;   • CARDIAC CATHETERIZATION N/A 2/19/2021    Procedure: Right Heart Cath;  Surgeon: Bry Nails MD;  Location: [facility] CATH INVASIVE LOCATION;  Service: Cardiovascular;  Laterality: N/A;   • CARDIAC CATHETERIZATION N/A 2/19/2021    Procedure: Left ventriculography;  Surgeon: Bry Nails MD;  Location: [facility] INVASIVE LOCATION;  Service: Cardiovascular;  Laterality: N/A;   • COLONOSCOPY N/A 12/7/2021    Procedure: COLONOSCOPY to cecum with cold biopsy polypectomy;  Surgeon: Edgar Allen MD;  Location: [facility]  ENDOSCOPY;  Service: Gastroenterology;  Laterality: N/A;  IRON DEFICIENCY ANEMIA, H/O COLON POLYPS  --   DIVERTICULOSIS, polyp, hemorrhoids   • CORONARY ARTERY BYPASS GRAFT N/A 2/22/2021    Procedure: BK, MIDLINE STERNOTOMY, CORONARY ARTERY BYPASS X 7 UTILIZING THE LEFT INTERNAL MAMMARY ARTERY AND THE RIGHT SAPHENOUS VEIN, MITRAL VALVE REPAIR, PRP;  Surgeon: Jr Shyam Echavarria MD;  Location: [facility];  Service: Cardiothoracic;  Laterality: N/A;   • ENDOSCOPY N/A 8/16/2018    Erosive gastritis, diverticulosis   • ENDOSCOPY N/A 2/26/2021    Procedure: ESOPHAGOGASTRODUODENOSCOPY AT BEDSIDE WITH HOT SNARE POLYPECTOMY AND CLIP PLACEMENT X1;  Surgeon: Jono Laboy MD;  Location: [facility] ENDOSCOPY;  Service: Gastroenterology;  Laterality: N/A;  PRE-  GI BLEED  POST- GASTRITIS, ESOPHAGITIS, POLYP   • ENDOSCOPY N/A 12/7/2021    Procedure: ESOPHAGOGASTRODUODENOSCOPY with COLD  BIOPSIES;  Surgeon: Edgar Allen MD;  Location: [facility] ENDOSCOPY;  Service: Gastroenterology;  Laterality: N/A;  IRON DEFICIENCY ANEMIA, H/O ULCERATIVE ESOPHAGITIS  --GASTRITIS, DUODENAL POLYP   • HERNIA REPAIR     • SHOULDER CLOSED REDUCTION Right 3/16/2018    Procedure: RIGHT SHOULDER CLOSED REDUCTION;  Surgeon: Kj Garcia MD;  Location: [facility] OR;  Service: Orthopedics   • TONSILLECTOMY     • TOTAL SHOULDER ARTHROPLASTY W/ DISTAL CLAVICLE EXCISION Right 3/22/2018    Procedure: Reverse Total Shoulder Arthroplsty;  Surgeon: Kj Garcia MD;  Location: [facility] OR;  Service: Orthopedics   • TRANSESOPHAGEAL ECHOCARDIOGRAM (BK) N/A 2/22/2021    Procedure: TRANSESOPHAGEAL ECHOCARDIOGRAM;  Surgeon: Jr Shyam Echavarria MD;  Location: [facility] OR;  Service: Cardiothoracic;  Laterality: N/A;        Current Outpatient Medications on File Prior to Visit   Medication Sig Dispense Refill   • acetaminophen (TYLENOL) 500 MG tablet Take 2 tablets by mouth 3 (Three) Times a Day. (Patient taking differently: Take 1,000 mg by  mouth 3 (Three) Times a Day As Needed.)     • aspirin 81 MG EC tablet Take 1 tablet by mouth Daily. 90 tablet 1   • atorvastatin (LIPITOR) 10 MG tablet Take 1 tablet by mouth Daily. 30 tablet 11   • Cholecalciferol 50 MCG (2000 UT) capsule Take 1 capsule by mouth Daily.     • fluticasone (CUTIVATE) 0.05 % cream Apply 1 application topically to the appropriate area as directed 2 (Two) Times a Day. (Patient taking differently: Apply 1 application topically to the appropriate area as directed As Needed.) 30 g 0   • furosemide (Lasix) 40 MG tablet Take 1 tablet by mouth Daily As Needed (for weight gain or shortness of breath). 30 tablet 1   • nitroglycerin (NITROSTAT) 0.4 MG SL tablet 1 under the tongue as needed for angina, may repeat q5mins for up three doses 25 tablet 1   • Omeprazole Magnesium (PRILOSEC OTC PO) Take 20 mg by mouth Daily.     • pantoprazole (PROTONIX) 40 MG EC tablet Take 1 tablet by mouth 2 (Two) Times a Day Before Meals. (Patient taking differently: Take 40 mg by mouth As Needed.) 180 tablet 1   • potassium chloride 10 MEQ CR tablet Take 1 tablet by mouth Daily As Needed (Take with Lasix (furosemide)). With Lasix (furosemide) 30 tablet 1   • Psyllium (METAMUCIL FIBER PO) Take  by mouth As Needed.     • sucralfate (CARAFATE) 1 g tablet As Needed.     • warfarin (COUMADIN) 2 MG tablet TAKE ONE-HALF OF A TABLET BY MOUTH ON THURS AND TAKE ONE TABLET BY MOUTH ALL OTHER DAYS OR AS DIRECTED 85 tablet 1     No current facility-administered medications on file prior to visit.        ALLERGIES:  No Known Allergies     Social History     Socioeconomic History   • Marital status:    Tobacco Use   • Smoking status: Never Smoker   • Smokeless tobacco: Never Used   Vaping Use   • Vaping Use: Never used   Substance and Sexual Activity   • Alcohol use: Yes     Comment: occasional   • Drug use: No   • Sexual activity: Defer        Family History   Problem Relation Age of Onset   • Malig Hyperthermia Neg Hx   "       Review of Systems   Constitutional: Negative.    HENT: Negative.    Eyes: Negative.    Respiratory: Negative.    Cardiovascular: Negative.    Gastrointestinal: Negative.    Musculoskeletal: Negative.    Allergic/Immunologic: Negative.    Neurological: Positive for dizziness.   Hematological: Negative.    Psychiatric/Behavioral: Negative.         Objective     Vitals:    06/30/22 0815   BP: 135/72   Pulse: 75   Resp: 14   Temp: 98.2 °F (36.8 °C)   SpO2: 98%   Weight: 82.2 kg (181 lb 3.2 oz)   Height: 169 cm (66.54\")  Comment: new ht   PainSc: 0-No pain     Current Status 6/30/2022   ECOG score 0       Physical Exam    CONSTITUTIONAL: pleasant well-developed adult man  HEENT: no icterus, no thrush, moist membranes  NECK: no jvd  LYMPH: no cervical or supraclavicular lad  CV: RRR, S1S2, no murmur  RESP: cta bilat, no wheezing, no rales  GI: soft, non-tender, no splenomegaly, +bs  MUSC: no edema, normal gait  NEURO: alert and oriented x3, normal strength  PSYCH: normal mood and affect    RECENT LABS:  Hematology WBC   Date Value Ref Range Status   06/30/2022 8.67 3.40 - 10.80 10*3/mm3 Final   05/25/2022 7.6 3.4 - 10.8 x10E3/uL Final     RBC   Date Value Ref Range Status   06/30/2022 3.54 (L) 4.14 - 5.80 10*6/mm3 Final   05/25/2022 3.45 (L) 4.14 - 5.80 x10E6/uL Final     Hemoglobin   Date Value Ref Range Status   06/30/2022 11.5 (L) 13.0 - 17.7 g/dL Final     Hematocrit   Date Value Ref Range Status   06/30/2022 35.0 (L) 37.5 - 51.0 % Final     Platelets   Date Value Ref Range Status   06/30/2022 136 (L) 140 - 450 10*3/mm3 Final        Lab Results   Component Value Date    GLUCOSE 99 05/25/2022    BUN 31 (H) 05/25/2022    CREATININE 1.74 (H) 05/25/2022    EGFRIFNONA 40 (L) 01/24/2022    EGFRIFAFRI 41 (L) 10/21/2021    BCR 18 05/25/2022    K 4.2 05/25/2022    CO2 24 05/25/2022    CALCIUM 9.0 05/25/2022    PROTENTOTREF 6.2 05/25/2022    ALBUMIN 4.0 05/25/2022    LABIL2 1.8 05/25/2022    AST 26 05/25/2022    ALT 21 " 05/25/2022         Assessment & Plan     *Chronic macrocytic anemia with hemoglobin currently 11.5  · Recent B12, folate, ferritin, iron profile normal    *Mild thrombocytopenia-platelets 136  · Platelet count adequate for anticoagulation  ·   *CKD 3B-creatinine 1.74/EGFR 41    *Atrial fibrillation, paroxysmal on chronic anticoagulation with Coumadin    Hematology plan/recommendations:  1. The patient's anemia is probably partially in part to his chronic kidney disease with decreased erythropoietin production although oftentimes this is a microcytic anemia  2. Given the anemia with macrocytic indices and CKD I will check the patient for paraproteinemia's with a SPEP/SANIA/free light chain ratio  3. Check for hemolysis-reticulocyte count, LDH, haptoglobin  4. Check erythropoietin level  5. Check an IPF for the mild low platelet count  6. Discussed with the patient possibility of an underlying bone marrow disorder such as MDS although chronicity/stability of his hemoglobin would argue against this diagnosis but still on the differential; I do not feel his cytopenias are severe enough to warrant a bone marrow exam at this time  7. Follow-up 6 months CBC with differential; if blood counts stable can likely be followed by his PCP    Thank you for allowing me to participate in the care of this pleasant patient

## 2022-07-01 LAB
ALBUMIN SERPL ELPH-MCNC: 3.4 G/DL (ref 2.9–4.4)
ALBUMIN/GLOB SERPL: 1.2 {RATIO} (ref 0.7–1.7)
ALPHA1 GLOB SERPL ELPH-MCNC: 0.3 G/DL (ref 0–0.4)
ALPHA2 GLOB SERPL ELPH-MCNC: 0.8 G/DL (ref 0.4–1)
B-GLOBULIN SERPL ELPH-MCNC: 1.1 G/DL (ref 0.7–1.3)
EPO SERPL-ACNC: 14.1 MIU/ML (ref 2.6–18.5)
GAMMA GLOB SERPL ELPH-MCNC: 0.9 G/DL (ref 0.4–1.8)
GLOBULIN SER-MCNC: 3 G/DL (ref 2.2–3.9)
IGA SERPL-MCNC: 223 MG/DL (ref 61–437)
IGG SERPL-MCNC: 1034 MG/DL (ref 603–1613)
IGM SERPL-MCNC: 35 MG/DL (ref 15–143)
INTERPRETATION SERPL IEP-IMP: ABNORMAL
KAPPA LC FREE SER-MCNC: 31.5 MG/L (ref 3.3–19.4)
KAPPA LC FREE/LAMBDA FREE SER: 1.23 {RATIO} (ref 0.26–1.65)
LABORATORY COMMENT REPORT: ABNORMAL
LAMBDA LC FREE SERPL-MCNC: 25.7 MG/L (ref 5.7–26.3)
M PROTEIN SERPL ELPH-MCNC: ABNORMAL G/DL
PROT SERPL-MCNC: 6.4 G/DL (ref 6–8.5)

## 2022-07-05 ENCOUNTER — TELEPHONE (OUTPATIENT)
Dept: ONCOLOGY | Facility: CLINIC | Age: 77
End: 2022-07-05

## 2022-07-05 NOTE — TELEPHONE ENCOUNTER
----- Message from Juan David Solis MD sent at 7/4/2022  7:39 PM EDT -----  Please let him know additional labs looked okay.  Erythropoietin level low so anemia most likely secondary to chronic kidney disease.  Continue to monitor as scheduled.

## 2022-07-20 ENCOUNTER — APPOINTMENT (OUTPATIENT)
Dept: PHARMACY | Facility: HOSPITAL | Age: 77
End: 2022-07-20

## 2022-07-27 ENCOUNTER — ANTICOAGULATION VISIT (OUTPATIENT)
Dept: PHARMACY | Facility: HOSPITAL | Age: 77
End: 2022-07-27

## 2022-07-27 DIAGNOSIS — I48.0 PAF (PAROXYSMAL ATRIAL FIBRILLATION): Primary | ICD-10-CM

## 2022-07-27 LAB
INR PPP: 3.9 (ref 0.91–1.09)
PROTHROMBIN TIME: 46.5 SECONDS (ref 10–13.8)

## 2022-07-27 PROCEDURE — 85610 PROTHROMBIN TIME: CPT

## 2022-07-27 PROCEDURE — G0463 HOSPITAL OUTPT CLINIC VISIT: HCPCS

## 2022-07-27 PROCEDURE — 36416 COLLJ CAPILLARY BLOOD SPEC: CPT

## 2022-07-27 NOTE — PROGRESS NOTES
Anticoagulation Clinic Progress Note    Anticoagulation Summary  As of 7/27/2022    INR goal:  2.0-3.0   TTR:  55.2 % (1.3 y)   INR used for dosing:  3.9 (7/27/2022)   Warfarin maintenance plan:  1 mg every Thu; 2 mg all other days   Weekly warfarin total:  13 mg   Plan last modified:  Eduar Weeks, PharmD (11/8/2021)   Next INR check:  8/10/2022   Priority:  High   Target end date:      Indications    PAF (paroxysmal atrial fibrillation) (HCC) [I48.0]             Anticoagulation Episode Summary     INR check location:      Preferred lab:      Send INR reminders to:  [facility]    Comments:        Anticoagulation Care Providers     Provider Role Specialty Phone number    Jimena Singer MD Referring Cardiology [phone]          Clinic Interview:  Patient Findings     Positives:  Change in health, Change in diet/appetite    Negatives:  Signs/symptoms of thrombosis, Signs/symptoms of bleeding,   Laboratory test error suspected, Change in alcohol use, Change in   activity, Upcoming invasive procedure, Emergency department visit,   Upcoming dental procedure, Missed doses, Extra doses, Change in   medications, Hospital admission, Bruising, Other complaints    Comments:  Patient reports having COVID ~2 weeks ago. He reported testing   positive on 7/16 and then feeling much better a week later. He also   reports that he has been eating more salads the past 2 weeks, but the last   few days he has not had any      Clinical Outcomes     Negatives:  Major bleeding event, Thromboembolic event,   Anticoagulation-related hospital admission, Anticoagulation-related ED   visit, Anticoagulation-related fatality    Comments:  Patient reports having COVID ~2 weeks ago. He reported testing   positive on 7/16 and then feeling much better a week later. He also   reports that he has been eating more salads the past 2 weeks, but the last   few days he has not had any        INR History:  Anticoagulation Monitoring  5/25/2022 6/22/2022 7/27/2022   INR 2.1 3.1 3.9   INR Date 5/25/2022 6/22/2022 7/27/2022   INR Goal 2.0-3.0 2.0-3.0 2.0-3.0   Trend Same Same Same   Last Week Total 13 mg 13 mg 13 mg   Next Week Total 13 mg 13 mg 11 mg   Sun 2 mg 2 mg 2 mg   Mon 2 mg 2 mg 2 mg   Tue 2 mg 2 mg 2 mg   Wed 2 mg 2 mg Hold (7/27); Otherwise 2 mg   Thu 1 mg 1 mg 1 mg   Fri 2 mg 2 mg 2 mg   Sat 2 mg 2 mg 2 mg   Visit Report - - -   Some recent data might be hidden       Plan:  1. INR is Supratherapeutic today- see above in Anticoagulation Summary.  Will instruct Rodolfo SALVADOR Grover to Hold their warfarin regimen- see above in Anticoagulation Summary. Instructed patient to hold their dose tonight and then resume normal regimen afterwards   2. Follow up in 2 weeks  3. Patient declines warfarin refills.  4. Verbal and written information provided. Patient expresses understanding and has no further questions at this time.    Bry Molina, Pharmacy Intern

## 2022-07-27 NOTE — PROGRESS NOTES
I have supervised and reviewed the notes, assessments, and/or procedures performed by our pharmacy student. The documented assessment and plan were developed cooperatively, and the plan was implemented in my presence. I concur with the documentation of this patient encounter.

## 2022-08-02 ENCOUNTER — TELEPHONE (OUTPATIENT)
Dept: FAMILY MEDICINE CLINIC | Facility: CLINIC | Age: 77
End: 2022-08-02

## 2022-08-02 NOTE — TELEPHONE ENCOUNTER
Caller: SARA ABERCROMBIE [facility]    Relationship: [facility]    Best call back number: [phone]    What is the best time to reach you: UNKNOWN    Who are you requesting to speak with (clinical staff, provider,  specific staff member): CLINICAL STAFF/ DR DELGADO    Do you know the name of the person who called: SARA ABERCROMBIE WITH [facility]    What was the call regarding: MR RIVERA SIGNED UP WITH [employer] FOR A FREE BENEFIT WITH HIS INSURANCE- THEY FO [facility]ISITS IN PATIENTS HOME- DR DELGADO IS GOING TO REMAIN THE PATIENT'S PCP- THIS IS AN INFORMATION COURTESY MESSAGE FROM [employer] ONLY     Do you require a callback: YES, IF QUESTIONS       [phone]

## 2022-08-10 ENCOUNTER — ANTICOAGULATION VISIT (OUTPATIENT)
Dept: PHARMACY | Facility: HOSPITAL | Age: 77
End: 2022-08-10

## 2022-08-10 DIAGNOSIS — I48.0 PAF (PAROXYSMAL ATRIAL FIBRILLATION): Primary | ICD-10-CM

## 2022-08-10 LAB
INR PPP: 3.5 (ref 0.91–1.09)
PROTHROMBIN TIME: 42.2 SECONDS (ref 10–13.8)

## 2022-08-10 PROCEDURE — 36416 COLLJ CAPILLARY BLOOD SPEC: CPT

## 2022-08-10 PROCEDURE — G0463 HOSPITAL OUTPT CLINIC VISIT: HCPCS

## 2022-08-10 PROCEDURE — 85610 PROTHROMBIN TIME: CPT

## 2022-08-10 NOTE — PROGRESS NOTES
Anticoagulation Clinic Progress Note    Anticoagulation Summary  As of 8/10/2022    INR goal:  2.0-3.0   TTR:  53.5 % (1.3 y)   INR used for dosing:  3.5 (8/10/2022)   Warfarin maintenance plan:  1 mg every Mon, Thu; 2 mg all other days   Weekly warfarin total:  12 mg   Plan last modified:  Eduar Weeks, PharmD (8/10/2022)   Next INR check:  8/24/2022   Priority:  High   Target end date:      Indications    PAF (paroxysmal atrial fibrillation) (HCC) [I48.0]             Anticoagulation Episode Summary     INR check location:      Preferred lab:      Send INR reminders to:  [facility]    Comments:        Anticoagulation Care Providers     Provider Role Specialty Phone number    Jimena Singer MD Referring Cardiology [phone]          Clinic Interview:  Patient Findings     Negatives:  Signs/symptoms of thrombosis, Signs/symptoms of bleeding,   Laboratory test error suspected, Change in health, Change in alcohol use,   Change in activity, Upcoming invasive procedure, Emergency department   visit, Upcoming dental procedure, Missed doses, Extra doses, Change in   medications, Change in diet/appetite, Hospital admission, Bruising, Other   complaints      Clinical Outcomes     Negatives:  Major bleeding event, Thromboembolic event,   Anticoagulation-related hospital admission, Anticoagulation-related ED   visit, Anticoagulation-related fatality        INR History:  Anticoagulation Monitoring 6/22/2022 7/27/2022 8/10/2022   INR 3.1 3.9 3.5   INR Date 6/22/2022 7/27/2022 8/10/2022   INR Goal 2.0-3.0 2.0-3.0 2.0-3.0   Trend Same Same Down   Last Week Total 13 mg 13 mg 13 mg   Next Week Total 13 mg 11 mg 11 mg   Sun 2 mg 2 mg 2 mg   Mon 2 mg 2 mg 1 mg   Tue 2 mg 2 mg 2 mg   Wed 2 mg Hold (7/27); Otherwise 2 mg 1 mg (8/10); Otherwise 2 mg   Thu 1 mg 1 mg 1 mg   Fri 2 mg 2 mg 2 mg   Sat 2 mg 2 mg 2 mg   Visit Report - - -   Some recent data might be hidden       Plan:  1. INR is Supratherapeutic today- see  above in Anticoagulation Summary.  Will instruct Rodolfo Grover to Change their warfarin regimen (1 mg today, then decrease to 1 mg Mon/Thurs, 2 mg all other days) - see above in Anticoagulation Summary.  2. Follow up in 2 weeks  3. Patient declines warfarin refills.  4. Verbal and written information provided. Patient expresses understanding and has no further questions at this time.    Eduar Weeks, PharmD

## 2022-08-24 ENCOUNTER — ANTICOAGULATION VISIT (OUTPATIENT)
Dept: PHARMACY | Facility: HOSPITAL | Age: 77
End: 2022-08-24

## 2022-08-24 DIAGNOSIS — I48.0 PAF (PAROXYSMAL ATRIAL FIBRILLATION): Primary | ICD-10-CM

## 2022-08-24 LAB
INR PPP: 1.9 (ref 0.91–1.09)
PROTHROMBIN TIME: 22.9 SECONDS (ref 10–13.8)

## 2022-08-24 PROCEDURE — 36416 COLLJ CAPILLARY BLOOD SPEC: CPT

## 2022-08-24 PROCEDURE — 85610 PROTHROMBIN TIME: CPT

## 2022-08-24 NOTE — PROGRESS NOTES
Anticoagulation Clinic Progress Note    Anticoagulation Summary  As of 2022    INR goal:  2.0-3.0   TTR:  53.8 % (1.4 y)   INR used for dosin.9 (2022)   Warfarin maintenance plan:  1 mg every Mon, Thu; 2 mg all other days   Weekly warfarin total:  12 mg   No change documented:  Nelly Rich, PharmD   Plan last modified:  Eduar Weeks PharmD (8/10/2022)   Next INR check:  2022   Priority:  High   Target end date:      Indications    PAF (paroxysmal atrial fibrillation) (HCC) [I48.0]             Anticoagulation Episode Summary     INR check location:      Preferred lab:      Send INR reminders to:   [facility]    Comments:        Anticoagulation Care Providers     Provider Role Specialty Phone number    Jimena Singer MD Referring Cardiology [phone]          Clinic Interview:  Patient Findings     Positives:  Missed doses    Negatives:  Signs/symptoms of thrombosis, Signs/symptoms of bleeding,   Laboratory test error suspected, Change in health, Change in alcohol use,   Change in activity, Upcoming invasive procedure, Emergency department   visit, Upcoming dental procedure, Extra doses, Change in medications,   Change in diet/appetite, Hospital admission, Bruising, Other complaints    Comments:  Reports forgetting to take last Wednesday's dose (). No   other changes to medications or diet.       Clinical Outcomes     Negatives:  Major bleeding event, Thromboembolic event,   Anticoagulation-related hospital admission, Anticoagulation-related ED   visit, Anticoagulation-related fatality    Comments:  Reports forgetting to take last day's dose (). No   other changes to medications or diet.         INR History:  Anticoagulation Monitoring 2022 8/10/2022 2022   INR 3.9 3.5 1.9   INR Date 2022 8/10/2022 2022   INR Goal 2.0-3.0 2.0-3.0 2.0-3.0   Trend Same Down Same   Last Week Total 13 mg 13 mg 10 mg   Next Week Total 11 mg 11 mg 12 mg   Sun 2 mg  2 mg 2 mg   Mon 2 mg 1 mg 1 mg   Tue 2 mg 2 mg 2 mg   Wed Hold (7/27); Otherwise 2 mg 1 mg (8/10); Otherwise 2 mg 2 mg   Thu 1 mg 1 mg 1 mg   Fri 2 mg 2 mg 2 mg   Sat 2 mg 2 mg 2 mg   Visit Report - - -   Some recent data might be hidden       Plan:  1. INR is Subtherapeutic today- see above in Anticoagulation Summary.  Will instruct Rodolfo Grover to Continue their warfarin regimen- see above in Anticoagulation Summary.  2. Follow up in 2 weeks  3. Patient declines warfarin refills.  4. Verbal and written information provided. Patient expresses understanding and has no further questions at this time.    Nelly Rich, PharmD

## 2022-09-07 ENCOUNTER — ANTICOAGULATION VISIT (OUTPATIENT)
Dept: PHARMACY | Facility: HOSPITAL | Age: 77
End: 2022-09-07

## 2022-09-07 DIAGNOSIS — I48.0 PAF (PAROXYSMAL ATRIAL FIBRILLATION): Primary | ICD-10-CM

## 2022-09-07 LAB
INR PPP: 2.3 (ref 0.91–1.09)
PROTHROMBIN TIME: 27.1 SECONDS (ref 10–13.8)

## 2022-09-07 PROCEDURE — 85610 PROTHROMBIN TIME: CPT

## 2022-09-07 PROCEDURE — 36416 COLLJ CAPILLARY BLOOD SPEC: CPT

## 2022-09-07 NOTE — PROGRESS NOTES
Anticoagulation Clinic Progress Note    Anticoagulation Summary  As of 2022    INR goal:  2.0-3.0   TTR:  54.4 % (1.4 y)   INR used for dosin.3 (2022)   Warfarin maintenance plan:  1 mg every Mon, Thu; 2 mg all other days   Weekly warfarin total:  12 mg   No change documented:  Miguelina Mccormick, Pharmacy Intern   Plan last modified:  Eduar Weeks, PharmD (8/10/2022)   Next INR check:  2022   Priority:  High   Target end date:      Indications    PAF (paroxysmal atrial fibrillation) (HCC) [I48.0]             Anticoagulation Episode Summary     INR check location:      Preferred lab:      Send INR reminders to:   [facility]    Comments:        Anticoagulation Care Providers     Provider Role Specialty Phone number    Jimena Singer MD Referring Cardiology [phone]          Clinic Interview:  Patient Findings     Negatives:  Signs/symptoms of thrombosis, Signs/symptoms of bleeding,   Laboratory test error suspected, Change in health, Change in alcohol use,   Change in activity, Upcoming invasive procedure, Emergency department   visit, Upcoming dental procedure, Missed doses, Extra doses, Change in   medications, Change in diet/appetite, Hospital admission, Bruising, Other   complaints      Clinical Outcomes     Negatives:  Major bleeding event, Thromboembolic event,   Anticoagulation-related hospital admission, Anticoagulation-related ED   visit, Anticoagulation-related fatality        INR History:  Anticoagulation Monitoring 8/10/2022 2022 2022   INR 3.5 1.9 2.3   INR Date 8/10/2022 2022 2022   INR Goal 2.0-3.0 2.0-3.0 2.0-3.0   Trend Down Same Same   Last Week Total 13 mg 10 mg 12 mg   Next Week Total 11 mg 12 mg 12 mg   Sun 2 mg 2 mg 2 mg   Mon 1 mg 1 mg 1 mg   Tue 2 mg 2 mg 2 mg   Wed 1 mg (8/10); Otherwise 2 mg 2 mg 2 mg   Thu 1 mg 1 mg 1 mg   Fri 2 mg 2 mg 2 mg   Sat 2 mg 2 mg 2 mg   Visit Report - - -   Some recent data might be hidden       Plan:  1. INR  is Therapeutic today- see above in Anticoagulation Summary.  Will instruct Rodolfo Grover to Continue their warfarin regimen- see above in Anticoagulation Summary.  2. Follow up in 2 weeks  3. Patient declines warfarin refills.  4. Verbal and written information provided. Patient expresses understanding and has no further questions at this time.    Luci Hale, [facility]

## 2022-09-21 ENCOUNTER — ANTICOAGULATION VISIT (OUTPATIENT)
Dept: PHARMACY | Facility: HOSPITAL | Age: 77
End: 2022-09-21

## 2022-09-21 DIAGNOSIS — I48.0 PAF (PAROXYSMAL ATRIAL FIBRILLATION): Primary | ICD-10-CM

## 2022-09-21 LAB
INR PPP: 2.9 (ref 0.91–1.09)
PROTHROMBIN TIME: 34.7 SECONDS (ref 10–13.8)

## 2022-09-21 PROCEDURE — 36416 COLLJ CAPILLARY BLOOD SPEC: CPT

## 2022-09-21 PROCEDURE — 85610 PROTHROMBIN TIME: CPT

## 2022-09-21 NOTE — PROGRESS NOTES
I have supervised and reviewed the notes, assessments, and/or procedures performed by our Pharmacy Intern. The documented assessment and plan were developed cooperatively, and the plan was implemented in my presence. I concur with the documentation of this patient encounter.    Luci Hale [facility]

## 2022-09-21 NOTE — PROGRESS NOTES
Anticoagulation Clinic Progress Note    Anticoagulation Summary  As of 2022    INR goal:  2.0-3.0   TTR:  55.6 % (1.4 y)   INR used for dosin.9 (2022)   Warfarin maintenance plan:  1 mg every Mon, Thu; 2 mg all other days   Weekly warfarin total:  12 mg   Plan last modified:  Eduar Weeks, PharmD (8/10/2022)   Next INR check:  10/19/2022   Priority:  High   Target end date:      Indications    PAF (paroxysmal atrial fibrillation) (HCC) [I48.0]             Anticoagulation Episode Summary     INR check location:      Preferred lab:      Send INR reminders to:  [facility]    Comments:        Anticoagulation Care Providers     Provider Role Specialty Phone number    Jimena Singer MD Referring Cardiology [phone]          Clinic Interview:  Patient Findings     Positives:  Extra doses    Negatives:  Signs/symptoms of thrombosis, Signs/symptoms of bleeding,   Laboratory test error suspected, Change in health, Change in alcohol use,   Change in activity, Upcoming invasive procedure, Emergency department   visit, Upcoming dental procedure, Missed doses, Change in medications,   Change in diet/appetite, Hospital admission, Bruising, Other complaints    Comments:  Reports may have taken a quarter tablet extra because of pill   splitting, no complaints otherwise.        Clinical Outcomes     Negatives:  Major bleeding event, Thromboembolic event,   Anticoagulation-related hospital admission, Anticoagulation-related ED   visit, Anticoagulation-related fatality    Comments:  Reports may have taken a quarter tablet extra because of pill   splitting, no complaints otherwise.          INR History:  Anticoagulation Monitoring 2022   INR 1.9 2.3 2.9   INR Date 2022   INR Goal 2.0-3.0 2.0-3.0 2.0-3.0   Trend Same Same Same   Last Week Total 10 mg 12 mg 12 mg   Next Week Total 12 mg 12 mg 12 mg   Sun 2 mg 2 mg 2 mg   Mon 1 mg 1 mg 1 mg   Tue 2 mg 2 mg  2 mg   Wed 2 mg 2 mg 2 mg   Thu 1 mg 1 mg 1 mg   Fri 2 mg 2 mg 2 mg   Sat 2 mg 2 mg 2 mg   Visit Report - - -   Some recent data might be hidden       Plan:  1. INR is Therapeutic today- see above in Anticoagulation Summary.  Will instruct Rodolfo Grover to Continue their warfarin regimen- see above in Anticoagulation Summary.  2. Follow up in 4 weeks  3. Patient declines warfarin refills.  4. Verbal and written information provided. Patient expresses understanding and has no further questions at this time.    Ra Rao, Pharmacy Intern

## 2022-09-22 RX ORDER — POTASSIUM CHLORIDE 750 MG/1
10 TABLET, FILM COATED, EXTENDED RELEASE ORAL DAILY PRN
Qty: 30 TABLET | Refills: 1 | Status: SHIPPED | OUTPATIENT
Start: 2022-09-22 | End: 2023-01-09

## 2022-09-22 RX ORDER — FUROSEMIDE 40 MG/1
40 TABLET ORAL DAILY PRN
Qty: 30 TABLET | Refills: 1 | Status: SHIPPED | OUTPATIENT
Start: 2022-09-22 | End: 2023-01-09

## 2022-09-22 NOTE — TELEPHONE ENCOUNTER
Rx Refill Note  Requested Prescriptions     Pending Prescriptions Disp Refills   • furosemide (Lasix) 40 MG tablet 30 tablet 1     Sig: Take 1 tablet by mouth Daily As Needed (for weight gain or shortness of breath).   • potassium chloride 10 MEQ CR tablet 30 tablet 1     Sig: Take 1 tablet by mouth Daily As Needed (Take with Lasix (furosemide)). With Lasix (furosemide)      Last office visit with prescribing clinician: 11/11/2021      Next office visit with prescribing clinician: 12/30/2022            Lyn Llanos LPN  09/22/22, 16:03 EDT

## 2022-09-22 NOTE — TELEPHONE ENCOUNTER
Caller: Reilly Rodolfo L    Relationship: Self    Best call back number: [phone]    Requested Prescriptions:   Requested Prescriptions     Pending Prescriptions Disp Refills   • furosemide (Lasix) 40 MG tablet 30 tablet 1     Sig: Take 1 tablet by mouth Daily As Needed (for weight gain or shortness of breath).   • potassium chloride 10 MEQ CR tablet 30 tablet 1     Sig: Take 1 tablet by mouth Daily As Needed (Take with Lasix (furosemide)). With Lasix (furosemide)        Pharmacy where request should be sent: [id number] [place] [address] [phone] [facility] [phone]      Additional details provided by patient: THE PATIENT IS RUNNING LOW ON THE ABOVE MEDICATIONS AND WILL NEED AUTHORIZATION FOR THEM TO BE FILLED.    Does the patient have less than a 3 day supply:  [x] Yes  [] No    Mely Middleton Rep   09/22/22 12:25 EDT

## 2022-10-19 ENCOUNTER — ANTICOAGULATION VISIT (OUTPATIENT)
Dept: PHARMACY | Facility: HOSPITAL | Age: 77
End: 2022-10-19

## 2022-10-19 DIAGNOSIS — I48.0 PAF (PAROXYSMAL ATRIAL FIBRILLATION): Primary | ICD-10-CM

## 2022-10-19 LAB
INR PPP: 2.6 (ref 0.91–1.09)
PROTHROMBIN TIME: 31.8 SECONDS (ref 10–13.8)

## 2022-10-19 PROCEDURE — 36416 COLLJ CAPILLARY BLOOD SPEC: CPT

## 2022-10-19 PROCEDURE — 85610 PROTHROMBIN TIME: CPT

## 2022-10-19 NOTE — PROGRESS NOTES
I have supervised and reviewed the notes, assessments, and/or procedures performed by our Pharmacy Intern. The documented assessment and plan were developed cooperatively, and  I concur with the documentation of this patient encounter.    Nelly Rich, PharmD

## 2022-10-19 NOTE — PROGRESS NOTES
Anticoagulation Clinic Progress Note    Anticoagulation Summary  As of 10/19/2022    INR goal:  2.0-3.0   TTR:  57.8 % (1.5 y)   INR used for dosin.6 (10/19/2022)   Warfarin maintenance plan:  1 mg every Mon, Thu; 2 mg all other days   Weekly warfarin total:  12 mg   No change documented:  Ra Rao, Pharmacy Intern   Plan last modified:  Eduar Weeks, PharmD (8/10/2022)   Next INR check:  2022   Priority:  High   Target end date:      Indications    PAF (paroxysmal atrial fibrillation) (HCC) [I48.0]             Anticoagulation Episode Summary     INR check location:      Preferred lab:      Send INR reminders to:   [facility]    Comments:        Anticoagulation Care Providers     Provider Role Specialty Phone number    Jimena Singer MD Referring Cardiology [phone]          Clinic Interview:  Patient Findings     Negatives:  Signs/symptoms of thrombosis, Signs/symptoms of bleeding,   Laboratory test error suspected, Change in health, Change in alcohol use,   Change in activity, Upcoming invasive procedure, Emergency department   visit, Upcoming dental procedure, Missed doses, Extra doses, Change in   medications, Change in diet/appetite, Hospital admission, Bruising, Other   complaints    Comments:  No upcoming procedures, no changes in meds/diet or missed   doses.      Clinical Outcomes     Negatives:  Major bleeding event, Thromboembolic event,   Anticoagulation-related hospital admission, Anticoagulation-related ED   visit, Anticoagulation-related fatality    Comments:  No upcoming procedures, no changes in meds/diet or missed   doses.        INR History:  Anticoagulation Monitoring 2022 2022 10/19/2022   INR 2.3 2.9 2.6   INR Date 2022 2022 10/19/2022   INR Goal 2.0-3.0 2.0-3.0 2.0-3.0   Trend Same Same Same   Last Week Total 12 mg 12 mg 12 mg   Next Week Total 12 mg 12 mg 12 mg   Sun 2 mg 2 mg 2 mg   Mon 1 mg 1 mg 1 mg   Tue 2 mg 2 mg 2 mg   Wed 2 mg 2  mg 2 mg   Thu 1 mg 1 mg 1 mg   Fri 2 mg 2 mg 2 mg   Sat 2 mg 2 mg 2 mg   Visit Report - - -   Some recent data might be hidden       Plan:  1. INR is Therapeutic today- see above in Anticoagulation Summary.  Will instruct Rodolfo Grover to Continue their warfarin regimen- see above in Anticoagulation Summary.  2. Follow up in 4 weeks  3. Patient declines warfarin refills.  4. Verbal and written information provided. Patient expresses understanding and has no further questions at this time.    Ra Rao, Pharmacy Intern

## 2022-11-15 ENCOUNTER — LAB (OUTPATIENT)
Dept: LAB | Facility: HOSPITAL | Age: 77
End: 2022-11-15

## 2022-11-15 ENCOUNTER — ANTICOAGULATION VISIT (OUTPATIENT)
Dept: PHARMACY | Facility: HOSPITAL | Age: 77
End: 2022-11-15

## 2022-11-15 DIAGNOSIS — I48.0 PAF (PAROXYSMAL ATRIAL FIBRILLATION): Primary | ICD-10-CM

## 2022-11-15 LAB
INR PPP: 4.1 (ref 0.91–1.09)
PROTHROMBIN TIME: 48.9 SECONDS (ref 10–13.8)

## 2022-11-15 PROCEDURE — G0463 HOSPITAL OUTPT CLINIC VISIT: HCPCS

## 2022-11-15 PROCEDURE — 85610 PROTHROMBIN TIME: CPT

## 2022-11-15 PROCEDURE — 36416 COLLJ CAPILLARY BLOOD SPEC: CPT

## 2022-11-15 NOTE — PROGRESS NOTES
I have supervised and reviewed the notes, assessments, and/or procedures performed by our  pharmacy resident . The documented assessment and plan were developed cooperatively, and the plan was not implemented in my presence. I concur with the documentation of this patient encounter.

## 2022-11-15 NOTE — PROGRESS NOTES
Anticoagulation Clinic Progress Note    Anticoagulation Summary  As of 11/15/2022    INR goal:  2.0-3.0   TTR:  56.4 % (1.6 y)   INR used for dosin.1 (11/15/2022)   Warfarin maintenance plan:  1 mg every Mon, Thu; 2 mg all other days; Starting 11/15/2022   Weekly warfarin total:  12 mg   Plan last modified:  Eduar Weeks, PharmD (8/10/2022)   Next INR check:  2022   Priority:  High   Target end date:      Indications    PAF (paroxysmal atrial fibrillation) (HCC) [I48.0]             Anticoagulation Episode Summary     INR check location:      Preferred lab:      Send INR reminders to:   [facility]    Comments:        Anticoagulation Care Providers     Provider Role Specialty Phone number    Jimena Singer MD Referring Cardiology [phone]          Clinic Interview:  Patient Findings     Positives:  Missed doses    Negatives:  Signs/symptoms of thrombosis, Signs/symptoms of bleeding,   Laboratory test error suspected, Change in health, Change in alcohol use,   Change in activity, Upcoming invasive procedure, Emergency department   visit, Upcoming dental procedure, Extra doses, Change in medications,   Change in diet/appetite, Hospital admission, Bruising, Other complaints    Comments:  pt missed dose of warfarin last week and took it the next   morning, no other changes, tiffany in 2 weeks        Clinical Outcomes     Negatives:  Major bleeding event, Thromboembolic event,   Anticoagulation-related hospital admission, Anticoagulation-related ED   visit, Anticoagulation-related fatality    Comments:  pt missed dose of warfarin last week and took it the next   morning, no other changes, tiffany in 2 weeks          INR History:  Anticoagulation Monitoring 2022 10/19/2022 11/15/2022   INR 2.9 2.6 4.1   INR Date 2022 10/19/2022 11/15/2022   INR Goal 2.0-3.0 2.0-3.0 2.0-3.0   Trend Same Same Same   Last Week Total 12 mg 12 mg 12 mg   Next Week Total 12 mg 12 mg 9 mg   Sun 2 mg 2 mg 2 mg    Mon 1 mg 1 mg 1 mg   Tue 2 mg 2 mg Hold (11/15); Otherwise 2 mg   Wed 2 mg 2 mg 1 mg (11/16); Otherwise 2 mg   Thu 1 mg 1 mg 1 mg   Fri 2 mg 2 mg 2 mg   Sat 2 mg 2 mg 2 mg   Visit Report - - -   Some recent data might be hidden       Plan:  1. INR is Supratherapeutic today- see above in Anticoagulation Summary.  Will instruct Rodolfo L Reilly to Change their warfarin regimen- see above in Anticoagulation Summary.  2. Follow up in 2 weeks  3. Patient declines warfarin refills.  4. Verbal and written information provided. Patient expresses understanding and has no further questions at this time.    Margaret Landon [facility]

## 2022-11-23 ENCOUNTER — OFFICE VISIT (OUTPATIENT)
Dept: GASTROENTEROLOGY | Facility: CLINIC | Age: 77
End: 2022-11-23

## 2022-11-23 VITALS
TEMPERATURE: 97.5 F | HEART RATE: 60 BPM | DIASTOLIC BLOOD PRESSURE: 64 MMHG | BODY MASS INDEX: 28.06 KG/M2 | WEIGHT: 178.8 LBS | SYSTOLIC BLOOD PRESSURE: 150 MMHG | HEIGHT: 67 IN | OXYGEN SATURATION: 98 %

## 2022-11-23 DIAGNOSIS — K63.5 POLYP OF COLON, UNSPECIFIED PART OF COLON, UNSPECIFIED TYPE: ICD-10-CM

## 2022-11-23 DIAGNOSIS — D64.9 ANEMIA, UNSPECIFIED TYPE: ICD-10-CM

## 2022-11-23 DIAGNOSIS — D50.0 IRON DEFICIENCY ANEMIA DUE TO CHRONIC BLOOD LOSS: Primary | ICD-10-CM

## 2022-11-23 DIAGNOSIS — K21.01 GASTROESOPHAGEAL REFLUX DISEASE WITH ESOPHAGITIS AND HEMORRHAGE: ICD-10-CM

## 2022-11-23 PROCEDURE — 99214 OFFICE O/P EST MOD 30 MIN: CPT | Performed by: INTERNAL MEDICINE

## 2022-11-23 NOTE — PROGRESS NOTES
Chief Complaint   Patient presents with   • Anemia       History of Present Illness:   77 y.o. male who last had an EGD and colonoscopy in 12/2021.  The EGD showed that the esophagitis had healed.  There was one small duodenal polyp that I biopsied.  Pathology from the EGD was unrevealing.  I recommended a repeat colonoscopy in 5 years.       I last saw him in  5/22:  Assessment:  1. H/o anemia  2.  History of grade D esophagitis noted on 2 of 2021 EGD.  The esophagitis had healed on the 12 of 2021 EGD. He is now on Omeprazole 20 mg/day.   2.  His last colonoscopy was in 12 of 2021.  I had recommended a repeat colonoscopy in 5 years.  3.  History of iron deficiency anemia. His stool is heme negative today.   4.  CRI - He sees Dr. Nagy (Nephrologist).  5. On warfarin for a fib      Recommendations:  1. CBC, CMP, Mg  2. F/u 6 mos.        He feels good. He takes Prilosec OTC 20 mg/day prn. Has heartburn occasionally. No dysphagia. No nausea or vomiting. No fevers, chills. Nodiarrhea or constipation - on metamucil. No rectal bleeding or melena. Weight stable.     Past Medical History:   Diagnosis Date   • Acute blood loss anemia    • Arthritis    • Atrial fibrillation (HCC)    • Bilateral lower extremity edema    • Bradycardia following surgery    • CAD (coronary artery disease)    • Chest discomfort    • Colon polyp    • Elevated troponin    • Focal motor seizure (HCC)    • Generalized tonic-clonic seizure (HCC)    • GERD (gastroesophageal reflux disease)    • Hypertension    • Iritis of right eye    • Ischemic cardiomyopathy    • Mitral valve insufficiency    • Myocardial infarction (HCC)    • Peptic ulceration    • Post-ictal state (HCC)    • Pulmonary hypertension (HCC)    • PVC (premature ventricular contraction)    • Rheumatic fever    • Rheumatic fever     as child   • S/P CABG x 7    • S/P mitral valve repair    • Severe obstructive sleep apnea    • Shoulder fracture, right        Past Surgical History:    Procedure Laterality Date   • CARDIAC CATHETERIZATION N/A 2/19/2021    Procedure: Coronary angiography;  Surgeon: Bry Nails MD;  Location: [facility] CATH INVASIVE LOCATION;  Service: Cardiovascular;  Laterality: N/A;   • CARDIAC CATHETERIZATION N/A 2/19/2021    Procedure: Left heart cath;  Surgeon: Bry Nails MD;  Location: [facility] CATH INVASIVE LOCATION;  Service: Cardiovascular;  Laterality: N/A;   • CARDIAC CATHETERIZATION N/A 2/19/2021    Procedure: Right Heart Cath;  Surgeon: Bry Nails MD;  Location: [facility] CATH INVASIVE LOCATION;  Service: Cardiovascular;  Laterality: N/A;   • CARDIAC CATHETERIZATION N/A 2/19/2021    Procedure: Left ventriculography;  Surgeon: Bry Nails MD;  Location: [facility] CATH INVASIVE LOCATION;  Service: Cardiovascular;  Laterality: N/A;   • COLONOSCOPY N/A 12/7/2021    Procedure: COLONOSCOPY to cecum with cold biopsy polypectomy;  Surgeon: Edgar Allen MD;  Location: [facility] ENDOSCOPY;  Service: Gastroenterology;  Laterality: N/A;  IRON DEFICIENCY ANEMIA, H/O COLON POLYPS  --   DIVERTICULOSIS, polyp, hemorrhoids   • CORONARY ARTERY BYPASS GRAFT N/A 2/22/2021    Procedure: BK, MIDLINE STERNOTOMY, CORONARY ARTERY BYPASS X 7 UTILIZING THE LEFT INTERNAL MAMMARY ARTERY AND THE RIGHT SAPHENOUS VEIN, MITRAL VALVE REPAIR, PRP;  Surgeon: Jr Shyam Echavarria MD;  Location: [facility] OR;  Service: Cardiothoracic;  Laterality: N/A;   • ENDOSCOPY N/A 8/16/2018    Erosive gastritis, diverticulosis   • ENDOSCOPY N/A 2/26/2021    Procedure: ESOPHAGOGASTRODUODENOSCOPY AT BEDSIDE WITH HOT SNARE POLYPECTOMY AND CLIP PLACEMENT X1;  Surgeon: Jono Laboy MD;  Location: [facility] ENDOSCOPY;  Service: Gastroenterology;  Laterality: N/A;  PRE-  GI BLEED  POST- GASTRITIS, ESOPHAGITIS, POLYP   • ENDOSCOPY N/A 12/7/2021    Procedure: ESOPHAGOGASTRODUODENOSCOPY with COLD  BIOPSIES;  Surgeon: Edgar Allen MD;  Location: [facility] ENDOSCOPY;  Service:  Gastroenterology;  Laterality: N/A;  IRON DEFICIENCY ANEMIA, H/O ULCERATIVE ESOPHAGITIS  --GASTRITIS, DUODENAL POLYP   • HERNIA REPAIR     • SHOULDER CLOSED REDUCTION Right 3/16/2018    Procedure: RIGHT SHOULDER CLOSED REDUCTION;  Surgeon: Kj Garcia MD;  Location: [facility] OR;  Service: Orthopedics   • TONSILLECTOMY     • TOTAL SHOULDER ARTHROPLASTY W/ DISTAL CLAVICLE EXCISION Right 3/22/2018    Procedure: Reverse Total Shoulder Arthroplsty;  Surgeon: Kj Garcia MD;  Location: [facility] OR;  Service: Orthopedics   • TRANSESOPHAGEAL ECHOCARDIOGRAM (BK) N/A 2/22/2021    Procedure: TRANSESOPHAGEAL ECHOCARDIOGRAM;  Surgeon: Jr Shyam Echavarria MD;  Location: [facility] OR;  Service: Cardiothoracic;  Laterality: N/A;         Current Outpatient Medications:   •  acetaminophen (TYLENOL) 500 MG tablet, Take 2 tablets by mouth 3 (Three) Times a Day. (Patient taking differently: Take 1,000 mg by mouth 3 (Three) Times a Day As Needed.), Disp: , Rfl:   •  aspirin 81 MG EC tablet, Take 1 tablet by mouth Daily., Disp: 90 tablet, Rfl: 1  •  atorvastatin (LIPITOR) 10 MG tablet, Take 1 tablet by mouth Daily., Disp: 30 tablet, Rfl: 11  •  Cholecalciferol 50 MCG (2000 UT) capsule, Take 1 capsule by mouth Daily., Disp: , Rfl:   •  fluticasone (CUTIVATE) 0.05 % cream, Apply 1 application topically to the appropriate area as directed 2 (Two) Times a Day. (Patient taking differently: Apply 1 application topically to the appropriate area as directed As Needed.), Disp: 30 g, Rfl: 0  •  furosemide (Lasix) 40 MG tablet, Take 1 tablet by mouth Daily As Needed (for weight gain or shortness of breath)., Disp: 30 tablet, Rfl: 1  •  metoprolol succinate XL (TOPROL-XL) 50 MG 24 hr tablet, Take 50 mg by mouth Daily., Disp: , Rfl:   •  nitroglycerin (NITROSTAT) 0.4 MG SL tablet, 1 under the tongue as needed for angina, may repeat q5mins for up three doses, Disp: 25 tablet, Rfl: 1  •  Omeprazole Magnesium (PRILOSEC OTC PO), Take  20 mg by mouth Daily., Disp: , Rfl:   •  pantoprazole (PROTONIX) 40 MG EC tablet, Take 1 tablet by mouth 2 (Two) Times a Day Before Meals. (Patient taking differently: Take 40 mg by mouth As Needed.), Disp: 180 tablet, Rfl: 1  •  potassium chloride 10 MEQ CR tablet, Take 1 tablet by mouth Daily As Needed (Take with Lasix (furosemide)). With Lasix (furosemide), Disp: 30 tablet, Rfl: 1  •  Psyllium (METAMUCIL FIBER PO), Take  by mouth As Needed., Disp: , Rfl:   •  sucralfate (CARAFATE) 1 g tablet, As Needed., Disp: , Rfl:   •  warfarin (COUMADIN) 2 MG tablet, TAKE ONE-HALF OF A TABLET BY MOUTH ON THURS AND TAKE ONE TABLET BY MOUTH ALL OTHER DAYS OR AS DIRECTED, Disp: 85 tablet, Rfl: 1    No Known Allergies    Family History   Problem Relation Age of Onset   • Stroke Mother    • Cancer Father 56        bladder   • Heart attack Father    • Malig Hyperthermia Neg Hx        Social History     Socioeconomic History   • Marital status:    Tobacco Use   • Smoking status: Never   • Smokeless tobacco: Never   Vaping Use   • Vaping Use: Never used   Substance and Sexual Activity   • Alcohol use: Yes     Comment: occasional   • Drug use: Never   • Sexual activity: Defer       Review of Systems   Gastrointestinal: Negative for abdominal pain.   All other systems reviewed and are negative.    Pertinent positives and negatives documented in the HPI and all other systems reviewed and were found to be negative.  Vitals:    11/23/22 1323   BP: 150/64   Pulse: 60   Temp: 97.5 °F (36.4 °C)   SpO2: 98%       Physical Exam  Vitals reviewed.   Constitutional:       General: He is not in acute distress.     Appearance: Normal appearance. He is well-developed. He is not diaphoretic.   HENT:      Head: Normocephalic and atraumatic. Hair is normal.      Right Ear: Hearing, tympanic membrane, ear canal and external ear normal.      Left Ear: Hearing, tympanic membrane, ear canal and external ear normal.      Nose: Nose normal. No nasal  deformity.      Mouth/Throat:      Mouth: Mucous membranes are moist. No oral lesions.      Pharynx: Uvula midline. No uvula swelling.   Eyes:      General: Lids are normal. No scleral icterus.        Right eye: No discharge.         Left eye: No discharge.      Extraocular Movements: Extraocular movements intact.      Right eye: Normal extraocular motion and no nystagmus.      Left eye: Normal extraocular motion and no nystagmus.      Conjunctiva/sclera: Conjunctivae normal.      Pupils: Pupils are equal, round, and reactive to light.   Neck:      Thyroid: No thyromegaly.      Vascular: No JVD.   Cardiovascular:      Rate and Rhythm: Normal rate and regular rhythm.      Pulses: Normal pulses.      Heart sounds: Normal heart sounds. No murmur heard.    No gallop.   Pulmonary:      Effort: Pulmonary effort is normal. No respiratory distress.      Breath sounds: Normal breath sounds. No wheezing or rales.   Chest:      Chest wall: No tenderness.   Abdominal:      General: Bowel sounds are normal. There is no distension.      Palpations: Abdomen is soft. There is no mass.      Tenderness: There is no abdominal tenderness. There is no guarding.      Hernia: No hernia is present.   Musculoskeletal:         General: No tenderness or deformity. Normal range of motion.      Cervical back: Normal range of motion and neck supple.   Lymphadenopathy:      Cervical: No cervical adenopathy.   Skin:     General: Skin is warm and dry.      Findings: No rash.   Neurological:      Mental Status: He is alert and oriented to person, place, and time.      Cranial Nerves: No cranial nerve deficit.      Motor: No abnormal muscle tone.      Coordination: Coordination normal.      Deep Tendon Reflexes: Reflexes are normal and symmetric. Reflexes normal.   Psychiatric:         Mood and Affect: Mood normal.         Behavior: Behavior normal.         Thought Content: Thought content normal.         Judgment: Judgment normal.         Diagnoses  and all orders for this visit:    1. Iron deficiency anemia due to chronic blood loss (Primary)  -     CBC & Differential  -     Ferritin  -     Folate RBC  -     Iron Profile  -     Vitamin B12  -     Hepatic Function Panel    2. Polyp of colon, unspecified part of colon, unspecified type  -     CBC & Differential  -     Ferritin  -     Folate RBC  -     Iron Profile  -     Vitamin B12  -     Hepatic Function Panel    3. Gastroesophageal reflux disease with esophagitis and hemorrhage  -     CBC & Differential  -     Ferritin  -     Folate RBC  -     Iron Profile  -     Vitamin B12  -     Hepatic Function Panel    4. Anemia, unspecified type  -     CBC & Differential  -     Ferritin  -     Folate RBC  -     Iron Profile  -     Vitamin B12  -     Hepatic Function Panel      Assessment:  1. H/o anemia  2.  History of grade D esophagitis noted on 2 of 2021 EGD.  The esophagitis had healed on the 12 of 2021 EGD. He is now on Omeprazole 20 mg/day.   2.  His last colonoscopy was in 12 of 2021.  I had recommended a repeat colonoscopy in 5 years.  3.  History of iron deficiency anemia.   4.  CRI - He sees Dr. Nagy (Nephrologist).  5. On warfarin for a fib   6.     Recommendations:  1. Cbc, anemia labs.   2. F/u prn    No follow-ups on file.    Edgar Allen MD  11/23/2022

## 2022-11-24 LAB
ALBUMIN SERPL-MCNC: 3.8 G/DL (ref 3.5–5.2)
ALP SERPL-CCNC: 94 U/L (ref 39–117)
ALT SERPL-CCNC: 12 U/L (ref 1–41)
AST SERPL-CCNC: 13 U/L (ref 1–40)
BASOPHILS # BLD AUTO: 0.05 10*3/MM3 (ref 0–0.2)
BASOPHILS NFR BLD AUTO: 0.6 % (ref 0–1.5)
BILIRUB DIRECT SERPL-MCNC: <0.2 MG/DL (ref 0–0.3)
BILIRUB SERPL-MCNC: 0.3 MG/DL (ref 0–1.2)
EOSINOPHIL # BLD AUTO: 0.29 10*3/MM3 (ref 0–0.4)
EOSINOPHIL NFR BLD AUTO: 3.7 % (ref 0.3–6.2)
ERYTHROCYTE [DISTWIDTH] IN BLOOD BY AUTOMATED COUNT: 11.6 % (ref 12.3–15.4)
FERRITIN SERPL-MCNC: 120 NG/ML (ref 30–400)
HCT VFR BLD AUTO: 35.3 % (ref 37.5–51)
HGB BLD-MCNC: 12 G/DL (ref 13–17.7)
IMM GRANULOCYTES # BLD AUTO: 0.11 10*3/MM3 (ref 0–0.05)
IMM GRANULOCYTES NFR BLD AUTO: 1.4 % (ref 0–0.5)
IRON SATN MFR SERPL: 16 % (ref 20–50)
IRON SERPL-MCNC: 65 MCG/DL (ref 59–158)
LYMPHOCYTES # BLD AUTO: 1.3 10*3/MM3 (ref 0.7–3.1)
LYMPHOCYTES NFR BLD AUTO: 16.4 % (ref 19.6–45.3)
MCH RBC QN AUTO: 32.2 PG (ref 26.6–33)
MCHC RBC AUTO-ENTMCNC: 34 G/DL (ref 31.5–35.7)
MCV RBC AUTO: 94.6 FL (ref 79–97)
MONOCYTES # BLD AUTO: 0.76 10*3/MM3 (ref 0.1–0.9)
MONOCYTES NFR BLD AUTO: 9.6 % (ref 5–12)
NEUTROPHILS # BLD AUTO: 5.43 10*3/MM3 (ref 1.7–7)
NEUTROPHILS NFR BLD AUTO: 68.3 % (ref 42.7–76)
NRBC BLD AUTO-RTO: 0 /100 WBC (ref 0–0.2)
PLATELET # BLD AUTO: 221 10*3/MM3 (ref 140–450)
PROT SERPL-MCNC: 6.8 G/DL (ref 6–8.5)
RBC # BLD AUTO: 3.73 10*6/MM3 (ref 4.14–5.8)
TIBC SERPL-MCNC: 404 MCG/DL
UIBC SERPL-MCNC: 339 MCG/DL (ref 112–346)
VIT B12 SERPL-MCNC: >2000 PG/ML (ref 211–946)
WBC # BLD AUTO: 7.94 10*3/MM3 (ref 3.4–10.8)

## 2022-11-25 LAB
FOLATE BLD-MCNC: 389 NG/ML
FOLATE RBC-MCNC: 1102 NG/ML

## 2022-11-28 ENCOUNTER — TELEPHONE (OUTPATIENT)
Dept: GASTROENTEROLOGY | Facility: CLINIC | Age: 77
End: 2022-11-28

## 2022-11-28 NOTE — TELEPHONE ENCOUNTER
See if he is taking iron. If he is not I would recommend he take every other day if he tolerates it ok. Take with food. Thanks.

## 2022-11-28 NOTE — PROGRESS NOTES
11/28/22       Tell him that his lab work shows that his hemoglobin is 12.0 which is better than it was in June 2022.  His other lab work looked good.  Please send a copy of this report to his PCP.  Lorenzo samson

## 2022-11-28 NOTE — TELEPHONE ENCOUNTER
----- Message from Edgar Allen MD sent at 11/28/2022 11:48 AM EST -----  11/28/22       Tell him that his lab work shows that his hemoglobin is 12.0 which is better than it was in June 2022.  His other lab work looked good.  Please send a copy of this report to his PCP.  Lorenzo samson

## 2022-11-30 ENCOUNTER — ANTICOAGULATION VISIT (OUTPATIENT)
Dept: PHARMACY | Facility: HOSPITAL | Age: 77
End: 2022-11-30

## 2022-11-30 DIAGNOSIS — I48.0 PAF (PAROXYSMAL ATRIAL FIBRILLATION): Primary | ICD-10-CM

## 2022-11-30 LAB
INR PPP: 3.1 (ref 0.91–1.09)
PROTHROMBIN TIME: 37.4 SECONDS (ref 10–13.8)

## 2022-11-30 PROCEDURE — 36416 COLLJ CAPILLARY BLOOD SPEC: CPT

## 2022-11-30 PROCEDURE — 85610 PROTHROMBIN TIME: CPT

## 2022-11-30 NOTE — PROGRESS NOTES
Anticoagulation Clinic Progress Note    Anticoagulation Summary  As of 11/30/2022    INR goal:  2.0-3.0   TTR:  55.0 % (1.6 y)   INR used for dosing:  3.1 (11/30/2022)   Warfarin maintenance plan:  1 mg every Mon, Thu; 2 mg all other days; Starting 11/30/2022   Weekly warfarin total:  12 mg   Plan last modified:  Eduar Weeks, PharmD (8/10/2022)   Next INR check:  12/14/2022   Priority:  High   Target end date:      Indications    PAF (paroxysmal atrial fibrillation) (HCC) [I48.0]             Anticoagulation Episode Summary     INR check location:      Preferred lab:      Send INR reminders to:   [facility]    Comments:        Anticoagulation Care Providers     Provider Role Specialty Phone number    Jimena Singer MD Referring Cardiology [phone]          Clinic Interview:  Patient Findings     Negatives:  Signs/symptoms of thrombosis, Signs/symptoms of bleeding,   Laboratory test error suspected, Change in health, Change in alcohol use,   Change in activity, Upcoming invasive procedure, Emergency department   visit, Upcoming dental procedure, Missed doses, Extra doses, Change in   medications, Change in diet/appetite, Hospital admission, Bruising, Other   complaints    Comments:  Has been eating cranberry sauce (forgot about the effect on   INR); not as much greens.       Clinical Outcomes     Negatives:  Major bleeding event, Thromboembolic event,   Anticoagulation-related hospital admission, Anticoagulation-related ED   visit, Anticoagulation-related fatality    Comments:  Has been eating cranberry sauce (forgot about the effect on   INR); not as much greens.         INR History:  Anticoagulation Monitoring 10/19/2022 11/15/2022 11/30/2022   INR 2.6 4.1 3.1   INR Date 10/19/2022 11/15/2022 11/30/2022   INR Goal 2.0-3.0 2.0-3.0 2.0-3.0   Trend Same Same Same   Last Week Total 12 mg 12 mg 12 mg   Next Week Total 12 mg 9 mg 11 mg   Sun 2 mg 2 mg 2 mg   Mon 1 mg 1 mg 1 mg   Tue 2 mg Hold (11/15);  Otherwise 2 mg 2 mg   Wed 2 mg 1 mg (11/16); Otherwise 2 mg 1 mg (11/30); Otherwise 2 mg   Thu 1 mg 1 mg 1 mg   Fri 2 mg 2 mg 2 mg   Sat 2 mg 2 mg 2 mg   Visit Report - - -   Some recent data might be hidden       Plan:  1. INR is Supratherapeutic today- see above in Anticoagulation Summary.  Will instruct Rodolfo Grover to Change their warfarin regimen- see above in Anticoagulation Summary.  2. Follow up in 2 weeks  3. Patient declines warfarin refills.  4. Verbal and written information provided. Patient expresses understanding and has no further questions at this time.    Nelly Rich, PharmD

## 2022-12-06 ENCOUNTER — TRANSCRIBE ORDERS (OUTPATIENT)
Dept: ADMINISTRATIVE | Facility: HOSPITAL | Age: 77
End: 2022-12-06

## 2022-12-06 DIAGNOSIS — N18.30 STAGE 3 CHRONIC KIDNEY DISEASE, UNSPECIFIED WHETHER STAGE 3A OR 3B CKD: Primary | ICD-10-CM

## 2022-12-14 ENCOUNTER — ANTICOAGULATION VISIT (OUTPATIENT)
Dept: PHARMACY | Facility: HOSPITAL | Age: 77
End: 2022-12-14

## 2022-12-14 DIAGNOSIS — I48.0 PAF (PAROXYSMAL ATRIAL FIBRILLATION): Primary | ICD-10-CM

## 2022-12-14 LAB
INR PPP: 2.6 (ref 0.91–1.09)
PROTHROMBIN TIME: 31.7 SECONDS (ref 10–13.8)

## 2022-12-14 PROCEDURE — 85610 PROTHROMBIN TIME: CPT

## 2022-12-14 PROCEDURE — 36416 COLLJ CAPILLARY BLOOD SPEC: CPT

## 2022-12-14 NOTE — PROGRESS NOTES
Anticoagulation Clinic Progress Note    Anticoagulation Summary  As of 2022    INR goal:  2.0-3.0   TTR:  55.6 % (1.7 y)   INR used for dosin.6 (2022)   Warfarin maintenance plan:  1 mg every Mon, Thu; 2 mg all other days; Starting 2022   Weekly warfarin total:  12 mg   No change documented:  Nelly Rich, PharmD   Plan last modified:  Eduar Weeks PharmD (8/10/2022)   Next INR check:  2023   Priority:  High   Target end date:      Indications    PAF (paroxysmal atrial fibrillation) (HCC) [I48.0]             Anticoagulation Episode Summary     INR check location:      Preferred lab:      Send INR reminders to:  [facility]    Comments:        Anticoagulation Care Providers     Provider Role Specialty Phone number    Jimena Singer MD Referring Cardiology [phone]          Clinic Interview:  Patient Findings     Negatives:  Signs/symptoms of thrombosis, Signs/symptoms of bleeding,   Laboratory test error suspected, Change in health, Change in alcohol use,   Change in activity, Upcoming invasive procedure, Emergency department   visit, Upcoming dental procedure, Missed doses, Extra doses, Change in   medications, Change in diet/appetite, Hospital admission, Bruising, Other   complaints      Clinical Outcomes     Negatives:  Major bleeding event, Thromboembolic event,   Anticoagulation-related hospital admission, Anticoagulation-related ED   visit, Anticoagulation-related fatality        INR History:  Anticoagulation Monitoring 11/15/2022 2022 2022   INR 4.1 3.1 2.6   INR Date 11/15/2022 2022 2022   INR Goal 2.0-3.0 2.0-3.0 2.0-3.0   Trend Same Same Same   Last Week Total 12 mg 12 mg 12 mg   Next Week Total 9 mg 11 mg 12 mg   Sun 2 mg 2 mg 2 mg   Mon 1 mg 1 mg 1 mg   Tue Hold (11/15); Otherwise 2 mg 2 mg 2 mg   Wed 1 mg (); Otherwise 2 mg 1 mg (); Otherwise 2 mg 2 mg   Thu 1 mg 1 mg 1 mg   Fri 2 mg 2 mg 2 mg   Sat 2 mg 2 mg 2 mg    Visit Report - - -   Some recent data might be hidden       Plan:  1. INR is Therapeutic today- see above in Anticoagulation Summary.  Will instruct Rodolfo Grover to Continue their warfarin regimen- see above in Anticoagulation Summary.  2. Follow up in 4 weeks  3. Patient declines warfarin refills.  4. Verbal and written information provided. Patient expresses understanding and has no further questions at this time.    Nelly Rich, PharmD

## 2022-12-22 ENCOUNTER — TELEPHONE (OUTPATIENT)
Dept: CARDIOLOGY | Facility: CLINIC | Age: 77
End: 2022-12-22

## 2022-12-22 NOTE — TELEPHONE ENCOUNTER
Pt called because he is stuck in [place] longer than expected due to the upcoming weather and doesn't have enough warfarin to get him through the rest of his trip out of town.    I spoke with Dr. Singer who gave us permission over the phone to refill 1 week's worth of medication for pt, who is currently at a pharmacy in [place].    Thank you,    Lorena Calle RN  Triage [facility]  12/22/22 14:57 EST     Statement Selected

## 2022-12-30 ENCOUNTER — OFFICE VISIT (OUTPATIENT)
Dept: FAMILY MEDICINE CLINIC | Facility: CLINIC | Age: 77
End: 2022-12-30
Payer: MEDICARE

## 2022-12-30 VITALS
TEMPERATURE: 97.1 F | OXYGEN SATURATION: 99 % | BODY MASS INDEX: 28.97 KG/M2 | WEIGHT: 184.6 LBS | RESPIRATION RATE: 14 BRPM | DIASTOLIC BLOOD PRESSURE: 60 MMHG | HEART RATE: 61 BPM | SYSTOLIC BLOOD PRESSURE: 128 MMHG | HEIGHT: 67 IN

## 2022-12-30 DIAGNOSIS — E78.00 PURE HYPERCHOLESTEROLEMIA: ICD-10-CM

## 2022-12-30 DIAGNOSIS — Z00.00 MEDICARE ANNUAL WELLNESS VISIT, SUBSEQUENT: Primary | ICD-10-CM

## 2022-12-30 PROBLEM — I50.32 CHRONIC DIASTOLIC HEART FAILURE (HCC): Status: ACTIVE | Noted: 2022-05-23

## 2022-12-30 PROCEDURE — G0439 PPPS, SUBSEQ VISIT: HCPCS | Performed by: FAMILY MEDICINE

## 2022-12-30 PROCEDURE — 1160F RVW MEDS BY RX/DR IN RCRD: CPT | Performed by: FAMILY MEDICINE

## 2022-12-30 PROCEDURE — 1170F FXNL STATUS ASSESSED: CPT | Performed by: FAMILY MEDICINE

## 2022-12-30 NOTE — PROGRESS NOTES
The ABCs of the Annual Wellness Visit  Subsequent Medicare Wellness Visit    Subjective      Rodolfo Grover is a 77 y.o. male who presents for a Subsequent Medicare Wellness Visit.    The following portions of the patient's history were reviewed and   updated as appropriate: allergies, current medications, past family history, past medical history, past social history, past surgical history and problem list.    Compared to one year ago, the patient feels his physical   health is the same.    Compared to one year ago, the patient feels his mental   health is the same.    Recent Hospitalizations:  He was not admitted to the hospital during the last year.       Current Medical Providers:  Patient Care Team:  Domenica Merida MD as PCP - General (Family Medicine)  Mitch Mendiola MD as Consulting Physician (Pulmonary Disease)  Edgar Allen MD as Consulting Physician (Gastroenterology)  Jimena Singer MD as Consulting Physician (Cardiology)  Domenica Merida MD as Referring Physician (Family Medicine)  Juan David Solis MD as Consulting Physician (Hematology and Oncology)    Outpatient Medications Prior to Visit   Medication Sig Dispense Refill   • acetaminophen (TYLENOL) 500 MG tablet Take 2 tablets by mouth 3 (Three) Times a Day. (Patient taking differently: Take 1,000 mg by mouth 3 (Three) Times a Day As Needed.)     • aspirin 81 MG EC tablet Take 1 tablet by mouth Daily. 90 tablet 1   • Cholecalciferol 50 MCG (2000 UT) capsule Take 1 capsule by mouth Daily.     • fluticasone (CUTIVATE) 0.05 % cream Apply 1 application topically to the appropriate area as directed 2 (Two) Times a Day. (Patient taking differently: Apply 1 application topically to the appropriate area as directed As Needed.) 30 g 0   • metoprolol succinate XL (TOPROL-XL) 50 MG 24 hr tablet Take 50 mg by mouth Daily.     • nitroglycerin (NITROSTAT) 0.4 MG SL tablet 1 under the tongue as needed for angina, may repeat q5mins for up three doses 25 tablet 1   •  Omeprazole Magnesium (PRILOSEC OTC PO) Take 20 mg by mouth Daily.     • pantoprazole (PROTONIX) 40 MG EC tablet Take 1 tablet by mouth 2 (Two) Times a Day Before Meals. (Patient taking differently: Take 40 mg by mouth As Needed.) 180 tablet 1   • Psyllium (METAMUCIL FIBER PO) Take  by mouth As Needed.     • sucralfate (CARAFATE) 1 g tablet As Needed.     • atorvastatin (LIPITOR) 10 MG tablet Take 1 tablet by mouth Daily. 30 tablet 11   • furosemide (Lasix) 40 MG tablet Take 1 tablet by mouth Daily As Needed (for weight gain or shortness of breath). 30 tablet 1   • potassium chloride 10 MEQ CR tablet Take 1 tablet by mouth Daily As Needed (Take with Lasix (furosemide)). With Lasix (furosemide) 30 tablet 1   • warfarin (COUMADIN) 2 MG tablet TAKE ONE-HALF OF A TABLET BY MOUTH ON THURS AND TAKE ONE TABLET BY MOUTH ALL OTHER DAYS OR AS DIRECTED 85 tablet 1     No facility-administered medications prior to visit.       No opioid medication identified on active medication list. I have reviewed chart for other potential  high risk medication/s and harmful drug interactions in the elderly.          Aspirin is on active medication list. Aspirin use is indicated based on review of current medical condition/s. Pros and cons of this therapy have been discussed today. Benefits of this medication outweigh potential harm.  Patient has been encouraged to continue taking this medication.  .      Patient Active Problem List   Diagnosis   • Acute blood loss anemia   • Fracture of fifth metacarpal bone of right hand   • Closed fracture dislocation of right shoulder   • Gastroesophageal reflux disease without esophagitis   • DNR (do not resuscitate)   • Bradycardia following surgery   • Frequent PVCs   • Proximal humerus fracture   • Osteoarthritis   • Severe obstructive sleep apnea   • Pure hypercholesterolemia   • Pulmonary hypertension (HCC)   • LARA (dyspnea on exertion)   • Localized edema   • Chest discomfort   • Elevated troponin  "  • Abnormal EKG   • Coronary artery disease involving native coronary artery of native heart without angina pectoris   • Focal motor seizure (HCC)   • Generalized tonic-clonic seizure (HCC)   • Post-ictal state (HCC)   • Coffee ground emesis   • S/P CABG (coronary artery bypass graft)   • Mitral regurgitation   • H/O mitral valve repair   • PAF (paroxysmal atrial fibrillation) (HCC)   • Ischemic cardiomyopathy   • Essential hypertension   • Non-ischemic cardiomyopathy (HCC)   • Iron deficiency anemia due to chronic blood loss   • Polyp of colon   • Macrocytic anemia   • Thrombocytopenia (HCC)   • CKD (chronic kidney disease)   • Chronic diastolic heart failure (HCC)     Advance Care Planning  Advance Directive is on file.  ACP discussion was held with the patient during this visit. Patient has an advance directive in EMR which is still valid.      Objective    Vitals:    12/30/22 1026   BP: 128/60   Pulse: 61   Resp: 14   Temp: 97.1 °F (36.2 °C)   TempSrc: Infrared   SpO2: 99%   Weight: 83.7 kg (184 lb 9.6 oz)   Height: 170.2 cm (67\")   PainSc: 0-No pain     Estimated body mass index is 28.91 kg/m² as calculated from the following:    Height as of this encounter: 170.2 cm (67\").    Weight as of this encounter: 83.7 kg (184 lb 9.6 oz).    BMI is >= 25 and <30. (Overweight) The following options were offered after discussion;: exercise counseling/recommendations and nutrition counseling/recommendations    Physical Exam  Vitals reviewed.   Constitutional:       General: He is not in acute distress.     Appearance: Normal appearance. He is not ill-appearing or toxic-appearing.   HENT:      Head: Normocephalic and atraumatic.      Right Ear: Tympanic membrane, ear canal and external ear normal. There is no impacted cerumen.      Left Ear: Tympanic membrane, ear canal and external ear normal. There is no impacted cerumen.      Nose: Nose normal.      Mouth/Throat:      Mouth: Mucous membranes are moist.      Pharynx: " Oropharynx is clear. No oropharyngeal exudate.   Eyes:      General: No scleral icterus.        Right eye: No discharge.         Left eye: No discharge.      Conjunctiva/sclera: Conjunctivae normal.   Neck:      Thyroid: No thyromegaly.      Vascular: No carotid bruit.   Cardiovascular:      Rate and Rhythm: Normal rate and regular rhythm.      Heart sounds: Normal heart sounds. No murmur heard.    No friction rub. No gallop.   Pulmonary:      Effort: Pulmonary effort is normal. No respiratory distress.      Breath sounds: Normal breath sounds. No wheezing or rales.   Chest:      Chest wall: No tenderness.   Abdominal:      General: Bowel sounds are normal. There is no distension.      Palpations: Abdomen is soft. There is no mass.      Tenderness: There is no abdominal tenderness. There is no guarding.   Musculoskeletal:         General: No swelling or deformity.      Cervical back: Neck supple.      Right lower leg: No edema.      Left lower leg: No edema.   Lymphadenopathy:      Cervical: No cervical adenopathy.   Skin:     Coloration: Skin is not jaundiced or pale.   Neurological:      Mental Status: He is alert and oriented to person, place, and time.      Motor: No abnormal muscle tone.      Coordination: Coordination normal.   Psychiatric:         Mood and Affect: Mood normal.         Behavior: Behavior normal.         Does the patient have evidence of cognitive impairment?   No    Lab Results   Component Value Date    CHLPL 196 01/04/2023    TRIG 78 01/04/2023    HDL 63 (H) 01/04/2023     (H) 01/04/2023    VLDL 14 01/04/2023          HEALTH RISK ASSESSMENT    Smoking Status:  Social History     Tobacco Use   Smoking Status Never   Smokeless Tobacco Never     Alcohol Consumption:  Social History     Substance and Sexual Activity   Alcohol Use Yes    Comment: occasional     Fall Risk Screen:    [facility] Fall Risk Assessment has not been completed.    Depression Screening:  PHQ-2/PHQ-9 Depression Screening  12/30/2022   Little Interest or Pleasure in Doing Things 0-->not at all   Feeling Down, Depressed or Hopeless 0-->not at all   PHQ-9: Brief Depression Severity Measure Score 0       Health Habits and Functional and Cognitive Screening:  Functional & Cognitive Status 12/30/2022   Do you have difficulty preparing food and eating? No   Do you have difficulty bathing yourself, getting dressed or grooming yourself? No   Do you have difficulty using the toilet? No   Do you have difficulty moving around from place to place? No   Do you have trouble with steps or getting out of a bed or a chair? No   Current Diet Other        Current Diet Comment pt states low sodium   Dental Exam Up to date        Dental Exam Comment -   Eye Exam Up to date   Exercise (times per week) 4 times per week   Current Exercises Include Walking   Current Exercise Activities Include -   Do you need help using the phone?  No   Are you deaf or do you have serious difficulty hearing?  No   Do you need help with transportation? No   Do you need help shopping? No   Do you need help preparing meals?  No   Do you need help with housework?  No   Do you need help with laundry? No   Do you need help taking your medications? No   Do you need help managing money? No   Do you ever drive or ride in a car without wearing a seat belt? No   Have you felt unusual stress, anger or loneliness in the last month? Yes   Who do you live with? Alone   If you need help, do you have trouble finding someone available to you? No   Have you been bothered in the last four weeks by sexual problems? No   Do you have difficulty concentrating, remembering or making decisions? No       Age-appropriate Screening Schedule:  Refer to the list below for future screening recommendations based on patient's age, sex and/or medical conditions. Orders for these recommended tests are listed in the plan section. The patient has been provided with a written plan.    Health Maintenance   Topic  Date Due   • ZOSTER VACCINE (2 of 3) 01/25/2012   • INFLUENZA VACCINE  08/01/2022   • LIPID PANEL  01/04/2024   • TDAP/TD VACCINES (2 - Td or Tdap) 01/18/2026                CMS Preventative Services Quick Reference  Risk Factors Identified During Encounter:    Immunizations Discussed/Encouraged: Influenza and COVID19    The above risks/problems have been discussed with the patient.  Pertinent information has been shared with the patient in the After Visit Summary.    Diagnoses and all orders for this visit:    1. Medicare annual wellness visit, subsequent (Primary)    2. Pure hypercholesterolemia  -     Lipid Panel; Future        Follow Up:   Next Medicare Wellness visit to be scheduled in 1 year.      An After Visit Summary and PPPS were made available to the patient.    Discussed vaccines. He does not want to do at this time. Concerned about the downsides of vaccinations. encouraged him to review the [facility] website of the FAQs for pts.     Going to check in on lipid panel when due next interval. Started back on statin. He should be on statin related to his significant hear history.

## 2023-01-04 ENCOUNTER — HOSPITAL ENCOUNTER (OUTPATIENT)
Dept: ULTRASOUND IMAGING | Facility: HOSPITAL | Age: 78
Discharge: HOME | End: 2023-01-04
Admitting: INTERNAL MEDICINE
Payer: MEDICARE

## 2023-01-04 DIAGNOSIS — N18.30 STAGE 3 CHRONIC KIDNEY DISEASE, UNSPECIFIED WHETHER STAGE 3A OR 3B CKD: ICD-10-CM

## 2023-01-04 PROCEDURE — 76775 US EXAM ABDO BACK WALL LIM: CPT

## 2023-01-05 ENCOUNTER — LAB (OUTPATIENT)
Dept: LAB | Facility: HOSPITAL | Age: 78
End: 2023-01-05
Payer: MEDICARE

## 2023-01-05 ENCOUNTER — OFFICE VISIT (OUTPATIENT)
Dept: ONCOLOGY | Facility: CLINIC | Age: 78
End: 2023-01-05
Payer: MEDICARE

## 2023-01-05 VITALS
SYSTOLIC BLOOD PRESSURE: 144 MMHG | OXYGEN SATURATION: 98 % | RESPIRATION RATE: 18 BRPM | BODY MASS INDEX: 28.58 KG/M2 | TEMPERATURE: 97.7 F | DIASTOLIC BLOOD PRESSURE: 69 MMHG | HEIGHT: 67 IN | HEART RATE: 71 BPM | WEIGHT: 182.1 LBS

## 2023-01-05 DIAGNOSIS — N18.32 STAGE 3B CHRONIC KIDNEY DISEASE: ICD-10-CM

## 2023-01-05 DIAGNOSIS — N18.32 STAGE 3B CHRONIC KIDNEY DISEASE: Primary | ICD-10-CM

## 2023-01-05 DIAGNOSIS — D53.9 MACROCYTIC ANEMIA: ICD-10-CM

## 2023-01-05 DIAGNOSIS — D69.6 THROMBOCYTOPENIA: ICD-10-CM

## 2023-01-05 LAB
BASOPHILS # BLD AUTO: 0.05 10*3/MM3 (ref 0–0.2)
BASOPHILS NFR BLD AUTO: 0.6 % (ref 0–1.5)
DEPRECATED RDW RBC AUTO: 48.6 FL (ref 37–54)
EOSINOPHIL # BLD AUTO: 0.29 10*3/MM3 (ref 0–0.4)
EOSINOPHIL NFR BLD AUTO: 3.4 % (ref 0.3–6.2)
ERYTHROCYTE [DISTWIDTH] IN BLOOD BY AUTOMATED COUNT: 13.2 % (ref 12.3–15.4)
HCT VFR BLD AUTO: 36.3 % (ref 37.5–51)
HGB BLD-MCNC: 11.9 G/DL (ref 13–17.7)
IMM GRANULOCYTES # BLD AUTO: 0.19 10*3/MM3 (ref 0–0.05)
IMM GRANULOCYTES NFR BLD AUTO: 2.3 % (ref 0–0.5)
LYMPHOCYTES # BLD AUTO: 1.36 10*3/MM3 (ref 0.7–3.1)
LYMPHOCYTES NFR BLD AUTO: 16.2 % (ref 19.6–45.3)
MCH RBC QN AUTO: 32.7 PG (ref 26.6–33)
MCHC RBC AUTO-ENTMCNC: 32.8 G/DL (ref 31.5–35.7)
MCV RBC AUTO: 99.7 FL (ref 79–97)
MONOCYTES # BLD AUTO: 0.76 10*3/MM3 (ref 0.1–0.9)
MONOCYTES NFR BLD AUTO: 9 % (ref 5–12)
NEUTROPHILS NFR BLD AUTO: 5.77 10*3/MM3 (ref 1.7–7)
NEUTROPHILS NFR BLD AUTO: 68.5 % (ref 42.7–76)
NRBC BLD AUTO-RTO: 0 /100 WBC (ref 0–0.2)
PLATELET # BLD AUTO: 174 10*3/MM3 (ref 140–450)
PMV BLD AUTO: 10.4 FL (ref 6–12)
RBC # BLD AUTO: 3.64 10*6/MM3 (ref 4.14–5.8)
WBC NRBC COR # BLD: 8.42 10*3/MM3 (ref 3.4–10.8)

## 2023-01-05 PROCEDURE — 85025 COMPLETE CBC W/AUTO DIFF WBC: CPT

## 2023-01-05 PROCEDURE — 36415 COLL VENOUS BLD VENIPUNCTURE: CPT

## 2023-01-05 PROCEDURE — 99213 OFFICE O/P EST LOW 20 MIN: CPT | Performed by: INTERNAL MEDICINE

## 2023-01-05 NOTE — PROGRESS NOTES
Subjective     REASON FOR CONSULTATION:  anemia  Provide an opinion on any further workup or treatment                             REQUESTING PHYSICIAN:  [facility]    RECORDS OBTAINED:  Records of the patients history including those obtained from the referring provider were reviewed and summarized in detail.    HISTORY OF PRESENT ILLNESS:  The patient is a 77 y.o. year old male who is here for an opinion about the above issue.    History of Present Illness   This is a pleasant 77-year-old man with several medical comorbidities including coronary artery disease, hyperlipidemia, hypertension, paroxysmal atrial fibrillation anticoagulated with Coumadin, history of mitral valve repair, CKD 3, obstructive sleep apnea, pulmonary hypertension referred from his primary care provider for assessment of anemia.    Reviewing the patient's lab data available, he has had a somewhat chronic fluctuating anemia typically macrocytic since at least 2018.  His hemoglobin typically runs in the 10-12 range although he did drop lower in February 2021 when he was admitted for coffee-ground emesis secondary to gastritis and an ST elevation MI.  His hemoglobin over the past 1 year has been fairly stable in the 11-12 range with microcytic indices.  His white blood cell count and differential is normal other than mild increase immature granulocytes.  The platelets are typically normal although today mildly low 136.  He had a recent B12/folate level drawn 6/16/2022 both of which were normal range.  Iron studies in January 2022 were normal with a ferritin 139 and iron saturation 24%.  Most recent serum creatinine on 5/25/2022 was 1.74.  He had a normal total protein 6.2 and bilirubin less than 0.2.    The patient is only required transfusion support during previous GI bleed secondary to esophagitis.  He has required no follow-up transfusion support.  He has had recent EGD/colonoscopy and followed by Dr. Leticia RODRIGUEZ.  He does not see blood in the  stool or melanotic stool.    Patient does not have much in the way of symptoms such as shortness of breath with exertion, dizziness or lightheadedness.  He does have vertigo which does not seem related to anemia.    The patient returned today for follow-up and lab review.  Recent iron sat was low and his PCP started him on iron every other day which causes some constipation.  He denies lightheadedness, worsening shortness of breath, bleeding or bruising.    Past Medical History:   Diagnosis Date   • Acute blood loss anemia    • Arthritis    • Atrial fibrillation (HCC)    • Bilateral lower extremity edema    • Bradycardia following surgery    • CAD (coronary artery disease)    • Chest discomfort    • Colon polyp    • Elevated troponin    • Focal motor seizure (HCC)    • Generalized tonic-clonic seizure (HCC)    • GERD (gastroesophageal reflux disease)    • Hypertension    • Iritis of right eye    • Ischemic cardiomyopathy    • Mitral valve insufficiency    • Myocardial infarction (HCC)    • Peptic ulceration    • Post-ictal state (HCC)    • Pulmonary hypertension (HCC)    • PVC (premature ventricular contraction)    • Rheumatic fever    • Rheumatic fever     as child   • S/P CABG x 7    • S/P mitral valve repair    • Severe obstructive sleep apnea    • Shoulder fracture, right         Past Surgical History:   Procedure Laterality Date   • CARDIAC CATHETERIZATION N/A 2/19/2021    Procedure: Coronary angiography;  Surgeon: Bry Nails MD;  Location: [facility] INVASIVE LOCATION;  Service: Cardiovascular;  Laterality: N/A;   • CARDIAC CATHETERIZATION N/A 2/19/2021    Procedure: Left heart cath;  Surgeon: Bry Nails MD;  Location: [facility] CATH INVASIVE LOCATION;  Service: Cardiovascular;  Laterality: N/A;   • CARDIAC CATHETERIZATION N/A 2/19/2021    Procedure: Right Heart Cath;  Surgeon: Bry Nails MD;  Location: [facility] CATH INVASIVE LOCATION;  Service: Cardiovascular;  Laterality: N/A;    • CARDIAC CATHETERIZATION N/A 2/19/2021    Procedure: Left ventriculography;  Surgeon: Bry Nails MD;  Location: [facility] CATH INVASIVE LOCATION;  Service: Cardiovascular;  Laterality: N/A;   • COLONOSCOPY N/A 12/7/2021    Procedure: COLONOSCOPY to cecum with cold biopsy polypectomy;  Surgeon: Edgar Allen MD;  Location: [facility] ENDOSCOPY;  Service: Gastroenterology;  Laterality: N/A;  IRON DEFICIENCY ANEMIA, H/O COLON POLYPS  --   DIVERTICULOSIS, polyp, hemorrhoids   • CORONARY ARTERY BYPASS GRAFT N/A 2/22/2021    Procedure: BK, MIDLINE STERNOTOMY, CORONARY ARTERY BYPASS X 7 UTILIZING THE LEFT INTERNAL MAMMARY ARTERY AND THE RIGHT SAPHENOUS VEIN, MITRAL VALVE REPAIR, PRP;  Surgeon: Jr Shyam Echavarria MD;  Location: [facility] OR;  Service: Cardiothoracic;  Laterality: N/A;   • ENDOSCOPY N/A 8/16/2018    Erosive gastritis, diverticulosis   • ENDOSCOPY N/A 2/26/2021    Procedure: ESOPHAGOGASTRODUODENOSCOPY AT BEDSIDE WITH HOT SNARE POLYPECTOMY AND CLIP PLACEMENT X1;  Surgeon: Jono Laboy MD;  Location: [facility] ENDOSCOPY;  Service: Gastroenterology;  Laterality: N/A;  PRE-  GI BLEED  POST- GASTRITIS, ESOPHAGITIS, POLYP   • ENDOSCOPY N/A 12/7/2021    Procedure: ESOPHAGOGASTRODUODENOSCOPY with COLD  BIOPSIES;  Surgeon: Edgar Allen MD;  Location: [facility] ENDOSCOPY;  Service: Gastroenterology;  Laterality: N/A;  IRON DEFICIENCY ANEMIA, H/O ULCERATIVE ESOPHAGITIS  --GASTRITIS, DUODENAL POLYP   • HERNIA REPAIR     • SHOULDER CLOSED REDUCTION Right 3/16/2018    Procedure: RIGHT SHOULDER CLOSED REDUCTION;  Surgeon: Kj Garcia MD;  Location: [facility] OR;  Service: Orthopedics   • TONSILLECTOMY     • TOTAL SHOULDER ARTHROPLASTY W/ DISTAL CLAVICLE EXCISION Right 3/22/2018    Procedure: Reverse Total Shoulder Arthroplsty;  Surgeon: Kj Garcia MD;  Location: [facility] MAIN OR;  Service: Orthopedics   • TRANSESOPHAGEAL ECHOCARDIOGRAM (BK) N/A 2/22/2021    Procedure: TRANSESOPHAGEAL  ECHOCARDIOGRAM;  Surgeon: Jr Shyam Echavarria MD;  Location: [facility] OR;  Service: Cardiothoracic;  Laterality: N/A;        Current Outpatient Medications on File Prior to Visit   Medication Sig Dispense Refill   • acetaminophen (TYLENOL) 500 MG tablet Take 2 tablets by mouth 3 (Three) Times a Day. (Patient taking differently: Take 1,000 mg by mouth 3 (Three) Times a Day As Needed.)     • aspirin 81 MG EC tablet Take 1 tablet by mouth Daily. 90 tablet 1   • atorvastatin (LIPITOR) 10 MG tablet Take 1 tablet by mouth Daily. 30 tablet 11   • Cholecalciferol 50 MCG (2000 UT) capsule Take 1 capsule by mouth Daily.     • fluticasone (CUTIVATE) 0.05 % cream Apply 1 application topically to the appropriate area as directed 2 (Two) Times a Day. (Patient taking differently: Apply 1 application topically to the appropriate area as directed As Needed.) 30 g 0   • furosemide (Lasix) 40 MG tablet Take 1 tablet by mouth Daily As Needed (for weight gain or shortness of breath). 30 tablet 1   • metoprolol succinate XL (TOPROL-XL) 50 MG 24 hr tablet Take 50 mg by mouth Daily.     • nitroglycerin (NITROSTAT) 0.4 MG SL tablet 1 under the tongue as needed for angina, may repeat q5mins for up three doses 25 tablet 1   • Omeprazole Magnesium (PRILOSEC OTC PO) Take 20 mg by mouth Daily.     • pantoprazole (PROTONIX) 40 MG EC tablet Take 1 tablet by mouth 2 (Two) Times a Day Before Meals. (Patient taking differently: Take 40 mg by mouth As Needed.) 180 tablet 1   • potassium chloride 10 MEQ CR tablet Take 1 tablet by mouth Daily As Needed (Take with Lasix (furosemide)). With Lasix (furosemide) 30 tablet 1   • Psyllium (METAMUCIL FIBER PO) Take  by mouth As Needed.     • sucralfate (CARAFATE) 1 g tablet As Needed.     • warfarin (COUMADIN) 2 MG tablet TAKE ONE-HALF OF A TABLET BY MOUTH ON THURS AND TAKE ONE TABLET BY MOUTH ALL OTHER DAYS OR AS DIRECTED 85 tablet 1     No current facility-administered medications on file prior to visit.         ALLERGIES:  No Known Allergies     Social History     Socioeconomic History   • Marital status:    Tobacco Use   • Smoking status: Never   • Smokeless tobacco: Never   Vaping Use   • Vaping Use: Never used   Substance and Sexual Activity   • Alcohol use: Yes     Comment: occasional   • Drug use: Never   • Sexual activity: Defer        Family History   Problem Relation Age of Onset   • Stroke Mother    • Cancer Father 56        bladder   • Heart attack Father    • Malig Hyperthermia Neg Hx         Review of Systems   Constitutional: Negative.    HENT: Negative.    Eyes: Negative.    Respiratory: Negative.    Cardiovascular: Negative.    Gastrointestinal: Negative.    Musculoskeletal: Negative.    Allergic/Immunologic: Negative.    Neurological: Positive for dizziness.   Hematological: Negative.    Psychiatric/Behavioral: Negative.    ROS unchanged-1/5/2023    Objective     Vitals:    01/05/23 1043   BP: 144/69   Pulse: 71   Resp: 18   Temp: 97.7 °F (36.5 °C)   TempSrc: Temporal   SpO2: 98%   Weight: 82.6 kg (182 lb 1.6 oz)   Height: 170.2 cm (67.01\")   PainSc: 0-No pain     Current Status 1/5/2023   ECOG score 0       Physical Exam    CONSTITUTIONAL: pleasant well-developed adult man  HEENT: no icterus, no thrush, moist membranes  LYMPH: no cervical or supraclavicular lad  CV: RRR, S1S2, no murmur  RESP: cta bilat, no wheezing, no rales  GI: soft, non-tender, no splenomegaly, +bs  MUSC: no edema, normal gait  NEURO: alert and oriented x3, normal strength  PSYCH: normal mood and affect  Exam is unchanged-1/5/2023  RECENT LABS:  Hematology WBC   Date Value Ref Range Status   01/05/2023 8.42 3.40 - 10.80 10*3/mm3 Final   11/23/2022 7.94 3.40 - 10.80 10*3/mm3 Final     RBC   Date Value Ref Range Status   01/05/2023 3.64 (L) 4.14 - 5.80 10*6/mm3 Final   11/23/2022 3.73 (L) 4.14 - 5.80 10*6/mm3 Final     Hemoglobin   Date Value Ref Range Status   01/05/2023 11.9 (L) 13.0 - 17.7 g/dL Final     Hematocrit    Date Value Ref Range Status   01/05/2023 36.3 (L) 37.5 - 51.0 % Final     Platelets   Date Value Ref Range Status   01/05/2023 174 140 - 450 10*3/mm3 Final        Lab Results   Component Value Date    GLUCOSE 99 05/25/2022    BUN 31 (H) 05/25/2022    CREATININE 1.74 (H) 05/25/2022    EGFRIFNONA 40 (L) 01/24/2022    EGFRIFAFRI 41 (L) 10/21/2021    BCR 18 05/25/2022    K 4.2 05/25/2022    CO2 24 05/25/2022    CALCIUM 9.0 05/25/2022    PROTENTOTREF 6.8 11/23/2022    ALBUMIN 3.80 11/23/2022    LABIL2 1.2 06/30/2022    AST 13 11/23/2022    ALT 12 11/23/2022         Assessment & Plan     *Chronic macrocytic anemia with hemoglobin currently 11.9  ·  B12/folate previously normal  · Recent iron sat mildly low-currently taking oral iron every other day  · Negative SPEP/SANIA and hemolysis labs June 2022    *Mild thrombocytopenia-platelets normal today 174  · Platelet count adequate for anticoagulation  · Previous IPF normal June 2022    *CKD 3B- followed by nephrology    *Atrial fibrillation, paroxysmal on chronic anticoagulation with Coumadin    Hematology plan/recommendations:  1. The patient's anemia is probably partially in part to his chronic kidney disease with decreased erythropoietin production although oftentimes this is a microcytic anemia  2. Continue oral iron every other day  3. 6-month follow-up CBC ferritin iron profile B12 level  4. Discussed with the patient possibility of an underlying bone marrow disorder such as MDS although chronicity/stability of his hemoglobin would argue against this diagnosis but still on the differential; I do not feel his cytopenias are severe enough to warrant a bone marrow exam at this time

## 2023-01-09 RX ORDER — FUROSEMIDE 40 MG/1
TABLET ORAL
Qty: 30 TABLET | Refills: 0 | Status: SHIPPED | OUTPATIENT
Start: 2023-01-09

## 2023-01-09 RX ORDER — POTASSIUM CHLORIDE 750 MG/1
TABLET, FILM COATED, EXTENDED RELEASE ORAL
Qty: 30 TABLET | Refills: 0 | Status: SHIPPED | OUTPATIENT
Start: 2023-01-09

## 2023-01-09 RX ORDER — WARFARIN SODIUM 2 MG/1
TABLET ORAL
Qty: 85 TABLET | Refills: 1 | Status: SHIPPED | OUTPATIENT
Start: 2023-01-09 | End: 2023-01-11 | Stop reason: SDUPTHER

## 2023-01-09 RX ORDER — ATORVASTATIN CALCIUM 10 MG/1
10 TABLET, FILM COATED ORAL DAILY
Qty: 30 TABLET | Refills: 11 | OUTPATIENT
Start: 2023-01-09

## 2023-01-11 ENCOUNTER — ANTICOAGULATION VISIT (OUTPATIENT)
Dept: PHARMACY | Facility: HOSPITAL | Age: 78
End: 2023-01-11
Payer: MEDICARE

## 2023-01-11 DIAGNOSIS — I48.0 PAF (PAROXYSMAL ATRIAL FIBRILLATION): Primary | ICD-10-CM

## 2023-01-11 LAB
INR PPP: 2.5 (ref 0.91–1.09)
PROTHROMBIN TIME: 29.7 SECONDS (ref 10–13.8)

## 2023-01-11 PROCEDURE — 36416 COLLJ CAPILLARY BLOOD SPEC: CPT

## 2023-01-11 PROCEDURE — 85610 PROTHROMBIN TIME: CPT

## 2023-01-11 RX ORDER — WARFARIN SODIUM 2 MG/1
TABLET ORAL
Qty: 80 TABLET | Refills: 1 | Status: SHIPPED | OUTPATIENT
Start: 2023-01-11

## 2023-01-11 NOTE — PROGRESS NOTES
Anticoagulation Clinic Progress Note    Anticoagulation Summary  As of 2023    INR goal:  2.0-3.0   TTR:  57.5 % (1.7 y)   INR used for dosin.5 (2023)   Warfarin maintenance plan:  1 mg every Mon, Thu; 2 mg all other days; Starting 2023   Weekly warfarin total:  12 mg   No change documented:  Myra Candelaria, Pharmacy Intern   Plan last modified:  Eduar Weeks, PharmD (8/10/2022)   Next INR check:  2023   Priority:  High   Target end date:      Indications    PAF (paroxysmal atrial fibrillation) (HCC) [I48.0]             Anticoagulation Episode Summary     INR check location:      Preferred lab:      Send INR reminders to:   [facility]    Comments:        Anticoagulation Care Providers     Provider Role Specialty Phone number    Jimena Singer MD Referring Cardiology [phone]          Clinic Interview:  Patient Findings     Negatives:  Signs/symptoms of thrombosis, Signs/symptoms of bleeding,   Laboratory test error suspected, Change in health, Change in alcohol use,   Change in activity, Upcoming invasive procedure, Emergency department   visit, Upcoming dental procedure, Missed doses, Extra doses, Change in   medications, Change in diet/appetite, Hospital admission, Bruising, Other   complaints      Clinical Outcomes     Negatives:  Major bleeding event, Thromboembolic event,   Anticoagulation-related hospital admission, Anticoagulation-related ED   visit, Anticoagulation-related fatality        INR History:  Anticoagulation Monitoring 2022   INR 3.1 2.6 2.5   INR Date 2022   INR Goal 2.0-3.0 2.0-3.0 2.0-3.0   Trend Same Same Same   Last Week Total 12 mg 12 mg 12 mg   Next Week Total 11 mg 12 mg 12 mg   Sun 2 mg 2 mg 2 mg   Mon 1 mg 1 mg 1 mg   Tue 2 mg 2 mg 2 mg   Wed 1 mg (); Otherwise 2 mg 2 mg 2 mg   Thu 1 mg 1 mg 1 mg   Fri 2 mg 2 mg 2 mg   Sat 2 mg 2 mg 2 mg   Visit Report - - -   Some recent data might be  hidden       Plan:  1. INR is Therapeutic today- see above in Anticoagulation Summary.  Will instruct Rodolfo Grover to Continue their warfarin regimen- see above in Anticoagulation Summary.  2. Follow up in 4 weeks  3. Patient desires warfarin refills.  4. Verbal and written information provided. Patient expresses understanding and has no further questions at this time.    Myra Candelaria, Pharmacy Intern

## 2023-01-11 NOTE — PROGRESS NOTES
I have supervised and reviewed the notes, assessments, and/or procedures performed by our Pharmacy Intern. The documented assessment and plan were developed cooperatively. I concur with the documentation of this patient encounter.    Eduar Weeks, PharmD

## 2023-01-18 RX ORDER — ATORVASTATIN CALCIUM 10 MG/1
10 TABLET, FILM COATED ORAL DAILY
Qty: 30 TABLET | Refills: 11 | Status: SHIPPED | OUTPATIENT
Start: 2023-01-18

## 2023-02-08 ENCOUNTER — ANTICOAGULATION VISIT (OUTPATIENT)
Dept: PHARMACY | Facility: HOSPITAL | Age: 78
End: 2023-02-08
Payer: MEDICARE

## 2023-02-08 DIAGNOSIS — I48.0 PAF (PAROXYSMAL ATRIAL FIBRILLATION): Primary | ICD-10-CM

## 2023-02-08 LAB
INR PPP: 1.5 (ref 0.91–1.09)
PROTHROMBIN TIME: 17.5 SECONDS (ref 10–13.8)

## 2023-02-08 PROCEDURE — 85610 PROTHROMBIN TIME: CPT

## 2023-02-08 PROCEDURE — G0463 HOSPITAL OUTPT CLINIC VISIT: HCPCS

## 2023-02-08 PROCEDURE — 36416 COLLJ CAPILLARY BLOOD SPEC: CPT

## 2023-02-08 NOTE — PROGRESS NOTES
Anticoagulation Clinic Progress Note    Anticoagulation Summary  As of 2023    INR goal:  2.0-3.0   TTR:  57.2 % (1.8 y)   INR used for dosin.5 (2023)   Warfarin maintenance plan:  1 mg every Mon, Thu; 2 mg all other days; Starting 2023   Weekly warfarin total:  12 mg   Plan last modified:  Eduar Weeks, PharmD (8/10/2022)   Next INR check:  2023   Priority:  High   Target end date:      Indications    PAF (paroxysmal atrial fibrillation) (HCC) [I48.0]             Anticoagulation Episode Summary     INR check location:      Preferred lab:      Send INR reminders to:   [facility]    Comments:        Anticoagulation Care Providers     Provider Role Specialty Phone number    Jimena Singer MD Referring Cardiology [phone]          Clinic Interview:  Patient Findings     Positives:  Missed doses, Extra doses    Negatives:  Signs/symptoms of thrombosis, Signs/symptoms of bleeding,   Laboratory test error suspected, Change in health, Change in alcohol use,   Change in activity, Upcoming invasive procedure, Emergency department   visit, Upcoming dental procedure, Change in medications, Change in   diet/appetite, Hospital admission, Bruising, Other complaints    Comments:  Patient reports missing dose of warfarin on Wednesday, .   The following day, the patient took 2 mg instead of 1 mg to compensate for   this missed dose.       Clinical Outcomes     Negatives:  Major bleeding event, Thromboembolic event,   Anticoagulation-related hospital admission, Anticoagulation-related ED   visit, Anticoagulation-related fatality    Comments:  Patient reports missing dose of warfarin on Wednesday, .   The following day, the patient took 2 mg instead of 1 mg to compensate for   this missed dose.         INR History:  Anticoagulation Monitoring 2022   INR 2.6 2.5 1.5   INR Date 2022   INR Goal 2.0-3.0 2.0-3.0 2.0-3.0   Trend Same Same Same    Last Week Total 12 mg 12 mg 11 mg   Next Week Total 12 mg 12 mg 13 mg   Sun 2 mg 2 mg 2 mg   Mon 1 mg 1 mg 1 mg   Tue 2 mg 2 mg 2 mg   Wed 2 mg 2 mg 3 mg (2/8); Otherwise 2 mg   Thu 1 mg 1 mg 1 mg   Fri 2 mg 2 mg 2 mg   Sat 2 mg 2 mg 2 mg   Visit Report - - -   Some recent data might be hidden       Plan:  1. INR is Subtherapeutic today- see above in Anticoagulation Summary.  Will instruct Rodolfo Grover to take 3 mg on 2/8, then continue their warfarin regimen- see above in Anticoagulation Summary.  2. Follow up in 2 weeks  3. Patient declines warfarin refills.  4. Verbal and written information provided. Patient expresses understanding and has no further questions at this time.    Chele Simental [facility]

## 2023-02-22 ENCOUNTER — ANTICOAGULATION VISIT (OUTPATIENT)
Dept: PHARMACY | Facility: HOSPITAL | Age: 78
End: 2023-02-22
Payer: MEDICARE

## 2023-02-22 DIAGNOSIS — I48.0 PAF (PAROXYSMAL ATRIAL FIBRILLATION): Primary | ICD-10-CM

## 2023-02-22 LAB
INR PPP: 2.3 (ref 0.91–1.09)
PROTHROMBIN TIME: 27.4 SECONDS (ref 10–13.8)

## 2023-02-22 PROCEDURE — 85610 PROTHROMBIN TIME: CPT

## 2023-02-22 PROCEDURE — 36416 COLLJ CAPILLARY BLOOD SPEC: CPT

## 2023-02-22 NOTE — PROGRESS NOTES
Anticoagulation Clinic Progress Note    Anticoagulation Summary  As of 2023    INR goal:  2.0-3.0   TTR:  56.8 % (1.9 y)   INR used for dosin.3 (2023)   Warfarin maintenance plan:  1 mg every Mon, Thu; 2 mg all other days; Starting 2023   Weekly warfarin total:  12 mg   No change documented:  Jolynn De Anda, Pharmacy Technician   Plan last modified:  Eduar Weeks, PharmD (8/10/2022)   Next INR check:  3/22/2023   Priority:  High   Target end date:      Indications    PAF (paroxysmal atrial fibrillation) (HCC) [I48.0]             Anticoagulation Episode Summary     INR check location:      Preferred lab:      Send INR reminders to:   [facility]    Comments:        Anticoagulation Care Providers     Provider Role Specialty Phone number    Jimena Singer MD Referring Cardiology [phone]          Clinic Interview:  Patient Findings     Negatives:  Signs/symptoms of thrombosis, Signs/symptoms of bleeding,   Laboratory test error suspected, Change in health, Change in alcohol use,   Change in activity, Upcoming invasive procedure, Emergency department   visit, Upcoming dental procedure, Missed doses, Extra doses, Change in   medications, Change in diet/appetite, Hospital admission, Bruising, Other   complaints      Clinical Outcomes     Negatives:  Major bleeding event, Thromboembolic event,   Anticoagulation-related hospital admission, Anticoagulation-related ED   visit, Anticoagulation-related fatality        INR History:  Anticoagulation Monitoring 2023   INR 2.5 1.5 2.3   INR Date 2023   INR Goal 2.0-3.0 2.0-3.0 2.0-3.0   Trend Same Same Same   Last Week Total 12 mg 11 mg 12 mg   Next Week Total 12 mg 13 mg 12 mg   Sun 2 mg 2 mg 2 mg   Mon 1 mg 1 mg 1 mg   Tue 2 mg 2 mg 2 mg   Wed 2 mg 3 mg (); Otherwise 2 mg 2 mg   Thu 1 mg 1 mg 1 mg   Fri 2 mg 2 mg 2 mg   Sat 2 mg 2 mg 2 mg   Visit Report - - -   Some recent data might be  hidden       Plan:  1. INR is therapeutic today- see above in Anticoagulation Summary.   Will instruct Rodolfo Grover to continue their warfarin regimen- see above in Anticoagulation Summary.  2. Follow up in 4 weeks.  3. Patient declines warfarin refills.  4. Verbal and written information provided. Patient expresses understanding and has no further questions at this time.    Jolynn De Anda, Pharmacy Technician

## 2023-03-03 ENCOUNTER — TELEPHONE (OUTPATIENT)
Dept: CARDIOLOGY | Facility: CLINIC | Age: 78
End: 2023-03-03

## 2023-03-03 ENCOUNTER — OFFICE VISIT (OUTPATIENT)
Dept: CARDIOLOGY | Facility: CLINIC | Age: 78
End: 2023-03-03
Payer: MEDICARE

## 2023-03-03 VITALS
DIASTOLIC BLOOD PRESSURE: 68 MMHG | WEIGHT: 179 LBS | BODY MASS INDEX: 28.09 KG/M2 | SYSTOLIC BLOOD PRESSURE: 112 MMHG | HEART RATE: 68 BPM | HEIGHT: 67 IN

## 2023-03-03 DIAGNOSIS — I10 ESSENTIAL HYPERTENSION: ICD-10-CM

## 2023-03-03 DIAGNOSIS — I44.0 1ST DEGREE AV BLOCK: ICD-10-CM

## 2023-03-03 DIAGNOSIS — I25.10 CORONARY ARTERY DISEASE INVOLVING NATIVE CORONARY ARTERY OF NATIVE HEART WITHOUT ANGINA PECTORIS: Primary | ICD-10-CM

## 2023-03-03 DIAGNOSIS — I44.0 FIRST DEGREE HEART BLOCK: ICD-10-CM

## 2023-03-03 DIAGNOSIS — I27.20 PULMONARY HYPERTENSION: ICD-10-CM

## 2023-03-03 DIAGNOSIS — E78.00 PURE HYPERCHOLESTEROLEMIA: ICD-10-CM

## 2023-03-03 DIAGNOSIS — I42.8 NON-ISCHEMIC CARDIOMYOPATHY: ICD-10-CM

## 2023-03-03 DIAGNOSIS — I50.32 CHRONIC DIASTOLIC HEART FAILURE: ICD-10-CM

## 2023-03-03 DIAGNOSIS — I97.89 BRADYCARDIA FOLLOWING SURGERY: ICD-10-CM

## 2023-03-03 DIAGNOSIS — I48.0 PAF (PAROXYSMAL ATRIAL FIBRILLATION): Primary | ICD-10-CM

## 2023-03-03 PROCEDURE — 1160F RVW MEDS BY RX/DR IN RCRD: CPT | Performed by: INTERNAL MEDICINE

## 2023-03-03 PROCEDURE — 3078F DIAST BP <80 MM HG: CPT | Performed by: INTERNAL MEDICINE

## 2023-03-03 PROCEDURE — 1159F MED LIST DOCD IN RCRD: CPT | Performed by: INTERNAL MEDICINE

## 2023-03-03 PROCEDURE — 99214 OFFICE O/P EST MOD 30 MIN: CPT | Performed by: INTERNAL MEDICINE

## 2023-03-03 PROCEDURE — 93000 ELECTROCARDIOGRAM COMPLETE: CPT | Performed by: INTERNAL MEDICINE

## 2023-03-03 PROCEDURE — 3074F SYST BP LT 130 MM HG: CPT | Performed by: INTERNAL MEDICINE

## 2023-03-03 RX ORDER — METOPROLOL SUCCINATE 25 MG/1
25 TABLET, EXTENDED RELEASE ORAL DAILY
Qty: 90 TABLET | Refills: 3 | Status: SHIPPED | OUTPATIENT
Start: 2023-03-03

## 2023-03-03 NOTE — PROGRESS NOTES
Date of Office Visit: 2023  Encounter Provider: Jimena Singer MD  Place of Service: [facility] CARDIOLOGY  Patient Name: Rodolfo Grover  :1945    Chief complaint  Pulmonary hypertension, coronary artery disease, atrial fibrillation, valvular heart disease    History of Present Illness  Patient is a 78-year-old gentleman with history of rheumatic fever, GE reflux disease who was seen at [facility] in 2018 after humeral fracture following a fall.  At that time he was noted to have ventricular bigeminy with normal potassium and magnesium levels.  He was hypertensive at the time.  He had an echocardiogram that showed normal systolic function with normal diastolic function, mild left atrial enlargement saline study was indeterminate.  There was aortic valve sclerosis with mild aortic regurgitation and trivial mitral and tricuspid regurgitation with mild pulmonary hypertension with an RV systolic pressure 42 mmHg.  A stress perfusion study was negative for ischemia.  Follow-up echocardiogram in 2020 showed an ejection fraction of 55% with normal diastolic function, mild aortic mitral valve regurgitation, mild to moderate tricuspid regurgitation with an RV systolic pressure increased further to 48 mmHg.  He presented in 2020 with complaints of chest discomfort and non-STEMI.  Echo showed new wall motion abnormalities as well as pulmonary hypertension( RVSP 61 mmHg).  Subsequent cardiac catheterization showed multivessel coronary artery disease.  Subsequently underwent CABG x7 vessels with mitral valve repair.  Postoperative course was complicated by hemoptysis, esophageal esophagitis, atrial fibrillation associated also with intermittent complete heart block.  He was also felt to have had a seizure and was started on Keppra.  He also had acute renal insufficiency.  He also had induration and possible cellulitis of his right thigh leg wound.  Echocardiogram  9/2021 showed ejection fraction 52% with mild left ventricle hypertrophy, diastolic dysfunction present mild aortic valve regurgitation noted trivial mitral valve regurgitation with mitral valve repair.  Mild tricuspid regurgitation with an RV systolic pressure 40 mmHg.    Since last visit he is walking a mile daily. He denies any chest pain shortness of breath syncope near syncope he has had edema treated with Lasix over the past 2 weeks he is also followed by nephrology.  He has had chronic intermittent vertigo for the past 30 years this is unchanged    Blood Dr Patton 1/2022 includes hemoglobin 11.9.  INR slightly low on February 8.  Creatinine on 11/2022 of 1.64 with GFR 42    Past Medical History:   Diagnosis Date   • Acute blood loss anemia    • Arthritis    • Atrial fibrillation (HCC)    • Bilateral lower extremity edema    • Bradycardia following surgery    • CAD (coronary artery disease)    • Chest discomfort    • Colon polyp    • Elevated troponin    • Focal motor seizure (HCC)    • Generalized tonic-clonic seizure (HCC)    • GERD (gastroesophageal reflux disease)    • Hypertension    • Iritis of right eye    • Ischemic cardiomyopathy    • Mitral valve insufficiency    • Myocardial infarction (HCC)    • Peptic ulceration    • Post-ictal state (HCC)    • Pulmonary hypertension (HCC)    • PVC (premature ventricular contraction)    • Rheumatic fever    • Rheumatic fever     as child   • S/P CABG x 7    • S/P mitral valve repair    • Severe obstructive sleep apnea    • Shoulder fracture, right      Past Surgical History:   Procedure Laterality Date   • CARDIAC CATHETERIZATION N/A 2/19/2021    Procedure: Coronary angiography;  Surgeon: Bry Nails MD;  Location: [facility] CATH INVASIVE LOCATION;  Service: Cardiovascular;  Laterality: N/A;   • CARDIAC CATHETERIZATION N/A 2/19/2021    Procedure: Left heart cath;  Surgeon: Bry Nails MD;  Location: [facility] CATH INVASIVE LOCATION;  Service:  Cardiovascular;  Laterality: N/A;   • CARDIAC CATHETERIZATION N/A 2/19/2021    Procedure: Right Heart Cath;  Surgeon: Bry Nails MD;  Location: [facility] CATH INVASIVE LOCATION;  Service: Cardiovascular;  Laterality: N/A;   • CARDIAC CATHETERIZATION N/A 2/19/2021    Procedure: Left ventriculography;  Surgeon: Bry Nails MD;  Location: [facility] CATH INVASIVE LOCATION;  Service: Cardiovascular;  Laterality: N/A;   • COLONOSCOPY N/A 12/7/2021    Procedure: COLONOSCOPY to cecum with cold biopsy polypectomy;  Surgeon: Edgar Allen MD;  Location: [facility] ENDOSCOPY;  Service: Gastroenterology;  Laterality: N/A;  IRON DEFICIENCY ANEMIA, H/O COLON POLYPS  --   DIVERTICULOSIS, polyp, hemorrhoids   • CORONARY ARTERY BYPASS GRAFT N/A 2/22/2021    Procedure: BK, MIDLINE STERNOTOMY, CORONARY ARTERY BYPASS X 7 UTILIZING THE LEFT INTERNAL MAMMARY ARTERY AND THE RIGHT SAPHENOUS VEIN, MITRAL VALVE REPAIR, PRP;  Surgeon: Jr Shyam Echavarria MD;  Location: [facility] OR;  Service: Cardiothoracic;  Laterality: N/A;   • ENDOSCOPY N/A 8/16/2018    Erosive gastritis, diverticulosis   • ENDOSCOPY N/A 2/26/2021    Procedure: ESOPHAGOGASTRODUODENOSCOPY AT BEDSIDE WITH HOT SNARE POLYPECTOMY AND CLIP PLACEMENT X1;  Surgeon: Jono Laboy MD;  Location: [facility] ENDOSCOPY;  Service: Gastroenterology;  Laterality: N/A;  PRE-  GI BLEED  POST- GASTRITIS, ESOPHAGITIS, POLYP   • ENDOSCOPY N/A 12/7/2021    Procedure: ESOPHAGOGASTRODUODENOSCOPY with COLD  BIOPSIES;  Surgeon: Edgar Allen MD;  Location: [facility] ENDOSCOPY;  Service: Gastroenterology;  Laterality: N/A;  IRON DEFICIENCY ANEMIA, H/O ULCERATIVE ESOPHAGITIS  --GASTRITIS, DUODENAL POLYP   • HERNIA REPAIR     • SHOULDER CLOSED REDUCTION Right 3/16/2018    Procedure: RIGHT SHOULDER CLOSED REDUCTION;  Surgeon: Kj Garcia MD;  Location: [facility] MAIN OR;  Service: Orthopedics   • TONSILLECTOMY     • TOTAL SHOULDER ARTHROPLASTY W/ DISTAL CLAVICLE EXCISION Right  3/22/2018    Procedure: Reverse Total Shoulder Arthroplsty;  Surgeon: Kj Garcia MD;  Location: [facility] MAIN OR;  Service: Orthopedics   • TRANSESOPHAGEAL ECHOCARDIOGRAM (BK) N/A 2/22/2021    Procedure: TRANSESOPHAGEAL ECHOCARDIOGRAM;  Surgeon: Jr Shyam Echavarria MD;  Location: [facility] MAIN OR;  Service: Cardiothoracic;  Laterality: N/A;     Outpatient Medications Prior to Visit   Medication Sig Dispense Refill   • acetaminophen (TYLENOL) 500 MG tablet Take 2 tablets by mouth 3 (Three) Times a Day. (Patient taking differently: Take 2 tablets by mouth 3 (Three) Times a Day As Needed.)     • aspirin 81 MG EC tablet Take 1 tablet by mouth Daily. 90 tablet 1   • atorvastatin (LIPITOR) 10 MG tablet Take 1 tablet by mouth Daily. 30 tablet 11   • Cholecalciferol 50 MCG (2000 UT) capsule Take 1 capsule by mouth Daily.     • Ferrous Gluconate (IRON 27 PO) Take 27 mg by mouth 3 (Three) Times a Week.     • fluticasone (CUTIVATE) 0.05 % cream Apply 1 application topically to the appropriate area as directed 2 (Two) Times a Day. (Patient taking differently: Apply 1 application topically to the appropriate area as directed As Needed.) 30 g 0   • furosemide (LASIX) 40 MG tablet TAKE 1 TABLET BY MOUTH ONCE DAILY AS NEEDED (FOR WEIGHT GAIN, OR SHORTNESS OF BREATH) 30 tablet 0   • nitroglycerin (NITROSTAT) 0.4 MG SL tablet 1 under the tongue as needed for angina, may repeat q5mins for up three doses 25 tablet 1   • Omeprazole Magnesium (PRILOSEC OTC PO) Take 20 mg by mouth Daily.     • pantoprazole (PROTONIX) 40 MG EC tablet Take 1 tablet by mouth 2 (Two) Times a Day Before Meals. (Patient taking differently: Take 1 tablet by mouth As Needed.) 180 tablet 1   • potassium chloride 10 MEQ CR tablet TAKE 1 TABLET BY MOUTH ONCE DAILY AS NEEDED (TAKE WITH LASIX/FUROSEMIDE) 30 tablet 0   • Psyllium (METAMUCIL FIBER PO) Take  by mouth As Needed.     • sucralfate (CARAFATE) 1 g tablet As Needed.     • warfarin (COUMADIN) 2 MG tablet  "Take one-half tablet (1 mg) by mouth Mon and Thurs, and take one tablet (2 mg) by mouth all other days or as directed. 80 tablet 1   • metoprolol succinate XL (TOPROL-XL) 50 MG 24 hr tablet Take 1 tablet by mouth Daily.       No facility-administered medications prior to visit.       Allergies as of 03/03/2023   • (No Known Allergies)     Social History     Socioeconomic History   • Marital status:    Tobacco Use   • Smoking status: Never   • Smokeless tobacco: Never   Vaping Use   • Vaping Use: Never used   Substance and Sexual Activity   • Alcohol use: Yes     Comment: occasional   • Drug use: Never   • Sexual activity: Defer     Family History   Problem Relation Age of Onset   • Stroke Mother    • Cancer Father 56        bladder   • Heart attack Father    • Malig Hyperthermia Neg Hx      Review of Systems   Constitutional: Negative for chills, fever, weight gain and weight loss.   Cardiovascular: Negative for leg swelling.   Respiratory: Negative for cough, snoring and wheezing.    Hematologic/Lymphatic: Negative for bleeding problem. Does not bruise/bleed easily.   Skin: Negative for color change.   Musculoskeletal: Negative for falls, joint pain and myalgias.   Gastrointestinal: Negative for melena.   Genitourinary: Negative for hematuria.   Neurological: Negative for excessive daytime sleepiness.   Psychiatric/Behavioral: Negative for depression. The patient is not nervous/anxious.         Objective:     Vitals:    03/03/23 0854   BP: 112/68   Pulse: 68   Weight: 81.2 kg (179 lb)   Height: 170.2 cm (67\")     Body mass index is 28.04 kg/m².    Vitals reviewed.   Constitutional:       Appearance: Well-developed.   Eyes:      General: No scleral icterus.        Right eye: No discharge.      Conjunctiva/sclera: Conjunctivae normal.      Pupils: Pupils are equal, round, and reactive to light.   HENT:      Head: Normocephalic.      Nose: Nose normal.   Neck:      Thyroid: No thyromegaly.      Vascular: No " JVD.   Pulmonary:      Effort: Pulmonary effort is normal. No respiratory distress.      Breath sounds: Normal breath sounds. No wheezing. No rales.   Cardiovascular:      Normal rate. Regular rhythm. Normal S1. Normal S2.      Murmurs: There is no murmur.      No gallop.   Pulses:     Intact distal pulses.   Edema:     Peripheral edema absent.   Abdominal:      General: Bowel sounds are normal. There is no distension.      Palpations: Abdomen is soft.      Tenderness: There is no abdominal tenderness. There is no rebound.   Musculoskeletal: Normal range of motion.         General: No tenderness.      Cervical back: Normal range of motion and neck supple. Skin:     General: Skin is warm and dry.      Findings: No erythema or rash.   Neurological:      Mental Status: Alert and oriented to person, place, and time.   Psychiatric:         Behavior: Behavior normal.         Thought Content: Thought content normal.         Judgment: Judgment normal.       Lab Review:     ECG 12 Lead    Date/Time: 3/3/2023 9:06 AM  Performed by: Jimena Singer MD  Authorized by: Jimena Singer MD   Comparison: compared with previous ECG   Similar to previous ECG  Rhythm: sinus rhythm  Conduction comments: Marked first-degree AV block is present  Other findings: non-specific ST-T wave changes and left ventricular hypertrophy    Clinical impression: abnormal EKG          Assessment:       Diagnosis Plan   1. Coronary artery disease involving native coronary artery of native heart without angina pectoris  ECG 12 Lead      2. First degree heart block  ECG 12 Lead      3. Pure hypercholesterolemia        4. Essential hypertension        5. Non-ischemic cardiomyopathy (HCC)        6. Chronic diastolic heart failure (HCC)        7. Pulmonary hypertension (HCC)          Plan:       1.  Recurrent postoperative atrial fibrillation with history of bradycardia postoperatively.  EKG shows sinus rhythm with prolonged ME with first-degree AV block.  Patient  had previously been instructed to decrease metoprolol but it does not look like he did so that there is some confusion as to exactly how much metoprolol he is taking.  It is listed as taking 50 mg but is not sure if he is really taking 50 or 25.  He will call once he gets home and rechecks his prescriptions.  2.  History of non-STEMI with subsequent multivessel CABG with cardiomyopathy 2/2021.  Clinically doing well.  Continue risk factor modification follow-up in 1 year.  3.  Status post mitral valve repair, as above.  He is aware of and does follow SBE prophylaxis.  4.  Postoperative hematemesis with recent EGD showing grade D esophagitis.  No recurrent problems.  5.  Chronic renal insufficiency.  Renal labs stable.  6.  Superficial vein thrombosis, clinically seems improved by description.  7.  Pulmonary hypertension, improved and mild on echo 9/2021  8.  Severe obstructive sleep apnea, on CPAP  9.  Dyslipidemia.  He is not scheduled to see Dr. Merida till next year and would like follow-up of his lipids.  We will check a lipid panel to be done in 1 week.    Addendum: Patient called back and states he is actually not taking any metoprolol is not taking it for many months.  We will therefore have him come in for a Holter next week.      Time Spent: I spent 35 minutes caring for Rodolfo on this date of service. This time includes time spent by me in the following activities: preparing for the visit, reviewing tests, obtaining and/or reviewing a separately obtained history, performing a medically appropriate examination and/or evaluation, counseling and educating the patient/family/caregiver, ordering medications, tests, or procedures, documenting information in the medical record and independently interpreting results and communicating that information with the patient/family/caregiver.   I spent 1 minutes on the separately reported service of ECG. This time is not included in the time used to support the E/M service  also reported today.        Your medication list          Accurate as of March 3, 2023 11:59 PM. If you have any questions, ask your nurse or doctor.            CHANGE how you take these medications      Instructions Last Dose Given Next Dose Due   acetaminophen 500 MG tablet  Commonly known as: TYLENOL  What changed:   · when to take this  · reasons to take this      Take 2 tablets by mouth 3 (Three) Times a Day.       fluticasone 0.05 % cream  Commonly known as: CUTIVATE  What changed:   · when to take this  · reasons to take this      Apply 1 application topically to the appropriate area as directed 2 (Two) Times a Day.       metoprolol succinate XL 25 MG 24 hr tablet  Commonly known as: TOPROL-XL  What changed:   · medication strength  · how much to take  Changed by: Jimena Singer MD      Take 1 tablet by mouth Daily.       pantoprazole 40 MG EC tablet  Commonly known as: PROTONIX  What changed:   · when to take this  · reasons to take this      Take 1 tablet by mouth 2 (Two) Times a Day Before Meals.          CONTINUE taking these medications      Instructions Last Dose Given Next Dose Due   aspirin 81 MG EC tablet      Take 1 tablet by mouth Daily.       atorvastatin 10 MG tablet  Commonly known as: LIPITOR      Take 1 tablet by mouth Daily.       Cholecalciferol 50 MCG (2000 UT) capsule      Take 1 capsule by mouth Daily.       furosemide 40 MG tablet  Commonly known as: LASIX      TAKE 1 TABLET BY MOUTH ONCE DAILY AS NEEDED (FOR WEIGHT GAIN, OR SHORTNESS OF BREATH)       IRON 27 PO      Take 27 mg by mouth 3 (Three) Times a Week.       METAMUCIL FIBER PO      Take  by mouth As Needed.       nitroglycerin 0.4 MG SL tablet  Commonly known as: NITROSTAT      1 under the tongue as needed for angina, may repeat q5mins for up three doses       potassium chloride 10 MEQ CR tablet      TAKE 1 TABLET BY MOUTH ONCE DAILY AS NEEDED (TAKE WITH LASIX/FUROSEMIDE)       PRILOSEC OTC PO      Take 20 mg by mouth Daily.        sucralfate 1 g tablet  Commonly known as: CARAFATE      As Needed.       warfarin 2 MG tablet  Commonly known as: COUMADIN      Take one-half tablet (1 mg) by mouth Mon and Thurs, and take one tablet (2 mg) by mouth all other days or as directed.             Where to Get Your Medications      These medications were sent to [facility] [address] [place] [id number] [facility] - [phone]  - [phone]   [address] [facility] [address]    Phone: [phone]   · metoprolol succinate XL 25 MG 24 hr tablet         Patient is no longer taking -.  I corrected the med list to reflect this.  I did not stop these medications.      Dictated utilizing Dragon dictation

## 2023-03-03 NOTE — TELEPHONE ENCOUNTER
Pt called and reported that he has NOT been taking Metoprolol.      Per Dr Singer: We want to go ahead and have a monitor placed early next week.  Do NOT start Metoprolol again.      Spoke with pt and he is ok with this plan.

## 2023-03-16 ENCOUNTER — TELEPHONE (OUTPATIENT)
Dept: CARDIOLOGY | Facility: CLINIC | Age: 78
End: 2023-03-16
Payer: MEDICARE

## 2023-03-16 DIAGNOSIS — I47.29 VENTRICULAR TACHYCARDIA (PAROXYSMAL): ICD-10-CM

## 2023-03-16 DIAGNOSIS — I44.1 SECOND DEGREE HEART BLOCK: Primary | ICD-10-CM

## 2023-03-16 DIAGNOSIS — I27.20 PULMONARY HYPERTENSION: ICD-10-CM

## 2023-03-16 DIAGNOSIS — I49.3 FREQUENT PVCS: ICD-10-CM

## 2023-03-16 NOTE — TELEPHONE ENCOUNTER
Why so far out?  Especially for echo.  Is that when the patient wanted it??.  Please see if he can arrange both for the next 2 weeks.

## 2023-03-22 ENCOUNTER — ANTICOAGULATION VISIT (OUTPATIENT)
Dept: PHARMACY | Facility: HOSPITAL | Age: 78
End: 2023-03-22
Payer: MEDICARE

## 2023-03-22 DIAGNOSIS — I48.0 PAF (PAROXYSMAL ATRIAL FIBRILLATION): Primary | ICD-10-CM

## 2023-03-22 LAB
INR PPP: 1.8 (ref 0.91–1.09)
PROTHROMBIN TIME: 21 SECONDS (ref 10–13.8)

## 2023-03-22 PROCEDURE — 36416 COLLJ CAPILLARY BLOOD SPEC: CPT

## 2023-03-22 PROCEDURE — 85610 PROTHROMBIN TIME: CPT

## 2023-03-22 PROCEDURE — G0463 HOSPITAL OUTPT CLINIC VISIT: HCPCS

## 2023-03-22 NOTE — PROGRESS NOTES
Anticoagulation Clinic Progress Note    Anticoagulation Summary  As of 3/22/2023    INR goal:  2.0-3.0   TTR:  56.9 % (1.9 y)   INR used for dosin.8 (3/22/2023)   Warfarin maintenance plan:  1 mg every Mon, Thu; 2 mg all other days; Starting 3/22/2023   Weekly warfarin total:  12 mg   Plan last modified:  Eduar Weeks, PharmD (8/10/2022)   Next INR check:  2023   Priority:  High   Target end date:      Indications    PAF (paroxysmal atrial fibrillation) (HCC) [I48.0]             Anticoagulation Episode Summary     INR check location:      Preferred lab:      Send INR reminders to:   [facility]    Comments:        Anticoagulation Care Providers     Provider Role Specialty Phone number    Jimena Singer MD Referring Cardiology [phone]          Clinic Interview:  Patient Findings     Positives:  Change in diet/appetite, Other complaints    Negatives:  Signs/symptoms of thrombosis, Signs/symptoms of bleeding,   Laboratory test error suspected, Change in health, Change in alcohol use,   Change in activity, Upcoming invasive procedure, Emergency department   visit, Upcoming dental procedure, Missed doses, Extra doses, Change in   medications, Hospital admission, Bruising    Comments:  Reports he had more vit k than usual (spinach over weekend).   Reports there was one day in the past two weeks when he may have missed   his dose, but he is not certain      Clinical Outcomes     Negatives:  Major bleeding event, Thromboembolic event,   Anticoagulation-related hospital admission, Anticoagulation-related ED   visit, Anticoagulation-related fatality    Comments:  Reports he had more vit k than usual (spinach over weekend).   Reports there was one day in the past two weeks when he may have missed   his dose, but he is not certain        INR History:  Anticoagulation Monitoring 2023 2023 3/22/2023   INR 1.5 2.3 1.8   INR Date 2023 2023 3/22/2023   INR Goal 2.0-3.0 2.0-3.0 2.0-3.0    Trend Same Same Same   Last Week Total 11 mg 12 mg 12 mg   Next Week Total 13 mg 12 mg 13 mg   Sun 2 mg 2 mg 2 mg   Mon 1 mg 1 mg 1 mg   Tue 2 mg 2 mg 2 mg   Wed 3 mg (2/8); Otherwise 2 mg 2 mg 2 mg   Thu 1 mg 1 mg 2 mg (3/23); Otherwise 1 mg   Fri 2 mg 2 mg 2 mg   Sat 2 mg 2 mg 2 mg   Visit Report - - -   Some recent data might be hidden       Plan:  1. INR is Subtherapeutic today- see above in Anticoagulation Summary.  Will instruct Rodolfo Grover to Change their warfarin regimen (Extra 1 mg x 1 day; otherwise continue 1 mg Mon/Thurs, 2 mg all other days) - see above in Anticoagulation Summary.  2. Follow up in 2 weeks  3. Patient declines warfarin refills.  4. Verbal and written information provided. Patient expresses understanding and has no further questions at this time.    Eduar Weeks, PharmD

## 2023-03-27 NOTE — TELEPHONE ENCOUNTER
Spoke with pt.  Verbalized understanding.  No other questions.  He will call but he will be going out of town a few days.  He will check in.  (Done)

## 2023-04-05 ENCOUNTER — ANTICOAGULATION VISIT (OUTPATIENT)
Dept: PHARMACY | Facility: HOSPITAL | Age: 78
End: 2023-04-05
Payer: MEDICARE

## 2023-04-05 DIAGNOSIS — I48.0 PAF (PAROXYSMAL ATRIAL FIBRILLATION): Primary | ICD-10-CM

## 2023-04-05 LAB
INR PPP: 2.7 (ref 0.91–1.09)
PROTHROMBIN TIME: 32 SECONDS (ref 10–13.8)

## 2023-04-05 PROCEDURE — 36416 COLLJ CAPILLARY BLOOD SPEC: CPT

## 2023-04-05 PROCEDURE — 85610 PROTHROMBIN TIME: CPT

## 2023-04-05 NOTE — PROGRESS NOTES
Anticoagulation Clinic Progress Note    Anticoagulation Summary  As of 2023    INR goal:  2.0-3.0   TTR:  57.3 % (2 y)   INR used for dosin.7 (2023)   Warfarin maintenance plan:  1 mg every Mon, Thu; 2 mg all other days; Starting 2023   Weekly warfarin total:  12 mg   No change documented:  Jolynn De Anda, Pharmacy Technician   Plan last modified:  Eduar Weeks, PharmD (8/10/2022)   Next INR check:  2023   Priority:  High   Target end date:      Indications    PAF (paroxysmal atrial fibrillation) [I48.0]             Anticoagulation Episode Summary     INR check location:      Preferred lab:      Send INR reminders to:  [facility]    Comments:        Anticoagulation Care Providers     Provider Role Specialty Phone number    Jimena Singer MD Referring Cardiology [phone]          Clinic Interview:  Patient Findings     Positives:  Change in diet/appetite    Negatives:  Signs/symptoms of thrombosis, Signs/symptoms of bleeding,   Laboratory test error suspected, Change in health, Change in alcohol use,   Change in activity, Upcoming invasive procedure, Emergency department   visit, Upcoming dental procedure, Missed doses, Extra doses, Change in   medications, Hospital admission, Bruising, Other complaints    Comments:  Trying to add more protein into his diet. He is eating more   chicken, tuna, and whey protein powder.      Clinical Outcomes     Negatives:  Major bleeding event, Thromboembolic event,   Anticoagulation-related hospital admission, Anticoagulation-related ED   visit, Anticoagulation-related fatality    Comments:  Trying to add more protein into his diet. He is eating more   chicken, tuna, and whey protein powder.        INR History:  Anticoagulation Monitoring 2023 3/22/2023 2023   INR 2.3 1.8 2.7   INR Date 2023 3/22/2023 2023   INR Goal 2.0-3.0 2.0-3.0 2.0-3.0   Trend Same Same Same   Last Week Total 12 mg 12 mg 12 mg   Next Week Total 12 mg  13 mg 12 mg   Sun 2 mg 2 mg 2 mg   Mon 1 mg 1 mg 1 mg   Tue 2 mg 2 mg 2 mg   Wed 2 mg 2 mg 2 mg   Thu 1 mg 2 mg (3/23); Otherwise 1 mg 1 mg   Fri 2 mg 2 mg 2 mg   Sat 2 mg 2 mg 2 mg   Visit Report - - -   Some recent data might be hidden       Plan:  1. INR is therapeutic today- see above in Anticoagulation Summary.   Will instruct Rodolfo Grover to continue their warfarin regimen- see above in Anticoagulation Summary.  2. Follow up in 2 weeks.  3. Patient declines warfarin refills.  4. Verbal and written information provided. Patient expresses understanding and has no further questions at this time.    Jolynn De Anda, Pharmacy Technician

## 2023-04-19 ENCOUNTER — ANTICOAGULATION VISIT (OUTPATIENT)
Dept: PHARMACY | Facility: HOSPITAL | Age: 78
End: 2023-04-19
Payer: MEDICARE

## 2023-04-19 DIAGNOSIS — I48.0 PAF (PAROXYSMAL ATRIAL FIBRILLATION): Primary | ICD-10-CM

## 2023-04-19 LAB
INR PPP: 2.6 (ref 0.91–1.09)
PROTHROMBIN TIME: 31.3 SECONDS (ref 10–13.8)

## 2023-04-19 PROCEDURE — 36416 COLLJ CAPILLARY BLOOD SPEC: CPT

## 2023-04-19 PROCEDURE — 85610 PROTHROMBIN TIME: CPT

## 2023-04-19 NOTE — PROGRESS NOTES
I have supervised and reviewed the notes, assessments, and/or procedures performed by our Pharmacy Intern. The documented assessment and plan were developed cooperatively, and the plan was implemented in my presence. I concur with the documentation of this patient encounter.    Luci Hale [facility]

## 2023-04-19 NOTE — PROGRESS NOTES
Anticoagulation Clinic Progress Note    Anticoagulation Summary  As of 2023    INR goal:  2.0-3.0   TTR:  58.2 % (2 y)   INR used for dosin.6 (2023)   Warfarin maintenance plan:  1 mg every Mon, Thu; 2 mg all other days; Starting 2023   Weekly warfarin total:  12 mg   No change documented:  Meño Greenfield, Pharmacy Intern   Plan last modified:  Eduar Weeks, PharmD (8/10/2022)   Next INR check:  5/3/2023   Priority:  High   Target end date:      Indications    PAF (paroxysmal atrial fibrillation) [I48.0]             Anticoagulation Episode Summary     INR check location:      Preferred lab:      Send INR reminders to:   [facility]    Comments:        Anticoagulation Care Providers     Provider Role Specialty Phone number    Jimena Singer MD Referring Cardiology [phone]          Clinic Interview:  Patient Findings     Negatives:  Signs/symptoms of thrombosis, Signs/symptoms of bleeding,   Laboratory test error suspected, Change in health, Change in alcohol use,   Change in activity, Upcoming invasive procedure, Emergency department   visit, Upcoming dental procedure, Missed doses, Extra doses, Change in   medications, Change in diet/appetite, Hospital admission, Bruising, Other   complaints      Clinical Outcomes     Negatives:  Major bleeding event, Thromboembolic event,   Anticoagulation-related hospital admission, Anticoagulation-related ED   visit, Anticoagulation-related fatality        INR History:      2022     2:00 PM 2023     2:00 PM 2023     2:00 PM 2023     1:15 PM 3/22/2023     2:00 PM 2023     2:30 PM 2023     1:45 PM   Anticoagulation Monitoring   INR 2.6 2.5 1.5 2.3 1.8 2.7 2.6   INR Date 2022 2023 2023 2023 3/22/2023 2023 2023   INR Goal 2.0-3.0 2.0-3.0 2.0-3.0 2.0-3.0 2.0-3.0 2.0-3.0 2.0-3.0   Trend Same Same Same Same Same Same Same   Last Week Total 12 mg 12 mg 11 mg 12 mg 12 mg 12 mg 12 mg   Next Week  Total 12 mg 12 mg 13 mg 12 mg 13 mg 12 mg 12 mg   Sun 2 mg 2 mg 2 mg 2 mg 2 mg 2 mg 2 mg   Mon 1 mg 1 mg 1 mg 1 mg 1 mg 1 mg 1 mg   Tue 2 mg 2 mg 2 mg 2 mg 2 mg 2 mg 2 mg   Wed 2 mg 2 mg 3 mg (2/8); Otherwise 2 mg 2 mg 2 mg 2 mg 2 mg   Thu 1 mg 1 mg 1 mg 1 mg 2 mg (3/23); Otherwise 1 mg 1 mg 1 mg   Fri 2 mg 2 mg 2 mg 2 mg 2 mg 2 mg 2 mg   Sat 2 mg 2 mg 2 mg 2 mg 2 mg 2 mg 2 mg       Plan:  1. INR is Therapeutic today- see above in Anticoagulation Summary.  Will instruct Rodolfo Grover to Continue their warfarin regimen- see above in Anticoagulation Summary.  2. Follow up in 2 weeks  3. Patient declines warfarin refills.  4. Verbal and written information provided. Patient expresses understanding and has no further questions at this time.    Meño Greenfield, Pharmacy Intern

## 2023-04-21 ENCOUNTER — OFFICE VISIT (OUTPATIENT)
Dept: SLEEP MEDICINE | Facility: HOSPITAL | Age: 78
End: 2023-04-21
Payer: MEDICARE

## 2023-04-21 VITALS
HEIGHT: 67 IN | OXYGEN SATURATION: 99 % | DIASTOLIC BLOOD PRESSURE: 93 MMHG | HEART RATE: 61 BPM | SYSTOLIC BLOOD PRESSURE: 134 MMHG | BODY MASS INDEX: 28.25 KG/M2 | WEIGHT: 180 LBS

## 2023-04-21 DIAGNOSIS — G47.33 OBSTRUCTIVE SLEEP APNEA: Primary | ICD-10-CM

## 2023-04-21 DIAGNOSIS — E66.3 OVERWEIGHT (BMI 25.0-29.9): ICD-10-CM

## 2023-04-21 PROCEDURE — G0463 HOSPITAL OUTPT CLINIC VISIT: HCPCS

## 2023-04-21 NOTE — PROGRESS NOTES
[facility]  Telephone: [phone] / Fax: [phone] [place]  Telephone: [phone] / Fax: [phone] Lauren Ruiz    PCP: Domenica Merida MD    Reason for visit: COLEMAN f/u    Rodolfo Grover is a 78 y.o.male  was last seen at [facility] sleep lab in April 2022. He is here today for annual f/u. He reports leaks around the nose despite changing the mask regularly. Machine is set at pressure of 12-18cm H2O. He gets a new mask every other month. He wakes up feeling rested in the morning and is unaware of witnessed apneas on snoring while on the machine. His sleep schedule is 10:30pm-6:30am. ESS is 14.     SH- no tobacco, no alcohol, no energy drinks.    ROS-   +post nasal drip, rest is negative.    DME [facility]    Current Medications:    Current Outpatient Medications:   •  acetaminophen (TYLENOL) 500 MG tablet, Take 2 tablets by mouth 3 (Three) Times a Day. (Patient taking differently: Take 2 tablets by mouth 3 (Three) Times a Day As Needed.), Disp: , Rfl:   •  aspirin 81 MG EC tablet, Take 1 tablet by mouth Daily., Disp: 90 tablet, Rfl: 1  •  atorvastatin (LIPITOR) 10 MG tablet, Take 1 tablet by mouth Daily., Disp: 30 tablet, Rfl: 11  •  Cholecalciferol 50 MCG (2000 UT) capsule, Take 1 capsule by mouth Daily., Disp: , Rfl:   •  Ferrous Gluconate (IRON 27 PO), Take 27 mg by mouth 3 (Three) Times a Week., Disp: , Rfl:   •  fluticasone (CUTIVATE) 0.05 % cream, Apply 1 application topically to the appropriate area as directed 2 (Two) Times a Day. (Patient taking differently: Apply 1 application topically to the appropriate area as directed As Needed.), Disp: 30 g, Rfl: 0  •  furosemide (LASIX) 40 MG tablet, TAKE 1 TABLET BY MOUTH ONCE DAILY AS NEEDED (FOR WEIGHT GAIN, OR SHORTNESS OF BREATH), Disp: 30 tablet, Rfl: 0  •  metoprolol succinate XL (TOPROL-XL) 25 MG 24 hr tablet, Take 1 tablet by mouth Daily., Disp: 90 tablet, Rfl: 3  •  nitroglycerin (NITROSTAT) 0.4 MG SL tablet, 1 under the tongue  "as needed for angina, may repeat q5mins for up three doses, Disp: 25 tablet, Rfl: 1  •  Omeprazole Magnesium (PRILOSEC OTC PO), Take 20 mg by mouth Daily., Disp: , Rfl:   •  pantoprazole (PROTONIX) 40 MG EC tablet, Take 1 tablet by mouth 2 (Two) Times a Day Before Meals. (Patient taking differently: Take 1 tablet by mouth As Needed.), Disp: 180 tablet, Rfl: 1  •  potassium chloride 10 MEQ CR tablet, TAKE 1 TABLET BY MOUTH ONCE DAILY AS NEEDED (TAKE WITH LASIX/FUROSEMIDE), Disp: 30 tablet, Rfl: 0  •  Psyllium (METAMUCIL FIBER PO), Take  by mouth As Needed., Disp: , Rfl:   •  sucralfate (CARAFATE) 1 g tablet, As Needed., Disp: , Rfl:   •  warfarin (COUMADIN) 2 MG tablet, Take one-half tablet (1 mg) by mouth Mon and Thurs, and take one tablet (2 mg) by mouth all other days or as directed., Disp: 80 tablet, Rfl: 1   also entered in Sleep Questionnaire    Patient  has a past medical history of Acute blood loss anemia, Arthritis, Atrial fibrillation, Bilateral lower extremity edema, Bradycardia following surgery, CAD (coronary artery disease), Chest discomfort, Colon polyp, Elevated troponin, Focal motor seizure, Generalized tonic-clonic seizure, GERD (gastroesophageal reflux disease), Hypertension, Iritis of right eye, Ischemic cardiomyopathy, Mitral valve insufficiency, Myocardial infarction, Peptic ulceration, Post-ictal state, Pulmonary hypertension, PVC (premature ventricular contraction), Rheumatic fever, Rheumatic fever, S/P CABG x 7, S/P mitral valve repair, Severe obstructive sleep apnea, and Shoulder fracture, right.    I have reviewed the Past Medical History, Past Surgical History, Social History and Family History.    Vital Signs /93   Pulse 61   Ht 170.2 cm (67.01\")   Wt 81.6 kg (180 lb)   SpO2 99%   BMI 28.19 kg/m²  Body mass index is 28.19 kg/m².    General Alert and oriented. No acute distress noted   Pharynx/Throat Class IV  Mallampati airway, large tongue, no evidence of redundant lateral " pharyngeal tissue. No oral lesions. No thrush. Moist mucous membranes.   Head Normocephalic. Symmetrical. Atraumatic.    Nose No septal deviation. No drainage   Chest Wall Normal shape. Symmetric expansion with respiration. No tenderness.   Neck Trachea midline, no thyromegaly or adenopathy    Lungs Clear to auscultation bilaterally. No wheezes. No rhonchi. No rales. Respirations regular, even and unlabored.   Heart Regular rhythm and normal rate. Normal S1 and S2. No murmur   Abdomen Soft, non-tender and non-distended. Normal bowel sounds. No masses.   Extremities Moves all extremities well. No edema   Psychiatric Normal mood and affect.     Testing:  Download 1/20/23-4/19/23 100% use with average nightly use of 6 hours and 54 minutes on auto CPAP 12-18cm H2O, AHI 7.3/hr, leak of 17.9 L/min.    Study:  Shaan HST- AHI 40, lowest desaturation 70s; 68min of sats <90%        Impression:  1. Obstructive sleep apnea    2. Overweight (BMI 25.0-29.9)          Plan:  Keep pressure on the CPAP at 12-18cm H2O as he reports excessive leaks and is concerned that higher pressure will intensify the leaks. He is eligible for a new machine this summer-I will place order to [employer]. Current machine will be over 5 year old in a few months.     Rodolfo uses the CPAP device and benefits from its use in terms of reduction of hypersomnia and snoring.  AHI appears to be within adequate range. I reviewed download report and original sleep study report with the patient.     Weight loss will be strongly beneficial to reduce the severity of sleep-disordered breathing.  Caution during activities that require prolonged concentration is strongly advised if sleepiness returns. Changing of PAP supplies regularly is important for effective use.        Thank you for allowing me to participate in your patient's care.      NKECHI Swanson  [facility]  Phone: [phone]      Part of this note may be an electronic  transcription/translation of spoken language to printed text using the Dragon Dictation System.

## 2023-05-03 ENCOUNTER — ANTICOAGULATION VISIT (OUTPATIENT)
Dept: PHARMACY | Facility: HOSPITAL | Age: 78
End: 2023-05-03
Payer: MEDICARE

## 2023-05-03 DIAGNOSIS — I48.0 PAF (PAROXYSMAL ATRIAL FIBRILLATION): Primary | ICD-10-CM

## 2023-05-03 LAB
INR PPP: 2.6 (ref 0.91–1.09)
PROTHROMBIN TIME: 31.6 SECONDS (ref 10–13.8)

## 2023-05-03 PROCEDURE — 36416 COLLJ CAPILLARY BLOOD SPEC: CPT

## 2023-05-03 PROCEDURE — 85610 PROTHROMBIN TIME: CPT

## 2023-05-03 NOTE — PROGRESS NOTES
Anticoagulation Clinic Progress Note    Anticoagulation Summary  As of 5/3/2023    INR goal:  2.0-3.0   TTR:  58.9 % (2 y)   INR used for dosin.6 (5/3/2023)   Warfarin maintenance plan:  1 mg every Mon, Thu; 2 mg all other days; Starting 5/3/2023   Weekly warfarin total:  12 mg   No change documented:  Eduar Weeks PharmD   Plan last modified:  Eduar Weeks PharmD (8/10/2022)   Next INR check:  2023   Priority:  High   Target end date:      Indications    PAF (paroxysmal atrial fibrillation) [I48.0]             Anticoagulation Episode Summary     INR check location:      Preferred lab:      Send INR reminders to:   [facility]    Comments:        Anticoagulation Care Providers     Provider Role Specialty Phone number    Jimena Singer MD Referring Cardiology [phone]          Clinic Interview:  Patient Findings     Negatives:  Signs/symptoms of thrombosis, Signs/symptoms of bleeding,   Laboratory test error suspected, Change in health, Change in alcohol use,   Change in activity, Upcoming invasive procedure, Emergency department   visit, Upcoming dental procedure, Missed doses, Extra doses, Change in   medications, Change in diet/appetite, Hospital admission, Bruising, Other   complaints      Clinical Outcomes     Negatives:  Major bleeding event, Thromboembolic event,   Anticoagulation-related hospital admission, Anticoagulation-related ED   visit, Anticoagulation-related fatality        INR History:      2023     2:00 PM 2023     2:00 PM 2023     1:15 PM 3/22/2023     2:00 PM 2023     2:30 PM 2023     1:45 PM 5/3/2023     2:45 PM   Anticoagulation Monitoring   INR 2.5 1.5 2.3 1.8 2.7 2.6 2.6   INR Date 2023 2023 2023 3/22/2023 2023 2023 5/3/2023   INR Goal 2.0-3.0 2.0-3.0 2.0-3.0 2.0-3.0 2.0-3.0 2.0-3.0 2.0-3.0   Trend Same Same Same Same Same Same Same   Last Week Total 12 mg 11 mg 12 mg 12 mg 12 mg 12 mg 12 mg   Next Week Total 12 mg 13  mg 12 mg 13 mg 12 mg 12 mg 12 mg   Sun 2 mg 2 mg 2 mg 2 mg 2 mg 2 mg 2 mg   Mon 1 mg 1 mg 1 mg 1 mg 1 mg 1 mg 1 mg   Tue 2 mg 2 mg 2 mg 2 mg 2 mg 2 mg 2 mg   Wed 2 mg 3 mg (2/8); Otherwise 2 mg 2 mg 2 mg 2 mg 2 mg 2 mg   Thu 1 mg 1 mg 1 mg 2 mg (3/23); Otherwise 1 mg 1 mg 1 mg 1 mg   Fri 2 mg 2 mg 2 mg 2 mg 2 mg 2 mg 2 mg   Sat 2 mg 2 mg 2 mg 2 mg 2 mg 2 mg 2 mg       Plan:  1. INR is Therapeutic today- see above in Anticoagulation Summary.  Will instruct Rodolfo Grover to Continue their warfarin regimen- see above in Anticoagulation Summary.  2. Follow up in 4 weeks  3. Patient declines warfarin refills.  4. Verbal and written information provided. Patient expresses understanding and has no further questions at this time.    Eduar Weeks, PharmD

## 2023-05-11 ENCOUNTER — OFFICE VISIT (OUTPATIENT)
Dept: CARDIOLOGY | Facility: CLINIC | Age: 78
End: 2023-05-11
Payer: MEDICARE

## 2023-05-11 ENCOUNTER — HOSPITAL ENCOUNTER (OUTPATIENT)
Dept: CARDIOLOGY | Facility: HOSPITAL | Age: 78
Discharge: HOME | End: 2023-05-11
Payer: MEDICARE

## 2023-05-11 VITALS
BODY MASS INDEX: 28.24 KG/M2 | SYSTOLIC BLOOD PRESSURE: 180 MMHG | HEART RATE: 48 BPM | DIASTOLIC BLOOD PRESSURE: 64 MMHG | WEIGHT: 179.9 LBS | HEIGHT: 67 IN

## 2023-05-11 VITALS
WEIGHT: 175 LBS | DIASTOLIC BLOOD PRESSURE: 70 MMHG | SYSTOLIC BLOOD PRESSURE: 116 MMHG | HEART RATE: 59 BPM | HEIGHT: 67 IN | BODY MASS INDEX: 27.47 KG/M2

## 2023-05-11 DIAGNOSIS — Z95.1 S/P CABG (CORONARY ARTERY BYPASS GRAFT): Primary | ICD-10-CM

## 2023-05-11 DIAGNOSIS — I27.20 PULMONARY HYPERTENSION: ICD-10-CM

## 2023-05-11 DIAGNOSIS — I49.3 FREQUENT PVCS: ICD-10-CM

## 2023-05-11 DIAGNOSIS — I44.1 WENCKEBACH SECOND DEGREE AV BLOCK: ICD-10-CM

## 2023-05-11 PROCEDURE — 93306 TTE W/DOPPLER COMPLETE: CPT | Performed by: INTERNAL MEDICINE

## 2023-05-11 PROCEDURE — 93306 TTE W/DOPPLER COMPLETE: CPT

## 2023-05-11 NOTE — PROGRESS NOTES
Date of Office Visit: 2023  Encounter Provider: Edgar Patton MD  Place of Service: [facility] CARDIOLOGY  Patient Name: Rodolfo Grover  :1945    Chief Complaint:  Bradycardia, 2nd Degree AV Block, 1st degree AV Block    HPI: Rodolfo Grover is a 78 y.o. male who presents today for 1 and 2 nd degree AV block, and PVC    He has a history CAD and prior bypass          Past Medical History:   Diagnosis Date   • Acute blood loss anemia    • Arthritis    • Atrial fibrillation    • Bilateral lower extremity edema    • Bradycardia following surgery    • CAD (coronary artery disease)    • Chest discomfort    • Colon polyp    • Elevated troponin    • Focal motor seizure    • Generalized tonic-clonic seizure    • GERD (gastroesophageal reflux disease)    • Hypertension    • Iritis of right eye    • Ischemic cardiomyopathy    • Mitral valve insufficiency    • Myocardial infarction    • Peptic ulceration    • Post-ictal state    • Pulmonary hypertension    • PVC (premature ventricular contraction)    • Rheumatic fever    • Rheumatic fever     as child   • S/P CABG x 7    • S/P mitral valve repair    • Severe obstructive sleep apnea    • Shoulder fracture, right        Past Surgical History:   Procedure Laterality Date   • CARDIAC CATHETERIZATION N/A 2021    Procedure: Coronary angiography;  Surgeon: Bry Nails MD;  Location: [facility] CATH INVASIVE LOCATION;  Service: Cardiovascular;  Laterality: N/A;   • CARDIAC CATHETERIZATION N/A 2021    Procedure: Left heart cath;  Surgeon: Bry Nails MD;  Location: [facility] CATH INVASIVE LOCATION;  Service: Cardiovascular;  Laterality: N/A;   • CARDIAC CATHETERIZATION N/A 2021    Procedure: Right Heart Cath;  Surgeon: Bry Nails MD;  Location: [facility] CATH INVASIVE LOCATION;  Service: Cardiovascular;  Laterality: N/A;   • CARDIAC CATHETERIZATION N/A 2021    Procedure: Left ventriculography;  Surgeon:  Bry Nails MD;  Location: [facility] CATH INVASIVE LOCATION;  Service: Cardiovascular;  Laterality: N/A;   • COLONOSCOPY N/A 12/7/2021    Procedure: COLONOSCOPY to cecum with cold biopsy polypectomy;  Surgeon: Edgar Allen MD;  Location: [facility] ENDOSCOPY;  Service: Gastroenterology;  Laterality: N/A;  IRON DEFICIENCY ANEMIA, H/O COLON POLYPS  --   DIVERTICULOSIS, polyp, hemorrhoids   • CORONARY ARTERY BYPASS GRAFT N/A 2/22/2021    Procedure: BK, MIDLINE STERNOTOMY, CORONARY ARTERY BYPASS X 7 UTILIZING THE LEFT INTERNAL MAMMARY ARTERY AND THE RIGHT SAPHENOUS VEIN, MITRAL VALVE REPAIR, PRP;  Surgeon: Jr Shyam Echavarria MD;  Location: [facility] OR;  Service: Cardiothoracic;  Laterality: N/A;   • ENDOSCOPY N/A 8/16/2018    Erosive gastritis, diverticulosis   • ENDOSCOPY N/A 2/26/2021    Procedure: ESOPHAGOGASTRODUODENOSCOPY AT BEDSIDE WITH HOT SNARE POLYPECTOMY AND CLIP PLACEMENT X1;  Surgeon: Jono Laboy MD;  Location: [facility] ENDOSCOPY;  Service: Gastroenterology;  Laterality: N/A;  PRE-  GI BLEED  POST- GASTRITIS, ESOPHAGITIS, POLYP   • ENDOSCOPY N/A 12/7/2021    Procedure: ESOPHAGOGASTRODUODENOSCOPY with COLD  BIOPSIES;  Surgeon: Edgar Allen MD;  Location: [facility] ENDOSCOPY;  Service: Gastroenterology;  Laterality: N/A;  IRON DEFICIENCY ANEMIA, H/O ULCERATIVE ESOPHAGITIS  --GASTRITIS, DUODENAL POLYP   • HERNIA REPAIR     • SHOULDER CLOSED REDUCTION Right 3/16/2018    Procedure: RIGHT SHOULDER CLOSED REDUCTION;  Surgeon: Kj Garcia MD;  Location: [facility] OR;  Service: Orthopedics   • TONSILLECTOMY     • TOTAL SHOULDER ARTHROPLASTY W/ DISTAL CLAVICLE EXCISION Right 3/22/2018    Procedure: Reverse Total Shoulder Arthroplsty;  Surgeon: Kj Garcia MD;  Location: [facility] OR;  Service: Orthopedics   • TRANSESOPHAGEAL ECHOCARDIOGRAM (BK) N/A 2/22/2021    Procedure: TRANSESOPHAGEAL ECHOCARDIOGRAM;  Surgeon: Jr Shyam Echavarria MD;  Location: [facility] MAIN OR;  Service:  Cardiothoracic;  Laterality: N/A;       Social History     Socioeconomic History   • Marital status:    Tobacco Use   • Smoking status: Never   • Smokeless tobacco: Never   Vaping Use   • Vaping Use: Never used   Substance and Sexual Activity   • Alcohol use: Yes     Comment: occasional   • Drug use: Never   • Sexual activity: Defer       Family History   Problem Relation Age of Onset   • Stroke Mother    • Cancer Father 56        bladder   • Heart attack Father    • Malig Hyperthermia Neg Hx        ROS    No Known Allergies      Current Outpatient Medications:   •  acetaminophen (TYLENOL) 500 MG tablet, Take 2 tablets by mouth 3 (Three) Times a Day. (Patient taking differently: Take 2 tablets by mouth 3 (Three) Times a Day As Needed.), Disp: , Rfl:   •  aspirin 81 MG EC tablet, Take 1 tablet by mouth Daily., Disp: 90 tablet, Rfl: 1  •  atorvastatin (LIPITOR) 10 MG tablet, Take 1 tablet by mouth Daily., Disp: 30 tablet, Rfl: 11  •  Cholecalciferol 50 MCG (2000 UT) capsule, Take 1 capsule by mouth Daily., Disp: , Rfl:   •  Ferrous Gluconate (IRON 27 PO), Take 27 mg by mouth 3 (Three) Times a Week., Disp: , Rfl:   •  fluticasone (CUTIVATE) 0.05 % cream, Apply 1 application topically to the appropriate area as directed 2 (Two) Times a Day. (Patient taking differently: Apply 1 application topically to the appropriate area as directed As Needed.), Disp: 30 g, Rfl: 0  •  furosemide (LASIX) 40 MG tablet, TAKE 1 TABLET BY MOUTH ONCE DAILY AS NEEDED (FOR WEIGHT GAIN, OR SHORTNESS OF BREATH), Disp: 30 tablet, Rfl: 0  •  metoprolol succinate XL (TOPROL-XL) 25 MG 24 hr tablet, Take 1 tablet by mouth Daily., Disp: 90 tablet, Rfl: 3  •  nitroglycerin (NITROSTAT) 0.4 MG SL tablet, 1 under the tongue as needed for angina, may repeat q5mins for up three doses, Disp: 25 tablet, Rfl: 1  •  Omeprazole Magnesium (PRILOSEC OTC PO), Take 20 mg by mouth Daily., Disp: , Rfl:   •  potassium chloride 10 MEQ CR tablet, TAKE 1 TABLET BY  "MOUTH ONCE DAILY AS NEEDED (TAKE WITH LASIX/FUROSEMIDE), Disp: 30 tablet, Rfl: 0  •  Psyllium (METAMUCIL FIBER PO), Take  by mouth As Needed., Disp: , Rfl:   •  sucralfate (CARAFATE) 1 g tablet, As Needed., Disp: , Rfl:   •  warfarin (COUMADIN) 2 MG tablet, Take one-half tablet (1 mg) by mouth Mon and Thurs, and take one tablet (2 mg) by mouth all other days or as directed., Disp: 80 tablet, Rfl: 1      Objective:     Vitals:    05/11/23 1020   BP: 116/70   Pulse: 59   Weight: 79.4 kg (175 lb)   Height: 170.2 cm (67\")     Body mass index is 27.41 kg/m².    PHYSICAL EXAM:    Vitals and nursing note reviewed.   Constitutional:       Appearance: Normal and healthy appearance.   Pulmonary:      Effort: Pulmonary effort is normal.      Breath sounds: Normal breath sounds.   Cardiovascular:      Normal rate. Regularly irregular rhythm.      Murmurs: There is no murmur.   Edema:     Peripheral edema absent.   Skin:     General: Skin is warm.   Neurological:      General: No focal deficit present.      Mental Status: Alert and oriented to person, place, and time.   Psychiatric:         Attention and Perception: Attention and perception normal.         Mood and Affect: Mood and affect normal.         Behavior: Behavior is cooperative.             ECG 12 Lead    Date/Time: 6/12/2023 10:09 PM  Performed by: Edgar Patton MD  Authorized by: Edgar Patton MD   Rhythm: sinus rhythm  Conduction: 1st degree AV block and 2nd degree AV block (Mobitz 1)        I reviewed his echocardiogram from 2021.  It demonstrates a normal ejection fraction.    I reviewed his Holter monitor.  He had about a 3 s pause.  It was Wenckebach conduction      Assessment:       Diagnosis Plan   1. S/P CABG (coronary artery bypass graft)        2. Wenckebach second degree AV block               Plan:       Wenckebach block with minimal symptoms    Discussed pacemaker placement, reached shared decision to defer for now    Follow-up as " indicated for bradycardia    As always, it has been a pleasure to participate in your patient's care.      Sincerely,         Edgar Patton MD

## 2023-05-12 PROCEDURE — 25510000001 PERFLUTREN (DEFINITY) 8.476 MG IN SODIUM CHLORIDE (PF) 0.9 % 10 ML INJECTION: Performed by: INTERNAL MEDICINE

## 2023-05-12 RX ADMIN — PERFLUTREN 2 ML: 6.52 INJECTION, SUSPENSION INTRAVENOUS at 11:15

## 2023-05-15 LAB
AORTIC ARCH: 2.6 CM
ASCENDING AORTA: 2.8 CM
BH CV ECHO MEAS - ACS: 1.75 CM
BH CV ECHO MEAS - AI P1/2T: 549.3 MSEC
BH CV ECHO MEAS - AO MAX PG: 7 MMHG
BH CV ECHO MEAS - AO MEAN PG: 3.7 MMHG
BH CV ECHO MEAS - AO ROOT DIAM: 3.1 CM
BH CV ECHO MEAS - AO V2 MAX: 131.9 CM/SEC
BH CV ECHO MEAS - AO V2 VTI: 32.2 CM
BH CV ECHO MEAS - AVA(I,D): 1.77 CM2
BH CV ECHO MEAS - EDV(CUBED): 164.1 ML
BH CV ECHO MEAS - EDV(MOD-SP2): 134 ML
BH CV ECHO MEAS - EDV(MOD-SP4): 130 ML
BH CV ECHO MEAS - EF(MOD-BP): 53.2 %
BH CV ECHO MEAS - EF(MOD-SP2): 45.5 %
BH CV ECHO MEAS - EF(MOD-SP4): 56.9 %
BH CV ECHO MEAS - ESV(CUBED): 62.2 ML
BH CV ECHO MEAS - ESV(MOD-SP2): 73 ML
BH CV ECHO MEAS - ESV(MOD-SP4): 56 ML
BH CV ECHO MEAS - FS: 27.6 %
BH CV ECHO MEAS - IVS/LVPW: 1.08 CM
BH CV ECHO MEAS - IVSD: 0.85 CM
BH CV ECHO MEAS - LAT PEAK E' VEL: 17.9 CM/SEC
BH CV ECHO MEAS - LV DIASTOLIC VOL/BSA (35-75): 67.3 CM2
BH CV ECHO MEAS - LV MASS(C)D: 162.9 GRAMS
BH CV ECHO MEAS - LV MAX PG: 2.5 MMHG
BH CV ECHO MEAS - LV MEAN PG: 1.34 MMHG
BH CV ECHO MEAS - LV SYSTOLIC VOL/BSA (12-30): 29 CM2
BH CV ECHO MEAS - LV V1 MAX: 79.3 CM/SEC
BH CV ECHO MEAS - LV V1 VTI: 18 CM
BH CV ECHO MEAS - LVIDD: 5.5 CM
BH CV ECHO MEAS - LVIDS: 4 CM
BH CV ECHO MEAS - LVOT AREA: 3.2 CM2
BH CV ECHO MEAS - LVOT DIAM: 2.01 CM
BH CV ECHO MEAS - LVPWD: 0.79 CM
BH CV ECHO MEAS - MED PEAK E' VEL: 4.6 CM/SEC
BH CV ECHO MEAS - MV DEC SLOPE: 854.5 CM/SEC2
BH CV ECHO MEAS - MV DEC TIME: 0.19 MSEC
BH CV ECHO MEAS - MV E MAX VEL: 143.5 CM/SEC
BH CV ECHO MEAS - MV MAX PG: 8.8 MMHG
BH CV ECHO MEAS - MV MEAN PG: 2.27 MMHG
BH CV ECHO MEAS - MV P1/2T: 54.5 MSEC
BH CV ECHO MEAS - MV V2 VTI: 45.6 CM
BH CV ECHO MEAS - MVA(P1/2T): 4 CM2
BH CV ECHO MEAS - MVA(VTI): 1.25 CM2
BH CV ECHO MEAS - PA ACC TIME: 0.08 SEC
BH CV ECHO MEAS - PA PR(ACCEL): 44.1 MMHG
BH CV ECHO MEAS - PA V2 MAX: 103.2 CM/SEC
BH CV ECHO MEAS - PULM A REVS DUR: 0.17 SEC
BH CV ECHO MEAS - PULM A REVS VEL: 21.7 CM/SEC
BH CV ECHO MEAS - PULM DIAS VEL: 28.4 CM/SEC
BH CV ECHO MEAS - PULM S/D: 1.52
BH CV ECHO MEAS - PULM SYS VEL: 43 CM/SEC
BH CV ECHO MEAS - QP/QS: 1.1
BH CV ECHO MEAS - RAP SYSTOLE: 8 MMHG
BH CV ECHO MEAS - RV MAX PG: 0.81 MMHG
BH CV ECHO MEAS - RV V1 MAX: 45 CM/SEC
BH CV ECHO MEAS - RV V1 VTI: 10 CM
BH CV ECHO MEAS - RVOT DIAM: 2.8 CM
BH CV ECHO MEAS - RVSP: 49 MMHG
BH CV ECHO MEAS - SI(MOD-SP2): 31.6 ML/M2
BH CV ECHO MEAS - SI(MOD-SP4): 38.3 ML/M2
BH CV ECHO MEAS - SV(LVOT): 57.1 ML
BH CV ECHO MEAS - SV(MOD-SP2): 61 ML
BH CV ECHO MEAS - SV(MOD-SP4): 74 ML
BH CV ECHO MEAS - SV(RVOT): 62.5 ML
BH CV ECHO MEAS - TR MAX PG: 41.3 MMHG
BH CV ECHO MEAS - TR MAX VEL: 321.2 CM/SEC
BH CV ECHO MEASUREMENTS AVERAGE E/E' RATIO: 12.76
BH CV XLRA - RV BASE: 3.5 CM
BH CV XLRA - RV LENGTH: 6.5 CM
BH CV XLRA - RV MID: 4 CM
BH CV XLRA - TDI S': 3.3 CM/SEC
LEFT ATRIUM VOLUME INDEX: 29.4 ML/M2
MAXIMAL PREDICTED HEART RATE: 140 BPM
SINUS: 3.2 CM
STJ: 2.3 CM
STRESS TARGET HR: 119 BPM

## 2023-05-16 ENCOUNTER — TELEPHONE (OUTPATIENT)
Dept: CARDIOLOGY | Facility: CLINIC | Age: 78
End: 2023-05-16
Payer: MEDICARE

## 2023-05-16 NOTE — TELEPHONE ENCOUNTER
Please let him know that recent echocardiogram is stable.  His heart is still strong and functioning well.  He does have some leakage around his heart valves but this appears stable since prior echo.  Would continue current medications and call with questions or concerns.

## 2023-05-16 NOTE — TELEPHONE ENCOUNTER
I spoke with Rodolfo Grover and updated pt on results/recommendations from provider.  Pt verbalized understanding and has no further questions at this time.    Thank you,    Lorena Calle, RN  Triage [facility]  05/16/23 12:24 EDT

## 2023-05-31 ENCOUNTER — ANTICOAGULATION VISIT (OUTPATIENT)
Dept: PHARMACY | Facility: HOSPITAL | Age: 78
End: 2023-05-31

## 2023-05-31 DIAGNOSIS — I48.0 PAF (PAROXYSMAL ATRIAL FIBRILLATION): Primary | ICD-10-CM

## 2023-05-31 LAB
INR PPP: 2.7 (ref 0.91–1.09)
PROTHROMBIN TIME: 31.8 SECONDS (ref 10–13.8)

## 2023-05-31 PROCEDURE — 85610 PROTHROMBIN TIME: CPT

## 2023-05-31 PROCEDURE — 36416 COLLJ CAPILLARY BLOOD SPEC: CPT

## 2023-05-31 NOTE — PROGRESS NOTES
Anticoagulation Clinic Progress Note    Anticoagulation Summary  As of 2023    INR goal:  2.0-3.0   TTR:  60.4 % (2.1 y)   INR used for dosin.7 (2023)   Warfarin maintenance plan:  1 mg every Mon, Thu; 2 mg all other days; Starting 2023   Weekly warfarin total:  12 mg   No change documented:  Velia Salmeron   Plan last modified:  Eduar Weeks, PharmD (8/10/2022)   Next INR check:  2023   Priority:  High   Target end date:      Indications    PAF (paroxysmal atrial fibrillation) [I48.0]             Anticoagulation Episode Summary     INR check location:      Preferred lab:      Send INR reminders to:  [facility]    Comments:        Anticoagulation Care Providers     Provider Role Specialty Phone number    Jimena Singer MD Referring Cardiology [phone]          Clinic Interview:  Patient Findings     Negatives:  Signs/symptoms of thrombosis, Signs/symptoms of bleeding,   Laboratory test error suspected, Change in health, Change in alcohol use,   Change in activity, Upcoming invasive procedure, Emergency department   visit, Upcoming dental procedure, Missed doses, Extra doses, Change in   medications, Change in diet/appetite, Hospital admission, Bruising, Other   complaints      Clinical Outcomes     Negatives:  Major bleeding event, Thromboembolic event,   Anticoagulation-related hospital admission, Anticoagulation-related ED   visit, Anticoagulation-related fatality        INR History:      2023     2:00 PM 2023     1:15 PM 3/22/2023     2:00 PM 2023     2:30 PM 2023     1:45 PM 5/3/2023     2:45 PM 2023     8:45 AM   Anticoagulation Monitoring   INR 1.5 2.3 1.8 2.7 2.6 2.6 2.7   INR Date 2023 2023 3/22/2023 2023 2023 5/3/2023 2023   INR Goal 2.0-3.0 2.0-3.0 2.0-3.0 2.0-3.0 2.0-3.0 2.0-3.0 2.0-3.0   Trend Same Same Same Same Same Same Same   Last Week Total 11 mg 12 mg 12 mg 12 mg 12 mg 12 mg 12 mg   Next Week Total  13 mg 12 mg 13 mg 12 mg 12 mg 12 mg 12 mg   Sun 2 mg 2 mg 2 mg 2 mg 2 mg 2 mg 2 mg   Mon 1 mg 1 mg 1 mg 1 mg 1 mg 1 mg 1 mg   Tue 2 mg 2 mg 2 mg 2 mg 2 mg 2 mg 2 mg   Wed 3 mg (2/8); Otherwise 2 mg 2 mg 2 mg 2 mg 2 mg 2 mg 2 mg   Thu 1 mg 1 mg 2 mg (3/23); Otherwise 1 mg 1 mg 1 mg 1 mg 1 mg   Fri 2 mg 2 mg 2 mg 2 mg 2 mg 2 mg 2 mg   Sat 2 mg 2 mg 2 mg 2 mg 2 mg 2 mg 2 mg       Plan:  1. INR is therapeutic today- see above in Anticoagulation Summary.   Will instruct Rodolfo Grover to continue their warfarin regimen- see above in Anticoagulation Summary.  2. Follow up in 4 weeks.  3. Patient declines warfarin refills.  4. Verbal and written information provided. Patient expresses understanding and has no further questions at this time.    Velia Salmeron

## 2023-06-12 PROBLEM — I44.1 WENCKEBACH SECOND DEGREE AV BLOCK: Status: ACTIVE | Noted: 2023-06-12

## 2023-07-26 ENCOUNTER — ANTICOAGULATION VISIT (OUTPATIENT)
Dept: PHARMACY | Facility: HOSPITAL | Age: 78
End: 2023-07-26
Payer: MEDICARE

## 2023-07-26 DIAGNOSIS — I48.0 PAF (PAROXYSMAL ATRIAL FIBRILLATION): Primary | ICD-10-CM

## 2023-07-26 LAB
INR PPP: 1.8 (ref 0.91–1.09)
PROTHROMBIN TIME: 21.4 SECONDS (ref 10–13.8)

## 2023-07-26 PROCEDURE — G0463 HOSPITAL OUTPT CLINIC VISIT: HCPCS

## 2023-07-26 PROCEDURE — 85610 PROTHROMBIN TIME: CPT

## 2023-07-26 PROCEDURE — 36416 COLLJ CAPILLARY BLOOD SPEC: CPT

## 2023-07-26 NOTE — PROGRESS NOTES
Anticoagulation Clinic Progress Note    Anticoagulation Summary  As of 2023      INR goal:  2.0-3.0   TTR:  61.4 % (2.3 y)   INR used for dosin.8 (2023)   Warfarin maintenance plan:  1 mg every Mon, Thu; 2 mg all other days   Weekly warfarin total:  12 mg   Plan last modified:  Eduar Weeks, PharmD (8/10/2022)   Next INR check:  2023   Priority:  High   Target end date:      Indications    PAF (paroxysmal atrial fibrillation) [I48.0]                 Anticoagulation Episode Summary       INR check location:      Preferred lab:      Send INR reminders to:  [facility]    Comments:            Anticoagulation Care Providers       Provider Role Specialty Phone number    Jimena Singer MD Referring Cardiology [phone]            Clinic Interview:  Patient Findings     Positives:  Other complaints    Negatives:  Signs/symptoms of thrombosis, Signs/symptoms of bleeding,   Laboratory test error suspected, Change in health, Change in alcohol use,   Change in activity, Upcoming invasive procedure, Emergency department   visit, Upcoming dental procedure, Missed doses, Extra doses, Change in   medications, Change in diet/appetite, Hospital admission, Bruising    Comments:  Pt reports that he may have missed a dose but he is unsure.      Clinical Outcomes     Negatives:  Major bleeding event, Thromboembolic event,   Anticoagulation-related hospital admission, Anticoagulation-related ED   visit, Anticoagulation-related fatality    Comments:  Pt reports that he may have missed a dose but he is unsure.        INR History:      3/22/2023     2:00 PM 2023     2:30 PM 2023     1:45 PM 5/3/2023     2:45 PM 2023     8:45 AM 2023     2:00 PM 2023     2:00 PM   Anticoagulation Monitoring   INR 1.8 2.7 2.6 2.6 2.7 2.2 1.8   INR Date 3/22/2023 2023 2023 5/3/2023 2023 2023 2023   INR Goal 2.0-3.0 2.0-3.0 2.0-3.0 2.0-3.0 2.0-3.0 2.0-3.0 2.0-3.0   Trend Same  Same Same Same Same Same Same   Last Week Total 12 mg 12 mg 12 mg 12 mg 12 mg 12 mg 12 mg   Next Week Total 13 mg 12 mg 12 mg 12 mg 12 mg 12 mg 13 mg   Sun 2 mg 2 mg 2 mg 2 mg 2 mg 2 mg 2 mg   Mon 1 mg 1 mg 1 mg 1 mg 1 mg 1 mg 1 mg   Tue 2 mg 2 mg 2 mg 2 mg 2 mg 2 mg 2 mg   Wed 2 mg 2 mg 2 mg 2 mg 2 mg 2 mg 3 mg (7/26); Otherwise 2 mg   Thu 2 mg (3/23); Otherwise 1 mg 1 mg 1 mg 1 mg 1 mg 1 mg 1 mg   Fri 2 mg 2 mg 2 mg 2 mg 2 mg 2 mg 2 mg   Sat 2 mg 2 mg 2 mg 2 mg 2 mg 2 mg 2 mg   Visit Report      Report        Plan:  1. INR is Subtherapeutic today- see above in Anticoagulation Summary.  Will instruct Rodolfo FRANCO Reilly to Change their warfarin regimen- see above in Anticoagulation Summary.  Take 3 mg today, then resume 1 mg M/Thur and 2 mg AOD.  2. Follow up in 2 weeks  3. Patient declines warfarin refills.  4. Verbal and written information provided. Patient expresses understanding and has no further questions at this time.    Raquel Cody, Pharmacy Intern

## 2023-07-26 NOTE — PROGRESS NOTES
I have supervised and reviewed the notes, assessments, and/or procedures performed by our Pharmacy Intern. The documented assessment and plan were developed cooperatively, and the plan was implemented in my presence. I concur with the documentation of this patient encounter.    Luci Hale [facility]

## 2023-08-01 RX ORDER — WARFARIN SODIUM 2 MG/1
TABLET ORAL
Qty: 80 TABLET | Refills: 1 | Status: SHIPPED | OUTPATIENT
Start: 2023-08-01

## 2023-08-07 NOTE — PROGRESS NOTES
Subjective     REASON FOR CONSULTATION:  anemia  Provide an opinion on any further workup or treatment                             REQUESTING PHYSICIAN:  Magnolia    RECORDS OBTAINED:  Records of the patients history including those obtained from the referring provider were reviewed and summarized in detail.    HISTORY OF PRESENT ILLNESS:  The patient is a 78 y.o. year old male who is here for an opinion about the above issue.    History of Present Illness   This is a pleasant 77-year-old man with several medical comorbidities including coronary artery disease, hyperlipidemia, hypertension, paroxysmal atrial fibrillation anticoagulated with Coumadin, history of mitral valve repair, CKD 3, obstructive sleep apnea, pulmonary hypertension referred from his primary care provider for assessment of anemia.    Reviewing the patient's lab data available, he has had a somewhat chronic fluctuating anemia typically macrocytic since at least 2018.  His hemoglobin typically runs in the 10-12 range although he did drop lower in February 2021 when he was admitted for coffee-ground emesis secondary to gastritis and an ST elevation MI.  His hemoglobin over the past 1 year has been fairly stable in the 11-12 range with microcytic indices.  His white blood cell count and differential is normal other than mild increase immature granulocytes.  The platelets are typically normal although today mildly low 136.  He had a recent B12/folate level drawn 6/16/2022 both of which were normal range.  Iron studies in January 2022 were normal with a ferritin 139 and iron saturation 24%.  Most recent serum creatinine on 5/25/2022 was 1.74.  He had a normal total protein 6.2 and bilirubin less than 0.2.    The patient has only required transfusion support during previous GI bleed secondary to esophagitis.         The patient returned today for follow-up and lab review.  He continues oral iron every other day.  He reports no bright red blood per rectum  or melanotic stool.  He denies significant lightheadedness dizziness shortness of breath.    Past Medical History:   Diagnosis Date    Acute blood loss anemia     Arthritis     Atrial fibrillation     Bilateral lower extremity edema     Bradycardia following surgery     CAD (coronary artery disease)     Chest discomfort     Colon polyp     Elevated troponin     Focal motor seizure     Generalized tonic-clonic seizure     GERD (gastroesophageal reflux disease)     Hypertension     Iritis of right eye     Ischemic cardiomyopathy     Mitral valve insufficiency     Myocardial infarction     Peptic ulceration     Post-ictal state     Pulmonary hypertension     PVC (premature ventricular contraction)     Rheumatic fever     Rheumatic fever     as child    S/P CABG x 7     S/P mitral valve repair     Severe obstructive sleep apnea     Shoulder fracture, right         Past Surgical History:   Procedure Laterality Date    CARDIAC CATHETERIZATION N/A 2/19/2021    Procedure: Coronary angiography;  Surgeon: Bry Nails MD;  Location: [facility] CATH INVASIVE LOCATION;  Service: Cardiovascular;  Laterality: N/A;    CARDIAC CATHETERIZATION N/A 2/19/2021    Procedure: Left heart cath;  Surgeon: Bry Nails MD;  Location: [facility] CATH INVASIVE LOCATION;  Service: Cardiovascular;  Laterality: N/A;    CARDIAC CATHETERIZATION N/A 2/19/2021    Procedure: Right Heart Cath;  Surgeon: Bry Nails MD;  Location[facility] CATH INVASIVE LOC[facility];  Service: Cardiovascular;  Laterality: N/A;    CARDIAC CATHETERIZATION N/A 2/19/2021    Procedure: Left ventriculography;  Surgeon: Bry Nails MD;  Location: [facility] CATH INVASIVE LOCATION;  Service: Cardiovascular;  Laterality: N/A;    COLONOSCOPY N/A 12/7/2021    Procedure: COLONOSCOPY to cecum with cold biopsy polypectomy;  Surgeon: Edgar Allen MD;  Location: [facility] ENDOSCOPY;  Service: Gastroenterology;  Laterality: N/A;  IRON DEFICIENCY ANEMIA, H/O COLON  POLYPS  --   DIVERTICULOSIS, polyp, hemorrhoids    CORONARY ARTERY BYPASS GRAFT N/A 2/22/2021    Procedure: BK, MIDLINE STERNOTOMY, CORONARY ARTERY BYPASS X 7 UTILIZING THE LEFT INTERNAL MAMMARY ARTERY AND THE RIGHT SAPHENOUS VEIN, MITRAL VALVE REPAIR, PRP;  Surgeon: Jr Shyam Echavarria MD;  Location: [facility] MAIN OR;  Service: Cardiothoracic;  Laterality: N/A;    ENDOSCOPY N/A 8/16/2018    Erosive gastritis, diverticulosis    ENDOSCOPY N/A 2/26/2021    Procedure: ESOPHAGOGASTRODUODENOSCOPY AT BEDSIDE WITH HOT SNARE POLYPECTOMY AND CLIP PLACEMENT X1;  Surgeon: Jono Laboy MD;  Location: [facility] ENDOSCOPY;  Service: Gastroenterology;  Laterality: N/A;  PRE-  GI BLEED  POST- GASTRITIS, ESOPHAGITIS, POLYP    ENDOSCOPY N/A 12/7/2021    Procedure: ESOPHAGOGASTRODUODENOSCOPY with COLD  BIOPSIES;  Surgeon: Edgar Allen MD;  Location: [facility] ENDOSCOPY;  Service: Gastroenterology;  Laterality: N/A;  IRON DEFICIENCY ANEMIA, H/O ULCERATIVE ESOPHAGITIS  --GASTRITIS, DUODENAL POLYP    HERNIA REPAIR      SHOULDER CLOSED REDUCTION Right 3/16/2018    Procedure: RIGHT SHOULDER CLOSED REDUCTION;  Surgeon: Kj Garcia MD;  Location: [facility] OR;  Service: Orthopedics    TONSILLECTOMY      TOTAL SHOULDER ARTHROPLASTY W/ DISTAL CLAVICLE EXCISION Right 3/22/2018    Procedure: Reverse Total Shoulder Arthroplsty;  Surgeon: Kj Garcia MD;  Location: [facility] OR;  Service: Orthopedics    TRANSESOPHAGEAL ECHOCARDIOGRAM (BK) N/A 2/22/2021    Procedure: TRANSESOPHAGEAL ECHOCARDIOGRAM;  Surgeon: Jr Shyam Echavarria MD;  Location: [facility] OR;  Service: Cardiothoracic;  Laterality: N/A;        Current Outpatient Medications on File Prior to Visit   Medication Sig Dispense Refill    acetaminophen (TYLENOL) 500 MG tablet Take 2 tablets by mouth 3 (Three) Times a Day. (Patient taking differently: Take 2 tablets by mouth 3 (Three) Times a Day As Needed.)      aspirin 81 MG EC tablet Take 1 tablet by mouth Daily. 90  tablet 1    atorvastatin (LIPITOR) 10 MG tablet Take 1 tablet by mouth Daily. 30 tablet 11    Cholecalciferol 50 MCG (2000 UT) capsule Take 1 capsule by mouth Daily.      Ferrous Gluconate (IRON 27 PO) Take 27 mg by mouth 3 (Three) Times a Week.      fluticasone (CUTIVATE) 0.05 % cream Apply 1 application topically to the appropriate area as directed 2 (Two) Times a Day. (Patient taking differently: Apply 1 application  topically to the appropriate area as directed As Needed.) 30 g 0    furosemide (LASIX) 40 MG tablet Take 1 tablet by mouth Daily. 90 tablet 2    metoprolol succinate XL (TOPROL-XL) 25 MG 24 hr tablet Take 1 tablet by mouth Daily. 90 tablet 3    nitroglycerin (NITROSTAT) 0.4 MG SL tablet 1 under the tongue as needed for angina, may repeat q5mins for up three doses 25 tablet 1    Omeprazole Magnesium (PRILOSEC OTC PO) Take 20 mg by mouth Daily.      potassium chloride 10 MEQ CR tablet Take 1 tablet by mouth Daily. 90 tablet 2    Psyllium (METAMUCIL FIBER PO) Take  by mouth As Needed.      sucralfate (CARAFATE) 1 g tablet As Needed.      warfarin (COUMADIN) 2 MG tablet Take one-half tablet (1 mg) by mouth Monday and Thursday, and take one tablet (2 mg) all other days or as directed. 80 tablet 1     No current facility-administered medications on file prior to visit.        ALLERGIES:  No Known Allergies     Social History     Socioeconomic History    Marital status:    Tobacco Use    Smoking status: Never    Smokeless tobacco: Never   Vaping Use    Vaping Use: Never used   Substance and Sexual Activity    Alcohol use: Yes     Comment: occasional    Drug use: Never    Sexual activity: Defer        Family History   Problem Relation Age of Onset    Stroke Mother     Cancer Father 56        bladder    Heart attack Father     Malig Hyperthermia Neg Hx         Review of Systems   Constitutional: Negative.    HENT: Negative.     Eyes: Negative.    Respiratory: Negative.     Cardiovascular: Negative.   "  Gastrointestinal: Negative.    Musculoskeletal: Negative.    Allergic/Immunologic: Negative.    Neurological:  Positive for dizziness.   Hematological: Negative.    Psychiatric/Behavioral: Negative.   ROS unchanged-8/11/2023    Objective     Vitals:    08/11/23 1015   BP: 149/70   Pulse: 88   Resp: 17   Temp: 98.4 øF (36.9 øC)   TempSrc: Temporal   SpO2: 99%   Weight: 80.6 kg (177 lb 11.2 oz)   Height: 170.2 cm (67.01\")   PainSc: 0-No pain         8/11/2023    10:18 AM   Current Status   ECOG score 0       Physical Exam    CONSTITUTIONAL: pleasant well-developed adult man  HEENT: no icterus, no thrush, moist membranes  LYMPH: no cervical or supraclavicular lad  CV: RRR, S1S2, no murmur  RESP: cta bilat, no wheezing, no rales  GI: soft, non-tender, no splenomegaly, +bs  MUSC: no edema, normal gait  NEURO: alert and oriented x3, normal strength  PSYCH: normal mood and affect  Exam is unchanged-8/11/2023 normal 13.2  RECENT LABS:  Hematology WBC   Date Value Ref Range Status   08/11/2023 7.27 3.40 - 10.80 10*3/mm3 Final   11/23/2022 7.94 3.40 - 10.80 10*3/mm3 Final     RBC   Date Value Ref Range Status   08/11/2023 4.08 (L) 4.14 - 5.80 10*6/mm3 Final   11/23/2022 3.73 (L) 4.14 - 5.80 10*6/mm3 Final     Hemoglobin   Date Value Ref Range Status   08/11/2023 13.2 13.0 - 17.7 g/dL Final     Hematocrit   Date Value Ref Range Status   08/11/2023 41.8 37.5 - 51.0 % Final     Platelets   Date Value Ref Range Status   08/11/2023 165 140 - 450 10*3/mm3 Final        Lab Results   Component Value Date    GLUCOSE 99 05/25/2022    BUN 31 (H) 05/25/2022    CREATININE 1.74 (H) 05/25/2022    EGFRIFNONA 40 (L) 01/24/2022    EGFRIFAFRI 41 (L) 10/21/2021    BCR 18 05/25/2022    K 4.2 05/25/2022    CO2 24 05/25/2022    CALCIUM 9.0 05/25/2022    PROTENTOTREF 6.8 11/23/2022    ALBUMIN 3.80 11/23/2022    LABIL2 1.2 06/30/2022    AST 13 11/23/2022    ALT 12 11/23/2022         Assessment & Plan     *Chronic macrocytic anemia with hemoglobin " currently normal 13.2   B12/folate previously normal  currently taking oral iron every other day  Negative SPEP/SANIA and hemolysis labs June 2022    *Mild thrombocytopenia-platelets normal today 165  Platelet count adequate for anticoagulation  Previous IPF normal June 2022    *CKD 3B- followed by nephrology    *Atrial fibrillation, paroxysmal on chronic anticoagulation with Coumadin    Hematology plan/recommendations:  The patient's anemia is probably partially in part to his chronic kidney disease with decreased erythropoietin production although oftentimes this is a microcytic anemia  Continue oral iron every other day pending today's ferritin iron profile  Check B12 today  6-month follow-up CBC ferritin iron profile

## 2023-08-09 ENCOUNTER — ANTICOAGULATION VISIT (OUTPATIENT)
Dept: PHARMACY | Facility: HOSPITAL | Age: 78
End: 2023-08-09
Payer: MEDICARE

## 2023-08-09 DIAGNOSIS — I48.0 PAF (PAROXYSMAL ATRIAL FIBRILLATION): Primary | ICD-10-CM

## 2023-08-09 LAB
INR PPP: 2.4 (ref 0.91–1.09)
PROTHROMBIN TIME: 28.4 SECONDS (ref 10–13.8)

## 2023-08-09 PROCEDURE — 36416 COLLJ CAPILLARY BLOOD SPEC: CPT

## 2023-08-09 PROCEDURE — 85610 PROTHROMBIN TIME: CPT

## 2023-08-09 NOTE — PROGRESS NOTES
Anticoagulation Clinic Progress Note    Anticoagulation Summary  As of 2023      INR goal:  2.0-3.0   TTR:  61.5 % (2.3 y)   INR used for dosin.4 (2023)   Warfarin maintenance plan:  1 mg every Mon, Thu; 2 mg all other days   Weekly warfarin total:  12 mg   No change documented:  Velia Salmeron   Plan last modified:  Eduar Weeks, PharmD (8/10/2022)   Next INR check:  2023   Priority:  High   Target end date:      Indications    PAF (paroxysmal atrial fibrillation) [I48.0]                 Anticoagulation Episode Summary       INR check location:      Preferred lab:      Send INR reminders to:   [facility]    Comments:            Anticoagulation Care Providers       Provider Role Specialty Phone number    Jimena Singer MD Referring Cardiology [phone]            Clinic Interview:  Patient Findings     Negatives:  Signs/symptoms of thrombosis, Signs/symptoms of bleeding,   Laboratory test error suspected, Change in health, Change in alcohol use,   Change in activity, Upcoming invasive procedure, Emergency department   visit, Upcoming dental procedure, Missed doses, Extra doses, Change in   medications, Change in diet/appetite, Hospital admission, Bruising, Other   complaints      Clinical Outcomes     Negatives:  Major bleeding event, Thromboembolic event,   Anticoagulation-related hospital admission, Anticoagulation-related ED   visit, Anticoagulation-related fatality        INR History:      2023     2:30 PM 2023     1:45 PM 5/3/2023     2:45 PM 2023     8:45 AM 2023     2:00 PM 2023     2:00 PM 2023     1:45 PM   Anticoagulation Monitoring   INR 2.7 2.6 2.6 2.7 2.2 1.8 2.4   INR Date 2023 2023 5/3/2023 2023 2023 2023 2023   INR Goal 2.0-3.0 2.0-3.0 2.0-3.0 2.0-3.0 2.0-3.0 2.0-3.0 2.0-3.0   Trend Same Same Same Same Same Same Same   Last Week Total 12 mg 12 mg 12 mg 12 mg 12 mg 12 mg 12 mg   Next Week Total 12 mg  12 mg 12 mg 12 mg 12 mg 13 mg 12 mg   Sun 2 mg 2 mg 2 mg 2 mg 2 mg 2 mg 2 mg   Mon 1 mg 1 mg 1 mg 1 mg 1 mg 1 mg 1 mg   Tue 2 mg 2 mg 2 mg 2 mg 2 mg 2 mg 2 mg   Wed 2 mg 2 mg 2 mg 2 mg 2 mg 3 mg (7/26); Otherwise 2 mg 2 mg   Thu 1 mg 1 mg 1 mg 1 mg 1 mg 1 mg 1 mg   Fri 2 mg 2 mg 2 mg 2 mg 2 mg 2 mg 2 mg   Sat 2 mg 2 mg 2 mg 2 mg 2 mg 2 mg 2 mg   Visit Report     Report         Plan:  1. INR is therapeutic today- see above in Anticoagulation Summary.   Will instruct Rodolfo Grover to continue their warfarin regimen- see above in Anticoagulation Summary.  2. Follow up in 4 weeks.  3. Patient declines warfarin refills.  4. Verbal and written information provided. Patient expresses understanding and has no further questions at this time.    Velia Salmeron

## 2023-08-11 ENCOUNTER — OFFICE VISIT (OUTPATIENT)
Dept: ONCOLOGY | Facility: CLINIC | Age: 78
End: 2023-08-11
Payer: MEDICARE

## 2023-08-11 ENCOUNTER — LAB (OUTPATIENT)
Dept: LAB | Facility: HOSPITAL | Age: 78
End: 2023-08-11
Payer: MEDICARE

## 2023-08-11 ENCOUNTER — TELEPHONE (OUTPATIENT)
Dept: ONCOLOGY | Facility: CLINIC | Age: 78
End: 2023-08-11

## 2023-08-11 VITALS
HEIGHT: 67 IN | HEART RATE: 88 BPM | RESPIRATION RATE: 17 BRPM | BODY MASS INDEX: 27.89 KG/M2 | OXYGEN SATURATION: 99 % | SYSTOLIC BLOOD PRESSURE: 149 MMHG | DIASTOLIC BLOOD PRESSURE: 70 MMHG | TEMPERATURE: 98.4 F | WEIGHT: 177.7 LBS

## 2023-08-11 DIAGNOSIS — N18.32 STAGE 3B CHRONIC KIDNEY DISEASE: ICD-10-CM

## 2023-08-11 DIAGNOSIS — D69.6 THROMBOCYTOPENIA: ICD-10-CM

## 2023-08-11 DIAGNOSIS — D53.9 MACROCYTIC ANEMIA: ICD-10-CM

## 2023-08-11 DIAGNOSIS — D69.6 THROMBOCYTOPENIA: Primary | ICD-10-CM

## 2023-08-11 LAB
BASOPHILS # BLD AUTO: 0.06 10*3/MM3 (ref 0–0.2)
BASOPHILS NFR BLD AUTO: 0.8 % (ref 0–1.5)
DEPRECATED RDW RBC AUTO: 47.8 FL (ref 37–54)
EOSINOPHIL # BLD AUTO: 0.2 10*3/MM3 (ref 0–0.4)
EOSINOPHIL NFR BLD AUTO: 2.8 % (ref 0.3–6.2)
ERYTHROCYTE [DISTWIDTH] IN BLOOD BY AUTOMATED COUNT: 12.7 % (ref 12.3–15.4)
FERRITIN SERPL-MCNC: 92.7 NG/ML (ref 30–400)
HCT VFR BLD AUTO: 41.8 % (ref 37.5–51)
HGB BLD-MCNC: 13.2 G/DL (ref 13–17.7)
IMM GRANULOCYTES # BLD AUTO: 0.14 10*3/MM3 (ref 0–0.05)
IMM GRANULOCYTES NFR BLD AUTO: 1.9 % (ref 0–0.5)
IRON 24H UR-MRATE: 107 MCG/DL (ref 59–158)
IRON SATN MFR SERPL: 32 % (ref 20–50)
LYMPHOCYTES # BLD AUTO: 1.32 10*3/MM3 (ref 0.7–3.1)
LYMPHOCYTES NFR BLD AUTO: 18.2 % (ref 19.6–45.3)
MCH RBC QN AUTO: 32.4 PG (ref 26.6–33)
MCHC RBC AUTO-ENTMCNC: 31.6 G/DL (ref 31.5–35.7)
MCV RBC AUTO: 102.5 FL (ref 79–97)
MONOCYTES # BLD AUTO: 0.6 10*3/MM3 (ref 0.1–0.9)
MONOCYTES NFR BLD AUTO: 8.3 % (ref 5–12)
NEUTROPHILS NFR BLD AUTO: 4.95 10*3/MM3 (ref 1.7–7)
NEUTROPHILS NFR BLD AUTO: 68 % (ref 42.7–76)
NRBC BLD AUTO-RTO: 0 /100 WBC (ref 0–0.2)
PLATELET # BLD AUTO: 165 10*3/MM3 (ref 140–450)
PMV BLD AUTO: 9.9 FL (ref 6–12)
RBC # BLD AUTO: 4.08 10*6/MM3 (ref 4.14–5.8)
TIBC SERPL-MCNC: 330 MCG/DL (ref 298–536)
TRANSFERRIN SERPL-MCNC: 236 MG/DL (ref 200–360)
VIT B12 BLD-MCNC: >2000 PG/ML (ref 211–946)
WBC NRBC COR # BLD: 7.27 10*3/MM3 (ref 3.4–10.8)

## 2023-08-11 PROCEDURE — 36415 COLL VENOUS BLD VENIPUNCTURE: CPT

## 2023-08-11 PROCEDURE — 3078F DIAST BP <80 MM HG: CPT | Performed by: INTERNAL MEDICINE

## 2023-08-11 PROCEDURE — 3077F SYST BP >= 140 MM HG: CPT | Performed by: INTERNAL MEDICINE

## 2023-08-11 PROCEDURE — 85025 COMPLETE CBC W/AUTO DIFF WBC: CPT

## 2023-08-11 PROCEDURE — 1126F AMNT PAIN NOTED NONE PRSNT: CPT | Performed by: INTERNAL MEDICINE

## 2023-08-11 PROCEDURE — 82728 ASSAY OF FERRITIN: CPT

## 2023-08-11 PROCEDURE — 82607 VITAMIN B-12: CPT | Performed by: INTERNAL MEDICINE

## 2023-08-11 PROCEDURE — 83540 ASSAY OF IRON: CPT

## 2023-08-11 PROCEDURE — 99213 OFFICE O/P EST LOW 20 MIN: CPT | Performed by: INTERNAL MEDICINE

## 2023-08-11 PROCEDURE — 84466 ASSAY OF TRANSFERRIN: CPT

## 2023-08-11 NOTE — TELEPHONE ENCOUNTER
----- Message from Juan David Solis MD sent at 8/11/2023 10:49 AM EDT -----  Please let him know iron level looks good can continue oral iron every other day    Patient v/u.

## 2023-08-23 ENCOUNTER — HOSPITAL ENCOUNTER (EMERGENCY)
Facility: HOSPITAL | Age: 78
Discharge: HOME | End: 2023-08-23
Attending: EMERGENCY MEDICINE
Payer: MEDICARE

## 2023-08-23 VITALS
TEMPERATURE: 97.7 F | SYSTOLIC BLOOD PRESSURE: 161 MMHG | HEIGHT: 66 IN | BODY MASS INDEX: 28.12 KG/M2 | WEIGHT: 175 LBS | DIASTOLIC BLOOD PRESSURE: 73 MMHG | OXYGEN SATURATION: 99 % | HEART RATE: 70 BPM | RESPIRATION RATE: 18 BRPM

## 2023-08-23 DIAGNOSIS — S46.212A STRAIN OF LEFT BICEPS, INITIAL ENCOUNTER: ICD-10-CM

## 2023-08-23 DIAGNOSIS — R58 ECCHYMOSIS: Primary | ICD-10-CM

## 2023-08-23 LAB
ANION GAP SERPL CALCULATED.3IONS-SCNC: 8.6 MMOL/L (ref 5–15)
BASOPHILS # BLD AUTO: 0.04 10*3/MM3 (ref 0–0.2)
BASOPHILS NFR BLD AUTO: 0.6 % (ref 0–1.5)
BUN SERPL-MCNC: 33 MG/DL (ref 8–23)
BUN/CREAT SERPL: 18.1 (ref 7–25)
CALCIUM SPEC-SCNC: 9.1 MG/DL (ref 8.6–10.5)
CHLORIDE SERPL-SCNC: 108 MMOL/L (ref 98–107)
CO2 SERPL-SCNC: 22.4 MMOL/L (ref 22–29)
CREAT SERPL-MCNC: 1.82 MG/DL (ref 0.76–1.27)
DEPRECATED RDW RBC AUTO: 49.1 FL (ref 37–54)
EGFRCR SERPLBLD CKD-EPI 2021: 37.1 ML/MIN/1.73
EOSINOPHIL # BLD AUTO: 0.23 10*3/MM3 (ref 0–0.4)
EOSINOPHIL NFR BLD AUTO: 3.3 % (ref 0.3–6.2)
ERYTHROCYTE [DISTWIDTH] IN BLOOD BY AUTOMATED COUNT: 13 % (ref 12.3–15.4)
GLUCOSE SERPL-MCNC: 112 MG/DL (ref 65–99)
HCT VFR BLD AUTO: 39.4 % (ref 37.5–51)
HGB BLD-MCNC: 12.3 G/DL (ref 13–17.7)
IMM GRANULOCYTES # BLD AUTO: 0.09 10*3/MM3 (ref 0–0.05)
IMM GRANULOCYTES NFR BLD AUTO: 1.3 % (ref 0–0.5)
INR PPP: 2.7
LYMPHOCYTES # BLD AUTO: 1.17 10*3/MM3 (ref 0.7–3.1)
LYMPHOCYTES NFR BLD AUTO: 16.7 % (ref 19.6–45.3)
MCH RBC QN AUTO: 31.7 PG (ref 26.6–33)
MCHC RBC AUTO-ENTMCNC: 31.2 G/DL (ref 31.5–35.7)
MCV RBC AUTO: 101.5 FL (ref 79–97)
MONOCYTES # BLD AUTO: 0.64 10*3/MM3 (ref 0.1–0.9)
MONOCYTES NFR BLD AUTO: 9.1 % (ref 5–12)
NEUTROPHILS NFR BLD AUTO: 4.83 10*3/MM3 (ref 1.7–7)
NEUTROPHILS NFR BLD AUTO: 69 % (ref 42.7–76)
NT-PROBNP SERPL-MCNC: 1829 PG/ML (ref 0–1800)
PLATELET # BLD AUTO: 151 10*3/MM3 (ref 140–450)
PMV BLD AUTO: 10.4 FL (ref 6–12)
POTASSIUM SERPL-SCNC: 4 MMOL/L (ref 3.5–5.2)
PROTHROMBIN TIME: 28.7 SECONDS (ref 11–15)
RBC # BLD AUTO: 3.88 10*6/MM3 (ref 4.14–5.8)
SODIUM SERPL-SCNC: 139 MMOL/L (ref 136–145)
WBC NRBC COR # BLD: 7 10*3/MM3 (ref 3.4–10.8)

## 2023-08-23 PROCEDURE — 85610 PROTHROMBIN TIME: CPT

## 2023-08-23 PROCEDURE — 36415 COLL VENOUS BLD VENIPUNCTURE: CPT

## 2023-08-23 PROCEDURE — 85025 COMPLETE CBC W/AUTO DIFF WBC: CPT | Performed by: PHYSICIAN ASSISTANT

## 2023-08-23 PROCEDURE — 99282 EMERGENCY DEPT VISIT SF MDM: CPT

## 2023-08-23 PROCEDURE — 80048 BASIC METABOLIC PNL TOTAL CA: CPT | Performed by: PHYSICIAN ASSISTANT

## 2023-08-23 PROCEDURE — 83880 ASSAY OF NATRIURETIC PEPTIDE: CPT | Performed by: PHYSICIAN ASSISTANT

## 2023-08-23 NOTE — FSED PROVIDER NOTE
Subjective   History of Present Illness  Patient is an 78-year-old gentleman who takes Coumadin.  He was putting suitcases in a car on Friday and twisted his arm and felt the muscles tear in his left bicep.  He was sore then sore on Saturday but did not given problems.  He started noticing some bruising that gradually worsened in the left upper arm.  He is not having any pain.  He is concerned that he is bleeding internally    Review of Systems   Musculoskeletal:  Negative for arthralgias and joint swelling.   Skin:  Positive for color change.     Past Medical History:   Diagnosis Date    Acute blood loss anemia     Arthritis     Atrial fibrillation     Bilateral lower extremity edema     Bradycardia following surgery     CAD (coronary artery disease)     Chest discomfort     Colon polyp     Elevated troponin     Focal motor seizure     Generalized tonic-clonic seizure     GERD (gastroesophageal reflux disease)     Hypertension     Iritis of right eye     Ischemic cardiomyopathy     Mitral valve insufficiency     Myocardial infarction     Peptic ulceration     Post-ictal state     Pulmonary hypertension     PVC (premature ventricular contraction)     Rheumatic fever     Rheumatic fever     as child    S/P CABG x 7     S/P mitral valve repair     Severe obstructive sleep apnea     Shoulder fracture, right        No Known Allergies    Past Surgical History:   Procedure Laterality Date    CARDIAC CATHETERIZATION N/A 2/19/2021    Procedure: Coronary angiography;  Surgeon: Bry Nails MD;  Location: [facility] CATH INVASIVE LOCATION;  Service: Cardiovascular;  Laterality: N/A;    CARDIAC CATHETERIZATION N/A 2/19/2021    Procedure: Left heart cath;  Surgeon: Bry Nails MD;  Location: [facility] CATH INVASIVE LOCATION;  Service: Cardiovascular;  Laterality: N/A;    CARDIAC CATHETERIZATION N/A 2/19/2021    Procedure: Right Heart Cath;  Surgeon: Bry Nails MD;  Location: [facility] CATH INVASIVE  LOCATION;  Service: Cardiovascular;  Laterality: N/A;    CARDIAC CATHETERIZATION N/A 2/19/2021    Procedure: Left ventriculography;  Surgeon: Bry Nails MD;  Location: [facility] CATH INVASIVE LOCATION;  Service: Cardiovascular;  Laterality: N/A;    COLONOSCOPY N/A 12/7/2021    Procedure: COLONOSCOPY to cecum with cold biopsy polypectomy;  Surgeon: Edgar Allen MD;  Location: [facility] ENDOSCOPY;  Service: Gastroenterology;  Laterality: N/A;  IRON DEFICIENCY ANEMIA, H/O COLON POLYPS  --   DIVERTICULOSIS, polyp, hemorrhoids    CORONARY ARTERY BYPASS GRAFT N/A 2/22/2021    Procedure: BK, MIDLINE STERNOTOMY, CORONARY ARTERY BYPASS X 7 UTILIZING THE LEFT INTERNAL MAMMARY ARTERY AND THE RIGHT SAPHENOUS VEIN, MITRAL VALVE REPAIR, PRP;  Surgeon: Jr Shyam Echavarria MD;  Location: [facility] MAIN OR;  Service: Cardiothoracic;  Laterality: N/A;    ENDOSCOPY N/A 8/16/2018    Erosive gastritis, diverticulosis    ENDOSCOPY N/A 2/26/2021    Procedure: ESOPHAGOGASTRODUODENOSCOPY AT BEDSIDE WITH HOT SNARE POLYPECTOMY AND CLIP PLACEMENT X1;  Surgeon: Jono Laboy MD;  Location: [facility] ENDOSCOPY;  Service: Gastroenterology;  Laterality: N/A;  PRE-  GI BLEED  POST- GASTRITIS, ESOPHAGITIS, POLYP    ENDOSCOPY N/A 12/7/2021    Procedure: ESOPHAGOGASTRODUODENOSCOPY with COLD  BIOPSIES;  Surgeon: Edgar Allen MD;  Location: [facility] ENDOSCOPY;  Service: Gastroenterology;  Laterality: N/A;  IRON DEFICIENCY ANEMIA, H/O ULCERATIVE ESOPHAGITIS  --GASTRITIS, DUODENAL POLYP    HERNIA REPAIR      SHOULDER CLOSED REDUCTION Right 3/16/2018    Procedure: RIGHT SHOULDER CLOSED REDUCTION;  Surgeon: Kj Garcia MD;  Location: [facility] MAIN OR;  Service: Orthopedics    TONSILLECTOMY      TOTAL SHOULDER ARTHROPLASTY W/ DISTAL CLAVICLE EXCISION Right 3/22/2018    Procedure: Reverse Total Shoulder Arthroplsty;  Surgeon: Kj Garcia MD;  Location: [facility] MAIN OR;  Service: Orthopedics    TRANSESOPHAGEAL ECHOCARDIOGRAM (BK) N/A  2/22/2021    Procedure: TRANSESOPHAGEAL ECHOCARDIOGRAM;  Surgeon: Jr Shyam Echavarria MD;  [facility] OR;  Service: Cardiothoracic;  Laterality: N/A;       Family History   Problem Relation Age of Onset    Stroke Mother     Cancer Father 56        bladder    Heart attack Father     Malig Hyperthermia Neg Hx        Social History     Socioeconomic History    Marital status:    Tobacco Use    Smoking status: Never    Smokeless tobacco: Never   Vaping Use    Vaping Use: Never used   Substance and Sexual Activity    Alcohol use: Yes     Comment: occasional    Drug use: Never    Sexual activity: Defer           Objective   Physical Exam  Vitals reviewed.   Constitutional:       Appearance: Normal appearance. He is normal weight.   Eyes:      Conjunctiva/sclera: Conjunctivae normal.   Cardiovascular:      Rate and Rhythm: Normal rate and regular rhythm.      Pulses: Normal pulses.   Pulmonary:      Effort: Pulmonary effort is normal.   Musculoskeletal:         General: Swelling present. No tenderness. Normal range of motion.   Skin:     Capillary Refill: Capillary refill takes less than 2 seconds.      Findings: Bruising present.   Neurological:      General: No focal deficit present.      Mental Status: He is alert.   Psychiatric:         Mood and Affect: Mood normal.         Behavior: Behavior normal.       Procedures           ED Course  ED Course as of 08/23/23 1343   Wed Aug 23, 2023   1052 proBNP(!): 1,829.0 [AN]      ED Course User Index  [AN] Giulia Bradshaw PA-C                                           Medical Decision Making  Patient does have excessive amount of bruising on the right upper forearm.  However it is soft.  He is not having any pain.  We talked about CT for rule out extravasation.  His Coumadin is normal at 2.7.  But he has chronic kidney disease and his GFR is 40.      We talked about risk versus benefits of doing the CT scan with poor kidney function.  Because he is not having  pain and it is soft at this time.  I think it is worth waiting on the CT scan to see how symptoms develop.  Patient is in agreement with this.  He does have family doctor for follow-up.  He can return to emergency room if he has any concerns.    Problems Addressed:  Ecchymosis: complicated acute illness or injury  Strain of left biceps, initial encounter: complicated acute illness or injury    Amount and/or Complexity of Data Reviewed  Labs: ordered. Decision-making details documented in ED Course.        Final diagnoses:   Ecchymosis   Strain of left biceps, initial encounter       ED Disposition  ED Disposition       ED Disposition   Discharge    Condition   Stable    Comment   --               No follow-up provider specified.       Medication List        Changed      acetaminophen 500 MG tablet  Commonly known as: TYLENOL  Take 2 tablets by mouth 3 (Three) Times a Day.  What changed:   when to take this  reasons to take this     fluticasone 0.05 % cream  Commonly known as: CUTIVATE  Apply 1 application topically to the appropriate area as directed 2 (Two) Times a Day.  What changed:   when to take this  reasons to take this

## 2023-09-05 ENCOUNTER — OFFICE VISIT (OUTPATIENT)
Dept: CARDIOLOGY | Facility: CLINIC | Age: 78
End: 2023-09-05
Payer: MEDICARE

## 2023-09-05 VITALS
BODY MASS INDEX: 28.87 KG/M2 | SYSTOLIC BLOOD PRESSURE: 120 MMHG | HEIGHT: 66 IN | WEIGHT: 179.6 LBS | DIASTOLIC BLOOD PRESSURE: 60 MMHG | HEART RATE: 57 BPM

## 2023-09-05 DIAGNOSIS — I25.5 ISCHEMIC CARDIOMYOPATHY: ICD-10-CM

## 2023-09-05 DIAGNOSIS — I44.0 1ST DEGREE AV BLOCK: ICD-10-CM

## 2023-09-05 DIAGNOSIS — I27.20 PULMONARY HYPERTENSION: ICD-10-CM

## 2023-09-05 DIAGNOSIS — I47.29 VENTRICULAR TACHYCARDIA (PAROXYSMAL): ICD-10-CM

## 2023-09-05 DIAGNOSIS — I48.0 PAF (PAROXYSMAL ATRIAL FIBRILLATION): Primary | ICD-10-CM

## 2023-09-05 DIAGNOSIS — I44.1 WENCKEBACH SECOND DEGREE AV BLOCK: ICD-10-CM

## 2023-09-05 DIAGNOSIS — Z95.1 S/P CABG (CORONARY ARTERY BYPASS GRAFT): ICD-10-CM

## 2023-09-05 DIAGNOSIS — E78.5 HYPERLIPIDEMIA LDL GOAL <70: ICD-10-CM

## 2023-09-05 DIAGNOSIS — I10 ESSENTIAL HYPERTENSION: ICD-10-CM

## 2023-09-05 DIAGNOSIS — I25.10 CORONARY ARTERY DISEASE INVOLVING NATIVE CORONARY ARTERY OF NATIVE HEART WITHOUT ANGINA PECTORIS: ICD-10-CM

## 2023-09-05 DIAGNOSIS — Z98.890 H/O MITRAL VALVE REPAIR: ICD-10-CM

## 2023-09-05 DIAGNOSIS — I49.3 FREQUENT PVCS: ICD-10-CM

## 2023-09-05 PROCEDURE — 93000 ELECTROCARDIOGRAM COMPLETE: CPT | Performed by: NURSE PRACTITIONER

## 2023-09-05 PROCEDURE — 3074F SYST BP LT 130 MM HG: CPT | Performed by: NURSE PRACTITIONER

## 2023-09-05 PROCEDURE — 99214 OFFICE O/P EST MOD 30 MIN: CPT | Performed by: NURSE PRACTITIONER

## 2023-09-05 PROCEDURE — 1160F RVW MEDS BY RX/DR IN RCRD: CPT | Performed by: NURSE PRACTITIONER

## 2023-09-05 PROCEDURE — 3078F DIAST BP <80 MM HG: CPT | Performed by: NURSE PRACTITIONER

## 2023-09-05 PROCEDURE — 1159F MED LIST DOCD IN RCRD: CPT | Performed by: NURSE PRACTITIONER

## 2023-09-05 NOTE — PROGRESS NOTES
Date of Office Visit: 2023  Encounter Provider: NKECHI Madrigal  Place of Service: [facility] CARDIOLOGY  Patient Name: Rodolfo rGover  :1945    Chief complaint  Follow-up PVCs, second-degree AV block, hypertension, pulmonary hypertension, coronary artery disease    History of Present Illness  Patient is an 78-year-old male patient of Dr. Singer.  Past medical history includes rheumatic fever, GE reflux disease who was seen at [facility] in 2018 after humeral fracture following a fall. At that time he was noted to have ventricular bigeminy with normal potassium and magnesium levels. He was hypertensive at the time. He had an echocardiogram that showed normal systolic function with normal diastolic function, mild left atrial enlargement saline study was indeterminate. There was aortic valve sclerosis with mild aortic regurgitation and trivial mitral and tricuspid regurgitation with mild pulmonary hypertension with an RV systolic pressure 42 mmHg. A stress perfusion study was negative for ischemia. Follow-up echocardiogram in 2020 showed an ejection fraction of 55% with normal diastolic function, mild aortic mitral valve regurgitation, mild to moderate tricuspid regurgitation with an RV systolic pressure increased further to 48 mmHg. He presented in 2020 with complaints of chest discomfort and non-STEMI. Echo showed new wall motion abnormalities as well as pulmonary hypertension( RVSP 61 mmHg). Subsequent cardiac catheterization showed multivessel coronary artery disease. Subsequently underwent CABG x7 vessels with mitral valve repair. Postoperative course was complicated by hemoptysis, esophageal esophagitis, atrial fibrillation associated also with intermittent complete heart block. He was also felt to have had a seizure and was started on Keppra. He also had acute renal insufficiency. He also had induration and possible cellulitis of his right  thigh leg wound. Echocardiogram 9/2021 showed ejection fraction 52% with mild left ventricle hypertrophy, diastolic dysfunction present mild aortic valve regurgitation noted trivial mitral valve regurgitation with mitral valve repair. Mild tricuspid regurgitation with an RV systolic pressure 40 mmHg. Holter monitor 3/16/2023 showed predominant sinus rhythm with rare PACs, no evidence of atrial arrhythmia, frequent PVCs at 11.9% of monitored time as well as 7 episodes of ventricular tachycardia with the longest lasting 4 beats.  There was also frequent 2-1 AV block that appeared to be predominantly Mobitz type I.  There was one 3 ms pause seen. Echocardiogram 5/15/2023 shows LVEF 53.2%, moderately dilated LV cavity, indeterminate diastolic function, mild thickening of the aortic valve with mild to moderate aortic regurgitation and no stenosis, severe mitral annular calcification with mild mitral regurgitation and no stenosis, mild tricuspid regurgitation with RVSP 49 mmHg. He visited with Dr Barron with EP service on 6/12/2023 and it was decided to defer pacemaker placement for now.    Interval history  Patient presents today for routine follow-up.  I will visit with him for the first time today and have reviewed his medical record.  Since last visit, he has overall been doing well.  He denies palpitations, shortness of breath, edema, dizziness, chest pain or chest pressure, fatigue, syncope or presyncope.  He is walking at least a mile daily and denies exertional symptoms.  Blood pressure today is at goal and he reports similar readings at home.  He is tolerating Coumadin well with no falls or abnormal bleeding. He had recent left bicep injury with some residual bruising that is slowly resolving per his report.    Past Medical History:   Diagnosis Date    Acute blood loss anemia     Arthritis     Atrial fibrillation     Bilateral lower extremity edema     Bradycardia following surgery     CAD (coronary artery  disease)     Chest discomfort     Colon polyp     Elevated troponin     Focal motor seizure     Generalized tonic-clonic seizure     GERD (gastroesophageal reflux disease)     Hypertension     Iritis of right eye     Ischemic cardiomyopathy     Mitral valve insufficiency     Myocardial infarction     Peptic ulceration     Post-ictal state     Pulmonary hypertension     PVC (premature ventricular contraction)     Rheumatic fever     Rheumatic fever     as child    S/P CABG x 7     S/P mitral valve repair     Severe obstructive sleep apnea     Shoulder fracture, right      Past Surgical History:   Procedure Laterality Date    CARDIAC CATHETERIZATION N/A 2/19/2021    Procedure: Coronary angiography;  Surgeon: Bry Nails MD;  Location:  [facility] C[facility] INVASIVE [facility];  Service: Cardiovascular;  Laterality: N/A;    CARDIAC CATHETERIZATION N/A 2/19/2021    Procedure: Left heart cath;  Surgeon: Bry Nails MD;  Location: [facility] CATH INVASIVE LOCATION;  Service: Cardiovascular;  Laterality: N/A;    CARDIAC CATHETERIZATION N/A 2/19/2021    Procedure: Right Heart Cath;  Surgeon: Bry Nails MD;  Location: [facility] CATH INVASIVE LOCATION;  Service: Cardiovascular;  Laterality: N/A;    CARDIAC CATHETERIZATION N/A 2/19/2021    Procedure: Left ventriculography;  Surgeon: Bry Nails MD;  Location: [facility] CATH INVASIVE LOCATION;  Service: Cardiovascular;  Laterality: N/A;    COLONOSCOPY N/A 12/7/2021    Procedure: COLONOSCOPY to cecum with cold biopsy polypectomy;  Surgeon: Edgar Allen MD;  Location: [facility] ENDOSCOPY;  Service: Gastroenterology;  Laterality: N/A;  IRON DEFICIENCY ANEMIA, H/O COLON POLYPS  --   DIVERTICULOSIS, polyp, hemorrhoids    CORONARY ARTERY BYPASS GRAFT N/A 2/22/2021    Procedure: BK, MIDLINE STERNOTOMY, CORONARY ARTERY BYPASS X 7 UTILIZING THE LEFT INTERNAL MAMMARY ARTERY AND THE RIGHT SAPHENOUS VEIN, MITRAL VALVE REPAIR, PRP;  Surgeon: Jr Shyam Echavarria MD;   Location: [facility] MAIN OR;  Service: Cardiothoracic;  Laterality: N/A;    ENDOSCOPY N/A 8/16/2018    Erosive gastritis, diverticulosis    ENDOSCOPY N/A 2/26/2021    Procedure: ESOPHAGOGASTRODUODENOSCOPY AT BEDSIDE WITH HOT SNARE POLYPECTOMY AND CLIP PLACEMENT X1;  Surgeon: Jono Laboy MD;  Location: [facility] ENDOSCOPY;  Service: Gastroenterology;  Laterality: N/A;  PRE-  GI BLEED  POST- GASTRITIS, ESOPHAGITIS, POLYP    ENDOSCOPY N/A 12/7/2021    Procedure: ESOPHAGOGASTRODUODENOSCOPY with COLD  BIOPSIES;  Surgeon: Edgar Allen MD;  Location: [facility] ENDOSCOPY;  Service: Gastroenterology;  Laterality: N/A;  IRON DEFICIENCY ANEMIA, H/O ULCERATIVE ESOPHAGITIS  --GASTRITIS, DUODENAL POLYP    HERNIA REPAIR      SHOULDER CLOSED REDUCTION Right 3/16/2018    Procedure: RIGHT SHOULDER CLOSED REDUCTION;  Surgeon: Kj Garcia MD;  Location: [facility] OR;  Service: Orthopedics    TONSILLECTOMY      TOTAL SHOULDER ARTHROPLASTY W/ DISTAL CLAVICLE EXCISION Right 3/22/2018    Procedure: Reverse Total Shoulder Arthroplsty;  Surgeon: Kj Garcia MD;  Location: [facility] OR;  Service: Orthopedics    TRANSESOPHAGEAL ECHOCARDIOGRAM (BK) N/A 2/22/2021    Procedure: TRANSESOPHAGEAL ECHOCARDIOGRAM;  Surgeon: Jr Shyam Echavarria MD;  Location: [facility] OR;  Service: Cardiothoracic;  Laterality: N/A;     Outpatient Medications Prior to Visit   Medication Sig Dispense Refill    acetaminophen (TYLENOL) 500 MG tablet Take 2 tablets by mouth 3 (Three) Times a Day. (Patient taking differently: Take 2 tablets by mouth 3 (Three) Times a Day As Needed.)      aspirin 81 MG EC tablet Take 1 tablet by mouth Daily. 90 tablet 1    atorvastatin (LIPITOR) 10 MG tablet Take 1 tablet by mouth Daily. 30 tablet 11    Cholecalciferol 50 MCG (2000 UT) capsule Take 1 capsule by mouth Daily.      Ferrous Gluconate (IRON 27 PO) Take 27 mg by mouth 3 (Three) Times a Week.      fluticasone (CUTIVATE) 0.05 % cream Apply 1 application  topically to the appropriate area as directed 2 (Two) Times a Day. (Patient taking differently: Apply 1 application  topically to the appropriate area as directed As Needed.) 30 g 0    furosemide (LASIX) 40 MG tablet Take 1 tablet by mouth Daily. 90 tablet 2    metoprolol succinate XL (TOPROL-XL) 25 MG 24 hr tablet Take 1 tablet by mouth Daily. 90 tablet 3    nitroglycerin (NITROSTAT) 0.4 MG SL tablet 1 under the tongue as needed for angina, may repeat q5mins for up three doses 25 tablet 1    Omeprazole Magnesium (PRILOSEC OTC PO) Take 20 mg by mouth Daily.      potassium chloride 10 MEQ CR tablet Take 1 tablet by mouth Daily. 90 tablet 2    Psyllium (METAMUCIL FIBER PO) Take  by mouth As Needed.      sucralfate (CARAFATE) 1 g tablet As Needed.      warfarin (COUMADIN) 2 MG tablet Take one-half tablet (1 mg) by mouth Monday and Thursday, and take one tablet (2 mg) all other days or as directed. 80 tablet 1     No facility-administered medications prior to visit.       Allergies as of 09/05/2023    (No Known Allergies)     Social History     Socioeconomic History    Marital status:    Tobacco Use    Smoking status: Never    Smokeless tobacco: Never   Vaping Use    Vaping Use: Never used   Substance and Sexual Activity    Alcohol use: Yes     Comment: occasional    Drug use: Never    Sexual activity: Defer     Family History   Problem Relation Age of Onset    Stroke Mother     Cancer Father 56        bladder    Heart attack Father     Malig Hyperthermia Neg Hx      Review of Systems   Constitutional: Negative for malaise/fatigue.   HENT:  Negative for nosebleeds.    Cardiovascular:  Negative for chest pain, claudication, dyspnea on exertion, leg swelling, near-syncope, orthopnea, palpitations, paroxysmal nocturnal dyspnea and syncope.   Respiratory:  Negative for shortness of breath.    Hematologic/Lymphatic: Negative for bleeding problem. Does not bruise/bleed easily.   Gastrointestinal:  Negative for  "hematemesis, hematochezia and melena.   Genitourinary:  Negative for hematuria.   Neurological:  Negative for brief paralysis, dizziness, headaches and light-headedness.   All other systems reviewed and are negative.     Objective:     Vitals:    09/05/23 1306   BP: 120/60   BP Location: Left arm   Patient Position: Sitting   Cuff Size: Adult   Pulse: 57   Weight: 81.5 kg (179 lb 9.6 oz)   Height: 167.6 cm (65.98\")     Body mass index is 29 kg/m².    Vitals reviewed.   Constitutional:       General: Not in acute distress.     Appearance: Well-developed and not in distress. Not diaphoretic.   HENT:      Head: Normocephalic.   Pulmonary:      Effort: Pulmonary effort is normal. No respiratory distress.      Breath sounds: Normal breath sounds. No wheezing. No rhonchi. No rales.   Cardiovascular:      Normal rate. Regular rhythm.      Murmurs: There is no murmur.      Comments: Bruising to LUE  Pulses:     Radial: 2+ bilaterally.  Edema:     Peripheral edema absent.   Skin:     General: Skin is warm and dry. There is no cyanosis.      Findings: No rash.   Neurological:      Mental Status: Alert and oriented to person, place, and time.   Psychiatric:         Behavior: Behavior normal. Behavior is cooperative.         Thought Content: Thought content normal.         Judgment: Judgment normal.     Lab Review:     Lab Results   Component Value Date     08/23/2023     05/25/2022    K 4.0 08/23/2023    K 4.2 05/25/2022     (H) 08/23/2023     05/25/2022    CO2 22.4 08/23/2023    CO2 24 05/25/2022    BUN 33 (H) 08/23/2023    BUN 31 (H) 05/25/2022    CREATININE 1.82 (H) 08/23/2023    CREATININE 1.74 (H) 05/25/2022    EGFRIFNONA 40 (L) 01/24/2022    EGFRIFNONA 41 (L) 12/07/2021    EGFRIFAFRI 41 (L) 10/21/2021    EGFRIFAFRI 65 06/10/2021    GLUCOSE 112 (H) 08/23/2023    GLUCOSE 99 05/25/2022    CALCIUM 9.1 08/23/2023    CALCIUM 9.0 05/25/2022    PROTENTOTREF 6.8 11/23/2022    PROTENTOTREF 6.4 06/30/2022    " ALBUMIN 3.80 11/23/2022    ALBUMIN 3.4 06/30/2022    BILITOT 0.3 11/23/2022    BILITOT <0.2 05/25/2022    AST 13 11/23/2022    AST 26 05/25/2022    ALT 12 11/23/2022    ALT 21 05/25/2022     Lab Results   Component Value Date    WBC 7.00 08/23/2023    WBC 7.27 08/11/2023    HGB 12.3 (L) 08/23/2023    HGB 13.2 08/11/2023    HCT 39.4 08/23/2023    HCT 41.8 08/11/2023    .5 (H) 08/23/2023    .5 (H) 08/11/2023     08/23/2023     08/11/2023     Lab Results   Component Value Date    PROBNP 1,829.0 (H) 08/23/2023    PROBNP 1,966.0 (H) 09/28/2021     Lab Results   Component Value Date    CKTOTAL 1,028 (H) 02/25/2021    TROPONINT 0.414 (C) 02/19/2021     Lab Results   Component Value Date    TSH 4.14 01/18/2019    TSH 4.960 (H) 08/16/2018      Lipid Panel          1/4/2023    09:09   Lipid Panel   Total Cholesterol 196    Triglycerides 78    HDL Cholesterol 63    VLDL Cholesterol 14    LDL Cholesterol  119           ECG 12 Lead    Date/Time: 9/5/2023 1:27 PM  Performed by: Kezia Dick APRN  Authorized by: Kezia Dick APRN   Comparison: compared with previous ECG   Similar to previous ECG  Rhythm: sinus rhythm  Rate: normal  BPM: 57  Conduction: 1st degree AV block  QRS axis: normal  Comments: Similar to prior.  No new ischemic changes      Assessment:       Diagnosis Plan   1. PAF (paroxysmal atrial fibrillation)        2. 1st degree AV block        3. Wenckebach second degree AV block        4. Frequent PVCs        5. Pulmonary hypertension        6. S/P CABG (coronary artery bypass graft)        7. Ventricular tachycardia (paroxysmal)        8. Coronary artery disease involving native coronary artery of native heart without angina pectoris        9. Essential hypertension        10. H/O mitral valve repair        11. Hyperlipidemia LDL goal <70        12. Ischemic cardiomyopathy          Plan:       1.  Postoperative atrial fibrillation with history of bradycardia  postoperatively.  EKG shows sinus rhythm with prolonged MA with first-degree AV block. Monitor study with frequent PVCs, 2-1 AV block, and 3 ms pause. Now off AV letha blocker therapy. Has seen Dr. Barron with decision to defer PPM for now.  2.  History of non-STEMI with subsequent multivessel CABG with cardiomyopathy 2/2021.  Clinically doing well.  Continue risk factor modification.  3.  Status post mitral valve repair, as above.  He is aware of and does follow SBE prophylaxis.  4.  Postoperative hematemesis with recent EGD showing grade D esophagitis.  No recurrent problems.  On PPI  5.  Chronic renal insufficiency.  Renal labs stable at last check.  This is managed by Dr. Nagy.  6.  Superficial vein thrombosis, clinically seems improved by description.  Anticoagulated with warfarin, which he is tolerating well. Follows with anticoagulation clinic.  7.  Pulmonary hypertension, improved and mild on echo 9/2021  8.  Severe obstructive sleep apnea, on CPAP.  Reports compliance and follows with sleep medicine  9.  Dyslipidemia.  On low dose statin.  Last lipids not at goal.  Recommend goal LDL less than 70, HDL greater than 50, triglycerides less than 150.  This is managed by PCP    Time Spent: I spent 30 minutes caring for Rodolfo on this date of service. This time includes time spent by me in the following activities: preparing for the visit, reviewing tests, performing a medically appropriate examination and/or evaluation, counseling and educating the patient/family/caregiver, ordering medications, tests, or procedures, and documenting information in the medical record.   I spent 1 minutes on the separately reported service of ECG. This time is not included in the time used to support the E/M service also reported today.        Your medication list            Accurate as of September 5, 2023  1:30 PM. If you have any questions, ask your nurse or doctor.                CHANGE how you take these medications         Instructions Last Dose Given Next Dose Due   acetaminophen 500 MG tablet  Commonly known as: TYLENOL  What changed:   when to take this  reasons to take this      Take 2 tablets by mouth 3 (Three) Times a Day.       fluticasone 0.05 % cream  Commonly known as: CUTIVATE  What changed:   when to take this  reasons to take this      Apply 1 application topically to the appropriate area as directed 2 (Two) Times a Day.              CONTINUE taking these medications        Instructions Last Dose Given Next Dose Due   aspirin 81 MG EC tablet      Take 1 tablet by mouth Daily.       atorvastatin 10 MG tablet  Commonly known as: LIPITOR      Take 1 tablet by mouth Daily.       Cholecalciferol 50 MCG (2000 UT) capsule      Take 1 capsule by mouth Daily.       furosemide 40 MG tablet  Commonly known as: LASIX      Take 1 tablet by mouth Daily.       IRON 27 PO      Take 27 mg by mouth 3 (Three) Times a Week.       METAMUCIL FIBER PO      Take  by mouth As Needed.       metoprolol succinate XL 25 MG 24 hr tablet  Commonly known as: TOPROL-XL      Take 1 tablet by mouth Daily.       nitroglycerin 0.4 MG SL tablet  Commonly known as: NITROSTAT      1 under the tongue as needed for angina, may repeat q5mins for up three doses       potassium chloride 10 MEQ CR tablet      Take 1 tablet by mouth Daily.       PRILOSEC OTC PO      Take 20 mg by mouth Daily.       sucralfate 1 g tablet  Commonly known as: CARAFATE      As Needed.       warfarin 2 MG tablet  Commonly known as: COUMADIN      Take one-half tablet (1 mg) by mouth Monday and Thursday, and take one tablet (2 mg) all other days or as directed.                Patient is no longer taking -.  I corrected the med list to reflect this.  I did not stop these medications.      Dictated utilizing Dragon dictation

## 2023-09-06 ENCOUNTER — ANTICOAGULATION VISIT (OUTPATIENT)
Dept: PHARMACY | Facility: HOSPITAL | Age: 78
End: 2023-09-06
Payer: MEDICARE

## 2023-09-06 DIAGNOSIS — I48.0 PAF (PAROXYSMAL ATRIAL FIBRILLATION): Primary | ICD-10-CM

## 2023-09-06 LAB
INR PPP: 2.6 (ref 0.91–1.09)
PROTHROMBIN TIME: 31 SECONDS (ref 10–13.8)

## 2023-09-06 PROCEDURE — 36416 COLLJ CAPILLARY BLOOD SPEC: CPT

## 2023-09-06 PROCEDURE — 85610 PROTHROMBIN TIME: CPT

## 2023-09-06 NOTE — PROGRESS NOTES
Anticoagulation Clinic Progress Note    Anticoagulation Summary  As of 2023      INR goal:  2.0-3.0   TTR:  62.7 % (2.4 y)   INR used for dosin.6 (2023)   Warfarin maintenance plan:  1 mg every Mon, Thu; 2 mg all other days   Weekly warfarin total:  12 mg   No change documented:  Calvin Bruno, Pharmacy Intern   Plan last modified:  Eduar Weeks, PharmD (8/10/2022)   Next INR check:  10/4/2023   Priority:  High   Target end date:      Indications    PAF (paroxysmal atrial fibrillation) [I48.0]                 Anticoagulation Episode Summary       INR check location:      Preferred lab:      Send INR reminders to:   [facility]    Comments:            Anticoagulation Care Providers       Provider Role Specialty Phone number    Jimena Singer MD Referring Cardiology [phone]            Clinic Interview:  Patient Findings     Negatives:  Signs/symptoms of thrombosis, Signs/symptoms of bleeding,   Laboratory test error suspected, Change in health, Change in alcohol use,   Change in activity, Upcoming invasive procedure, Emergency department   visit, Upcoming dental procedure, Missed doses, Extra doses, Change in   medications, Change in diet/appetite, Hospital admission, Bruising, Other   complaints      Clinical Outcomes     Negatives:  Major bleeding event, Thromboembolic event,   Anticoagulation-related hospital admission, Anticoagulation-related ED   visit, Anticoagulation-related fatality        INR History:      5/3/2023     2:45 PM 2023     8:45 AM 2023     2:00 PM 2023     2:00 PM 2023     1:45 PM 2023    10:43 AM 2023     1:45 PM   Anticoagulation Monitoring   INR 2.6 2.7 2.2 1.8 2.4  2.6   INR Date 5/3/2023 2023 2023 2023 2023  2023   INR Goal 2.0-3.0 2.0-3.0 2.0-3.0 2.0-3.0 2.0-3.0  2.0-3.0   Trend Same Same Same Same Same  Same   Last Week Total 12 mg 12 mg 12 mg 12 mg 12 mg  12 mg   Next Week Total 12 mg 12 mg 12 mg 13 mg 12  mg  12 mg   Sun 2 mg 2 mg 2 mg 2 mg 2 mg  2 mg   Mon 1 mg 1 mg 1 mg 1 mg 1 mg  1 mg   Tue 2 mg 2 mg 2 mg 2 mg 2 mg  2 mg   Wed 2 mg 2 mg 2 mg 3 mg (7/26); Otherwise 2 mg 2 mg  2 mg   Thu 1 mg 1 mg 1 mg 1 mg 1 mg  1 mg   Fri 2 mg 2 mg 2 mg 2 mg 2 mg  2 mg   Sat 2 mg 2 mg 2 mg 2 mg 2 mg  2 mg   Historical INR      2.70     Visit Report   Report           Plan:  1. INR is therapeutic today- see above in Anticoagulation Summary.   Will instruct Rodolfo Grover to continue their warfarin regimen- see above in Anticoagulation Summary.  2. Follow up in 4 weeks.  3. Patient declines warfarin refills.  4. Verbal and written information provided. Patient expresses understanding and has no further questions at this time.    Calvin Bruno, Pharmacy Intern

## 2023-09-26 ENCOUNTER — OFFICE VISIT (OUTPATIENT)
Dept: SLEEP MEDICINE | Facility: HOSPITAL | Age: 78
End: 2023-09-26
Payer: MEDICARE

## 2023-09-26 VITALS
HEART RATE: 65 BPM | HEIGHT: 67 IN | OXYGEN SATURATION: 99 % | SYSTOLIC BLOOD PRESSURE: 140 MMHG | BODY MASS INDEX: 27.94 KG/M2 | WEIGHT: 178 LBS | DIASTOLIC BLOOD PRESSURE: 69 MMHG

## 2023-09-26 DIAGNOSIS — E66.3 OVERWEIGHT (BMI 25.0-29.9): ICD-10-CM

## 2023-09-26 DIAGNOSIS — G47.33 OBSTRUCTIVE SLEEP APNEA: Primary | ICD-10-CM

## 2023-09-26 PROCEDURE — G0463 HOSPITAL OUTPT CLINIC VISIT: HCPCS

## 2023-09-26 NOTE — PROGRESS NOTES
"[facility]  [address]  [address]  [facility] [address] [phone]    PCP: Gold Gray Sr., MD    Reason for visit:  Sleep disorders: COLEMAN    Rodolfo \"Tanmay" is a 78 y.o.male who was seen in the [facility] today. He is here for prescription for new machine. [facility] did not fill last order. He sleeps from 10:30pm to 6:30am. He uses ffm. Mask fits well and air leaks are better. Wakes up rested.  [place] Sleepiness Scale is dnc. Caffeine 1 per day. Alcohol 0 per week.    Rodolfo \"Tanmay"  reports that he has never smoked. He has never used smokeless tobacco.    Pertinent Positive Review of Systems of pnd, acid reflux  Rest of Review of Systems was negative as recorded in Sleep Questionnaire.    Patient  has a past medical history of Acute blood loss anemia, Arthritis, Atrial fibrillation, Bilateral lower extremity edema, Bradycardia following surgery, CAD (coronary artery disease), Chest discomfort, Colon polyp, Elevated troponin, Focal motor seizure, Generalized tonic-clonic seizure, GERD (gastroesophageal reflux disease), Hypertension, Iritis of right eye, Ischemic cardiomyopathy, Mitral valve insufficiency, Myocardial infarction, Peptic ulceration, Post-ictal state, Pulmonary hypertension, PVC (premature ventricular contraction), Rheumatic fever, Rheumatic fever, S/P CABG x 7, S/P mitral valve repair, Severe obstructive sleep apnea, and Shoulder fracture, right.     Current Medications:    Current Outpatient Medications:     acetaminophen (TYLENOL) 500 MG tablet, Take 2 tablets by mouth 3 (Three) Times a Day. (Patient taking differently: Take 2 tablets by mouth 3 (Three) Times a Day As Needed.), Disp: , Rfl:     aspirin 81 MG EC tablet, Take 1 tablet by mouth Daily., Disp: 90 tablet, Rfl: 1    atorvastatin (LIPITOR) 10 MG tablet, Take 1 tablet by mouth Daily., Disp: 30 tablet, Rfl: 11    Cholecalciferol 50 MCG (2000 UT) capsule, Take 1 capsule by mouth Daily., Disp: , Rfl: " "    Ferrous Gluconate (IRON 27 PO), Take 27 mg by mouth 3 (Three) Times a Week., Disp: , Rfl:     fluticasone (CUTIVATE) 0.05 % cream, Apply 1 application topically to the appropriate area as directed 2 (Two) Times a Day. (Patient taking differently: Apply 1 application  topically to the appropriate area as directed As Needed.), Disp: 30 g, Rfl: 0    furosemide (LASIX) 40 MG tablet, Take 1 tablet by mouth Daily., Disp: 90 tablet, Rfl: 2    metoprolol succinate XL (TOPROL-XL) 25 MG 24 hr tablet, Take 1 tablet by mouth Daily., Disp: 90 tablet, Rfl: 3    nitroglycerin (NITROSTAT) 0.4 MG SL tablet, 1 under the tongue as needed for angina, may repeat q5mins for up three doses, Disp: 25 tablet, Rfl: 1    Omeprazole Magnesium (PRILOSEC OTC PO), Take 20 mg by mouth Daily., Disp: , Rfl:     potassium chloride 10 MEQ CR tablet, Take 1 tablet by mouth Daily., Disp: 90 tablet, Rfl: 2    Psyllium (METAMUCIL FIBER PO), Take  by mouth As Needed., Disp: , Rfl:     sucralfate (CARAFATE) 1 g tablet, As Needed., Disp: , Rfl:     warfarin (COUMADIN) 2 MG tablet, Take one-half tablet (1 mg) by mouth Monday and Thursday, and take one tablet (2 mg) all other days or as directed., Disp: 80 tablet, Rfl: 1   also entered in Sleep Questionnaire         Vital Signs: /69   Pulse 65   Ht 170.2 cm (67.01\")   Wt 80.7 kg (178 lb)   SpO2 99%   BMI 27.87 kg/m²     Body mass index is 27.87 kg/m².       Tongue: Large       Dentition: good       Pharynx: Posterior pharyngeal pillars are wide   Mallampatti: II (hard and soft palate, upper portion of tonsils anduvula visible)        General: Alert. Cooperative. Well developed. No acute distress.             Head:  Normocephalic. Symmetrical. Atraumatic.              Nose: No septal deviation. No drainage.          Throat: No oral lesions. No thrush. Moist mucous membranes.    Chest Wall:  Normal shape. Symmetric expansion with respiration. No tenderness.             Neck:  Trachea midline.      "      Lungs:  Clear to auscultation bilaterally. No wheezes. No rhonchi. No rales. Respirations regular, even and unlabored.            Heart:  Regular rhythm and normal rate. Normal S1 and S2. No murmur.     Abdomen:  Soft, non-tender and non-distended. Normal bowel sounds. No masses.  Extremities:  Moves all extremities well. No edema.    Psychiatric: Normal mood and affect.    Diagnostic data available to date is as below and was reviewed on current visit:  [place] HST- AHI 40, lowest desaturation 70s; 68min of sats <90%    Overnight oximetry on CPAP RA-August 2018- no desaturation  6/3/20: Overnight titration polysomnogram study from 10:45 PM to 5:02 AM.  Sleep efficiency 87.5% with 5.51 hours total sleep time.  Sleep distribution shows minimal slow-wave sleep at 1.2%.  Reduced REM sleep at 15.1%.  Oxygen saturation remained above 88%.  Arousal index 10.3.  PLM index 9.4 with PLM arousal index 0.5.  CPAP was increased from 5 to 17 cm water pressure.  Bilevel also tried at 18/14.  Maximum sleep recorded at 11 cm water pressure with AHI less than 5.  Patient slept supine for the entire night.    Lab Results   Component Value Date    IRON 107 08/11/2023    TIBC 330 08/11/2023    FERRITIN 92.70 08/11/2023       Most current available usage data reviewed on 09/26/2023:        [employer]: [employer]    [place] [facility] as below:    full face mask      Description Replacement    Nasal PILLOWS      A 7034 Nasal Pillows  every 3 mth    A 7033 Repl Nasal Pillows  2 per mth    Nasal MASK/CUSHION      A 7034 Nasal Mask/Cushion  every 3 mth    A 7032 Repl Nasal Mask/Cushion  2 per mth    Full Face MASK     x A 7030 Full Face Mask  every 3 mth   x A 7031 Repl Face Mask  1 per mth      A 4604 Heated Tubing  every 3 mth    A 7037 Standard Tubing  every 3 mth   x A 7035 Headgear  every 3 mth   x A 7046 Repl Humidifier Chamber  every 6 yrs   x A 7038 Disposable Filters  2 per mth   x A 7039  "Non-disposable Filter  every 6 mth   x A 7036 Chin Strap  every 6 mth     Orders Placed This Encounter   Procedures    SCANNED - PULMONARY RESULTS          Impression:  1. Obstructive sleep apnea    2. Overweight (BMI 25.0-29.9)        Plan:  Rodolfo \"Chon\" needs a new device. I will prescribe same with settings auto 10-15. He will need to see us for compliance check.    I reiterated the importance of effective treatment of obstructive sleep apnea with PAP machine.  Cardiovascular health risks of untreated sleep apnea were again reviewed.  Patient was asked to remain cautious if there is persistent hypersomnolence. The benefit of weight loss in reducing severity of obstructive sleep apnea was discussed.  Patient would benefit from adhering to a strict diet to achieve ideal BMI.     Change of PAP supplies regularly is important for effective use.  Change of cushion on the mask or plugs on nasal pillows along with disposable filters once every month and change of mask frame, tubing, headgear and Velcro straps every 6 months at the minimum was reiterated.    This patient is compliant with PAP machine and benefits from its use.  Apnea hypopneas index is corrected/improved.  Daytime hypersomnolence has resolved.     Patient will follow up in this clinic in 3 months  aprn    Thank you for allowing me to participate in your patient's care.    Electronically signed by Mitch Mendiola MD, 09/26/23, 9:04 AM EDT.    Part of this note may be an electronic transcription/translation of spoken language to printed text using the Dragon Dictation System.  "

## 2023-10-04 ENCOUNTER — ANTICOAGULATION VISIT (OUTPATIENT)
Dept: PHARMACY | Facility: HOSPITAL | Age: 78
End: 2023-10-04
Payer: MEDICARE

## 2023-10-04 DIAGNOSIS — I48.0 PAF (PAROXYSMAL ATRIAL FIBRILLATION): Primary | ICD-10-CM

## 2023-10-04 LAB
INR PPP: 2.6 (ref 0.91–1.09)
PROTHROMBIN TIME: 31.3 SECONDS (ref 10–13.8)

## 2023-10-04 PROCEDURE — 36416 COLLJ CAPILLARY BLOOD SPEC: CPT

## 2023-10-04 PROCEDURE — 85610 PROTHROMBIN TIME: CPT

## 2023-10-04 NOTE — PROGRESS NOTES
Anticoagulation Clinic Progress Note    Anticoagulation Summary  As of 10/4/2023      INR goal:  2.0-3.0   TTR:  63.9 % (2.5 y)   INR used for dosin.6 (10/4/2023)   Warfarin maintenance plan:  1 mg every Mon, Thu; 2 mg all other days   Weekly warfarin total:  12 mg   No change documented:  Velia Salmeron   Plan last modified:  Eduar Weeks, PharmD (8/10/2022)   Next INR check:  2023   Priority:  High   Target end date:      Indications    PAF (paroxysmal atrial fibrillation) [I48.0]                 Anticoagulation Episode Summary       INR check location:      Preferred lab:      Send INR reminders to:   [facility]    Comments:            Anticoagulation Care Providers       Provider Role Specialty Phone number    Jimena Singer MD Referring Cardiology [phone]            Clinic Interview:  Patient Findings     Negatives:  Signs/symptoms of thrombosis, Signs/symptoms of bleeding,   Laboratory test error suspected, Change in health, Change in alcohol use,   Change in activity, Upcoming invasive procedure, Emergency department   visit, Upcoming dental procedure, Missed doses, Extra doses, Change in   medications, Change in diet/appetite, Hospital admission, Bruising, Other   complaints      Clinical Outcomes     Negatives:  Major bleeding event, Thromboembolic event,   Anticoagulation-related hospital admission, Anticoagulation-related ED   visit, Anticoagulation-related fatality        INR History:      2023     8:45 AM 2023     2:00 PM 2023     2:00 PM 2023     1:45 PM 2023    10:43 AM 2023     1:45 PM 10/4/2023     2:00 PM   Anticoagulation Monitoring   INR 2.7 2.2 1.8 2.4  2.6 2.6   INR Date 2023 2023 2023 2023  2023 10/4/2023   INR Goal 2.0-3.0 2.0-3.0 2.0-3.0 2.0-3.0  2.0-3.0 2.0-3.0   Trend Same Same Same Same  Same Same   Last Week Total 12 mg 12 mg 12 mg 12 mg  12 mg 12 mg   Next Week Total 12 mg 12 mg 13 mg 12 mg  12  mg 12 mg   Sun 2 mg 2 mg 2 mg 2 mg  2 mg 2 mg   Mon 1 mg 1 mg 1 mg 1 mg  1 mg 1 mg   Tue 2 mg 2 mg 2 mg 2 mg  2 mg 2 mg   Wed 2 mg 2 mg 3 mg (7/26); Otherwise 2 mg 2 mg  2 mg 2 mg   Thu 1 mg 1 mg 1 mg 1 mg  1 mg 1 mg   Fri 2 mg 2 mg 2 mg 2 mg  2 mg 2 mg   Sat 2 mg 2 mg 2 mg 2 mg  2 mg 2 mg   Historical INR     2.70      Visit Report  Report            Plan:  1. INR is therapeutic today- see above in Anticoagulation Summary.   Will instruct Rodolfo Grover to continue their warfarin regimen- see above in Anticoagulation Summary.  2. Follow up in 4 weeks.  3. Patient declines warfarin refills.  4. Verbal and written information provided. Patient expresses understanding and has no further questions at this time.    Velia Salmeron

## 2023-10-24 ENCOUNTER — TELEPHONE (OUTPATIENT)
Dept: CARDIOLOGY | Facility: CLINIC | Age: 78
End: 2023-10-24
Payer: MEDICARE

## 2023-10-24 NOTE — TELEPHONE ENCOUNTER
Patient is having one tooth extracted in a few weeks (not currently scheduled).  His dentist, Dr. Gil, wanted him to reach out to see if he can hold his blood thinner.  He did not give a recommendations for how long, he just wanted patient to reach out to us.  Pt is on warfarin managed by [facility].    Recs?    Violetta Bruno RN  [facility] Triage  10/24/23 14:14 EDT'

## 2023-10-25 NOTE — TELEPHONE ENCOUNTER
Called and spoke to the patient and he verbalizes understanding.    Violetta [facility] Triage  10/25/23 09:05 EDT

## 2023-11-01 ENCOUNTER — ANTICOAGULATION VISIT (OUTPATIENT)
Dept: PHARMACY | Facility: HOSPITAL | Age: 78
End: 2023-11-01
Payer: MEDICARE

## 2023-11-01 DIAGNOSIS — I48.0 PAF (PAROXYSMAL ATRIAL FIBRILLATION): Primary | ICD-10-CM

## 2023-11-01 LAB
INR PPP: 2.9 (ref 0.91–1.09)
PROTHROMBIN TIME: 34.4 SECONDS (ref 10–13.8)

## 2023-11-01 PROCEDURE — 36416 COLLJ CAPILLARY BLOOD SPEC: CPT

## 2023-11-01 PROCEDURE — 85610 PROTHROMBIN TIME: CPT

## 2023-11-01 NOTE — PROGRESS NOTES
Anticoagulation Clinic Progress Note    Anticoagulation Summary  As of 2023      INR goal:  2.0-3.0   TTR:  65.0% (2.5 y)   INR used for dosin.9 (2023)   Warfarin maintenance plan:  1 mg every Mon, Thu; 2 mg all other days   Weekly warfarin total:  12 mg   No change documented:  Jolynn De Anda, Pharmacy Technician   Plan last modified:  Eduar Weeks, PharmD (8/10/2022)   Next INR check:  2023   Priority:  High   Target end date:      Indications    PAF (paroxysmal atrial fibrillation) [I48.0]                 Anticoagulation Episode Summary       INR check location:      Preferred lab:      Send INR reminders to:   [facility]    Comments:            Anticoagulation Care Providers       Provider Role Specialty Phone number    Jimena Singer MD Referring Cardiology [phone]            Clinic Interview:  Patient Findings     Negatives:  Signs/symptoms of thrombosis, Signs/symptoms of bleeding,   Laboratory test error suspected, Change in health, Change in alcohol use,   Change in activity, Upcoming invasive procedure, Emergency department   visit, Upcoming dental procedure, Missed doses, Extra doses, Change in   medications, Change in diet/appetite, Hospital admission, Bruising, Other   complaints      Clinical Outcomes     Negatives:  Major bleeding event, Thromboembolic event,   Anticoagulation-related hospital admission, Anticoagulation-related ED   visit, Anticoagulation-related fatality        INR History:      2023     2:00 PM 2023     2:00 PM 2023     1:45 PM 2023    10:43 AM 2023     1:45 PM 10/4/2023     2:00 PM 2023     2:00 PM   Anticoagulation Monitoring   INR 2.2 1.8 2.4  2.6 2.6 2.9   INR Date 2023 2023 2023  2023 10/4/2023 2023   INR Goal 2.0-3.0 2.0-3.0 2.0-3.0  2.0-3.0 2.0-3.0 2.0-3.0   Trend Same Same Same  Same Same Same   Last Week Total 12 mg 12 mg 12 mg  12 mg 12 mg 12 mg   Next Week Total 12 mg 13 mg 12 mg   12 mg 12 mg 12 mg   Sun 2 mg 2 mg 2 mg  2 mg 2 mg 2 mg   Mon 1 mg 1 mg 1 mg  1 mg 1 mg 1 mg   Tue 2 mg 2 mg 2 mg  2 mg 2 mg 2 mg   Wed 2 mg 3 mg (7/26); Otherwise 2 mg 2 mg  2 mg 2 mg 2 mg   Thu 1 mg 1 mg 1 mg  1 mg 1 mg 1 mg   Fri 2 mg 2 mg 2 mg  2 mg 2 mg 2 mg   Sat 2 mg 2 mg 2 mg  2 mg 2 mg 2 mg   Historical INR    2.70       Visit Report Report             Plan:  1. INR is therapeutic today- see above in Anticoagulation Summary.   Will instruct Rodolfo Grover to continue their warfarin regimen- see above in Anticoagulation Summary.  2. Follow up in 4 weeks.  3. Patient declines warfarin refills.  4. Verbal and written information provided. Patient expresses understanding and has no further questions at this time.    Jolynn De Anda, Pharmacy Technician

## 2023-11-23 NOTE — TELEPHONE ENCOUNTER
states overall he is feeling better, his shortness of air has improved. Mr. Grover states he will go this afternoon to complete lab work.   
Discussed results with patient, pt will go next Thursday or Friday for repeat labs. Advised pt if breathing not better he will need a repeat cxr before next appt on 4/21. Pt verbalized understanding and this was agreeable to the patient.         ----- Message from NKECHI Kent sent at 4/8/2021  3:35 PM EDT -----  Violetta,     Can you call Mr. Grover, let him know I reviewed cxr with Dr. Echavarria and called lasix and potassium in for him?  He has a pleural effusion in his right lung which is causing his shortness of breath.  He will need a bmp in 1 week to monitor renal fxn, I'm out next week- be sure to look for and bring to Dr. Echavarria of one of the NP's attention please.  I'd also like to get another cxr prior to his next appointment if his shortness of breath is not improved with the lasix.     Thanks,  Aleta      
LM for pt to return call. Calling to f/u on repeat BMP lab orders.   
no

## 2023-11-29 ENCOUNTER — ANTICOAGULATION VISIT (OUTPATIENT)
Dept: PHARMACY | Facility: HOSPITAL | Age: 78
End: 2023-11-29
Payer: MEDICARE

## 2023-11-29 DIAGNOSIS — I48.0 PAF (PAROXYSMAL ATRIAL FIBRILLATION): Primary | ICD-10-CM

## 2023-11-29 LAB
INR PPP: 3.2 (ref 0.91–1.09)
PROTHROMBIN TIME: 38.2 SECONDS (ref 10–13.8)

## 2023-11-29 PROCEDURE — G0463 HOSPITAL OUTPT CLINIC VISIT: HCPCS

## 2023-11-29 PROCEDURE — 85610 PROTHROMBIN TIME: CPT

## 2023-11-29 PROCEDURE — 36416 COLLJ CAPILLARY BLOOD SPEC: CPT

## 2023-11-29 NOTE — PROGRESS NOTES
Anticoagulation Clinic Progress Note    Anticoagulation Summary  As of 11/29/2023      INR goal:  2.0-3.0   TTR:  64.1% (2.6 y)   INR used for dosing:  3.2 (11/29/2023)   Warfarin maintenance plan:  1 mg every Mon, Thu; 2 mg all other days   Weekly warfarin total:  12 mg   Plan last modified:  Eduar Weeks, PharmD (8/10/2022)   Next INR check:  12/13/2023   Priority:  High   Target end date:      Indications    PAF (paroxysmal atrial fibrillation) [I48.0]                 Anticoagulation Episode Summary       INR check location:      Preferred lab:      Send INR reminders to:  [facility]    Comments:            Anticoagulation Care Providers       Provider Role Specialty Phone number    Jimena Singer MD Referring Cardiology [phone]            Clinic Interview:  Patient Findings     Positives:  Change in diet/appetite, Other complaints    Negatives:  Signs/symptoms of thrombosis, Signs/symptoms of bleeding,   Laboratory test error suspected, Change in health, Change in alcohol use,   Change in activity, Upcoming invasive procedure, Emergency department   visit, Upcoming dental procedure, Missed doses, Extra doses, Change in   medications, Hospital admission, Bruising    Comments:  Ate cranberry at [employer]. Took a few cold-eeze tablets   for oncoming cold that he states is now resolved.       Clinical Outcomes     Negatives:  Major bleeding event, Thromboembolic event,   Anticoagulation-related hospital admission, Anticoagulation-related ED   visit, Anticoagulation-related fatality    Comments:  Ate cranberry at [employer]. Took a few cold-eeze tablets   for oncoming cold that he states is now resolved.         INR History:      7/26/2023     2:00 PM 8/9/2023     1:45 PM 8/23/2023    10:43 AM 9/6/2023     1:45 PM 10/4/2023     2:00 PM 11/1/2023     2:00 PM 11/29/2023     2:00 PM   Anticoagulation Monitoring   INR 1.8 2.4  2.6 2.6 2.9 3.2   INR Date 7/26/2023 8/9/2023  9/6/2023 10/4/2023  11/1/2023 11/29/2023   INR Goal 2.0-3.0 2.0-3.0  2.0-3.0 2.0-3.0 2.0-3.0 2.0-3.0   Trend Same Same  Same Same Same Same   Last Week Total 12 mg 12 mg  12 mg 12 mg 12 mg 12 mg   Next Week Total 13 mg 12 mg  12 mg 12 mg 12 mg 11 mg   Sun 2 mg 2 mg  2 mg 2 mg 2 mg 2 mg   Mon 1 mg 1 mg  1 mg 1 mg 1 mg 1 mg   Tue 2 mg 2 mg  2 mg 2 mg 2 mg 2 mg   Wed 3 mg (7/26); Otherwise 2 mg 2 mg  2 mg 2 mg 2 mg 1 mg (11/29); Otherwise 2 mg   Thu 1 mg 1 mg  1 mg 1 mg 1 mg 1 mg   Fri 2 mg 2 mg  2 mg 2 mg 2 mg 2 mg   Sat 2 mg 2 mg  2 mg 2 mg 2 mg 2 mg   Historical INR   2.70            Plan:  1. INR is Supratherapeutic today- see above in Anticoagulation Summary.  Will instruct Rodolfo Grover to Change their warfarin regimen- see above in Anticoagulation Summary.  2. Follow up in 2 weeks  3. Patient declines warfarin refills.  4. Verbal and written information provided. Patient expresses understanding and has no further questions at this time.    Nelly Rich, PharmD

## 2023-12-13 ENCOUNTER — ANTICOAGULATION VISIT (OUTPATIENT)
Dept: PHARMACY | Facility: HOSPITAL | Age: 78
End: 2023-12-13
Payer: MEDICARE

## 2023-12-13 DIAGNOSIS — I48.0 PAF (PAROXYSMAL ATRIAL FIBRILLATION): Primary | ICD-10-CM

## 2023-12-13 LAB
INR PPP: 2.5 (ref 0.91–1.09)
PROTHROMBIN TIME: 30.6 SECONDS (ref 10–13.8)

## 2023-12-13 PROCEDURE — 36416 COLLJ CAPILLARY BLOOD SPEC: CPT

## 2023-12-13 PROCEDURE — 85610 PROTHROMBIN TIME: CPT

## 2023-12-13 NOTE — PROGRESS NOTES
Anticoagulation Clinic Progress Note    Anticoagulation Summary  As of 2023      INR goal:  2.0-3.0   TTR:  64.2% (2.7 y)   INR used for dosin.5 (2023)   Warfarin maintenance plan:  1 mg every Mon, Thu; 2 mg all other days   Weekly warfarin total:  12 mg   No change documented:  Jolynn De Anda, Pharmacy Technician   Plan last modified:  Eduar Weeks, PharmD (8/10/2022)   Next INR check:  1/10/2024   Priority:  High   Target end date:      Indications    PAF (paroxysmal atrial fibrillation) [I48.0]                 Anticoagulation Episode Summary       INR check location:      Preferred lab:      Send INR reminders to:   [facility]    Comments:            Anticoagulation Care Providers       Provider Role Specialty Phone number    Jimena Singer MD Referring Cardiology [phone]            Clinic Interview:  Patient Findings     Negatives:  Signs/symptoms of thrombosis, Signs/symptoms of bleeding,   Laboratory test error suspected, Change in health, Change in alcohol use,   Change in activity, Upcoming invasive procedure, Emergency department   visit, Upcoming dental procedure, Missed doses, Extra doses, Change in   medications, Change in diet/appetite, Hospital admission, Bruising, Other   complaints      Clinical Outcomes     Negatives:  Major bleeding event, Thromboembolic event,   Anticoagulation-related hospital admission, Anticoagulation-related ED   visit, Anticoagulation-related fatality        INR History:      2023     1:45 PM 2023    10:43 AM 2023     1:45 PM 10/4/2023     2:00 PM 2023     2:00 PM 2023     2:00 PM 2023     2:00 PM   Anticoagulation Monitoring   INR 2.4  2.6 2.6 2.9 3.2 2.5   INR Date 2023  2023 10/4/2023 2023 2023 2023   INR Goal 2.0-3.0  2.0-3.0 2.0-3.0 2.0-3.0 2.0-3.0 2.0-3.0   Trend Same  Same Same Same Same Same   Last Week Total 12 mg  12 mg 12 mg 12 mg 12 mg 12 mg   Next Week Total 12 mg  12 mg  12 mg 12 mg 11 mg 12 mg   Sun 2 mg  2 mg 2 mg 2 mg 2 mg 2 mg   Mon 1 mg  1 mg 1 mg 1 mg 1 mg 1 mg   Tue 2 mg  2 mg 2 mg 2 mg 2 mg 2 mg   Wed 2 mg  2 mg 2 mg 2 mg 1 mg (11/29); Otherwise 2 mg 2 mg   Thu 1 mg  1 mg 1 mg 1 mg 1 mg 1 mg   Fri 2 mg  2 mg 2 mg 2 mg 2 mg 2 mg   Sat 2 mg  2 mg 2 mg 2 mg 2 mg 2 mg   Historical INR  2.70             Plan:  1. INR is therapeutic today- see above in Anticoagulation Summary.   Will instruct Rodolfo Grover to continue their warfarin regimen- see above in Anticoagulation Summary.  2. Follow up in 4 weeks.  3. Patient declines warfarin refills.  4. Verbal and written information provided. Patient expresses understanding and has no further questions at this time.    Jolynn De Anda, Pharmacy Technician

## 2023-12-15 ENCOUNTER — OFFICE VISIT (OUTPATIENT)
Dept: SLEEP MEDICINE | Facility: HOSPITAL | Age: 78
End: 2023-12-15
Payer: MEDICARE

## 2023-12-15 ENCOUNTER — TELEPHONE (OUTPATIENT)
Dept: SLEEP MEDICINE | Facility: HOSPITAL | Age: 78
End: 2023-12-15
Payer: MEDICARE

## 2023-12-15 VITALS
HEART RATE: 72 BPM | DIASTOLIC BLOOD PRESSURE: 62 MMHG | WEIGHT: 179 LBS | HEIGHT: 67 IN | BODY MASS INDEX: 28.09 KG/M2 | OXYGEN SATURATION: 96 % | SYSTOLIC BLOOD PRESSURE: 126 MMHG

## 2023-12-15 DIAGNOSIS — G47.33 OBSTRUCTIVE SLEEP APNEA: Primary | ICD-10-CM

## 2023-12-15 DIAGNOSIS — Z78.9 DIFFICULTY WITH CPAP FULL FACE MASK USE: ICD-10-CM

## 2023-12-15 DIAGNOSIS — E66.3 OVERWEIGHT (BMI 25.0-29.9): ICD-10-CM

## 2023-12-15 PROCEDURE — G0463 HOSPITAL OUTPT CLINIC VISIT: HCPCS

## 2023-12-15 NOTE — PROGRESS NOTES
[facility]  Telephone: [phone] / Fax: [phone] [place]  Telephone: [phone] / Fax: [phone] Lauren Ruiz    PCP: Gold Gray Sr., MD    Reason for visit: COLEMAN f/u    Rodolfo Grover is a 78 y.o.male  was last seen at [facility] sleep lab in September 2023. He is here today to review response to new CPAP. He received a new auto CPAP 10-15cm H2O from [employer] around Oct 10th. He finds the pressures comfortable. However, his mask is irritating the face. He has some dryness and redness on the chin.  His sleep schedule is 10pm-6am. ESS is 6. Sleep quality has improved in general since CPAP machine treatment was initiated.    SH- no tobacco, 1 alc per week, 2 tea per day, no energy drinks.    ROS- +post nasal drip, +GERD, rest is negative.    DME [facility]    Current Medications:    Current Outpatient Medications:     acetaminophen (TYLENOL) 500 MG tablet, Take 2 tablets by mouth 3 (Three) Times a Day. (Patient taking differently: Take 2 tablets by mouth 3 (Three) Times a Day As Needed.), Disp: , Rfl:     aspirin 81 MG EC tablet, Take 1 tablet by mouth Daily., Disp: 90 tablet, Rfl: 1    atorvastatin (LIPITOR) 10 MG tablet, Take 1 tablet by mouth Daily., Disp: 30 tablet, Rfl: 11    Cholecalciferol 50 MCG (2000 UT) capsule, Take 1 capsule by mouth Daily., Disp: , Rfl:     Ferrous Gluconate (IRON 27 PO), Take 27 mg by mouth 3 (Three) Times a Week., Disp: , Rfl:     fluticasone (CUTIVATE) 0.05 % cream, Apply 1 application topically to the appropriate area as directed 2 (Two) Times a Day. (Patient taking differently: Apply 1 application  topically to the appropriate area as directed As Needed.), Disp: 30 g, Rfl: 0    furosemide (LASIX) 40 MG tablet, Take 1 tablet by mouth Daily., Disp: 90 tablet, Rfl: 2    metoprolol succinate XL (TOPROL-XL) 25 MG 24 hr tablet, Take 1 tablet by mouth Daily., Disp: 90 tablet, Rfl: 3    nitroglycerin (NITROSTAT) 0.4 MG SL tablet, 1 under the tongue as needed for  "angina, may repeat q5mins for up three doses, Disp: 25 tablet, Rfl: 1    Omeprazole Magnesium (PRILOSEC OTC PO), Take 20 mg by mouth Daily., Disp: , Rfl:     potassium chloride 10 MEQ CR tablet, Take 1 tablet by mouth Daily., Disp: 90 tablet, Rfl: 2    Psyllium (METAMUCIL FIBER PO), Take  by mouth As Needed., Disp: , Rfl:     sucralfate (CARAFATE) 1 g tablet, As Needed., Disp: , Rfl:     warfarin (COUMADIN) 2 MG tablet, Take one-half tablet (1 mg) by mouth Monday and Thursday, and take one tablet (2 mg) all other days or as directed., Disp: 80 tablet, Rfl: 1   also entered in Sleep Questionnaire    Patient  has a past medical history of Acute blood loss anemia, Arthritis, Atrial fibrillation, Bilateral lower extremity edema, Bradycardia following surgery, CAD (coronary artery disease), Chest discomfort, Colon polyp, Elevated troponin, Focal motor seizure, Generalized tonic-clonic seizure, GERD (gastroesophageal reflux disease), Hypertension, Iritis of right eye, Ischemic cardiomyopathy, Mitral valve insufficiency, Myocardial infarction, Peptic ulceration, Post-ictal state, Pulmonary hypertension, PVC (premature ventricular contraction), Rheumatic fever, Rheumatic fever, S/P CABG x 7, S/P mitral valve repair, Severe obstructive sleep apnea, and Shoulder fracture, right.    I have reviewed the Past Medical History, Past Surgical History, Social History and Family History.    Vital Signs /62   Pulse 72   Ht 170.2 cm (67.01\")   Wt 81.2 kg (179 lb)   SpO2 96%   BMI 28.03 kg/m²  Body mass index is 28.03 kg/m².    General Alert and oriented. No acute distress noted   Pharynx/Throat Class IV Mallampati airway, large tongue, no evidence of redundant lateral pharyngeal tissue. No oral lesions. No thrush. Moist mucous membranes.   Head Normocephalic. Symmetrical. Atraumatic.    Nose No septal deviation. No drainage   Chest Wall Normal shape. Symmetric expansion with respiration. No tenderness.   Neck Trachea " midline, no thyromegaly or adenopathy    Lungs Clear to auscultation bilaterally. No wheezes. No rhonchi. No rales. Respirations regular, even and unlabored.   Heart Regular rhythm and normal rate. Normal S1 and S2. No murmur   Abdomen Soft, non-tender and non-distended. Normal bowel sounds. No masses.   Extremities Moves all extremities well. No edema   Psychiatric Normal mood and affect.     Testing:  Download Oct 10-present, avg nightly use of 7 hours and 18 minutes on auto CPAP 10-15cm H2O, avg pr is 14.9cm H2O, AHI 8/hr.    Study-Diagnostic data available to date is as below and was reviewed on current visit:  Shaan HST- AHI 40, lowest desaturation 70s; 68min of sats <90%    Overnight oximetry on CPAP RA-August 2018- no desaturation  6/3/20: Overnight titration polysomnogram study from 10:45 PM to 5:02 AM.  Sleep efficiency 87.5% with 5.51 hours total sleep time.  Sleep distribution shows minimal slow-wave sleep at 1.2%.  Reduced REM sleep at 15.1%.  Oxygen saturation remained above 88%.  Arousal index 10.3.  PLM index 9.4 with PLM arousal index 0.5.  CPAP was increased from 5 to 17 cm water pressure.  Bilevel also tried at 18/14.  Maximum sleep recorded at 11 cm water pressure with AHI less than 5.  Patient slept supine for the entire night.    Impression:  1. Obstructive sleep apnea    2. Difficulty with CPAP full face mask use    3. Overweight (BMI 25.0-29.9)          Plan:  Change pressure to 12-18cm H2O in view of slightly elevated residual AHI. I ordered mask fitting through DME. He should try nasal cushion or nasal mask and use it instead of his current mask to help allow skin to heal. He was given an ointment from the dermatologist to use and will use as needed.    Otherwise, Chon uses the CPAP device and benefits from its use in terms of reduction of hypersomnia and snoring.  I reviewed download report and original sleep study report with the patient. I advised pt to contact us if PAP pressures  feel excessive or insufficient.    Weight loss will be strongly beneficial to reduce the severity of sleep-disordered breathing.      Follow up with Dr. Mendiola in 6 months.    Thank you for allowing me to participate in your patient's care.      NKECHI Swanson  [facility]  Phone: [phone]      Part of this note may be an electronic transcription/translation of spoken language to printed text using the Dragon Dictation System.

## 2024-01-10 ENCOUNTER — ANTICOAGULATION VISIT (OUTPATIENT)
Dept: PHARMACY | Facility: HOSPITAL | Age: 79
End: 2024-01-10
Payer: MEDICARE

## 2024-01-10 ENCOUNTER — OFFICE VISIT (OUTPATIENT)
Dept: FAMILY MEDICINE CLINIC | Facility: CLINIC | Age: 79
End: 2024-01-10
Payer: MEDICARE

## 2024-01-10 VITALS
RESPIRATION RATE: 18 BRPM | DIASTOLIC BLOOD PRESSURE: 64 MMHG | HEIGHT: 67 IN | BODY MASS INDEX: 27.94 KG/M2 | TEMPERATURE: 97.7 F | SYSTOLIC BLOOD PRESSURE: 122 MMHG | WEIGHT: 178 LBS

## 2024-01-10 DIAGNOSIS — I48.0 PAF (PAROXYSMAL ATRIAL FIBRILLATION): Primary | ICD-10-CM

## 2024-01-10 DIAGNOSIS — L20.9 ATOPIC DERMATITIS IN ADULT: ICD-10-CM

## 2024-01-10 DIAGNOSIS — Z00.00 MEDICARE ANNUAL WELLNESS VISIT, SUBSEQUENT: Primary | ICD-10-CM

## 2024-01-10 LAB
INR PPP: 2 (ref 0.91–1.09)
PROTHROMBIN TIME: 24.5 SECONDS (ref 10–13.8)

## 2024-01-10 PROCEDURE — G0463 HOSPITAL OUTPT CLINIC VISIT: HCPCS

## 2024-01-10 PROCEDURE — 36416 COLLJ CAPILLARY BLOOD SPEC: CPT

## 2024-01-10 PROCEDURE — 85610 PROTHROMBIN TIME: CPT

## 2024-01-10 RX ORDER — WARFARIN SODIUM 2 MG/1
TABLET ORAL
Qty: 80 TABLET | Refills: 1 | Status: SHIPPED | OUTPATIENT
Start: 2024-01-10

## 2024-01-10 RX ORDER — FLUTICASONE PROPIONATE 0.05 %
1 CREAM (GRAM) TOPICAL 2 TIMES DAILY
Qty: 30 G | Refills: 4 | Status: SHIPPED | OUTPATIENT
Start: 2024-01-10

## 2024-01-10 NOTE — PROGRESS NOTES
QUICK REFERENCE INFORMATION:  The ABCs of the Annual Wellness Visit    Subsequent Medicare Wellness Visit     HEALTH RISK ASSESSMENT    : 1945    Recent Hospitalizations:  No hospitalization(s) within the last year..  ccc    Current Medical Providers:  Patient Care Team:  Gold Gray Sr., MD as PCP - General (Family Medicine)  Mitch Mendiola MD as Consulting Physician (Pulmonary Disease)  Edgar Allen MD as Consulting Physician (Gastroenterology)  Jimena Singer MD as Consulting Physician (Cardiology)  Domenica Merida MD as Referring Physician (Family Medicine)  Juan David Solis MD as Consulting Physician (Hematology and Oncology)    Smoking Status:  Social History     Tobacco Use   Smoking Status Never   Smokeless Tobacco Never       Alcohol Consumption:  Social History     Substance and Sexual Activity   Alcohol Use Yes    Comment: occasional       Depression Screen:       1/10/2024     8:56 AM   PHQ-2/PHQ-9 Depression Screening   Little Interest or Pleasure in Doing Things 0-->not at all   Feeling Down, Depressed or Hopeless 0-->not at all   PHQ-9: Brief Depression Severity Measure Score 0       Health Habits and Functional and Cognitive Screenin/10/2024     8:00 AM   Functional & Cognitive Status   Do you have difficulty preparing food and eating? No   Do you have difficulty bathing yourself, getting dressed or grooming yourself? No   Do you have difficulty using the toilet? No   Do you have difficulty moving around from place to place? No   Do you have trouble with steps or getting out of a bed or a chair? No   Current Diet Other        Current Diet Comment low salt   Dental Exam Up to date   Eye Exam Up to date   Exercise (times per week) 7 times per week   Current Exercises Include Walking   Do you need help using the phone?  No   Are you deaf or do you have serious difficulty hearing?  No   Do you need help to go to places out of walking distance? No   Do you need help shopping? No   Do you  need help preparing meals?  No   Do you need help with housework?  No   Do you need help with laundry? No   Do you need help taking your medications? No   Do you need help managing money? No   Do you ever drive or ride in a car without wearing a seat belt? No   Have you felt unusual stress, anger or loneliness in the last month? No   Who do you live with? Alone   If you need help, do you have trouble finding someone available to you? No   Do you have difficulty concentrating, remembering or making decisions? No       Does the patient have evidence of cognitive impairment? No    Mini-Mental State Examination (MMSE)        Instructions: Ask the questions in the order listed. Score one point for each correct response within each question or activity.      Maximum Score  Patient’s Score  Questions    5  5  “What is the year?  Season?  Date?  Day of the week?  Month?”    5   5 “Where are we now: State?  County?  Town/city?  Hospital?  Floor?”    3  3  The examiner names three unrelated objects clearly and slowly, then asks the patient to name all three of them. The patient’s response is used for scoring. The examiner repeats them until patient learns all of them, if possible. Number of trials: ___________    5   4 “I would like you to count backward from 100 by sevens.” (93, 86, 79, 72, 65, …) Stop after five answers.   Alternative: “Spell WORLD backwards.” ([id number])    3  2  “Earlier I told you the names of three things. Can you tell me what those were?”    2  2  Show the patient two simple objects, such as a wristwatch and a pencil, and ask the patient to name them.    1  1  “Repeat the phrase: ‘No ifs, ands, or buts.’”    3  3  “Take the paper in your right hand, fold it in half, and put it on the floor.”   (The examiner gives the patient a piece of blank paper.)    1   1 “Please read this and do what it says.” (Written instruction is “Close your eyes.”)    1  1  “Make up and write a sentence about anything.” (This  sentence must contain a noun and a verb.)    1   0 “Please copy this picture.” (The examiner gives the patient a blank piece of paper and asks him/her to draw the symbol below. All 10 angles must be present and two must intersect.)             30  27  TOTAL          Aspirin use counseling: Taking ASA appropriately as indicated    Recent Lab Results:       Lab Results   Component Value Date    HGBA1C 5.40 02/21/2021     Lab Results   Component Value Date    CHOL 247 (H) 12/29/2021    TRIG 99 01/04/2024    HDL 63 (H) 01/04/2024    VLDL 18 01/04/2024    LDLHDL 2.27 12/29/2021       Age-appropriate Screening Schedule:  Refer to the list below for future screening recommendations based on patient's age, sex and/or medical conditions. Orders for these recommended tests are listed in the plan section. The patient has been provided with a written plan.    Health Maintenance   Topic Date Due    Pneumococcal Vaccine 65+ (1 of 2 - PCV) Never done    ZOSTER VACCINE (2 of 3) 01/25/2012    INFLUENZA VACCINE  08/01/2023    COVID-19 Vaccine (3 - 2023-24 season) 09/01/2023    BMI FOLLOWUP  12/30/2023    LIPID PANEL  01/04/2025    ANNUAL WELLNESS VISIT  01/10/2025    TDAP/TD VACCINES (2 - Td or Tdap) 01/18/2026    COLORECTAL CANCER SCREENING  12/07/2026        Subjective   History of Present Illness:  Rodolfo Grover is a 78 y.o. male who presents for an Annual Wellness Visit.  Compared to one year ago, the patient feels his physical health is better.  Compared to one year ago, the patient feels his mental health is better.  History of Present Illness     Allergies:  No Known Allergies    Social History:  Social History     Socioeconomic History    Marital status:    Tobacco Use    Smoking status: Never    Smokeless tobacco: Never   Vaping Use    Vaping Use: Never used   Substance and Sexual Activity    Alcohol use: Yes     Comment: occasional    Drug use: Never    Sexual activity: Defer       Family History:  Family History    Problem Relation Age of Onset    Stroke Mother     Cancer Father 56        bladder    Heart attack Father     Malig Hyperthermia Neg Hx        Past Medical History :  Active Ambulatory Problems     Diagnosis Date Noted    Acute blood loss anemia 10/07/2014    Fracture of fifth metacarpal bone of right hand 01/22/2016    Closed fracture dislocation of right shoulder 03/16/2018    Gastroesophageal reflux disease without esophagitis 03/16/2018    DNR (do not resuscitate) 03/16/2018    Bradycardia following surgery 03/17/2018    Frequent PVCs 03/17/2018    Proximal humerus fracture 03/22/2018    Osteoarthritis 03/22/2018    Severe obstructive sleep apnea 01/17/2019    Pure hypercholesterolemia 01/17/2019    Pulmonary hypertension 02/09/2019    LARA (dyspnea on exertion) 08/15/2019    Localized edema 08/15/2019    Chest discomfort 02/19/2021    Elevated troponin 02/19/2021    Abnormal EKG 02/19/2021    Coronary artery disease involving native coronary artery of native heart without angina pectoris 02/19/2021    Focal motor seizure 02/24/2021    Generalized tonic-clonic seizure 02/24/2021    Post-ictal state 02/24/2021    Coffee ground emesis 02/19/2021    S/P CABG (coronary artery bypass graft) 03/09/2021    Mitral regurgitation 03/09/2021    H/O mitral valve repair 03/09/2021    PAF (paroxysmal atrial fibrillation) 03/09/2021    Ischemic cardiomyopathy 03/09/2021    Essential hypertension 05/07/2021    Non-ischemic cardiomyopathy 06/28/2021    Iron deficiency anemia due to chronic blood loss 11/08/2021    Polyp of colon 11/08/2021    Macrocytic anemia 06/30/2022    Thrombocytopenia 06/30/2022    CKD (chronic kidney disease) 06/30/2022    Chronic diastolic heart failure 05/23/2022    Wenckebach second degree AV block 06/12/2023    Medicare annual wellness visit, subsequent 01/10/2024    Atopic dermatitis in adult 01/10/2024     Resolved Ambulatory Problems     Diagnosis Date Noted    Arthritis 10/07/2014    GI bleed  10/07/2014    Melena 09/12/2014    Hyperglycemia 03/16/2018    Fall at home 03/16/2018    Gastrointestinal hemorrhage associated with acute gastritis 08/15/2018    Adverse effect of non-steroidal anti-inflammatory drug (NSAID) 08/17/2018     Past Medical History:   Diagnosis Date    Atrial fibrillation     Bilateral lower extremity edema     CAD (coronary artery disease)     Colon polyp     GERD (gastroesophageal reflux disease)     Hypertension     Iritis of right eye     Mitral valve insufficiency     Myocardial infarction     Peptic ulceration     PVC (premature ventricular contraction)     Rheumatic fever     Rheumatic fever     S/P CABG x 7     S/P mitral valve repair     Shoulder fracture, right        Medication List:  Outpatient Encounter Medications as of 1/10/2024   Medication Sig Dispense Refill    acetaminophen (TYLENOL) 500 MG tablet Take 2 tablets by mouth 3 (Three) Times a Day. (Patient taking differently: Take 2 tablets by mouth 3 (Three) Times a Day As Needed.)      aspirin 81 MG EC tablet Take 1 tablet by mouth Daily. 90 tablet 1    atorvastatin (LIPITOR) 10 MG tablet Take 1 tablet by mouth Daily. 30 tablet 11    Cholecalciferol 50 MCG (2000 UT) capsule Take 1 capsule by mouth Daily.      Ferrous Gluconate (IRON 27 PO) Take 27 mg by mouth 3 (Three) Times a Week.      fluticasone (CUTIVATE) 0.05 % cream Apply 1 application  topically to the appropriate area as directed 2 (Two) Times a Day. 30 g 4    furosemide (LASIX) 40 MG tablet Take 1 tablet by mouth Daily. 90 tablet 2    metoprolol succinate XL (TOPROL-XL) 25 MG 24 hr tablet Take 1 tablet by mouth Daily. 90 tablet 3    nitroglycerin (NITROSTAT) 0.4 MG SL tablet 1 under the tongue as needed for angina, may repeat q5mins for up three doses 25 tablet 1    Omeprazole Magnesium (PRILOSEC OTC PO) Take 20 mg by mouth Daily.      potassium chloride 10 MEQ CR tablet Take 1 tablet by mouth Daily. 90 tablet 2    Psyllium (METAMUCIL FIBER PO) Take  by mouth  As Needed.      sucralfate (CARAFATE) 1 g tablet As Needed.      warfarin (COUMADIN) 2 MG tablet Take one-half tablet (1 mg) by mouth Monday and Thursday, and take one tablet (2 mg) all other days or as directed. 80 tablet 1    [DISCONTINUED] fluticasone (CUTIVATE) 0.05 % cream Apply 1 application topically to the appropriate area as directed 2 (Two) Times a Day. (Patient taking differently: Apply 1 application  topically to the appropriate area as directed As Needed.) 30 g 0     No facility-administered encounter medications on file as of 1/10/2024.     Reviewed use of high risk medication in the elderly: yes  Reviewed for potential of harmful drug interactions in the elderly: yes    Past Surgical History:  Past Surgical History:   Procedure Laterality Date    CARDIAC CATHETERIZATION N/A 2/19/2021    Procedure: Coronary angiography;  Surgeon: Bry Nails MD;  Location: [facility] CATH INVASIVE LOCATION;  Service: Cardiovascular;  Laterality: N/A;    CARDIAC CATHETERIZATION N/A 2/19/2021    Procedure: Left heart cath;  Surgeon: Bry Nails MD;  Location: [facility] CATH INVASIVE LOCATION;  Service: Cardiovascular;  Laterality: N/A;    CARDIAC CATHETERIZATION N/A 2/19/2021    Procedure: Right Heart Cath;  Surgeon: Bry Nails MD;  Location: [facility] CATH INVASIVE LOCATION;  Service: Cardiovascular;  Laterality: N/A;    CARDIAC CATHETERIZATION N/A 2/19/2021    Procedure: Left ventriculography;  Surgeon: Bry Nails MD;  Location: [facility] CATH INVASIVE LOCATION;  Service: Cardiovascular;  Laterality: N/A;    COLONOSCOPY N/A 12/7/2021    Procedure: COLONOSCOPY to cecum with cold biopsy polypectomy;  Surgeon: Edgar Allen MD;  Location: [facility] ENDOSCOPY;  Service: Gastroenterology;  Laterality: N/A;  IRON DEFICIENCY ANEMIA, H/O COLON POLYPS  --   DIVERTICULOSIS, polyp, hemorrhoids    CORONARY ARTERY BYPASS GRAFT N/A 2/22/2021    Procedure: BK, MIDLINE STERNOTOMY, CORONARY ARTERY BYPASS  X 7 UTILIZING THE LEFT INTERNAL MAMMARY ARTERY AND THE RIGHT SAPHENOUS VEIN, MITRAL VALVE REPAIR, PRP;  Surgeon: Jr Shyam Echavarria MD;  Location: [facility] OR;  Service: Cardiothoracic;  Laterality: N/A;    ENDOSCOPY N/A 8/16/2018    Erosive gastritis, diverticulosis    ENDOSCOPY N/A 2/26/2021    Procedure: ESOPHAGOGASTRODUODENOSCOPY AT BEDSIDE WITH HOT SNARE POLYPECTOMY AND CLIP PLACEMENT X1;  Surgeon: Jono Laboy MD;  Location: [facility] ENDOSCOPY;  Service: Gastroenterology;  Laterality: N/A;  PRE-  GI BLEED  POST- GASTRITIS, ESOPHAGITIS, POLYP    ENDOSCOPY N/A 12/7/2021    Procedure: ESOPHAGOGASTRODUODENOSCOPY with COLD  BIOPSIES;  Surgeon: Edgar Allen MD;  Location: [facility] ENDOSCOPY;  Service: Gastroenterology;  Laterality: N/A;  IRON DEFICIENCY ANEMIA, H/O ULCERATIVE ESOPHAGITIS  --GASTRITIS, DUODENAL POLYP    HERNIA REPAIR      SHOULDER CLOSED REDUCTION Right 3/16/2018    Procedure: RIGHT SHOULDER CLOSED REDUCTION;  Surgeon: Kj Garcia MD;  Location: [facility] OR;  Service: Orthopedics    TONSILLECTOMY      TOTAL SHOULDER ARTHROPLASTY W/ DISTAL CLAVICLE EXCISION Right 3/22/2018    Procedure: Reverse Total Shoulder Arthroplsty;  Surgeon: Kj Garcia MD;  Location: [facility] OR;  Service: Orthopedics    TRANSESOPHAGEAL ECHOCARDIOGRAM (BK) N/A 2/22/2021    Procedure: TRANSESOPHAGEAL ECHOCARDIOGRAM;  Surgeon: Jr Shyam Echavarria MD;  Location: [facility] OR;  Service: Cardiothoracic;  Laterality: N/A;        The following portions of the patient's history were reviewed and updated as appropriate: allergies, current medications, past family history, past medical history, past social history, past surgical history and problem list.    Review Of Systems:  Review of Systems   Constitutional:  Negative for activity change, appetite change and fatigue.   HENT:  Negative for congestion, postnasal drip, sinus pressure and sore throat.    Eyes:  Negative for blurred vision and itching.  "  Respiratory:  Negative for cough, shortness of breath and wheezing.    Cardiovascular:  Negative for chest pain.   Gastrointestinal:  Negative for abdominal pain, constipation, nausea, vomiting and GERD.   Endocrine: Negative for cold intolerance and heat intolerance.   Genitourinary:  Negative for difficulty urinating, dysuria and urinary incontinence.   Musculoskeletal:  Negative for back pain, joint swelling and neck pain.   Skin:  Negative for color change and rash.   Neurological:  Negative for dizziness, speech difficulty, weakness and memory problem.   Psychiatric/Behavioral:  Negative for behavioral problems, decreased concentration, suicidal ideas and depressed mood.      I have reviewed and confirmed the accuracy of the ROS as documented by the MA/LPN/RN Gold Gray Sr, MD    Objective     Physical Exam:  Vital Signs:  Visit Vitals  /64   Temp 97.7 °F (36.5 °C)   Resp 18   Ht 170.2 cm (67\")   Wt 80.7 kg (178 lb)   BMI 27.88 kg/m²     BMI is >= 25 and <30. (Overweight) The following options were offered after discussion;: exercise counseling/recommendations and nutrition counseling/recommendations      Physical Exam  Vitals reviewed.   Constitutional:       Appearance: He is well-developed.   HENT:      Head: Normocephalic and atraumatic.      Nose: Nose normal.   Eyes:      General: Lids are normal.      Conjunctiva/sclera: Conjunctivae normal.      Pupils: Pupils are equal, round, and reactive to light.   Cardiovascular:      Rate and Rhythm: Normal rate and regular rhythm.      Pulses: Normal pulses.      Heart sounds: Normal heart sounds.   Pulmonary:      Effort: Pulmonary effort is normal. No respiratory distress.      Breath sounds: Normal breath sounds.   Abdominal:      General: Bowel sounds are normal.      Palpations: Abdomen is soft.   Musculoskeletal:         General: Normal range of motion.      Cervical back: Normal range of motion and neck supple.   Skin:     General: Skin is warm " and dry.      Capillary Refill: Capillary refill takes less than 2 seconds.   Neurological:      Mental Status: He is alert and oriented to person, place, and time.   Psychiatric:         Behavior: Behavior normal.         Thought Content: Thought content normal.         Judgment: Judgment normal.         Assessment & Plan   Assessment and Plan:  Patient Self-Management and Personalized Health Advice  The patient has been provided with information about: designing advance directives and preventive services including:   Annual Wellness Visit (AWV).    Advance Care Planning:  ACP discussion was held with the patient during this visit. Patient has an advance directive in EMR which is still valid.   Identification of Risk Factors:  Risk factors include: Advance Directive Discussion.    Diagnoses and all orders for this visit:    1. Medicare annual wellness visit, subsequent (Primary)  Assessment & Plan:  Medicare wellness completed.  Discussed advanced directives with patient.  Performed the Mini-Mental exam and patient scored 27/30.      2. Atopic dermatitis in adult  -     fluticasone (CUTIVATE) 0.05 % cream; Apply 1 application  topically to the appropriate area as directed 2 (Two) Times a Day.  Dispense: 30 g; Refill: 4          Follow Up:  No follow-ups on file.     An After Visit Summary and PPPS with all of these plans were given to the patient.

## 2024-01-10 NOTE — ASSESSMENT & PLAN NOTE
Medicare wellness completed.  Discussed advanced directives with patient.  Performed the Mini-Mental exam and patient scored 27/30.

## 2024-01-10 NOTE — PROGRESS NOTES
Anticoagulation Clinic Progress Note    Anticoagulation Summary  As of 1/10/2024      INR goal:  2.0-3.0   TTR:  65.2% (2.7 y)   INR used for dosin.0 (1/10/2024)   Warfarin maintenance plan:  1 mg every Mon, Thu; 2 mg all other days   Weekly warfarin total:  12 mg   No change documented:  Britney Olivia, Pharmacy Intern   Plan last modified:  Eduar Weeks, PharmD (8/10/2022)   Next INR check:  2024   Priority:  High   Target end date:      Indications    PAF (paroxysmal atrial fibrillation) [I48.0]                 Anticoagulation Episode Summary       INR check location:      Preferred lab:      Send INR reminders to:   [facility]    Comments:            Anticoagulation Care Providers       Provider Role Specialty Phone number    Jimena Singer MD Referring Cardiology [phone]            Clinic Interview:  Patient Findings     Positives:  Change in activity    Negatives:  Signs/symptoms of thrombosis, Signs/symptoms of bleeding,   Laboratory test error suspected, Change in health, Change in alcohol use,   Upcoming invasive procedure, Emergency department visit, Upcoming dental   procedure, Missed doses, Extra doses, Change in medications, Change in   diet/appetite, Hospital admission, Bruising, Other complaints    Comments:  Patient is going to [place] -.      Clinical Outcomes     Negatives:  Major bleeding event, Thromboembolic event,   Anticoagulation-related hospital admission, Anticoagulation-related ED   visit, Anticoagulation-related fatality    Comments:  Patient is going to [place] -.        INR History:      2023    10:43 AM 2023     1:45 PM 10/4/2023     2:00 PM 2023     2:00 PM 2023     2:00 PM 2023     2:00 PM 1/10/2024     2:15 PM   Anticoagulation Monitoring   INR  2.6 2.6 2.9 3.2 2.5 2.0   INR Date  2023 10/4/2023 2023 2023 2023 1/10/2024   INR Goal  2.0-3.0 2.0-3.0 2.0-3.0 2.0-3.0 2.0-3.0  2.0-3.0   Trend  Same Same Same Same Same Same   Last Week Total  12 mg 12 mg 12 mg 12 mg 12 mg 12 mg   Next Week Total  12 mg 12 mg 12 mg 11 mg 12 mg 12 mg   Sun  2 mg 2 mg 2 mg 2 mg 2 mg 2 mg   Mon  1 mg 1 mg 1 mg 1 mg 1 mg 1 mg   Tue  2 mg 2 mg 2 mg 2 mg 2 mg 2 mg   Wed  2 mg 2 mg 2 mg 1 mg (11/29); Otherwise 2 mg 2 mg 2 mg   Thu  1 mg 1 mg 1 mg 1 mg 1 mg 1 mg   Fri  2 mg 2 mg 2 mg 2 mg 2 mg 2 mg   Sat  2 mg 2 mg 2 mg 2 mg 2 mg 2 mg   Historical INR 2.70          Visit Report       Report       Plan:  1. INR is Therapeutic today- see above in Anticoagulation Summary.  Will instruct Rodolfo Grover to Continue their warfarin regimen of 1mg on Monday and Thursday and 2mg on all other days- see above in Anticoagulation Summary.  2. Follow up in 4 weeks  3. Patient desires warfarin refills.  4. Verbal and written information provided. Patient expresses understanding and has no further questions at this time.    Britney Olivia, Pharmacy Intern

## 2024-01-10 NOTE — PROGRESS NOTES
I have supervised and reviewed the notes, assessments, and/or procedures performed. The documented assessment and plan were developed cooperatively, and the plan was implemented in my presence. I concur with the documentation of this patient encounter.    Luci Hale, [facility]

## 2024-01-29 RX ORDER — ATORVASTATIN CALCIUM 10 MG/1
10 TABLET, FILM COATED ORAL DAILY
Qty: 90 TABLET | Refills: 0 | Status: SHIPPED | OUTPATIENT
Start: 2024-01-29

## 2024-02-07 ENCOUNTER — ANTICOAGULATION VISIT (OUTPATIENT)
Dept: PHARMACY | Facility: HOSPITAL | Age: 79
End: 2024-02-07
Payer: MEDICARE

## 2024-02-07 DIAGNOSIS — I48.0 PAF (PAROXYSMAL ATRIAL FIBRILLATION): Primary | ICD-10-CM

## 2024-02-07 LAB
INR PPP: 2.5 (ref 0.91–1.09)
PROTHROMBIN TIME: 29.5 SECONDS (ref 10–13.8)

## 2024-02-07 PROCEDURE — 36416 COLLJ CAPILLARY BLOOD SPEC: CPT

## 2024-02-07 PROCEDURE — G0463 HOSPITAL OUTPT CLINIC VISIT: HCPCS

## 2024-02-07 PROCEDURE — 85610 PROTHROMBIN TIME: CPT

## 2024-02-07 NOTE — PROGRESS NOTES
Anticoagulation Clinic Progress Note    Anticoagulation Summary  As of 2024      INR goal:  2.0-3.0   TTR:  66.1% (2.8 y)   INR used for dosin.5 (2024)   Warfarin maintenance plan:  1 mg every Mon, Thu; 2 mg all other days   Weekly warfarin total:  12 mg   No change documented:  Eduar Weeks, Meg   Plan last modified:  Eduar Weeks PharmD (8/10/2022)   Next INR check:  3/6/2024   Priority:  High   Target end date:      Indications    PAF (paroxysmal atrial fibrillation) [I48.0]                 Anticoagulation Episode Summary       INR check location:      Preferred lab:      Send INR reminders to:   [facility]    Comments:            Anticoagulation Care Providers       Provider Role Specialty Phone number    Jimena Singer MD Referring Cardiology [phone]            Clinic Interview:  Patient Findings     Negatives:  Signs/symptoms of thrombosis, Signs/symptoms of bleeding,   Laboratory test error suspected, Change in health, Change in alcohol use,   Change in activity, Upcoming invasive procedure, Emergency department   visit, Upcoming dental procedure, Missed doses, Extra doses, Change in   medications, Change in diet/appetite, Hospital admission, Bruising, Other   complaints      Clinical Outcomes     Negatives:  Major bleeding event, Thromboembolic event,   Anticoagulation-related hospital admission, Anticoagulation-related ED   visit, Anticoagulation-related fatality        INR History:      2023     1:45 PM 10/4/2023     2:00 PM 2023     2:00 PM 2023     2:00 PM 2023     2:00 PM 1/10/2024     2:15 PM 2024     2:15 PM   Anticoagulation Monitoring   INR 2.6 2.6 2.9 3.2 2.5 2.0 2.5   INR Date 2023 10/4/2023 2023 2023 2023 1/10/2024 2024   INR Goal 2.0-3.0 2.0-3.0 2.0-3.0 2.0-3.0 2.0-3.0 2.0-3.0 2.0-3.0   Trend Same Same Same Same Same Same Same   Last Week Total 12 mg 12 mg 12 mg 12 mg 12 mg 12 mg 12 mg   Next Week Total 12 mg  12 mg 12 mg 11 mg 12 mg 12 mg 12 mg   Sun 2 mg 2 mg 2 mg 2 mg 2 mg 2 mg 2 mg   Mon 1 mg 1 mg 1 mg 1 mg 1 mg 1 mg 1 mg   Tue 2 mg 2 mg 2 mg 2 mg 2 mg 2 mg 2 mg   Wed 2 mg 2 mg 2 mg 1 mg (11/29); Otherwise 2 mg 2 mg 2 mg 2 mg   Thu 1 mg 1 mg 1 mg 1 mg 1 mg 1 mg 1 mg   Fri 2 mg 2 mg 2 mg 2 mg 2 mg 2 mg 2 mg   Sat 2 mg 2 mg 2 mg 2 mg 2 mg 2 mg 2 mg   Visit Report      Report        Plan:  1. INR is Therapeutic today- see above in Anticoagulation Summary.  Will instruct Rodolfo Grover to Continue their warfarin regimen- see above in Anticoagulation Summary.  2. Follow up in 4 weeks  3. Patient declines warfarin refills (last refill sent 1/10/24).  4. Verbal and written information provided. Patient expresses understanding and has no further questions at this time.    Eduar Weeks, PharmD

## 2024-02-09 ENCOUNTER — OFFICE VISIT (OUTPATIENT)
Dept: ONCOLOGY | Facility: CLINIC | Age: 79
End: 2024-02-09
Payer: MEDICARE

## 2024-02-09 ENCOUNTER — LAB (OUTPATIENT)
Dept: LAB | Facility: HOSPITAL | Age: 79
End: 2024-02-09
Payer: MEDICARE

## 2024-02-09 VITALS
SYSTOLIC BLOOD PRESSURE: 130 MMHG | TEMPERATURE: 98 F | DIASTOLIC BLOOD PRESSURE: 51 MMHG | HEIGHT: 67 IN | OXYGEN SATURATION: 96 % | WEIGHT: 182.4 LBS | BODY MASS INDEX: 28.63 KG/M2 | RESPIRATION RATE: 18 BRPM | HEART RATE: 65 BPM

## 2024-02-09 DIAGNOSIS — D53.9 MACROCYTIC ANEMIA: ICD-10-CM

## 2024-02-09 DIAGNOSIS — D69.6 THROMBOCYTOPENIA: ICD-10-CM

## 2024-02-09 DIAGNOSIS — D53.9 MACROCYTIC ANEMIA: Primary | ICD-10-CM

## 2024-02-09 DIAGNOSIS — N18.32 STAGE 3B CHRONIC KIDNEY DISEASE: ICD-10-CM

## 2024-02-09 LAB
BASOPHILS # BLD AUTO: 0.05 10*3/MM3 (ref 0–0.2)
BASOPHILS NFR BLD AUTO: 0.6 % (ref 0–1.5)
DEPRECATED RDW RBC AUTO: 51.4 FL (ref 37–54)
EOSINOPHIL # BLD AUTO: 0.3 10*3/MM3 (ref 0–0.4)
EOSINOPHIL NFR BLD AUTO: 3.4 % (ref 0.3–6.2)
ERYTHROCYTE [DISTWIDTH] IN BLOOD BY AUTOMATED COUNT: 13.3 % (ref 12.3–15.4)
FERRITIN SERPL-MCNC: 92.3 NG/ML (ref 30–400)
HCT VFR BLD AUTO: 44.7 % (ref 37.5–51)
HGB BLD-MCNC: 14.1 G/DL (ref 13–17.7)
IMM GRANULOCYTES # BLD AUTO: 0.18 10*3/MM3 (ref 0–0.05)
IMM GRANULOCYTES NFR BLD AUTO: 2.1 % (ref 0–0.5)
IRON 24H UR-MRATE: 91 MCG/DL (ref 59–158)
IRON SATN MFR SERPL: 26 % (ref 20–50)
LYMPHOCYTES # BLD AUTO: 1.58 10*3/MM3 (ref 0.7–3.1)
LYMPHOCYTES NFR BLD AUTO: 18 % (ref 19.6–45.3)
MCH RBC QN AUTO: 32.9 PG (ref 26.6–33)
MCHC RBC AUTO-ENTMCNC: 31.5 G/DL (ref 31.5–35.7)
MCV RBC AUTO: 104.2 FL (ref 79–97)
MONOCYTES # BLD AUTO: 0.91 10*3/MM3 (ref 0.1–0.9)
MONOCYTES NFR BLD AUTO: 10.4 % (ref 5–12)
NEUTROPHILS NFR BLD AUTO: 5.75 10*3/MM3 (ref 1.7–7)
NEUTROPHILS NFR BLD AUTO: 65.5 % (ref 42.7–76)
NRBC BLD AUTO-RTO: 0 /100 WBC (ref 0–0.2)
PLATELET # BLD AUTO: 170 10*3/MM3 (ref 140–450)
PMV BLD AUTO: 10.3 FL (ref 6–12)
RBC # BLD AUTO: 4.29 10*6/MM3 (ref 4.14–5.8)
TIBC SERPL-MCNC: 350 MCG/DL (ref 298–536)
TRANSFERRIN SERPL-MCNC: 250 MG/DL (ref 200–360)
WBC NRBC COR # BLD AUTO: 8.77 10*3/MM3 (ref 3.4–10.8)

## 2024-02-09 PROCEDURE — 85025 COMPLETE CBC W/AUTO DIFF WBC: CPT

## 2024-02-09 PROCEDURE — 83540 ASSAY OF IRON: CPT

## 2024-02-09 PROCEDURE — 84466 ASSAY OF TRANSFERRIN: CPT

## 2024-02-09 PROCEDURE — 3075F SYST BP GE 130 - 139MM HG: CPT | Performed by: NURSE PRACTITIONER

## 2024-02-09 PROCEDURE — 3078F DIAST BP <80 MM HG: CPT | Performed by: NURSE PRACTITIONER

## 2024-02-09 PROCEDURE — 36415 COLL VENOUS BLD VENIPUNCTURE: CPT

## 2024-02-09 PROCEDURE — 99213 OFFICE O/P EST LOW 20 MIN: CPT | Performed by: NURSE PRACTITIONER

## 2024-02-09 PROCEDURE — 1126F AMNT PAIN NOTED NONE PRSNT: CPT | Performed by: NURSE PRACTITIONER

## 2024-02-09 PROCEDURE — 82728 ASSAY OF FERRITIN: CPT

## 2024-03-06 ENCOUNTER — ANTICOAGULATION VISIT (OUTPATIENT)
Dept: PHARMACY | Facility: HOSPITAL | Age: 79
End: 2024-03-06
Payer: MEDICARE

## 2024-03-06 DIAGNOSIS — I48.0 PAF (PAROXYSMAL ATRIAL FIBRILLATION): Primary | ICD-10-CM

## 2024-03-06 LAB
INR PPP: 2.5 (ref 0.91–1.09)
PROTHROMBIN TIME: 29.9 SECONDS (ref 10–13.8)

## 2024-03-06 PROCEDURE — G0463 HOSPITAL OUTPT CLINIC VISIT: HCPCS

## 2024-03-06 PROCEDURE — 85610 PROTHROMBIN TIME: CPT

## 2024-03-06 PROCEDURE — 36416 COLLJ CAPILLARY BLOOD SPEC: CPT

## 2024-03-06 NOTE — PROGRESS NOTES
Anticoagulation Clinic Progress Note    Anticoagulation Summary  As of 3/6/2024      INR goal:  2.0-3.0   TTR:  67.0% (2.9 y)   INR used for dosin.5 (3/6/2024)   Warfarin maintenance plan:  1 mg every Mon, Thu; 2 mg all other days   Weekly warfarin total:  12 mg   No change documented:  Juan David Qureshi, Pharmacy Intern   Plan last modified:  Eduar Weeks, PharmD (8/10/2022)   Next INR check:  4/3/2024   Priority:  High   Target end date:      Indications    PAF (paroxysmal atrial fibrillation) [I48.0]                 Anticoagulation Episode Summary       INR check location:      Preferred lab:      Send INR reminders to:   [facility]    Comments:            Anticoagulation Care Providers       Provider Role Specialty Phone number    Jimena Singer MD Referring Cardiology [phone]            Clinic Interview:  Patient Findings     Positives:  Change in health    Negatives:  Signs/symptoms of thrombosis, Signs/symptoms of bleeding,   Laboratory test error suspected, Change in alcohol use, Change in   activity, Upcoming invasive procedure, Emergency department visit,   Upcoming dental procedure, Missed doses, Extra doses, Change in   medications, Change in diet/appetite, Hospital admission, Bruising, Other   complaints    Comments:  Patient reports having a cold over the weekend and having   nosebleeds as a result. Nosebleed prevention and treatment education   provided.      Clinical Outcomes     Negatives:  Major bleeding event, Thromboembolic event,   Anticoagulation-related hospital admission, Anticoagulation-related ED   visit, Anticoagulation-related fatality    Comments:  Patient reports having a cold over the weekend and having   nosebleeds as a result. Nosebleed prevention and treatment education   provided.        INR History:      10/4/2023     2:00 PM 2023     2:00 PM 2023     2:00 PM 2023     2:00 PM 1/10/2024     2:15 PM 2024     2:15 PM 3/6/2024     2:00 PM    Anticoagulation Monitoring   INR 2.6 2.9 3.2 2.5 2.0 2.5 2.5   INR Date 10/4/2023 11/1/2023 11/29/2023 12/13/2023 1/10/2024 2/7/2024 3/6/2024   INR Goal 2.0-3.0 2.0-3.0 2.0-3.0 2.0-3.0 2.0-3.0 2.0-3.0 2.0-3.0   Trend Same Same Same Same Same Same Same   Last Week Total 12 mg 12 mg 12 mg 12 mg 12 mg 12 mg 12 mg   Next Week Total 12 mg 12 mg 11 mg 12 mg 12 mg 12 mg 12 mg   Sun 2 mg 2 mg 2 mg 2 mg 2 mg 2 mg 2 mg   Mon 1 mg 1 mg 1 mg 1 mg 1 mg 1 mg 1 mg   Tue 2 mg 2 mg 2 mg 2 mg 2 mg 2 mg 2 mg   Wed 2 mg 2 mg 1 mg (11/29); Otherwise 2 mg 2 mg 2 mg 2 mg 2 mg   Thu 1 mg 1 mg 1 mg 1 mg 1 mg 1 mg 1 mg   Fri 2 mg 2 mg 2 mg 2 mg 2 mg 2 mg 2 mg   Sat 2 mg 2 mg 2 mg 2 mg 2 mg 2 mg 2 mg   Visit Report     Report         Plan:  1. INR is therapeutic today- see above in Anticoagulation Summary.   Will instruct Rodolfo Grover to continue their warfarin regimen- see above in Anticoagulation Summary.  2. Follow up in 4 weeks.  3. Patient declines warfarin refills.  4. Verbal and written information provided. Patient expresses understanding and has no further questions at this time.    Juan David Qureshi, Pharmacy Intern

## 2024-03-06 NOTE — PROGRESS NOTES
I have supervised and reviewed the notes, assessments, and/or procedures performed. The documented assessment and plan were developed cooperatively, and the plan was implemented in my presence. I concur with the documentation of this patient encounter.    Luci Hale, [facility]

## 2024-03-13 ENCOUNTER — LAB (OUTPATIENT)
Dept: LAB | Facility: HOSPITAL | Age: 79
End: 2024-03-13
Payer: MEDICARE

## 2024-03-13 ENCOUNTER — OFFICE VISIT (OUTPATIENT)
Dept: CARDIOLOGY | Facility: CLINIC | Age: 79
End: 2024-03-13
Payer: MEDICARE

## 2024-03-13 VITALS
HEIGHT: 67 IN | HEART RATE: 71 BPM | RESPIRATION RATE: 18 BRPM | BODY MASS INDEX: 27.94 KG/M2 | WEIGHT: 178 LBS | DIASTOLIC BLOOD PRESSURE: 80 MMHG | SYSTOLIC BLOOD PRESSURE: 134 MMHG | OXYGEN SATURATION: 99 %

## 2024-03-13 DIAGNOSIS — I48.0 PAF (PAROXYSMAL ATRIAL FIBRILLATION): ICD-10-CM

## 2024-03-13 DIAGNOSIS — I10 ESSENTIAL HYPERTENSION: ICD-10-CM

## 2024-03-13 DIAGNOSIS — E78.00 PURE HYPERCHOLESTEROLEMIA: ICD-10-CM

## 2024-03-13 DIAGNOSIS — I49.8 ACCELERATED ATRIOVENTRICULAR JUNCTIONAL RHYTHM: ICD-10-CM

## 2024-03-13 DIAGNOSIS — I49.3 FREQUENT PVCS: ICD-10-CM

## 2024-03-13 DIAGNOSIS — I48.0 PAF (PAROXYSMAL ATRIAL FIBRILLATION): Primary | ICD-10-CM

## 2024-03-13 DIAGNOSIS — I44.1 WENCKEBACH SECOND DEGREE AV BLOCK: ICD-10-CM

## 2024-03-13 DIAGNOSIS — I42.8 NON-ISCHEMIC CARDIOMYOPATHY: ICD-10-CM

## 2024-03-13 DIAGNOSIS — Z98.890 H/O MITRAL VALVE REPAIR: ICD-10-CM

## 2024-03-13 LAB
T-UPTAKE NFR SERPL: 1.05 TBI (ref 0.8–1.3)
T4 SERPL-MCNC: 5.91 MCG/DL (ref 4.5–11.7)
TSH SERPL DL<=0.05 MIU/L-ACNC: 3.13 UIU/ML (ref 0.27–4.2)

## 2024-03-13 PROCEDURE — 1160F RVW MEDS BY RX/DR IN RCRD: CPT | Performed by: INTERNAL MEDICINE

## 2024-03-13 PROCEDURE — 99214 OFFICE O/P EST MOD 30 MIN: CPT | Performed by: INTERNAL MEDICINE

## 2024-03-13 PROCEDURE — 84443 ASSAY THYROID STIM HORMONE: CPT

## 2024-03-13 PROCEDURE — 3075F SYST BP GE 130 - 139MM HG: CPT | Performed by: INTERNAL MEDICINE

## 2024-03-13 PROCEDURE — 3079F DIAST BP 80-89 MM HG: CPT | Performed by: INTERNAL MEDICINE

## 2024-03-13 PROCEDURE — 84479 ASSAY OF THYROID (T3 OR T4): CPT

## 2024-03-13 PROCEDURE — 93000 ELECTROCARDIOGRAM COMPLETE: CPT | Performed by: INTERNAL MEDICINE

## 2024-03-13 PROCEDURE — 36415 COLL VENOUS BLD VENIPUNCTURE: CPT

## 2024-03-13 PROCEDURE — 1159F MED LIST DOCD IN RCRD: CPT | Performed by: INTERNAL MEDICINE

## 2024-03-13 PROCEDURE — 84436 ASSAY OF TOTAL THYROXINE: CPT

## 2024-03-13 NOTE — PROGRESS NOTES
Date of Office Visit: 2024  Encounter Provider: Jimena Singer MD  Place of Service: [facility] CARDIOLOGY  Patient Name: Rodolfo Grover  :1945    Chief complaint  Coronary artery disease, atrial fibrillation, hypertension, pulmonary hypertension, valvular heart disease    History of Present Illness  Patient is a 79-year-old gentleman with history of rheumatic fever, GE reflux disease who was seen at [facility], operated by [facility] in 2018 after humeral fracture following a fall.  At that time he was noted to have ventricular bigeminy with normal potassium and magnesium levels.  He was hypertensive at the time.  He had an echocardiogram that showed normal systolic function with normal diastolic function, mild left atrial enlargement saline study was indeterminate.  There was aortic valve sclerosis with mild aortic regurgitation and trivial mitral and tricuspid regurgitation with mild pulmonary hypertension with an RV systolic pressure 42 mmHg.  A stress perfusion study was negative for ischemia.  Follow-up echocardiogram in 2020 showed an ejection fraction of 55% with normal diastolic function, mild aortic mitral valve regurgitation, mild to moderate tricuspid regurgitation with an RV systolic pressure increased further to 48 mmHg.  He presented in 2020 with complaints of chest discomfort and non-STEMI.  Echo showed new wall motion abnormalities as well as pulmonary hypertension( RVSP 61 mmHg).  Subsequent cardiac catheterization showed multivessel coronary artery disease.  Subsequently underwent CABG x7 vessels with mitral valve repair.  Postoperative course was complicated by hemoptysis, esophageal esophagitis, atrial fibrillation associated also with intermittent complete heart block.  He was also felt to have had a seizure and was started on Keppra.  He also had acute renal insufficiency.  He also had induration and possible cellulitis of his right thigh leg wound.  Echo  on 5/2023 showed an ejection fraction of 53%, indeterminate diastolic function, mild aortic valve regurgitation no aortic stenosis.  Mild to  mitral regurgitation mild tricuspid regurgitation RVSP increased further to 49 mmHg  (had been 40 mmHg in 2021).    Since last visit he is active he denies any chest pain palpitations syncope near syncope shortness of breath.  Active walking 1 mile almost daily.      Past Medical History:   Diagnosis Date    Acute blood loss anemia     Arthritis     Atrial fibrillation     Bilateral lower extremity edema     Bradycardia following surgery     CAD (coronary artery disease)     Chest discomfort     Colon polyp     Elevated troponin     Focal motor seizure     Generalized tonic-clonic seizure     GERD (gastroesophageal reflux disease)     Hypertension     Iritis of right eye     Ischemic cardiomyopathy     Mitral valve insufficiency     Myocardial infarction     Peptic ulceration     Post-ictal state     Pulmonary hypertension     PVC (premature ventricular contraction)     Rheumatic fever     Rheumatic fever     as child    S/P CABG x 7     S/P mitral valve repair     Severe obstructive sleep apnea     Shoulder fracture, right      Past Surgical History:   Procedure Laterality Date    CARDIAC CATHETERIZATION N/A 2/19/2021    Procedure: Coronary angiography;  Surgeon: Bry Nails MD;  Location: [facility] CATH INVASIVE LOCATION;  Service: Cardiovascular;  Laterality: N/A;    CARDIAC CATHETERIZATION N/A 2/19/2021    Procedure: Left heart cath;  Surgeon: Bry Nails MD;  Location: [facility] CATH INVASIVE LOCATION;  Service: Cardiovascular;  Laterality: N/A;    CARDIAC CATHETERIZATION N/A 2/19/2021    Procedure: Right Heart Cath;  Surgeon: Bry Nails MD;  Location: [facility] CATH INVASIVE LOCATION;  Service: Cardiovascular;  Laterality: N/A;    CARDIAC CATHETERIZATION N/A 2/19/2021    Procedure: Left ventriculography;  Surgeon: Bry Nails MD;   Location: [facility] CATH INVASIVE LOCATION;  Service: Cardiovascular;  Laterality: N/A;    COLONOSCOPY N/A 12/7/2021    Procedure: COLONOSCOPY to cecum with cold biopsy polypectomy;  Surgeon: Edgar Allen MD;  Location: [facility] ENDOSCOPY;  Service: Gastroenterology;  Laterality: N/A;  IRON DEFICIENCY ANEMIA, H/O COLON POLYPS  --   DIVERTICULOSIS, polyp, hemorrhoids    CORONARY ARTERY BYPASS GRAFT N/A 2/22/2021    Procedure: BK, MIDLINE STERNOTOMY, CORONARY ARTERY BYPASS X 7 UTILIZING THE LEFT INTERNAL MAMMARY ARTERY AND THE RIGHT SAPHENOUS VEIN, MITRAL VALVE REPAIR, PRP;  Surgeon: Jr Shyam Echavarria MD;  Location: [facility] OR;  Service: Cardiothoracic;  Laterality: N/A;    ENDOSCOPY N/A 8/16/2018    Erosive gastritis, diverticulosis    ENDOSCOPY N/A 2/26/2021    Procedure: ESOPHAGOGASTRODUODENOSCOPY AT BEDSIDE WITH HOT SNARE POLYPECTOMY AND CLIP PLACEMENT X1;  Surgeon: Jono Laboy MD;  Location: [facility] ENDOSCOPY;  Service: Gastroenterology;  Laterality: N/A;  PRE-  GI BLEED  POST- GASTRITIS, ESOPHAGITIS, POLYP    ENDOSCOPY N/A 12/7/2021    Procedure: ESOPHAGOGASTRODUODENOSCOPY with COLD  BIOPSIES;  Surgeon: Edgar Allen MD;  Location: [facility] ENDOSCOPY;  Service: Gastroenterology;  Laterality: N/A;  IRON DEFICIENCY ANEMIA, H/O ULCERATIVE ESOPHAGITIS  --GASTRITIS, DUODENAL POLYP    HERNIA REPAIR      SHOULDER CLOSED REDUCTION Right 3/16/2018    Procedure: RIGHT SHOULDER CLOSED REDUCTION;  Surgeon: Kj Garcia MD;  Location: [facility] OR;  Service: Orthopedics    TONSILLECTOMY      TOTAL SHOULDER ARTHROPLASTY W/ DISTAL CLAVICLE EXCISION Right 3/22/2018    Procedure: Reverse Total Shoulder Arthroplsty;  Surgeon: Kj Garcia MD;  Location: [facility] OR;  Service: Orthopedics    TRANSESOPHAGEAL ECHOCARDIOGRAM (BK) N/A 2/22/2021    Procedure: TRANSESOPHAGEAL ECHOCARDIOGRAM;  Surgeon: Jr Shyam Echavarria MD;  Location: [facility] OR;  Service: Cardiothoracic;  Laterality: N/A;      Outpatient Medications Prior to Visit   Medication Sig Dispense Refill    acetaminophen (TYLENOL) 500 MG tablet Take 2 tablets by mouth 3 (Three) Times a Day. (Patient taking differently: Take 2 tablets by mouth 3 (Three) Times a Day As Needed.)      aspirin 81 MG EC tablet Take 1 tablet by mouth Daily. 90 tablet 1    atorvastatin (LIPITOR) 10 MG tablet Take 1 tablet by mouth once daily 90 tablet 0    Cholecalciferol 50 MCG (2000 UT) capsule Take 1 capsule by mouth Daily.      Ferrous Gluconate (IRON 27 PO) Take 27 mg by mouth 3 (Three) Times a Week.      fluticasone (CUTIVATE) 0.05 % cream Apply 1 application  topically to the appropriate area as directed 2 (Two) Times a Day. 30 g 4    furosemide (LASIX) 40 MG tablet Take 1 tablet by mouth Daily. 90 tablet 2    nitroglycerin (NITROSTAT) 0.4 MG SL tablet 1 under the tongue as needed for angina, may repeat q5mins for up three doses 25 tablet 1    Omeprazole Magnesium (PRILOSEC OTC PO) Take 20 mg by mouth Daily.      potassium chloride 10 MEQ CR tablet Take 1 tablet by mouth Daily. 90 tablet 2    Psyllium (METAMUCIL FIBER PO) Take  by mouth As Needed.      sucralfate (CARAFATE) 1 g tablet As Needed.      warfarin (COUMADIN) 2 MG tablet Take one-half tablet (1 mg) by mouth Monday and Thursday, and take one tablet (2 mg) all other days or as directed. 80 tablet 1    metoprolol succinate XL (TOPROL-XL) 25 MG 24 hr tablet Take 1 tablet by mouth Daily. 90 tablet 3     No facility-administered medications prior to visit.       Allergies as of 03/13/2024    (No Known Allergies)     Social History     Socioeconomic History    Marital status:    Tobacco Use    Smoking status: Never     Passive exposure: Never    Smokeless tobacco: Never   Vaping Use    Vaping status: Never Used   Substance and Sexual Activity    Alcohol use: Yes     Comment: occasional    Drug use: Never    Sexual activity: Defer     Family History   Problem Relation Age of Onset    Stroke Mother   "   Cancer Father 56        bladder    Heart attack Father     Malig Hyperthermia Neg Hx      Review of Systems   Constitutional: Negative for chills, fever, weight gain and weight loss.   Cardiovascular:  Negative for leg swelling.   Respiratory:  Negative for cough, snoring and wheezing.    Hematologic/Lymphatic: Negative for bleeding problem. Does not bruise/bleed easily.   Skin:  Negative for color change.   Musculoskeletal:  Negative for falls, joint pain and myalgias.   Gastrointestinal:  Negative for melena.   Genitourinary:  Negative for hematuria.   Neurological:  Negative for excessive daytime sleepiness.   Psychiatric/Behavioral:  Negative for depression. The patient is not nervous/anxious.         Objective:     Vitals:    03/13/24 1224   BP: 134/80   BP Location: Right arm   Patient Position: Sitting   Cuff Size: Adult   Pulse: 71   Resp: 18   SpO2: 99%   Weight: 80.7 kg (178 lb)   Height: 170.2 cm (67\")     Body mass index is 27.88 kg/m².    Vitals reviewed.   Constitutional:       Appearance: Well-developed.   Eyes:      General: No scleral icterus.        Right eye: No discharge.      Conjunctiva/sclera: Conjunctivae normal.      Pupils: Pupils are equal, round, and reactive to light.   HENT:      Head: Normocephalic.      Nose: Nose normal.   Neck:      Thyroid: No thyromegaly.      Vascular: No JVD.   Pulmonary:      Effort: Pulmonary effort is normal. No respiratory distress.      Breath sounds: Normal breath sounds. No wheezing. No rales.   Cardiovascular:      Normal rate. Regular rhythm. Normal S1. Normal S2.       Murmurs: There is no murmur.      No gallop.    Pulses:     Intact distal pulses.      Carotid: 2+ bilaterally.     Radial: 2+ bilaterally.     Femoral: 2+ bilaterally.     Popliteal: 2+ bilaterally.     Dorsalis pedis: 2+ bilaterally.     Posterior tibial: 2+ bilaterally.  Edema:     Peripheral edema absent.   Abdominal:      General: Bowel sounds are normal. There is no distension. "      Palpations: Abdomen is soft.      Tenderness: There is no abdominal tenderness. There is no rebound.   Musculoskeletal: Normal range of motion.         General: No tenderness.      Cervical back: Normal range of motion and neck supple. Skin:     General: Skin is warm and dry.      Findings: No erythema or rash.   Neurological:      Mental Status: Alert and oriented to person, place, and time.   Psychiatric:         Behavior: Behavior normal.         Thought Content: Thought content normal.         Judgment: Judgment normal.       Lab Review:   Lab Results - Last 18 Months   Lab Units 02/09/24  1229 01/04/24  0921 08/23/23  1021 01/05/23  1038 11/23/22  1406   WBC 10*3/mm3 8.77 7.03 7.00   < > 7.94   RBC 10*6/mm3 4.29 4.22 3.88*   < > 3.73*   HEMOGLOBIN g/dL 14.1 13.8 12.3*   < > 12.0*   HEMATOCRIT % 44.7 41.5 39.4   < > 35.3*   MCV fL 104.2* 98.3* 101.5*   < > 94.6   MCH pg 32.9 32.7 31.7   < > 32.2   MCHC g/dL 31.5 33.3 31.2*   < > 34.0   RDW % 13.3 11.6* 13.0   < > 11.6*   PLATELETS 10*3/mm3 170 171 151   < > 221   NEUTROPHIL % % 65.5 66.8 69.0   < > 68.3   LYMPHOCYTE % % 18.0* 18.3* 16.7*   < > 16.4*   MONOCYTES % % 10.4 9.4 9.1   < > 9.6   EOSINOPHIL % % 3.4 3.3 3.3   < > 3.7   BASOPHIL % % 0.6 0.6 0.6   < > 0.6   NEUTROS ABS 10*3/mm3 5.75 4.70 4.83   < > 5.43   LYMPHS ABS 10*3/mm3 1.58 1.29 1.17   < > 1.30   MONOS ABS 10*3/mm3 0.91* 0.66 0.64   < > 0.76   EOS ABS 10*3/mm3 0.30 0.23 0.23   < > 0.29   BASOS ABS 10*3/mm3 0.05 0.04 0.04   < > 0.05   IMM GRAN % %  --  1.6*  --   --  1.4*   IMMATURE GRANS (ABS) 10*3/mm3  --  0.11*  --   --  0.11*   RDW-SD fl 51.4  --  49.1   < >  --    MPV fL 10.3  --  10.4   < >  --     < > = values in this interval not displayed.       Lab Results - Last 18 Months   Lab Units 01/04/24  0921 08/23/23  1057 11/23/22  1406   GLUCOSE mg/dL 92 112*  --    BUN mg/dL 25* 33*  --    CREATININE mg/dL 1.78* 1.82*  --    SODIUM mmol/L 140 139  --    POTASSIUM mmol/L 4.1 4.0  --   "  CHLORIDE mmol/L 103 108*  --    CO2 mmol/L 25.9 22.4  --    CALCIUM mg/dL 9.4 9.1  --    ALBUMIN g/dL 4.0  --  3.80   ALT (SGPT) U/L 22  --  12   AST (SGOT) U/L 17  --  13   ALK PHOS U/L 93  --  94   BILIRUBIN mg/dL 0.6  --  0.3   BUN / CREAT RATIO  14.0 18.1  --    ANION GAP mmol/L  --  8.6  --    EGFR mL/min/1.73  --  37.1*  --      Lab Results - Last 18 Months   Lab Units 01/04/24  0921 01/04/23  0909   TRIGLYCERIDES mg/dL 99 78   HDL CHOL mg/dL 63* 63*   LDL CHOL mg/dL 111* 119*   VLDL CHOLESTEROL SARAHI mg/dL 18 14     Lab Results - Last 18 Months   Lab Units 08/23/23  1021   PROBNP pg/mL 1,829.0*     No results for input(s): \"TROPONINT\" in the last [id number] hours.  Lab Results - Last 18 Months   Lab Units 03/13/24  1347 01/04/24  0921   TSH uIU/mL 3.130 5.040*     Lab Results - Last 18 Months   Lab Units 03/06/24  1407 02/07/24  1423   PROTIME seconds 29.9* 29.5*       ECG 12 Lead    Date/Time: 3/13/2024 3:04 AM  Performed by: Jimena Singer MD    Authorized by: Jimena Singer MD  Rhythm comments: Accelerated junctional rhythm at a rate of 70 beats minute.  Other findings comments: Consider prior inferior wall infarction    Clinical impression: abnormal EKG        Assessment:       Diagnosis Plan   1. PAF (paroxysmal atrial fibrillation)  Thyroid Panel With TSH    Holter Monitor - 72 Hour Up To 15 Days    ECG 12 Lead      2. Accelerated atrioventricular junctional rhythm  Thyroid Panel With TSH    Holter Monitor - 72 Hour Up To 15 Days    ECG 12 Lead      3. Non-ischemic cardiomyopathy        4. Pure hypercholesterolemia        5. Wenckebach second degree AV block        6. H/O mitral valve repair        7. Frequent PVCs        8. Essential hypertension          Plan:         1.  Recurrent postoperative atrial fibrillation with history of bradycardia postoperatively and now junctional rhythm.  Remains on warfarin.  EKG shows junctional rhythm.  Will discontinue metoprolol and next week place a 2-week Zio patch.  If he " has sinus rhythm, can consider possible discontinuation of warfarin as he is also on aspirin and has history of esophagitis.  He had postop A-fib with no known recurrence.  Will check a thyroid panel  2.  History of non-STEMI with subsequent multivessel CABG with cardiomyopathy 2/2021.  Clinically doing well with no anginal symptoms or heart failure findings.  3.  Status post mitral valve repair, as above.  He is aware of and does follow SBE prophylaxis.  4.  Postoperative hematemesis with recent EGD showing grade D esophagitis.  No recurrent problems but will consider possible discontinuation of Coumadin if no atrial arrhythmia recurs off AV letha blocker therapy  5.  Chronic renal insufficiency.  Renal labs stable.  6.  Superficial vein thrombosis, clinically seems improved by description.  7.  Pulmonary hypertension, worse in 5/2023 to 49 mmHg.  9.  Dyslipidemia      Time Spent: I spent 35 minutes caring for Rodolfo on this date of service. This time includes time spent by me in the following activities: preparing for the visit, reviewing tests, obtaining and/or reviewing a separately obtained history, performing a medically appropriate examination and/or evaluation, counseling and educating the patient/family/caregiver, ordering medications, tests, or procedures, documenting information in the medical record, and independently interpreting results and communicating that information with the patient/family/caregiver.   I spent 1 minutes on the separately reported service of ECG. This time is not included in the time used to support the E/M service also reported today.        Your medication list            Accurate as of March 13, 2024 11:59 PM. If you have any questions, ask your nurse or doctor.                CHANGE how you take these medications        Instructions Last Dose Given Next Dose Due   acetaminophen 500 MG tablet  Commonly known as: TYLENOL  What changed:   when to take this  reasons to take this       Take 2 tablets by mouth 3 (Three) Times a Day.              CONTINUE taking these medications        Instructions Last Dose Given Next Dose Due   aspirin 81 MG EC tablet      Take 1 tablet by mouth Daily.       atorvastatin 10 MG tablet  Commonly known as: LIPITOR      Take 1 tablet by mouth once daily       Cholecalciferol 50 MCG (2000 UT) capsule      Take 1 capsule by mouth Daily.       fluticasone 0.05 % cream  Commonly known as: CUTIVATE      Apply 1 application  topically to the appropriate area as directed 2 (Two) Times a Day.       furosemide 40 MG tablet  Commonly known as: LASIX      Take 1 tablet by mouth Daily.       IRON 27 PO      Take 27 mg by mouth 3 (Three) Times a Week.       METAMUCIL FIBER PO      Take  by mouth As Needed.       nitroglycerin 0.4 MG SL tablet  Commonly known as: NITROSTAT      1 under the tongue as needed for angina, may repeat q5mins for up three doses       potassium chloride 10 MEQ CR tablet      Take 1 tablet by mouth Daily.       PRILOSEC OTC PO      Take 20 mg by mouth Daily.       sucralfate 1 g tablet  Commonly known as: CARAFATE      As Needed.       warfarin 2 MG tablet  Commonly known as: COUMADIN      Take one-half tablet (1 mg) by mouth Monday and Thursday, and take one tablet (2 mg) all other days or as directed.                Patient is no longer taking -.  I corrected the med list to reflect this.  I did not stop these medications.      Dictated utilizing Dragon dictation

## 2024-03-14 ENCOUNTER — TELEPHONE (OUTPATIENT)
Dept: CARDIOLOGY | Facility: CLINIC | Age: 79
End: 2024-03-14
Payer: MEDICARE

## 2024-03-14 NOTE — TELEPHONE ENCOUNTER
Notified patient of results and recommendations; patient verbalizes understanding.    [facility] Triage  03/14/24 09:46 EDT

## 2024-03-14 NOTE — TELEPHONE ENCOUNTER
Please let him know that thyroid studies done yesterday were normal.  Keep upcoming Holter appointment and continue current medications for now.

## 2024-04-03 ENCOUNTER — ANTICOAGULATION VISIT (OUTPATIENT)
Dept: PHARMACY | Facility: HOSPITAL | Age: 79
End: 2024-04-03
Payer: MEDICARE

## 2024-04-03 DIAGNOSIS — I48.0 PAF (PAROXYSMAL ATRIAL FIBRILLATION): Primary | ICD-10-CM

## 2024-04-03 LAB
INR PPP: 2.1 (ref 0.91–1.09)
PROTHROMBIN TIME: 24.7 SECONDS (ref 10–13.8)

## 2024-04-03 PROCEDURE — G0463 HOSPITAL OUTPT CLINIC VISIT: HCPCS

## 2024-04-03 PROCEDURE — 36416 COLLJ CAPILLARY BLOOD SPEC: CPT

## 2024-04-03 PROCEDURE — 85610 PROTHROMBIN TIME: CPT

## 2024-04-03 NOTE — PROGRESS NOTES
Anticoagulation Clinic Progress Note    Anticoagulation Summary  As of 4/3/2024      INR goal:  2.0-3.0   TTR:  67.9% (3 y)   INR used for dosin.1 (4/3/2024)   Warfarin maintenance plan:  1 mg every Mon, Thu; 2 mg all other days   Weekly warfarin total:  12 mg   No change documented:  Jolynn De Anda, Pharmacy Technician   Plan last modified:  Eduar Weeks, PharmD (8/10/2022)   Next INR check:  2024   Priority:  High   Target end date:      Indications    PAF (paroxysmal atrial fibrillation) [I48.0]                 Anticoagulation Episode Summary       INR check location:      Preferred lab:      Send INR reminders to:   [facility]    Comments:            Anticoagulation Care Providers       Provider Role Specialty Phone number    Jimena Singer MD Referring Cardiology [phone]            Clinic Interview:  Patient Findings     Negatives:  Signs/symptoms of thrombosis, Signs/symptoms of bleeding,   Laboratory test error suspected, Change in health, Change in alcohol use,   Change in activity, Upcoming invasive procedure, Emergency department   visit, Upcoming dental procedure, Missed doses, Extra doses, Change in   medications, Change in diet/appetite, Hospital admission, Bruising, Other   complaints      Clinical Outcomes     Negatives:  Major bleeding event, Thromboembolic event,   Anticoagulation-related hospital admission, Anticoagulation-related ED   visit, Anticoagulation-related fatality        INR History:      2023     2:00 PM 2023     2:00 PM 2023     2:00 PM 1/10/2024     2:15 PM 2024     2:15 PM 3/6/2024     2:00 PM 4/3/2024     2:15 PM   Anticoagulation Monitoring   INR 2.9 3.2 2.5 2.0 2.5 2.5 2.1   INR Date 2023 2023 2023 1/10/2024 2024 3/6/2024 4/3/2024   INR Goal 2.0-3.0 2.0-3.0 2.0-3.0 2.0-3.0 2.0-3.0 2.0-3.0 2.0-3.0   Trend Same Same Same Same Same Same Same   Last Week Total 12 mg 12 mg 12 mg 12 mg 12 mg 12 mg 12 mg   Next Week  Total 12 mg 11 mg 12 mg 12 mg 12 mg 12 mg 12 mg   Sun 2 mg 2 mg 2 mg 2 mg 2 mg 2 mg 2 mg   Mon 1 mg 1 mg 1 mg 1 mg 1 mg 1 mg 1 mg   Tue 2 mg 2 mg 2 mg 2 mg 2 mg 2 mg 2 mg   Wed 2 mg 1 mg (11/29); Otherwise 2 mg 2 mg 2 mg 2 mg 2 mg 2 mg   Thu 1 mg 1 mg 1 mg 1 mg 1 mg 1 mg 1 mg   Fri 2 mg 2 mg 2 mg 2 mg 2 mg 2 mg 2 mg   Sat 2 mg 2 mg 2 mg 2 mg 2 mg 2 mg 2 mg   Visit Report    Report          Plan:  1. INR is therapeutic today- see above in Anticoagulation Summary.   Will instruct Rodolfo Grover to continue their warfarin regimen- see above in Anticoagulation Summary.  2. Follow up in 4 weeks.  3. Patient declines warfarin refills.  4. Verbal and written information provided. Patient expresses understanding and has no further questions at this time.    Jolynn De Anda, Pharmacy Technician

## 2024-04-03 NOTE — PROGRESS NOTES
I have supervised and reviewed the notes, assessments, and/or procedures performed. I personally performed the assessment and implemented the plan. I concur with the documentation of this patient encounter.    Eduar Weeks, PharmD

## 2024-04-13 ENCOUNTER — HOSPITAL ENCOUNTER (INPATIENT)
Facility: HOSPITAL | Age: 79
LOS: 2 days | Discharge: HOME | DRG: 243 | End: 2024-04-15
Attending: EMERGENCY MEDICINE | Admitting: INTERNAL MEDICINE
Payer: MEDICARE

## 2024-04-13 ENCOUNTER — TELEPHONE (OUTPATIENT)
Dept: CARDIOLOGY | Facility: CLINIC | Age: 79
End: 2024-04-13
Payer: MEDICARE

## 2024-04-13 ENCOUNTER — APPOINTMENT (OUTPATIENT)
Dept: GENERAL RADIOLOGY | Facility: HOSPITAL | Age: 79
DRG: 243 | End: 2024-04-13
Payer: MEDICARE

## 2024-04-13 DIAGNOSIS — I45.9 ABNORMAL CARDIAC CONDUCTION: ICD-10-CM

## 2024-04-13 DIAGNOSIS — N18.9 CHRONIC RENAL IMPAIRMENT, UNSPECIFIED CKD STAGE: ICD-10-CM

## 2024-04-13 DIAGNOSIS — Z86.79 HISTORY OF THIRD DEGREE HEART BLOCK: ICD-10-CM

## 2024-04-13 DIAGNOSIS — I44.2 AV BLOCK, COMPLETE: Primary | ICD-10-CM

## 2024-04-13 DIAGNOSIS — R06.09 EXERTIONAL DYSPNEA: ICD-10-CM

## 2024-04-13 DIAGNOSIS — Z79.01 CHRONIC ANTICOAGULATION: ICD-10-CM

## 2024-04-13 LAB
ALBUMIN SERPL-MCNC: 4 G/DL (ref 3.5–5.2)
ALBUMIN/GLOB SERPL: 1.4 G/DL
ALP SERPL-CCNC: 93 U/L (ref 39–117)
ALT SERPL W P-5'-P-CCNC: 17 U/L (ref 1–41)
ANION GAP SERPL CALCULATED.3IONS-SCNC: 9.8 MMOL/L (ref 5–15)
AST SERPL-CCNC: 19 U/L (ref 1–40)
BASOPHILS # BLD AUTO: 0.06 10*3/MM3 (ref 0–0.2)
BASOPHILS NFR BLD AUTO: 0.8 % (ref 0–1.5)
BILIRUB SERPL-MCNC: 0.3 MG/DL (ref 0–1.2)
BUN SERPL-MCNC: 29 MG/DL (ref 8–23)
BUN/CREAT SERPL: 17.5 (ref 7–25)
CALCIUM SPEC-SCNC: 9.2 MG/DL (ref 8.6–10.5)
CHLORIDE SERPL-SCNC: 105 MMOL/L (ref 98–107)
CO2 SERPL-SCNC: 25.2 MMOL/L (ref 22–29)
CREAT SERPL-MCNC: 1.66 MG/DL (ref 0.76–1.27)
DEPRECATED RDW RBC AUTO: 43.1 FL (ref 37–54)
EGFRCR SERPLBLD CKD-EPI 2021: 41.2 ML/MIN/1.73
EOSINOPHIL # BLD AUTO: 0.25 10*3/MM3 (ref 0–0.4)
EOSINOPHIL NFR BLD AUTO: 3.3 % (ref 0.3–6.2)
ERYTHROCYTE [DISTWIDTH] IN BLOOD BY AUTOMATED COUNT: 11.8 % (ref 12.3–15.4)
GEN 5 2HR TROPONIN T REFLEX: 28 NG/L
GLOBULIN UR ELPH-MCNC: 2.8 GM/DL
GLUCOSE SERPL-MCNC: 95 MG/DL (ref 65–99)
HCT VFR BLD AUTO: 40.8 % (ref 37.5–51)
HGB BLD-MCNC: 13 G/DL (ref 13–17.7)
HOLD SPECIMEN: NORMAL
HOLD SPECIMEN: NORMAL
IMM GRANULOCYTES # BLD AUTO: 0.13 10*3/MM3 (ref 0–0.05)
IMM GRANULOCYTES NFR BLD AUTO: 1.7 % (ref 0–0.5)
INR PPP: 2.05 (ref 0.9–1.1)
LYMPHOCYTES # BLD AUTO: 1.34 10*3/MM3 (ref 0.7–3.1)
LYMPHOCYTES NFR BLD AUTO: 17.9 % (ref 19.6–45.3)
MCH RBC QN AUTO: 31.5 PG (ref 26.6–33)
MCHC RBC AUTO-ENTMCNC: 31.9 G/DL (ref 31.5–35.7)
MCV RBC AUTO: 98.8 FL (ref 79–97)
MONOCYTES # BLD AUTO: 0.84 10*3/MM3 (ref 0.1–0.9)
MONOCYTES NFR BLD AUTO: 11.2 % (ref 5–12)
NEUTROPHILS NFR BLD AUTO: 4.85 10*3/MM3 (ref 1.7–7)
NEUTROPHILS NFR BLD AUTO: 65.1 % (ref 42.7–76)
NRBC BLD AUTO-RTO: 0 /100 WBC (ref 0–0.2)
PLATELET # BLD AUTO: 160 10*3/MM3 (ref 140–450)
PMV BLD AUTO: 10.4 FL (ref 6–12)
POTASSIUM SERPL-SCNC: 4.1 MMOL/L (ref 3.5–5.2)
PROT SERPL-MCNC: 6.8 G/DL (ref 6–8.5)
PROTHROMBIN TIME: 23.4 SECONDS (ref 11.7–14.2)
QT INTERVAL: 427 MS
QTC INTERVAL: 448 MS
RBC # BLD AUTO: 4.13 10*6/MM3 (ref 4.14–5.8)
SODIUM SERPL-SCNC: 140 MMOL/L (ref 136–145)
TROPONIN T DELTA: -1 NG/L
TROPONIN T SERPL HS-MCNC: 29 NG/L
WBC NRBC COR # BLD AUTO: 7.47 10*3/MM3 (ref 3.4–10.8)
WHOLE BLOOD HOLD COAG: NORMAL
WHOLE BLOOD HOLD SPECIMEN: NORMAL

## 2024-04-13 PROCEDURE — 85610 PROTHROMBIN TIME: CPT | Performed by: EMERGENCY MEDICINE

## 2024-04-13 PROCEDURE — 93005 ELECTROCARDIOGRAM TRACING: CPT

## 2024-04-13 PROCEDURE — 36415 COLL VENOUS BLD VENIPUNCTURE: CPT

## 2024-04-13 PROCEDURE — 85025 COMPLETE CBC W/AUTO DIFF WBC: CPT | Performed by: EMERGENCY MEDICINE

## 2024-04-13 PROCEDURE — 25010000002 ENOXAPARIN PER 10 MG

## 2024-04-13 PROCEDURE — 84484 ASSAY OF TROPONIN QUANT: CPT

## 2024-04-13 PROCEDURE — 99285 EMERGENCY DEPT VISIT HI MDM: CPT

## 2024-04-13 PROCEDURE — 93010 ELECTROCARDIOGRAM REPORT: CPT | Performed by: INTERNAL MEDICINE

## 2024-04-13 PROCEDURE — 71045 X-RAY EXAM CHEST 1 VIEW: CPT

## 2024-04-13 PROCEDURE — 80053 COMPREHEN METABOLIC PANEL: CPT | Performed by: EMERGENCY MEDICINE

## 2024-04-13 PROCEDURE — 99222 1ST HOSP IP/OBS MODERATE 55: CPT

## 2024-04-13 PROCEDURE — 84484 ASSAY OF TROPONIN QUANT: CPT | Performed by: EMERGENCY MEDICINE

## 2024-04-13 PROCEDURE — 93005 ELECTROCARDIOGRAM TRACING: CPT | Performed by: INTERNAL MEDICINE

## 2024-04-13 PROCEDURE — 93005 ELECTROCARDIOGRAM TRACING: CPT | Performed by: EMERGENCY MEDICINE

## 2024-04-13 RX ORDER — NITROGLYCERIN 0.4 MG/1
0.4 TABLET SUBLINGUAL
Status: DISCONTINUED | OUTPATIENT
Start: 2024-04-13 | End: 2024-04-15 | Stop reason: HOSPADM

## 2024-04-13 RX ORDER — ACETAMINOPHEN 325 MG/1
650 TABLET ORAL EVERY 4 HOURS PRN
Status: DISCONTINUED | OUTPATIENT
Start: 2024-04-13 | End: 2024-04-15

## 2024-04-13 RX ORDER — ATORVASTATIN CALCIUM 20 MG/1
10 TABLET, FILM COATED ORAL DAILY
Status: DISCONTINUED | OUTPATIENT
Start: 2024-04-13 | End: 2024-04-15 | Stop reason: HOSPADM

## 2024-04-13 RX ORDER — ONDANSETRON 2 MG/ML
4 INJECTION INTRAMUSCULAR; INTRAVENOUS EVERY 6 HOURS PRN
Status: DISCONTINUED | OUTPATIENT
Start: 2024-04-13 | End: 2024-04-15 | Stop reason: HOSPADM

## 2024-04-13 RX ORDER — SODIUM CHLORIDE 0.9 % (FLUSH) 0.9 %
10 SYRINGE (ML) INJECTION AS NEEDED
Status: DISCONTINUED | OUTPATIENT
Start: 2024-04-13 | End: 2024-04-15 | Stop reason: HOSPADM

## 2024-04-13 RX ORDER — ASPIRIN 325 MG
325 TABLET ORAL ONCE
Status: DISCONTINUED | OUTPATIENT
Start: 2024-04-13 | End: 2024-04-15 | Stop reason: HOSPADM

## 2024-04-13 RX ORDER — SODIUM CHLORIDE 0.9 % (FLUSH) 0.9 %
10 SYRINGE (ML) INJECTION EVERY 12 HOURS SCHEDULED
Status: DISCONTINUED | OUTPATIENT
Start: 2024-04-13 | End: 2024-04-15 | Stop reason: HOSPADM

## 2024-04-13 RX ORDER — CHOLECALCIFEROL (VITAMIN D3) 125 MCG
5 CAPSULE ORAL NIGHTLY PRN
Status: DISCONTINUED | OUTPATIENT
Start: 2024-04-13 | End: 2024-04-15 | Stop reason: HOSPADM

## 2024-04-13 RX ORDER — ENOXAPARIN SODIUM 100 MG/ML
1 INJECTION SUBCUTANEOUS EVERY 24 HOURS
Status: DISCONTINUED | OUTPATIENT
Start: 2024-04-13 | End: 2024-04-15

## 2024-04-13 RX ORDER — WARFARIN SODIUM 2 MG/1
2 TABLET ORAL
Status: DISCONTINUED | OUTPATIENT
Start: 2024-04-13 | End: 2024-04-15 | Stop reason: HOSPADM

## 2024-04-13 RX ORDER — FUROSEMIDE 40 MG/1
40 TABLET ORAL DAILY
Status: DISCONTINUED | OUTPATIENT
Start: 2024-04-13 | End: 2024-04-15 | Stop reason: HOSPADM

## 2024-04-13 RX ORDER — SODIUM CHLORIDE 9 MG/ML
40 INJECTION, SOLUTION INTRAVENOUS AS NEEDED
Status: DISCONTINUED | OUTPATIENT
Start: 2024-04-13 | End: 2024-04-15 | Stop reason: HOSPADM

## 2024-04-13 RX ORDER — PANTOPRAZOLE SODIUM 40 MG/1
40 TABLET, DELAYED RELEASE ORAL
Status: DISCONTINUED | OUTPATIENT
Start: 2024-04-14 | End: 2024-04-15 | Stop reason: HOSPADM

## 2024-04-13 RX ORDER — BISACODYL 5 MG/1
5 TABLET, DELAYED RELEASE ORAL DAILY PRN
Status: DISCONTINUED | OUTPATIENT
Start: 2024-04-13 | End: 2024-04-15 | Stop reason: HOSPADM

## 2024-04-13 RX ORDER — POLYETHYLENE GLYCOL 3350 17 G/17G
17 POWDER, FOR SOLUTION ORAL DAILY PRN
Status: DISCONTINUED | OUTPATIENT
Start: 2024-04-13 | End: 2024-04-15 | Stop reason: HOSPADM

## 2024-04-13 RX ORDER — BISACODYL 10 MG
10 SUPPOSITORY, RECTAL RECTAL DAILY PRN
Status: DISCONTINUED | OUTPATIENT
Start: 2024-04-13 | End: 2024-04-15 | Stop reason: HOSPADM

## 2024-04-13 RX ORDER — ONDANSETRON 4 MG/1
4 TABLET, ORALLY DISINTEGRATING ORAL EVERY 6 HOURS PRN
Status: DISCONTINUED | OUTPATIENT
Start: 2024-04-13 | End: 2024-04-15 | Stop reason: HOSPADM

## 2024-04-13 RX ORDER — CALCIUM CARBONATE 500 MG/1
2 TABLET, CHEWABLE ORAL 2 TIMES DAILY PRN
Status: DISCONTINUED | OUTPATIENT
Start: 2024-04-13 | End: 2024-04-15 | Stop reason: HOSPADM

## 2024-04-13 RX ORDER — AMOXICILLIN 250 MG
2 CAPSULE ORAL 2 TIMES DAILY PRN
Status: DISCONTINUED | OUTPATIENT
Start: 2024-04-13 | End: 2024-04-15 | Stop reason: HOSPADM

## 2024-04-13 RX ADMIN — Medication 10 ML: at 20:45

## 2024-04-13 RX ADMIN — ENOXAPARIN SODIUM 80 MG: 100 INJECTION SUBCUTANEOUS at 18:53

## 2024-04-13 NOTE — H&P
Date of Consultation: 24    Encounter Provider: NKECHI Bond    Group of Service: [facility]     Patient Name: Rodolfo Grover    :1945    Chief complaint:  Abnormal cardiac conduction     History of Present Illness:  Rodolfo Grover is an 79 year old who follows with Dr. Singer. He has a past medical history that is significant for anemia, atrial fibrillation, coronary artery disease s/p CABG, GERD, hypertension, ischemic cardiomyopathy, pulmonary hypertension, mitral valve insufficiency s/p mitral valve repair, and obstructive sleep apnea.     In 2020 he presented with complaints of chest discomfort and was found to have an NSTEMI. He underwent cardiac catheterization which showed multivessel coronary artery disease. He underwent CABG x 7 with mitral valve repair. Postoperatively he had atrial fibrillation and intermittent complete heart block. At his most recent office visit on 3/13/24 his EKG showed a junctional rhythm. His metoprolol was discontinued and a 2-week Zio patch was placed.     His Zio patch was reviewed by Dr. Singer and revealed 99 episode of third-degree AV block with the lowest heart rate being 22.     Dr. Singer called him this morning to discuss these results and refer him to the hospital.     His wife was at bedside when I saw him today. He denies chest pain or discomfort, palpitations, or shortness of breath at rest. He is a very active gentleman, walking a mile daily. He does report increased fatigue and mild exertional dyspnea over the last few weeks. He denies any sick contacts, recent illness, nausea, vomiting, diarrhea, chills, fever, dizziness, or lightheadedness.     Workup in the ED revealed a HS troponin of 29, creatinine of 1.66, INR 2.05, and chest xray with no acute findings.     Echocardiogram 23    Left ventricular systolic function is normal. Calculated left ventricular EF = 53.2% Normal left ventricular wall thickness noted. The left  ventricular cavity is moderately dilated. All left ventricular wall segments contract normally. Left ventricular diastolic function was indeterminate.    There is mild thickening of the aortic valve. The aortic valve appears trileaflet. Mild to moderate aortic valve regurgitation is present. This is an eccentric jet that transverses across the ventricular surface of the anterior mitral valve leaflet No aortic valve stenosis is present.    Severe mitral annular calcification is present. Mild mitral valve regurgitation is present. No significant mitral valve stenosis is present.    Mild tricuspid valve regurgitation is present. Estimated right ventricular systolic pressure from tricuspid regurgitation is moderately elevated (45-55 mmHg). Calculated right ventricular systolic pressure from tricuspid regurgitation is 49 mmHg.    Stress Test with Myocardial Perfusion 3/18/18  Diaphragmatic attenuation artifact is present.  Left ventricular ejection fraction is normal (Calculated EF = 50%).  Myocardial perfusion imaging indicates a normal myocardial perfusion study with no evidence of ischemia.  Impressions are consistent with a low risk study.  Frequent monofocal premature ventricular contractions were noted before, during and after the study.    Cardiac Catheterization 2/19/21  Conclusions:   1. Left main: Normal  2. LAD: Discrete 95% ostial stenosis with mild calcification.  Diffuse 80% mid vessel stenosis with moderate calcification.  Discrete 90% mid vessel stenosis.  Sequential 70 to 80% diagonal stenosis  3. LCX: Discrete 60 to 70% mid vessel stenosis  4. RCA: Diffuse 80% stenosis in proximal to mid segment with severe calcification  5.  Normal left ventricular size.  Mildly reduced systolic function.  Mid to distal inferior wall severe hypokinesis.  Moderate anterolateral wall hypokinesis     Recommendations: CABG    Past Medical History:   Diagnosis Date    Acute blood loss anemia     Arthritis     Atrial  fibrillation     Bilateral lower extremity edema     Bradycardia following surgery     CAD (coronary artery disease)     Chest discomfort     Colon polyp     Elevated troponin     Focal motor seizure     Generalized tonic-clonic seizure     GERD (gastroesophageal reflux disease)     Hypertension     Iritis of right eye     Ischemic cardiomyopathy     Mitral valve insufficiency     Myocardial infarction     Peptic ulceration     Post-ictal state     Pulmonary hypertension     PVC (premature ventricular contraction)     Rheumatic fever     Rheumatic fever     as child    S/P CABG x 7     S/P mitral valve repair     Severe obstructive sleep apnea     Shoulder fracture, right      Past Surgical History:   Procedure Laterality Date    CARDIAC CATHETERIZATION N/A 2/19/2021    Procedure: Coronary angiography;  Surgeon: Bry Nails MD;  Location: [facility] CATH INVASIVE LOCATION;  Service: Cardiovascular;  Laterality: N/A;    CARDIAC CATHETERIZATION N/A 2/19/2021    Procedure: Left heart cath;  Surgeon: Bry Nails MD;  Location: [facility] CATH INVASIVE LOCATION;  Service: Cardiovascular;  Laterality: N/A;    CARDIAC CATHETERIZATION N/A 2/19/2021    Procedure: Right Heart Cath;  Surgeon: Bry Nails MD;  Location: [facility] CATH INVASIVE LOCATION;  Service: Cardiovascular;  Laterality: N/A;    CARDIAC CATHETERIZATION N/A 2/19/2021    Procedure: Left ventriculography;  Surgeon: Bry Nails MD;  Location: [facility] CATH INVASIVE LOCATION;  Service: Cardiovascular;  Laterality: N/A;    COLONOSCOPY N/A 12/7/2021    Procedure: COLONOSCOPY to cecum with cold biopsy polypectomy;  Surgeon: Edgar Allen MD;  Location: [facility] ENDOSCOPY;  Service: Gastroenterology;  Laterality: N/A;  IRON DEFICIENCY ANEMIA, H/O COLON POLYPS  --   DIVERTICULOSIS, polyp, hemorrhoids    CORONARY ARTERY BYPASS GRAFT N/A 2/22/2021    Procedure: BK, MIDLINE STERNOTOMY, CORONARY ARTERY BYPASS X 7 UTILIZING THE LEFT INTERNAL  MAMMARY ARTERY AND THE RIGHT SAPHENOUS VEIN, MITRAL VALVE REPAIR, PRP;  Surgeon: Jr Shyam Echavarria MD;  Location: [facility] MAIN OR;  Service: Cardiothoracic;  Laterality: N/A;    ENDOSCOPY N/A 8/16/2018    Erosive gastritis, diverticulosis    ENDOSCOPY N/A 2/26/2021    Procedure: ESOPHAGOGASTRODUODENOSCOPY AT BEDSIDE WITH HOT SNARE POLYPECTOMY AND CLIP PLACEMENT X1;  Surgeon: Jono Laboy MD;  Location: [facility] ENDOSCOPY;  Service: Gastroenterology;  Laterality: N/A;  PRE-  GI BLEED  POST- GASTRITIS, ESOPHAGITIS, POLYP    ENDOSCOPY N/A 12/7/2021    Procedure: ESOPHAGOGASTRODUODENOSCOPY with COLD  BIOPSIES;  Surgeon: Edgar Allen MD;  Location: [facility] ENDOSCOPY;  Service: Gastroenterology;  Laterality: N/A;  IRON DEFICIENCY ANEMIA, H/O ULCERATIVE ESOPHAGITIS  --GASTRITIS, DUODENAL POLYP    HERNIA REPAIR      SHOULDER CLOSED REDUCTION Right 3/16/2018    Procedure: RIGHT SHOULDER CLOSED REDUCTION;  Surgeon: Kj Garcia MD;  Location: [facility] OR;  Service: Orthopedics    TONSILLECTOMY      TOTAL SHOULDER ARTHROPLASTY W/ DISTAL CLAVICLE EXCISION Right 3/22/2018    Procedure: Reverse Total Shoulder Arthroplsty;  Surgeon: Kj Garcia MD;  Location: [facility] OR;  Service: Orthopedics    TRANSESOPHAGEAL ECHOCARDIOGRAM (BK) N/A 2/22/2021    Procedure: TRANSESOPHAGEAL ECHOCARDIOGRAM;  Surgeon: Jr Shyam Echavarria MD;  Location: [facility] OR;  Service: Cardiothoracic;  Laterality: N/A;     No Known Allergies    No current facility-administered medications on file prior to encounter.     Current Outpatient Medications on File Prior to Encounter   Medication Sig Dispense Refill    acetaminophen (TYLENOL) 500 MG tablet Take 2 tablets by mouth 3 (Three) Times a Day. (Patient taking differently: Take 2 tablets by mouth 3 (Three) Times a Day As Needed.)      aspirin 81 MG EC tablet Take 1 tablet by mouth Daily. 90 tablet 1    atorvastatin (LIPITOR) 10 MG tablet Take 1 tablet by mouth once daily 90  "tablet 0    Cholecalciferol 50 MCG (2000 UT) capsule Take 1 capsule by mouth Daily.      Ferrous Gluconate (IRON 27 PO) Take 27 mg by mouth 3 (Three) Times a Week.      fluticasone (CUTIVATE) 0.05 % cream Apply 1 application  topically to the appropriate area as directed 2 (Two) Times a Day. 30 g 4    furosemide (LASIX) 40 MG tablet Take 1 tablet by mouth Daily. 90 tablet 2    nitroglycerin (NITROSTAT) 0.4 MG SL tablet 1 under the tongue as needed for angina, may repeat q5mins for up three doses 25 tablet 1    Omeprazole Magnesium (PRILOSEC OTC PO) Take 20 mg by mouth Daily.      potassium chloride 10 MEQ CR tablet Take 1 tablet by mouth Daily. 90 tablet 2    Psyllium (METAMUCIL FIBER PO) Take  by mouth As Needed.      sucralfate (CARAFATE) 1 g tablet As Needed.      warfarin (COUMADIN) 2 MG tablet Take one-half tablet (1 mg) by mouth Monday and Thursday, and take one tablet (2 mg) all other days or as directed. 80 tablet 1     Social History     Socioeconomic History    Marital status:    Tobacco Use    Smoking status: Never     Passive exposure: Never    Smokeless tobacco: Never   Vaping Use    Vaping status: Never Used   Substance and Sexual Activity    Alcohol use: Yes     Comment: occasional    Drug use: Never    Sexual activity: Defer     Family History   Problem Relation Age of Onset    Stroke Mother     Cancer Father 56        bladder    Heart attack Father     Malig Hyperthermia Neg Hx      REVIEW OF SYSTEMS:   12 point ROS was performed and is negative except as outlined in HPI       Objective:     Vitals:    04/13/24 1307 04/13/24 1308 04/13/24 1445 04/13/24 1526   BP:  150/64  156/74   BP Location:  Left arm  Left arm   Patient Position:  Lying  Sitting   Pulse:   57 84   Resp:   18 18   Temp:    98.1 °F (36.7 °C)   TempSrc:    Oral   SpO2:   98% 90%   Weight: 79.4 kg (175 lb)      Height: 170.2 cm (67\")        Body mass index is 27.41 kg/m².  Flowsheet Rows      Flowsheet Row First Filed Value " "  Admission Height 170.2 cm (67\") Documented at 04/13/2024 1307   Admission Weight 79.4 kg (175 lb) Documented at 04/13/2024 1307          Physical Exam  Vitals reviewed.   Constitutional:       General: He is not in acute distress.  HENT:      Head: Normocephalic.      Nose: Nose normal.   Eyes:      Extraocular Movements: Extraocular movements intact.      Pupils: Pupils are equal, round, and reactive to light.   Cardiovascular:      Rate and Rhythm: Normal rate and regular rhythm.      Pulses: Normal pulses.      Heart sounds: Normal heart sounds. Heart sounds not distant. No murmur heard.     No friction rub. No gallop. No S3 or S4 sounds.   Pulmonary:      Effort: Pulmonary effort is normal.      Breath sounds: Normal breath sounds.   Abdominal:      General: Abdomen is flat. Bowel sounds are normal.      Palpations: Abdomen is soft.      Tenderness: There is no abdominal tenderness.   Skin:     General: Skin is warm and dry.   Neurological:      General: No focal deficit present.      Mental Status: He is alert and oriented to person, place, and time. Mental status is at baseline.   Psychiatric:         Mood and Affect: Mood normal.         Behavior: Behavior normal.       Lab Review:                Results from last 7 days   Lab Units 04/13/24  1306   SODIUM mmol/L 140   POTASSIUM mmol/L 4.1   CHLORIDE mmol/L 105   CO2 mmol/L 25.2   BUN mg/dL 29*   CREATININE mg/dL 1.66*   GLUCOSE mg/dL 95   CALCIUM mg/dL 9.2     Results from last 7 days   Lab Units 04/13/24  1503 04/13/24  1306   HSTROP T ng/L 28* 29*     Results from last 7 days   Lab Units 04/13/24  1306   WBC 10*3/mm3 7.47   HEMOGLOBIN g/dL 13.0   HEMATOCRIT % 40.8   PLATELETS 10*3/mm3 160     Results from last 7 days   Lab Units 04/13/24  1306   INR  2.05*                 EKG (reviewed by me personally):            Assessment/Plan:   Third degree AV block  Intermittent. Thankfully, he is not having significant symptoms. He does note fatigue and dyspnea " with exertion.   Hemodynamically stable. Will monitor on telemetry and plan for EP evaluation/pacemaker placement on Monday.   History of postoperative atrial fibrillation, bradycardia  His metoprolol was discontinued in March 2024 after he was found to be in junctional rhythm during his office visit.   Warfarin held in preparation for pacemaker placement.  [id number].  Will place on prophylactic Lovenox.  Coronary artery disease  Status post CABG x 7 in 2020  Ischemic cardiomyopathy  Echocardiogram in 5/11/23 showed an EF of 53%.   Mitral valve insufficiency  Status post mitral valve repair in 2020  Chronic renal insufficiency   Pulmonary hypertension  RVSP 49 mmHg on echocardiogram in May 2023  Dyslipidemia    Archana Tobar, APRN   04/13/24

## 2024-04-13 NOTE — PROGRESS NOTES
"[facility] Clinical Pharmacy Services: Enoxaparin Consult    Rodolfo Grover has a pharmacy consult to dose prophylactic enoxaparin per Archana ARBOLEDA's request.     Indication: VTE Prophylaxis  Home Anticoagulation: Warfarin (A. Fib)--> currently held on MAR     Relevant clinical data and objective history reviewed:  79 y.o. male 170.2 cm (67\") 79.4 kg (175 lb)   Body mass index is 27.41 kg/m².   Results from last 7 days   Lab Units 04/13/24  1306   PLATELETS 10*3/mm3 160     Estimated Creatinine Clearance: 35.2 mL/min (A) (by C-G formula based on SCr of 1.66 mg/dL (H)).    Assessment/Plan    Will start patient on  80 mg (1mg/kg) subcutaneous every 24 hours, adjusted for renal function. Consult order will be discontinued but pharmacy will continue to follow.     Myra Candelaria [facility]  Clinical Pharmacist   "

## 2024-04-13 NOTE — ED PROVIDER NOTES
EMERGENCY DEPARTMENT ENCOUNTER    Room Number:  37/37  PCP: Gold Gray Sr., MD  Historian: Patient      HPI:  Chief Complaint: Abnormal Holter monitor results  A complete HPI/ROS/PMH/PSH/SH/FH are unobtainable due to: None    Context: Rodolfo Grover is a 79 y.o. male who presents to the ED via private vehicle after being referred in by cardiology c/o acute abnormal Holter monitor results.  He was notified by his cardiologist today that he has been having episodes of complete heart block and was told to come to the emergency department, scheduled for pacemaker on Monday.  Patient has had some exertional dyspnea, may be some positional lightheadedness.  Does have a history of vertigo.  Denies any chest pains.      MEDICAL RECORD REVIEW    External (non-ED) record review: Holter monitor results reviewed, patient with 5 episodes of V. tach, longest lasting 5 beats.  First-degree, second-degree Mobitz 1, second-degree Mobitz 2, and 30 degree AV block noted, with reported 99 episode of third-degree AV block for a total of 14 minutes during the study.  3 sinus pauses noted the longest of which was 3.1 seconds.  Monitor was placed on March 15, 2024 and was in place for 13 days and 20 hours.    Adult transthoracic echo May 15, 2023 shows ejection fraction of 53.2%              PAST MEDICAL HISTORY  Active Ambulatory Problems     Diagnosis Date Noted    Acute blood loss anemia 10/07/2014    Fracture of fifth metacarpal bone of right hand 01/22/2016    Closed fracture dislocation of right shoulder 03/16/2018    Gastroesophageal reflux disease without esophagitis 03/16/2018    DNR (do not resuscitate) 03/16/2018    Bradycardia following surgery 03/17/2018    Frequent PVCs 03/17/2018    Proximal humerus fracture 03/22/2018    Osteoarthritis 03/22/2018    Severe obstructive sleep apnea 01/17/2019    Pure hypercholesterolemia 01/17/2019    Pulmonary hypertension 02/09/2019    LARA (dyspnea on exertion) 08/15/2019    Localized  edema 08/15/2019    Chest discomfort 02/19/2021    Elevated troponin 02/19/2021    Abnormal EKG 02/19/2021    Coronary artery disease involving native coronary artery of native heart without angina pectoris 02/19/2021    Focal motor seizure 02/24/2021    Generalized tonic-clonic seizure 02/24/2021    Post-ictal state 02/24/2021    Coffee ground emesis 02/19/2021    S/P CABG (coronary artery bypass graft) 03/09/2021    Mitral regurgitation 03/09/2021    H/O mitral valve repair 03/09/2021    PAF (paroxysmal atrial fibrillation) 03/09/2021    Ischemic cardiomyopathy 03/09/2021    Essential hypertension 05/07/2021    Non-ischemic cardiomyopathy 06/28/2021    Iron deficiency anemia due to chronic blood loss 11/08/2021    Polyp of colon 11/08/2021    Macrocytic anemia 06/30/2022    Thrombocytopenia 06/30/2022    CKD (chronic kidney disease) 06/30/2022    Chronic diastolic heart failure 05/23/2022    Wenckebach second degree AV block 06/12/2023    Medicare annual wellness visit, subsequent 01/10/2024    Atopic dermatitis in adult 01/10/2024     Resolved Ambulatory Problems     Diagnosis Date Noted    Arthritis 10/07/2014    GI bleed 10/07/2014    Melena 09/12/2014    Hyperglycemia 03/16/2018    Fall at home 03/16/2018    Gastrointestinal hemorrhage associated with acute gastritis 08/15/2018    Adverse effect of non-steroidal anti-inflammatory drug (NSAID) 08/17/2018     Past Medical History:   Diagnosis Date    Atrial fibrillation     Bilateral lower extremity edema     CAD (coronary artery disease)     Colon polyp     GERD (gastroesophageal reflux disease)     Hypertension     Iritis of right eye     Mitral valve insufficiency     Myocardial infarction     Peptic ulceration     PVC (premature ventricular contraction)     Rheumatic fever     Rheumatic fever     S/P CABG x 7     S/P mitral valve repair     Shoulder fracture, right          PAST SURGICAL HISTORY  Past Surgical History:   Procedure Laterality Date    CARDIAC  CATHETERIZATION N/A 2/19/2021    Procedure: Coronary angiography;  Surgeon: Bry Nails MD;  Location: [facility] CATH INVASIVE LOCATION;  Service: Cardiovascular;  Laterality: N/A;    CARDIAC CATHETERIZATION N/A 2/19/2021    Procedure: Left heart cath;  Surgeon: Bry Nails MD;  Location: [facility] CATH INVASIVE LOCATION;  Service: Cardiovascular;  Laterality: N/A;    CARDIAC CATHETERIZATION N/A 2/19/2021    Procedure: Right Heart Cath;  Surgeon: Bry Nails MD;  Location: [facility] CATH INVASIVE LOCATION;  Service: Cardiovascular;  Laterality: N/A;    CARDIAC CATHETERIZATION N/A 2/19/2021    Procedure: Left ventriculography;  Surgeon: Bry Nails MD;  Location: [facility] CATH INVASIVE LOCATION;  Service: Cardiovascular;  Laterality: N/A;    COLONOSCOPY N/A 12/7/2021    Procedure: COLONOSCOPY to cecum with cold biopsy polypectomy;  Surgeon: Edgar Allen MD;  Location: [facility] ENDOSCOPY;  Service: Gastroenterology;  Laterality: N/A;  IRON DEFICIENCY ANEMIA, H/O COLON POLYPS  --   DIVERTICULOSIS, polyp, hemorrhoids    CORONARY ARTERY BYPASS GRAFT N/A 2/22/2021    Procedure: BK, MIDLINE STERNOTOMY, CORONARY ARTERY BYPASS X 7 UTILIZING THE LEFT INTERNAL MAMMARY ARTERY AND THE RIGHT SAPHENOUS VEIN, MITRAL VALVE REPAIR, PRP;  Surgeon: Jr Shyam Echavarria MD;  Location: [facility] MAIN OR;  Service: Cardiothoracic;  Laterality: N/A;    ENDOSCOPY N/A 8/16/2018    Erosive gastritis, diverticulosis    ENDOSCOPY N/A 2/26/2021    Procedure: ESOPHAGOGASTRODUODENOSCOPY AT BEDSIDE WITH HOT SNARE POLYPECTOMY AND CLIP PLACEMENT X1;  Surgeon: Jono Laboy MD;  Location: [facility] ENDOSCOPY;  Service: Gastroenterology;  Laterality: N/A;  PRE-  GI BLEED  POST- GASTRITIS, ESOPHAGITIS, POLYP    ENDOSCOPY N/A 12/7/2021    Procedure: ESOPHAGOGASTRODUODENOSCOPY with COLD  BIOPSIES;  Surgeon: Edgar Allen MD;  Location: [facility] ENDOSCOPY;  Service: Gastroenterology;  Laterality: N/A;  IRON DEFICIENCY  ANEMIA, H/O ULCERATIVE ESOPHAGITIS  --GASTRITIS, DUODENAL POLYP    HERNIA REPAIR      SHOULDER CLOSED REDUCTION Right 3/16/2018    Procedure: RIGHT SHOULDER CLOSED REDUCTION;  Surgeon: Kj Garcia MD;  Location: [facility] OR;  Service: Orthopedics    TONSILLECTOMY      TOTAL SHOULDER ARTHROPLASTY W/ DISTAL CLAVICLE EXCISION Right 3/22/2018    Procedure: Reverse Total Shoulder Arthroplsty;  Surgeon: Kj Garcia MD;  Location: [facility] OR;  Service: Orthopedics    TRANSESOPHAGEAL ECHOCARDIOGRAM (BK) N/A 2/22/2021    Procedure: TRANSESOPHAGEAL ECHOCARDIOGRAM;  Surgeon: Jr Shyam Echavarria MD;  Location: [facility] OR;  Service: Cardiothoracic;  Laterality: N/A;         FAMILY HISTORY  Family History   Problem Relation Age of Onset    Stroke Mother     Cancer Father 56        bladder    Heart attack Father     Malig Hyperthermia Neg Hx          SOCIAL HISTORY  Social History     Socioeconomic History    Marital status:    Tobacco Use    Smoking status: Never     Passive exposure: Never    Smokeless tobacco: Never   Vaping Use    Vaping status: Never Used   Substance and Sexual Activity    Alcohol use: Yes     Comment: occasional    Drug use: Never    Sexual activity: Defer         ALLERGIES  Patient has no known allergies.        REVIEW OF SYSTEMS  Review of Systems     All systems reviewed and negative except for those discussed in HPI.       PHYSICAL EXAM    I have reviewed the triage vital signs and nursing notes.    ED Triage Vitals   Temp Heart Rate Resp BP SpO2   04/13/24 1246 04/13/24 1246 04/13/24 1246 04/13/24 1308 04/13/24 1246   97.3 °F (36.3 °C) 72 18 150/64 97 %      Temp src Heart Rate Source Patient Position BP Location FiO2 (%)   -- -- 04/13/24 1308 04/13/24 1308 --     Lying Left arm        Physical Exam  General: No acute distress, nontoxic  HEENT: Mucous membranes moist, atraumatic, EOMI  Neck: Full ROM  Pulm: Symmetric chest rise, nonlabored, lungs slightly diminished  bilaterally but no overt wheezes/rales/rhonchi  Cardiovascular: Regular rate and rhythm, intact distal pulses, 1+ pitting edema bilateral lower extremities  GI: Soft, nontender, nondistended, no rebound, no guarding, bowel sounds present  MSK: Full ROM, no deformity  Skin: Warm, dry  Neuro: Awake, alert, oriented x 4, GCS 15, moving all extremities, no focal deficits  Psych: Calm, cooperative        LAB RESULTS  Recent Results (from the past 24 hour(s))   ECG 12 Lead ED Triage Standing Order; Chest Pain    Collection Time: 04/13/24 12:51 PM   Result Value Ref Range    QT Interval 427 ms    QTC Interval 448 ms   Comprehensive Metabolic Panel    Collection Time: 04/13/24  1:06 PM    Specimen: Blood   Result Value Ref Range    Glucose 95 65 - 99 mg/dL    BUN 29 (H) 8 - 23 mg/dL    Creatinine 1.66 (H) 0.76 - 1.27 mg/dL    Sodium 140 136 - 145 mmol/L    Potassium 4.1 3.5 - 5.2 mmol/L    Chloride 105 98 - 107 mmol/L    CO2 25.2 22.0 - 29.0 mmol/L    Calcium 9.2 8.6 - 10.5 mg/dL    Total Protein 6.8 6.0 - 8.5 g/dL    Albumin 4.0 3.5 - 5.2 g/dL    ALT (SGPT) 17 1 - 41 U/L    AST (SGOT) 19 1 - 40 U/L    Alkaline Phosphatase 93 39 - 117 U/L    Total Bilirubin 0.3 0.0 - 1.2 mg/dL    Globulin 2.8 gm/dL    A/G Ratio 1.4 g/dL    BUN/Creatinine Ratio 17.5 7.0 - 25.0    Anion Gap 9.8 5.0 - 15.0 mmol/L    eGFR 41.2 (L) >60.0 mL/min/1.73   High Sensitivity Troponin T    Collection Time: 04/13/24  1:06 PM    Specimen: Blood   Result Value Ref Range    HS Troponin T 29 (H) <22 ng/L   Protime-INR    Collection Time: 04/13/24  1:06 PM    Specimen: Blood   Result Value Ref Range    Protime 23.4 (H) 11.7 - 14.2 Seconds    INR 2.05 (H) 0.90 - 1.10   CBC Auto Differential    Collection Time: 04/13/24  1:06 PM    Specimen: Blood   Result Value Ref Range    WBC 7.47 3.40 - 10.80 10*3/mm3    RBC 4.13 (L) 4.14 - 5.80 10*6/mm3    Hemoglobin 13.0 13.0 - 17.7 g/dL    Hematocrit 40.8 37.5 - 51.0 %    MCV 98.8 (H) 79.0 - 97.0 fL    MCH 31.5 26.6 -  33.0 pg    MCHC 31.9 31.5 - 35.7 g/dL    RDW 11.8 (L) 12.3 - 15.4 %    RDW-SD 43.1 37.0 - 54.0 fl    MPV 10.4 6.0 - 12.0 fL    Platelets 160 140 - 450 10*3/mm3    Neutrophil % 65.1 42.7 - 76.0 %    Lymphocyte % 17.9 (L) 19.6 - 45.3 %    Monocyte % 11.2 5.0 - 12.0 %    Eosinophil % 3.3 0.3 - 6.2 %    Basophil % 0.8 0.0 - 1.5 %    Immature Grans % 1.7 (H) 0.0 - 0.5 %    Neutrophils, Absolute 4.85 1.70 - 7.00 10*3/mm3    Lymphocytes, Absolute 1.34 0.70 - 3.10 10*3/mm3    Monocytes, Absolute 0.84 0.10 - 0.90 10*3/mm3    Eosinophils, Absolute 0.25 0.00 - 0.40 10*3/mm3    Basophils, Absolute 0.06 0.00 - 0.20 10*3/mm3    Immature Grans, Absolute 0.13 (H) 0.00 - 0.05 10*3/mm3    nRBC 0.0 0.0 - 0.2 /100 WBC       Ordered the above labs and independently interpreted results. My findings will be discussed in the medical decision making section below        RADIOLOGY  No Radiology Exams Resulted Within Past 24 Hours    Ordered the above noted radiological studies.  Independently interpreted by me and my independent review of findings can be found in the ED Course.  See dictation for official radiology interpretation.      PROCEDURES    Procedures      EKG - Per my independent interpretation at 1253:    EKG Time: 1251  Rhythm/Rate: Rhythm is slightly difficult to interpret given underlying artifact however I suspect a first-degree AV block versus junctional rhythm  Normal axis  Normal intervals otherwise  Nonspecific repolarization abnormalities   No STEMI       Rhythm is I believe sinus as compared to junctional rhythm noted on March 13, 2024.      MEDICATIONS GIVEN IN ER    Medications   sodium chloride 0.9 % flush 10 mL (has no administration in time range)   aspirin tablet 325 mg (0 mg Oral Hold 4/13/24 1305)         PROGRESS, DATA ANALYSIS, CONSULTS, AND MEDICAL DECISION MAKING    Please note that this section constitutes my independent interpretation of clinical data including lab results, radiology, EKG's.  This  constitutes my independent professional opinion regarding differential diagnosis and management of this patient.  It may include any factors such as history from outside sources, review of external records, social determinants of health, management of medications, response to those treatments, and discussions with other providers.    Differential Diagnosis and Plan: Initial concern for ongoing cardiac conduction abnormalities, with the multiple episodes of complete heart block noted on the Holter monitor.  He is currently with a regular rhythm, without severe bradycardia or hypotension.  Well-appearing in no acute distress.  Will plan for labs, cardiology consultation for admission and as of now pacemaker on Monday.  Will evaluate for any signs of heart failure, renal failure, electrolyte abnormalities, among others.    Additional sources:  - Discussed/ obtained information from independent historians:       - (Social Determinants of Health): None     - Shared decision making:  Patient fully updated on and in agreement with the course and plan moving forward    ED Course as of 04/13/24 1340   Sat Apr 13, 2024   1332 XR Chest 1 View  Per my independent interpretation of the chest x-ray, no evidence of pneumothorax [DC]   1333 WBC: 7.47 [DC]   1333 Hemoglobin: 13.0 [DC]   1333 Platelets: 160 [DC]   1333 INR(!): 2.05 [DC]   1338 Discussed with Dr. Arias, cardiology, discussed patient's clinical course and findings today, treatment modalities, and he will admit to the cardiology service. [DC]   1339 Glucose: 95 [DC]   1339 BUN(!): 29 [DC]   1339 Creatinine(!): 1.66  1.78 three months ago [DC]   1339 Sodium: 140 [DC]   1339 Potassium: 4.1 [DC]   1339 ALT (SGPT): 17 [DC]   1339 AST (SGOT): 19 [DC]   1339 Alkaline Phosphatase: 93 [DC]   1339 Total Bilirubin: 0.3 [DC]      ED Course User Index  [DC] Chele Adrian MD     Patient is overall well-appearing in no acute distress with no concerns for acute complete heart  block at this time in the emergency department.    Hospitalization Considered?: yes    Orders Placed During This Visit:  Orders Placed This Encounter   Procedures    XR Chest 1 View    [place] Draw    Comprehensive Metabolic Panel    High Sensitivity Troponin T    Protime-INR    CBC Auto Differential    High Sensitivity Troponin T 2Hr    NPO Diet NPO Type: Strict NPO    Undress & Gown    Continuous Pulse Oximetry    LCG (on-call MD unless specified)    Oxygen Therapy- Nasal Cannula; Titrate 1-6 LPM Per SpO2; 90 - 95%    POC Protime / INR    ECG 12 Lead ED Triage Standing Order; Chest Pain    ECG 12 Lead ED Triage Standing Order; Chest Pain    Insert Peripheral IV    Inpatient Admission    CBC & Differential    Green Top (Gel)    Lavender Top    Gold Top - SST    Light Blue Top       Additional orders considered but not placed:      Independent interpretation of labs, radiology studies, and discussions with consultants: See ED Course        AS OF 13:40 EDT VITALS:    BP - 150/64  HR - 72  TEMP - 97.3 °F (36.3 °C)  02 SATS - 97%          DIAGNOSIS  Final diagnoses:   Abnormal cardiac conduction   History of third degree heart block   Exertional dyspnea   Chronic renal impairment, unspecified CKD stage   Chronic anticoagulation         DISPOSITION  ED Disposition       ED Disposition   Decision to Admit    Condition   --    Comment   Level of Care: Telemetry [5]   Diagnosis: Abnormal cardiac conduction [[id number]]   Admitting Physician: RULA GONZALES JR [[id number]]   Attending Physician: RULA GONZALES JR [[id number]]   Certification: I Certify That Inpatient Hospital Services Are Medically Necessary For Greater Than 2 Midnights                  Please note that portions of this document were completed with a voice recognition program.    Note Disclaimer: At [facility], we believe that sharing information builds trust and better relationships. You are receiving this note because you recently visited [facility]. It is  possible you will see health information before a provider has talked with you about it. This kind of information can be easy to misunderstand. To help you fully understand what it means for your health, we urge you to discuss this note with your provider.                       Chele Adrian MD  04/13/24 [id number]

## 2024-04-13 NOTE — ED NOTES
"Nursing report ED to floor  Rodolfo Grover  79 y.o.  male    HPI :  HPI (Adult)  Stated Reason for Visit: abnormal ekg    Chief Complaint  Chief Complaint   Patient presents with    Abnormal ECG       Admitting doctor:   Jordan Arias Jr., MD    Admitting diagnosis:   The primary encounter diagnosis was Abnormal cardiac conduction. Diagnoses of History of third degree heart block, Exertional dyspnea, Chronic renal impairment, unspecified CKD stage, and Chronic anticoagulation were also pertinent to this visit.    Code status:   Current Code Status       Date Active Code Status Order ID Comments User Context       Prior            Allergies:   Patient has no known allergies.    Isolation:   No active isolations    Intake and Output  No intake or output data in the 24 hours ending 04/13/24 1345    Weight:       04/13/24  1307   Weight: 79.4 kg (175 lb)       Most recent vitals:   Vitals:    04/13/24 1246 04/13/24 1307 04/13/24 1308   BP:   150/64   BP Location:   Left arm   Patient Position:   Lying   Pulse: 72     Resp: 18     Temp: 97.3 °F (36.3 °C)     SpO2: 97%     Weight:  79.4 kg (175 lb)    Height:  170.2 cm (67\")        Active LDAs/IV Access:   Lines, Drains & Airways       Active LDAs       Name Placement date Placement time Site Days    Peripheral IV 05/19/21 0745 Left Antecubital 05/19/21  0745  Antecubital  1060    Peripheral IV 04/13/24 1304 Right Antecubital 04/13/24  1304  Antecubital  less than 1                    Labs (abnormal labs have a star):   Labs Reviewed   COMPREHENSIVE METABOLIC PANEL - Abnormal; Notable for the following components:       Result Value    BUN 29 (*)     Creatinine 1.66 (*)     eGFR 41.2 (*)     All other components within normal limits    Narrative:     GFR Normal >60  Chronic Kidney Disease <60  Kidney Failure <15    The GFR formula is only valid for adults with stable renal function between ages 18 and 70.   TROPONIN - Abnormal; Notable for the following components:    HS " Troponin T 29 (*)     All other components within normal limits    Narrative:     High Sensitive Troponin T Reference Range:  <14.0 ng/L- Negative Female for AMI  <22.0 ng/L- Negative Male for AMI  >=14 - Abnormal Female indicating possible myocardial injury.  >=22 - Abnormal Male indicating possible myocardial injury.   Clinicians would have to utilize clinical acumen, EKG, Troponin, and serial changes to determine if it is an Acute Myocardial Infarction or myocardial injury due to an underlying chronic condition.        PROTIME-INR - Abnormal; Notable for the following components:    Protime 23.4 (*)     INR 2.05 (*)     All other components within normal limits   CBC WITH AUTO DIFFERENTIAL - Abnormal; Notable for the following components:    RBC 4.13 (*)     MCV 98.8 (*)     RDW 11.8 (*)     Lymphocyte % 17.9 (*)     Immature Grans % 1.7 (*)     Immature Grans, Absolute 0.13 (*)     All other components within normal limits   RAINBOW DRAW    Narrative:     The following orders were created for panel order [place] Draw.  Procedure                               Abnormality         Status                     ---------                               -----------         ------                     Green Top (Gel)[[id number]]                                  In process                 Lavender Top[[id number]]                                     In process                 Gold Top - SST[[id number]]                                   In process                 Light Blue Top[[id number]]                                   In process                   Please view results for these tests on the individual orders.   HIGH SENSITIVITIY TROPONIN T 2HR   POCT PROTIME - INR   CBC AND DIFFERENTIAL    Narrative:     The following orders were created for panel order CBC & Differential.  Procedure                               Abnormality         Status                     ---------                               -----------         ------                      CBC Auto Differential[[id number]]        Abnormal            Final result                 Please view results for these tests on the individual orders.   GREEN TOP   LAVENDER TOP   GOLD TOP - SST   LIGHT BLUE TOP       EKG:   ECG 12 Lead ED Triage Standing Order; Chest Pain   Preliminary Result   HEART RATE= 66  bpm   RR Interval= 909  ms   MO Interval=   ms   P Horizontal Axis= 217  deg   P Front Axis= 269  deg   QRSD Interval= 99  ms   QT Interval= 427  ms   QTcB= 448  ms   QRS Axis= 57  deg   T Wave Axis= -38  deg   - ABNORMAL ECG -   AV block, complete (third degree)   Posterior infarct, old   Probable anterolateral infarct, old   Borderline repolarization abnormality   Electronically Signed By:    Date and Time of Study: 2024-04-13 12:51:23          Meds given in ED:   Medications   sodium chloride 0.9 % flush 10 mL (has no administration in time range)   aspirin tablet 325 mg (0 mg Oral Hold 4/13/24 1305)       Imaging results:  No radiology results for the last day    Ambulatory status:   - with assist    Social issues:   Social History     Socioeconomic History    Marital status:    Tobacco Use    Smoking status: Never     Passive exposure: Never    Smokeless tobacco: Never   Vaping Use    Vaping status: Never Used   Substance and Sexual Activity    Alcohol use: Yes     Comment: occasional    Drug use: Never    Sexual activity: Defer       Peripheral Neurovascular       Neuro Cognitive       Learning       Respiratory       Abdominal Pain       Pain Assessments  Pain (Adult)  (0-10) Pain Rating: Rest: 0    NIH Stroke Scale       Nik Martinez RN  04/13/24 13:45 EDT

## 2024-04-13 NOTE — ED NOTES
Patient to ER via car from home had a holter monitor was called and told by Dr Singer that it showed heart block and slow heart rate    Heart rate as low as 20 at times patient has no complaints at time of triage

## 2024-04-13 NOTE — Clinical Note
using micropuncture technique with ultrasound guidance into the left axillary vein. Sheath insertion delayed.

## 2024-04-13 NOTE — TELEPHONE ENCOUNTER
I talked to patient regarding monitor results.  He has had fatigue but no syncope.  With the frequency of heart block noted, I recommended he come to the emergency room for admission and pacemaker on Monday.  Told him to hold warfarin.  I contacted Dr. Dolan in the ER to alert him as well.

## 2024-04-14 LAB
ANION GAP SERPL CALCULATED.3IONS-SCNC: 8.7 MMOL/L (ref 5–15)
BUN SERPL-MCNC: 27 MG/DL (ref 8–23)
BUN/CREAT SERPL: 17.6 (ref 7–25)
CALCIUM SPEC-SCNC: 8.7 MG/DL (ref 8.6–10.5)
CHLORIDE SERPL-SCNC: 109 MMOL/L (ref 98–107)
CO2 SERPL-SCNC: 24.3 MMOL/L (ref 22–29)
CREAT SERPL-MCNC: 1.53 MG/DL (ref 0.76–1.27)
EGFRCR SERPLBLD CKD-EPI 2021: 45.4 ML/MIN/1.73
GLUCOSE SERPL-MCNC: 86 MG/DL (ref 65–99)
MAGNESIUM SERPL-MCNC: 2 MG/DL (ref 1.6–2.4)
PHOSPHATE SERPL-MCNC: 3.4 MG/DL (ref 2.5–4.5)
POTASSIUM SERPL-SCNC: 4.3 MMOL/L (ref 3.5–5.2)
QT INTERVAL: 478 MS
QT INTERVAL: 496 MS
QTC INTERVAL: 434 MS
QTC INTERVAL: 482 MS
SODIUM SERPL-SCNC: 142 MMOL/L (ref 136–145)

## 2024-04-14 PROCEDURE — 80048 BASIC METABOLIC PNL TOTAL CA: CPT

## 2024-04-14 PROCEDURE — 36415 COLL VENOUS BLD VENIPUNCTURE: CPT

## 2024-04-14 PROCEDURE — 83735 ASSAY OF MAGNESIUM: CPT

## 2024-04-14 PROCEDURE — 93010 ELECTROCARDIOGRAM REPORT: CPT | Performed by: INTERNAL MEDICINE

## 2024-04-14 PROCEDURE — 99232 SBSQ HOSP IP/OBS MODERATE 35: CPT | Performed by: INTERNAL MEDICINE

## 2024-04-14 PROCEDURE — 93005 ELECTROCARDIOGRAM TRACING: CPT | Performed by: INTERNAL MEDICINE

## 2024-04-14 PROCEDURE — 84100 ASSAY OF PHOSPHORUS: CPT

## 2024-04-14 PROCEDURE — 25010000002 ENOXAPARIN PER 10 MG

## 2024-04-14 RX ADMIN — Medication 5 MG: at 21:16

## 2024-04-14 RX ADMIN — Medication 10 ML: at 21:16

## 2024-04-14 RX ADMIN — ENOXAPARIN SODIUM 80 MG: 100 INJECTION SUBCUTANEOUS at 15:46

## 2024-04-14 RX ADMIN — PANTOPRAZOLE SODIUM 40 MG: 40 TABLET, DELAYED RELEASE ORAL at 06:15

## 2024-04-14 RX ADMIN — ATORVASTATIN CALCIUM 10 MG: 20 TABLET, FILM COATED ORAL at 08:43

## 2024-04-14 NOTE — PROGRESS NOTES
"[facility]    Patient Name: Rodolfo Grover  :1945  79 y.o.  LOS: 1  Encounter Provider: Jordan Arias Jr, MD      Patient Care Team:  Gold Gray Sr., MD as PCP - General (Family Medicine)  Mitch Mendiola MD as Consulting Physician (Pulmonary Disease)  Edgar Allen MD as Consulting Physician (Gastroenterology)  Jimena Singer MD as Consulting Physician (Cardiology)  Domenica Merida MD as Referring Physician (Family Medicine)  Juan David Solis MD as Consulting Physician (Hematology and Oncology)    Chief Complaint: Follow-up complete heart block, PAF, CAD status post CABG without angina    Interval History: Patient with significant heart block overnight with significant bradycardia during sleep but asymptomatic.  Hemodynamically stable overnight       Objective   Vital Signs  Temp:  [97.5 °F (36.4 °C)-98.1 °F (36.7 °C)] 97.5 °F (36.4 °C)  Heart Rate:  [57-84] 81  Resp:  [18] 18  BP: (134-188)/(57-76) 134/76  No intake or output data in the 24 hours ending 24 1307  Flowsheet Rows      Flowsheet Row First Filed Value   Admission Height 170.2 cm (67\") Documented at 2024 1307   Admission Weight 79.4 kg (175 lb) Documented at 2024 1307              Vitals reviewed.   Constitutional:       Appearance: Healthy appearance. Not in distress.   Neck:      Vascular: No JVR. JVD normal.   Pulmonary:      Effort: Pulmonary effort is normal.      Breath sounds: Normal breath sounds. No wheezing. No rhonchi. No rales.   Chest:      Chest wall: Not tender to palpatation.   Cardiovascular:      PMI at left midclavicular line. Normal rate. Regularly irregular rhythm. Normal S1. Normal S2.       Murmurs: There is no murmur.      No gallop.  No click. No rub.   Pulses:     Intact distal pulses.   Edema:     Peripheral edema absent.   Abdominal:      General: Bowel sounds are normal.      Palpations: Abdomen is soft.      Tenderness: There is no abdominal tenderness.   Musculoskeletal: Normal " "range of motion.         General: No tenderness. Skin:     General: Skin is warm and dry.   Neurological:      General: No focal deficit present.      Mental Status: Alert and oriented to person, place and time.           Pertinent Test Results:  Results from last 7 days   Lab Units 04/14/24  0442 04/13/24  1306   SODIUM mmol/L 142 140   POTASSIUM mmol/L 4.3 4.1   CHLORIDE mmol/L 109* 105   CO2 mmol/L 24.3 25.2   BUN mg/dL 27* 29*   CREATININE mg/dL 1.53* 1.66*   GLUCOSE mg/dL 86 95   CALCIUM mg/dL 8.7 9.2   AST (SGOT) U/L  --  19   ALT (SGPT) U/L  --  17     Results from last 7 days   Lab Units 04/13/24  1503 04/13/24  1306   HSTROP T ng/L 28* 29*     Results from last 7 days   Lab Units 04/13/24  1306   WBC 10*3/mm3 7.47   HEMOGLOBIN g/dL 13.0   HEMATOCRIT % 40.8   PLATELETS 10*3/mm3 160     Results from last 7 days   Lab Units 04/13/24  1306   INR  2.05*     Results from last 7 days   Lab Units 04/14/24  0442   MAGNESIUM mg/dL 2.0           Invalid input(s): \"LDLCALC\"                Medication Review:   aspirin, 325 mg, Oral, Once  atorvastatin, 10 mg, Oral, Daily  enoxaparin, 1 mg/kg, Subcutaneous, Q24H  furosemide, 40 mg, Oral, Daily  pantoprazole, 40 mg, Oral, Q AM  sodium chloride, 10 mL, Intravenous, Q12H  [Held by provider] warfarin, 2 mg, Oral, Daily         Pharmacy to Dose enoxaparin (LOVENOX),         Assessment & Plan     Active Hospital Problems    Diagnosis  POA    **Abnormal cardiac conduction [I45.9]  Yes      Resolved Hospital Problems   No resolved problems to display.        Complete heart block -patient does have frequent pauses of 2 to 3 seconds with episodes of slow ventricular versus junctional escape especially during sleeping hours.  He is asymptomatic at this time.  Been having some dizziness at home.  Plan is for pacemaker tomorrow.  N.p.o. after midnight  Paroxysmal atrial fibrillation -he has been maintained on warfarin.  He has been holding this for a few days.  At yesterday's INR was " 2.05 we will check an INR in the morning.  Coronary artery disease status post CABG without angina  Ischemic cardiomyopathy with recovered EF on last echocardiogram 53%  Nonrheumatic mitral regurgitation  CKD  Pulmonary hypertension  Mixed hyperlipidemia           Jordan Arias Jr, MD  [facility]   [facility]  [address] [place] [address]  Office: [phone]    04/14/24  13:07 EDT

## 2024-04-14 NOTE — PLAN OF CARE
Goal Outcome Evaluation:         Pt pleasant, alert and oriented. He denies any chest pain, rhythm still abnormal, PVC's, pauses, and second degree heart block. Pt states he takes melatonin for sleep. He said he did not rest well.

## 2024-04-14 NOTE — PLAN OF CARE
Goal Outcome Evaluation:            Patient givne nursing care per MD orders and hospital policies and showed no signs or symptoms of distress this shift. Monitor tech called this afternoon and told me that it looked like his P wave was crossing into his T wave. I couldn't see it myself but ordered an EKG per protocol and reported to Cardiology results. No change and no new orders.

## 2024-04-15 ENCOUNTER — APPOINTMENT (OUTPATIENT)
Dept: GENERAL RADIOLOGY | Facility: HOSPITAL | Age: 79
DRG: 243 | End: 2024-04-15
Payer: MEDICARE

## 2024-04-15 ENCOUNTER — READMISSION MANAGEMENT (OUTPATIENT)
Dept: CALL CENTER | Facility: HOSPITAL | Age: 79
End: 2024-04-15
Payer: MEDICARE

## 2024-04-15 VITALS
BODY MASS INDEX: 29.31 KG/M2 | HEART RATE: 71 BPM | SYSTOLIC BLOOD PRESSURE: 154 MMHG | WEIGHT: 186.73 LBS | DIASTOLIC BLOOD PRESSURE: 76 MMHG | HEIGHT: 67 IN | TEMPERATURE: 97.6 F | RESPIRATION RATE: 16 BRPM | OXYGEN SATURATION: 97 %

## 2024-04-15 PROBLEM — I44.2 AV BLOCK, COMPLETE: Status: ACTIVE | Noted: 2024-04-13

## 2024-04-15 LAB
HOLD SPECIMEN: NORMAL
INR PPP: 1.74 (ref 0.9–1.1)
PROTHROMBIN TIME: 20.6 SECONDS (ref 11.7–14.2)
QT INTERVAL: 441 MS
QTC INTERVAL: 476 MS

## 2024-04-15 PROCEDURE — 99222 1ST HOSP IP/OBS MODERATE 55: CPT

## 2024-04-15 PROCEDURE — 25010000002 LIDOCAINE 1 % SOLUTION: Performed by: INTERNAL MEDICINE

## 2024-04-15 PROCEDURE — C1894 INTRO/SHEATH, NON-LASER: HCPCS | Performed by: INTERNAL MEDICINE

## 2024-04-15 PROCEDURE — 93010 ELECTROCARDIOGRAM REPORT: CPT | Performed by: INTERNAL MEDICINE

## 2024-04-15 PROCEDURE — C1898 LEAD, PMKR, OTHER THAN TRANS: HCPCS | Performed by: INTERNAL MEDICINE

## 2024-04-15 PROCEDURE — 25010000002 CEFAZOLIN PER 500 MG: Performed by: INTERNAL MEDICINE

## 2024-04-15 PROCEDURE — 33208 INSRT HEART PM ATRIAL & VENT: CPT | Performed by: INTERNAL MEDICINE

## 2024-04-15 PROCEDURE — 85610 PROTHROMBIN TIME: CPT | Performed by: INTERNAL MEDICINE

## 2024-04-15 PROCEDURE — 71045 X-RAY EXAM CHEST 1 VIEW: CPT

## 2024-04-15 PROCEDURE — C1887 CATHETER, GUIDING: HCPCS | Performed by: INTERNAL MEDICINE

## 2024-04-15 PROCEDURE — C1785 PMKR, DUAL, RATE-RESP: HCPCS | Performed by: INTERNAL MEDICINE

## 2024-04-15 PROCEDURE — 25010000002 FENTANYL CITRATE (PF) 50 MCG/ML SOLUTION: Performed by: INTERNAL MEDICINE

## 2024-04-15 PROCEDURE — 02HK3JZ INSERTION OF PACEMAKER LEAD INTO RIGHT VENTRICLE, PERCUTANEOUS APPROACH: ICD-10-PCS | Performed by: INTERNAL MEDICINE

## 2024-04-15 PROCEDURE — 25510000001 IOPAMIDOL PER 1 ML: Performed by: INTERNAL MEDICINE

## 2024-04-15 PROCEDURE — 0JH606Z INSERTION OF PACEMAKER, DUAL CHAMBER INTO CHEST SUBCUTANEOUS TISSUE AND FASCIA, OPEN APPROACH: ICD-10-PCS | Performed by: INTERNAL MEDICINE

## 2024-04-15 PROCEDURE — 93005 ELECTROCARDIOGRAM TRACING: CPT | Performed by: INTERNAL MEDICINE

## 2024-04-15 PROCEDURE — 25010000002 MIDAZOLAM PER 1 MG: Performed by: INTERNAL MEDICINE

## 2024-04-15 PROCEDURE — 02H63JZ INSERTION OF PACEMAKER LEAD INTO RIGHT ATRIUM, PERCUTANEOUS APPROACH: ICD-10-PCS | Performed by: INTERNAL MEDICINE

## 2024-04-15 DEVICE — LD PM CAPSUREFIX NOVUS5076 52CM: Type: IMPLANTABLE DEVICE | Status: FUNCTIONAL

## 2024-04-15 DEVICE — IMPLANTABLE DEVICE: Type: IMPLANTABLE DEVICE | Status: FUNCTIONAL

## 2024-04-15 DEVICE — GEN PM AZURE XT SURESCAN DR MRI: Type: IMPLANTABLE DEVICE | Status: FUNCTIONAL

## 2024-04-15 RX ORDER — SODIUM CHLORIDE 0.9 % (FLUSH) 0.9 %
10 SYRINGE (ML) INJECTION AS NEEDED
Status: DISCONTINUED | OUTPATIENT
Start: 2024-04-15 | End: 2024-04-15 | Stop reason: HOSPADM

## 2024-04-15 RX ORDER — ACETAMINOPHEN 325 MG/1
650 TABLET ORAL EVERY 4 HOURS PRN
Status: DISCONTINUED | OUTPATIENT
Start: 2024-04-15 | End: 2024-04-15 | Stop reason: HOSPADM

## 2024-04-15 RX ORDER — ONDANSETRON 2 MG/ML
4 INJECTION INTRAMUSCULAR; INTRAVENOUS EVERY 6 HOURS PRN
Status: DISCONTINUED | OUTPATIENT
Start: 2024-04-15 | End: 2024-04-15

## 2024-04-15 RX ORDER — HYDROMORPHONE HYDROCHLORIDE 1 MG/ML
0.5 INJECTION, SOLUTION INTRAMUSCULAR; INTRAVENOUS; SUBCUTANEOUS
Status: DISCONTINUED | OUTPATIENT
Start: 2024-04-15 | End: 2024-04-15 | Stop reason: HOSPADM

## 2024-04-15 RX ORDER — NALOXONE HCL 0.4 MG/ML
0.4 VIAL (ML) INJECTION
Status: DISCONTINUED | OUTPATIENT
Start: 2024-04-15 | End: 2024-04-15 | Stop reason: HOSPADM

## 2024-04-15 RX ORDER — METHOHEXITAL IN WATER/PF 100MG/10ML
SYRINGE (ML) INTRAVENOUS
Status: DISCONTINUED | OUTPATIENT
Start: 2024-04-15 | End: 2024-04-15 | Stop reason: HOSPADM

## 2024-04-15 RX ORDER — SODIUM CHLORIDE 0.9 % (FLUSH) 0.9 %
3 SYRINGE (ML) INJECTION EVERY 12 HOURS SCHEDULED
Status: DISCONTINUED | OUTPATIENT
Start: 2024-04-15 | End: 2024-04-15 | Stop reason: HOSPADM

## 2024-04-15 RX ORDER — SODIUM CHLORIDE 9 MG/ML
INJECTION, SOLUTION INTRAVENOUS
Status: COMPLETED | OUTPATIENT
Start: 2024-04-15 | End: 2024-04-15

## 2024-04-15 RX ORDER — MIDAZOLAM HYDROCHLORIDE 1 MG/ML
INJECTION INTRAMUSCULAR; INTRAVENOUS
Status: DISCONTINUED | OUTPATIENT
Start: 2024-04-15 | End: 2024-04-15 | Stop reason: HOSPADM

## 2024-04-15 RX ORDER — ACETAMINOPHEN 650 MG/1
650 SUPPOSITORY RECTAL EVERY 4 HOURS PRN
Status: DISCONTINUED | OUTPATIENT
Start: 2024-04-15 | End: 2024-04-15 | Stop reason: HOSPADM

## 2024-04-15 RX ORDER — FENTANYL CITRATE 50 UG/ML
INJECTION, SOLUTION INTRAMUSCULAR; INTRAVENOUS
Status: DISCONTINUED | OUTPATIENT
Start: 2024-04-15 | End: 2024-04-15 | Stop reason: HOSPADM

## 2024-04-15 RX ORDER — SODIUM CHLORIDE 9 MG/ML
40 INJECTION, SOLUTION INTRAVENOUS AS NEEDED
Status: DISCONTINUED | OUTPATIENT
Start: 2024-04-15 | End: 2024-04-15 | Stop reason: HOSPADM

## 2024-04-15 RX ORDER — LIDOCAINE HYDROCHLORIDE 10 MG/ML
INJECTION, SOLUTION INFILTRATION; PERINEURAL
Status: DISCONTINUED | OUTPATIENT
Start: 2024-04-15 | End: 2024-04-15 | Stop reason: HOSPADM

## 2024-04-15 RX ADMIN — ATORVASTATIN CALCIUM 10 MG: 20 TABLET, FILM COATED ORAL at 08:14

## 2024-04-15 NOTE — CONSULTS
Electrophysiology Hospital Consult            Patient Name: Rodolfo Grover  Age/Sex: 79 y.o. male  : 1945  MRN: [id number]    Date of Admission: 2024  Date of Encounter Visit: 04/15/24  Encounter Provider: NKECHI Watson  Referring Provider: Chele Adrian MD  Place of Service: [facility] CARDIOLOGY  Patient Care Team:  Gold Gray Sr., MD as PCP - General (Family Medicine)  Mitch Mendiola MD as Consulting Physician (Pulmonary Disease)  Edgar Allen MD as Consulting Physician (Gastroenterology)  Jimena Singer MD as Consulting Physician (Cardiology)  Domenica Merida MD as Referring Physician (Family Medicine)  Juan David Solis MD as Consulting Physician (Hematology and Oncology)      Subjective:   EP Consultation for: AV block     Chief Complaint: AV block, dizziness    History of Present Illness:  Rodolfo Grover is a 79 y.o. male who follows with Dr. Singer. He has anemia, AF, CAD--s/p CABG, and ICM---EF recovered.     In 2020 he presented with chest pain. Found to have NSTEMI. He underwent CABGx7 with mitral repair.     Post op he had AF and intermittent AV block.     He had recent follow up with Dr. Singer, was in junctional rhythm.     Subsequent zio showed periods of complete AV block with associated dizziness.         He was advised to come to ED for admission.     Since admission he has had periods of complete AV block and sinus bradycardia with pauses.     He does have CKD and baseline creatinine around 1.5.     EP has been asked to see for pacemaker consideration.       Patient says he was called on Saturday about monitor results and was told to go to ED.     He endorses some slight dyspnea on exertion and maybe more fatigue than usual.     Symptoms are mild. Denies any significant dizziness or syncope.     Since admission his telemetry shows periods of high degree AV block.     In NSR he is bradycardic, his MD interval is over 400ms.       Past Medical  History:  Past Medical History:   Diagnosis Date    Acute blood loss anemia     Arthritis     Atrial fibrillation     Bilateral lower extremity edema     Bradycardia following surgery     CAD (coronary artery disease)     Chest discomfort     Colon polyp     Elevated troponin     Focal motor seizure     Generalized tonic-clonic seizure     GERD (gastroesophageal reflux disease)     Hypertension     Iritis of right eye     Ischemic cardiomyopathy     Mitral valve insufficiency     Myocardial infarction     Peptic ulceration     Post-ictal state     Pulmonary hypertension     PVC (premature ventricular contraction)     Rheumatic fever     Rheumatic fever     as child    S/P CABG x 7     S/P mitral valve repair     Severe obstructive sleep apnea     Shoulder fracture, right        Past Surgical History:   Procedure Laterality Date    CARDIAC CATHETERIZATION N/A 2/19/2021    Procedure: Coronary angiography;  Surgeon: Bry Nails MD;  Location: [facility] CATH INVASIVE LOCATION;  Service: Cardiovascular;  Laterality: N/A;    CARDIAC CATHETERIZATION N/A 2/19/2021    Procedure: Left heart cath;  Surgeon: Bry Nails MD;  [facility] LOC[facility];  Service: Cardiovascular;  Laterality: N/A;    CARDIAC CATHETERIZATION N/A 2/19/2021    Procedure: Right Heart Cath;  Surgeon: Bry Nails MD;  [facility];  Service: Cardiovascular;  Laterality: N/A;    CARDIAC CATHETERIZATION N/A 2/19/2021    Procedure: Left ventriculography;  Surgeon: Bry Nails MD;  Location: [facility] CATH INVASIVE LOCATION;  Service: Cardiovascular;  Laterality: N/A;    COLONOSCOPY N/A 12/7/2021    Procedure: COLONOSCOPY to cecum with cold biopsy polypectomy;  Surgeon: Edgar Allen MD;  Location: [facility] ENDOSCOPY;  Service: Gastroenterology;  Laterality: N/A;  IRON DEFICIENCY ANEMIA, H/O COLON POLYPS  --   DIVERTICULOSIS, polyp, hemorrhoids    CORONARY ARTERY BYPASS GRAFT N/A  2/22/2021    Procedure: BK, MIDLINE STERNOTOMY, CORONARY ARTERY BYPASS X 7 UTILIZING THE LEFT INTERNAL MAMMARY ARTERY AND THE RIGHT SAPHENOUS VEIN, MITRAL VALVE REPAIR, PRP;  Surgeon: Jr Shyam Echavarria MD;  Location: [facility] MAIN OR;  Service: Cardiothoracic;  Laterality: N/A;    ENDOSCOPY N/A 8/16/2018    Erosive gastritis, diverticulosis    ENDOSCOPY N/A 2/26/2021    Procedure: ESOPHAGOGASTRODUODENOSCOPY AT BEDSIDE WITH HOT SNARE POLYPECTOMY AND CLIP PLACEMENT X1;  Surgeon: Jono Laboy MD;  Location: [facility] ENDOSCOPY;  Service: Gastroenterology;  Laterality: N/A;  PRE-  GI BLEED  POST- GASTRITIS, ESOPHAGITIS, POLYP    ENDOSCOPY N/A 12/7/2021    Procedure: ESOPHAGOGASTRODUODENOSCOPY with COLD  BIOPSIES;  Surgeon: Edgar Allen MD;  Location: [facility] ENDOSCOPY;  Service: Gastroenterology;  Laterality: N/A;  IRON DEFICIENCY ANEMIA, H/O ULCERATIVE ESOPHAGITIS  --GASTRITIS, DUODENAL POLYP    HERNIA REPAIR      SHOULDER CLOSED REDUCTION Right 3/16/2018    Procedure: RIGHT SHOULDER CLOSED REDUCTION;  Surgeon: Kj Garcia MD;  Location: [facility] OR;  Service: Orthopedics    TONSILLECTOMY      TOTAL SHOULDER ARTHROPLASTY W/ DISTAL CLAVICLE EXCISION Right 3/22/2018    Procedure: Reverse Total Shoulder Arthroplsty;  Surgeon: Kj Garcia MD;  Location: [facility] OR;  Service: Orthopedics    TRANSESOPHAGEAL ECHOCARDIOGRAM (BK) N/A 2/22/2021    Procedure: TRANSESOPHAGEAL ECHOCARDIOGRAM;  Surgeon: Jr Shyam Echavarria MD;  Location: [facility] OR;  Service: Cardiothoracic;  Laterality: N/A;       Home Medications:   Medications Prior to Admission   Medication Sig Dispense Refill Last Dose    acetaminophen (TYLENOL) 500 MG tablet Take 2 tablets by mouth 3 (Three) Times a Day. (Patient taking differently: Take 2 tablets by mouth 3 (Three) Times a Day As Needed.)   4/12/2024    aspirin 81 MG EC tablet Take 1 tablet by mouth Daily. 90 tablet 1 4/12/2024    atorvastatin (LIPITOR) 10 MG tablet Take 1  tablet by mouth once daily 90 tablet 0 4/12/2024    Cholecalciferol 50 MCG (2000 UT) capsule Take 1 capsule by mouth Daily.   4/12/2024    Ferrous Gluconate (IRON 27 PO) Take 27 mg by mouth 3 (Three) Times a Week.   4/12/2024    fluticasone (CUTIVATE) 0.05 % cream Apply 1 application  topically to the appropriate area as directed 2 (Two) Times a Day. 30 g 4 4/12/2024    furosemide (LASIX) 40 MG tablet Take 1 tablet by mouth Daily. (Patient taking differently: Take 1 tablet by mouth Daily. As needed based on weight per patient) 90 tablet 2 4/12/2024    nitroglycerin (NITROSTAT) 0.4 MG SL tablet 1 under the tongue as needed for angina, may repeat q5mins for up three doses 25 tablet 1 4/12/2024    Omeprazole Magnesium (PRILOSEC OTC PO) Take 20 mg by mouth Daily.   4/12/2024    potassium chloride 10 MEQ CR tablet Take 1 tablet by mouth Daily. 90 tablet 2 4/12/2024    Psyllium (METAMUCIL FIBER PO) Take  by mouth As Needed.   4/12/2024    sucralfate (CARAFATE) 1 g tablet As Needed.   4/12/2024    warfarin (COUMADIN) 2 MG tablet Take one-half tablet (1 mg) by mouth Monday and Thursday, and take one tablet (2 mg) all other days or as directed. 80 tablet 1 4/12/2024       Allergies:  No Known Allergies    Past Social History:  Social History     Socioeconomic History    Marital status:    Tobacco Use    Smoking status: Never     Passive exposure: Never    Smokeless tobacco: Never   Vaping Use    Vaping status: Never Used   Substance and Sexual Activity    Alcohol use: Yes     Comment: occasional    Drug use: Never    Sexual activity: Defer       Past Family History:  Family History   Problem Relation Age of Onset    Stroke Mother     Cancer Father 56        bladder    Heart attack Father     Malig Hyperthermia Neg Hx        Review of Systems: All systems reviewed. Pertinent positives identified in HPI. All other systems are negative.     14 point ROS was performed and is negative except as outlined in HPI.      Objective:     Objective:  Vital Signs (last 24 hours)         04/14 0700  04/15 0659 04/15 0700  04/15 0737   Most Recent      Temp (°F) 97.5 -  98.1       97.7 (36.5) 04/14 2230    Heart Rate 59 -  81       63 04/14 2230    Resp 17 -  18       18 04/14 2230    /60 -  151/61       134/60 04/14 2230    SpO2 (%) 97 -  100       98 04/14 2230          Temp:  [97.7 °F (36.5 °C)-98.1 °F (36.7 °C)] 97.7 °F (36.5 °C)  Heart Rate:  [59-63] 63  Resp:  [17-18] 18  BP: (134-151)/(60-62) 134/60  Body mass index is 29.25 kg/m².        Physical Exam:     General Appearance: No acute distress, well developed and well nourished.   Eyes: Conjunctiva and lids: No erythema, swelling, or discharge. Sclera non-icteric.   HENT: Atraumatic, normocephalic. External eyes, ears, and nose normal.   Respiratory: No signs of respiratory distress. Respiration rhythm and depth normal.   Clear to auscultation. No rales, crackles, rhonchi, or wheezing auscultated.   Cardiovascular:  Heart Rate and Rhythm: Normal, Heart Sounds: Normal S1 and S2. No S3 or S4 noted.  Gastrointestinal:  Abdomen soft, non-distended, non-tender.  Musculoskeletal: Normal movement of extremities  Skin: Warm and dry.   Psychiatric: Patient alert and oriented to person, place, and time. Speech and behavior appropriate. Normal mood and affect.    Labs:   Lab Review:     Results from last 7 days   Lab Units 04/14/24  0442 04/13/24  1306   SODIUM mmol/L 142 140   POTASSIUM mmol/L 4.3 4.1   CHLORIDE mmol/L 109* 105   CO2 mmol/L 24.3 25.2   BUN mg/dL 27* 29*   CREATININE mg/dL 1.53* 1.66*   GLUCOSE mg/dL 86 95   CALCIUM mg/dL 8.7 9.2   AST (SGOT) U/L  --  19   ALT (SGPT) U/L  --  17     Results from last 7 days   Lab Units 04/13/24  1503 04/13/24  1306   HSTROP T ng/L 28* 29*     Results from last 7 days   Lab Units 04/13/24  1306   WBC 10*3/mm3 7.47   HEMOGLOBIN g/dL 13.0   HEMATOCRIT % 40.8   PLATELETS 10*3/mm3 160     Results from last 7 days   Lab Units  04/15/24  0523 04/13/24  1306   INR  1.74* 2.05*         Results from last 7 days   Lab Units 04/14/24  0442   MAGNESIUM mg/dL 2.0                     EKG:                 I personally viewed and interpreted the patient's EKG/Telemetry tracings.    Assessment:       Abnormal cardiac conduction        Plan:   Dr. Barron and I saw this patient.        He has evidence of conduction system disease with AV block and bradycardia. His symptoms are rather mild but he does note more fatigue and dyspnea.     We discussed pacemaker placement given his AV block and mild symptoms. We discussed risks, benefits, and alternatives.     He is agreeable to proceed this afternoon.     Need to hold lovenox post device, would not restart.     Thank you for allowing me to participate in the care of Rodolfo Grover. Feel free to contact me directly with any further questions or concerns.    NKECHI Watson  [facility]  04/15/24  07:37 EDT

## 2024-04-15 NOTE — OUTREACH NOTE
Prep Survey      Flowsheet Row Responses   [facility] patient discharged from? [place]   Is LACE score < 7 ? No   Eligibility [facility]   Date of Admission 04/13/24   Date of Discharge 04/15/24   Discharge Disposition Home or Self Care   Discharge diagnosis Abnormal cardiac conduction, permanent pacemaker   Does the patient have one of the following disease processes/diagnoses(primary or secondary)? General Surgery   Does the patient have Home health ordered? No   Is there a DME ordered? No   Prep survey completed? Yes            Esther SANDOVAL - Registered Nurse

## 2024-04-15 NOTE — CASE MANAGEMENT/SOCIAL WORK
Case Management Discharge Note      Final Note: Home         Selected Continued Care - Admitted Since 4/13/2024       Destination    No services have been selected for the patient.                Durable Medical Equipment    No services have been selected for the patient.                Dialysis/Infusion    No services have been selected for the patient.                Home Medical Care    No services have been selected for the patient.                Therapy    No services have been selected for the patient.                Community Resources    No services have been selected for the patient.                Community & DME    No services have been selected for the patient.                    Transportation Services  Private: Car    Final Discharge Disposition Code: 01 - home or self-care

## 2024-04-15 NOTE — DISCHARGE SUMMARY
DISCHARGE NOTE    Patient Name: Rodolfo Grover  Age/Sex: 79 y.o. male  : 1945  MRN: [id number]    Date of Discharge:  4/15/2024   Date of Admit: 2024  Encounter Provider: NKECHI Watson  Place of Service: [facility] CARDIOLOGY  Patient Care Team:  Gold Gray Sr., MD as PCP - General (Family Medicine)  Mitch Mendiola MD as Consulting Physician (Pulmonary Disease)  Edgar Allen MD as Consulting Physician (Gastroenterology)  Jimena Singer MD as Consulting Physician (Cardiology)  Domenica Merida MD as Referring Physician (Family Medicine)  Juan David Solis MD as Consulting Physician (Hematology and Oncology)    Subjective:     Discharge Diagnosis:    Abnormal cardiac conduction    AV block, complete      Hospital Course:   Please see consult note from today for full details.     Patient had DC pacemaker placed today for CHB. Tolerated procedure well. This afternoon incision without signs of infection or hematoma. Normal device testing and function.     Will discharge today, incision check in one week.     Resume warfarin at home dose tonight.   Vital Signs  Temp:  [97.6 °F (36.4 °C)-97.9 °F (36.6 °C)] 97.6 °F (36.4 °C)  Heart Rate:  [60-71] 71  Resp:  [8-22] 16  BP: (134-154)/(60-76) 154/76    Intake/Output Summary (Last 24 hours) at 4/15/2024 1534  Last data filed at 2024 2100  Gross per 24 hour   Intake 240 ml   Output --   Net 240 ml       Physical Exam:    General Appearance: No acute distress, well developed and well nourished.   Eyes: Conjunctiva and lids: No erythema, swelling, or discharge. Sclera non-icteric.   HENT: Atraumatic, normocephalic. External eyes, ears, and nose normal.   Respiratory: No signs of respiratory distress. Respiration rhythm and depth normal.   Clear to auscultation. No rales, crackles, rhonchi, or wheezing auscultated.    Cardiovascular:  Jugular Venous Pressure: Normal  Heart Rate and Rhythm: Normal, Heart Sounds: Normal S1 and S2. No S3 or S4 noted.  Murmurs: No murmurs noted. No rubs, thrills, or gallops.   Arterial Pulses: Posterior tibialis and dorsalis pedis pulses normal.   Lower Extremities: No edema noted.  Gastrointestinal:  Abdomen soft, non-distended, non-tender.  Musculoskeletal: Normal movement of extremities  Skin: Warm and dry.   Psychiatric: Patient alert and oriented to person, place, and time. Speech and behavior appropriate. Normal mood and affect.    Labs:   Results from last 7 days   Lab Units 04/14/24  0442 04/13/24  1306   SODIUM mmol/L 142 140   POTASSIUM mmol/L 4.3 4.1   CHLORIDE mmol/L 109* 105   CO2 mmol/L 24.3 25.2   BUN mg/dL 27* 29*   CREATININE mg/dL 1.53* 1.66*   GLUCOSE mg/dL 86 95   CALCIUM mg/dL 8.7 9.2   AST (SGOT) U/L  --  19   ALT (SGPT) U/L  --  17     Results from last 7 days   Lab Units 04/13/24  1503 04/13/24  1306   HSTROP T ng/L 28* 29*     Results from last 7 days   Lab Units 04/13/24  1306   WBC 10*3/mm3 7.47   HEMOGLOBIN g/dL 13.0   HEMATOCRIT % 40.8   PLATELETS 10*3/mm3 160     Results from last 7 days   Lab Units 04/15/24  0523 04/13/24  1306   INR  1.74* 2.05*     Results from last 7 days   Lab Units 04/14/24  0442   MAGNESIUM mg/dL 2.0                   Discharge Diet:    Dietary Orders (From admission, onward)       Start     Ordered    04/15/24 1316  Diet: Cardiac; Healthy Heart (2-3 Na+); Fluid Consistency: Thin (IDDSI 0)  Diet Effective Now        References:    Diet Order [place]   Question Answer Comment   Diets: Cardiac    Cardiac Diet: Healthy Heart (2-3 Na+)    Fluid Consistency: Thin (IDDSI 0)        04/15/24 1315                      Activity at Discharge:      Discharge Medications     Discharge Medications        Continue These Medications        Instructions Start Date   acetaminophen 500 MG tablet  Commonly known as: TYLENOL   1,000 mg, Oral, 3 Times Daily       aspirin 81 MG EC tablet   81 mg, Oral, Daily      atorvastatin 10 MG tablet  Commonly known as: LIPITOR   10 mg, Oral, Daily      Cholecalciferol 50 MCG (2000 UT) capsule   1 capsule, Oral, Daily      fluticasone 0.05 % cream  Commonly known as: CUTIVATE   1 application , Topical, 2 Times Daily      furosemide 40 MG tablet  Commonly known as: LASIX   40 mg, Oral, Daily      IRON 27 PO   27 mg, Oral, 3 Times Weekly      METAMUCIL FIBER PO   Oral, As Needed      nitroglycerin 0.4 MG SL tablet  Commonly known as: NITROSTAT   1 under the tongue as needed for angina, may repeat q5mins for up three doses      potassium chloride 10 MEQ CR tablet   10 mEq, Oral, Daily      PRILOSEC OTC PO   20 mg, Oral, Daily      sucralfate 1 g tablet  Commonly known as: CARAFATE   As Needed      warfarin 2 MG tablet  Commonly known as: COUMADIN   Take one-half tablet (1 mg) by mouth Monday and Thursday, and take one tablet (2 mg) all other days or as directed.               Discharge disposition: home    Follow-up Appointments   Follow-up Information       Gold Gray Sr., MD .    Specialties: Family Medicine, Urgent Care  Contact information:  [address]  [place] [address]  [phone]                           Future Appointments   Date Time Provider Department Center   4/22/2024 12:00 PM [facility] [place] DEVICE CHECK [facility] CD L[facility] None   5/1/2024  2:15 PM ANTI COAG CLINIC [facility] None   6/3/2024  1:30 PM [facility] [place] DEVICE CHECK [facility] CD [facility] None   6/3/2024  2:00 PM Nehemiah Mendez APRN [facility] None   6/11/2024  9:30 AM Kezia Dick APRN [facility] CD [facility]   7/9/2024  2:00 PM Mitch Mendiola MD [facility] SL[facility] None   7/10/2024  9:15 AM Gold Gray Sr., MD [facility] E[facility]PT [facility]   8/16/2024  1:00 PM LAB CHAIR 5 [facility] JENNIFERE  LAB [facility]   8/16/2024  1:20 PM Juan David Solis MD [facility] Mendez, APRN  04/15/24  15:34 EDT

## 2024-04-15 NOTE — PLAN OF CARE
Goal Outcome Evaluation:  Plan of Care Reviewed With: patient        Progress: no change   Patient currently NPO for possible pacemaker placement today. Pt had some pauses,hr been in the 30's but asymptomatic. Pt had shower last night. Pt denies any pain or discomfort.

## 2024-04-16 ENCOUNTER — TRANSITIONAL CARE MANAGEMENT TELEPHONE ENCOUNTER (OUTPATIENT)
Dept: CALL CENTER | Facility: HOSPITAL | Age: 79
End: 2024-04-16
Payer: MEDICARE

## 2024-04-16 NOTE — OUTREACH NOTE
Call Center TCM Note      Flowsheet Row Responses   [facility] patient discharged from? [place]   Does the patient have one of the following disease processes/diagnoses(primary or secondary)? General Surgery   TCM attempt successful? Yes   Call start time 1456   Call end time 1458   Discharge diagnosis Abnormal cardiac conduction, permanent pacemaker   Person spoke with today (if not patient) and relationship Patient   Meds reviewed with patient/caregiver? Yes  [resumed usual home meds]   Does the patient have all medications related to this admission filled (includes all antibiotics, pain medications, etc.) N/A  [No new meds ordered at discharge]   Is the patient taking all medications as directed (includes completed medication regime)? Yes   Comments PCP Dr Gray. Patient declined to schedule PCP appt with call today. He denies any needs from primary care at this time.   Does the patient have an appointment with their PCP within 7-14 days of discharge? No   Nursing Interventions Patient declined scheduling/rescheduling appointment at this time, Patient desires to follow up with specialty only, Routed TCM call to PCP office   Has home health visited the patient within 72 hours of discharge? N/A   Psychosocial issues? No   Did the patient receive a copy of their discharge instructions? Yes   Nursing interventions Reviewed instructions with patient   What is the patient's perception of their health status since discharge? Improving   Is the patient /caregiver able to teach back basic post-op care? --  [Patient reports that he got his instructions on post op care.]   Is the patient/caregiver able to teach back steps to recovery at home? Set small, achievable goals for return to baseline health   If the patient is a current smoker, are they able to teach back resources for cessation? Not a smoker   Is the patient/caregiver able to teach back the hierarchy of who to call/visit for symptoms/problems? PCP,  Specialist, Home health nurse, Urgent Care, ED, 911 Yes   TCM call completed? Yes   Wrap up additional comments Device check appt with cardiology 4/22 12p   Call end time 1458   Would this patient benefit from a Referral to Amb Social Work? No   Is the patient interested in additional calls from an ambulatory ? No            Magali Jiménez, RN    4/16/2024, 15:00 EDT

## 2024-04-22 ENCOUNTER — CLINICAL SUPPORT NO REQUIREMENTS (OUTPATIENT)
Age: 79
End: 2024-04-22
Payer: MEDICARE

## 2024-04-22 DIAGNOSIS — I44.2 AV BLOCK, COMPLETE: Primary | ICD-10-CM

## 2024-04-22 PROCEDURE — 93280 PM DEVICE PROGR EVAL DUAL: CPT | Performed by: INTERNAL MEDICINE

## 2024-04-29 RX ORDER — ATORVASTATIN CALCIUM 10 MG/1
10 TABLET, FILM COATED ORAL DAILY
Qty: 90 TABLET | Refills: 0 | Status: SHIPPED | OUTPATIENT
Start: 2024-04-29

## 2024-05-08 ENCOUNTER — ANTICOAGULATION VISIT (OUTPATIENT)
Dept: PHARMACY | Facility: HOSPITAL | Age: 79
End: 2024-05-08
Payer: MEDICARE

## 2024-05-08 DIAGNOSIS — I48.0 PAF (PAROXYSMAL ATRIAL FIBRILLATION): Primary | ICD-10-CM

## 2024-05-08 LAB
INR PPP: 1.8 (ref 0.91–1.09)
PROTHROMBIN TIME: 21.2 SECONDS (ref 10–13.8)

## 2024-05-08 PROCEDURE — 36416 COLLJ CAPILLARY BLOOD SPEC: CPT

## 2024-05-08 PROCEDURE — 85610 PROTHROMBIN TIME: CPT

## 2024-05-08 PROCEDURE — G0463 HOSPITAL OUTPT CLINIC VISIT: HCPCS

## 2024-05-22 ENCOUNTER — ANTICOAGULATION VISIT (OUTPATIENT)
Dept: PHARMACY | Facility: HOSPITAL | Age: 79
End: 2024-05-22
Payer: MEDICARE

## 2024-05-22 DIAGNOSIS — I48.0 PAF (PAROXYSMAL ATRIAL FIBRILLATION): Primary | ICD-10-CM

## 2024-05-22 LAB
INR PPP: 2.1 (ref 0.91–1.09)
PROTHROMBIN TIME: 24.7 SECONDS (ref 10–13.8)

## 2024-05-22 PROCEDURE — 85610 PROTHROMBIN TIME: CPT

## 2024-05-22 PROCEDURE — 36416 COLLJ CAPILLARY BLOOD SPEC: CPT

## 2024-05-22 PROCEDURE — G0463 HOSPITAL OUTPT CLINIC VISIT: HCPCS

## 2024-05-22 NOTE — PROGRESS NOTES
Anticoagulation Clinic Progress Note    Anticoagulation Summary  As of 2024      INR goal:  2.0-3.0   TTR:  67.7% (3 y)   INR used for dosin.1 (2024)   Warfarin maintenance plan:  1 mg every Mon, Thu; 2 mg all other days   Weekly warfarin total:  12 mg   No change documented:  Mikel Streeter, Pharmacy Intern   Plan last modified:  Eduar Weeks, PharmD (8/10/2022)   Next INR check:  2024   Priority:  High   Target end date:      Indications    PAF (paroxysmal atrial fibrillation) [I48.0]                 Anticoagulation Episode Summary       INR check location:      Preferred lab:      Send INR reminders to:   [facility]    Comments:            Anticoagulation Care Providers       Provider Role Specialty Phone number    Jimena Singer MD Referring Cardiology [phone]            Clinic Interview:  Patient Findings     Positives:  Missed doses    Negatives:  Signs/symptoms of thrombosis, Signs/symptoms of bleeding,   Laboratory test error suspected, Change in health, Change in alcohol use,   Change in activity, Upcoming invasive procedure, Emergency department   visit, Upcoming dental procedure, Extra doses, Change in medications,   Change in diet/appetite, Hospital admission, Bruising, Other complaints    Comments:  Patient reports that he missed his dose on , but took it   in the morning of 5/15 along with his usual dose that night.      Clinical Outcomes     Negatives:  Major bleeding event, Thromboembolic event,   Anticoagulation-related hospital admission, Anticoagulation-related ED   visit, Anticoagulation-related fatality    Comments:  Patient reports that he missed his dose on , but took it   in the morning of 5/15 along with his usual dose that night.        INR History:      Latest Ref Rng & Units 2024     2:15 PM 3/6/2024     2:00 PM 4/3/2024     2:15 PM 2024     1:06 PM 4/15/2024     5:23 AM 2024     2:15 PM 2024     2:15 PM   Anticoagulation  Monitoring   INR  2.5 2.5 2.1   1.8 2.1   INR Date  2/7/2024 3/6/2024 4/3/2024   5/8/2024 5/22/2024   INR Goal  2.0-3.0 2.0-3.0 2.0-3.0   2.0-3.0 2.0-3.0   Trend  Same Same Same   Same Same   Last Week Total  12 mg 12 mg 12 mg   12 mg 14 mg   Next Week Total  12 mg 12 mg 12 mg   13 mg 12 mg   Sun  2 mg 2 mg 2 mg   2 mg 2 mg   Mon  1 mg 1 mg 1 mg   1 mg 1 mg   Tue  2 mg 2 mg 2 mg   2 mg 2 mg   Wed  2 mg 2 mg 2 mg   3 mg (5/8); Otherwise 2 mg 2 mg   Thu  1 mg 1 mg 1 mg   1 mg 1 mg   Fri  2 mg 2 mg 2 mg   2 mg 2 mg   Sat  2 mg 2 mg 2 mg   2 mg 2 mg   Historical INR 0.90 - 1.10    2.05  1.74          Plan:  1. INR is Therapeutic today- see above in Anticoagulation Summary.  Will instruct Rodolfo Grover to Continue their warfarin regimen- see above in Anticoagulation Summary.  2. Follow up in 4 weeks  3. Patient declines warfarin refills.  4. Verbal and written information provided. Patient expresses understanding and has no further questions at this time.    Mikel Streeter, Pharmacy Intern

## 2024-06-03 ENCOUNTER — CLINICAL SUPPORT NO REQUIREMENTS (OUTPATIENT)
Age: 79
End: 2024-06-03
Payer: MEDICARE

## 2024-06-03 ENCOUNTER — OFFICE VISIT (OUTPATIENT)
Age: 79
End: 2024-06-03
Payer: MEDICARE

## 2024-06-03 VITALS
HEIGHT: 67 IN | HEART RATE: 87 BPM | DIASTOLIC BLOOD PRESSURE: 68 MMHG | SYSTOLIC BLOOD PRESSURE: 138 MMHG | BODY MASS INDEX: 28.09 KG/M2 | WEIGHT: 179 LBS

## 2024-06-03 DIAGNOSIS — I44.2 AV BLOCK, COMPLETE: Primary | ICD-10-CM

## 2024-06-03 PROCEDURE — 93000 ELECTROCARDIOGRAM COMPLETE: CPT

## 2024-06-03 PROCEDURE — 99213 OFFICE O/P EST LOW 20 MIN: CPT

## 2024-06-03 PROCEDURE — 3078F DIAST BP <80 MM HG: CPT

## 2024-06-03 PROCEDURE — 3075F SYST BP GE 130 - 139MM HG: CPT

## 2024-06-03 NOTE — PROGRESS NOTES
Date of Office Visit: 2024  Encounter Provider: NKECHI Watson  Place of Service: [facility] CARDIOLOGY  Patient Name: Rodolfo Grover  :1945    Chief Complaint   Patient presents with    Hospital Follow Up Visit    Pacemaker Check     1 month hospital follow up   -4/15 complete AV block    :     HPI: Rodolof Grover is a 79 y.o. male who follows with Dr. Singer. He has anemia, AF, CAD--s/p CABG, and ICM---EF recovered.      In 2020 he presented with chest pain. Found to have NSTEMI. He underwent CABGx7 with mitral repair.      Post op he had AF and intermittent AV block.      He had recent follow up with Dr. Sniger, was in junctional rhythm.      Subsequent zio showed periods of complete AV block with associated dizziness.      He was admitted . We saw patient in consult. He was having periods of high degree AV block. We recommended pacemaker placement.     He had DC left bundle PPM placed on 4/15.                          He presents today for follow up appt.     Since pacemaker he has been doing well.     He has not noticed a significant difference but really was not having symptoms prior to pacemaker.     He has multiple episodes of AV block during day time hours on monitor.     He has an area on the lateral aspect of his incision open up over the weekend. He says his incision was normal without issues prior to this. He has not noticed any significant drainage, fever, or swelling.     Normal device tsting and fucntion. AP: 1%, RV: 80%. He is in persistent AF.     On warfarin.    Past Medical History:   Diagnosis Date    Acute blood loss anemia     Arthritis     Atrial fibrillation     Bilateral lower extremity edema     Bradycardia following surgery     CAD (coronary artery disease)     Chest discomfort     Colon polyp     Elevated troponin     Focal motor seizure     Generalized tonic-clonic seizure     GERD (gastroesophageal reflux disease)     Hypertension      Iritis of right eye     Ischemic cardiomyopathy     Mitral valve insufficiency     Myocardial infarction     Peptic ulceration     Post-ictal state     Pulmonary hypertension     PVC (premature ventricular contraction)     Rheumatic fever     Rheumatic fever     as child    S/P CABG x 7     S/P mitral valve repair     Severe obstructive sleep apnea     Shoulder fracture, right        Past Surgical History:   Procedure Laterality Date    CARDIAC CATHETERIZATION N/A 2/19/2021    Procedure: Coronary angiography;  Surgeon: Bry Nails MD;  Location: [facility] CATH INVASIVE LOCATION;  Service: Cardiovascular;  Laterality: N/A;    CARDIAC CATHETERIZATION N/A 2/19/2021    Procedure: Left heart cath;  Surgeon: Bry Nails MD;  Location: [facility] CATH INVASIVE LOCATION;  Service: Cardiovascular;  Laterality: N/A;    CARDIAC CATHETERIZATION N/A 2/19/2021    Procedure: Right Heart Cath;  Surgeon: Bry Nails MD;  Location: [facility] CATH INVASIVE LOCATION;  Service: Cardiovascular;  Laterality: N/A;    CARDIAC CATHETERIZATION N/A 2/19/2021    Procedure: Left ventriculography;  Surgeon: Bry Nails MD;  Location: [facility] CATH INVASIVE LOCATION;  Service: Cardiovascular;  Laterality: N/A;    CARDIAC ELECTROPHYSIOLOGY PROCEDURE N/A 4/15/2024    Procedure: Pacemaker DC new [employer];  Surgeon: Edgar Patton MD;  Location: [facility] CATH INVASIVE LOCATION;  Service: Cardiovascular;  Laterality: N/A;    COLONOSCOPY N/A 12/7/2021    Procedure: COLONOSCOPY to cecum with cold biopsy polypectomy;  Surgeon: Edgar Allen MD;  Location: [facility] ENDOSCOPY;  Service: Gastroenterology;  Laterality: N/A;  IRON DEFICIENCY ANEMIA, H/O COLON POLYPS  --   DIVERTICULOSIS, polyp, hemorrhoids    CORONARY ARTERY BYPASS GRAFT N/A 2/22/2021    Procedure: BK, MIDLINE STERNOTOMY, CORONARY ARTERY BYPASS X 7 UTILIZING THE LEFT INTERNAL MAMMARY ARTERY AND THE RIGHT SAPHENOUS VEIN, MITRAL VALVE REPAIR, PRP;   Surgeon: Jr Shyam Echavarria MD;  Location: [facility] OR;  Service: Cardiothoracic;  Laterality: N/A;    ENDOSCOPY N/A 8/16/2018    Erosive gastritis, diverticulosis    ENDOSCOPY N/A 2/26/2021    Procedure: ESOPHAGOGASTRODUODENOSCOPY AT BEDSIDE WITH HOT SNARE POLYPECTOMY AND CLIP PLACEMENT X1;  Surgeon: Jono Laboy MD;  Location: [facility] ENDOSCOPY;  Service: Gastroenterology;  Laterality: N/A;  PRE-  GI BLEED  POST- GASTRITIS, ESOPHAGITIS, POLYP    ENDOSCOPY N/A 12/7/2021    Procedure: ESOPHAGOGASTRODUODENOSCOPY with COLD  BIOPSIES;  Surgeon: Edgar Allen MD;  Location: [facility] ENDOSCOPY;  Service: Gastroenterology;  Laterality: N/A;  IRON DEFICIENCY ANEMIA, H/O ULCERATIVE ESOPHAGITIS  --GASTRITIS, DUODENAL POLYP    HERNIA REPAIR      SHOULDER CLOSED REDUCTION Right 3/16/2018    Procedure: RIGHT SHOULDER CLOSED REDUCTION;  Surgeon: Kj Garcia MD;  Location: [facility] OR;  Service: Orthopedics    TONSILLECTOMY      TOTAL SHOULDER ARTHROPLASTY W/ DISTAL CLAVICLE EXCISION Right 3/22/2018    Procedure: Reverse Total Shoulder Arthroplsty;  Surgeon: jK Garcia MD;  Location: [facility] MAIN OR;  Service: Orthopedics    TRANSESOPHAGEAL ECHOCARDIOGRAM (BK) N/A 2/22/2021    Procedure: TRANSESOPHAGEAL ECHOCARDIOGRAM;  Surgeon: Jr Shyam Echavarria MD;  Location: [facility] OR;  Service: Cardiothoracic;  Laterality: N/A;       Social History     Socioeconomic History    Marital status:    Tobacco Use    Smoking status: Never     Passive exposure: Never    Smokeless tobacco: Never   Vaping Use    Vaping status: Never Used   Substance and Sexual Activity    Alcohol use: Yes     Comment: occasional    Drug use: Never    Sexual activity: Defer       Family History   Problem Relation Age of Onset    Stroke Mother     Cancer Father 56        bladder    Heart attack Father     Malig Hyperthermia Neg Hx        Review of Systems   Constitutional: Negative for chills, fever and malaise/fatigue.    Cardiovascular:  Negative for chest pain, dyspnea on exertion, leg swelling, near-syncope, orthopnea, palpitations, paroxysmal nocturnal dyspnea and syncope.   Respiratory:  Negative for cough and shortness of breath.    Hematologic/Lymphatic: Negative.    Musculoskeletal:  Negative for joint pain, joint swelling and myalgias.   Gastrointestinal:  Negative for abdominal pain, diarrhea, melena, nausea and vomiting.   Genitourinary:  Negative for frequency and hematuria.   Neurological:  Negative for light-headedness, numbness, paresthesias and seizures.   Allergic/Immunologic: Negative.    All other systems reviewed and are negative.      No Known Allergies      Current Outpatient Medications:     acetaminophen (TYLENOL) 500 MG tablet, Take 2 tablets by mouth 3 (Three) Times a Day. (Patient taking differently: Take 2 tablets by mouth 3 (Three) Times a Day As Needed.), Disp: , Rfl:     aspirin 81 MG EC tablet, Take 1 tablet by mouth Daily., Disp: 90 tablet, Rfl: 1    atorvastatin (LIPITOR) 10 MG tablet, Take 1 tablet by mouth once daily, Disp: 90 tablet, Rfl: 0    Cholecalciferol 50 MCG (2000 UT) capsule, Take 1 capsule by mouth Daily., Disp: , Rfl:     Ferrous Gluconate (IRON 27 PO), Take 27 mg by mouth 3 (Three) Times a Week., Disp: , Rfl:     fluticasone (CUTIVATE) 0.05 % cream, Apply 1 application  topically to the appropriate area as directed 2 (Two) Times a Day., Disp: 30 g, Rfl: 4    furosemide (LASIX) 40 MG tablet, Take 1 tablet by mouth Daily. (Patient taking differently: Take 1 tablet by mouth Daily. As needed based on weight per patient), Disp: 90 tablet, Rfl: 2    nitroglycerin (NITROSTAT) 0.4 MG SL tablet, 1 under the tongue as needed for angina, may repeat q5mins for up three doses, Disp: 25 tablet, Rfl: 1    Omeprazole Magnesium (PRILOSEC OTC PO), Take 20 mg by mouth Daily., Disp: , Rfl:     potassium chloride 10 MEQ CR tablet, Take 1 tablet by mouth Daily., Disp: 90 tablet, Rfl: 2    Psyllium  "(METAMUCIL FIBER PO), Take  by mouth As Needed., Disp: , Rfl:     sucralfate (CARAFATE) 1 g tablet, As Needed., Disp: , Rfl:     warfarin (COUMADIN) 2 MG tablet, Take one-half tablet (1 mg) by mouth Monday and Thursday, and take one tablet (2 mg) all other days or as directed., Disp: 80 tablet, Rfl: 1      Objective:     Vitals:    06/03/24 1313   BP: 138/68   BP Location: Right arm   Patient Position: Sitting   Cuff Size: Adult   Pulse: 87   Weight: 81.2 kg (179 lb)   Height: 170.2 cm (67\")     Body mass index is 28.04 kg/m².    PHYSICAL EXAM:    Vitals Reviewed.   General Appearance: No acute distress, well developed and well nourished.   Eyes: Conjunctiva and lids: No erythema, swelling, or discharge. Sclera non-icteric.   HENT: Atraumatic, normocephalic. External eyes, ears, and nose normal.   Respiratory: No signs of respiratory distress. Respiration rhythm and depth normal.   Clear to auscultation. No rales, crackles, rhonchi, or wheezing auscultated.   Cardiovascular:  Heart Rate and Rhythm: Normal, Heart Sounds: Normal S1 and S2. No S3 or S4 noted.  Gastrointestinal:  Abdomen soft, non-distended, non-tender.   Musculoskeletal: Normal movement of extremities  Skin: Warm and dry.   Psychiatric: Patient alert and oriented to person, place, and time. Speech and behavior appropriate. Normal mood and affect.       ECG 12 Lead    Date/Time: 6/3/2024 2:16 PM  Performed by: Nehemiah Mendez APRN    Authorized by: Nehemiah Mendez APRN  Comparison: compared with previous ECG   Similar to previous ECG  Rhythm: atrial fibrillation            Assessment:       Diagnosis Plan   1. AV block, complete               Plan:   AV block---s/p DC LB PPM---- he is doing well post pacemaker placement. Normal device testing and function. AP: 1%, RV: 80%. He remains in persistent AF and is on warfarin.     He has a spot on the lateral aspect of his incision that opened over the weekend. He has no systemic symptoms. It appears to be a " stitch abscess. Since he has no active symptoms, will have him come back in two week for incision check.     Advised him to call for any fever, chills, drainage.         As always, it has been a pleasure to participate in your patient's care.      Sincerely,         NKECHI Watson

## 2024-06-10 NOTE — PROGRESS NOTES
Date of Office Visit: 2024  Encounter Provider: NKECHI Madrigal  Place of Service: [facility]  Patient Name: Rodolfo Grover  :1945    Chief complaint  Coronary artery disease, atrial fibrillation, hypertension, pulmonary hypertension, valvular heart disease     History of Present Illness  Patient is a 79 y.o. year old male  patient of Dr. Singer. Past medical history includes rheumatic fever, GE reflux disease who was seen at [facility] in 2018 after humeral fracture following a fall.  At that time he was noted to have ventricular bigeminy with normal potassium and magnesium levels.  He was hypertensive at the time.  He had an echocardiogram that showed normal systolic function with normal diastolic function, mild left atrial enlargement saline study was indeterminate.  There was aortic valve sclerosis with mild aortic regurgitation and trivial mitral and tricuspid regurgitation with mild pulmonary hypertension with an RV systolic pressure 42 mmHg.  A stress perfusion study was negative for ischemia.  Follow-up echocardiogram in 2020 showed an ejection fraction of 55% with normal diastolic function, mild aortic mitral valve regurgitation, mild to moderate tricuspid regurgitation with an RV systolic pressure increased further to 48 mmHg.  He presented in 2020 with complaints of chest discomfort and non-STEMI.  Echo showed new wall motion abnormalities as well as pulmonary hypertension( RVSP 61 mmHg).  Subsequent cardiac catheterization showed multivessel coronary artery disease.  Subsequently underwent CABG x7 vessels with mitral valve repair.  Postoperative course was complicated by hemoptysis, esophageal esophagitis, atrial fibrillation associated also with intermittent complete heart block.  He was also felt to have had a seizure and was started on Keppra.  He also had acute renal insufficiency.  He also had induration and possible  cellulitis of his right thigh leg wound.  Echo on 5/2023 showed an ejection fraction of 53%, indeterminate diastolic function, mild aortic valve regurgitation no aortic stenosis.  Mild to  mitral regurgitation mild tricuspid regurgitation RVSP increased further to 49 mmHg  (had been 40 mmHg in 2021).     Holter monitor 4/12/2024 showed predominant sinus rhythm with PVCs occurring 10% of the monitored time, 5 episodes of ventricular tachycardia, and significant heart block with reported 99 episodes of third-degree AV block with a total of 14 minutes.  There were also 3 pauses noted with the longest 3.1 seconds.  He was advised to go to ER for evaluation and pacemaker placement and ultimately underwent pacemaker placement on 4/15/2024.  He was seen in office by EP on 6/3/2024 and had a stitch abscess to the lateral aspect of his pacemaker incision.  He is to follow-up with them in 2 weeks for incision check.    Interval history  Patient presents today for routine follow-up.  I will visit with him today and have reviewed his medical record.  Since last visit he is wearing CPAP consistently.  He denies palpitations, shortness of breath, edema, dizziness, chest pain or chest pressure, fatigue, syncope or presyncope.  Blood pressure today is slightly elevated and he reports home readings are similar.  He is tolerating warfarin well with no falls or abnormal bleeding.  He follows with anticoagulation clinic and INR has been labile, but overall at goal.    Past Medical History:   Diagnosis Date    Acute blood loss anemia     Arthritis     Atrial fibrillation     Bilateral lower extremity edema     Bradycardia following surgery     CAD (coronary artery disease)     Chest discomfort     Colon polyp     Elevated troponin     Focal motor seizure     Generalized tonic-clonic seizure     GERD (gastroesophageal reflux disease)     Hypertension     Iritis of right eye     Ischemic cardiomyopathy     Mitral valve insufficiency      Myocardial infarction     Peptic ulceration     Post-ictal state     Pulmonary hypertension     PVC (premature ventricular contraction)     Rheumatic fever     Rheumatic fever     as child    S/P CABG x 7     S/P mitral valve repair     Severe obstructive sleep apnea     Shoulder fracture, right      Past Surgical History:   Procedure Laterality Date    CARDIAC CATHETERIZATION N/A 2/19/2021    Procedure: Coronary angiography;  Surgeon: Bry Nails MD;  Location: [facility] CATH INVASIVE LOCATION;  Service: Cardiovascular;  Laterality: N/A;    CARDIAC CATHETERIZATION N/A 2/19/2021    Procedure: Left heart cath;  Surgeon: Bry Nails MD;  Location: [facility] CATH INVASIVE LOCATION;  Service: Cardiovascular;  Laterality: N/A;    CARDIAC CATHETERIZATION N/A 2/19/2021    Procedure: Right Heart Cath;  Surgeon: Bry Nails MD;  Location: [facility] CATH INVASIVE LOCATION;  Service: Cardiovascular;  Laterality: N/A;    CARDIAC CATHETERIZATION N/A 2/19/2021    Procedure: Left ventriculography;  Surgeon: Bry Nails MD;  Location: [facility] CATH INVASIVE LOCATION;  Service: Cardiovascular;  Laterality: N/A;    CARDIAC ELECTROPHYSIOLOGY PROCEDURE N/A 4/15/2024    Procedure: Pacemaker DC new [employer];  Surgeon: Edgar Patton MD;  Location: [facility] CATH INVASIVE LOCATION;  Service: Cardiovascular;  Laterality: N/A;    COLONOSCOPY N/A 12/7/2021    Procedure: COLONOSCOPY to cecum with cold biopsy polypectomy;  Surgeon: Edgar Allen MD;  Location: [facility] ENDOSCOPY;  Service: Gastroenterology;  Laterality: N/A;  IRON DEFICIENCY ANEMIA, H/O COLON POLYPS  --   DIVERTICULOSIS, polyp, hemorrhoids    CORONARY ARTERY BYPASS GRAFT N/A 2/22/2021    Procedure: BK, MIDLINE STERNOTOMY, CORONARY ARTERY BYPASS X 7 UTILIZING THE LEFT INTERNAL MAMMARY ARTERY AND THE RIGHT SAPHENOUS VEIN, MITRAL VALVE REPAIR, PRP;  Surgeon: Jr Shyam Echavarria MD;  Location: [facility] MAIN OR;  Service: Cardiothoracic;   Laterality: N/A;    ENDOSCOPY N/A 8/16/2018    Erosive gastritis, diverticulosis    ENDOSCOPY N/A 2/26/2021    Procedure: ESOPHAGOGASTRODUODENOSCOPY AT BEDSIDE WITH HOT SNARE POLYPECTOMY AND CLIP PLACEMENT X1;  Surgeon: Jono Laboy MD;  Location: [facility] ENDOSCOPY;  Service: Gastroenterology;  Laterality: N/A;  PRE-  GI BLEED  POST- GASTRITIS, ESOPHAGITIS, POLYP    ENDOSCOPY N/A 12/7/2021    Procedure: ESOPHAGOGASTRODUODENOSCOPY with COLD  BIOPSIES;  Surgeon: Edgar Allen MD;  Location: [facility] ENDOSCOPY;  Service: Gastroenterology;  Laterality: N/A;  IRON DEFICIENCY ANEMIA, H/O ULCERATIVE ESOPHAGITIS  --GASTRITIS, DUODENAL POLYP    HERNIA REPAIR      SHOULDER CLOSED REDUCTION Right 3/16/2018    Procedure: RIGHT SHOULDER CLOSED REDUCTION;  Surgeon: Kj Garcia MD;  Location: [facility] OR;  Service: Orthopedics    TONSILLECTOMY      TOTAL SHOULDER ARTHROPLASTY W/ DISTAL CLAVICLE EXCISION Right 3/22/2018    Procedure: Reverse Total Shoulder Arthroplsty;  Surgeon: Kj Garcia MD;  Location: [facility] OR;  Service: Orthopedics    TRANSESOPHAGEAL ECHOCARDIOGRAM (BK) N/A 2/22/2021    Procedure: TRANSESOPHAGEAL ECHOCARDIOGRAM;  Surgeon: Jr Shyam Echavarria MD;  Location: [facility] OR;  Service: Cardiothoracic;  Laterality: N/A;     Outpatient Medications Prior to Visit   Medication Sig Dispense Refill    acetaminophen (TYLENOL) 500 MG tablet Take 2 tablets by mouth 3 (Three) Times a Day. (Patient taking differently: Take 2 tablets by mouth 3 (Three) Times a Day As Needed.)      aspirin 81 MG EC tablet Take 1 tablet by mouth Daily. 90 tablet 1    atorvastatin (LIPITOR) 10 MG tablet Take 1 tablet by mouth once daily 90 tablet 0    Cholecalciferol 50 MCG (2000 UT) capsule Take 1 capsule by mouth Daily.      Ferrous Gluconate (IRON 27 PO) Take 27 mg by mouth 3 (Three) Times a Week.      fluticasone (CUTIVATE) 0.05 % cream Apply 1 application  topically to the appropriate area as directed 2 (Two)  Times a Day. 30 g 4    furosemide (LASIX) 40 MG tablet Take 1 tablet by mouth Daily. (Patient taking differently: Take 1 tablet by mouth Daily. As needed based on weight per patient) 90 tablet 2    Omeprazole Magnesium (PRILOSEC OTC PO) Take 20 mg by mouth Daily.      potassium chloride 10 MEQ CR tablet Take 1 tablet by mouth Daily. 90 tablet 2    Psyllium (METAMUCIL FIBER PO) Take  by mouth As Needed.      sucralfate (CARAFATE) 1 g tablet As Needed.      warfarin (COUMADIN) 2 MG tablet Take one-half tablet (1 mg) by mouth Monday and Thursday, and take one tablet (2 mg) all other days or as directed. 80 tablet 1    nitroglycerin (NITROSTAT) 0.4 MG SL tablet 1 under the tongue as needed for angina, may repeat q5mins for up three doses (Patient not taking: Reported on 6/11/2024) 25 tablet 1     No facility-administered medications prior to visit.       Allergies as of 06/11/2024    (No Known Allergies)     Social History     Socioeconomic History    Marital status:    Tobacco Use    Smoking status: Never     Passive exposure: Never    Smokeless tobacco: Never   Vaping Use    Vaping status: Never Used   Substance and Sexual Activity    Alcohol use: Yes     Comment: occasional    Drug use: Never    Sexual activity: Defer     Family History   Problem Relation Age of Onset    Stroke Mother     Cancer Father 56        bladder    Heart attack Father     Malig Hyperthermia Neg Hx      Review of Systems   Constitutional: Negative for malaise/fatigue.   Cardiovascular:  Negative for chest pain, claudication, dyspnea on exertion, leg swelling, near-syncope, orthopnea, palpitations, paroxysmal nocturnal dyspnea and syncope.   Respiratory:  Negative for shortness of breath.    Neurological:  Negative for brief paralysis, dizziness, headaches and light-headedness.   All other systems reviewed and are negative.       Objective:     Vitals:    06/11/24 0938   BP: 134/68   BP Location: Right arm   Patient Position: Sitting  "  Cuff Size: Adult   Pulse: 89   Resp: 18   SpO2: 98%   Weight: 81.6 kg (180 lb)   Height: 170.2 cm (67\")     Body mass index is 28.19 kg/m².    Vitals reviewed.   Constitutional:       General: Not in acute distress.     Appearance: Well-developed and not in distress. Not diaphoretic.   HENT:      Head: Normocephalic.   Pulmonary:      Effort: Pulmonary effort is normal. No respiratory distress.      Breath sounds: Normal breath sounds. No wheezing. No rhonchi. No rales.   Cardiovascular:      Normal rate. Regular rhythm.      Murmurs: There is no murmur.   Pulses:     Radial: 2+ bilaterally.  Edema:     Peripheral edema absent.   Skin:     General: Skin is warm and dry. There is no cyanosis.      Findings: No rash.   Neurological:      Mental Status: Alert and oriented to person, place, and time.   Psychiatric:         Behavior: Behavior normal. Behavior is cooperative.         Thought Content: Thought content normal.         Judgment: Judgment normal.       Lab Review:     Lab Results   Component Value Date     04/14/2024     04/13/2024    K 4.3 04/14/2024    K 4.1 04/13/2024     (H) 04/14/2024     04/13/2024    CO2 24.3 04/14/2024    CO2 25.2 04/13/2024    BUN 27 (H) 04/14/2024    BUN 29 (H) 04/13/2024    CREATININE 1.53 (H) 04/14/2024    CREATININE 1.66 (H) 04/13/2024    EGFRIFNONA 40 (L) 01/24/2022    EGFRIFNONA 41 (L) 12/07/2021    EGFRIFAFRI 41 (L) 10/21/2021    EGFRIFAFRI 65 06/10/2021    GLUCOSE 86 04/14/2024    GLUCOSE 95 04/13/2024    CALCIUM 8.7 04/14/2024    CALCIUM 9.2 04/13/2024    PROTENTOTREF 6.7 01/04/2024    PROTENTOTREF 6.8 11/23/2022    ALBUMIN 4.0 04/13/2024    ALBUMIN 4.0 01/04/2024    BILITOT 0.3 04/13/2024    BILITOT 0.6 01/04/2024    AST 19 04/13/2024    AST 17 01/04/2024    ALT 17 04/13/2024    ALT 22 01/04/2024     Lab Results   Component Value Date    WBC 7.47 04/13/2024    WBC 8.77 02/09/2024    HGB 13.0 04/13/2024    HGB 14.1 02/09/2024    HCT 40.8 04/13/2024 "    HCT 44.7 02/09/2024    MCV 98.8 (H) 04/13/2024    .2 (H) 02/09/2024     04/13/2024     02/09/2024     Lab Results   Component Value Date    PROBNP 1,829.0 (H) 08/23/2023    PROBNP 1,966.0 (H) 09/28/2021     Lab Results   Component Value Date    CKTOTAL 1,028 (H) 02/25/2021    TROPONINT 28 (H) 04/13/2024     Lab Results   Component Value Date    TSH 3.130 03/13/2024    TSH 5.040 (H) 01/04/2024      Lipid Panel          1/4/2024    09:21   Lipid Panel   Total Cholesterol 192    Triglycerides 99    HDL Cholesterol 63    VLDL Cholesterol 18    LDL Cholesterol  111           ECG 12 Lead    Date/Time: 6/11/2024 9:53 AM  Performed by: Kezia Dick APRN    Authorized by: Kezia Dick APRN  Comparison: compared with previous ECG   Similar to previous ECG  Rhythm: paced  Rate: normal  BPM: 89  QRS axis: normal  Pacing: ventricular paced rhythmComments: A-fib/flutter underlying with ventricular paced complexes        Assessment:       Diagnosis Plan   1. AV block, complete        2. PAF (paroxysmal atrial fibrillation)        3. Non-ischemic cardiomyopathy        4. H/O mitral valve repair        5. Essential hypertension        6. Pure hypercholesterolemia          Plan:       1.  Recurrent postoperative atrial fibrillation with history of bradycardia, junctional rhythm and complete heart block. S/p PPM placement 4/2024. Device check shows 100% atrial fibrillation underlying. BP also slightly elevated off metoprolol. Will restart metoprolol succinate 25 mg daily.  Remains on warfarin.    2.  History of non-STEMI with subsequent multivessel CABG with cardiomyopathy 2/2021.  Clinically doing well with no anginal symptoms or heart failure findings.  3.  Status post mitral valve repair, as above.  He is aware of and does follow SBE prophylaxis.  4.  Postoperative hematemesis with recent EGD showing grade D esophagitis.  No recurrent problems.  5.  Chronic renal insufficiency.  Renal labs  stable.  6.  Superficial vein thrombosis, clinically seems improved by his description.  7.  Pulmonary hypertension, worse in 5/2023 to 49 mmHg.  9.  Dyslipidemia      Time Spent: I spent 30 minutes caring for Rodolfo on this date of service. This time includes time spent by me in the following activities: preparing for the visit, reviewing tests, performing a medically appropriate examination and/or evaluations, counseling and educating the patient/family/caregiver, ordering medications, tests, or procedures, documenting information in the medical record, and independently interpreting results and communicating that information with the patient/family/caregiver.   I spent 1 minutes on the separately reported service of ECG. This time is not included in the time used to support the E/M service also reported today.        Your medication list            Accurate as of June 11, 2024  9:54 AM. If you have any questions, ask your nurse or doctor.                CHANGE how you take these medications        Instructions Last Dose Given Next Dose Due   acetaminophen 500 MG tablet  Commonly known as: TYLENOL  What changed:   when to take this  reasons to take this      Take 2 tablets by mouth 3 (Three) Times a Day.       furosemide 40 MG tablet  Commonly known as: LASIX  What changed: additional instructions      Take 1 tablet by mouth Daily.              CONTINUE taking these medications        Instructions Last Dose Given Next Dose Due   aspirin 81 MG EC tablet      Take 1 tablet by mouth Daily.       atorvastatin 10 MG tablet  Commonly known as: LIPITOR      Take 1 tablet by mouth once daily       Cholecalciferol 50 MCG (2000 UT) capsule      Take 1 capsule by mouth Daily.       fluticasone 0.05 % cream  Commonly known as: CUTIVATE      Apply 1 application  topically to the appropriate area as directed 2 (Two) Times a Day.       IRON 27 PO      Take 27 mg by mouth 3 (Three) Times a Week.       METAMUCIL FIBER PO      Take   by mouth As Needed.       nitroglycerin 0.4 MG SL tablet  Commonly known as: NITROSTAT      1 under the tongue as needed for angina, may repeat q5mins for up three doses       potassium chloride 10 MEQ CR tablet      Take 1 tablet by mouth Daily.       PRILOSEC OTC PO      Take 20 mg by mouth Daily.       sucralfate 1 g tablet  Commonly known as: CARAFATE      As Needed.       warfarin 2 MG tablet  Commonly known as: COUMADIN      Take one-half tablet (1 mg) by mouth Monday and Thursday, and take one tablet (2 mg) all other days or as directed.                Patient is no longer taking -.  I corrected the med list to reflect this.  I did not stop these medications.    No follow-ups on file.      Dictated utilizing Dragon dictation

## 2024-06-11 ENCOUNTER — OFFICE VISIT (OUTPATIENT)
Dept: CARDIOLOGY | Facility: CLINIC | Age: 79
End: 2024-06-11
Payer: MEDICARE

## 2024-06-11 VITALS
HEIGHT: 67 IN | SYSTOLIC BLOOD PRESSURE: 134 MMHG | OXYGEN SATURATION: 98 % | DIASTOLIC BLOOD PRESSURE: 68 MMHG | BODY MASS INDEX: 28.25 KG/M2 | RESPIRATION RATE: 18 BRPM | WEIGHT: 180 LBS | HEART RATE: 89 BPM

## 2024-06-11 DIAGNOSIS — I10 ESSENTIAL HYPERTENSION: ICD-10-CM

## 2024-06-11 DIAGNOSIS — I48.0 PAF (PAROXYSMAL ATRIAL FIBRILLATION): ICD-10-CM

## 2024-06-11 DIAGNOSIS — Z98.890 H/O MITRAL VALVE REPAIR: ICD-10-CM

## 2024-06-11 DIAGNOSIS — E78.00 PURE HYPERCHOLESTEROLEMIA: ICD-10-CM

## 2024-06-11 DIAGNOSIS — I42.8 NON-ISCHEMIC CARDIOMYOPATHY: ICD-10-CM

## 2024-06-11 DIAGNOSIS — I44.2 AV BLOCK, COMPLETE: Primary | ICD-10-CM

## 2024-06-11 PROCEDURE — 3078F DIAST BP <80 MM HG: CPT | Performed by: NURSE PRACTITIONER

## 2024-06-11 PROCEDURE — 99214 OFFICE O/P EST MOD 30 MIN: CPT | Performed by: NURSE PRACTITIONER

## 2024-06-11 PROCEDURE — 93000 ELECTROCARDIOGRAM COMPLETE: CPT | Performed by: NURSE PRACTITIONER

## 2024-06-11 PROCEDURE — 3075F SYST BP GE 130 - 139MM HG: CPT | Performed by: NURSE PRACTITIONER

## 2024-06-11 RX ORDER — METOPROLOL SUCCINATE 25 MG/1
25 TABLET, EXTENDED RELEASE ORAL DAILY
Qty: 90 TABLET | Refills: 3 | Status: SHIPPED | OUTPATIENT
Start: 2024-06-11

## 2024-06-18 ENCOUNTER — CLINICAL SUPPORT NO REQUIREMENTS (OUTPATIENT)
Age: 79
End: 2024-06-18
Payer: MEDICARE

## 2024-06-18 DIAGNOSIS — I44.2 AV BLOCK, COMPLETE: Primary | ICD-10-CM

## 2024-06-19 ENCOUNTER — ANTICOAGULATION VISIT (OUTPATIENT)
Dept: PHARMACY | Facility: HOSPITAL | Age: 79
End: 2024-06-19
Payer: MEDICARE

## 2024-06-19 DIAGNOSIS — I48.0 PAF (PAROXYSMAL ATRIAL FIBRILLATION): Primary | ICD-10-CM

## 2024-06-19 LAB
INR PPP: 2 (ref 0.91–1.09)
PROTHROMBIN TIME: 23.4 SECONDS (ref 10–13.8)

## 2024-06-19 PROCEDURE — 85610 PROTHROMBIN TIME: CPT

## 2024-06-19 PROCEDURE — 36416 COLLJ CAPILLARY BLOOD SPEC: CPT

## 2024-06-19 PROCEDURE — G0463 HOSPITAL OUTPT CLINIC VISIT: HCPCS

## 2024-06-19 NOTE — PROGRESS NOTES
Anticoagulation Clinic Progress Note    Anticoagulation Summary  As of 2024      INR goal:  2.0-3.0   TTR:  68.5% (3.1 y)   INR used for dosin.0 (2024)   Warfarin maintenance plan:  1 mg every Mon, Thu; 2 mg all other days   Weekly warfarin total:  12 mg   Plan last modified:  Eduar Weeks, PharmD (2024)   Next INR check:  2024   Priority:  High   Target end date:      Indications    PAF (paroxysmal atrial fibrillation) [I48.0]                 Anticoagulation Episode Summary       INR check location:      Preferred lab:      Send INR reminders to:  [facility]    Comments:            Anticoagulation Care Providers       Provider Role Specialty Phone number    Jimena Singer MD Referring Cardiology [phone]            Clinic Interview:  Patient Findings     Positives:  Change in diet/appetite    Negatives:  Signs/symptoms of thrombosis, Signs/symptoms of bleeding,   Laboratory test error suspected, Change in health, Change in alcohol use,   Change in activity, Upcoming invasive procedure, Emergency department   visit, Upcoming dental procedure, Missed doses, Extra doses, Change in   medications, Hospital admission, Bruising, Other complaints    Comments:   Patient reports that he had a smoothie over the weekend that   had a little bit of spinach in it      Clinical Outcomes     Negatives:  Major bleeding event, Thromboembolic event,   Anticoagulation-related hospital admission, Anticoagulation-related ED   visit, Anticoagulation-related fatality    Comments:   Patient reports that he had a smoothie over the weekend that   had a little bit of spinach in it        INR History:      Latest Ref Rng & Units 3/6/2024     2:00 PM 4/3/2024     2:15 PM 2024     1:06 PM 4/15/2024     5:23 AM 2024     2:15 PM 2024     2:15 PM 2024     2:15 PM   Anticoagulation Monitoring   INR  2.5 2.1   1.8 2.1 2.0   INR Date  3/6/2024 4/3/2024   2024 2024 2024   INR  Goal  2.0-3.0 2.0-3.0   2.0-3.0 2.0-3.0 2.0-3.0   Trend  Same Same   Same Same Same   Last Week Total  12 mg 12 mg   12 mg 14 mg 12 mg   Next Week Total  12 mg 12 mg   13 mg 12 mg 12 mg   Sun  2 mg 2 mg   2 mg 2 mg 2 mg   Mon  1 mg 1 mg   1 mg 1 mg 1 mg   Tue  2 mg 2 mg   2 mg 2 mg 2 mg   Wed  2 mg 2 mg   3 mg (5/8); Otherwise 2 mg 2 mg 2 mg   Thu  1 mg 1 mg   1 mg 1 mg 1 mg   Fri  2 mg 2 mg   2 mg 2 mg 2 mg   Sat  2 mg 2 mg   2 mg 2 mg 2 mg   Historical INR 0.90 - 1.10   2.05  1.74           Plan:  1. INR is Therapeutic today- see above in Anticoagulation Summary.  Will instruct Rodolfo Grover to Continue their warfarin regimen (1 mg on M and Th, 2 all other days) - see above in Anticoagulation Summary.  2. Follow up in 4 weeks  3. Patient declines warfarin refills.  4. Verbal and written information provided. Patient expresses understanding and has no further questions at this time.    Valerie Pavon, Pharmacy Intern

## 2024-06-19 NOTE — PROGRESS NOTES
I have supervised and reviewed the notes, assessments, and/or procedures performed. The documented assessment and plan were developed cooperatively, and the plan was implemented in my presence. I concur with the documentation of this patient encounter.    Eduar Weeks, PharmD

## 2024-07-09 ENCOUNTER — OFFICE VISIT (OUTPATIENT)
Dept: SLEEP MEDICINE | Facility: HOSPITAL | Age: 79
End: 2024-07-09
Payer: MEDICARE

## 2024-07-09 VITALS
HEART RATE: 79 BPM | BODY MASS INDEX: 28.25 KG/M2 | SYSTOLIC BLOOD PRESSURE: 126 MMHG | DIASTOLIC BLOOD PRESSURE: 74 MMHG | HEIGHT: 67 IN | OXYGEN SATURATION: 97 % | WEIGHT: 180 LBS

## 2024-07-09 DIAGNOSIS — G47.33 OBSTRUCTIVE SLEEP APNEA, ADULT: Primary | ICD-10-CM

## 2024-07-09 DIAGNOSIS — R68.2 DRY MOUTH: ICD-10-CM

## 2024-07-09 PROCEDURE — G0463 HOSPITAL OUTPT CLINIC VISIT: HCPCS

## 2024-07-09 NOTE — PROGRESS NOTES
"[facility]  [address]  [place] [address] [phone]    PCP: Gold Gray Sr., MD    Reason for visit:  Sleep disorders: COLEMAN    Rodolfo \"Tanmay" is a 79 y.o.male who was seen in the [facility] today. Regular fu. He is sleeping fairly well with CPAP. He sleeps from 10:30pm to 6:30am. He wakes up rested. Using ffm. Fits well. No air leaks. Having dry mouth.  [place] Sleepiness Scale is 2. Caffeine 1 per day. Alcohol 0-1 per week.    Rodolfo \"Chon\"  reports that he has never smoked. He has never been exposed to tobacco smoke. He has never used smokeless tobacco.    Pertinent Positive Review of Systems of pnd  Rest of Review of Systems was negative as recorded in Sleep Questionnaire.    Patient  has a past medical history of Acute blood loss anemia, Arthritis, Atrial fibrillation, Bilateral lower extremity edema, Bradycardia following surgery, CAD (coronary artery disease), Chest discomfort, Colon polyp, Elevated troponin, Focal motor seizure, Generalized tonic-clonic seizure, GERD (gastroesophageal reflux disease), Hypertension, Iritis of right eye, Ischemic cardiomyopathy, Mitral valve insufficiency, Myocardial infarction, Peptic ulceration, Post-ictal state, Pulmonary hypertension, PVC (premature ventricular contraction), Rheumatic fever, Rheumatic fever, S/P CABG x 7, S/P mitral valve repair, Severe obstructive sleep apnea, and Shoulder fracture, right.     Current Medications:    Current Outpatient Medications:     acetaminophen (TYLENOL) 500 MG tablet, Take 2 tablets by mouth 3 (Three) Times a Day. (Patient taking differently: Take 2 tablets by mouth 3 (Three) Times a Day As Needed.), Disp: , Rfl:     aspirin 81 MG EC tablet, Take 1 tablet by mouth Daily., Disp: 90 tablet, Rfl: 1    atorvastatin (LIPITOR) 10 MG tablet, Take 1 tablet by mouth once daily, Disp: 90 tablet, Rfl: 0    Cholecalciferol 50 MCG (2000 UT) capsule, Take 1 capsule by mouth Daily., " "Disp: , Rfl:     Ferrous Gluconate (IRON 27 PO), Take 27 mg by mouth 3 (Three) Times a Week., Disp: , Rfl:     fluticasone (CUTIVATE) 0.05 % cream, Apply 1 application  topically to the appropriate area as directed 2 (Two) Times a Day., Disp: 30 g, Rfl: 4    furosemide (LASIX) 40 MG tablet, Take 1 tablet by mouth Daily. (Patient taking differently: Take 1 tablet by mouth Daily. As needed based on weight per patient), Disp: 90 tablet, Rfl: 2    metoprolol succinate XL (TOPROL-XL) 25 MG 24 hr tablet, Take 1 tablet by mouth Daily., Disp: 90 tablet, Rfl: 3    nitroglycerin (NITROSTAT) 0.4 MG SL tablet, 1 under the tongue as needed for angina, may repeat q5mins for up three doses (Patient not taking: Reported on 6/11/2024), Disp: 25 tablet, Rfl: 1    Omeprazole Magnesium (PRILOSEC OTC PO), Take 20 mg by mouth Daily., Disp: , Rfl:     potassium chloride 10 MEQ CR tablet, Take 1 tablet by mouth Daily., Disp: 90 tablet, Rfl: 2    Psyllium (METAMUCIL FIBER PO), Take  by mouth As Needed., Disp: , Rfl:     sucralfate (CARAFATE) 1 g tablet, As Needed., Disp: , Rfl:     warfarin (COUMADIN) 2 MG tablet, Take one-half tablet (1 mg) by mouth Monday and Thursday, and take one tablet (2 mg) all other days or as directed., Disp: 80 tablet, Rfl: 1   also entered in Sleep Questionnaire         Vital Signs: /74   Pulse 79   Ht 170.2 cm (67.01\")   Wt 81.6 kg (180 lb)   SpO2 97%   BMI 28.19 kg/m²     Body mass index is 28.19 kg/m².       Tongue: Normal       Dentition: good       Pharynx: Posterior pharyngeal pillars are normal    Mallampatti: III (soft and hard palate and base of uvula visible)        General: Alert. Cooperative. Well developed. No acute distress.             Head:  Normocephalic. Symmetrical. Atraumatic.              Nose: No septal deviation. No drainage.          Throat: No oral lesions. No thrush. Moist mucous membranes.    Chest Wall:  Normal shape. Symmetric expansion with respiration. No tenderness.      "        Neck:  Trachea midline.           Lungs:  Clear to auscultation bilaterally. No wheezes. No rhonchi. No rales. Respirations regular, even and unlabored.            Heart:  Regular rhythm and normal rate. Normal S1 and S2. No murmur.     Abdomen:  Soft, non-tender and non-distended. Normal bowel sounds. No masses.  Extremities:  Moves all extremities well. No edema.    Psychiatric: Normal mood and affect.    Diagnostic data available to date is as below and was reviewed on current visit:  [place]aan HST- AHI 40, lowest desaturation 70s; 68min of sats <90%    Overnight oximetry on CPAP RA-August 2018- no desaturation  6/3/20: Overnight titration polysomnogram study from 10:45 PM to 5:02 AM.  Sleep efficiency 87.5% with 5.51 hours total sleep time.  Sleep distribution shows minimal slow-wave sleep at 1.2%.  Reduced REM sleep at 15.1%.  Oxygen saturation remained above 88%.  Arousal index 10.3.  PLM index 9.4 with PLM arousal index 0.5.  CPAP was increased from 5 to 17 cm water pressure.  Bilevel also tried at 18/14.  Maximum sleep recorded at 11 cm water pressure with AHI less than 5.  Patient slept supine for the entire night.    Lab Results   Component Value Date    IRON 91 02/09/2024    TIBC 350 02/09/2024    FERRITIN 92.30 02/09/2024       Most current available usage data reviewed on 07/09/2024:        [employer]: [employer]    Prescription to [facility] for replacement supplies as below:    full face mask      Description Replacement    Nasal PILLOWS      A 7034 Nasal Pillows  every 3 mth    A 7033 Repl Nasal Pillows  2 per mth    Nasal MASK/CUSHION      A 7034 Nasal Mask/Cushion  every 3 mth    A 7032 Repl Nasal Mask/Cushion  2 per mth    Full Face MASK     x A 7030 Full Face Mask  every 3 mth   x A 7031 Repl Face Mask  1 per mth      A 4604 Heated Tubing  every 3 mth    A 7037 Standard Tubing  every 3 mth   x A 7035 Headgear  every 3 mth   x A 7046 Repl Humidifier Chamber  every 6 yrs   x A 7038 Disposable Filters  " 2 per mth   x A 7039 Non-disposable Filter  every 6 mth   x A 7036 Chin Strap  every 6 mth     Orders Placed This Encounter   Procedures    Pulmonary Results Scan          Impression:  1. Obstructive sleep apnea, adult    2. Dry mouth        Plan:  Rodolfo \"Chon\" is doing well on CPAP and will keep same pr.  Reminded to replace supplies regularly.    Try somnoseal for dry mouth    I reiterated the importance of effective treatment of obstructive sleep apnea with PAP machine.  Cardiovascular health risks of untreated sleep apnea were again reviewed.  Patient was asked to remain cautious if there is persistent hypersomnolence. The benefit of weight loss in reducing severity of obstructive sleep apnea was discussed.  Patient would benefit from adhering to a strict diet to achieve ideal BMI.     Change of PAP supplies regularly is important for effective use.  Change of cushion on the mask or plugs on nasal pillows along with disposable filters once every month and change of mask frame, tubing, headgear and Velcro straps every 6 months at the minimum was reiterated.    This patient is compliant with PAP machine and benefits from its use.  Apnea hypopneas index is corrected/improved.  Daytime hypersomnolence has resolved.     Patient will follow up in this clinic in 1 year APRN    Thank you for allowing me to participate in your patient's care.    Electronically signed by Mitch Mendiola MD, 07/09/24, 2:08 PM EDT.    Part of this note may be an electronic transcription/translation of spoken language to printed text using the Dragon Dictation System.  "

## 2024-07-10 ENCOUNTER — OFFICE VISIT (OUTPATIENT)
Dept: FAMILY MEDICINE CLINIC | Facility: CLINIC | Age: 79
End: 2024-07-10
Payer: MEDICARE

## 2024-07-10 VITALS
HEART RATE: 78 BPM | TEMPERATURE: 97.9 F | BODY MASS INDEX: 27.78 KG/M2 | DIASTOLIC BLOOD PRESSURE: 68 MMHG | SYSTOLIC BLOOD PRESSURE: 118 MMHG | WEIGHT: 177 LBS | HEIGHT: 67 IN | OXYGEN SATURATION: 97 % | RESPIRATION RATE: 18 BRPM

## 2024-07-10 DIAGNOSIS — R05.1 ACUTE COUGH: ICD-10-CM

## 2024-07-10 DIAGNOSIS — I10 ESSENTIAL HYPERTENSION: Primary | ICD-10-CM

## 2024-07-10 DIAGNOSIS — E78.00 PURE HYPERCHOLESTEROLEMIA: ICD-10-CM

## 2024-07-10 PROCEDURE — 3074F SYST BP LT 130 MM HG: CPT | Performed by: FAMILY MEDICINE

## 2024-07-10 PROCEDURE — 1126F AMNT PAIN NOTED NONE PRSNT: CPT | Performed by: FAMILY MEDICINE

## 2024-07-10 PROCEDURE — 3078F DIAST BP <80 MM HG: CPT | Performed by: FAMILY MEDICINE

## 2024-07-10 PROCEDURE — 99214 OFFICE O/P EST MOD 30 MIN: CPT | Performed by: FAMILY MEDICINE

## 2024-07-10 RX ORDER — AMOXICILLIN AND CLAVULANATE POTASSIUM 500; 125 MG/1; MG/1
1 TABLET, FILM COATED ORAL 2 TIMES DAILY
Qty: 20 TABLET | Refills: 0 | Status: SHIPPED | OUTPATIENT
Start: 2024-07-10 | End: 2024-07-20

## 2024-07-10 RX ORDER — BENZONATATE 200 MG/1
200 CAPSULE ORAL 3 TIMES DAILY PRN
Qty: 30 CAPSULE | Refills: 0 | Status: SHIPPED | OUTPATIENT
Start: 2024-07-10

## 2024-07-10 NOTE — PROGRESS NOTES
"Chief Complaint  Chief Complaint   Patient presents with    Hypertension     6 mon f/u    Hyperlipidemia     6 mon f/u     Cough     Pt c/o cough x 1 week       Subjective    History of Present Illness        Rodolfo Grover presents to [facility] for   Hypertension  This is a chronic problem. The current episode started more than 1 year ago. The problem is unchanged. The problem is controlled. Associated symptoms include headaches. Pertinent negatives include no anxiety, malaise/fatigue, neck pain, palpitations or shortness of breath. The current treatment provides significant improvement. There is no history of coarctation of the aorta, hyperaldosteronism, hypercortisolism, hyperparathyroidism or pheochromocytoma.   Hyperlipidemia  This is a chronic problem. The current episode started more than 1 year ago. The problem is controlled. Recent lipid tests were reviewed and are normal. He has no history of hypothyroidism or obesity. Pertinent negatives include no shortness of breath. Current antihyperlipidemic treatment includes statins. The current treatment provides significant improvement of lipids. There are no compliance problems.  Risk factors for coronary artery disease include dyslipidemia and hypertension.   Cough  This is a new problem. The current episode started in the past 7 days. The problem has been worse. The problem occurs constantly. The cough is Productive of yellow sputum. Associated symptoms include headaches, nasal congestion, postnasal drip and a sore throat. Pertinent negatives include no shortness of breath. Treatments tried: OTC decongestant. The treatment provided mild relief.      History of Present Illness      Objective   Vital Signs:   Visit Vitals  /68   Pulse 78   Temp 97.9 °F (36.6 °C)   Resp 18   Ht 170.2 cm (67.01\")   Wt 80.3 kg (177 lb)   SpO2 97%   BMI 27.71 kg/m²                Physical Exam  Vitals reviewed.   Constitutional:       Appearance: He " is well-developed.   HENT:      Head: Normocephalic and atraumatic.      Nose: Nose normal.   Eyes:      General: Lids are normal.      Conjunctiva/sclera: Conjunctivae normal.      Pupils: Pupils are equal, round, and reactive to light.   Cardiovascular:      Rate and Rhythm: Normal rate and regular rhythm.      Pulses: Normal pulses.      Heart sounds: Normal heart sounds.   Pulmonary:      Effort: Pulmonary effort is normal. No respiratory distress.      Breath sounds: Normal breath sounds.   Abdominal:      General: Bowel sounds are normal.      Palpations: Abdomen is soft.   Musculoskeletal:         General: Normal range of motion.      Cervical back: Normal range of motion and neck supple.   Skin:     General: Skin is warm and dry.      Capillary Refill: Capillary refill takes less than 2 seconds.   Neurological:      Mental Status: He is alert and oriented to person, place, and time.   Psychiatric:         Behavior: Behavior normal.         Thought Content: Thought content normal.         Judgment: Judgment normal.        Physical Exam           Result Review :  Results                            Assessment and Plan      Diagnoses and all orders for this visit:    1. Essential hypertension (Primary)  Assessment & Plan:  Hypertension is stable and controlled  Continue current treatment regimen.  Dietary sodium restriction.  Weight loss.  Regular aerobic exercise.  Blood pressure will be reassessed in 6 months.    Orders:  -     CBC & Differential  -     Comprehensive Metabolic Panel  -     TSH  -     Lipid Panel With / Chol / HDL Ratio    2. Pure hypercholesterolemia  Assessment & Plan:   Lipid abnormalities are stable    Plan:  Continue same medication/s without change.      Discussed medication dosage, use, side effects, and goals of treatment in detail.    Counseled patient on lifestyle modifications to help control hyperlipidemia.     Patient Treatment Goals:   LDL goal is under 100    Followup in 6  months.    Orders:  -     CBC & Differential  -     Comprehensive Metabolic Panel  -     TSH  -     Lipid Panel With / Chol / HDL Ratio    3. Acute cough  Assessment & Plan:  he was prescribed Augmentin and Tessalon Perles to treat his symptoms.    Increase fluids. Tylenol/motrin for pain or fever.   Medication and medication adverse effects discussed.    Follow-up 5-7 days for reevaluation if not improved or sooner if needed.      Orders:  -     amoxicillin-clavulanate (Augmentin) 500-125 MG per tablet; Take 1 tablet by mouth 2 (Two) Times a Day for 10 days.  Dispense: 20 tablet; Refill: 0  -     benzonatate (TESSALON) 200 MG capsule; Take 1 capsule by mouth 3 (Three) Times a Day As Needed for Cough.  Dispense: 30 capsule; Refill: 0       Assessment & Plan             Follow Up   No follow-ups on file.  Patient was given instructions and counseling regarding his condition or for health maintenance advice. Please see specific information pulled into the AVS if appropriate.

## 2024-07-10 NOTE — ASSESSMENT & PLAN NOTE
he was prescribed Augmentin and Tessalon Perles to treat his symptoms.    Increase fluids. Tylenol/motrin for pain or fever.   Medication and medication adverse effects discussed.    Follow-up 5-7 days for reevaluation if not improved or sooner if needed.

## 2024-07-11 ENCOUNTER — APPOINTMENT (OUTPATIENT)
Dept: CT IMAGING | Facility: HOSPITAL | Age: 79
End: 2024-07-11
Payer: MEDICARE

## 2024-07-11 ENCOUNTER — TELEPHONE (OUTPATIENT)
Dept: CARDIOLOGY | Facility: CLINIC | Age: 79
End: 2024-07-11

## 2024-07-11 ENCOUNTER — APPOINTMENT (OUTPATIENT)
Dept: GENERAL RADIOLOGY | Facility: HOSPITAL | Age: 79
End: 2024-07-11
Payer: MEDICARE

## 2024-07-11 ENCOUNTER — HOSPITAL ENCOUNTER (EMERGENCY)
Facility: HOSPITAL | Age: 79
Discharge: HOME | End: 2024-07-11
Attending: EMERGENCY MEDICINE
Payer: MEDICARE

## 2024-07-11 VITALS
RESPIRATION RATE: 16 BRPM | DIASTOLIC BLOOD PRESSURE: 84 MMHG | SYSTOLIC BLOOD PRESSURE: 164 MMHG | BODY MASS INDEX: 27.94 KG/M2 | HEIGHT: 67 IN | OXYGEN SATURATION: 99 % | TEMPERATURE: 97.4 F | HEART RATE: 70 BPM | WEIGHT: 178 LBS

## 2024-07-11 DIAGNOSIS — Z86.79 HISTORY OF CAD (CORONARY ARTERY DISEASE): ICD-10-CM

## 2024-07-11 DIAGNOSIS — M25.512 ACUTE PAIN OF LEFT SHOULDER: ICD-10-CM

## 2024-07-11 DIAGNOSIS — R20.2 ARM PARESTHESIA, LEFT: ICD-10-CM

## 2024-07-11 DIAGNOSIS — M54.12 CERVICAL RADICULOPATHY AT C5: Primary | ICD-10-CM

## 2024-07-11 LAB
ALBUMIN SERPL-MCNC: 3.4 G/DL (ref 3.5–5.2)
ALBUMIN/GLOB SERPL: 1.1 G/DL
ALP SERPL-CCNC: 172 U/L (ref 39–117)
ALT SERPL W P-5'-P-CCNC: 56 U/L (ref 1–41)
ANION GAP SERPL CALCULATED.3IONS-SCNC: 9.3 MMOL/L (ref 5–15)
AST SERPL-CCNC: 39 U/L (ref 1–40)
BASOPHILS # BLD MANUAL: 0.11 10*3/MM3 (ref 0–0.2)
BASOPHILS NFR BLD MANUAL: 1 % (ref 0–1.5)
BILIRUB SERPL-MCNC: <0.2 MG/DL (ref 0–1.2)
BUN SERPL-MCNC: 21 MG/DL (ref 8–23)
BUN/CREAT SERPL: 13.1 (ref 7–25)
CALCIUM SPEC-SCNC: 8.5 MG/DL (ref 8.6–10.5)
CHLORIDE SERPL-SCNC: 103 MMOL/L (ref 98–107)
CO2 SERPL-SCNC: 24.7 MMOL/L (ref 22–29)
CREAT SERPL-MCNC: 1.6 MG/DL (ref 0.76–1.27)
DEPRECATED RDW RBC AUTO: 43.2 FL (ref 37–54)
EGFRCR SERPLBLD CKD-EPI 2021: 43 ML/MIN/1.73
EOSINOPHIL # BLD MANUAL: 0.53 10*3/MM3 (ref 0–0.4)
EOSINOPHIL NFR BLD MANUAL: 5 % (ref 0.3–6.2)
ERYTHROCYTE [DISTWIDTH] IN BLOOD BY AUTOMATED COUNT: 12.1 % (ref 12.3–15.4)
GEN 5 2HR TROPONIN T REFLEX: 20 NG/L
GLOBULIN UR ELPH-MCNC: 3.2 GM/DL
GLUCOSE SERPL-MCNC: 122 MG/DL (ref 65–99)
HCT VFR BLD AUTO: 38.3 % (ref 37.5–51)
HGB BLD-MCNC: 12.5 G/DL (ref 13–17.7)
HOLD SPECIMEN: NORMAL
HOLD SPECIMEN: NORMAL
INR PPP: 3.59 (ref 0.9–1.1)
LYMPHOCYTES # BLD MANUAL: 1.58 10*3/MM3 (ref 0.7–3.1)
LYMPHOCYTES NFR BLD MANUAL: 17 % (ref 5–12)
MAGNESIUM SERPL-MCNC: 1.9 MG/DL (ref 1.6–2.4)
MCH RBC QN AUTO: 32.3 PG (ref 26.6–33)
MCHC RBC AUTO-ENTMCNC: 32.6 G/DL (ref 31.5–35.7)
MCV RBC AUTO: 99 FL (ref 79–97)
METAMYELOCYTES NFR BLD MANUAL: 4 % (ref 0–0)
MONOCYTES # BLD: 1.79 10*3/MM3 (ref 0.1–0.9)
NEUTROPHILS # BLD AUTO: 6.11 10*3/MM3 (ref 1.7–7)
NEUTROPHILS NFR BLD MANUAL: 58 % (ref 42.7–76)
NRBC BLD AUTO-RTO: 0 /100 WBC (ref 0–0.2)
NRBC SPEC MANUAL: 1 /100 WBC (ref 0–0.2)
PLAT MORPH BLD: NORMAL
PLATELET # BLD AUTO: 231 10*3/MM3 (ref 140–450)
PMV BLD AUTO: 9.8 FL (ref 6–12)
POLYCHROMASIA BLD QL SMEAR: ABNORMAL
POTASSIUM SERPL-SCNC: 4.1 MMOL/L (ref 3.5–5.2)
PROT SERPL-MCNC: 6.6 G/DL (ref 6–8.5)
PROTHROMBIN TIME: 36.1 SECONDS (ref 11.7–14.2)
RBC # BLD AUTO: 3.87 10*6/MM3 (ref 4.14–5.8)
SODIUM SERPL-SCNC: 137 MMOL/L (ref 136–145)
TROPONIN T DELTA: -4 NG/L
TROPONIN T SERPL HS-MCNC: 24 NG/L
VARIANT LYMPHS NFR BLD MANUAL: 15 % (ref 19.6–45.3)
WBC MORPH BLD: NORMAL
WBC NRBC COR # BLD AUTO: 10.53 10*3/MM3 (ref 3.4–10.8)
WHOLE BLOOD HOLD COAG: NORMAL
WHOLE BLOOD HOLD SPECIMEN: NORMAL

## 2024-07-11 PROCEDURE — 71045 X-RAY EXAM CHEST 1 VIEW: CPT

## 2024-07-11 PROCEDURE — 85007 BL SMEAR W/DIFF WBC COUNT: CPT | Performed by: EMERGENCY MEDICINE

## 2024-07-11 PROCEDURE — 72125 CT NECK SPINE W/O DYE: CPT

## 2024-07-11 PROCEDURE — 36415 COLL VENOUS BLD VENIPUNCTURE: CPT

## 2024-07-11 PROCEDURE — 99284 EMERGENCY DEPT VISIT MOD MDM: CPT

## 2024-07-11 PROCEDURE — 93005 ELECTROCARDIOGRAM TRACING: CPT | Performed by: EMERGENCY MEDICINE

## 2024-07-11 PROCEDURE — 73030 X-RAY EXAM OF SHOULDER: CPT

## 2024-07-11 PROCEDURE — 93005 ELECTROCARDIOGRAM TRACING: CPT

## 2024-07-11 PROCEDURE — 80053 COMPREHEN METABOLIC PANEL: CPT | Performed by: EMERGENCY MEDICINE

## 2024-07-11 PROCEDURE — 84484 ASSAY OF TROPONIN QUANT: CPT | Performed by: EMERGENCY MEDICINE

## 2024-07-11 PROCEDURE — 85025 COMPLETE CBC W/AUTO DIFF WBC: CPT | Performed by: EMERGENCY MEDICINE

## 2024-07-11 PROCEDURE — 85610 PROTHROMBIN TIME: CPT | Performed by: EMERGENCY MEDICINE

## 2024-07-11 PROCEDURE — 83735 ASSAY OF MAGNESIUM: CPT | Performed by: EMERGENCY MEDICINE

## 2024-07-11 RX ORDER — ASPIRIN 325 MG
325 TABLET ORAL ONCE
Status: DISCONTINUED | OUTPATIENT
Start: 2024-07-11 | End: 2024-07-11

## 2024-07-11 RX ORDER — SODIUM CHLORIDE 0.9 % (FLUSH) 0.9 %
10 SYRINGE (ML) INJECTION AS NEEDED
Status: DISCONTINUED | OUTPATIENT
Start: 2024-07-11 | End: 2024-07-11 | Stop reason: HOSPADM

## 2024-07-11 NOTE — ED PROVIDER NOTES
EMERGENCY DEPARTMENT ENCOUNTER    Room Number:  30/30  PCP: Gold Gray Sr., MD  Independent Historians: Patient    HPI:  Chief Complaint: had concerns including Arm Pain and Tingling.      A complete HPI/ROS/PMH/PSH/SH/FH are unobtainable due to: None    Chronic or social conditions impacting patient care (Social Determinants of Health): None      Context: Rodolfo Grover is a 79 y.o. male with a medical history of coronary artery disease with prior bypass surgery, hypertension, hyperlipidemia, AV block status post pacer placement who presents to the ED c/o acute left shoulder discomfort for a couple of weeks.  Patient has noted difficult to define left shoulder pain with no limit to range of motion and no neck pain.  Over the past week he has had episodes of left forearm and hand tingling and numbness along with the discomfort so called his cardiologist who recommended he come to the ER for evaluation.  Patient with no history of any neck surgeries or procedures.  Patient with no falls or injury to the left shoulder or arm.  Patient denies any chest pain, shortness of breath, headache.        Review of prior external notes (non-ED) -and- Review of prior external test results outside of this encounter: I reviewed patient's office note from visit with primary provider yesterday.  Patient presented with cough for the past 1 week.  Patient started on Augmentin and Tessalon.    I reviewed discharge summary from last admission which was in April of this year patient admitted for complete AV block and had pacemaker placement.    Prescription drug monitoring program review:     N/A    PAST MEDICAL HISTORY  Active Ambulatory Problems     Diagnosis Date Noted    Acute blood loss anemia 10/07/2014    Fracture of fifth metacarpal bone of right hand 01/22/2016    Closed fracture dislocation of right shoulder 03/16/2018    Gastroesophageal reflux disease without esophagitis 03/16/2018    DNR (do not resuscitate) 03/16/2018     Bradycardia following surgery 03/17/2018    Frequent PVCs 03/17/2018    Proximal humerus fracture 03/22/2018    Osteoarthritis 03/22/2018    Severe obstructive sleep apnea 01/17/2019    Pure hypercholesterolemia 01/17/2019    Pulmonary hypertension 02/09/2019    LARA (dyspnea on exertion) 08/15/2019    Localized edema 08/15/2019    Chest discomfort 02/19/2021    Elevated troponin 02/19/2021    Abnormal EKG 02/19/2021    Coronary artery disease involving native coronary artery of native heart without angina pectoris 02/19/2021    Focal motor seizure 02/24/2021    Generalized tonic-clonic seizure 02/24/2021    Post-ictal state 02/24/2021    Coffee ground emesis 02/19/2021    S/P CABG (coronary artery bypass graft) 03/09/2021    Mitral regurgitation 03/09/2021    H/O mitral valve repair 03/09/2021    PAF (paroxysmal atrial fibrillation) 03/09/2021    Ischemic cardiomyopathy 03/09/2021    Essential hypertension 05/07/2021    Non-ischemic cardiomyopathy 06/28/2021    Iron deficiency anemia due to chronic blood loss 11/08/2021    Polyp of colon 11/08/2021    Macrocytic anemia 06/30/2022    Thrombocytopenia 06/30/2022    CKD (chronic kidney disease) 06/30/2022    Chronic diastolic heart failure 05/23/2022    Wenckebach second degree AV block 06/12/2023    Medicare annual wellness visit, subsequent 01/10/2024    Atopic dermatitis in adult 01/10/2024    Abnormal cardiac conduction 04/13/2024    AV block, complete 04/13/2024    Acute cough 07/10/2024     Resolved Ambulatory Problems     Diagnosis Date Noted    Arthritis 10/07/2014    GI bleed 10/07/2014    Melena 09/12/2014    Hyperglycemia 03/16/2018    Fall at home 03/16/2018    Gastrointestinal hemorrhage associated with acute gastritis 08/15/2018    Adverse effect of non-steroidal anti-inflammatory drug (NSAID) 08/17/2018     Past Medical History:   Diagnosis Date    Atrial fibrillation     Bilateral lower extremity edema     CAD (coronary artery disease)     Colon polyp      GERD (gastroesophageal reflux disease)     Hypertension     Iritis of right eye     Mitral valve insufficiency     Myocardial infarction     Peptic ulceration     PVC (premature ventricular contraction)     Rheumatic fever     Rheumatic fever     S/P CABG x 7     S/P mitral valve repair     Shoulder fracture, right          PAST SURGICAL HISTORY  Past Surgical History:   Procedure Laterality Date    CARDIAC CATHETERIZATION N/A 02/19/2021    Procedure: Coronary angiography;  Surgeon: Bry Nails MD;  Location: [facility] CATH INVASIVE LOCATION;  Service: Cardiovascular;  Laterality: N/A;    CARDIAC CATHETERIZATION N/A 02/19/2021    Procedure: Left heart cath;  Surgeon: Bry Nails MD;  Location: [facility] CATH INVASIVE LOCATION;  Service: Cardiovascular;  Laterality: N/A;    CARDIAC CATHETERIZATION N/A 02/19/2021    Procedure: Right Heart Cath;  Surgeon: Bry Nails MD;  Location: [facility] CATH INVASIVE LOCATION;  Service: Cardiovascular;  Laterality: N/A;    CARDIAC CATHETERIZATION N/A 02/19/2021    Procedure: Left ventriculography;  Surgeon: Bry Nails MD;  Location: [facility] CATH INVASIVE LOCATION;  Service: Cardiovascular;  Laterality: N/A;    CARDIAC ELECTROPHYSIOLOGY PROCEDURE N/A 04/15/2024    Procedure: Pacemaker DC new [employer];  Surgeon: Edgar Patton MD;  Location: [facility] CATH INVASIVE LOCATION;  Service: Cardiovascular;  Laterality: N/A;    COLONOSCOPY N/A 12/07/2021    Procedure: COLONOSCOPY to cecum with cold biopsy polypectomy;  Surgeon: Edgar Allen MD;  Location: [facility] ENDOSCOPY;  Service: Gastroenterology;  Laterality: N/A;  IRON DEFICIENCY ANEMIA, H/O COLON POLYPS  --   DIVERTICULOSIS, polyp, hemorrhoids    CORONARY ARTERY BYPASS GRAFT N/A 02/22/2021    Procedure: BK, MIDLINE STERNOTOMY, CORONARY ARTERY BYPASS X 7 UTILIZING THE LEFT INTERNAL MAMMARY ARTERY AND THE RIGHT SAPHENOUS VEIN, MITRAL VALVE REPAIR, PRP;  Surgeon: Jr Shyam Echavarria MD;   Location: [facility] MAIN OR;  Service: Cardiothoracic;  Laterality: N/A;    ENDOSCOPY N/A 08/16/2018    Erosive gastritis, diverticulosis    ENDOSCOPY N/A 02/26/2021    Procedure: ESOPHAGOGASTRODUODENOSCOPY AT BEDSIDE WITH HOT SNARE POLYPECTOMY AND CLIP PLACEMENT X1;  Surgeon: Jono Laboy MD;  Location: [facility] ENDOSCOPY;  Service: Gastroenterology;  Laterality: N/A;  PRE-  GI BLEED  POST- GASTRITIS, ESOPHAGITIS, POLYP    ENDOSCOPY N/A 12/07/2021    Procedure: ESOPHAGOGASTRODUODENOSCOPY with COLD  BIOPSIES;  Surgeon: Edgar Allen MD;  Location: [facility] ENDOSCOPY;  Service: Gastroenterology;  Laterality: N/A;  IRON DEFICIENCY ANEMIA, H/O ULCERATIVE ESOPHAGITIS  --GASTRITIS, DUODENAL POLYP    HERNIA REPAIR      PACEMAKER IMPLANTATION  04/15/2024    SHOULDER CLOSED REDUCTION Right 03/16/2018    Procedure: RIGHT SHOULDER CLOSED REDUCTION;  Surgeon: Kj Garcia MD;  Location: [facility] OR;  Service: Orthopedics    TONSILLECTOMY      TOTAL SHOULDER ARTHROPLASTY W/ DISTAL CLAVICLE EXCISION Right 03/22/2018    Procedure: Reverse Total Shoulder Arthroplsty;  Surgeon: Kj Gracia MD;  Location: [facility] OR;  Service: Orthopedics    TRANSESOPHAGEAL ECHOCARDIOGRAM (BK) N/A 02/22/2021    Procedure: TRANSESOPHAGEAL ECHOCARDIOGRAM;  Surgeon: Jr Shyam Echavarria MD;  Location: [facility] OR;  Service: Cardiothoracic;  Laterality: N/A;         FAMILY HISTORY  Family History   Problem Relation Age of Onset    Stroke Mother     Cancer Father 56        bladder    Heart attack Father     Malig Hyperthermia Neg Hx          SOCIAL HISTORY  Social History     Socioeconomic History    Marital status:    Tobacco Use    Smoking status: Never     Passive exposure: Never    Smokeless tobacco: Never   Vaping Use    Vaping status: Never Used   Substance and Sexual Activity    Alcohol use: Yes     Comment: occasional    Drug use: Never    Sexual activity: Defer         ALLERGIES  Patient has no known  "allergies.        REVIEW OF SYSTEMS  Review of Systems  Included in HPI  All systems reviewed and negative except for those discussed in HPI.      PHYSICAL EXAM    I have reviewed the triage vital signs and nursing notes.    ED Triage Vitals [07/11/24 1631]   Temp Heart Rate Resp BP SpO2   97.4 °F (36.3 °C) 92 16 -- 95 %      Temp src Heart Rate Source Patient Position BP Location FiO2 (%)   Tympanic -- -- -- --       Physical Exam  GENERAL: Pleasant cooperative and conversant well-appearing male, alert, no acute distress  SKIN: Warm, dry  HENT: Normocephalic, atraumatic  EYES: no scleral icterus, EOMI, no conjunctivitis  Neck: No spinous tenderness to palpation nor bony step-offs  CV: regular rhythm, regular rate  RESPIRATORY: normal effort, lungs clear, no wheezing, no rhonchi  ABDOMEN: soft, nontender, nondistended  MUSCULOSKELETAL: no deformity, full range of motion at left shoulder with no pain clicks or crepitus, full range of motion of the left hand including \"okay sign\" and \"thumbs up\" with no diminished sensation currently  NEURO: alert, moves all extremities, follows commands                                                                   LAB RESULTS  Recent Results (from the past 24 hour(s))   ECG 12 Lead Chest Pain    Collection Time: 07/11/24  4:35 PM   Result Value Ref Range    QT Interval 415 ms    QTC Interval 464 ms   Comprehensive Metabolic Panel    Collection Time: 07/11/24  5:21 PM    Specimen: Blood   Result Value Ref Range    Glucose 122 (H) 65 - 99 mg/dL    BUN 21 8 - 23 mg/dL    Creatinine 1.60 (H) 0.76 - 1.27 mg/dL    Sodium 137 136 - 145 mmol/L    Potassium 4.1 3.5 - 5.2 mmol/L    Chloride 103 98 - 107 mmol/L    CO2 24.7 22.0 - 29.0 mmol/L    Calcium 8.5 (L) 8.6 - 10.5 mg/dL    Total Protein 6.6 6.0 - 8.5 g/dL    Albumin 3.4 (L) 3.5 - 5.2 g/dL    ALT (SGPT) 56 (H) 1 - 41 U/L    AST (SGOT) 39 1 - 40 U/L    Alkaline Phosphatase 172 (H) 39 - 117 U/L    Total Bilirubin <0.2 0.0 - 1.2 mg/dL    " Globulin 3.2 gm/dL    A/G Ratio 1.1 g/dL    BUN/Creatinine Ratio 13.1 7.0 - 25.0    Anion Gap 9.3 5.0 - 15.0 mmol/L    eGFR 43.0 (L) >60.0 mL/min/1.73   High Sensitivity Troponin T    Collection Time: 07/11/24  5:21 PM    Specimen: Blood   Result Value Ref Range    HS Troponin T 24 (H) <22 ng/L   Green Top (Gel)    Collection Time: 07/11/24  5:21 PM   Result Value Ref Range    Extra Tube Hold for add-ons.    Lavender Top    Collection Time: 07/11/24  5:21 PM   Result Value Ref Range    Extra Tube hold for add-on    Gold Top - SST    Collection Time: 07/11/24  5:21 PM   Result Value Ref Range    Extra Tube Hold for add-ons.    Light Blue Top    Collection Time: 07/11/24  5:21 PM   Result Value Ref Range    Extra Tube Hold for add-ons.    CBC Auto Differential    Collection Time: 07/11/24  5:21 PM    Specimen: Blood   Result Value Ref Range    WBC 10.53 3.40 - 10.80 10*3/mm3    RBC 3.87 (L) 4.14 - 5.80 10*6/mm3    Hemoglobin 12.5 (L) 13.0 - 17.7 g/dL    Hematocrit 38.3 37.5 - 51.0 %    MCV 99.0 (H) 79.0 - 97.0 fL    MCH 32.3 26.6 - 33.0 pg    MCHC 32.6 31.5 - 35.7 g/dL    RDW 12.1 (L) 12.3 - 15.4 %    RDW-SD 43.2 37.0 - 54.0 fl    MPV 9.8 6.0 - 12.0 fL    Platelets 231 140 - 450 10*3/mm3    nRBC 0.0 0.0 - 0.2 /100 WBC   Magnesium    Collection Time: 07/11/24  5:21 PM    Specimen: Blood   Result Value Ref Range    Magnesium 1.9 1.6 - 2.4 mg/dL   Protime-INR    Collection Time: 07/11/24  5:21 PM    Specimen: Blood   Result Value Ref Range    Protime 36.1 (H) 11.7 - 14.2 Seconds    INR 3.59 (H) 0.90 - 1.10   Manual Differential    Collection Time: 07/11/24  5:21 PM    Specimen: Blood   Result Value Ref Range    Neutrophil % 58.0 42.7 - 76.0 %    Lymphocyte % 15.0 (L) 19.6 - 45.3 %    Monocyte % 17.0 (H) 5.0 - 12.0 %    Eosinophil % 5.0 0.3 - 6.2 %    Basophil % 1.0 0.0 - 1.5 %    Metamyelocyte % 4.0 (H) 0.0 - 0.0 %    Neutrophils Absolute 6.11 1.70 - 7.00 10*3/mm3    Lymphocytes Absolute 1.58 0.70 - 3.10 10*3/mm3     Monocytes Absolute 1.79 (H) 0.10 - 0.90 10*3/mm3    Eosinophils Absolute 0.53 (H) 0.00 - 0.40 10*3/mm3    Basophils Absolute 0.11 0.00 - 0.20 10*3/mm3    nRBC 1.0 (H) 0.0 - 0.2 /100 WBC    Polychromasia Large/3+ None Seen    WBC Morphology Normal Normal    Platelet Morphology Normal Normal   High Sensitivity Troponin T 2Hr    Collection Time: 07/11/24  7:20 PM    Specimen: Blood   Result Value Ref Range    HS Troponin T 20 <22 ng/L    Troponin T Delta -4 (L) >=-4 - <+4 ng/L         RADIOLOGY  CT Cervical Spine Without Contrast    Result Date: 7/11/2024  CT CERVICAL SPINE WITHOUT CONTRAST  HISTORY: Neck pain, left radiculopathy.  COMPARISON: None.  FINDINGS: There is a grade 1 retrolisthesis of C3 upon C4 estimated to be approximately 2 mm. There is moderate loss of disc height at C3-4 and to a lesser extent C4-5.  C2-3: Small central disc bulge or protrusion is appreciated.  C3-4: A central disc osteophyte complex is present which results in mild flattening of ventral surface of the thecal sac. There is moderate to severe foraminal stenosis bilaterally more prominent on the right secondary to uncovertebral degenerative disease and extension of the disc osteophyte complex into the neural foramen.  C4-5: A mild central disc osteophyte complex is present with no evidence of herniation. Moderate to severe foraminal stenosis is present bilaterally secondary to uncovertebral degenerative disease.  C5-6: Mild central disc bulge or protrusion is appreciated. Moderate lateral recess narrowing is present on the left secondary to facet hypertrophy.  C6-7: Small central disc osteophyte complex is present with no evidence of herniation.  C7-T1: There is no evidence of a disc bulge or herniation.      Multilevel degenerative disease involving cervical spine is noted as described above including moderate to severe foraminal stenosis on the left at C3-4 and C4-5 and moderate foraminal stenosis on the left at C5-6. Clinical  correlation suggested. Further evaluation could be performed with a MRI examination of the cervical spine as indicated.   Radiation dose reduction techniques were utilized, including automated exposure control and exposure modulation based on body size.   This report was finalized on 7/11/2024 6:38 PM by Dr. Ronaldo Bledsoe M.D on Workstation: [id number]      XR Shoulder 2+ View Left    Result Date: 7/11/2024  XR SHOULDER 2+ VW LEFT-7/11/2024  HISTORY: Left shoulder pain.  No acute bone, joint or soft tissue abnormalities are seen. At least one old left rib fracture is seen. Cardiac pacemaker is seen.      1. No acute process.   This report was finalized on 7/11/2024 6:25 PM by Dr. Luca Ortiz M.D on Workstation: [id number]      XR Chest 1 View    Result Date: 7/11/2024  XR CHEST 1 VW-7/11/2024  HISTORY: Chest pain.  Heart size is mildly enlarged. Lungs appear clear. Sternotomy wires and cardiac pacemaker are seen in good position. Right shoulder prosthesis is seen.      1. Mild cardiomegaly.   This report was finalized on 7/11/2024 5:23 PM by Dr. Luca Ortiz M.D on Workstation: [id number]         MEDICATIONS GIVEN IN ER  Medications   sodium chloride 0.9 % flush 10 mL (has no administration in time range)         ORDERS PLACED DURING THIS VISIT:  Orders Placed This Encounter   Procedures    XR Chest 1 View    XR Shoulder 2+ View Left    CT Cervical Spine Without Contrast    [place] Draw    Comprehensive Metabolic Panel    High Sensitivity Troponin T    CBC Auto Differential    Magnesium    Protime-INR    Manual Differential    High Sensitivity Troponin T 2Hr    NPO Diet NPO Type: Strict NPO    Undress & Gown    Continuous Pulse Oximetry    Oxygen Therapy- Nasal Cannula; Titrate 1-6 LPM Per SpO2; 90 - 95%    ECG 12 Lead Chest Pain    ECG 12 Lead ED Triage Standing Order; Chest Pain    Insert Peripheral IV    CBC & Differential    Green Top (Gel)    Lavender Top    Gold Top - SST    Light Blue Top          OUTPATIENT MEDICATION MANAGEMENT:  Current Facility-Administered Medications Ordered in Epic   Medication Dose Route Frequency Provider Last Rate Last Admin    sodium chloride 0.9 % flush 10 mL  10 mL Intravenous PRN Flakita Quintanilla MD         Current Outpatient Medications Ordered in Epic   Medication Sig Dispense Refill    aspirin 81 MG EC tablet Take 1 tablet by mouth Daily. 90 tablet 1    acetaminophen (TYLENOL) 500 MG tablet Take 2 tablets by mouth 3 (Three) Times a Day. (Patient taking differently: Take 2 tablets by mouth 3 (Three) Times a Day As Needed.)      amoxicillin-clavulanate (Augmentin) 500-125 MG per tablet Take 1 tablet by mouth 2 (Two) Times a Day for 10 days. 20 tablet 0    atorvastatin (LIPITOR) 10 MG tablet Take 1 tablet by mouth once daily 90 tablet 0    benzonatate (TESSALON) 200 MG capsule Take 1 capsule by mouth 3 (Three) Times a Day As Needed for Cough. 30 capsule 0    Cholecalciferol 50 MCG (2000 UT) capsule Take 1 capsule by mouth Daily.      Ferrous Gluconate (IRON 27 PO) Take 27 mg by mouth 3 (Three) Times a Week.      fluticasone (CUTIVATE) 0.05 % cream Apply 1 application  topically to the appropriate area as directed 2 (Two) Times a Day. 30 g 4    furosemide (LASIX) 40 MG tablet Take 1 tablet by mouth Daily. (Patient taking differently: Take 1 tablet by mouth Daily. As needed based on weight per patient) 90 tablet 2    metoprolol succinate XL (TOPROL-XL) 25 MG 24 hr tablet Take 1 tablet by mouth Daily. 90 tablet 3    nitroglycerin (NITROSTAT) 0.4 MG SL tablet 1 under the tongue as needed for angina, may repeat q5mins for up three doses 25 tablet 1    Omeprazole Magnesium (PRILOSEC OTC PO) Take 20 mg by mouth Daily.      potassium chloride 10 MEQ CR tablet Take 1 tablet by mouth Daily. 90 tablet 2    Psyllium (METAMUCIL FIBER PO) Take  by mouth As Needed.      sucralfate (CARAFATE) 1 g tablet As Needed.      warfarin (COUMADIN) 2 MG tablet Take one-half tablet (1 mg) by  mouth Monday and Thursday, and take one tablet (2 mg) all other days or as directed. 80 tablet 1         PROCEDURES  Procedures            PROGRESS, DATA ANALYSIS, CONSULTS, AND MEDICAL DECISION MAKING  All labs have been independently interpreted by me.  All radiology studies have been reviewed by me. All EKG's have been independently viewed and interpreted by me.  Discussion below represents my analysis of pertinent findings related to patient's condition, differential diagnosis, treatment plan and final disposition.    Differential diagnosis includes but is not limited to   Presents with left arm pain with paresthesias.  Possibilities include ACS equivalent, bony malignancy, peripheral neuropathy or nerve palsy, cervical radiculopathy among other possibilities.  Plan for EKG, chest x-ray, labs to include troponin and electrolytes as well as imaging of patient's left shoulder and cervical spine.  Patient amenable to plan.    Clinical Scores:              Heart score calculation:    History slightly suspicious: 0  History moderately suspicious: +1  History highly suspicious: +2    EKG normal: 0  EKG non-specific repolarization disturbance: +1  EKG sig ST deviation: +2    Age <45: 0  Age 45-64: +1  Age >65: +2    No known risk factors: 0  1-2 risk factors: +1  3 or more risk factors: +2    Initial troponin less than the normal limit: 0  Initial troponin 1-3 times the normal limit: +1  Initial troponin greater than 3 times the normal limit: +2    Total score: 5    ED Course as of 07/11/24 2138   Thu Jul 11, 2024   1755 EKG ER MD interpretation   Rhythm and rate: Ventricularly paced complexes at a rate of 75   [AR]   1755 I viewed patient's chest x-ray and on my interpretation patient has had right shoulder replacement, cardiomegaly, no pulmonary infiltrates. [AR]   1756 Patient's creatinine is 1.6 which is around his baseline with a history of chronic kidney disease.  Patient's INR is supratherapeutic at 3.5.   Patient's troponin is mildly elevated at 24 which is consistent with previous values of 28 and 29 even in April of this year.  Patient's hemoglobin is stable at 12.5 with previous value being 13 in April of this year.  Awaiting repeat interval troponin, shoulder x-ray, and CT cervical spine. [AR]   2009 I discussed all results with patient including his baseline kidney dysfunction and elevated troponin.  We also discussed his INR being supratherapeutic.  Patient takes his warfarin at night so we will skip his dose tonight.  He had taken 2 full-strength aspirins prior to coming in and is on Augmentin for his URI so those both could be contributing to his INR elevation today.  Patient amenable to following up with neurosurgery and his primary provider. [AR]      ED Course User Index  [AR] Flakita Quintanilla MD             AS OF 21:38 EDT VITALS:    BP - 164/84  HR - 70  TEMP - 97.4 °F (36.3 °C) (Tympanic)  O2 SATS - 99%      COMPLEXITY OF CARE  Admission was considered but after careful review of the patient's presentation, physical examination, diagnostic results, and response to treatment the patient may be safely discharged with outpatient follow-up.    DIAGNOSIS  Final diagnoses:   Cervical radiculopathy at C5   Arm paresthesia, left   Acute pain of left shoulder   History of CAD (coronary artery disease)         DISPOSITION  ED Disposition       ED Disposition   Discharge    Condition   Stable    Comment   --                Please note that portions of this document were completed with a voice recognition program.    Note Disclaimer: At [facility], we believe that sharing information builds trust and better relationships. You are receiving this note because you recently visited [facility]. It is possible you will see health information before a provider has talked with you about it. This kind of information can be easy to misunderstand. To help you fully understand what it means for your health, we  urge you to discuss this note with your provider.         Flakita Quintanilla MD  07/11/24 2897

## 2024-07-11 NOTE — TELEPHONE ENCOUNTER
"With unilateral sensation change and \"tingling\", honestly would go to ER for evaluation.  That is suspicious for stroke."

## 2024-07-11 NOTE — TELEPHONE ENCOUNTER
Reviewed recommendations with Rodolfo Grover and the patient verbalized understanding of the recommendations.  Patient agreeable to this plan and stated he will come in to ED now.    Thank you,  Miguelina MONTES RN  Triage Nurse [facility]   15:54 EDT

## 2024-07-11 NOTE — TELEPHONE ENCOUNTER
"Rodolfo Grover called to report symptoms.    Patient saw his PCP yesterday, but forgot to discuss his symptoms at the appointment.  He was prescribed an antibiotic and tessalon for an URI.    Patient reports intermittent left shoulder pain that started a few weeks ago.  He does think it tends to occur with activity.  He denies any radiation of the pain and no other symptoms during episodes of pain.  He denies chest pain.  He stated that his arm almost felt \"stiff\" so he did exercises and felt the pain had resolved.  However, last week patient reports left shoulder pain returned and he also developed a \"tingling\" sensation in his arm that went down into his hand.  He likened the sensation to when an arm/leg falls asleep.  He again tried exercising the shoulder but has not had any symptom improvement.  He denies any shoulder pain now but does have the tingling sensation, which is intermittent.     Patient denies any redness or drainage at pacemaker site.  He stated he has had his site checked and that it has healed.    Patient wanted to make provider aware of his symptoms and requests further recommendations.  He stated that with his previous MI and CABG, the only symptom he had was some chest tightness so he states he does not present \"typically\" with his cardiac issues.    Please let me know how you would like to proceed.    Thank you,  Miguelina MONTES RN  Triage Nurse [facility]   15:36 EDT    "

## 2024-07-12 LAB
ALBUMIN SERPL-MCNC: 3.7 G/DL (ref 3.5–5.2)
ALBUMIN/GLOB SERPL: 1.4 G/DL
ALP SERPL-CCNC: 190 U/L (ref 39–117)
ALT SERPL-CCNC: 62 U/L (ref 1–41)
AST SERPL-CCNC: 47 U/L (ref 1–40)
BILIRUB SERPL-MCNC: 0.4 MG/DL (ref 0–1.2)
BUN SERPL-MCNC: 22 MG/DL (ref 8–23)
BUN/CREAT SERPL: 13.7 (ref 7–25)
CALCIUM SERPL-MCNC: 8.9 MG/DL (ref 8.6–10.5)
CHLORIDE SERPL-SCNC: 104 MMOL/L (ref 98–107)
CHOLEST SERPL-MCNC: 145 MG/DL (ref 0–200)
CHOLEST/HDLC SERPL: 3.82 {RATIO}
CO2 SERPL-SCNC: 25.1 MMOL/L (ref 22–29)
CREAT SERPL-MCNC: 1.61 MG/DL (ref 0.76–1.27)
DIFFERENTIAL COMMENT: ABNORMAL
EGFRCR SERPLBLD CKD-EPI 2021: 42.7 ML/MIN/1.73
EOSINOPHIL # BLD MANUAL: 0.19 10*3/MM3 (ref 0–0.4)
EOSINOPHIL NFR BLD MANUAL: 2 % (ref 0.3–6.2)
ERYTHROCYTE [DISTWIDTH] IN BLOOD BY AUTOMATED COUNT: 11.8 % (ref 12.3–15.4)
GLOBULIN SER CALC-MCNC: 2.7 GM/DL
GLUCOSE SERPL-MCNC: 90 MG/DL (ref 65–99)
HCT VFR BLD AUTO: 40.2 % (ref 37.5–51)
HDLC SERPL-MCNC: 38 MG/DL (ref 40–60)
HGB BLD-MCNC: 12.9 G/DL (ref 13–17.7)
LDLC SERPL CALC-MCNC: 86 MG/DL (ref 0–100)
LYMPHOCYTES # BLD MANUAL: 1.35 10*3/MM3 (ref 0.7–3.1)
LYMPHOCYTES NFR BLD MANUAL: 14 % (ref 19.6–45.3)
MCH RBC QN AUTO: 32.3 PG (ref 26.6–33)
MCHC RBC AUTO-ENTMCNC: 32.1 G/DL (ref 31.5–35.7)
MCV RBC AUTO: 100.8 FL (ref 79–97)
MONOCYTES # BLD MANUAL: 0.48 10*3/MM3 (ref 0.1–0.9)
MONOCYTES NFR BLD MANUAL: 5 % (ref 5–12)
NEUTROPHILS # BLD MANUAL: 6.19 10*3/MM3 (ref 1.7–7)
NEUTROPHILS NFR BLD MANUAL: 64 % (ref 42.7–76)
NRBC BLD AUTO-RTO: 0 /100 WBC (ref 0–0.2)
PLATELET # BLD AUTO: 220 10*3/MM3 (ref 140–450)
PLATELET BLD QL SMEAR: ABNORMAL
POTASSIUM SERPL-SCNC: 4.2 MMOL/L (ref 3.5–5.2)
PROT SERPL-MCNC: 6.4 G/DL (ref 6–8.5)
QT INTERVAL: 415 MS
QTC INTERVAL: 464 MS
RBC # BLD AUTO: 3.99 10*6/MM3 (ref 4.14–5.8)
RBC MORPH BLD: ABNORMAL
SODIUM SERPL-SCNC: 140 MMOL/L (ref 136–145)
TRIGL SERPL-MCNC: 116 MG/DL (ref 0–150)
TSH SERPL DL<=0.005 MIU/L-ACNC: 5.51 UIU/ML (ref 0.27–4.2)
VLDLC SERPL CALC-MCNC: 21 MG/DL (ref 5–40)
WBC # BLD AUTO: 9.67 10*3/MM3 (ref 3.4–10.8)

## 2024-07-12 NOTE — DISCHARGE INSTRUCTIONS
"Rest at home avoiding any particularly strenuous activity today and tomorrow.     Skip your warfarin dose today and resume it tomorrow due to your blood being \"too thin\".    Eat small, frequent meals and drink plenty of fluids. You can use over the counter 4% lidocaine patches for pain relief or take tylenol per package instructions. Do not take ibuprofen or aleve over the counter as that will interact with your blood thinner.    Monitor for any signs of recurrent symptoms or worsening and see your primary doctor to discuss your ER visit while returning to the ER if any concerns as we discussed.    "

## 2024-07-13 NOTE — THERAPY TREATMENT NOTE
Continue Crestor   On Eliquis   On metoprolol  Monitor hemodynamic status   PT/OT as tolerated  Fall precautions   PT/OT as tolerated  Fall precautions   Patient Name: Rodolfo Grover  : 1945    MRN: [id number]                              Today's Date: 3/3/2021       Admit Date: 2021    Visit Dx:     ICD-10-CM ICD-9-CM   1. S/P MVR (mitral valve repair)  Z98.890 V45.89   2. Chest discomfort  R07.89 786.59   3. Elevated troponin  R77.8 790.6   4. Abnormal EKG  R94.31 794.31   5. Other chest pain  R07.89 786.59   6. Shortness of breath  R06.02 786.05   7. Coronary artery disease involving native coronary artery of native heart with unstable angina pectoris (CMS/HCC)  I25.110 414.01     411.1   8. S/P CABG (coronary artery bypass graft)  Z95.1 V45.81   9. Coffee ground emesis  K92.0 578.0     Patient Active Problem List   Diagnosis   • Acute blood loss anemia   • Fracture of fifth metacarpal bone of right hand   • Closed fracture dislocation of right shoulder   • Gastroesophageal reflux disease without esophagitis   • DNR (do not resuscitate)   • Bradycardia following surgery   • Frequent PVCs   • Proximal humerus fracture   • Osteoarthritis   • Severe obstructive sleep apnea   • Pure hypercholesterolemia   • Pulmonary hypertension (CMS/HCC)   • LARA (dyspnea on exertion)   • Localized edema   • Chest discomfort   • Elevated troponin   • Abnormal EKG   • Coronary artery disease involving native coronary artery of native heart with unstable angina pectoris (CMS/HCC)   • Focal motor seizure (CMS/HCC)   • Generalized tonic-clonic seizure (CMS/HCC)   • Post-ictal state (CMS/HCC)   • Coffee ground emesis     Past Medical History:   Diagnosis Date   • GERD (gastroesophageal reflux disease)    • Iritis of right eye    • PVC (premature ventricular contraction)    • Rheumatic fever    • Severe obstructive sleep apnea    • Shoulder fracture, right      Past Surgical History:   Procedure Laterality Date   • CARDIAC CATHETERIZATION N/A 2021    Procedure: Coronary angiography;  Surgeon: Bry Nails MD;  Location: [facility] INVASIVE LOCATION;  Service:  Per chart review, patient was recommended hospice by neuro-onc.     Per recent  note, patient and patient's daughter are not ready to proceed with hospice care at this time.  On Decadron 1 mg daily  PT/OT/SLP as tolerated  Fall precautions  Continue monitor neurological and functional status   Secondary to metapneumovirus  On ceftriaxone  On Decadron 1 mg daily  Isolation precautions  Wean oxygen as tolerated  On DuoNebs 4 times daily  Monitor respiratory status   Cardiovascular;  Laterality: N/A;   • CARDIAC CATHETERIZATION N/A 2/19/2021    Procedure: Left heart cath;  Surgeon: Bry Nails MD;  Location: [facility] CATH INVASIVE LOCATION;  Service: Cardiovascular;  Laterality: N/A;   • CARDIAC CATHETERIZATION N/A 2/19/2021    Procedure: Right Heart Cath;  Surgeon: Bry Nails MD;  Location: [facility] CATH INVASIVE LOCATION;  Service: Cardiovascular;  Laterality: N/A;   • CARDIAC CATHETERIZATION N/A 2/19/2021    Procedure: Left ventriculography;  Surgeon: Bry Nails MD;  Location: [facility] CATH INVASIVE LOCATION;  Service: Cardiovascular;  Laterality: N/A;   • CORONARY ARTERY BYPASS GRAFT N/A 2/22/2021    Procedure: BK, MIDLINE STERNOTOMY, CORONARY ARTERY BYPASS X 7 UTILIZING THE LEFT INTERNAL MAMMARY ARTERY AND THE RIGHT SAPHENOUS VEIN, MITRAL VALVE REPAIR, PRP;  Surgeon: Jr Shyam Echavarria MD;  Location: [facility] MAIN OR;  Service: Cardiothoracic;  Laterality: N/A;   • ENDOSCOPY N/A 8/16/2018    Erosive gastritis, diverticulosis   • ENDOSCOPY N/A 2/26/2021    Procedure: ESOPHAGOGASTRODUODENOSCOPY AT BEDSIDE WITH HOT SNARE POLYPECTOMY AND CLIP PLACEMENT X1;  Surgeon: Jono Laboy MD;  Location: [facility] ENDOSCOPY;  Service: Gastroenterology;  Laterality: N/A;  PRE-  GI BLEED  POST- GASTRITIS, ESOPHAGITIS, POLYP   • HERNIA REPAIR     • SHOULDER CLOSED REDUCTION Right 3/16/2018    Procedure: RIGHT SHOULDER CLOSED REDUCTION;  Surgeon: Kj Garcia MD;  Location: [facility] OR;  Service: Orthopedics   • TONSILLECTOMY     • TOTAL SHOULDER ARTHROPLASTY W/ DISTAL CLAVICLE EXCISION Right 3/22/2018    Procedure: Reverse Total Shoulder Arthroplsty;  Surgeon: Kj Garcia MD;  Location: [facility] MAIN OR;  Service: Orthopedics   • TRANSESOPHAGEAL ECHOCARDIOGRAM (BK) N/A 2/22/2021    Procedure: TRANSESOPHAGEAL ECHOCARDIOGRAM;  Surgeon: Jr Shyam Echavarria MD;  Location: [facility] MAIN OR;  Service: Cardiothoracic;  Laterality: N/A;     General  Information     [facility] Name 03/03/21 1213          Physical Therapy Time and Intention    Document Type  therapy note (daily note)  (Pended)   -     Mode of Treatment  physical therapy;individual therapy  (Pended)   -[facility] Name 03/03/21 1213          General Information    Existing Precautions/Restrictions  cardiac;fall;sternal  (Pended)   -[facility] Name 03/03/21 1213          Cognition    Orientation Status (Cognition)  oriented x 4  (Pended)   -[facility] Name 03/03/21 1213          Safety Issues, Functional Mobility    Impairments Affecting Function (Mobility)  balance;endurance/activity tolerance;shortness of breath;strength  (Pended)   -       User Key  (r) = Recorded By, (t) = Taken By, (c) = Cosigned By    Initials Name Provider Type     Azucena Sloan PT Student PT Student        Mobility     Row Name 03/03/21 1216          Bed Mobility    Supine-Sit [place] (Bed Mobility)  not tested  (Pended)   -     Sit-Supine [place] (Bed Mobility)  not tested  (Pended)   -     Comment (Bed Mobility)  Sitting in chair  (Pended)   -[facility] Name 03/03/21 1216          Sit-Stand Transfer    Sit-Stand [place] (Transfers)  standby assist  (Pended)   -[facility] Name 03/03/21 1216          Gait/Stairs (Locomotion)    [place] Level (Gait)  contact guard  (Pended)   -     Distance in Feet (Gait)  250ft  (Pended)   -     Deviations/Abnormal Patterns (Gait)  gait speed decreased;stride length decreased;colin decreased  (Pended)   -     Bilateral Gait Deviations  forward flexed posture  (Pended)   -     Comment (Gait/Stairs)  Pt ambulated 250ft around unit today with 2 brief rest breaks d/t reported SOA, however no labored breathing was noted.  (Pended)   -       User Key  (r) = Recorded By, (t) = Taken By, (c) = Cosigned By    Initials Name Provider Type     Azucena Sloan PT Student PT Student        Obj/Interventions     [facility] Name 03/03/21 1219          Motor Skills     Therapeutic Exercise  --  (Pended)  10 reps B cardiac rehab protocol, cardiac level 4  -       User Key  (r) = Recorded By, (t) = Taken By, (c) = Cosigned By    Initials Name Provider Type    Azucena Davey, PT Student PT Student        Goals/Plan    No documentation.       Clinical Impression     Row Name 03/03/21 1219          Pain    Additional Documentation  Pain Scale: FACES Pre/Post-Treatment (Group)  (Pended)   -     Row Name 03/03/21 1219          Pain Scale: FACES Pre/Post-Treatment    Pain: FACES Scale, Pretreatment  0-->no hurt  (Pended)   -     Posttreatment Pain Rating  0-->no hurt  (Pended)   -     Row Name 03/03/21 1219          Plan of Care Review    Plan of Care Reviewed With  patient  (Pended)   -     Progress  improving  (Pended)   -     Outcome Summary  Pt able to ambulate 250ft today with 2 brief rest breaks d/t SOA. Pt had an IV and did not want to attempt stairs again today, PT will attempt again tomorrow. Pt with improved gait mechanics and no LOB. He will continue to benefit from skilled PT to improve overall strength, endurance, gait, and functional mobility.  (Pended)   -     Row Name 03/03/21 1219          Positioning and Restraints    Pre-Treatment Position  sitting in chair/recliner  (Pended)   -     Post Treatment Position  chair  (Pended)   -BH     In Chair  reclined;call light within reach;encouraged to call for assist  (Pended)   -       User Key  (r) = Recorded By, (t) = Taken By, (c) = Cosigned By    Initials Name Provider Type    Azucena Davey, PT Student PT Student        Outcome Measures     Row Name 03/03/21 1221          How much help from another person do you currently need...    Turning from your back to your side while in flat bed without using bedrails?  4  (Pended)   -BH     Moving from lying on back to sitting on the side of a flat bed without bedrails?  4  (Pended)   -BH     Moving to and from a bed to a chair (including a wheelchair)?  4   (Pended)   -     Standing up from a chair using your arms (e.g., wheelchair, bedside chair)?  4  (Pended)   -     Climbing 3-5 steps with a railing?  3  (Pended)   -     To walk in hospital room?  4  (Pended)   -     AM-PAC 6 Clicks Score (PT)  23  (Pended)   -     Row Name 03/03/21 1221          Functional Assessment    Outcome Measure Options  AM-PAC 6 Clicks Basic Mobility (PT)  (Pended)   -       User Key  (r) = Recorded By, (t) = Taken By, (c) = Cosigned By    Initials Name Provider Type     Azucena Sloan, PT Student PT Student        Physical Therapy Education                 Title: PT OT SLP Therapies (Done)     Topic: Physical Therapy (Done)     Point: Mobility training (Done)     Learning Progress Summary           Patient Acceptance, E,TB,D, VU,NR by  at 3/3/2021 1222    Acceptance, E,D, VU by  at 3/2/2021 1120    Acceptance, E,TB,D, VU,NR by  at 3/1/2021 1055    Acceptance, E,TB,D, VU,NR by  at 2/26/2021 1332    Acceptance, E,TB,D, VU,NR by  at 2/25/2021 1155    Acceptance, E,TB,D, VU,NR by  at 2/23/2021 1118                   Point: Home exercise program (Done)     Learning Progress Summary           Patient Acceptance, E,TB,D, VU,NR by  at 3/3/2021 1222    Acceptance, E,D, VU by  at 3/2/2021 1120    Acceptance, E,TB,D, VU,NR by  at 3/1/2021 1055    Acceptance, E,TB,D, VU,NR by  at 2/26/2021 1332    Acceptance, E,TB,D, VU,NR by  at 2/25/2021 1155    Acceptance, E,TB,D, VU,NR by  at 2/23/2021 1118                   Point: Body mechanics (Done)     Learning Progress Summary           Patient Acceptance, E,TB,D, VU,NR by  at 3/3/2021 1222    Acceptance, E,D, VU by  at 3/2/2021 1120    Acceptance, E,TB,D, VU,NR by  at 3/1/2021 1055    Acceptance, E,TB,D, VU,NR by  at 2/26/2021 1332    Acceptance, E,TB,D, VU,NR by  at 2/25/2021 1155    Acceptance, E,TB,D, VU,NR by  at 2/23/2021 1118                   Point: Precautions (Done)     Learning Progress Summary            Patient Acceptance, E,TB,D, VU,NR by  at 3/3/2021 1222    Acceptance, E,D, VU by  at 3/2/2021 1120    Acceptance, E,TB,D, VU,NR by  at 3/1/2021 1055    Acceptance, E,TB,D, VU,NR by  at 2/26/2021 1332    Acceptance, E,TB,D, VU,NR by  at 2/25/2021 1155    Acceptance, E,TB,D, VU,NR by  at 2/23/2021 1118                               User Key     Initials Effective Dates Name Provider Type Discipline     04/03/18 -  Yamel Wheat PT Physical Therapist PT     02/01/21 -  Azucena Sloan PT Student PT Student PT     02/08/21 -  Eloisa Vaughn Physical Therapist PT              PT Recommendation and Plan     Plan of Care Reviewed With: (P) patient  Progress: (P) improving  Outcome Summary: (P) Pt able to ambulate 250ft today with 2 brief rest breaks d/t SOA. Pt had an IV and did not want to attempt stairs again today, PT will attempt again tomorrow. Pt with improved gait mechanics and no LOB. He will continue to benefit from skilled PT to improve overall strength, endurance, gait, and functional mobility.     Time Calculation:   PT Charges     Row Name 03/03/21 1222             Time Calculation    Start Time  1053  (Pended)   -      Stop Time  1105  (Pended)   -      Time Calculation (min)  12 min  (Pended)   -      PT Received On  03/03/21  (Pended)   -      PT - Next Appointment  03/04/21  (Pended)   -         Time Calculation- PT    Total Timed Code Minutes- PT  12 minute(s)  (Pended)   -        User Key  (r) = Recorded By, (t) = Taken By, (c) = Cosigned By    Initials Name Provider Type     Azucena Sloan PT Student PT Student        Therapy Charges for Today     Code Description Service Date Service Provider Modifiers Qty    [id number]  PT THERAPEUTIC ACT EA 15 MIN 3/3/2021 Azucena Sloan PT Student GP 1          PT G-Codes  Outcome Measure Options: (P) AM-PAC 6 Clicks Basic Mobility (PT)  AM-PAC 6 Clicks Score (PT): (P) 23    GA Hardin  3/3/2021     yes

## 2024-07-17 ENCOUNTER — ANTICOAGULATION VISIT (OUTPATIENT)
Dept: PHARMACY | Facility: HOSPITAL | Age: 79
End: 2024-07-17
Payer: MEDICARE

## 2024-07-17 DIAGNOSIS — I48.0 PAF (PAROXYSMAL ATRIAL FIBRILLATION): Primary | ICD-10-CM

## 2024-07-17 LAB
INR PPP: 3.2 (ref 0.91–1.09)
PROTHROMBIN TIME: 37.9 SECONDS (ref 10–13.8)

## 2024-07-17 PROCEDURE — 85610 PROTHROMBIN TIME: CPT

## 2024-07-17 PROCEDURE — G0463 HOSPITAL OUTPT CLINIC VISIT: HCPCS

## 2024-07-17 PROCEDURE — 36416 COLLJ CAPILLARY BLOOD SPEC: CPT

## 2024-07-17 NOTE — PROGRESS NOTES
Anticoagulation Clinic Progress Note    Anticoagulation Summary  As of 7/17/2024      INR goal:  2.0-3.0   TTR:  68.1% (3.2 y)   INR used for dosing:  3.2 (7/17/2024)   Warfarin maintenance plan:  1 mg every Mon, Thu; 2 mg all other days   Weekly warfarin total:  12 mg   Plan last modified:  Eduar Weeks, PharmD (6/19/2024)   Next INR check:  7/31/2024   Priority:  High   Target end date:      Indications    PAF (paroxysmal atrial fibrillation) [I48.0]                 Anticoagulation Episode Summary       INR check location:      Preferred lab:      Send INR reminders to:  [facility] [place]    Comments:            Anticoagulation Care Providers       Provider Role Specialty Phone number    Jimena Singer MD Referring Cardiology [phone]            Clinic Interview:  Patient Findings     Positives:  Change in medications    Negatives:  Signs/symptoms of thrombosis, Signs/symptoms of bleeding,   Laboratory test error suspected, Change in health, Change in alcohol use,   Change in activity, Upcoming invasive procedure, Emergency department   visit, Upcoming dental procedure, Missed doses, Extra doses, Change in   diet/appetite, Hospital admission, Bruising, Other complaints    Comments:  Prescribed Augmentin x 10 days on 7/10. ED on 7/11 for pinched   nerve.      Clinical Outcomes     Negatives:  Major bleeding event, Thromboembolic event,   Anticoagulation-related hospital admission, Anticoagulation-related ED   visit, Anticoagulation-related fatality    Comments:  Prescribed Augmentin x 10 days on 7/10. ED on 7/11 for pinched   nerve.        INR History:      Latest Ref Rng & Units 4/13/2024     1:06 PM 4/15/2024     5:23 AM 5/8/2024     2:15 PM 5/22/2024     2:15 PM 6/19/2024     2:15 PM 7/11/2024     5:21 PM 7/17/2024     2:15 PM   Anticoagulation Monitoring   INR    1.8 2.1 2.0  3.2   INR Date    5/8/2024 5/22/2024 6/19/2024 7/17/2024   INR Goal    2.0-3.0 2.0-3.0 2.0-3.0  2.0-3.0   Trend    Same  Same Same  Same   Last Week Total    12 mg 14 mg 12 mg  10 mg   Next Week Total    13 mg 12 mg 12 mg  11 mg   Sun    2 mg 2 mg 2 mg  2 mg   Mon    1 mg 1 mg 1 mg  1 mg   Tue    2 mg 2 mg 2 mg  2 mg   Wed    3 mg (5/8); Otherwise 2 mg 2 mg 2 mg  1 mg (7/17); Otherwise 2 mg   Thu    1 mg 1 mg 1 mg  1 mg   Fri    2 mg 2 mg 2 mg  2 mg   Sat    2 mg 2 mg 2 mg  2 mg   Historical INR 0.90 - 1.10 2.05  1.74     3.59         Plan:  1. INR is Supratherapeutic today- see above in Anticoagulation Summary.  Will instruct Rodolfo Grover to Change their warfarin regimen- see above in Anticoagulation Summary.  2. Follow up in  2 weeks  3. Patient declines warfarin refills.  4. Verbal and written information provided. Patient expresses understanding and has no further questions at this time.    Luci Hale [facility]

## 2024-07-30 RX ORDER — ATORVASTATIN CALCIUM 10 MG/1
10 TABLET, FILM COATED ORAL DAILY
Qty: 90 TABLET | Refills: 0 | Status: SHIPPED | OUTPATIENT
Start: 2024-07-30

## 2024-07-31 ENCOUNTER — ANTICOAGULATION VISIT (OUTPATIENT)
Dept: PHARMACY | Facility: HOSPITAL | Age: 79
End: 2024-07-31
Payer: MEDICARE

## 2024-07-31 DIAGNOSIS — I48.0 PAF (PAROXYSMAL ATRIAL FIBRILLATION): Primary | ICD-10-CM

## 2024-07-31 LAB
INR PPP: 2.7 (ref 0.91–1.09)
PROTHROMBIN TIME: 32.2 SECONDS (ref 10–13.8)

## 2024-07-31 PROCEDURE — 36416 COLLJ CAPILLARY BLOOD SPEC: CPT

## 2024-07-31 PROCEDURE — G0463 HOSPITAL OUTPT CLINIC VISIT: HCPCS

## 2024-07-31 PROCEDURE — 85610 PROTHROMBIN TIME: CPT

## 2024-07-31 NOTE — PROGRESS NOTES
Anticoagulation Clinic Progress Note    Anticoagulation Summary  As of 2024      INR goal:  2.0-3.0   TTR:  68.0% (3.2 y)   INR used for dosin.7 (2024)   Warfarin maintenance plan:  1 mg every Mon, Thu; 2 mg all other days   Weekly warfarin total:  12 mg   No change documented:  Jolynn De Anda, Pharmacy Technician   Plan last modified:  Eduar Weeks, PharmD (2024)   Next INR check:  2024   Priority:  High   Target end date:      Indications    PAF (paroxysmal atrial fibrillation) [I48.0]                 Anticoagulation Episode Summary       INR check location:      Preferred lab:      Send INR reminders to:   [facility]    Comments:            Anticoagulation Care Providers       Provider Role Specialty Phone number    Jimena Singer MD Referring Cardiology [phone]            Clinic Interview:  Patient Findings     Negatives:  Signs/symptoms of thrombosis, Signs/symptoms of bleeding,   Laboratory test error suspected, Change in health, Change in alcohol use,   Change in activity, Upcoming invasive procedure, Emergency department   visit, Upcoming dental procedure, Missed doses, Extra doses, Change in   medications, Change in diet/appetite, Hospital admission, Bruising, Other   complaints      Clinical Outcomes     Negatives:  Major bleeding event, Thromboembolic event,   Anticoagulation-related hospital admission, Anticoagulation-related ED   visit, Anticoagulation-related fatality        INR History:      Latest Ref Rng & Units 4/15/2024     5:23 AM 2024     2:15 PM 2024     2:15 PM 2024     2:15 PM 2024     5:21 PM 2024     2:15 PM 2024     2:15 PM   Anticoagulation Monitoring   INR   1.8 2.1 2.0  3.2 2.7   INR Date   2024   INR Goal   2.0-3.0 2.0-3.0 2.0-3.0  2.0-3.0 2.0-3.0   Trend   Same Same Same  Same Same   Last Week Total   12 mg 14 mg 12 mg  10 mg 12 mg   Next Week Total   13 mg 12 mg 12  mg  11 mg 12 mg   Sun   2 mg 2 mg 2 mg  2 mg 2 mg   Mon   1 mg 1 mg 1 mg  1 mg 1 mg   Tue   2 mg 2 mg 2 mg  2 mg 2 mg   Wed   3 mg (5/8); Otherwise 2 mg 2 mg 2 mg  1 mg (7/17); Otherwise 2 mg 2 mg   Thu   1 mg 1 mg 1 mg  1 mg 1 mg   Fri   2 mg 2 mg 2 mg  2 mg 2 mg   Sat   2 mg 2 mg 2 mg  2 mg 2 mg   Historical INR 0.90 - 1.10 1.74     3.59          Plan:  1. INR is therapeutic today- see above in Anticoagulation Summary.   Will instruct Rodolfo Grover to continue their warfarin regimen- see above in Anticoagulation Summary.  2. Follow up in 4 weeks.  3. Patient declines warfarin refills.  4. Verbal and written information provided. Patient expresses understanding and has no further questions at this time.    Jolynn De Anda, Pharmacy Technician

## 2024-08-13 RX ORDER — WARFARIN SODIUM 2 MG/1
TABLET ORAL
Qty: 80 TABLET | Refills: 1 | Status: SHIPPED | OUTPATIENT
Start: 2024-08-13

## 2024-08-13 NOTE — PROGRESS NOTES
Subjective     REASON FOR CONSULTATION:  anemia  Provide an opinion on any further workup or treatment                             REQUESTING PHYSICIAN:  Magnolia    RECORDS OBTAINED:  Records of the patients history including those obtained from the referring provider were reviewed and summarized in detail.    HISTORY OF PRESENT ILLNESS:  The patient is a 79 y.o. year old male who is here for an opinion about the above issue.    History of Present Illness   This is a pleasant 79-year-old man with several medical comorbidities including coronary artery disease, hyperlipidemia, hypertension, paroxysmal atrial fibrillation anticoagulated with Coumadin, history of mitral valve repair, CKD 3, obstructive sleep apnea, pulmonary hypertension referred from his primary care provider for assessment of anemia.    Reviewing the patient's lab data available, he has had a somewhat chronic fluctuating anemia typically macrocytic since at least 2018.  His hemoglobin typically runs in the 10-12 range although he did drop lower in February 2021 when he was admitted for coffee-ground emesis secondary to gastritis and an ST elevation MI.  His hemoglobin over the past 1 year has been fairly stable in the 11-12 range with microcytic indices.  His white blood cell count and differential is normal other than mild increase immature granulocytes.  The platelets are typically normal although today mildly low 136.  He had a recent B12/folate level drawn 6/16/2022 both of which were normal range.  Iron studies in January 2022 were normal with a ferritin 139 and iron saturation 24%.  Most recent serum creatinine on 5/25/2022 was 1.74.  He had a normal total protein 6.2 and bilirubin less than 0.2.    The patient has only required transfusion support during previous GI bleed secondary to esophagitis.     The patient returned today for follow-up and lab review.  He continues oral iron every other day.  He reports no bright red blood per rectum or  melanotic stool.  He denies significant lightheadedness dizziness shortness of breath.  No new complaint brought forth today.    Past Medical History:   Diagnosis Date    Acute blood loss anemia     Arthritis     Atrial fibrillation     Bilateral lower extremity edema     Bradycardia following surgery     CAD (coronary artery disease)     Chest discomfort     Colon polyp     Elevated troponin     Focal motor seizure     Generalized tonic-clonic seizure     GERD (gastroesophageal reflux disease)     Hypertension     Iritis of right eye     Ischemic cardiomyopathy     Mitral valve insufficiency     Myocardial infarction     Peptic ulceration     Post-ictal state     Pulmonary hypertension     PVC (premature ventricular contraction)     Rheumatic fever     Rheumatic fever     as child    S/P CABG x 7     S/P mitral valve repair     Severe obstructive sleep apnea     Shoulder fracture, right         Past Surgical History:   Procedure Laterality Date    CARDIAC CATHETERIZATION N/A 02/19/2021    Procedure: Coronary angiography;  Surgeon: Bry Nails MD;  Location: [facility] CATH INVASIVE LOCATION;  Service: Cardiovascular;  Laterality: N/A;    CARDIAC CATHETERIZATION N/A 02/19/2021    Procedure: Left heart cath;  Surgeon: Bry Nails MD;  Location: [facility] CATH INVASIVE LOCATION;  Service: Cardiovascular;  Laterality: N/A;    CARDIAC CATHETERIZATION N/A 02/19/2021    Procedure: Right Heart Cath;  Surgeon: Bry Nails MD;  Location: [facility] CATH INVASIVE LOCATION;  Service: Cardiovascular;  Laterality: N/A;    CARDIAC CATHETERIZATION N/A 02/19/2021    Procedure: Left ventriculography;  Surgeon: Bry Nails MD;  Location: [facility] CATH INVASIVE LOCATION;  Service: Cardiovascular;  Laterality: N/A;    CARDIAC ELECTROPHYSIOLOGY PROCEDURE N/A 04/15/2024    Procedure: Pacemaker DC new Medtronic;  Surgeon: Edgar Patton MD;  Location: [facility] CATH INVASIVE LOCATION;  Service:  Cardiovascular;  Laterality: N/A;    COLONOSCOPY N/A 12/07/2021    Procedure: COLONOSCOPY to cecum with cold biopsy polypectomy;  Surgeon: Edgar Allen MD;  Location: [facility] ENDOSCOPY;  Service: Gastroenterology;  Laterality: N/A;  IRON DEFICIENCY ANEMIA, H/O COLON POLYPS  --   DIVERTICULOSIS, polyp, hemorrhoids    CORONARY ARTERY BYPASS GRAFT N/A 02/22/2021    Procedure: BK, MIDLINE STERNOTOMY, CORONARY ARTERY BYPASS X 7 UTILIZING THE LEFT INTERNAL MAMMARY ARTERY AND THE RIGHT SAPHENOUS VEIN, MITRAL VALVE REPAIR, PRP;  Surgeon: Jr Shyam Echavarria MD;  Location: [facility] MAIN OR;  Service: Cardiothoracic;  Laterality: N/A;    ENDOSCOPY N/A 08/16/2018    Erosive gastritis, diverticulosis    ENDOSCOPY N/A 02/26/2021    Procedure: ESOPHAGOGASTRODUODENOSCOPY AT BEDSIDE WITH HOT SNARE POLYPECTOMY AND CLIP PLACEMENT X1;  Surgeon: Jono Laboy MD;  Location: [facility] ENDOSCOPY;  Service: Gastroenterology;  Laterality: N/A;  PRE-  GI BLEED  POST- GASTRITIS, ESOPHAGITIS, POLYP    ENDOSCOPY N/A 12/07/2021    Procedure: ESOPHAGOGASTRODUODENOSCOPY with COLD  BIOPSIES;  Surgeon: Edgar Allen MD;  Location: [facility] ENDOSCOPY;  Service: Gastroenterology;  Laterality: N/A;  IRON DEFICIENCY ANEMIA, H/O ULCERATIVE ESOPHAGITIS  --GASTRITIS, DUODENAL POLYP    HERNIA REPAIR      PACEMAKER IMPLANTATION  04/15/2024    SHOULDER CLOSED REDUCTION Right 03/16/2018    Procedure: RIGHT SHOULDER CLOSED REDUCTION;  Surgeon: Kj Garcia MD;  Location: [facility] MAIN OR;  Service: Orthopedics    TONSILLECTOMY      TOTAL SHOULDER ARTHROPLASTY W/ DISTAL CLAVICLE EXCISION Right 03/22/2018    Procedure: Reverse Total Shoulder Arthroplsty;  Surgeon: Kj Garcia MD;  Location: [facility] MAIN OR;  Service: Orthopedics    TRANSESOPHAGEAL ECHOCARDIOGRAM (BK) N/A 02/22/2021    Procedure: TRANSESOPHAGEAL ECHOCARDIOGRAM;  Surgeon: Jr Shyam Echavarria MD;  Location: [facility] MAIN OR;  Service: Cardiothoracic;  Laterality: N/A;        Current  Outpatient Medications on File Prior to Visit   Medication Sig Dispense Refill    acetaminophen (TYLENOL) 500 MG tablet Take 2 tablets by mouth 3 (Three) Times a Day. (Patient taking differently: Take 2 tablets by mouth 3 (Three) Times a Day As Needed.)      aspirin 81 MG EC tablet Take 1 tablet by mouth Daily. 90 tablet 1    atorvastatin (LIPITOR) 10 MG tablet Take 1 tablet by mouth once daily 90 tablet 0    Cholecalciferol 50 MCG (2000 UT) capsule Take 1 capsule by mouth Daily.      Ferrous Gluconate (IRON 27 PO) Take 27 mg by mouth 3 (Three) Times a Week.      fluticasone (CUTIVATE) 0.05 % cream Apply 1 application  topically to the appropriate area as directed 2 (Two) Times a Day. 30 g 4    furosemide (LASIX) 40 MG tablet Take 1 tablet by mouth Daily. (Patient taking differently: Take 1 tablet by mouth Daily. As needed based on weight per patient) 90 tablet 2    metoprolol succinate XL (TOPROL-XL) 25 MG 24 hr tablet Take 1 tablet by mouth Daily. 90 tablet 3    nitroglycerin (NITROSTAT) 0.4 MG SL tablet 1 under the tongue as needed for angina, may repeat q5mins for up three doses 25 tablet 1    Omeprazole Magnesium (PRILOSEC OTC PO) Take 20 mg by mouth Daily.      potassium chloride 10 MEQ CR tablet Take 1 tablet by mouth Daily. 90 tablet 2    Psyllium (METAMUCIL FIBER PO) Take  by mouth As Needed.      sucralfate (CARAFATE) 1 g tablet As Needed.      warfarin (COUMADIN) 2 MG tablet Take one-half tablet (1 mg) by mouth Monday and Thursday, and take one tablet (2 mg) all other days or as directed. 80 tablet 1    [DISCONTINUED] benzonatate (TESSALON) 200 MG capsule Take 1 capsule by mouth 3 (Three) Times a Day As Needed for Cough. 30 capsule 0     No current facility-administered medications on file prior to visit.        ALLERGIES:  No Known Allergies     Social History     Socioeconomic History    Marital status:    Tobacco Use    Smoking status: Never     Passive exposure: Never    Smokeless tobacco:  "Never   Vaping Use    Vaping status: Never Used   Substance and Sexual Activity    Alcohol use: Yes     Comment: occasional    Drug use: Never    Sexual activity: Defer        Family History   Problem Relation Age of Onset    Stroke Mother     Cancer Father 56        bladder    Heart attack Father     Malig Hyperthermia Neg Hx         Review of Systems   Constitutional: Negative.    HENT: Negative.     Eyes: Negative.    Respiratory: Negative.     Cardiovascular: Negative.    Gastrointestinal: Negative.    Musculoskeletal: Negative.    Allergic/Immunologic: Negative.    Neurological:  Negative for dizziness.   Hematological: Negative.    Psychiatric/Behavioral: Negative.     23    Objective     Vitals:    08/16/24 1325   BP: 136/72   Pulse: 75   Resp: 16   Temp: 98.6 °F (37 °C)   TempSrc: Oral   SpO2: 99%   Weight: 81.2 kg (179 lb)   Height: 170.2 cm (67.01\")   PainSc: 0-No pain           8/16/2024     1:27 PM   Current Status   ECOG score 0       Physical Exam    CONSTITUTIONAL: pleasant well-developed adult man  CV: RRR, S1S2, no murmur  RESP: cta bilat, no wheezing, no rales  GI: soft, non-tender, no splenomegaly, +bs  MUSC: no edema, normal gait  NEURO: alert and oriented x3, normal strength  PSYCH: normal mood and affect  Exam is unchanged-8/16/2024  RECENT LABS:  Hematology WBC   Date Value Ref Range Status   08/16/2024 7.58 3.40 - 10.80 10*3/mm3 Final   07/11/2024 9.67 3.40 - 10.80 10*3/mm3 Final     RBC   Date Value Ref Range Status   08/16/2024 3.85 (L) 4.14 - 5.80 10*6/mm3 Final   07/11/2024 3.99 (L) 4.14 - 5.80 10*6/mm3 Final     Hemoglobin   Date Value Ref Range Status   08/16/2024 12.6 (L) 13.0 - 17.7 g/dL Final     Hematocrit   Date Value Ref Range Status   08/16/2024 39.4 37.5 - 51.0 % Final     Platelets   Date Value Ref Range Status   08/16/2024 176 140 - 450 10*3/mm3 Final        Lab Results   Component Value Date    GLUCOSE 122 (H) 07/11/2024    BUN 21 07/11/2024    CREATININE 1.60 (H) 07/11/2024 "    EGFRIFNONA 40 (L) 01/24/2022    EGFRIFAFRI 41 (L) 10/21/2021    BCR 13.1 07/11/2024    K 4.1 07/11/2024    CO2 24.7 07/11/2024    CALCIUM 8.5 (L) 07/11/2024    PROTENTOTREF 6.4 07/11/2024    ALBUMIN 3.4 (L) 07/11/2024    LABIL2 1.4 07/11/2024    AST 39 07/11/2024    ALT 56 (H) 07/11/2024         Assessment & Plan     *Chronic macrocytic anemia with hemoglobin currently stable 12.8   B12/folate previously normal  currently taking oral iron every other day  Negative SPEP/SANIA and hemolysis labs June 2022    *Mild thrombocytopenia-platelets normal today 176  Platelet count adequate for anticoagulation  Previous IPF normal June 2022    *CKD 3B- followed by nephrology    *Atrial fibrillation, paroxysmal on chronic anticoagulation with Coumadin    Hematology plan/recommendations:  The patient's anemia is probably secondary to chronic kidney disease with decreased erythropoietin production although usually this is a microcytic anemia  Continue oral iron every other day pending today's ferritin iron profile  12-month follow-up CBC ferritin iron profile

## 2024-08-16 ENCOUNTER — LAB (OUTPATIENT)
Dept: LAB | Facility: HOSPITAL | Age: 79
End: 2024-08-16
Payer: MEDICARE

## 2024-08-16 ENCOUNTER — OFFICE VISIT (OUTPATIENT)
Dept: ONCOLOGY | Facility: CLINIC | Age: 79
End: 2024-08-16
Payer: MEDICARE

## 2024-08-16 VITALS
DIASTOLIC BLOOD PRESSURE: 72 MMHG | BODY MASS INDEX: 28.09 KG/M2 | RESPIRATION RATE: 16 BRPM | TEMPERATURE: 98.6 F | OXYGEN SATURATION: 99 % | HEART RATE: 75 BPM | HEIGHT: 67 IN | WEIGHT: 179 LBS | SYSTOLIC BLOOD PRESSURE: 136 MMHG

## 2024-08-16 DIAGNOSIS — D50.0 IRON DEFICIENCY ANEMIA DUE TO CHRONIC BLOOD LOSS: Primary | ICD-10-CM

## 2024-08-16 DIAGNOSIS — D53.9 MACROCYTIC ANEMIA: ICD-10-CM

## 2024-08-16 LAB
BASOPHILS # BLD AUTO: 0.06 10*3/MM3 (ref 0–0.2)
BASOPHILS NFR BLD AUTO: 0.8 % (ref 0–1.5)
DEPRECATED RDW RBC AUTO: 50.8 FL (ref 37–54)
EOSINOPHIL # BLD AUTO: 0.48 10*3/MM3 (ref 0–0.4)
EOSINOPHIL NFR BLD AUTO: 6.3 % (ref 0.3–6.2)
ERYTHROCYTE [DISTWIDTH] IN BLOOD BY AUTOMATED COUNT: 13.4 % (ref 12.3–15.4)
FERRITIN SERPL-MCNC: 166 NG/ML (ref 30–400)
HCT VFR BLD AUTO: 39.4 % (ref 37.5–51)
HGB BLD-MCNC: 12.6 G/DL (ref 13–17.7)
IMM GRANULOCYTES # BLD AUTO: 0.07 10*3/MM3 (ref 0–0.05)
IMM GRANULOCYTES NFR BLD AUTO: 0.9 % (ref 0–0.5)
IRON 24H UR-MRATE: 113 MCG/DL (ref 59–158)
IRON SATN MFR SERPL: 35 % (ref 20–50)
LYMPHOCYTES # BLD AUTO: 1.58 10*3/MM3 (ref 0.7–3.1)
LYMPHOCYTES NFR BLD AUTO: 20.8 % (ref 19.6–45.3)
MCH RBC QN AUTO: 32.7 PG (ref 26.6–33)
MCHC RBC AUTO-ENTMCNC: 32 G/DL (ref 31.5–35.7)
MCV RBC AUTO: 102.3 FL (ref 79–97)
MONOCYTES # BLD AUTO: 0.72 10*3/MM3 (ref 0.1–0.9)
MONOCYTES NFR BLD AUTO: 9.5 % (ref 5–12)
NEUTROPHILS NFR BLD AUTO: 4.67 10*3/MM3 (ref 1.7–7)
NEUTROPHILS NFR BLD AUTO: 61.7 % (ref 42.7–76)
NRBC BLD AUTO-RTO: 0 /100 WBC (ref 0–0.2)
PLATELET # BLD AUTO: 176 10*3/MM3 (ref 140–450)
PMV BLD AUTO: 9.7 FL (ref 6–12)
RBC # BLD AUTO: 3.85 10*6/MM3 (ref 4.14–5.8)
TIBC SERPL-MCNC: 322 MCG/DL (ref 298–536)
TRANSFERRIN SERPL-MCNC: 216 MG/DL (ref 200–360)
VIT B12 BLD-MCNC: 648 PG/ML (ref 211–946)
WBC NRBC COR # BLD AUTO: 7.58 10*3/MM3 (ref 3.4–10.8)

## 2024-08-16 PROCEDURE — 1126F AMNT PAIN NOTED NONE PRSNT: CPT | Performed by: INTERNAL MEDICINE

## 2024-08-16 PROCEDURE — 99213 OFFICE O/P EST LOW 20 MIN: CPT | Performed by: INTERNAL MEDICINE

## 2024-08-16 PROCEDURE — 82728 ASSAY OF FERRITIN: CPT

## 2024-08-16 PROCEDURE — 83540 ASSAY OF IRON: CPT

## 2024-08-16 PROCEDURE — 36415 COLL VENOUS BLD VENIPUNCTURE: CPT

## 2024-08-16 PROCEDURE — 3078F DIAST BP <80 MM HG: CPT | Performed by: INTERNAL MEDICINE

## 2024-08-16 PROCEDURE — 84466 ASSAY OF TRANSFERRIN: CPT

## 2024-08-16 PROCEDURE — 82607 VITAMIN B-12: CPT | Performed by: NURSE PRACTITIONER

## 2024-08-16 PROCEDURE — 3075F SYST BP GE 130 - 139MM HG: CPT | Performed by: INTERNAL MEDICINE

## 2024-08-16 PROCEDURE — 85025 COMPLETE CBC W/AUTO DIFF WBC: CPT

## 2024-08-28 ENCOUNTER — ANTICOAGULATION VISIT (OUTPATIENT)
Dept: PHARMACY | Facility: HOSPITAL | Age: 79
End: 2024-08-28
Payer: MEDICARE

## 2024-08-28 DIAGNOSIS — I48.0 PAF (PAROXYSMAL ATRIAL FIBRILLATION): Primary | ICD-10-CM

## 2024-08-28 LAB
INR PPP: 3.1 (ref 0.91–1.09)
PROTHROMBIN TIME: 37.1 SECONDS (ref 10–13.8)

## 2024-08-28 PROCEDURE — 85610 PROTHROMBIN TIME: CPT

## 2024-08-28 PROCEDURE — G0463 HOSPITAL OUTPT CLINIC VISIT: HCPCS

## 2024-08-28 PROCEDURE — 36416 COLLJ CAPILLARY BLOOD SPEC: CPT

## 2024-08-28 NOTE — PROGRESS NOTES
Anticoagulation Clinic Progress Note    Anticoagulation Summary  As of 8/28/2024      INR goal:  2.0-3.0   TTR:  68.1% (3.3 y)   INR used for dosing:  3.1 (8/28/2024)   Warfarin maintenance plan:  1 mg every Mon, Thu; 2 mg all other days   Weekly warfarin total:  12 mg   No change documented:  Jolynn De Anda, Pharmacy Technician   Plan last modified:  Eduar Weeks, PharmD (6/19/2024)   Next INR check:  9/25/2024   Priority:  High   Target end date:      Indications    PAF (paroxysmal atrial fibrillation) [I48.0]                 Anticoagulation Episode Summary       INR check location:      Preferred lab:      Send INR reminders to:   [facility]    Comments:            Anticoagulation Care Providers       Provider Role Specialty Phone number    Jimena Singer MD Referring Cardiology [phone]            Clinic Interview:  Patient Findings     Positives:  Other complaints    Negatives:  Signs/symptoms of thrombosis, Signs/symptoms of bleeding,   Laboratory test error suspected, Change in health, Change in alcohol use,   Change in activity, Upcoming invasive procedure, Emergency department   visit, Upcoming dental procedure, Missed doses, Extra doses, Change in   medications, Change in diet/appetite, Hospital admission, Bruising    Comments:  Had a migraine on Monday and took Excedrin      Clinical Outcomes     Negatives:  Major bleeding event, Thromboembolic event,   Anticoagulation-related hospital admission, Anticoagulation-related ED   visit, Anticoagulation-related fatality        INR History:      Latest Ref Rng & Units 5/8/2024     2:15 PM 5/22/2024     2:15 PM 6/19/2024     2:15 PM 7/11/2024     5:21 PM 7/17/2024     2:15 PM 7/31/2024     2:15 PM 8/28/2024     2:15 PM   Anticoagulation Monitoring   INR  1.8 2.1 2.0  3.2 2.7 3.1   INR Date  5/8/2024 5/22/2024 6/19/2024 7/17/2024 7/31/2024 8/28/2024   INR Goal  2.0-3.0 2.0-3.0 2.0-3.0  2.0-3.0 2.0-3.0 2.0-3.0   Trend  Same Same Same  Same Same  Same   Last Week Total  12 mg 14 mg 12 mg  10 mg 12 mg 12 mg   Next Week Total  13 mg 12 mg 12 mg  11 mg 12 mg 12 mg   Sun  2 mg 2 mg 2 mg  2 mg 2 mg 2 mg   Mon  1 mg 1 mg 1 mg  1 mg 1 mg 1 mg   Tue  2 mg 2 mg 2 mg  2 mg 2 mg 2 mg   Wed  3 mg (5/8); Otherwise 2 mg 2 mg 2 mg  1 mg (7/17); Otherwise 2 mg 2 mg 2 mg   Thu  1 mg 1 mg 1 mg  1 mg 1 mg 1 mg   Fri  2 mg 2 mg 2 mg  2 mg 2 mg 2 mg   Sat  2 mg 2 mg 2 mg  2 mg 2 mg 2 mg   Historical INR 0.90 - 1.10    3.59           Plan:  1. INR is out of range- see above in Anticoagulation Summary.   Will instruct Rodolfo SALVADOR Grover to Continue  their warfarin regimen- see above in Anticoagulation Summary.  2. Follow up in 4 weeks.   3. Patient declines warfarin refills.  4. Verbal and written information provided. Patient expresses understanding and has no further questions at this time.    Jolynn De Anda, Pharmacy Technician

## 2024-08-28 NOTE — PROGRESS NOTES
I have supervised and reviewed the notes, assessments, and/or procedures performed. I personally performed the assessment and implemented the plan. I concur with the documentation of this patient encounter.    Luci Hale, [facility]

## 2024-09-24 ENCOUNTER — CLINICAL SUPPORT NO REQUIREMENTS (OUTPATIENT)
Age: 79
End: 2024-09-24
Payer: MEDICARE

## 2024-09-24 ENCOUNTER — OFFICE VISIT (OUTPATIENT)
Age: 79
End: 2024-09-24
Payer: MEDICARE

## 2024-09-24 VITALS
BODY MASS INDEX: 28.88 KG/M2 | WEIGHT: 184 LBS | SYSTOLIC BLOOD PRESSURE: 118 MMHG | HEART RATE: 76 BPM | DIASTOLIC BLOOD PRESSURE: 62 MMHG | HEIGHT: 67 IN

## 2024-09-24 DIAGNOSIS — I44.2 AV BLOCK, COMPLETE: Primary | ICD-10-CM

## 2024-09-24 DIAGNOSIS — I44.1 WENCKEBACH SECOND DEGREE AV BLOCK: ICD-10-CM

## 2024-09-24 DIAGNOSIS — I42.8 NON-ISCHEMIC CARDIOMYOPATHY: Primary | ICD-10-CM

## 2024-09-24 PROCEDURE — 3078F DIAST BP <80 MM HG: CPT | Performed by: INTERNAL MEDICINE

## 2024-09-24 PROCEDURE — 3074F SYST BP LT 130 MM HG: CPT | Performed by: INTERNAL MEDICINE

## 2024-09-24 PROCEDURE — 99214 OFFICE O/P EST MOD 30 MIN: CPT | Performed by: INTERNAL MEDICINE

## 2024-09-24 NOTE — PROGRESS NOTES
Date of Office Visit: 2024  Encounter Provider: Edgar Patton MD  Place of Service: [facility] CARDIOLOGY  Patient Name: Rodolfo Grover  :1945    Chief Complaint   Patient presents with    AV block, complete    Pacemaker Check   :     HPI: Rodolfo Grover is a 79 y.o. male who presents today for follow-up of complete heart block and persistent atrial fibrillation.     He was found to have intermittent heart block on monitor.      He had successful placement of dual chamber pacemaker with CSP.      He has done well and has no complaints today.      Device interrogation is normal    He has had some outflow tract PVCs noted, but no symptoms.               Past Medical History:   Diagnosis Date    Acute blood loss anemia     Arthritis     Atrial fibrillation     Bilateral lower extremity edema     Bradycardia following surgery     CAD (coronary artery disease)     Chest discomfort     Colon polyp     Elevated troponin     Focal motor seizure     Generalized tonic-clonic seizure     GERD (gastroesophageal reflux disease)     Hypertension     Iritis of right eye     Ischemic cardiomyopathy     Mitral valve insufficiency     Myocardial infarction     Peptic ulceration     Post-ictal state     Pulmonary hypertension     PVC (premature ventricular contraction)     Rheumatic fever     Rheumatic fever     as child    S/P CABG x 7     S/P mitral valve repair     Severe obstructive sleep apnea     Shoulder fracture, right        Past Surgical History:   Procedure Laterality Date    CARDIAC CATHETERIZATION N/A 2021    Procedure: Coronary angiography;  Surgeon: Bry Nails MD;  Location:  [facility] CATH INVASIVE LOCATION;  Service: Cardiovascular;  Laterality: N/A;    CARDIAC CATHETERIZATION N/A 2021    Procedure: Left heart cath;  Surgeon: Bry Nails MD;  Location:  [facility] CATH INVASIVE LOCATION;  Service: Cardiovascular;  Laterality: N/A;    CARDIAC  CATHETERIZATION N/A 02/19/2021    Procedure: Right Heart Cath;  Surgeon: Bry Nails MD;  Location: [facility] CATH INVASIVE LOCATION;  Service: Cardiovascular;  Laterality: N/A;    CARDIAC CATHETERIZATION N/A 02/19/2021    Procedure: Left ventriculography;  Surgeon: Bry Nails MD;  Location: [facility] CATH INVASIVE LOCATION;  Service: Cardiovascular;  Laterality: N/A;    CARDIAC ELECTROPHYSIOLOGY PROCEDURE N/A 04/15/2024    Procedure: Pacemaker DC new [employer]tr[employer];  Surgeon: Edgar Patton MD;  Location: [facility] CATH INVASIVE LOCATION;  Service: Cardiovascular;  Laterality: N/A;    COLONOSCOPY N/A 12/07/2021    Procedure: COLONOSCOPY to cecum with cold biopsy polypectomy;  Surgeon: Edgar Allen MD;  Location: [facility] ENDOSCOPY;  Service: Gastroenterology;  Laterality: N/A;  IRON DEFICIENCY ANEMIA, H/O COLON POLYPS  --   DIVERTICULOSIS, polyp, hemorrhoids    CORONARY ARTERY BYPASS GRAFT N/A 02/22/2021    Procedure: BK, MIDLINE STERNOTOMY, CORONARY ARTERY BYPASS X 7 UTILIZING THE LEFT INTERNAL MAMMARY ARTERY AND THE RIGHT SAPHENOUS VEIN, MITRAL VALVE REPAIR, PRP;  Surgeon: Jr Shyam Echavarria MD;  Location: [facility] MAIN OR;  Service: Cardiothoracic;  Laterality: N/A;    ENDOSCOPY N/A 08/16/2018    Erosive gastritis, diverticulosis    ENDOSCOPY N/A 02/26/2021    Procedure: ESOPHAGOGASTRODUODENOSCOPY AT BEDSIDE WITH HOT SNARE POLYPECTOMY AND CLIP PLACEMENT X1;  Surgeon: Jono Laboy MD;  Location: [facility] ENDOSCOPY;  Service: Gastroenterology;  Laterality: N/A;  PRE-  GI BLEED  POST- GASTRITIS, ESOPHAGITIS, POLYP    ENDOSCOPY N/A 12/07/2021    Procedure: ESOPHAGOGASTRODUODENOSCOPY with COLD  BIOPSIES;  Surgeon: Edgar Allen MD;  Location: [facility] ENDOSCOPY;  Service: Gastroenterology;  Laterality: N/A;  IRON DEFICIENCY ANEMIA, H/O ULCERATIVE ESOPHAGITIS  --GASTRITIS, DUODENAL POLYP    HERNIA REPAIR      PACEMAKER IMPLANTATION  04/15/2024    SHOULDER CLOSED REDUCTION Right 03/16/2018     Procedure: RIGHT SHOULDER CLOSED REDUCTION;  Surgeon: Kj Garcia MD;  Location: [facility] OR;  Service: Orthopedics    TONSILLECTOMY      TOTAL SHOULDER ARTHROPLASTY W/ DISTAL CLAVICLE EXCISION Right 03/22/2018    Procedure: Reverse Total Shoulder Arthroplsty;  Surgeon: Kj Garcia MD;  Location: [facility] OR;  Service: Orthopedics    TRANSESOPHAGEAL ECHOCARDIOGRAM (BK) N/A 02/22/2021    Procedure: TRANSESOPHAGEAL ECHOCARDIOGRAM;  Surgeon: Jr Shyam Echavarria MD;  Location: [facility] OR;  Service: Cardiothoracic;  Laterality: N/A;       Social History     Socioeconomic History    Marital status:    Tobacco Use    Smoking status: Never     Passive exposure: Never    Smokeless tobacco: Never   Vaping Use    Vaping status: Never Used   Substance and Sexual Activity    Alcohol use: Yes     Comment: occasional    Drug use: Never    Sexual activity: Defer       Family History   Problem Relation Age of Onset    Stroke Mother     Cancer Father 56        bladder    Heart attack Father     Malig Hyperthermia Neg Hx        Review of Systems   Constitutional: Negative.   Cardiovascular: Negative.    Respiratory: Negative.     Gastrointestinal: Negative.        No Known Allergies      Current Outpatient Medications:     acetaminophen (TYLENOL) 500 MG tablet, Take 2 tablets by mouth 3 (Three) Times a Day. (Patient taking differently: Take 2 tablets by mouth 3 (Three) Times a Day As Needed.), Disp: , Rfl:     aspirin 81 MG EC tablet, Take 1 tablet by mouth Daily., Disp: 90 tablet, Rfl: 1    atorvastatin (LIPITOR) 10 MG tablet, Take 1 tablet by mouth once daily, Disp: 90 tablet, Rfl: 0    Cholecalciferol 50 MCG (2000 UT) capsule, Take 1 capsule by mouth Daily., Disp: , Rfl:     Ferrous Gluconate (IRON 27 PO), Take 27 mg by mouth 3 (Three) Times a Week., Disp: , Rfl:     fluticasone (CUTIVATE) 0.05 % cream, Apply 1 application  topically to the appropriate area as directed 2 (Two) Times a Day., Disp: 30 g,  "Rfl: 4    furosemide (LASIX) 40 MG tablet, Take 1 tablet by mouth Daily. (Patient taking differently: Take 1 tablet by mouth Daily. As needed based on weight per patient), Disp: 90 tablet, Rfl: 2    metoprolol succinate XL (TOPROL-XL) 25 MG 24 hr tablet, Take 1 tablet by mouth Daily., Disp: 90 tablet, Rfl: 3    nitroglycerin (NITROSTAT) 0.4 MG SL tablet, 1 under the tongue as needed for angina, may repeat q5mins for up three doses, Disp: 25 tablet, Rfl: 1    Omeprazole Magnesium (PRILOSEC OTC PO), Take 20 mg by mouth Daily., Disp: , Rfl:     potassium chloride 10 MEQ CR tablet, Take 1 tablet by mouth Daily., Disp: 90 tablet, Rfl: 2    Psyllium (METAMUCIL FIBER PO), Take  by mouth As Needed., Disp: , Rfl:     sucralfate (CARAFATE) 1 g tablet, As Needed., Disp: , Rfl:     warfarin (COUMADIN) 2 MG tablet, Take one-half tablet (1 mg) by mouth Monday and Thursday, and take one tablet (2 mg) all other days or as directed., Disp: 80 tablet, Rfl: 1      Objective:     Vitals:    09/24/24 1030   BP: 118/62   Pulse: 76   Weight: 83.5 kg (184 lb)   Height: 170.2 cm (67.01\")     Body mass index is 28.81 kg/m².    PHYSICAL EXAM:    Vitals and nursing note reviewed.   Constitutional:       Appearance: Well-developed.   Eyes:      General:         Right eye: No discharge.         Left eye: No discharge.      Conjunctiva/sclera: Conjunctivae normal.   HENT:      Head: Normocephalic and atraumatic.   Neck:      Vascular: No JVD.   Pulmonary:      Effort: No respiratory distress.   Chest:      Comments: Pacemaker on left chest, clean dry intact  Cardiovascular:      Normal rate.   Edema:     Peripheral edema absent.   Abdominal:      General: There is no distension.      Tenderness: There is no abdominal tenderness.   Musculoskeletal: Normal range of motion.      Cervical back: Neck supple. Neurological:      Cranial Nerves: No cranial nerve deficit.             ECG 12 Lead    Date/Time: 10/2/2024 11:01 PM  Performed by: Pooja" Edgar Ramsey MD    Authorized by: Edgar Patton MD  Comparison: compared with previous ECG   Rhythm: sinus rhythm            Assessment:       Diagnosis Plan   1. Non-ischemic cardiomyopathy        2. Wenckebach second degree AV block               Plan:       He has no symptoms of complete heart block or heart failure.     His pacing looks great.     He will follow-up in one year.     As always, it has been a pleasure to participate in your patient's care.      Sincerely,         Edgar Patton MD

## 2024-09-25 ENCOUNTER — ANTICOAGULATION VISIT (OUTPATIENT)
Dept: PHARMACY | Facility: HOSPITAL | Age: 79
End: 2024-09-25
Payer: MEDICARE

## 2024-09-25 DIAGNOSIS — I48.0 PAF (PAROXYSMAL ATRIAL FIBRILLATION): Primary | ICD-10-CM

## 2024-09-25 LAB
INR PPP: 2.4 (ref 0.91–1.09)
PROTHROMBIN TIME: 28.8 SECONDS (ref 10–13.8)

## 2024-09-25 PROCEDURE — 36416 COLLJ CAPILLARY BLOOD SPEC: CPT

## 2024-09-25 PROCEDURE — G0463 HOSPITAL OUTPT CLINIC VISIT: HCPCS

## 2024-09-25 PROCEDURE — 85610 PROTHROMBIN TIME: CPT

## 2024-10-02 PROCEDURE — 93000 ELECTROCARDIOGRAM COMPLETE: CPT | Performed by: INTERNAL MEDICINE

## 2024-10-14 DIAGNOSIS — I50.32 CHRONIC DIASTOLIC HEART FAILURE: ICD-10-CM

## 2024-10-14 RX ORDER — FUROSEMIDE 40 MG
40 TABLET ORAL DAILY
Qty: 90 TABLET | Refills: 0 | Status: SHIPPED | OUTPATIENT
Start: 2024-10-14

## 2024-10-14 RX ORDER — POTASSIUM CHLORIDE 750 MG/1
10 TABLET, EXTENDED RELEASE ORAL DAILY
Qty: 90 TABLET | Refills: 0 | Status: SHIPPED | OUTPATIENT
Start: 2024-10-14

## 2024-10-23 ENCOUNTER — ANTICOAGULATION VISIT (OUTPATIENT)
Dept: PHARMACY | Facility: HOSPITAL | Age: 79
End: 2024-10-23
Payer: MEDICARE

## 2024-10-23 DIAGNOSIS — I48.0 PAF (PAROXYSMAL ATRIAL FIBRILLATION): Primary | ICD-10-CM

## 2024-10-23 LAB
INR PPP: 2.2 (ref 0.91–1.09)
PROTHROMBIN TIME: 26.2 SECONDS (ref 10–13.8)

## 2024-10-23 PROCEDURE — 85610 PROTHROMBIN TIME: CPT

## 2024-10-23 PROCEDURE — 36416 COLLJ CAPILLARY BLOOD SPEC: CPT

## 2024-10-23 PROCEDURE — G0463 HOSPITAL OUTPT CLINIC VISIT: HCPCS

## 2024-10-23 NOTE — PROGRESS NOTES
Anticoagulation Clinic Progress Note    Anticoagulation Summary  As of 10/23/2024      INR goal:  2.0-3.0   TTR:  69.2% (3.5 y)   INR used for dosin.2 (10/23/2024)   Warfarin maintenance plan:  1 mg every Mon, Thu; 2 mg all other days   Weekly warfarin total:  12 mg   No change documented:  Eduar Weeks, Meg   Plan last modified:  Eduar Weeks PharmD (2024)   Next INR check:  2024   Priority:  High   Target end date:  --    Indications    PAF (paroxysmal atrial fibrillation) [I48.0]                 Anticoagulation Episode Summary       INR check location:  --    Preferred lab:  --    Send INR reminders to:   [facility] [place]    Comments:  --          Anticoagulation Care Providers       Provider Role Specialty Phone number    Jimena Singer MD Referring Cardiology [phone]            Clinic Interview:  Patient Findings     Negatives:  Signs/symptoms of thrombosis, Signs/symptoms of bleeding,   Laboratory test error suspected, Change in health, Change in alcohol use,   Change in activity, Upcoming invasive procedure, Emergency department   visit, Upcoming dental procedure, Missed doses, Extra doses, Change in   medications, Change in diet/appetite, Hospital admission, Bruising, Other   complaints      Clinical Outcomes     Negatives:  Major bleeding event, Thromboembolic event,   Anticoagulation-related hospital admission, Anticoagulation-related ED   visit, Anticoagulation-related fatality        INR History:      Latest Ref Rng & Units 2024     2:15 PM 2024     5:21 PM 2024     2:15 PM 2024     2:15 PM 2024     2:15 PM 2024     2:00 PM 10/23/2024     2:15 PM   Anticoagulation Monitoring   INR  2.0  3.2 2.7 3.1 2.4 2.2   INR Date  2024  2024 2024 2024 2024 10/23/2024   INR Goal  2.0-3.0  2.0-3.0 2.0-3.0 2.0-3.0 2.0-3.0 2.0-3.0   Trend  Same  Same Same Same Same Same   Last Week Total  12 mg  10 mg 12 mg 12 mg 12 mg 12 mg   Next  Week Total  12 mg  11 mg 12 mg 12 mg 12 mg 12 mg   Sun  2 mg  2 mg 2 mg 2 mg 2 mg 2 mg   Mon  1 mg  1 mg 1 mg 1 mg 1 mg 1 mg   Tue  2 mg  2 mg 2 mg 2 mg 2 mg 2 mg   Wed  2 mg  1 mg (7/17); Otherwise 2 mg 2 mg 2 mg 2 mg 2 mg   Thu  1 mg  1 mg 1 mg 1 mg 1 mg 1 mg   Fri  2 mg  2 mg 2 mg 2 mg 2 mg 2 mg   Sat  2 mg  2 mg 2 mg 2 mg 2 mg 2 mg   Historical INR 0.90 - 1.10  3.59             Plan:  1. INR is Therapeutic today- see above in Anticoagulation Summary.  Will instruct Rodolfo Grover to Continue their warfarin regimen- see above in Anticoagulation Summary.  2. Follow up in 4 weeks  3. Patient declines warfarin refills.  4. Verbal information provided; he declined After Visit Summary. Patient expresses understanding and has no further questions at this time.    Eduar Weeks, PharmD

## 2024-11-11 RX ORDER — ATORVASTATIN CALCIUM 10 MG/1
10 TABLET, FILM COATED ORAL DAILY
Qty: 30 TABLET | Refills: 0 | Status: SHIPPED | OUTPATIENT
Start: 2024-11-11

## 2024-11-20 ENCOUNTER — ANTICOAGULATION VISIT (OUTPATIENT)
Dept: PHARMACY | Facility: HOSPITAL | Age: 79
End: 2024-11-20
Payer: MEDICARE

## 2024-11-20 DIAGNOSIS — I48.0 PAF (PAROXYSMAL ATRIAL FIBRILLATION): Primary | ICD-10-CM

## 2024-11-20 LAB
INR PPP: 2.3 (ref 0.91–1.09)
PROTHROMBIN TIME: 27.9 SECONDS (ref 10–13.8)

## 2024-11-20 PROCEDURE — G0463 HOSPITAL OUTPT CLINIC VISIT: HCPCS

## 2024-11-20 PROCEDURE — 85610 PROTHROMBIN TIME: CPT

## 2024-11-20 PROCEDURE — 36416 COLLJ CAPILLARY BLOOD SPEC: CPT

## 2024-11-20 NOTE — PROGRESS NOTES
Anticoagulation Clinic Progress Note    Anticoagulation Summary  As of 2024      INR goal:  2.0-3.0   TTR:  69.9% (3.5 y)   INR used for dosin.3 (2024)   Warfarin maintenance plan:  1 mg every Mon, Thu; 2 mg all other days   Weekly warfarin total:  12 mg   No change documented:  Eduar Weeks, Meg   Plan last modified:  Eduar Weeks, PharmD (2024)   Next INR check:  2024   Priority:  High   Target end date:  --    Indications    PAF (paroxysmal atrial fibrillation) [I48.0]                 Anticoagulation Episode Summary       INR check location:  --    Preferred lab:  --    Send INR reminders to:   [facility] [place]    Comments:  --          Anticoagulation Care Providers       Provider Role Specialty Phone number    Jimena Singer MD Referring Cardiology [phone]            Clinic Interview:  Patient Findings     Negatives:  Signs/symptoms of thrombosis, Signs/symptoms of bleeding,   Laboratory test error suspected, Change in health, Change in alcohol use,   Change in activity, Upcoming invasive procedure, Emergency department   visit, Upcoming dental procedure, Missed doses, Extra doses, Change in   medications, Change in diet/appetite, Hospital admission, Bruising, Other   complaints      Clinical Outcomes     Negatives:  Major bleeding event, Thromboembolic event,   Anticoagulation-related hospital admission, Anticoagulation-related ED   visit, Anticoagulation-related fatality        INR History:      Latest Ref Rng & Units 2024     5:21 PM 2024     2:15 PM 2024     2:15 PM 2024     2:15 PM 2024     2:00 PM 10/23/2024     2:15 PM 2024     2:15 PM   Anticoagulation Monitoring   INR   3.2 2.7 3.1 2.4 2.2 2.3   INR Date   2024 2024 2024 2024 10/23/2024 2024   INR Goal   2.0-3.0 2.0-3.0 2.0-3.0 2.0-3.0 2.0-3.0 2.0-3.0   Trend   Same Same Same Same Same Same   Last Week Total   10 mg 12 mg 12 mg 12 mg 12 mg 12 mg    Next Week Total   11 mg 12 mg 12 mg 12 mg 12 mg 12 mg   Sun   2 mg 2 mg 2 mg 2 mg 2 mg 2 mg   Mon   1 mg 1 mg 1 mg 1 mg 1 mg 1 mg   Tue   2 mg 2 mg 2 mg 2 mg 2 mg 2 mg   Wed   1 mg (7/17); Otherwise 2 mg 2 mg 2 mg 2 mg 2 mg 2 mg   Thu   1 mg 1 mg 1 mg 1 mg 1 mg 1 mg   Fri   2 mg 2 mg 2 mg 2 mg 2 mg 2 mg   Sat   2 mg 2 mg 2 mg 2 mg 2 mg 2 mg   Historical INR 0.90 - 1.10 3.59              Plan:  1. INR is Therapeutic today- see above in Anticoagulation Summary.  Will instruct Rodolfo Grover to Continue their warfarin regimen- see above in Anticoagulation Summary.  2. Follow up in 4 weeks  3. Patient declines warfarin refills.  4. Verbal and written information provided. Patient expresses understanding and has no further questions at this time.    Eduar Weeks, PharmD

## 2024-12-18 ENCOUNTER — ANTICOAGULATION VISIT (OUTPATIENT)
Dept: PHARMACY | Facility: HOSPITAL | Age: 79
End: 2024-12-18
Payer: MEDICARE

## 2024-12-18 ENCOUNTER — OFFICE VISIT (OUTPATIENT)
Dept: CARDIOLOGY | Facility: CLINIC | Age: 79
End: 2024-12-18
Payer: MEDICARE

## 2024-12-18 VITALS
DIASTOLIC BLOOD PRESSURE: 78 MMHG | HEIGHT: 67 IN | OXYGEN SATURATION: 98 % | BODY MASS INDEX: 28.53 KG/M2 | WEIGHT: 181.8 LBS | SYSTOLIC BLOOD PRESSURE: 124 MMHG | RESPIRATION RATE: 16 BRPM | HEART RATE: 78 BPM

## 2024-12-18 DIAGNOSIS — I35.1 NONRHEUMATIC AORTIC VALVE INSUFFICIENCY: Primary | ICD-10-CM

## 2024-12-18 DIAGNOSIS — R07.89 CHEST TIGHTNESS: ICD-10-CM

## 2024-12-18 DIAGNOSIS — I48.0 PAF (PAROXYSMAL ATRIAL FIBRILLATION): Primary | ICD-10-CM

## 2024-12-18 LAB
INR PPP: 2 (ref 0.91–1.09)
PROTHROMBIN TIME: 23.7 SECONDS (ref 10–13.8)

## 2024-12-18 PROCEDURE — G0463 HOSPITAL OUTPT CLINIC VISIT: HCPCS | Performed by: PHARMACIST

## 2024-12-18 PROCEDURE — 85610 PROTHROMBIN TIME: CPT

## 2024-12-18 PROCEDURE — 3074F SYST BP LT 130 MM HG: CPT | Performed by: INTERNAL MEDICINE

## 2024-12-18 PROCEDURE — 36416 COLLJ CAPILLARY BLOOD SPEC: CPT

## 2024-12-18 PROCEDURE — 99214 OFFICE O/P EST MOD 30 MIN: CPT | Performed by: INTERNAL MEDICINE

## 2024-12-18 PROCEDURE — 3078F DIAST BP <80 MM HG: CPT | Performed by: INTERNAL MEDICINE

## 2024-12-18 PROCEDURE — 93000 ELECTROCARDIOGRAM COMPLETE: CPT | Performed by: INTERNAL MEDICINE

## 2024-12-18 RX ORDER — ATORVASTATIN CALCIUM 10 MG/1
10 TABLET, FILM COATED ORAL DAILY
Qty: 30 TABLET | Refills: 5 | Status: SHIPPED | OUTPATIENT
Start: 2024-12-18

## 2024-12-18 NOTE — PROGRESS NOTES
Date of Office Visit: 2024  Encounter Provider: Jimena Singer MD  Place of Service: [facility] CARDIOLOGY  Patient Name: Rodolfo Grover  :1945    Chief complaint  Coronary artery disease, atrial fibrillation, hypertension, pulmonary hypertension, valvular heart disease    History of Present Illness  Patient is a 79-year-old gentleman with history of rheumatic fever, GE reflux disease who was seen at [facility] in 2018 after humeral fracture following a fall.  At that time he was noted to have ventricular bigeminy with normal potassium and magnesium levels.  He was hypertensive at the time.  He had an echocardiogram that showed normal systolic function with normal diastolic function, mild left atrial enlargement saline study was indeterminate.  There was aortic valve sclerosis with mild aortic regurgitation and trivial mitral and tricuspid regurgitation with mild pulmonary hypertension with an RV systolic pressure 42 mmHg.  A stress perfusion study was negative for ischemia.  Follow-up echocardiogram in 2020 showed an ejection fraction of 55% with normal diastolic function, mild aortic mitral valve regurgitation, mild to moderate tricuspid regurgitation with an RV systolic pressure increased further to 48 mmHg.  He presented in 2020 with complaints of chest discomfort and non-STEMI.  Echo showed new wall motion abnormalities as well as pulmonary hypertension( RVSP 61 mmHg).  Subsequent cardiac catheterization showed multivessel coronary artery disease.  Subsequently underwent CABG x7 vessels with mitral valve repair.  Postoperative course was complicated by hemoptysis, esophageal esophagitis, atrial fibrillation associated also with intermittent complete heart block.  He was also felt to have had a seizure and was started on Keppra.  He also had acute renal insufficiency.  He also had induration and possible cellulitis of his right thigh leg wound.  Echo  on 5/2023 showed an ejection fraction of 53%, indeterminate diastolic function, mild aortic valve regurgitation no aortic stenosis.  Mild to  mitral regurgitation mild tricuspid regurgitation RVSP increased further to 49 mmHg  (had been 40 mmHg in 2021).  On 4/2024 had a monitor that showed 5 episodes of ventricular tachycardia and significant heart block for total of 14 minutes.  There were 3 pauses longest lasting 3.1 seconds.  In hospital he ultimately demonstrated complete heart block and had a dual-chamber pacer placed      Since last visit he has had no chest pain shortness of breath palpitations syncope near syncope though he does complain of 1 episode of chest fullness that occurred about 1 month ago.  Occurred when he was sitting still lasted less than 5 minutes described as 2-3 out of 10 chest pressure.  Does not recurred since then.  He is walking 1 mile a day without difficulty.  He recounts however that he does not have any chest pain prior to bypass.  Pressure at home has been as it is today.    Past Medical History:   Diagnosis Date    Acute blood loss anemia     Arthritis     Atrial fibrillation     Bilateral lower extremity edema     Bradycardia following surgery     CAD (coronary artery disease)     Chest discomfort     Colon polyp     Elevated troponin     Focal motor seizure     Generalized tonic-clonic seizure     GERD (gastroesophageal reflux disease)     Hypertension     Iritis of right eye     Ischemic cardiomyopathy     Mitral valve insufficiency     Myocardial infarction     Peptic ulceration     Post-ictal state     Pulmonary hypertension     PVC (premature ventricular contraction)     Rheumatic fever     Rheumatic fever     as child    S/P CABG x 7     S/P mitral valve repair     Severe obstructive sleep apnea     Shoulder fracture, right      Past Surgical History:   Procedure Laterality Date    CARDIAC CATHETERIZATION N/A 02/19/2021    Procedure: Coronary angiography;  Surgeon: Jesu  Bry KIM MD;  Location: [facility] CATH INVASIVE LOCATION;  Service: Cardiovascular;  Laterality: N/A;    CARDIAC CATHETERIZATION N/A 02/19/2021    Procedure: Left heart cath;  Surgeon: Bry Nails MD;  Location: [facility] CATH INVASIVE LOCATION;  Service: Cardiovascular;  Laterality: N/A;    CARDIAC CATHETERIZATION N/A 02/19/2021    Procedure: Right Heart Cath;  Surgeon: Bry Nails MD;  Location: [facility] CATH INVASIVE LOCATION;  Service: Cardiovascular;  Laterality: N/A;    CARDIAC CATHETERIZATION N/A 02/19/2021    Procedure: Left ventriculography;  Surgeon: Bry Nails MD;  Location: [facility] CATH INVASIVE LOCATION;  Service: Cardiovascular;  Laterality: N/A;    CARDIAC ELECTROPHYSIOLOGY PROCEDURE N/A 04/15/2024    Procedure: Pacemaker DC new [employer];  Surgeon: Edgar Patton MD;  Location: [facility] CATH INVASIVE LOCATION;  Service: Cardiovascular;  Laterality: N/A;    COLONOSCOPY N/A 12/07/2021    Procedure: COLONOSCOPY to cecum with cold biopsy polypectomy;  Surgeon: Edgar Allen MD;  Location: [facility] ENDOSCOPY;  Service: Gastroenterology;  Laterality: N/A;  IRON DEFICIENCY ANEMIA, H/O COLON POLYPS  --   DIVERTICULOSIS, polyp, hemorrhoids    CORONARY ARTERY BYPASS GRAFT N/A 02/22/2021    Procedure: BK, MIDLINE STERNOTOMY, CORONARY ARTERY BYPASS X 7 UTILIZING THE LEFT INTERNAL MAMMARY ARTERY AND THE RIGHT SAPHENOUS VEIN, MITRAL VALVE REPAIR, PRP;  Surgeon: Jr Shyam Echavarria MD;  Location: [facility] OR;  Service: Cardiothoracic;  Laterality: N/A;    ENDOSCOPY N/A 08/16/2018    Erosive gastritis, diverticulosis    ENDOSCOPY N/A 02/26/2021    Procedure: ESOPHAGOGASTRODUODENOSCOPY AT BEDSIDE WITH HOT SNARE POLYPECTOMY AND CLIP PLACEMENT X1;  Surgeon: Jono Laboy MD;  Location: [facility] ENDOSCOPY;  Service: Gastroenterology;  Laterality: N/A;  PRE-  GI BLEED  POST- GASTRITIS, ESOPHAGITIS, POLYP    ENDOSCOPY N/A 12/07/2021    Procedure: ESOPHAGOGASTRODUODENOSCOPY  with COLD  BIOPSIES;  Surgeon: Edgar Allen MD;  Location: [facility] ENDOSCOPY;  Service: Gastroenterology;  Laterality: N/A;  IRON DEFICIENCY ANEMIA, H/O ULCERATIVE ESOPHAGITIS  --GASTRITIS, DUODENAL POLYP    HERNIA REPAIR      PACEMAKER IMPLANTATION  04/15/2024    SHOULDER CLOSED REDUCTION Right 03/16/2018    Procedure: RIGHT SHOULDER CLOSED REDUCTION;  Surgeon: Kj Garcia MD;  Location: [facility] MAIN OR;  Service: Orthopedics    TONSILLECTOMY      TOTAL SHOULDER ARTHROPLASTY W/ DISTAL CLAVICLE EXCISION Right 03/22/2018    Procedure: Reverse Total Shoulder Arthroplsty;  Surgeon: Kj Garcia MD;  Location: [facility] MAIN OR;  Service: Orthopedics    TRANSESOPHAGEAL ECHOCARDIOGRAM (BK) N/A 02/22/2021    Procedure: TRANSESOPHAGEAL ECHOCARDIOGRAM;  Surgeon: Jr Shyam Echavarria MD;  Location: [facility] OR;  Service: Cardiothoracic;  Laterality: N/A;     Outpatient Medications Prior to Visit   Medication Sig Dispense Refill    acetaminophen (TYLENOL) 500 MG tablet Take 2 tablets by mouth 3 (Three) Times a Day. (Patient taking differently: Take 2 tablets by mouth 3 (Three) Times a Day As Needed.)      aspirin 81 MG EC tablet Take 1 tablet by mouth Daily. 90 tablet 1    atorvastatin (LIPITOR) 10 MG tablet Take 1 tablet by mouth once daily 30 tablet 5    Cholecalciferol 50 MCG (2000 UT) capsule Take 1 capsule by mouth Daily.      Ferrous Gluconate (IRON 27 PO) Take 27 mg by mouth 3 (Three) Times a Week.      fluticasone (CUTIVATE) 0.05 % cream Apply 1 application  topically to the appropriate area as directed 2 (Two) Times a Day. (Patient taking differently: Apply 1 Application topically to the appropriate area as directed As Needed.) 30 g 4    furosemide (LASIX) 40 MG tablet Take 1 tablet by mouth once daily 90 tablet 0    metoprolol succinate XL (TOPROL-XL) 25 MG 24 hr tablet Take 1 tablet by mouth Daily. 90 tablet 3    nitroglycerin (NITROSTAT) 0.4 MG SL tablet 1 under the tongue as needed for angina, may  repeat q5mins for up three doses (Patient taking differently: Place 1 tablet under the tongue Every 5 (Five) Minutes As Needed. 1 under the tongue as needed for angina, may repeat q5mins for up three doses) 25 tablet 1    Omeprazole Magnesium (PRILOSEC OTC PO) Take 20 mg by mouth Daily.      potassium chloride 10 MEQ CR tablet Take 1 tablet by mouth once daily 90 tablet 0    Psyllium (METAMUCIL FIBER PO) Take  by mouth As Needed.      sucralfate (CARAFATE) 1 g tablet As Needed.      warfarin (COUMADIN) 2 MG tablet Take one-half tablet (1 mg) by mouth Monday and Thursday, and take one tablet (2 mg) all other days or as directed. 80 tablet 1     No facility-administered medications prior to visit.       Allergies as of 12/18/2024    (No Known Allergies)     Social History     Socioeconomic History    Marital status:    Tobacco Use    Smoking status: Never     Passive exposure: Never    Smokeless tobacco: Never   Vaping Use    Vaping status: Never Used   Substance and Sexual Activity    Alcohol use: Yes     Comment: occasional    Drug use: Never    Sexual activity: Defer     Family History   Problem Relation Age of Onset    Stroke Mother     Cancer Father 56        bladder    Heart attack Father     Malig Hyperthermia Neg Hx      Review of Systems   Constitutional: Negative for chills, fever, weight gain and weight loss.   Cardiovascular:  Negative for leg swelling.   Respiratory:  Negative for cough, snoring and wheezing.    Hematologic/Lymphatic: Negative for bleeding problem. Does not bruise/bleed easily.   Skin:  Negative for color change.   Musculoskeletal:  Negative for falls, joint pain and myalgias.   Gastrointestinal:  Negative for melena.   Genitourinary:  Negative for hematuria.   Neurological:  Negative for excessive daytime sleepiness.   Psychiatric/Behavioral:  Negative for depression. The patient is not nervous/anxious.         Objective:     Vitals:    12/18/24 1036   BP: 124/78   Pulse: 78  "  Resp: 16   SpO2: 98%   Weight: 82.5 kg (181 lb 12.8 oz)   Height: 170.2 cm (67\")     Body mass index is 28.47 kg/m².    Vitals reviewed.   Constitutional:       Appearance: Well-developed.   Eyes:      General: No scleral icterus.        Right eye: No discharge.      Conjunctiva/sclera: Conjunctivae normal.      Pupils: Pupils are equal, round, and reactive to light.   HENT:      Head: Normocephalic.      Nose: Nose normal.   Neck:      Thyroid: No thyromegaly.      Vascular: No JVD.   Pulmonary:      Effort: Pulmonary effort is normal. No respiratory distress.      Breath sounds: Normal breath sounds. No wheezing. No rales.   Cardiovascular:      Normal rate. Regular rhythm. Normal S1. Normal S2.       Murmurs: There is no murmur.      No gallop.    Pulses:     Intact distal pulses.      Carotid: 2+ bilaterally.     Radial: 2+ bilaterally.     Femoral: 2+ bilaterally.     Popliteal: 2+ bilaterally.     Dorsalis pedis: 2+ bilaterally.     Posterior tibial: 2+ bilaterally.  Edema:     Peripheral edema absent.   Abdominal:      General: Bowel sounds are normal. There is no distension.      Palpations: Abdomen is soft.      Tenderness: There is no abdominal tenderness. There is no rebound.   Musculoskeletal: Normal range of motion.         General: No tenderness.      Cervical back: Normal range of motion and neck supple. Skin:     General: Skin is warm and dry.      Findings: No erythema or rash.   Neurological:      Mental Status: Alert and oriented to person, place, and time.   Psychiatric:         Behavior: Behavior normal.         Thought Content: Thought content normal.         Judgment: Judgment normal.       Lab Review:   Lab Results - Last 18 Months   Lab Units 08/16/24  1311 07/11/24  1721 07/11/24  0805 04/13/24  1306 02/09/24  1229 01/04/24  0921   WBC 10*3/mm3 7.58 10.53 9.67 7.47   < > 7.03   RBC 10*6/mm3 3.85* 3.87* 3.99* 4.13*   < > 4.22   HEMOGLOBIN g/dL 12.6* 12.5* 12.9* 13.0   < > 13.8   HEMATOCRIT " % 39.4 38.3 40.2 40.8   < > 41.5   MCV fL 102.3* 99.0* 100.8* 98.8*   < > 98.3*   MCH pg 32.7 32.3 32.3 31.5   < > 32.7   MCHC g/dL 32.0 32.6 32.1 31.9   < > 33.3   RDW % 13.4 12.1* 11.8* 11.8*   < > 11.6*   PLATELETS 10*3/mm3 176 231 220 160   < > 171   NEUTROPHIL % % 61.7  --   --  65.1   < > 66.8   LYMPHOCYTE % % 20.8  --   --  17.9*   < > 18.3*   MONOCYTES % % 9.5  --   --  11.2   < > 9.4   EOSINOPHIL % % 6.3*  --  2.0 3.3   < > 3.3   BASOPHIL % % 0.8  --   --  0.8   < > 0.6   NEUTROS ABS 10*3/mm3 4.67 6.11 6.19 4.85   < > 4.70   LYMPHS ABS 10*3/mm3 1.58  --  1.35 1.34   < > 1.29   MONOS ABS 10*3/mm3 0.72  --  0.48 0.84   < > 0.66   EOS ABS 10*3/mm3 0.48* 0.53*  --  0.25   < > 0.23   EOSINOPHIL ABS 10*3/mm3  --   --  0.19  --   --   --    BASOS ABS 10*3/mm3 0.06 0.11  --  0.06   < > 0.04   IMM GRAN % %  --   --   --   --   --  1.6*   IMMATURE GRANS (ABS) 10*3/mm3  --   --   --   --   --  0.11*   RDW-SD fl 50.8 43.2  --  43.1   < >  --    MPV fL 9.7 9.8  --  10.4   < >  --     < > = values in this interval not displayed.       Lab Results - Last 18 Months   Lab Units 07/11/24  1721 07/11/24  0805 04/14/24  0442 04/13/24  1306   GLUCOSE mg/dL 122* 90 86 95   BUN mg/dL 21 22 27* 29*   CREATININE mg/dL 1.60* 1.61* 1.53* 1.66*   SODIUM mmol/L 137 140 142 140   POTASSIUM mmol/L 4.1 4.2 4.3 4.1   CHLORIDE mmol/L 103 104 109* 105   CO2 mmol/L 24.7 25.1 24.3 25.2   CALCIUM mg/dL 8.5* 8.9 8.7 9.2   TOTAL PROTEIN g/dL 6.6  --   --  6.8   ALBUMIN g/dL 3.4* 3.7  --  4.0   ALT (SGPT) U/L 56* 62*  --  17   AST (SGOT) U/L 39 47*  --  19   ALK PHOS U/L 172* 190*  --  93   BILIRUBIN mg/dL <0.2 0.4  --  0.3   GLOBULIN gm/dL 3.2  --   --  2.8   A/G RATIO g/dL 1.1  --   --  1.4   BUN / CREAT RATIO  13.1 13.7 17.6 17.5   ANION GAP mmol/L 9.3  --  8.7 9.8   EGFR mL/min/1.73 43.0*  --  45.4* 41.2*     Lab Results - Last 18 Months   Lab Units 07/11/24  0805 01/04/24  0921   TRIGLYCERIDES mg/dL 116 99   HDL CHOL mg/dL 38* 63*   LDL CHOL  mg/dL 86 111*   VLDL CHOLESTEROL SARAHI mg/dL 21 18     Lab Results - Last 18 Months   Lab Units 08/23/23  1021   PROBNP pg/mL 1,829.0*     Lab Results - Last 18 Months   Lab Units 07/11/24  1920 07/11/24  1721   HSTROP T ng/L 20 24*     Lab Results - Last 18 Months   Lab Units 07/11/24  0805 03/13/24  1347   TSH uIU/mL 5.510* 3.130     Lab Results - Last 18 Months   Lab Units 12/18/24  1407 11/20/24  1427   PROTIME seconds 23.7* 27.9*       ECG 12 Lead    Date/Time: 12/18/2024 8:20 PM  Performed by: Jimena Singer MD    Authorized by: Jimena Singer MD  Comparison: compared with previous ECG   Similar to previous ECG  Rhythm: atrial fibrillation  Pacing: ventricular paced rhythm  Clinical impression: abnormal EKG        Assessment:       Diagnosis Plan   1. Nonrheumatic aortic valve insufficiency  Adult Transthoracic Echo Complete W/ Cont if Necessary Per Protocol    ECG 12 Lead      2. Chest tightness  Stress Test With Myocardial Perfusion One Day    ECG 12 Lead        Plan:       1.  Recurrent postoperative atrial fibrillation with progressive bradycardia and pacemaker placement 4/2024.  Remains on Coumadin  2.  History of non-STEMI with subsequent multivessel CABG with cardiomyopathy 2/2021.  Clinically with an episode of chest discomfort as above.  He is previously demonstrated silent ischemia.  With this in mind we will check a Lexiscan cardiac stress test  3.  Status post mitral valve repair, as above.  He is aware of and does follow SBE prophylaxis.  4.  Postoperative hematemesis with recent EGD showing grade D esophagitis.  No recurrent episodes.  5.  Sick sinus syndrome (s/p pacemaker 4/2024.  Continued routine device check  6.  Chronic renal insufficiency.  Renal labs stable.  7.  Superficial vein thrombosis, clinically seems improved by description.  8.  Pulmonary hypertension, worse in 5/2023 to 49 mmHg..  Recheck echo especially with chest discomfort as above  9.  Dyslipidemia      Time Spent: I spent 35  minutes caring for Rodolfo on this date of service. This time includes time spent by me in the following activities: preparing for the visit, reviewing tests, obtaining and/or reviewing a separately obtained history, performing a medically appropriate examination and/or evaluation, counseling and educating the patient/family/caregiver, documenting information in the medical record, and independently interpreting results and communicating that information with the patient/family/caregiver.   I spent 1 minutes on the separately reported service of ECG. This time is not included in the time used to support the E/M service also reported today.        Your medication list            Accurate as of December 18, 2024 11:59 PM. If you have any questions, ask your nurse or doctor.                CHANGE how you take these medications        Instructions Last Dose Given Next Dose Due   acetaminophen 500 MG tablet  Commonly known as: TYLENOL  What changed:   when to take this  reasons to take this      Take 2 tablets by mouth 3 (Three) Times a Day.       fluticasone 0.05 % cream  Commonly known as: CUTIVATE  What changed:   when to take this  reasons to take this      Apply 1 application  topically to the appropriate area as directed 2 (Two) Times a Day.       nitroglycerin 0.4 MG SL tablet  Commonly known as: NITROSTAT  What changed:   how much to take  how to take this  when to take this  reasons to take this      1 under the tongue as needed for angina, may repeat q5mins for up three doses              CONTINUE taking these medications        Instructions Last Dose Given Next Dose Due   aspirin 81 MG EC tablet      Take 1 tablet by mouth Daily.       atorvastatin 10 MG tablet  Commonly known as: LIPITOR      Take 1 tablet by mouth once daily       Cholecalciferol 50 MCG (2000 UT) capsule      Take 1 capsule by mouth Daily.       furosemide 40 MG tablet  Commonly known as: LASIX      Take 1 tablet by mouth once daily       IRON  27 PO      Take 27 mg by mouth 3 (Three) Times a Week.       METAMUCIL FIBER PO      Take  by mouth As Needed.       metoprolol succinate XL 25 MG 24 hr tablet  Commonly known as: TOPROL-XL      Take 1 tablet by mouth Daily.       potassium chloride 10 MEQ CR tablet      Take 1 tablet by mouth once daily       PRILOSEC OTC PO      Take 20 mg by mouth Daily.       sucralfate 1 g tablet  Commonly known as: CARAFATE      As Needed.       warfarin 2 MG tablet  Commonly known as: COUMADIN      Take one-half tablet (1 mg) by mouth Monday and Thursday, and take one tablet (2 mg) all other days or as directed.                 Where to Get Your Medications        These medications were sent to [facility] [address] [place] [id number] [facility] [phone]  - [phone]   [id number] [place] [id number]      Phone: [phone]   atorvastatin 10 MG tablet         Patient is no longer taking -.  I corrected the med list to reflect this.  I did not stop these medications.      Dictated utilizing Dragon dictation

## 2024-12-18 NOTE — PROGRESS NOTES
Anticoagulation Clinic Progress Note    Anticoagulation Summary  As of 2024      INR goal:  2.0-3.0   TTR:  70.5% (3.6 y)   INR used for dosin.0 (2024)   Warfarin maintenance plan:  1 mg every Mon, Thu; 2 mg all other days   Weekly warfarin total:  12 mg   No change documented:  Chele Simental, PharmD   Plan last modified:  Eduar Weeks PharmD (2024)   Next INR check:  2025   Priority:  High   Target end date:  --    Indications    PAF (paroxysmal atrial fibrillation) [I48.0]                 Anticoagulation Episode Summary       INR check location:  --    Preferred lab:  --    Send INR reminders to:   [facility] [place]    Comments:  --          Anticoagulation Care Providers       Provider Role Specialty Phone number    Jimena Singer MD Referring Cardiology [phone]            Clinic Interview:  Patient Findings     Negatives:  Signs/symptoms of thrombosis, Signs/symptoms of bleeding,   Laboratory test error suspected, Change in health, Change in alcohol use,   Change in activity, Upcoming invasive procedure, Emergency department   visit, Upcoming dental procedure, Missed doses, Extra doses, Change in   medications, Change in diet/appetite, Hospital admission, Bruising, Other   complaints      Clinical Outcomes     Negatives:  Major bleeding event, Thromboembolic event,   Anticoagulation-related hospital admission, Anticoagulation-related ED   visit, Anticoagulation-related fatality        INR History:      2024     2:15 PM 2024     2:15 PM 2024     2:15 PM 2024     2:00 PM 10/23/2024     2:15 PM 2024     2:15 PM 2024     2:30 PM   Anticoagulation Monitoring   INR 3.2 2.7 3.1 2.4 2.2 2.3 2.0   INR Date 2024 2024 2024 2024 10/23/2024 2024 2024   INR Goal 2.0-3.0 2.0-3.0 2.0-3.0 2.0-3.0 2.0-3.0 2.0-3.0 2.0-3.0   Trend Same Same Same Same Same Same Same   Last Week Total 10 mg 12 mg 12 mg 12 mg 12 mg 12 mg 12 mg    Next Week Total 11 mg 12 mg 12 mg 12 mg 12 mg 12 mg 12 mg   Sun 2 mg 2 mg 2 mg 2 mg 2 mg 2 mg 2 mg   Mon 1 mg 1 mg 1 mg 1 mg 1 mg 1 mg 1 mg   Tue 2 mg 2 mg 2 mg 2 mg 2 mg 2 mg 2 mg   Wed 1 mg (7/17); Otherwise 2 mg 2 mg 2 mg 2 mg 2 mg 2 mg 2 mg   Thu 1 mg 1 mg 1 mg 1 mg 1 mg 1 mg 1 mg   Fri 2 mg 2 mg 2 mg 2 mg 2 mg 2 mg 2 mg   Sat 2 mg 2 mg 2 mg 2 mg 2 mg 2 mg 2 mg   Visit Report       Report       Plan:  1. INR is Therapeutic today- see above in Anticoagulation Summary.  Will instruct Rodolfo Grover to Continue their warfarin regimen- see above in Anticoagulation Summary.  2. Follow up in 3 weeks (will be out of town in 4 weeks - elected to come in for recheck earlier)  3. Patient declines warfarin refills.  4. Verbal and written information provided. Patient expresses understanding and has no further questions at this time.    Chele Simental, PharmD

## 2024-12-27 ENCOUNTER — TELEPHONE (OUTPATIENT)
Dept: CARDIOLOGY | Facility: CLINIC | Age: 79
End: 2024-12-27
Payer: MEDICARE

## 2024-12-30 ENCOUNTER — HOSPITAL ENCOUNTER (OUTPATIENT)
Dept: CARDIOLOGY | Facility: HOSPITAL | Age: 79
Discharge: HOME | End: 2024-12-30
Payer: MEDICARE

## 2024-12-30 VITALS
SYSTOLIC BLOOD PRESSURE: 142 MMHG | HEART RATE: 70 BPM | DIASTOLIC BLOOD PRESSURE: 68 MMHG | BODY MASS INDEX: 28.41 KG/M2 | HEIGHT: 67 IN | WEIGHT: 181 LBS

## 2024-12-30 VITALS — HEIGHT: 67 IN | BODY MASS INDEX: 28.41 KG/M2 | WEIGHT: 181 LBS

## 2024-12-30 DIAGNOSIS — R07.89 CHEST TIGHTNESS: ICD-10-CM

## 2024-12-30 DIAGNOSIS — I35.1 NONRHEUMATIC AORTIC VALVE INSUFFICIENCY: ICD-10-CM

## 2024-12-30 LAB
AORTIC DIMENSIONLESS INDEX: 0.7 (DI)
ASCENDING AORTA: 2.6 CM
AV MEAN PRESS GRAD SYS DOP V1V2: 3 MMHG
AV VMAX SYS DOP: 113 CM/SEC
BH CV ECHO MEAS - ACS: 2 CM
BH CV ECHO MEAS - AI P1/2T: 1084 MSEC
BH CV ECHO MEAS - AO MAX PG: 5.1 MMHG
BH CV ECHO MEAS - AO ROOT AREA (BSA CORRECTED): 1.5 CM2
BH CV ECHO MEAS - AO ROOT DIAM: 3 CM
BH CV ECHO MEAS - AO V2 VTI: 22.7 CM
BH CV ECHO MEAS - AVA(I,D): 2.08 CM2
BH CV ECHO MEAS - EDV(CUBED): 89.6 ML
BH CV ECHO MEAS - EDV(MOD-SP2): 92 ML
BH CV ECHO MEAS - EDV(MOD-SP4): 139 ML
BH CV ECHO MEAS - EF(MOD-SP2): 50 %
BH CV ECHO MEAS - EF(MOD-SP4): 56.8 %
BH CV ECHO MEAS - ESV(MOD-SP2): 46 ML
BH CV ECHO MEAS - ESV(MOD-SP4): 60 ML
BH CV ECHO MEAS - FS: 30 %
BH CV ECHO MEAS - IVS/LVPW: 0.98 CM
BH CV ECHO MEAS - IVSD: 0.82 CM
BH CV ECHO MEAS - LV DIASTOLIC VOL/BSA (35-75): 71.7 CM2
BH CV ECHO MEAS - LV MASS(C)D: 117.3 GRAMS
BH CV ECHO MEAS - LV MAX PG: 2.5 MMHG
BH CV ECHO MEAS - LV MEAN PG: 1 MMHG
BH CV ECHO MEAS - LV SYSTOLIC VOL/BSA (12-30): 31 CM2
BH CV ECHO MEAS - LV V1 MAX: 79.6 CM/SEC
BH CV ECHO MEAS - LV V1 VTI: 15.8 CM
BH CV ECHO MEAS - LVIDD: 4.5 CM
BH CV ECHO MEAS - LVIDS: 3.1 CM
BH CV ECHO MEAS - LVOT AREA: 3 CM2
BH CV ECHO MEAS - LVOT DIAM: 1.95 CM
BH CV ECHO MEAS - LVPWD: 0.83 CM
BH CV ECHO MEAS - MR MAX PG: 25.3 MMHG
BH CV ECHO MEAS - MR MAX VEL: 251.5 CM/SEC
BH CV ECHO MEAS - MV A DUR: 0.17 SEC
BH CV ECHO MEAS - MV A MAX VEL: 66.7 CM/SEC
BH CV ECHO MEAS - MV DEC SLOPE: 532.6 CM/SEC2
BH CV ECHO MEAS - MV DEC TIME: 0.14 SEC
BH CV ECHO MEAS - MV E MAX VEL: 121 CM/SEC
BH CV ECHO MEAS - MV E/A: 1.81
BH CV ECHO MEAS - MV MAX PG: 5.6 MMHG
BH CV ECHO MEAS - MV MEAN PG: 2.32 MMHG
BH CV ECHO MEAS - MV P1/2T: 66.4 MSEC
BH CV ECHO MEAS - MV V2 VTI: 38.6 CM
BH CV ECHO MEAS - MVA(P1/2T): 3.3 CM2
BH CV ECHO MEAS - MVA(VTI): 1.22 CM2
BH CV ECHO MEAS - PA V2 MAX: 115.3 CM/SEC
BH CV ECHO MEAS - PULM DIAS VEL: 33.8 CM/SEC
BH CV ECHO MEAS - PULM S/D: 1.18
BH CV ECHO MEAS - PULM SYS VEL: 39.8 CM/SEC
BH CV ECHO MEAS - QP/QS: 0.63
BH CV ECHO MEAS - RAP SYSTOLE: 3 MMHG
BH CV ECHO MEAS - RV MAX PG: 0.69 MMHG
BH CV ECHO MEAS - RV V1 MAX: 41.6 CM/SEC
BH CV ECHO MEAS - RV V1 VTI: 8.9 CM
BH CV ECHO MEAS - RVOT DIAM: 2.07 CM
BH CV ECHO MEAS - RVSP: 46 MMHG
BH CV ECHO MEAS - SV(LVOT): 47.2 ML
BH CV ECHO MEAS - SV(MOD-SP2): 46 ML
BH CV ECHO MEAS - SV(MOD-SP4): 79 ML
BH CV ECHO MEAS - SV(RVOT): 29.8 ML
BH CV ECHO MEAS - SVI(LVOT): 24.3 ML/M2
BH CV ECHO MEAS - SVI(MOD-SP2): 23.7 ML/M2
BH CV ECHO MEAS - SVI(MOD-SP4): 40.8 ML/M2
BH CV ECHO MEAS - TAPSE (>1.6): 1.48 CM
BH CV ECHO MEAS - TR MAX PG: 42.9 MMHG
BH CV ECHO MEAS - TR MAX VEL: 327.4 CM/SEC
BH CV NUCLEAR PRIOR STUDY: 1
BH CV REST NUCLEAR ISOTOPE DOSE: 11.3 MCI
BH CV STRESS BP STAGE 1: NORMAL
BH CV STRESS COMMENTS STAGE 1: NORMAL
BH CV STRESS DOSE REGADENOSON STAGE 1: 0.4
BH CV STRESS DURATION MIN STAGE 1: 0
BH CV STRESS DURATION SEC STAGE 1: 10
BH CV STRESS HR STAGE 1: 78
BH CV STRESS NUCLEAR ISOTOPE DOSE: 34.9 MCI
BH CV STRESS PROTOCOL 1: NORMAL
BH CV STRESS RECOVERY BP: NORMAL MMHG
BH CV STRESS RECOVERY HR: 73 BPM
BH CV STRESS STAGE 1: 1
BH CV XLRA - RV BASE: 4.3 CM
BH CV XLRA - RV LENGTH: 7.1 CM
BH CV XLRA - RV MID: 3 CM
LEFT ATRIUM VOLUME INDEX: 29.3 ML/M2
LV EF BIPLANE MOD: 53.3 %
MAXIMAL PREDICTED HEART RATE: 139 BPM
PERCENT MAX PREDICTED HR: 56.12 %
SINUS: 2.9 CM
SPECT HRT GATED+EF W RNC IV: 48 %
STJ: 2.6 CM
STRESS BASELINE BP: NORMAL MMHG
STRESS BASELINE HR: 75 BPM
STRESS PERCENT HR: 66 %
STRESS POST EXERCISE DUR SEC: 10 SEC
STRESS POST PEAK BP: NORMAL MMHG
STRESS POST PEAK HR: 78 BPM
STRESS TARGET HR: 118 BPM

## 2024-12-30 PROCEDURE — 93017 CV STRESS TEST TRACING ONLY: CPT

## 2024-12-30 PROCEDURE — 25510000001 PERFLUTREN 6.52 MG/ML SUSPENSION 2 ML VIAL: Performed by: INTERNAL MEDICINE

## 2024-12-30 PROCEDURE — 78452 HT MUSCLE IMAGE SPECT MULT: CPT

## 2024-12-30 PROCEDURE — A9502 TC99M TETROFOSMIN: HCPCS | Performed by: INTERNAL MEDICINE

## 2024-12-30 PROCEDURE — 34310000005 TECHNETIUM TETROFOSMIN KIT: Performed by: INTERNAL MEDICINE

## 2024-12-30 PROCEDURE — 93306 TTE W/DOPPLER COMPLETE: CPT

## 2024-12-30 PROCEDURE — 25010000002 REGADENOSON 0.4 MG/5ML SOLUTION: Performed by: INTERNAL MEDICINE

## 2024-12-30 RX ORDER — REGADENOSON 0.08 MG/ML
0.4 INJECTION, SOLUTION INTRAVENOUS
Status: COMPLETED | OUTPATIENT
Start: 2024-12-30 | End: 2024-12-30

## 2024-12-30 RX ADMIN — REGADENOSON 0.4 MG: 0.08 INJECTION, SOLUTION INTRAVENOUS at 08:47

## 2024-12-30 RX ADMIN — TETROFOSMIN 1 DOSE: 1.38 INJECTION, POWDER, LYOPHILIZED, FOR SOLUTION INTRAVENOUS at 08:01

## 2024-12-30 RX ADMIN — TETROFOSMIN 1 DOSE: 1.38 INJECTION, POWDER, LYOPHILIZED, FOR SOLUTION INTRAVENOUS at 08:47

## 2024-12-30 RX ADMIN — PERFLUTREN 1 ML: 6.52 INJECTION, SUSPENSION INTRAVENOUS at 07:51

## 2024-12-31 ENCOUNTER — TELEPHONE (OUTPATIENT)
Dept: CARDIOLOGY | Facility: CLINIC | Age: 79
End: 2024-12-31
Payer: MEDICARE

## 2024-12-31 NOTE — TELEPHONE ENCOUNTER
Notified patient of results/recommendations. Patient verbalized understanding.    Alyssa Dunne RN  Triage [facility]

## 2024-12-31 NOTE — TELEPHONE ENCOUNTER
Please let him know that echo looks good.  His heart is strong and functioning well.  Mitral valve repair is stable with only very mild narrowing.  There is some leakage from his aortic and mitral valve that is also stable.  There is some elevated pressure in his lungs but this it also appears stable and slightly improved since his prior echocardiogram in May 2023.  Stress test shows an area of old damage similar to his heart cath in 2022.  He had complained of an isolated episode of chest fullness at his appointment with Dr. Singer.  As long as he is not having further symptoms, would be okay to continue current regimen for now.  If he has further symptoms of chest pain, shortness of breath would recommend repeat cardiac cath at that time.

## 2025-01-08 ENCOUNTER — ANTICOAGULATION VISIT (OUTPATIENT)
Dept: PHARMACY | Facility: HOSPITAL | Age: 80
End: 2025-01-08
Payer: MEDICARE

## 2025-01-08 DIAGNOSIS — I48.0 PAF (PAROXYSMAL ATRIAL FIBRILLATION): Primary | ICD-10-CM

## 2025-01-08 LAB
INR PPP: 1.8 (ref 0.91–1.09)
PROTHROMBIN TIME: 22.2 SECONDS (ref 10–13.8)

## 2025-01-08 PROCEDURE — G0463 HOSPITAL OUTPT CLINIC VISIT: HCPCS

## 2025-01-08 PROCEDURE — 85610 PROTHROMBIN TIME: CPT

## 2025-01-08 PROCEDURE — 36416 COLLJ CAPILLARY BLOOD SPEC: CPT

## 2025-01-08 NOTE — PROGRESS NOTES
Anticoagulation Clinic Progress Note    Anticoagulation Summary  As of 2025      INR goal:  2.0-3.0   TTR:  69.4% (3.7 y)   INR used for dosin.8 (2025)   Warfarin maintenance plan:  1 mg every Mon, Thu; 2 mg all other days   Weekly warfarin total:  12 mg   Plan last modified:  Eduar Weeks, PharmD (2024)   Next INR check:  2025   Priority:  High   Target end date:  --    Indications    PAF (paroxysmal atrial fibrillation) [I48.0]                 Anticoagulation Episode Summary       INR check location:  --    Preferred lab:  --    Send INR reminders to:  [facility]    Comments:  --          Anticoagulation Care Providers       Provider Role Specialty Phone number    Jimena Singer MD Referring Cardiology [phone]            Clinic Interview:  Patient Findings     Positives:  Change in activity    Negatives:  Signs/symptoms of thrombosis, Signs/symptoms of bleeding,   Laboratory test error suspected, Change in health, Change in alcohol use,   Upcoming invasive procedure, Emergency department visit, Upcoming dental   procedure, Missed doses, Extra doses, Change in medications, Change in   diet/appetite, Hospital admission, Bruising, Other complaints    Comments:  Reports slight decrease in usual activity due to weather      Clinical Outcomes     Negatives:  Major bleeding event, Thromboembolic event,   Anticoagulation-related hospital admission, Anticoagulation-related ED   visit, Anticoagulation-related fatality    Comments:  Reports slight decrease in usual activity due to weather        INR History:      2024     2:15 PM 2024     2:15 PM 2024     2:00 PM 10/23/2024     2:15 PM 2024     2:15 PM 2024     2:30 PM 2025     2:30 PM   Anticoagulation Monitoring   INR 2.7 3.1 2.4 2.2 2.3 2.0 1.8   INR Date 2024 2024 2024 10/23/2024 2024 2024 2025   INR Goal 2.0-3.0 2.0-3.0 2.0-3.0 2.0-3.0 2.0-3.0 2.0-3.0 2.0-3.0    Trend Same Same Same Same Same Same Same   Last Week Total 12 mg 12 mg 12 mg 12 mg 12 mg 12 mg 12 mg   Next Week Total 12 mg 12 mg 12 mg 12 mg 12 mg 12 mg 13 mg   Sun 2 mg 2 mg 2 mg 2 mg 2 mg 2 mg 2 mg   Mon 1 mg 1 mg 1 mg 1 mg 1 mg 1 mg 1 mg   Tue 2 mg 2 mg 2 mg 2 mg 2 mg 2 mg 2 mg   Wed 2 mg 2 mg 2 mg 2 mg 2 mg 2 mg 3 mg (1/8); Otherwise 2 mg   Thu 1 mg 1 mg 1 mg 1 mg 1 mg 1 mg 1 mg   Fri 2 mg 2 mg 2 mg 2 mg 2 mg 2 mg 2 mg   Sat 2 mg 2 mg 2 mg 2 mg 2 mg 2 mg 2 mg   Visit Report      Report        Plan:  1. INR is Subtherapeutic today- see above in Anticoagulation Summary.  Will instruct Rodolfo Grover to receive boosted dose of 3 mg today then continue their warfarin regimen- see above in Anticoagulation Summary.  2. Follow up in 3 weeks (patient reports they will be out of town until then)  3. Patient declines warfarin refills.  4. Verbal and written information provided. Patient expresses understanding and has no further questions at this time.    Destiny Richardson, PharmD

## 2025-01-29 ENCOUNTER — OFFICE VISIT (OUTPATIENT)
Dept: FAMILY MEDICINE CLINIC | Facility: CLINIC | Age: 80
End: 2025-01-29
Payer: MEDICARE

## 2025-01-29 VITALS
HEIGHT: 67 IN | HEART RATE: 83 BPM | SYSTOLIC BLOOD PRESSURE: 128 MMHG | RESPIRATION RATE: 18 BRPM | DIASTOLIC BLOOD PRESSURE: 70 MMHG | WEIGHT: 189 LBS | OXYGEN SATURATION: 99 % | BODY MASS INDEX: 29.66 KG/M2

## 2025-01-29 DIAGNOSIS — R73.9 ELEVATED BLOOD SUGAR: ICD-10-CM

## 2025-01-29 DIAGNOSIS — Z12.5 SCREENING FOR PROSTATE CANCER: ICD-10-CM

## 2025-01-29 DIAGNOSIS — I10 ESSENTIAL HYPERTENSION: ICD-10-CM

## 2025-01-29 DIAGNOSIS — Z00.00 MEDICARE ANNUAL WELLNESS VISIT, SUBSEQUENT: Primary | ICD-10-CM

## 2025-01-29 PROCEDURE — 1170F FXNL STATUS ASSESSED: CPT | Performed by: FAMILY MEDICINE

## 2025-01-29 PROCEDURE — G0439 PPPS, SUBSEQ VISIT: HCPCS | Performed by: FAMILY MEDICINE

## 2025-01-29 PROCEDURE — 3074F SYST BP LT 130 MM HG: CPT | Performed by: FAMILY MEDICINE

## 2025-01-29 PROCEDURE — 1160F RVW MEDS BY RX/DR IN RCRD: CPT | Performed by: FAMILY MEDICINE

## 2025-01-29 PROCEDURE — 1159F MED LIST DOCD IN RCRD: CPT | Performed by: FAMILY MEDICINE

## 2025-01-29 PROCEDURE — 1126F AMNT PAIN NOTED NONE PRSNT: CPT | Performed by: FAMILY MEDICINE

## 2025-01-29 PROCEDURE — 3078F DIAST BP <80 MM HG: CPT | Performed by: FAMILY MEDICINE

## 2025-01-29 NOTE — ASSESSMENT & PLAN NOTE
Orders:    CBC & Differential    Comprehensive Metabolic Panel    Lipid Panel With / Chol / HDL Ratio    TSH

## 2025-01-29 NOTE — PROGRESS NOTES
Subjective   The ABCs of the Annual Wellness Visit  Medicare Wellness Visit      Rodolfo Grover is a 79 y.o. patient who presents for a Medicare Wellness Visit.    The following portions of the patient's history were reviewed and   updated as appropriate: allergies, current medications, past family history, past medical history, past social history, past surgical history, and problem list.    Compared to one year ago, the patient's physical   health is the same.  Compared to one year ago, the patient's mental   health is the same.    Recent Hospitalizations:  This patient has had a [facility] admission record on file within the last 365 days.  Current Medical Providers:  Patient Care Team:  Gold Gray Sr., MD as PCP - General (Family Medicine)  Mitch Mendiola MD as Consulting Physician (Pulmonary Disease)  Edgar Allen MD as Consulting Physician (Gastroenterology)  Jimena Singer MD as Consulting Physician (Cardiology)  Domenica Merida MD as Referring Physician (Family Medicine)  Juan David Solis MD as Consulting Physician (Hematology and Oncology)    Outpatient Medications Prior to Visit   Medication Sig Dispense Refill   • acetaminophen (TYLENOL) 500 MG tablet Take 2 tablets by mouth 3 (Three) Times a Day. (Patient taking differently: Take 2 tablets by mouth 3 (Three) Times a Day As Needed.)     • aspirin 81 MG EC tablet Take 1 tablet by mouth Daily. 90 tablet 1   • atorvastatin (LIPITOR) 10 MG tablet Take 1 tablet by mouth once daily 30 tablet 5   • Cholecalciferol 50 MCG (2000 UT) capsule Take 1 capsule by mouth Daily.     • Ferrous Gluconate (IRON 27 PO) Take 27 mg by mouth 3 (Three) Times a Week.     • fluticasone (CUTIVATE) 0.05 % cream Apply 1 application  topically to the appropriate area as directed 2 (Two) Times a Day. (Patient taking differently: Apply 1 Application topically to the appropriate area as directed As Needed.) 30 g 4   • furosemide (LASIX) 40 MG tablet Take 1 tablet by mouth once daily  90 tablet 0   • metoprolol succinate XL (TOPROL-XL) 25 MG 24 hr tablet Take 1 tablet by mouth Daily. 90 tablet 3   • nitroglycerin (NITROSTAT) 0.4 MG SL tablet 1 under the tongue as needed for angina, may repeat q5mins for up three doses (Patient taking differently: Place 1 tablet under the tongue Every 5 (Five) Minutes As Needed. 1 under the tongue as needed for angina, may repeat q5mins for up three doses) 25 tablet 1   • Omeprazole Magnesium (PRILOSEC OTC PO) Take 20 mg by mouth Daily.     • potassium chloride 10 MEQ CR tablet Take 1 tablet by mouth once daily 90 tablet 0   • Psyllium (METAMUCIL FIBER PO) Take  by mouth As Needed.     • sucralfate (CARAFATE) 1 g tablet As Needed.     • warfarin (COUMADIN) 2 MG tablet Take one-half tablet (1 mg) by mouth Monday and Thursday, and take one tablet (2 mg) all other days or as directed. 80 tablet 1     No facility-administered medications prior to visit.     No opioid medication identified on active medication list. I have reviewed chart for other potential  high risk medication/s and harmful drug interactions in the elderly.      Aspirin is on active medication list. Aspirin use is indicated based on review of current medical condition/s. Pros and cons of this therapy have been discussed today. Benefits of this medication outweigh potential harm.  Patient has been encouraged to continue taking this medication.  .      Patient Active Problem List   Diagnosis   • Acute blood loss anemia   • Fracture of fifth metacarpal bone of right hand   • Closed fracture dislocation of right shoulder   • Gastroesophageal reflux disease without esophagitis   • DNR (do not resuscitate)   • Bradycardia following surgery   • Frequent PVCs   • Proximal humerus fracture   • Osteoarthritis   • Severe obstructive sleep apnea   • Pure hypercholesterolemia   • Pulmonary hypertension   • LARA (dyspnea on exertion)   • Localized edema   • Chest discomfort   • Elevated troponin   • Abnormal EKG  "  • Coronary artery disease involving native coronary artery of native heart without angina pectoris   • Focal motor seizure   • Generalized tonic-clonic seizure   • Post-ictal state   • Coffee ground emesis   • S/P CABG (coronary artery bypass graft)   • Mitral regurgitation   • H/O mitral valve repair   • PAF (paroxysmal atrial fibrillation)   • Ischemic cardiomyopathy   • Essential hypertension   • Non-ischemic cardiomyopathy   • Iron deficiency anemia due to chronic blood loss   • Polyp of colon   • Macrocytic anemia   • Thrombocytopenia   • CKD (chronic kidney disease)   • Chronic diastolic heart failure   • Wenckebach second degree AV block   • Medicare annual wellness visit, subsequent   • Atopic dermatitis in adult   • Abnormal cardiac conduction   • AV block, complete   • Acute cough     Advance Care Planning Advance Directive is on file.  ACP discussion was held with the patient during this visit. Patient has an advance directive in EMR which is still valid.             Objective   Vitals:    01/29/25 0851   BP: 128/70   Pulse: 83   Resp: 18   SpO2: 99%   Weight: 85.7 kg (189 lb)   Height: 170 cm (66.93\")   PainSc: 0-No pain       Estimated body mass index is 29.66 kg/m² as calculated from the following:    Height as of this encounter: 170 cm (66.93\").    Weight as of this encounter: 85.7 kg (189 lb).    BMI is >= 25 and <30. (Overweight) The following options were offered after discussion;: exercise counseling/recommendations and nutrition counseling/recommendations           Does the patient have evidence of cognitive impairment? No          Mini-Mental State Examination (MMSE)        Instructions: Ask the questions in the order listed. Score one point for each correct response within each question or activity.      Maximum Score  Patient’s Score  Questions    5   5 “What is the year?  Season?  Date?  Day of the week?  Month?”    5  5  “Where are we now: State?  County?  Town/city?  Hospital?  Floor?”  "   3  3  The examiner names three unrelated objects clearly and slowly, then asks the patient to name all three of them. The patient’s response is used for scoring. The examiner repeats them until patient learns all of them, if possible. Number of trials: ___________    5   5 “I would like you to count backward from 100 by sevens.” (93, 86, 79, 72, 65, …) Stop after five answers.   Alternative: “Spell WORLD backwards.” ([id number])    3  3  “Earlier I told you the names of three things. Can you tell me what those were?”    2  2  Show the patient two simple objects, such as a wristwatch and a pencil, and ask the patient to name them.    1  1  “Repeat the phrase: ‘No ifs, ands, or buts.’”    3   3 “Take the paper in your right hand, fold it in half, and put it on the floor.”   (The examiner gives the patient a piece of blank paper.)    1  1  “Please read this and do what it says.” (Written instruction is “Close your eyes.”)    1   1 “Make up and write a sentence about anything.” (This sentence must contain a noun and a verb.)    1   1 “Please copy this picture.” (The examiner gives the patient a blank piece of paper and asks him/her to draw the symbol below. All 10 angles must be present and two must intersect.)             30  30  TOTAL                                                                                            Health  Risk Assessment    Smoking Status:  Social History     Tobacco Use   Smoking Status Never   • Passive exposure: Never   Smokeless Tobacco Never     Alcohol Consumption:  Social History     Substance and Sexual Activity   Alcohol Use Yes    Comment: occasional       Fall Risk Screen  STEADI Fall Risk Assessment was completed, and patient is at LOW risk for falls.Assessment completed on:1/29/2025    Depression Screening   Little interest or pleasure in doing things? Not at all   Feeling down, depressed, or hopeless? Not at all   PHQ-2 Total Score 0      Health Habits and Functional and  Cognitive Screenin/29/2025     8:52 AM   Functional & Cognitive Status   Do you have difficulty preparing food and eating? No   Do you have difficulty bathing yourself, getting dressed or grooming yourself? No   Do you have difficulty using the toilet? No   Do you have difficulty moving around from place to place? No   Do you have trouble with steps or getting out of a bed or a chair? No   Current Diet Well Balanced Diet   Dental Exam Up to date   Eye Exam Up to date   Exercise (times per week) 1 times per week   Current Exercises Include Walking   Do you need help using the phone?  No   Are you deaf or do you have serious difficulty hearing?  No   Do you need help to go to places out of walking distance? No   Do you need help shopping? No   Do you need help preparing meals?  No   Do you need help with housework?  No   Do you need help with laundry? No   Do you need help taking your medications? No   Do you need help managing money? No   Do you ever drive or ride in a car without wearing a seat belt? No   Have you felt unusual stress, anger or loneliness in the last month? No   Who do you live with? Alone   If you need help, do you have trouble finding someone available to you? No   Have you been bothered in the last four weeks by sexual problems? No   Do you have difficulty concentrating, remembering or making decisions? No           Age-appropriate Screening Schedule:  Refer to the list below for future screening recommendations based on patient's age, sex and/or medical conditions. Orders for these recommended tests are listed in the plan section. The patient has been provided with a written plan.    Health Maintenance List  Health Maintenance   Topic Date Due   • Pneumococcal Vaccine 65+ (1 of 2 - PCV) Never done   • ZOSTER VACCINE (2 of 3) 2012   • RSV Vaccine - Adults (1 - 1-dose 75+ series) Never done   • COVID-19 Vaccine (3 - - season) 2024   • BMI FOLLOWUP  01/10/2025   •  "INFLUENZA VACCINE  03/31/2025 (Originally 7/1/2024)   • LIPID PANEL  07/11/2025   • TDAP/TD VACCINES (2 - Td or Tdap) 01/18/2026   • ANNUAL WELLNESS VISIT  01/29/2026   • COLORECTAL CANCER SCREENING  12/07/2026                                                                                                                                                CMS Preventative Services Quick Reference  Risk Factors Identified During Encounter  None Identified    The above risks/problems have been discussed with the patient.  Pertinent information has been shared with the patient in the After Visit Summary.  An After Visit Summary and PPPS were made available to the patient.    Follow Up:   Next Medicare Wellness visit to be scheduled in 1 year.         Additional E&M Note during same encounter follows:  Patient has additional, significant, and separately identifiable condition(s)/problem(s) that require work above and beyond the Medicare Wellness Visit     Chief Complaint  Medicare Wellness-subsequent (AWV)    Subjective   HPI  Chon is also being seen today for an annual adult preventative physical exam.     Review of Systems   All other systems reviewed and are negative.             Objective   Vital Signs:  /70   Pulse 83   Resp 18   Ht 170 cm (66.93\")   Wt 85.7 kg (189 lb)   SpO2 99%   BMI 29.66 kg/m²   Physical Exam  Vitals reviewed.   Constitutional:       Appearance: Normal appearance. He is well-developed.   HENT:      Head: Normocephalic and atraumatic.      Right Ear: Tympanic membrane, ear canal and external ear normal.      Left Ear: Tympanic membrane, ear canal and external ear normal.      Nose: Nose normal.      Mouth/Throat:      Mouth: Mucous membranes are moist.      Pharynx: Oropharynx is clear.   Eyes:      General: Lids are normal.      Conjunctiva/sclera: Conjunctivae normal.      Pupils: Pupils are equal, round, and reactive to light.   Cardiovascular:      Rate and Rhythm: Normal rate and " regular rhythm.      Pulses: Normal pulses.      Heart sounds: Normal heart sounds.   Pulmonary:      Effort: Pulmonary effort is normal. No respiratory distress.      Breath sounds: Normal breath sounds.   Abdominal:      General: Bowel sounds are normal.      Palpations: Abdomen is soft.   Musculoskeletal:         General: Normal range of motion.      Cervical back: Normal range of motion and neck supple.   Skin:     General: Skin is warm and dry.      Capillary Refill: Capillary refill takes less than 2 seconds.   Neurological:      General: No focal deficit present.      Mental Status: He is alert and oriented to person, place, and time. Mental status is at baseline.      Cranial Nerves: No cranial nerve deficit.      Sensory: No sensory deficit.      Motor: No weakness.      Coordination: Coordination normal.      Gait: Gait normal.      Deep Tendon Reflexes: Reflexes normal.   Psychiatric:         Mood and Affect: Mood normal.         Behavior: Behavior normal.         Thought Content: Thought content normal.         Judgment: Judgment normal.       The following data was reviewed by: Gold Gray Sr, MD on 01/29/2025:              Assessment and Plan Additional age appropriate preventative wellness advice topics were discussed during today's preventative wellness exam(some topics already addressed during AWV portion of the note above):    Physical Activity: Advised cardiovascular activity 150 minutes per week as tolerated. (example brisk walk for 30 minutes, 5 days a week).   Nutrition: Discussed nutrition plan with patient. Information shared in after visit summary. Goal is for a well balanced diet to enhance overall health.   Healthy Weight: Discussed current and goal BMI with patient. Steps to attain this goal discussed. Information shared in after visit summary.           Medicare annual wellness visit, subsequent  Medicare wellness completed.  Discussed advanced directives with patient.  Performed the  Mini-Mental exam and patient scored 30/30.         Essential hypertension      Orders:  •  CBC & Differential  •  Comprehensive Metabolic Panel  •  Lipid Panel With / Chol / HDL Ratio  •  TSH    Screening for prostate cancer    Orders:  •  PSA Screen    Elevated blood sugar    Orders:  •  Hemoglobin A1c            Follow Up   No follow-ups on file.  Patient was given instructions and counseling regarding his condition or for health maintenance advice. Please see specific information pulled into the AVS if appropriate.

## 2025-01-30 LAB
ALBUMIN SERPL-MCNC: 3.7 G/DL (ref 3.5–5.2)
ALBUMIN/GLOB SERPL: 1.3 G/DL
ALP SERPL-CCNC: 98 U/L (ref 39–117)
ALT SERPL-CCNC: 23 U/L (ref 1–41)
AST SERPL-CCNC: 23 U/L (ref 1–40)
BASOPHILS # BLD AUTO: 0.05 10*3/MM3 (ref 0–0.2)
BASOPHILS NFR BLD AUTO: 0.7 % (ref 0–1.5)
BILIRUB SERPL-MCNC: 0.5 MG/DL (ref 0–1.2)
BUN SERPL-MCNC: 24 MG/DL (ref 8–23)
BUN/CREAT SERPL: 15.2 (ref 7–25)
CALCIUM SERPL-MCNC: 9 MG/DL (ref 8.6–10.5)
CHLORIDE SERPL-SCNC: 105 MMOL/L (ref 98–107)
CHOLEST SERPL-MCNC: 167 MG/DL (ref 0–200)
CHOLEST/HDLC SERPL: 2.88 {RATIO}
CO2 SERPL-SCNC: 27.2 MMOL/L (ref 22–29)
CREAT SERPL-MCNC: 1.58 MG/DL (ref 0.76–1.27)
EGFRCR SERPLBLD CKD-EPI 2021: 43.7 ML/MIN/1.73
EOSINOPHIL # BLD AUTO: 0.24 10*3/MM3 (ref 0–0.4)
EOSINOPHIL NFR BLD AUTO: 3.5 % (ref 0.3–6.2)
ERYTHROCYTE [DISTWIDTH] IN BLOOD BY AUTOMATED COUNT: 11.8 % (ref 12.3–15.4)
GLOBULIN SER CALC-MCNC: 2.8 GM/DL
GLUCOSE SERPL-MCNC: 94 MG/DL (ref 65–99)
HBA1C MFR BLD: 6 % (ref 4.8–5.6)
HCT VFR BLD AUTO: 40.4 % (ref 37.5–51)
HDLC SERPL-MCNC: 58 MG/DL (ref 40–60)
HGB BLD-MCNC: 13.5 G/DL (ref 13–17.7)
IMM GRANULOCYTES # BLD AUTO: 0.12 10*3/MM3 (ref 0–0.05)
IMM GRANULOCYTES NFR BLD AUTO: 1.7 % (ref 0–0.5)
LDLC SERPL CALC-MCNC: 95 MG/DL (ref 0–100)
LYMPHOCYTES # BLD AUTO: 1.23 10*3/MM3 (ref 0.7–3.1)
LYMPHOCYTES NFR BLD AUTO: 17.9 % (ref 19.6–45.3)
MCH RBC QN AUTO: 33.3 PG (ref 26.6–33)
MCHC RBC AUTO-ENTMCNC: 33.4 G/DL (ref 31.5–35.7)
MCV RBC AUTO: 99.8 FL (ref 79–97)
MONOCYTES # BLD AUTO: 0.65 10*3/MM3 (ref 0.1–0.9)
MONOCYTES NFR BLD AUTO: 9.5 % (ref 5–12)
NEUTROPHILS # BLD AUTO: 4.58 10*3/MM3 (ref 1.7–7)
NEUTROPHILS NFR BLD AUTO: 66.7 % (ref 42.7–76)
NRBC BLD AUTO-RTO: 0 /100 WBC (ref 0–0.2)
PLATELET # BLD AUTO: 158 10*3/MM3 (ref 140–450)
POTASSIUM SERPL-SCNC: 4.7 MMOL/L (ref 3.5–5.2)
PROT SERPL-MCNC: 6.5 G/DL (ref 6–8.5)
PSA SERPL-MCNC: 4.44 NG/ML (ref 0–4)
RBC # BLD AUTO: 4.05 10*6/MM3 (ref 4.14–5.8)
SODIUM SERPL-SCNC: 142 MMOL/L (ref 136–145)
TRIGL SERPL-MCNC: 71 MG/DL (ref 0–150)
TSH SERPL DL<=0.005 MIU/L-ACNC: 5.33 UIU/ML (ref 0.27–4.2)
VLDLC SERPL CALC-MCNC: 14 MG/DL (ref 5–40)
WBC # BLD AUTO: 6.87 10*3/MM3 (ref 3.4–10.8)

## 2025-01-31 ENCOUNTER — ANTICOAGULATION VISIT (OUTPATIENT)
Dept: PHARMACY | Facility: HOSPITAL | Age: 80
End: 2025-01-31
Payer: MEDICARE

## 2025-01-31 DIAGNOSIS — I48.0 PAF (PAROXYSMAL ATRIAL FIBRILLATION): Primary | ICD-10-CM

## 2025-01-31 LAB
INR PPP: 1.6 (ref 0.91–1.09)
PROTHROMBIN TIME: 19.7 SECONDS (ref 10–13.8)

## 2025-01-31 PROCEDURE — 85610 PROTHROMBIN TIME: CPT

## 2025-01-31 PROCEDURE — G0463 HOSPITAL OUTPT CLINIC VISIT: HCPCS

## 2025-01-31 NOTE — PROGRESS NOTES
Anticoagulation Clinic Progress Note    Anticoagulation Summary  As of 2025      INR goal:  2.0-3.0   TTR:  68.3% (3.7 y)   INR used for dosin.6 (2025)   Warfarin maintenance plan:  1 mg every Thu; 2 mg all other days   Weekly warfarin total:  13 mg   Plan last modified:  Luci Hale RPH (2025)   Next INR check:  2025   Priority:  High   Target end date:  --    Indications    PAF (paroxysmal atrial fibrillation) [I48.0]                 Anticoagulation Episode Summary       INR check location:  --    Preferred lab:  --    Send INR reminders to:   [facility] [place]    Comments:  --          Anticoagulation Care Providers       Provider Role Specialty Phone number    Jimena Singer MD Referring Cardiology [phone]            Clinic Interview:  Patient Findings     Negatives:  Signs/symptoms of thrombosis, Signs/symptoms of bleeding,   Laboratory test error suspected, Change in health, Change in alcohol use,   Change in activity, Upcoming invasive procedure, Emergency department   visit, Upcoming dental procedure, Missed doses, Extra doses, Change in   medications, Change in diet/appetite, Hospital admission, Bruising, Other   complaints      Clinical Outcomes     Negatives:  Major bleeding event, Thromboembolic event,   Anticoagulation-related hospital admission, Anticoagulation-related ED   visit, Anticoagulation-related fatality        INR History:      2024     2:15 PM 2024     2:00 PM 10/23/2024     2:15 PM 2024     2:15 PM 2024     2:30 PM 2025     2:30 PM 2025     2:30 PM   Anticoagulation Monitoring   INR 3.1 2.4 2.2 2.3 2.0 1.8 1.6   INR Date 2024 2024 10/23/2024 2024 2024 2025 2025   INR Goal 2.0-3.0 2.0-3.0 2.0-3.0 2.0-3.0 2.0-3.0 2.0-3.0 2.0-3.0   Trend Same Same Same Same Same Same Up   Last Week Total 12 mg 12 mg 12 mg 12 mg 12 mg 12 mg 12 mg   Next Week Total 12 mg 12 mg 12 mg 12 mg 12 mg 13 mg 14 mg    Sun 2 mg 2 mg 2 mg 2 mg 2 mg 2 mg 2 mg   Mon 1 mg 1 mg 1 mg 1 mg 1 mg 1 mg 2 mg   Tue 2 mg 2 mg 2 mg 2 mg 2 mg 2 mg 2 mg   Wed 2 mg 2 mg 2 mg 2 mg 2 mg 3 mg (1/8); Otherwise 2 mg 2 mg   Thu 1 mg 1 mg 1 mg 1 mg 1 mg 1 mg 1 mg   Fri 2 mg 2 mg 2 mg 2 mg 2 mg 2 mg 3 mg (1/31); Otherwise 2 mg   Sat 2 mg 2 mg 2 mg 2 mg 2 mg 2 mg 2 mg   Visit Report     Report         Plan:  1. INR is Subtherapeutic today- see above in Anticoagulation Summary.  Will instruct Rodolfo Grover to Increase their warfarin regimen- see above in Anticoagulation Summary.      boost to 3 mg then increase to 1 mg on thurs and 2 mg AOHALIMA, rck 2 weeks   2. Follow up in 2 weeks  3. Patient declines warfarin refills.  4. Verbal and written information provided. Patient expresses understanding and has no further questions at this time.    Luci Hale [facility]

## 2025-02-12 ENCOUNTER — ANTICOAGULATION VISIT (OUTPATIENT)
Dept: PHARMACY | Facility: HOSPITAL | Age: 80
End: 2025-02-12
Payer: MEDICARE

## 2025-02-12 DIAGNOSIS — I48.0 PAF (PAROXYSMAL ATRIAL FIBRILLATION): Primary | ICD-10-CM

## 2025-02-12 LAB
INR PPP: 4.4 (ref 0.91–1.09)
PROTHROMBIN TIME: 52.5 SECONDS (ref 10–13.8)

## 2025-02-12 PROCEDURE — 85610 PROTHROMBIN TIME: CPT

## 2025-02-12 PROCEDURE — G0463 HOSPITAL OUTPT CLINIC VISIT: HCPCS

## 2025-02-12 NOTE — PROGRESS NOTES
Anticoagulation Clinic Progress Note    Anticoagulation Summary  As of 2025      INR goal:  2.0-3.0   TTR:  68.0% (3.8 y)   INR used for dosin.4 (2025)   Warfarin maintenance plan:  1 mg every Thu; 2 mg all other days   Weekly warfarin total:  13 mg   Plan last modified:  Luci Hale RPH (2025)   Next INR check:  2025   Priority:  High   Target end date:  --    Indications    PAF (paroxysmal atrial fibrillation) [I48.0]                 Anticoagulation Episode Summary       INR check location:  --    Preferred lab:  --    Send INR reminders to:  [facility]    Comments:  --          Anticoagulation Care Providers       Provider Role Specialty Phone number    Jimena Singer MD Referring Cardiology [phone]            Clinic Interview:  Patient Findings     Positives:  Change in health, Change in medications    Negatives:  Signs/symptoms of thrombosis, Signs/symptoms of bleeding,   Laboratory test error suspected, Change in alcohol use, Change in   activity, Upcoming invasive procedure, Emergency department visit,   Upcoming dental procedure, Missed doses, Extra doses, Change in   diet/appetite, Hospital admission, Bruising, Other complaints      Clinical Outcomes     Negatives:  Major bleeding event, Thromboembolic event,   Anticoagulation-related hospital admission, Anticoagulation-related ED   visit, Anticoagulation-related fatality        INR History:      2024     2:00 PM 10/23/2024     2:15 PM 2024     2:15 PM 2024     2:30 PM 2025     2:30 PM 2025     2:30 PM 2025     2:00 PM   Anticoagulation Monitoring   INR 2.4 2.2 2.3 2.0 1.8 1.6 4.4   INR Date 2024 10/23/2024 2024 2024 2025 2025 2025   INR Goal 2.0-3.0 2.0-3.0 2.0-3.0 2.0-3.0 2.0-3.0 2.0-3.0 2.0-3.0   Trend Same Same Same Same Same Up Same   Last Week Total 12 mg 12 mg 12 mg 12 mg 12 mg 12 mg 13 mg   Next Week Total 12 mg 12 mg 12 mg 12 mg 13 mg  14 mg 11 mg   Sun 2 mg 2 mg 2 mg 2 mg 2 mg 2 mg 2 mg   Mon 1 mg 1 mg 1 mg 1 mg 1 mg 2 mg 2 mg   Tue 2 mg 2 mg 2 mg 2 mg 2 mg 2 mg 2 mg   Wed 2 mg 2 mg 2 mg 2 mg 3 mg (1/8); Otherwise 2 mg 2 mg Hold (2/12); Otherwise 2 mg   Thu 1 mg 1 mg 1 mg 1 mg 1 mg 1 mg 1 mg   Fri 2 mg 2 mg 2 mg 2 mg 2 mg 3 mg (1/31); Otherwise 2 mg 2 mg   Sat 2 mg 2 mg 2 mg 2 mg 2 mg 2 mg 2 mg   Visit Report    Report          Plan:  1. INR is Supratherapeutic today- see above in Anticoagulation Summary.  Will instruct Rodolfo SALVADOR Grover to Change their warfarin regimen- see above in Anticoagulation Summary.  Increase in arthritis pain this past week (taking 6 500 mg tablets of tylenol daily). Hold and recheck in 2 weeks   2. Follow up in 2 weeks  3. Patient declines warfarin refills.  4. Verbal and written information provided. Patient expresses understanding and has no further questions at this time.    Luci Hale [facility]

## 2025-02-21 RX ORDER — WARFARIN SODIUM 2 MG/1
TABLET ORAL
Qty: 85 TABLET | Refills: 1 | Status: SHIPPED | OUTPATIENT
Start: 2025-02-21

## 2025-02-24 ENCOUNTER — TELEPHONE (OUTPATIENT)
Dept: FAMILY MEDICINE CLINIC | Facility: CLINIC | Age: 80
End: 2025-02-24
Payer: MEDICARE

## 2025-02-24 DIAGNOSIS — R97.20 ELEVATED PSA: Primary | ICD-10-CM

## 2025-02-26 ENCOUNTER — ANTICOAGULATION VISIT (OUTPATIENT)
Dept: PHARMACY | Facility: HOSPITAL | Age: 80
End: 2025-02-26
Payer: MEDICARE

## 2025-02-26 DIAGNOSIS — I48.0 PAF (PAROXYSMAL ATRIAL FIBRILLATION): Primary | ICD-10-CM

## 2025-02-26 LAB
INR PPP: 2.4 (ref 0.91–1.09)
PROTHROMBIN TIME: 29.3 SECONDS (ref 10–13.8)

## 2025-02-26 PROCEDURE — 85610 PROTHROMBIN TIME: CPT

## 2025-02-26 PROCEDURE — G0463 HOSPITAL OUTPT CLINIC VISIT: HCPCS

## 2025-02-26 NOTE — PROGRESS NOTES
Anticoagulation Clinic Progress Note    Anticoagulation Summary  As of 2025      INR goal:  2.0-3.0   TTR:  67.6% (3.8 y)   INR used for dosin.4 (2025)   Warfarin maintenance plan:  1 mg every Mon, Thu; 2 mg all other days   Weekly warfarin total:  12 mg   Plan last modified:  Luci aHle RPH (2025)   Next INR check:  3/12/2025   Priority:  High   Target end date:  --    Indications    PAF (paroxysmal atrial fibrillation) [I48.0]                 Anticoagulation Episode Summary       INR check location:  --    Preferred lab:  --    Send INR reminders to:  [facility]    Comments:  --          Anticoagulation Care Providers       Provider Role Specialty Phone number    Jimena Singer MD Referring Cardiology [phone]            Clinic Interview:  Patient Findings     Positives:  Missed doses    Negatives:  Signs/symptoms of thrombosis, Signs/symptoms of bleeding,   Laboratory test error suspected, Change in health, Change in alcohol use,   Change in activity, Upcoming invasive procedure, Emergency department   visit, Upcoming dental procedure, Extra doses, Change in medications,   Change in diet/appetite, Hospital admission, Bruising, Other complaints    Comments:  Patient reported he did not take dosing as instructed last   visit, took 1mg mon and thurs instead of 1 mg thurs only      Clinical Outcomes     Negatives:  Major bleeding event, Thromboembolic event,   Anticoagulation-related hospital admission, Anticoagulation-related ED   visit, Anticoagulation-related fatality    Comments:  Patient reported he did not take dosing as instructed last   visit, took 1mg mon and thurs instead of 1 mg thurs only        INR History:      10/23/2024     2:15 PM 2024     2:15 PM 2024     2:30 PM 2025     2:30 PM 2025     2:30 PM 2025     2:00 PM 2025     2:30 PM   Anticoagulation Monitoring   INR 2.2 2.3 2.0 1.8 1.6 4.4 2.4   INR Date 10/23/2024  11/20/2024 12/18/2024 1/8/2025 1/31/2025 2/12/2025 2/26/2025   INR Goal 2.0-3.0 2.0-3.0 2.0-3.0 2.0-3.0 2.0-3.0 2.0-3.0 2.0-3.0   Trend Same Same Same Same Up Same Down   Last Week Total 12 mg 12 mg 12 mg 12 mg 12 mg 13 mg 12 mg   Next Week Total 12 mg 12 mg 12 mg 13 mg 14 mg 11 mg 12 mg   Sun 2 mg 2 mg 2 mg 2 mg 2 mg 2 mg 2 mg   Mon 1 mg 1 mg 1 mg 1 mg 2 mg 2 mg 1 mg   Tue 2 mg 2 mg 2 mg 2 mg 2 mg 2 mg 2 mg   Wed 2 mg 2 mg 2 mg 3 mg (1/8); Otherwise 2 mg 2 mg Hold (2/12); Otherwise 2 mg 2 mg   Thu 1 mg 1 mg 1 mg 1 mg 1 mg 1 mg 1 mg   Fri 2 mg 2 mg 2 mg 2 mg 3 mg (1/31); Otherwise 2 mg 2 mg 2 mg   Sat 2 mg 2 mg 2 mg 2 mg 2 mg 2 mg 2 mg   Visit Report   Report           Plan:  1. INR is therapeutic today- see above in Anticoagulation Summary.   Will instruct Rodolfo Grover to continue their warfarin regimen- see above in Anticoagulation Summary.  2. Follow up in 2 weeks.  3. Patient declines warfarin refills.  4. Verbal and written information provided. Patient expresses understanding and has no further questions at this time.    Velia Salmeron

## 2025-02-26 NOTE — PROGRESS NOTES
I have supervised and reviewed the notes, assessments, and/or procedures performed. I personally performed the assessment and implemented the plan. I concur with the documentation of this patient encounter.    Luci Hale, [facility]

## 2025-03-12 ENCOUNTER — ANTICOAGULATION VISIT (OUTPATIENT)
Dept: PHARMACY | Facility: HOSPITAL | Age: 80
End: 2025-03-12
Payer: MEDICARE

## 2025-03-12 DIAGNOSIS — I48.0 PAF (PAROXYSMAL ATRIAL FIBRILLATION): Primary | ICD-10-CM

## 2025-03-12 LAB
INR PPP: 2.6 (ref 0.91–1.09)
PROTHROMBIN TIME: 30.9 SECONDS (ref 10–13.8)

## 2025-03-12 PROCEDURE — 85610 PROTHROMBIN TIME: CPT

## 2025-03-12 PROCEDURE — G0463 HOSPITAL OUTPT CLINIC VISIT: HCPCS

## 2025-03-12 NOTE — PROGRESS NOTES
Anticoagulation Clinic Progress Note    Anticoagulation Summary  As of 3/12/2025      INR goal:  2.0-3.0   TTR:  67.9% (3.8 y)   INR used for dosin.6 (3/12/2025)   Warfarin maintenance plan:  1 mg every Mon, Thu; 2 mg all other days   Weekly warfarin total:  12 mg   No change documented:  Shelton Cr, Pharmacy Intern   Plan last modified:  Luci Hale RPH (2025)   Next INR check:  2025   Priority:  High   Target end date:  --    Indications    PAF (paroxysmal atrial fibrillation) [I48.0]                 Anticoagulation Episode Summary       INR check location:  --    Preferred lab:  --    Send INR reminders to:  [facility] [place]    Comments:  --          Anticoagulation Care Providers       Provider Role Specialty Phone number    Jimena Singer MD Referring Cardiology [phone]            Clinic Interview:  Patient Findings     Negatives:  Signs/symptoms of thrombosis, Signs/symptoms of bleeding,   Laboratory test error suspected, Change in health, Change in alcohol use,   Change in activity, Upcoming invasive procedure, Emergency department   visit, Upcoming dental procedure, Missed doses, Extra doses, Change in   medications, Change in diet/appetite, Hospital admission, Bruising, Other   complaints      Clinical Outcomes     Negatives:  Major bleeding event, Thromboembolic event,   Anticoagulation-related hospital admission, Anticoagulation-related ED   visit, Anticoagulation-related fatality        INR History:      2024     2:15 PM 2024     2:30 PM 2025     2:30 PM 2025     2:30 PM 2025     2:00 PM 2025     2:30 PM 3/12/2025     2:45 PM   Anticoagulation Monitoring   INR 2.3 2.0 1.8 1.6 4.4 2.4 2.6   INR Date 2024 2024 2025 2025 2025 2025 3/12/2025   INR Goal 2.0-3.0 2.0-3.0 2.0-3.0 2.0-3.0 2.0-3.0 2.0-3.0 2.0-3.0   Trend Same Same Same Up Same Down Same   Last Week Total 12 mg 12 mg 12 mg 12 mg 13 mg 12 mg  12 mg   Next Week Total 12 mg 12 mg 13 mg 14 mg 11 mg 12 mg 12 mg   Sun 2 mg 2 mg 2 mg 2 mg 2 mg 2 mg 2 mg   Mon 1 mg 1 mg 1 mg 2 mg 2 mg 1 mg 1 mg   Tue 2 mg 2 mg 2 mg 2 mg 2 mg 2 mg 2 mg   Wed 2 mg 2 mg 3 mg (1/8); Otherwise 2 mg 2 mg Hold (2/12); Otherwise 2 mg 2 mg 2 mg   Thu 1 mg 1 mg 1 mg 1 mg 1 mg 1 mg 1 mg   Fri 2 mg 2 mg 2 mg 3 mg (1/31); Otherwise 2 mg 2 mg 2 mg 2 mg   Sat 2 mg 2 mg 2 mg 2 mg 2 mg 2 mg 2 mg   Visit Report  Report            Plan:  1. INR is Therapeutic today- see above in Anticoagulation Summary.  Will instruct Rodolfo Grover to Continue their warfarin regimen (1 mg MoTh, 2 mg AOD) - see above in Anticoagulation Summary.  2. Follow up in 4 weeks  3. Patient declines warfarin refills.  4. Verbal and written information provided. Patient expresses understanding and has no further questions at this time.    [place]a [place], Pharmacy Intern

## 2025-04-04 RX ORDER — METOPROLOL SUCCINATE 25 MG/1
25 TABLET, EXTENDED RELEASE ORAL DAILY
Qty: 90 TABLET | Refills: 3 | Status: SHIPPED | OUTPATIENT
Start: 2025-04-04

## 2025-04-04 NOTE — TELEPHONE ENCOUNTER
Pt reached out today lmm re: needing refill on Metoprolol sent to [facility]     Lv  12/18/24 MKD   Nxt 6/25/25 SW   Labs 1/29/25 CBC, CMP, LIPID     Return for followup with APRN in 6 months and me in 1 yr.     I reached out to pt who is aware medication has been sent to pharmacy and they will contact him once medication is ready for . Pt gave vu and no further questions or concerns.

## 2025-04-09 ENCOUNTER — ANTICOAGULATION VISIT (OUTPATIENT)
Dept: PHARMACY | Facility: HOSPITAL | Age: 80
End: 2025-04-09
Payer: MEDICARE

## 2025-04-09 DIAGNOSIS — I48.0 PAF (PAROXYSMAL ATRIAL FIBRILLATION): Primary | ICD-10-CM

## 2025-04-09 LAB
INR PPP: 2.2 (ref 0.91–1.09)
PROTHROMBIN TIME: 27 SECONDS (ref 10–13.8)

## 2025-04-09 PROCEDURE — G0463 HOSPITAL OUTPT CLINIC VISIT: HCPCS

## 2025-04-09 PROCEDURE — 85610 PROTHROMBIN TIME: CPT

## 2025-04-09 NOTE — PROGRESS NOTES
Anticoagulation Clinic Progress Note    Anticoagulation Summary  As of 2025      INR goal:  2.0-3.0   TTR:  68.5% (3.9 y)   INR used for dosin.2 (2025)   Warfarin maintenance plan:  1 mg every Mon, Thu; 2 mg all other days   Weekly warfarin total:  12 mg   No change documented:  Velia Salmeron   Plan last modified:  Luci Hale RPH (2025)   Next INR check:  2025   Priority:  High   Target end date:  --    Indications    PAF (paroxysmal atrial fibrillation) [I48.0]                 Anticoagulation Episode Summary       INR check location:  --    Preferred lab:  --    Send INR reminders to:   [facility] [place]    Comments:  --          Anticoagulation Care Providers       Provider Role Specialty Phone number    Jimena Singer MD Referring Cardiology [phone]            Clinic Interview:  Patient Findings     Negatives:  Signs/symptoms of thrombosis, Signs/symptoms of bleeding,   Laboratory test error suspected, Change in health, Change in alcohol use,   Change in activity, Upcoming invasive procedure, Emergency department   visit, Upcoming dental procedure, Missed doses, Extra doses, Change in   medications, Change in diet/appetite, Hospital admission, Bruising, Other   complaints      Clinical Outcomes     Negatives:  Major bleeding event, Thromboembolic event,   Anticoagulation-related hospital admission, Anticoagulation-related ED   visit, Anticoagulation-related fatality        INR History:      2024     2:30 PM 2025     2:30 PM 2025     2:30 PM 2025     2:00 PM 2025     2:30 PM 3/12/2025     2:45 PM 2025     2:45 PM   Anticoagulation Monitoring   INR 2.0 1.8 1.6 4.4 2.4 2.6 2.2   INR Date 2024 2025 2025 2025 2025 3/12/2025 2025   INR Goal 2.0-3.0 2.0-3.0 2.0-3.0 2.0-3.0 2.0-3.0 2.0-3.0 2.0-3.0   Trend Same Same Up Same Down Same Same   Last Week Total 12 mg 12 mg 12 mg 13 mg 12 mg 12 mg 12 mg   Next Week  Total 12 mg 13 mg 14 mg 11 mg 12 mg 12 mg 12 mg   Sun 2 mg 2 mg 2 mg 2 mg 2 mg 2 mg 2 mg   Mon 1 mg 1 mg 2 mg 2 mg 1 mg 1 mg 1 mg   Tue 2 mg 2 mg 2 mg 2 mg 2 mg 2 mg 2 mg   Wed 2 mg 3 mg (1/8); Otherwise 2 mg 2 mg Hold (2/12); Otherwise 2 mg 2 mg 2 mg 2 mg   Thu 1 mg 1 mg 1 mg 1 mg 1 mg 1 mg 1 mg   Fri 2 mg 2 mg 3 mg (1/31); Otherwise 2 mg 2 mg 2 mg 2 mg 2 mg   Sat 2 mg 2 mg 2 mg 2 mg 2 mg 2 mg 2 mg   Visit Report Report             Plan:  1. INR is therapeutic today- see above in Anticoagulation Summary.   Will instruct Rodolfo FRANCO Reilly to continue their warfarin regimen- see above in Anticoagulation Summary.  2. Follow up in 4 weeks.  3. Patient declines warfarin refills.  4. Verbal and written information provided. Patient expresses understanding and has no further questions at this time.    Velia Salmeron

## 2025-04-09 NOTE — PROGRESS NOTES
I have supervised and reviewed the notes, assessments, and/or procedures performed. I personally performed the assessment and implemented the plan. I concur with the documentation of this patient encounter.    Luci Hale, [facility]

## 2025-05-08 ENCOUNTER — ANTICOAGULATION VISIT (OUTPATIENT)
Dept: PHARMACY | Facility: HOSPITAL | Age: 80
End: 2025-05-08
Payer: MEDICARE

## 2025-05-08 DIAGNOSIS — I48.0 PAF (PAROXYSMAL ATRIAL FIBRILLATION): Primary | ICD-10-CM

## 2025-05-08 LAB
INR PPP: 2.2 (ref 0.91–1.09)
PROTHROMBIN TIME: 26.2 SECONDS (ref 10–13.8)

## 2025-05-08 PROCEDURE — 85610 PROTHROMBIN TIME: CPT

## 2025-05-08 PROCEDURE — G0463 HOSPITAL OUTPT CLINIC VISIT: HCPCS

## 2025-05-08 NOTE — PROGRESS NOTES
I have supervised and reviewed the notes, assessments, and/or procedures performed. The documented assessment and plan were developed cooperatively, and the plan was implemented in my presence. I concur with the documentation of this patient encounter.    Luci Hale, [facility]

## 2025-05-08 NOTE — PROGRESS NOTES
Anticoagulation Clinic Progress Note    Anticoagulation Summary  As of 2025      INR goal:  2.0-3.0   TTR:  69.2% (4 y)   INR used for dosin.2 (2025)   Warfarin maintenance plan:  1 mg every Mon, Thu; 2 mg all other days   Weekly warfarin total:  12 mg   No change documented:  Janet Dick, Pharmacy Intern   Plan last modified:  Luci Hale RPH (2025)   Next INR check:  2025   Priority:  High   Target end date:  --    Indications    PAF (paroxysmal atrial fibrillation) [I48.0]                 Anticoagulation Episode Summary       INR check location:  --    Preferred lab:  --    Send INR reminders to:   [facility]    Comments:  --          Anticoagulation Care Providers       Provider Role Specialty Phone number    Jimena Singer MD Referring Cardiology [phone]            Clinic Interview:  Patient Findings     Negatives:  Signs/symptoms of thrombosis, Signs/symptoms of bleeding,   Laboratory test error suspected, Change in health, Change in alcohol use,   Change in activity, Upcoming invasive procedure, Emergency department   visit, Upcoming dental procedure, Missed doses, Extra doses, Change in   medications, Change in diet/appetite, Hospital admission, Bruising, Other   complaints      Clinical Outcomes     Negatives:  Major bleeding event, Thromboembolic event,   Anticoagulation-related hospital admission, Anticoagulation-related ED   visit, Anticoagulation-related fatality        INR History:      2025     2:30 PM 2025     2:30 PM 2025     2:00 PM 2025     2:30 PM 3/12/2025     2:45 PM 2025     2:45 PM 2025     2:45 PM   Anticoagulation Monitoring   INR 1.8 1.6 4.4 2.4 2.6 2.2 2.2   INR Date 2025 2025 2025 2025 3/12/2025 2025 2025   INR Goal 2.0-3.0 2.0-3.0 2.0-3.0 2.0-3.0 2.0-3.0 2.0-3.0 2.0-3.0   Trend Same Up Same Down Same Same Same   Last Week Total 12 mg 12 mg 13 mg 12 mg 12 mg 12 mg 12 mg   Next Week  Total 13 mg 14 mg 11 mg 12 mg 12 mg 12 mg 12 mg   Sun 2 mg 2 mg 2 mg 2 mg 2 mg 2 mg 2 mg   Mon 1 mg 2 mg 2 mg 1 mg 1 mg 1 mg 1 mg   Tue 2 mg 2 mg 2 mg 2 mg 2 mg 2 mg 2 mg   Wed 3 mg (1/8); Otherwise 2 mg 2 mg Hold (2/12); Otherwise 2 mg 2 mg 2 mg 2 mg 2 mg   Thu 1 mg 1 mg 1 mg 1 mg 1 mg 1 mg 1 mg   Fri 2 mg 3 mg (1/31); Otherwise 2 mg 2 mg 2 mg 2 mg 2 mg 2 mg   Sat 2 mg 2 mg 2 mg 2 mg 2 mg 2 mg 2 mg       Plan:  1. INR is therapeutic today- see above in Anticoagulation Summary.   Will instruct Rodolfo FRANCO Reilly to continue their warfarin regimen- see above in Anticoagulation Summary.  2. Follow up in 4 weeks.  3. Patient declines warfarin refills.  4. Verbal and written information provided. Patient expresses understanding and has no further questions at this time.    Janet Dick, Pharmacy Intern

## 2025-05-16 ENCOUNTER — OFFICE VISIT (OUTPATIENT)
Dept: SLEEP MEDICINE | Facility: HOSPITAL | Age: 80
End: 2025-05-16
Payer: MEDICARE

## 2025-05-16 VITALS
HEART RATE: 80 BPM | OXYGEN SATURATION: 97 % | HEIGHT: 67 IN | BODY MASS INDEX: 28.72 KG/M2 | DIASTOLIC BLOOD PRESSURE: 71 MMHG | SYSTOLIC BLOOD PRESSURE: 121 MMHG | WEIGHT: 183 LBS

## 2025-05-16 DIAGNOSIS — G47.33 OBSTRUCTIVE SLEEP APNEA, ADULT: Primary | ICD-10-CM

## 2025-05-16 DIAGNOSIS — Z78.9 DIFFICULTY WITH CPAP FULL FACE MASK USE: ICD-10-CM

## 2025-05-16 DIAGNOSIS — R68.2 DRY MOUTH: ICD-10-CM

## 2025-05-16 PROCEDURE — G0463 HOSPITAL OUTPT CLINIC VISIT: HCPCS

## 2025-05-16 NOTE — PROGRESS NOTES
[facility]  Telephone: [phone] / Fax: [phone] [place]  Telephone: [phone] / Fax: [phone] Lauren Ruiz    PCP: Gold Gray Sr., MD    Reason for visit: COLEMAN f/u    Rodolfo Grover is a 80 y.o.male  was last seen at [facility] sleep lab in July 2024. He is here to discuss inspire.  He reports recent increase in cavities. Dentist is concerned that chemistry of the mouth changes due to constant dryness, which predisposes him to cavities. Pt reports ongoing struggle with CPAP use. He tried a chin strap through it does not seem to work. He has pacemaker in place. He uses under the nose and over the mouth mask that fits well.     SH- no tobacco, no alcohol, 1-2 cups coffee, no energy drinks.    ROS- + post nasal drip, +GERD, rest is negative    DME [facility]    Current Medications:    Current Outpatient Medications:     acetaminophen (TYLENOL) 500 MG tablet, Take 2 tablets by mouth 3 (Three) Times a Day. (Patient taking differently: Take 2 tablets by mouth 3 (Three) Times a Day As Needed.), Disp: , Rfl:     aspirin 81 MG EC tablet, Take 1 tablet by mouth Daily., Disp: 90 tablet, Rfl: 1    atorvastatin (LIPITOR) 10 MG tablet, Take 1 tablet by mouth once daily, Disp: 30 tablet, Rfl: 5    Cholecalciferol 50 MCG (2000 UT) capsule, Take 1 capsule by mouth Daily., Disp: , Rfl:     Ferrous Gluconate (IRON 27 PO), Take 27 mg by mouth 3 (Three) Times a Week., Disp: , Rfl:     fluticasone (CUTIVATE) 0.05 % cream, Apply 1 application  topically to the appropriate area as directed 2 (Two) Times a Day. (Patient taking differently: Apply 1 Application topically to the appropriate area as directed As Needed.), Disp: 30 g, Rfl: 4    furosemide (LASIX) 40 MG tablet, Take 1 tablet by mouth once daily, Disp: 90 tablet, Rfl: 0    metoprolol succinate XL (TOPROL-XL) 25 MG 24 hr tablet, Take 1 tablet by mouth Daily., Disp: 90 tablet, Rfl: 3    nitroglycerin (NITROSTAT) 0.4 MG SL tablet, 1 under the  "tongue as needed for angina, may repeat q5mins for up three doses (Patient taking differently: Place 1 tablet under the tongue Every 5 (Five) Minutes As Needed. 1 under the tongue as needed for angina, may repeat q5mins for up three doses), Disp: 25 tablet, Rfl: 1    Omeprazole Magnesium (PRILOSEC OTC PO), Take 20 mg by mouth Daily., Disp: , Rfl:     potassium chloride 10 MEQ CR tablet, Take 1 tablet by mouth once daily, Disp: 90 tablet, Rfl: 0    Psyllium (METAMUCIL FIBER PO), Take  by mouth As Needed., Disp: , Rfl:     sucralfate (CARAFATE) 1 g tablet, As Needed., Disp: , Rfl:     warfarin (COUMADIN) 2 MG tablet, Take one-half tablet (1 mg) by mouth Thursdays, and take one tablet (2 mg) by mouth all other days or as directed., Disp: 85 tablet, Rfl: 1   also entered in Sleep Questionnaire    Patient  has a past medical history of Acute blood loss anemia, Arthritis, Atrial fibrillation, Bilateral lower extremity edema, Bradycardia following surgery, CAD (coronary artery disease), Chest discomfort, Colon polyp, Elevated troponin, Focal motor seizure, Generalized tonic-clonic seizure, GERD (gastroesophageal reflux disease), Hypertension, Iritis of right eye, Ischemic cardiomyopathy, Mitral valve insufficiency, Myocardial infarction, Peptic ulceration, Post-ictal state, Pulmonary hypertension, PVC (premature ventricular contraction), Rheumatic fever, Rheumatic fever, S/P CABG x 7, S/P mitral valve repair, Severe obstructive sleep apnea, and Shoulder fracture, right.    I have reviewed the Past Medical History, Past Surgical History, Social History and Family History.    Vital Signs /71   Pulse 80   Ht 170 cm (66.93\")   Wt 83 kg (183 lb)   SpO2 97%   BMI 28.72 kg/m²  Body mass index is 28.72 kg/m².    General Alert and oriented. No acute distress noted   Pharynx/Throat Class III Mallampati airway, large tongue, no evidence of redundant lateral pharyngeal tissue. No oral lesions. No thrush. Moist mucous " membranes.   Head Normocephalic. Symmetrical. Atraumatic.    Nose No septal deviation. No drainage   Chest Wall Normal shape. Symmetric expansion with respiration. No tenderness.   Neck Trachea midline, no thyromegaly or adenopathy    Lungs Clear to auscultation bilaterally. No wheezes. No rhonchi. No rales. Respirations regular, even and unlabored.   Heart Regular rhythm and normal rate. Normal S1 and S2. No murmur   Abdomen Soft, non-tender and non-distended. Normal bowel sounds. No masses.   Extremities Moves all extremities well. No edema   Psychiatric Normal mood and affect.     Testing:  Download 2/14/25-5/14/25 99% use with average nightly use of 7 hours and 13 minutes on auto CPAP 12-18cm H2O, avg pressure is 17.2cm H2O, AHI 4.3/hr.    Study-Diagnostic data available to date is as below and was reviewed on current visit:  [place] HST- AHI 40, lowest desaturation 70s; 68min of sats <90%    Overnight oximetry on CPAP RA-August 2018- no desaturation  6/3/20: Overnight titration polysomnogram study from 10:45 PM to 5:02 AM.  Sleep efficiency 87.5% with 5.51 hours total sleep time.  Sleep distribution shows minimal slow-wave sleep at 1.2%.  Reduced REM sleep at 15.1%.  Oxygen saturation remained above 88%.  Arousal index 10.3.  PLM index 9.4 with PLM arousal index 0.5.  CPAP was increased from 5 to 17 cm water pressure.  Bilevel also tried at 18/14.  Maximum sleep recorded at 11 cm water pressure with AHI less than 5.  Patient slept supine for the entire night.    Impression:  1. Obstructive sleep apnea, adult    2. Dry mouth    3. Difficulty with CPAP full face mask use          Plan:  I discussed inspire device at length with patient today. He has excellent control of obstructive events on CPAP. AHI is down from 40/hr to 4.3/hr. Inspire is unlikely to provide 100% correction of sleep disorder breathing.  He has known CAD, a-fib, HTN, pulmonary HTN, and valvular heart disease. He also has pacer in  place.    We agreed on trying to optimize response to CPAP. I ordered mask fitting through DME. He has not tried adjusting humidification on the device to help improve dry mouth. He is not sure how to do it. I advised Chon to take the device to DME and ask the tech to make appropriate adjustments to the humidity levels if needed.     Follow up with Dr. Mendiola in one year    Thank you for allowing me to participate in your patient's care.      NKECHI Swanson  [facility]  Phone: [phone]      Part of this note may be an electronic transcription/translation of spoken language to printed text using the Dragon Dictation System.

## 2025-05-27 DIAGNOSIS — I50.32 CHRONIC DIASTOLIC HEART FAILURE: ICD-10-CM

## 2025-05-27 NOTE — TELEPHONE ENCOUNTER
Rx Refill Note  Requested Prescriptions     Pending Prescriptions Disp Refills    potassium chloride 10 MEQ CR tablet [Pharmacy Med Name: Potassium Chloride ER 10 MEQ Oral Tablet Extended Release] 90 tablet 0     Sig: Take 1 tablet by mouth once daily    furosemide (LASIX) 40 MG tablet [Pharmacy Med Name: Furosemide 40 MG Oral Tablet] 90 tablet 0     Sig: Take 1 tablet by mouth once daily      Last office visit with prescribing clinician: 1/29/2025   Last telemedicine visit with prescribing clinician: Visit date not found   Next office visit with prescribing clinician: 7/30/2025                         Would you like a call back once the refill request has been completed: [] Yes [] No    If the office needs to give you a call back, can they leave a voicemail: [] Yes [] No    Lindsay Morales MA  05/27/25, 09:13 EDT

## 2025-05-28 RX ORDER — POTASSIUM CHLORIDE 750 MG/1
10 TABLET, EXTENDED RELEASE ORAL DAILY
Qty: 90 TABLET | Refills: 2 | Status: SHIPPED | OUTPATIENT
Start: 2025-05-28

## 2025-05-28 RX ORDER — FUROSEMIDE 40 MG/1
40 TABLET ORAL DAILY
Qty: 90 TABLET | Refills: 2 | Status: SHIPPED | OUTPATIENT
Start: 2025-05-28

## 2025-06-04 ENCOUNTER — ANTICOAGULATION VISIT (OUTPATIENT)
Dept: PHARMACY | Facility: HOSPITAL | Age: 80
End: 2025-06-04
Payer: MEDICARE

## 2025-06-04 DIAGNOSIS — I48.0 PAF (PAROXYSMAL ATRIAL FIBRILLATION): Primary | ICD-10-CM

## 2025-06-04 LAB
INR PPP: 2 (ref 0.91–1.09)
PROTHROMBIN TIME: 24.4 SECONDS (ref 10–13.8)

## 2025-06-04 PROCEDURE — G0463 HOSPITAL OUTPT CLINIC VISIT: HCPCS

## 2025-06-04 PROCEDURE — 85610 PROTHROMBIN TIME: CPT

## 2025-06-04 NOTE — PROGRESS NOTES
Anticoagulation Clinic Progress Note    Anticoagulation Summary  As of 2025      INR goal:  2.0-3.0   TTR:  69.7% (4.1 y)   INR used for dosin.0 (2025)   Warfarin maintenance plan:  1 mg every Mon, Thu; 2 mg all other days   Weekly warfarin total:  12 mg   No change documented:  Magdaleno Rausch, Pharmacy Technician   Plan last modified:  Luci Hale RPH (2025)   Next INR check:  2025   Priority:  High   Target end date:  --    Indications    PAF (paroxysmal atrial fibrillation) [I48.0]                 Anticoagulation Episode Summary       INR check location:  --    Preferred lab:  --    Send INR reminders to:  [facility] [place]    Comments:  --          Anticoagulation Care Providers       Provider Role Specialty Phone number    Jimena Singer MD Referring Cardiology [phone]            Clinic Interview:  Patient Findings     Negatives:  Signs/symptoms of thrombosis, Signs/symptoms of bleeding,   Laboratory test error suspected, Change in health, Change in alcohol use,   Change in activity, Upcoming invasive procedure, Emergency department   visit, Upcoming dental procedure, Missed doses, Extra doses, Change in   medications, Change in diet/appetite, Hospital admission, Bruising, Other   complaints      Clinical Outcomes     Negatives:  Major bleeding event, Thromboembolic event,   Anticoagulation-related hospital admission, Anticoagulation-related ED   visit, Anticoagulation-related fatality        INR History:      2025     2:30 PM 2025     2:00 PM 2025     2:30 PM 3/12/2025     2:45 PM 2025     2:45 PM 2025     2:45 PM 2025     1:45 PM   Anticoagulation Monitoring   INR 1.6 4.4 2.4 2.6 2.2 2.2 2.0   INR Date 2025 2025 2025 3/12/2025 2025 2025 2025   INR Goal 2.0-3.0 2.0-3.0 2.0-3.0 2.0-3.0 2.0-3.0 2.0-3.0 2.0-3.0   Trend Up Same Down Same Same Same Same   Last Week Total 12 mg 13 mg 12 mg 12 mg 12 mg 12 mg 12 mg   Next  Week Total 14 mg 11 mg 12 mg 12 mg 12 mg 12 mg 12 mg   Sun 2 mg 2 mg 2 mg 2 mg 2 mg 2 mg 2 mg   Mon 2 mg 2 mg 1 mg 1 mg 1 mg 1 mg 1 mg   Tue 2 mg 2 mg 2 mg 2 mg 2 mg 2 mg 2 mg   Wed 2 mg Hold (2/12); Otherwise 2 mg 2 mg 2 mg 2 mg 2 mg 2 mg   Thu 1 mg 1 mg 1 mg 1 mg 1 mg 1 mg 1 mg   Fri 3 mg (1/31); Otherwise 2 mg 2 mg 2 mg 2 mg 2 mg 2 mg 2 mg   Sat 2 mg 2 mg 2 mg 2 mg 2 mg 2 mg 2 mg       Plan:  1. INR is therapeutic today- see above in Anticoagulation Summary.   Will instruct Rodolfo Grover to continue their warfarin regimen- see above in Anticoagulation Summary.  2. Follow up in 4 weeks.  3. Patient declines warfarin refills.  4. Verbal and written information provided. Patient expresses understanding and has no further questions at this time.    Magdaleno Rausch, Pharmacy Technician

## 2025-06-04 NOTE — PROGRESS NOTES
I have supervised and reviewed the notes, assessments, and/or procedures performed. The documented assessment and plan were developed cooperatively, and the plan was implemented in my presence. I concur with the documentation of this patient encounter.    Luci Hale, [facility]

## 2025-06-23 NOTE — PROGRESS NOTES
Date of Office Visit: 2025  Encounter Provider: NKECHI Madrigal  Place of Service: [facility] CARDIOLOGY  Patient Name: Rodolfo Gorver  :1945    Chief complaint  Coronary artery disease, atrial fibrillation, hypertension, pulmonary hypertension, valvular heart disease     History of Present Illness  Patient is a 80 y.o. year old male  patient of Dr. Singer. Past medical history includes rheumatic fever, GE reflux disease who was seen at [facility] in 2018 after humeral fracture following a fall.  At that time he was noted to have ventricular bigeminy with normal potassium and magnesium levels.  He was hypertensive at the time.  He had an echocardiogram that showed normal systolic function with normal diastolic function, mild left atrial enlargement saline study was indeterminate.  There was aortic valve sclerosis with mild aortic regurgitation and trivial mitral and tricuspid regurgitation with mild pulmonary hypertension with an RV systolic pressure 42 mmHg.  A stress perfusion study was negative for ischemia.  Follow-up echocardiogram in 2020 showed an ejection fraction of 55% with normal diastolic function, mild aortic mitral valve regurgitation, mild to moderate tricuspid regurgitation with an RV systolic pressure increased further to 48 mmHg.  He presented in 2020 with complaints of chest discomfort and non-STEMI.  Echo showed new wall motion abnormalities as well as pulmonary hypertension( RVSP 61 mmHg).  Subsequent cardiac catheterization showed multivessel coronary artery disease.  Subsequently underwent CABG x7 vessels with mitral valve repair.  Postoperative course was complicated by hemoptysis, esophageal esophagitis, atrial fibrillation associated also with intermittent complete heart block.  He was also felt to have had a seizure and was started on Keppra.  He also had acute renal insufficiency.  He also had induration and possible  cellulitis of his right thigh leg wound.  Echo on 5/2023 showed an ejection fraction of 53%, indeterminate diastolic function, mild aortic valve regurgitation no aortic stenosis.  Mild to  mitral regurgitation mild tricuspid regurgitation RVSP increased further to 49 mmHg  (had been 40 mmHg in 2021).  On 4/2024 had a monitor that showed 5 episodes of ventricular tachycardia and significant heart block for total of 14 minutes.  There were 3 pauses longest lasting 3.1 seconds.  In hospital he ultimately demonstrated complete heart block and had a dual-chamber pacer placed. He complained of one episode of chest pain that occurred at rest when he last saw Dr. Singer in December 2024. Subsequent perfusion stress test showed small irreversible perfusion defect in the inferior wall with no evidence of ischemia.  Study was considered low risk.  Subsequent echocardiogram showed LVEF 53.3%, indeterminate diastolic function, mild mitral stenosis with mean gradient of 2 mmHg, mild aortic and mitral regurgitation, moderate tricuspid regurgitation with RVSP 46 mmHg.  As he had no further symptoms repeat cardiac cath was deferred.  Area of ischemia thought to be in the same area as in his heart cath in 2022.     Interval history  Patient presents today for routine follow-up.  I will visit with him today and have reviewed his medical record.  Since last visit he is wearing CPAP consistently.  He denies shortness of breath, chest pain or chest pressure, syncope or presyncope.  He has persistent fatigue that is at his baseline and states he does not get enough sleep at night. On nights he gets at least 8 hours of sleep, he feels better. He has discussed this with sleep medicine.  He has transient lower extremity edema that is controlled on his current furosemide dose.  He has occasional palpitations that mostly occur at night and are unchanged. He has a wound on his right shin from a cyst removal that has been slow to heal. He is  following with dermatology for this.    Past Medical History:   Diagnosis Date    Acute blood loss anemia     Arthritis     Atrial fibrillation     Basal cell carcinoma of skin of lower limb, including hip     Bilateral lower extremity edema     Bradycardia following surgery     CAD (coronary artery disease)     Chest discomfort     Colon polyp     Elevated troponin     Focal motor seizure     Generalized tonic-clonic seizure     GERD (gastroesophageal reflux disease)     Hypertension     Iritis of right eye     Ischemic cardiomyopathy     Mitral valve insufficiency     Myocardial infarction     Peptic ulceration     Post-ictal state     Pulmonary hypertension     PVC (premature ventricular contraction)     Rheumatic fever     Rheumatic fever     as child    S/P CABG x 7     S/P mitral valve repair     Severe obstructive sleep apnea     Shoulder fracture, right      Past Surgical History:   Procedure Laterality Date    CARDIAC CATHETERIZATION N/A 02/19/2021    Procedure: Coronary angiography;  Surgeon: Bry Nails MD;  Location: [facility] CATH INVASIVE LOCATION;  Service: Cardiovascular;  Laterality: N/A;    CARDIAC CATHETERIZATION N/A 02/19/2021    Procedure: Left heart cath;  Surgeon: Bry Nails MD;  Location: [facility] CATH [facility]ASIVE LOC[facility];  Service: Cardiovascular;  Laterality: N/A;    CARDIAC CATHETERIZATION N/A 02/19/2021    Procedure: Right Heart Cath;  Surgeon: Bry Nails MD;  Location: [facility] CATH [facility]ASIVE LOC[facility];  Service: Cardiovascular;  Laterality: N/A;    CARDIAC CATHETERIZATION N/A 02/19/2021    Procedure: Left ventriculography;  Surgeon: Bry Nails MD;  Location: [facility] CATH INVASIVE LOCATION;  Service: Cardiovascular;  Laterality: N/A;    CARDIAC ELECTROPHYSIOLOGY PROCEDURE N/A 04/15/2024    Procedure: Pacemaker DC new [employer];  Surgeon: Edgar Patton MD;  Location: [facility] CATH [facility]ASIVE LOC[facility];  Service: Cardiovascular;  Laterality: N/A;     COLONOSCOPY N/A 12/07/2021    Procedure: COLONOSCOPY to cecum with cold biopsy polypectomy;  Surgeon: Edgar Allen MD;  Location: [facility] ENDOSCOPY;  Service: Gastroenterology;  Laterality: N/A;  IRON DEFICIENCY ANEMIA, H/O COLON POLYPS  --   DIVERTICULOSIS, polyp, hemorrhoids    CORONARY ARTERY BYPASS GRAFT N/A 02/22/2021    Procedure: BK, MIDLINE STERNOTOMY, CORONARY ARTERY BYPASS X 7 UTILIZING THE LEFT INTERNAL MAMMARY ARTERY AND THE RIGHT SAPHENOUS VEIN, MITRAL VALVE REPAIR, PRP;  Surgeon: Jr Shyam Echavarria MD;  Location: [facility] MAIN OR;  Service: Cardiothoracic;  Laterality: N/A;    ENDOSCOPY N/A 08/16/2018    Erosive gastritis, diverticulosis    ENDOSCOPY N/A 02/26/2021    Procedure: ESOPHAGOGASTRODUODENOSCOPY AT BEDSIDE WITH HOT SNARE POLYPECTOMY AND CLIP PLACEMENT X1;  Surgeon: Jono Laboy MD;  Location: [facility] ENDOSCOPY;  Service: Gastroenterology;  Laterality: N/A;  PRE-  GI BLEED  POST- GASTRITIS, ESOPHAGITIS, POLYP    ENDOSCOPY N/A 12/07/2021    Procedure: ESOPHAGOGASTRODUODENOSCOPY with COLD  BIOPSIES;  Surgeon: Edgar Allen MD;  Location: [facility] ENDOSCOPY;  Service: Gastroenterology;  Laterality: N/A;  IRON DEFICIENCY ANEMIA, H/O ULCERATIVE ESOPHAGITIS  --GASTRITIS, DUODENAL POLYP    HERNIA REPAIR      PACEMAKER IMPLANTATION  04/15/2024    SHOULDER CLOSED REDUCTION Right 03/16/2018    Procedure: RIGHT SHOULDER CLOSED REDUCTION;  Surgeon: Kj Garcia MD;  Location: [facility] OR;  Service: Orthopedics    TONSILLECTOMY      TOTAL SHOULDER ARTHROPLASTY W/ DISTAL CLAVICLE EXCISION Right 03/22/2018    Procedure: Reverse Total Shoulder Arthroplsty;  Surgeon: Kj Garcia MD;  Location: [facility] OR;  Service: Orthopedics    TRANSESOPHAGEAL ECHOCARDIOGRAM (BK) N/A 02/22/2021    Procedure: TRANSESOPHAGEAL ECHOCARDIOGRAM;  Surgeon: Jr Shyam Echavarria MD;  Location: [facility] OR;  Service: Cardiothoracic;  Laterality: N/A;     Outpatient Medications Prior to Visit   Medication  Sig Dispense Refill    acetaminophen (TYLENOL) 500 MG tablet Take 2 tablets by mouth 3 (Three) Times a Day. (Patient taking differently: Take 2 tablets by mouth 3 (Three) Times a Day As Needed.)      aspirin 81 MG EC tablet Take 1 tablet by mouth Daily. 90 tablet 1    atorvastatin (LIPITOR) 10 MG tablet Take 1 tablet by mouth once daily 30 tablet 5    Cholecalciferol 50 MCG (2000 UT) capsule Take 1 capsule by mouth Daily.      Ferrous Gluconate (IRON 27 PO) Take 27 mg by mouth 3 (Three) Times a Week.      fluticasone (CUTIVATE) 0.05 % cream Apply 1 application  topically to the appropriate area as directed 2 (Two) Times a Day. (Patient taking differently: Apply 1 Application topically to the appropriate area as directed As Needed.) 30 g 4    furosemide (LASIX) 40 MG tablet Take 1 tablet by mouth once daily 90 tablet 2    metoprolol succinate XL (TOPROL-XL) 25 MG 24 hr tablet Take 1 tablet by mouth Daily. 90 tablet 3    nitroglycerin (NITROSTAT) 0.4 MG SL tablet 1 under the tongue as needed for angina, may repeat q5mins for up three doses (Patient taking differently: Place 1 tablet under the tongue Every 5 (Five) Minutes As Needed. 1 under the tongue as needed for angina, may repeat q5mins for up three doses) 25 tablet 1    Omeprazole Magnesium (PRILOSEC OTC PO) Take 20 mg by mouth Daily.      potassium chloride 10 MEQ CR tablet Take 1 tablet by mouth once daily 90 tablet 2    Psyllium (METAMUCIL FIBER PO) Take  by mouth As Needed.      warfarin (COUMADIN) 2 MG tablet Take one-half tablet (1 mg) by mouth Thursdays, and take one tablet (2 mg) by mouth all other days or as directed. 85 tablet 1    sucralfate (CARAFATE) 1 g tablet As Needed. (Patient not taking: Reported on 6/25/2025)       No facility-administered medications prior to visit.       Allergies as of 06/25/2025    (No Known Allergies)     Social History     Socioeconomic History    Marital status:    Tobacco Use    Smoking status: Never     Passive  "exposure: Never    Smokeless tobacco: Never   Vaping Use    Vaping status: Never Used   Substance and Sexual Activity    Alcohol use: Yes     Comment: occasional    Drug use: Never    Sexual activity: Defer     Family History   Problem Relation Age of Onset    Stroke Mother     Cancer Father 56        bladder    Heart attack Father     Malig Hyperthermia Neg Hx      Review of Systems   Constitutional: Positive for malaise/fatigue.   Cardiovascular:  Positive for leg swelling and palpitations. Negative for chest pain, claudication, dyspnea on exertion, near-syncope, orthopnea, paroxysmal nocturnal dyspnea and syncope.   Respiratory:  Negative for shortness of breath.    Neurological:  Negative for brief paralysis, dizziness, headaches and light-headedness.   All other systems reviewed and are negative.       Objective:     Vitals:    06/25/25 1016   BP: 128/72   BP Location: Left arm   Patient Position: Sitting   Cuff Size: Large Adult   Pulse: 72   Weight: 81.6 kg (180 lb)   Height: 167.6 cm (66\")     Body mass index is 29.05 kg/m².    Vitals reviewed.   Constitutional:       General: Not in acute distress.     Appearance: Well-developed and not in distress. Not diaphoretic.   HENT:      Head: Normocephalic.   Pulmonary:      Effort: Pulmonary effort is normal. No respiratory distress.      Breath sounds: Normal breath sounds. No wheezing. No rhonchi. No rales.   Cardiovascular:      Normal rate. Regular rhythm.      Murmurs: There is no murmur.   Pulses:     Radial: 2+ bilaterally.  Edema:     Peripheral edema absent.   Skin:     General: Skin is warm and dry. There is no cyanosis.      Findings: No rash.   Neurological:      Mental Status: Alert and oriented to person, place, and time.   Psychiatric:         Behavior: Behavior normal. Behavior is cooperative.         Thought Content: Thought content normal.         Judgment: Judgment normal.       Lab Review:     Lab Results   Component Value Date     " 01/29/2025     07/11/2024    K 4.7 01/29/2025    K 4.1 07/11/2024     01/29/2025     07/11/2024    CO2 27.2 01/29/2025    CO2 24.7 07/11/2024    BUN 24 (H) 01/29/2025    BUN 21 07/11/2024    CREATININE 1.58 (H) 01/29/2025    CREATININE 1.60 (H) 07/11/2024    EGFRIFNONA 40 (L) 01/24/2022    EGFRIFNONA 41 (L) 12/07/2021    EGFRIFAFRI 41 (L) 10/21/2021    EGFRIFAFRI 65 06/10/2021    GLUCOSE 94 01/29/2025    GLUCOSE 122 (H) 07/11/2024    CALCIUM 9.0 01/29/2025    CALCIUM 8.5 (L) 07/11/2024    ALBUMIN 3.7 01/29/2025    ALBUMIN 3.4 (L) 07/11/2024    BILITOT 0.5 01/29/2025    BILITOT <0.2 07/11/2024    AST 23 01/29/2025    AST 39 07/11/2024    ALT 23 01/29/2025    ALT 56 (H) 07/11/2024     Lab Results   Component Value Date    WBC 6.87 01/29/2025    WBC 7.58 08/16/2024    HGB 13.5 01/29/2025    HGB 12.6 (L) 08/16/2024    HCT 40.4 01/29/2025    HCT 39.4 08/16/2024    MCV 99.8 (H) 01/29/2025    .3 (H) 08/16/2024     01/29/2025     08/16/2024     Lab Results   Component Value Date    PROBNP 1,829.0 (H) 08/23/2023    PROBNP 1,966.0 (H) 09/28/2021     Lab Results   Component Value Date    CKTOTAL 66 08/16/2024    TROPONINT 20 07/11/2024     Lab Results   Component Value Date    TSH 5.330 (H) 01/29/2025    TSH 5.510 (H) 07/11/2024      Lipid Panel          7/11/2024    08:05 1/29/2025    09:35   Lipid Panel   Total Cholesterol 145  167    Triglycerides 116  71    HDL Cholesterol 38  58    VLDL Cholesterol 21  14    LDL Cholesterol  86  95           ECG 12 Lead    Date/Time: 6/25/2025 10:37 AM  Performed by: Kezia Dick APRN    Authorized by: Kezia Dick APRN  Comparison: compared with previous ECG   Similar to previous ECG  Rhythm: paced  Rate: normal  BPM: 72  QRS axis: normal  Comments: Similar to prior        Assessment:       Diagnosis Plan   1. PAF (paroxysmal atrial fibrillation)        2. Essential hypertension        3. H/O mitral valve repair        4. AV block,  complete        5. Frequent PVCs        6. Non-ischemic cardiomyopathy        7. S/P CABG (coronary artery bypass graft)        8. Coronary artery disease involving native coronary artery of native heart without angina pectoris        9. Hyperlipidemia LDL goal <70        10. Ventricular tachycardia (paroxysmal)        11. Pulmonary hypertension          Plan:       1.  Recurrent postoperative atrial fibrillation with progressive bradycardia and pacemaker placement 4/2024.  Remains on Coumadin  2.  History of non-STEMI with subsequent multivessel CABG with cardiomyopathy 2/2021.  Clinically with an episode of chest discomfort as above.  He is previously demonstrated silent ischemia.  Lexiscan Cardiolite stress test showed area of ischemia thought to be in a similar position to what was seen on previous heart cath.  He remains asymptomatic.  Discussed that if he develops any symptoms of chest pain, worsening dyspnea, or fatigue would recommend cart repeat cardiac cath at that time.  3.  Status post mitral valve repair, as above.  Reminded him to stay up-to-date with dental work and also continue to use SBE prophylaxis.  4.  Postoperative hematemesis with recent EGD showing grade D esophagitis.  No recurrent episodes.  Follows with GI  5.  Sick sinus syndrome (s/p pacemaker 4/2024). Recent device check April 2025 showed battery life 12 years, 100% A-fib burden with no other events.  6.  Chronic renal insufficiency.  Stable on most recent labs.  He follows with Dr. Nagy.  7.  Superficial vein thrombosis.   8.  Pulmonary hypertension, worse in 5/2023 to 49 mmHg..  Improved to 46 mmHg on most recent echocardiogram  9.  Dyslipidemia on statin therapy managed by PCP      Time Spent: I spent 30 minutes caring for Rodolfo on this date of service. This time includes time spent by me in the following activities: preparing for the visit, reviewing tests, performing a medically appropriate examination and/or evaluations,  counseling and educating the patient/family/caregiver, ordering medications, tests, or procedures, documenting information in the medical record, and independently interpreting results and communicating that information with the patient/family/caregiver.   I spent 1 minutes on the separately reported service of ECG. This time is not included in the time used to support the E/M service also reported today.        Your medication list            Accurate as of June 25, 2025 10:38 AM. If you have any questions, ask your nurse or doctor.                CHANGE how you take these medications        Instructions Last Dose Given Next Dose Due   acetaminophen 500 MG tablet  Commonly known as: TYLENOL  What changed:   when to take this  reasons to take this      Take 2 tablets by mouth 3 (Three) Times a Day.       fluticasone 0.05 % cream  Commonly known as: CUTIVATE  What changed:   when to take this  reasons to take this      Apply 1 application  topically to the appropriate area as directed 2 (Two) Times a Day.       nitroglycerin 0.4 MG SL tablet  Commonly known as: NITROSTAT  What changed:   how much to take  how to take this  when to take this  reasons to take this      1 under the tongue as needed for angina, may repeat q5mins for up three doses              CONTINUE taking these medications        Instructions Last Dose Given Next Dose Due   aspirin 81 MG EC tablet      Take 1 tablet by mouth Daily.       atorvastatin 10 MG tablet  Commonly known as: LIPITOR      Take 1 tablet by mouth once daily       Cholecalciferol 50 MCG (2000 UT) capsule      Take 1 capsule by mouth Daily.       furosemide 40 MG tablet  Commonly known as: LASIX      Take 1 tablet by mouth once daily       IRON 27 PO      Take 27 mg by mouth 3 (Three) Times a Week.       METAMUCIL FIBER PO      Take  by mouth As Needed.       metoprolol succinate XL 25 MG 24 hr tablet  Commonly known as: TOPROL-XL      Take 1 tablet by mouth Daily.       potassium  chloride 10 MEQ CR tablet      Take 1 tablet by mouth once daily       PRILOSEC OTC PO      Take 20 mg by mouth Daily.       sucralfate 1 g tablet  Commonly known as: CARAFATE      As Needed.       warfarin 2 MG tablet  Commonly known as: COUMADIN      Take one-half tablet (1 mg) by mouth Thursdays, and take one tablet (2 mg) by mouth all other days or as directed.                Patient is no longer taking -.  I corrected the med list to reflect this.  I did not stop these medications.    No follow-ups on file.      Dictated utilizing Dragon dictation

## 2025-06-25 ENCOUNTER — OFFICE VISIT (OUTPATIENT)
Age: 80
End: 2025-06-25
Payer: MEDICARE

## 2025-06-25 VITALS
BODY MASS INDEX: 28.93 KG/M2 | SYSTOLIC BLOOD PRESSURE: 128 MMHG | HEART RATE: 72 BPM | WEIGHT: 180 LBS | DIASTOLIC BLOOD PRESSURE: 72 MMHG | HEIGHT: 66 IN

## 2025-06-25 DIAGNOSIS — Z95.1 S/P CABG (CORONARY ARTERY BYPASS GRAFT): ICD-10-CM

## 2025-06-25 DIAGNOSIS — I48.0 PAF (PAROXYSMAL ATRIAL FIBRILLATION): Primary | ICD-10-CM

## 2025-06-25 DIAGNOSIS — I42.8 NON-ISCHEMIC CARDIOMYOPATHY: ICD-10-CM

## 2025-06-25 DIAGNOSIS — I27.20 PULMONARY HYPERTENSION: ICD-10-CM

## 2025-06-25 DIAGNOSIS — I49.3 FREQUENT PVCS: ICD-10-CM

## 2025-06-25 DIAGNOSIS — I10 ESSENTIAL HYPERTENSION: ICD-10-CM

## 2025-06-25 DIAGNOSIS — Z98.890 H/O MITRAL VALVE REPAIR: ICD-10-CM

## 2025-06-25 DIAGNOSIS — I25.10 CORONARY ARTERY DISEASE INVOLVING NATIVE CORONARY ARTERY OF NATIVE HEART WITHOUT ANGINA PECTORIS: ICD-10-CM

## 2025-06-25 DIAGNOSIS — I47.20 VENTRICULAR TACHYCARDIA (PAROXYSMAL): ICD-10-CM

## 2025-06-25 DIAGNOSIS — E78.5 HYPERLIPIDEMIA LDL GOAL <70: ICD-10-CM

## 2025-06-25 DIAGNOSIS — I44.2 AV BLOCK, COMPLETE: ICD-10-CM

## 2025-06-25 PROCEDURE — 99214 OFFICE O/P EST MOD 30 MIN: CPT | Performed by: NURSE PRACTITIONER

## 2025-06-25 PROCEDURE — 3078F DIAST BP <80 MM HG: CPT | Performed by: NURSE PRACTITIONER

## 2025-06-25 PROCEDURE — 93000 ELECTROCARDIOGRAM COMPLETE: CPT | Performed by: NURSE PRACTITIONER

## 2025-06-25 PROCEDURE — 3074F SYST BP LT 130 MM HG: CPT | Performed by: NURSE PRACTITIONER

## 2025-06-27 RX ORDER — ATORVASTATIN CALCIUM 10 MG/1
10 TABLET, FILM COATED ORAL DAILY
Qty: 90 TABLET | Refills: 2 | Status: SHIPPED | OUTPATIENT
Start: 2025-06-27

## 2025-07-02 ENCOUNTER — ANTICOAGULATION VISIT (OUTPATIENT)
Dept: PHARMACY | Facility: HOSPITAL | Age: 80
End: 2025-07-02
Payer: MEDICARE

## 2025-07-02 DIAGNOSIS — I48.0 PAF (PAROXYSMAL ATRIAL FIBRILLATION): Primary | ICD-10-CM

## 2025-07-02 LAB
INR PPP: 1.9 (ref 0.91–1.09)
PROTHROMBIN TIME: 23.1 SECONDS (ref 10–13.8)

## 2025-07-02 PROCEDURE — 85610 PROTHROMBIN TIME: CPT

## 2025-07-02 PROCEDURE — G0463 HOSPITAL OUTPT CLINIC VISIT: HCPCS

## 2025-07-02 NOTE — PROGRESS NOTES
Anticoagulation Clinic Progress Note    Anticoagulation Summary  As of 2025      INR goal:  2.0-3.0   TTR:  68.4% (4.1 y)   INR used for dosin.9 (2025)   Warfarin maintenance plan:  1 mg every Mon, Thu; 2 mg all other days   Weekly warfarin total:  12 mg   Plan last modified:  Luci Hale RPH (2025)   Next INR check:  2025   Priority:  High   Target end date:  --    Indications    PAF (paroxysmal atrial fibrillation) [I48.0]                 Anticoagulation Episode Summary       INR check location:  --    Preferred lab:  --    Send INR reminders to:  [facility]    Comments:  --          Anticoagulation Care Providers       Provider Role Specialty Phone number    Jimena Singer MD Referring Cardiology [phone]            Clinic Interview:  Patient Findings     Positives:  Change in diet/appetite    Negatives:  Signs/symptoms of thrombosis, Signs/symptoms of bleeding,   Laboratory test error suspected, Change in health, Change in alcohol use,   Change in activity, Upcoming invasive procedure, Emergency department   visit, Upcoming dental procedure, Missed doses, Extra doses, Change in   medications, Hospital admission, Bruising, Other complaints    Comments:  Reports he drank a protein drink this morning (usually only   drinks on Sat and Sun). Reports he has eaten some iceberg lettuce salads.         Clinical Outcomes     Negatives:  Major bleeding event, Thromboembolic event,   Anticoagulation-related hospital admission, Anticoagulation-related ED   visit, Anticoagulation-related fatality    Comments:  Reports he drank a protein drink this morning (usually only   drinks on Sat and Sun). Reports he has eaten some iceberg lettuce salads.           INR History:      2025     2:00 PM 2025     2:30 PM 3/12/2025     2:45 PM 2025     2:45 PM 2025     2:45 PM 2025     1:45 PM 2025     1:45 PM   Anticoagulation Monitoring   INR 4.4 2.4 2.6 2.2 2.2 2.0  1.9   INR Date 2/12/2025 2/26/2025 3/12/2025 4/9/2025 5/8/2025 6/4/2025 7/2/2025   INR Goal 2.0-3.0 2.0-3.0 2.0-3.0 2.0-3.0 2.0-3.0 2.0-3.0 2.0-3.0   Trend Same Down Same Same Same Same Same   Last Week Total 13 mg 12 mg 12 mg 12 mg 12 mg 12 mg 12 mg   Next Week Total 11 mg 12 mg 12 mg 12 mg 12 mg 12 mg 13 mg   Sun 2 mg 2 mg 2 mg 2 mg 2 mg 2 mg 2 mg   Mon 2 mg 1 mg 1 mg 1 mg 1 mg 1 mg 1 mg   Tue 2 mg 2 mg 2 mg 2 mg 2 mg 2 mg 2 mg   Wed Hold (2/12); Otherwise 2 mg 2 mg 2 mg 2 mg 2 mg 2 mg 3 mg (7/2); Otherwise 2 mg   Thu 1 mg 1 mg 1 mg 1 mg 1 mg 1 mg 1 mg   Fri 2 mg 2 mg 2 mg 2 mg 2 mg 2 mg 2 mg   Sat 2 mg 2 mg 2 mg 2 mg 2 mg 2 mg 2 mg       Plan:  1. INR is Subtherapeutic today- see above in Anticoagulation Summary.  Will instruct Rodolfo FRANCO Reilly to Change their warfarin regimen (3 mg today, then resume 1 mg Mon/Thurs, 2 mg all other days) - see above in Anticoagulation Summary.  2. Follow up in 4 weeks.  3. Patient declines warfarin refills.  4. Verbal and written information provided. Patient expresses understanding and has no further questions at this time.    Eduar Weeks, PharmD

## 2025-07-17 LAB
MC_CV_MDC_IDC_RATE_1: 150
MC_CV_MDC_IDC_RATE_1: 171
MC_CV_MDC_IDC_THERAPIES: NORMAL
MC_CV_MDC_IDC_ZONE_ID: 2
MC_CV_MDC_IDC_ZONE_ID: 6
MDC_IDC_MSMT_BATTERY_REMAINING_LONGEVITY: 124 MO
MDC_IDC_MSMT_BATTERY_RRT_TRIGGER: 2.62
MDC_IDC_MSMT_BATTERY_STATUS: NORMAL
MDC_IDC_MSMT_BATTERY_VOLTAGE: 3.01
MDC_IDC_MSMT_LEADCHNL_RA_DTM: NORMAL
MDC_IDC_MSMT_LEADCHNL_RA_IMPEDANCE_VALUE: 418
MDC_IDC_MSMT_LEADCHNL_RA_PACING_THRESHOLD_AMPLITUDE: 0.62
MDC_IDC_MSMT_LEADCHNL_RA_PACING_THRESHOLD_POLARITY: NORMAL
MDC_IDC_MSMT_LEADCHNL_RA_PACING_THRESHOLD_PULSEWIDTH: 0.4
MDC_IDC_MSMT_LEADCHNL_RA_SENSING_INTR_AMPL: 2.62
MDC_IDC_MSMT_LEADCHNL_RV_DTM: NORMAL
MDC_IDC_MSMT_LEADCHNL_RV_IMPEDANCE_VALUE: 494
MDC_IDC_MSMT_LEADCHNL_RV_PACING_THRESHOLD_AMPLITUDE: 0.75
MDC_IDC_MSMT_LEADCHNL_RV_PACING_THRESHOLD_POLARITY: NORMAL
MDC_IDC_MSMT_LEADCHNL_RV_PACING_THRESHOLD_PULSEWIDTH: 0.4
MDC_IDC_MSMT_LEADCHNL_RV_SENSING_INTR_AMPL: 17.12
MDC_IDC_PG_IMPLANT_DTM: NORMAL
MDC_IDC_PG_MFG: NORMAL
MDC_IDC_PG_MODEL: NORMAL
MDC_IDC_PG_SERIAL: NORMAL
MDC_IDC_PG_TYPE: NORMAL
MDC_IDC_SESS_DTM: NORMAL
MDC_IDC_SESS_TYPE: NORMAL
MDC_IDC_SET_BRADY_AT_MODE_SWITCH_RATE: 171
MDC_IDC_SET_BRADY_LOWRATE: 70
MDC_IDC_SET_BRADY_MAX_SENSOR_RATE: 130
MDC_IDC_SET_BRADY_MAX_TRACKING_RATE: 130
MDC_IDC_SET_BRADY_MODE: NORMAL
MDC_IDC_SET_BRADY_PAV_DELAY: 180
MDC_IDC_SET_BRADY_SAV_DELAY: 150
MDC_IDC_SET_LEADCHNL_RA_PACING_AMPLITUDE: 3.5
MDC_IDC_SET_LEADCHNL_RA_PACING_POLARITY: NORMAL
MDC_IDC_SET_LEADCHNL_RA_PACING_PULSEWIDTH: 0.4
MDC_IDC_SET_LEADCHNL_RA_SENSING_POLARITY: NORMAL
MDC_IDC_SET_LEADCHNL_RA_SENSING_SENSITIVITY: 0.3
MDC_IDC_SET_LEADCHNL_RV_PACING_AMPLITUDE: 2
MDC_IDC_SET_LEADCHNL_RV_PACING_POLARITY: NORMAL
MDC_IDC_SET_LEADCHNL_RV_PACING_PULSEWIDTH: 0.4
MDC_IDC_SET_LEADCHNL_RV_SENSING_POLARITY: NORMAL
MDC_IDC_SET_LEADCHNL_RV_SENSING_SENSITIVITY: 0.9
MDC_IDC_SET_ZONE_STATUS: NORMAL
MDC_IDC_SET_ZONE_STATUS: NORMAL
MDC_IDC_SET_ZONE_TYPE: NORMAL
MDC_IDC_SET_ZONE_TYPE: NORMAL
MDC_IDC_STAT_AT_BURDEN_PERCENT: 2
MDC_IDC_STAT_BRADY_RA_PERCENT_PACED: 92.63
MDC_IDC_STAT_BRADY_RV_PERCENT_PACED: 98.47

## 2025-07-28 ENCOUNTER — TELEPHONE (OUTPATIENT)
Dept: ONCOLOGY | Facility: CLINIC | Age: 80
End: 2025-07-28

## 2025-07-30 ENCOUNTER — ANTICOAGULATION VISIT (OUTPATIENT)
Dept: PHARMACY | Facility: HOSPITAL | Age: 80
End: 2025-07-30
Payer: MEDICARE

## 2025-07-30 ENCOUNTER — OFFICE VISIT (OUTPATIENT)
Dept: FAMILY MEDICINE CLINIC | Facility: CLINIC | Age: 80
End: 2025-07-30
Payer: MEDICARE

## 2025-07-30 VITALS
BODY MASS INDEX: 30.37 KG/M2 | HEIGHT: 66 IN | HEART RATE: 79 BPM | WEIGHT: 189 LBS | RESPIRATION RATE: 18 BRPM | SYSTOLIC BLOOD PRESSURE: 110 MMHG | DIASTOLIC BLOOD PRESSURE: 70 MMHG | OXYGEN SATURATION: 99 %

## 2025-07-30 DIAGNOSIS — R73.9 ELEVATED BLOOD SUGAR: ICD-10-CM

## 2025-07-30 DIAGNOSIS — I48.0 PAF (PAROXYSMAL ATRIAL FIBRILLATION): Primary | ICD-10-CM

## 2025-07-30 DIAGNOSIS — R97.20 ELEVATED PSA: ICD-10-CM

## 2025-07-30 DIAGNOSIS — E78.00 PURE HYPERCHOLESTEROLEMIA: ICD-10-CM

## 2025-07-30 DIAGNOSIS — I27.20 PULMONARY HYPERTENSION: Primary | ICD-10-CM

## 2025-07-30 LAB
INR PPP: 2.4 (ref 0.91–1.09)
PROTHROMBIN TIME: 28.4 SECONDS (ref 10–13.8)

## 2025-07-30 PROCEDURE — 85610 PROTHROMBIN TIME: CPT

## 2025-07-30 PROCEDURE — G0463 HOSPITAL OUTPT CLINIC VISIT: HCPCS

## 2025-07-30 NOTE — PROGRESS NOTES
I have supervised and reviewed the notes, assessments, and/or procedures performed. I personally performed the assessment and implemented the plan. I concur with the documentation of this patient encounter.    Myra Candelaria [facility]     Anticoagulation Clinic Progress Note    Anticoagulation Summary  As of 2025      INR goal:  2.0-3.0   TTR:  68.7% (4.2 y)   INR used for dosin.4 (2025)   Warfarin maintenance plan:  1 mg every Mon, Thu; 2 mg all other days   Weekly warfarin total:  12 mg   No change documented:  Magdaleno Rausch, Pharmacy Technician   Plan last modified:  Luci Hale [facility] (2025)   Next INR check:  9/3/2025   Priority:  High   Target end date:  --    Indications    PAF (paroxysmal atrial fibrillation) [I48.0]                 Anticoagulation Episode Summary       INR check location:  --    Preferred lab:  --    Send INR reminders to:  [facility]    Comments:  --          Anticoagulation Care Providers       Provider Role Specialty Phone number    Jimena Singer MD Referring Cardiology [phone]            Clinic Interview:  Patient Findings     Negatives:  Signs/symptoms of thrombosis, Signs/symptoms of bleeding,   Laboratory test error suspected, Change in health, Change in alcohol use,   Change in activity, Upcoming invasive procedure, Emergency department   visit, Upcoming dental procedure, Missed doses, Extra doses, Change in   medications, Change in diet/appetite, Hospital admission, Bruising, Other   complaints      Clinical Outcomes     Negatives:  Major bleeding event, Thromboembolic event,   Anticoagulation-related hospital admission, Anticoagulation-related ED   visit, Anticoagulation-related fatality        INR History:      2025     2:30 PM 3/12/2025     2:45 PM 2025     2:45 PM 2025     2:45 PM 2025     1:45 PM 2025     1:45 PM 2025     3:00 PM   Anticoagulation Monitoring   INR 2.4 2.6 2.2 2.2 2.0 1.9 2.4   INR Date  2/26/2025 3/12/2025 4/9/2025 5/8/2025 6/4/2025 7/2/2025 7/30/2025   INR Goal 2.0-3.0 2.0-3.0 2.0-3.0 2.0-3.0 2.0-3.0 2.0-3.0 2.0-3.0   Trend Down Same Same Same Same Same Same   Last Week Total 12 mg 12 mg 12 mg 12 mg 12 mg 12 mg 12 mg   Next Week Total 12 mg 12 mg 12 mg 12 mg 12 mg 13 mg 12 mg   Sun 2 mg 2 mg 2 mg 2 mg 2 mg 2 mg 2 mg   Mon 1 mg 1 mg 1 mg 1 mg 1 mg 1 mg 1 mg   Tue 2 mg 2 mg 2 mg 2 mg 2 mg 2 mg 2 mg   Wed 2 mg 2 mg 2 mg 2 mg 2 mg 3 mg (7/2); Otherwise 2 mg 2 mg   Thu 1 mg 1 mg 1 mg 1 mg 1 mg 1 mg 1 mg   Fri 2 mg 2 mg 2 mg 2 mg 2 mg 2 mg 2 mg   Sat 2 mg 2 mg 2 mg 2 mg 2 mg 2 mg 2 mg   Visit Report       Report       Plan:  1. INR is therapeutic today- see above in Anticoagulation Summary.   Will instruct Rodolfo Grover to continue their warfarin regimen- see above in Anticoagulation Summary.  2. Follow up in 5 weeks due to patient being out of town  3. Patient declines warfarin refills.  4. Verbal and written information provided. Patient expresses understanding and has no further questions at this time.    Magdaleno Rausch, Pharmacy Technician

## 2025-07-30 NOTE — PROGRESS NOTES
"Chief Complaint  Chief Complaint   Patient presents with    Hypertension     6 mon f/u        Subjective    History of Present Illness        Rodolfo Grover presents to [facility] for   Hypertension  Chronicity:  Chronic  Onset:  More than 1 year ago  Progression since onset:  Stable  Condition status:  Controlled  Associated symptoms: no blurred vision, no headaches, no orthopnea, no palpitations, no sweats and no dyspnea with exertion    CAD risks:  Family history, male gender, obesity and dyslipidemia  Improvement on treatment:  Significant  Compliance problems:  No compliance problems  Identifiable causes: no coarctation of the aorta, no hyperaldosteronism, no hypercortisolism, no hyperparathyroidism and no pheochromocytoma    Hyperlipidemia  This is a chronic problem. The current episode started more than 1 year ago. The problem is controlled. Recent lipid tests were reviewed and are normal. Exacerbating diseases include obesity. He has no history of diabetes or hypothyroidism. Pertinent negatives include no focal weakness, leg pain or myalgias. Current antihyperlipidemic treatment includes statins. The current treatment provides significant improvement of lipids. There are no compliance problems.  Risk factors for coronary artery disease include dyslipidemia, family history, male sex, hypertension and obesity.      History of Present Illness      Objective   Vital Signs:   Visit Vitals  /70   Pulse 79   Resp 18   Ht 167.6 cm (66\")   Wt 85.7 kg (189 lb)   SpO2 99%   BMI 30.51 kg/m²                Physical Exam  Vitals reviewed.   Constitutional:       Appearance: He is well-developed.   HENT:      Head: Normocephalic.      Right Ear: External ear normal.      Left Ear: External ear normal.      Nose: Nose normal.   Eyes:      Conjunctiva/sclera: Conjunctivae normal.   Cardiovascular:      Rate and Rhythm: Normal rate and regular rhythm.   Pulmonary:      Effort: Pulmonary " effort is normal.      Breath sounds: Normal breath sounds.   Musculoskeletal:         General: Normal range of motion.      Cervical back: Normal range of motion and neck supple.   Skin:     General: Skin is warm and dry.      Capillary Refill: Capillary refill takes less than 2 seconds.   Neurological:      Mental Status: He is alert and oriented to person, place, and time.        Physical Exam           Result Review :  Results                            Assessment and Plan      Diagnoses and all orders for this visit:    1. Pulmonary hypertension (Primary)  Assessment & Plan:  Patient's blood pressure stable.  Patient was encouraged to continue his medication.  Patient is encouraged return to clinic if symptoms  Patient will need to return to clinic 6 months for follow-up      Orders:  -     CBC & Differential  -     Comprehensive Metabolic Panel  -     Lipid Panel With / Chol / HDL Ratio  -     TSH  -     UA / M With / Rflx Culture([facility] ONLY) - Urine, Clean Catch    2. Pure hypercholesterolemia  Assessment & Plan:   Lipid abnormalities are stable    Plan:  Continue same medication/s without change.      Discussed medication dosage, use, side effects, and goals of treatment in detail.    Counseled patient on lifestyle modifications to help control hyperlipidemia.     Patient Treatment Goals:   LDL goal is under 100    Followup in 6 months.    Orders:  -     CBC & Differential  -     Comprehensive Metabolic Panel  -     Lipid Panel With / Chol / HDL Ratio  -     TSH  -     UA / M With / Rflx Culture([facility] ONLY) - Urine, Clean Catch    3. Elevated PSA    4. Elevated blood sugar  -     Hemoglobin A1c       Assessment & Plan             Follow Up   No follow-ups on file.  Patient was given instructions and counseling regarding his condition or for health maintenance advice. Please see specific information pulled into the AVS if appropriate.

## 2025-07-31 NOTE — ASSESSMENT & PLAN NOTE
Patient's blood pressure stable.  Patient was encouraged to continue his medication.  Patient is encouraged return to clinic if symptoms  Patient will need to return to clinic 6 months for follow-up

## 2025-08-01 ENCOUNTER — PATIENT ROUNDING (BHMG ONLY) (OUTPATIENT)
Dept: FAMILY MEDICINE CLINIC | Facility: CLINIC | Age: 80
End: 2025-08-01
Payer: MEDICARE

## 2025-08-01 NOTE — PROGRESS NOTES
A My-Chart message has been sent to the patient for PATIENT ROUNDING with [facility] [place]

## 2025-08-01 NOTE — PROGRESS NOTES
A My-Chart message has been sent to the patient for PATIENT ROUNDING with [facility] [place]

## 2025-08-02 LAB
ALBUMIN SERPL-MCNC: 3.9 G/DL (ref 3.5–5.2)
ALBUMIN/GLOB SERPL: 1.5 G/DL
ALP SERPL-CCNC: 101 U/L (ref 39–117)
ALT SERPL-CCNC: 31 U/L (ref 1–41)
APPEARANCE UR: CLEAR
AST SERPL-CCNC: 25 U/L (ref 1–40)
BACTERIA #/AREA URNS HPF: NORMAL /[HPF]
BASOPHILS # BLD AUTO: 0.05 10*3/MM3 (ref 0–0.2)
BASOPHILS NFR BLD AUTO: 0.7 % (ref 0–1.5)
BILIRUB SERPL-MCNC: 0.6 MG/DL (ref 0–1.2)
BILIRUB UR QL STRIP: NEGATIVE
BUN SERPL-MCNC: 29 MG/DL (ref 8–23)
BUN/CREAT SERPL: 17.9 (ref 7–25)
CALCIUM SERPL-MCNC: 8.8 MG/DL (ref 8.6–10.5)
CASTS URNS QL MICRO: NORMAL /LPF
CHLORIDE SERPL-SCNC: 105 MMOL/L (ref 98–107)
CHOLEST SERPL-MCNC: 195 MG/DL (ref 0–200)
CHOLEST/HDLC SERPL: 3.68 {RATIO}
CO2 SERPL-SCNC: 26.2 MMOL/L (ref 22–29)
COLOR UR: YELLOW
CREAT SERPL-MCNC: 1.62 MG/DL (ref 0.76–1.27)
EGFRCR SERPLBLD CKD-EPI 2021: 42.1 ML/MIN/1.73
EOSINOPHIL # BLD AUTO: 0.2 10*3/MM3 (ref 0–0.4)
EOSINOPHIL NFR BLD AUTO: 3 % (ref 0.3–6.2)
EPI CELLS #/AREA URNS HPF: NORMAL /HPF (ref 0–10)
ERYTHROCYTE [DISTWIDTH] IN BLOOD BY AUTOMATED COUNT: 12.1 % (ref 12.3–15.4)
GLOBULIN SER CALC-MCNC: 2.6 GM/DL
GLUCOSE SERPL-MCNC: 90 MG/DL (ref 65–99)
GLUCOSE UR QL STRIP: NEGATIVE
HBA1C MFR BLD: 5.8 % (ref 4.8–5.6)
HCT VFR BLD AUTO: 40.1 % (ref 37.5–51)
HDLC SERPL-MCNC: 53 MG/DL (ref 40–60)
HGB BLD-MCNC: 13.2 G/DL (ref 13–17.7)
HGB UR QL STRIP: NEGATIVE
IMM GRANULOCYTES # BLD AUTO: 0.12 10*3/MM3 (ref 0–0.05)
IMM GRANULOCYTES NFR BLD AUTO: 1.8 % (ref 0–0.5)
KETONES UR QL STRIP: NEGATIVE
LDLC SERPL CALC-MCNC: 121 MG/DL (ref 0–100)
LEUKOCYTE ESTERASE UR QL STRIP: NEGATIVE
LYMPHOCYTES # BLD AUTO: 1.37 10*3/MM3 (ref 0.7–3.1)
LYMPHOCYTES NFR BLD AUTO: 20.4 % (ref 19.6–45.3)
MCH RBC QN AUTO: 33.3 PG (ref 26.6–33)
MCHC RBC AUTO-ENTMCNC: 32.9 G/DL (ref 31.5–35.7)
MCV RBC AUTO: 101.3 FL (ref 79–97)
MICRO URNS: NORMAL
MICRO URNS: NORMAL
MONOCYTES # BLD AUTO: 0.74 10*3/MM3 (ref 0.1–0.9)
MONOCYTES NFR BLD AUTO: 11 % (ref 5–12)
NEUTROPHILS # BLD AUTO: 4.22 10*3/MM3 (ref 1.7–7)
NEUTROPHILS NFR BLD AUTO: 63.1 % (ref 42.7–76)
NITRITE UR QL STRIP: NEGATIVE
NRBC BLD AUTO-RTO: 0 /100 WBC (ref 0–0.2)
PH UR STRIP: 6 [PH] (ref 5–7.5)
PLATELET # BLD AUTO: 164 10*3/MM3 (ref 140–450)
POTASSIUM SERPL-SCNC: 4.3 MMOL/L (ref 3.5–5.2)
PROT SERPL-MCNC: 6.5 G/DL (ref 6–8.5)
PROT UR QL STRIP: NEGATIVE
RBC # BLD AUTO: 3.96 10*6/MM3 (ref 4.14–5.8)
RBC #/AREA URNS HPF: NORMAL /HPF (ref 0–2)
SODIUM SERPL-SCNC: 139 MMOL/L (ref 136–145)
SP GR UR STRIP: 1.01 (ref 1–1.03)
TRIGL SERPL-MCNC: 119 MG/DL (ref 0–150)
TSH SERPL DL<=0.005 MIU/L-ACNC: 5.09 UIU/ML (ref 0.27–4.2)
URINALYSIS REFLEX: NORMAL
UROBILINOGEN UR STRIP-MCNC: 0.2 MG/DL (ref 0.2–1)
VLDLC SERPL CALC-MCNC: 21 MG/DL (ref 5–40)
WBC # BLD AUTO: 6.7 10*3/MM3 (ref 3.4–10.8)
WBC #/AREA URNS HPF: NORMAL /HPF (ref 0–5)

## 2025-08-06 ENCOUNTER — TELEPHONE (OUTPATIENT)
Dept: FAMILY MEDICINE CLINIC | Facility: CLINIC | Age: 80
End: 2025-08-06
Payer: MEDICARE

## 2025-08-06 DIAGNOSIS — I27.20 PULMONARY HYPERTENSION: ICD-10-CM

## 2025-08-06 DIAGNOSIS — I10 ESSENTIAL HYPERTENSION: ICD-10-CM

## 2025-08-06 DIAGNOSIS — R79.89 ABNORMAL TSH: Primary | ICD-10-CM

## 2025-08-14 ENCOUNTER — RESULTS FOLLOW-UP (OUTPATIENT)
Dept: FAMILY MEDICINE CLINIC | Facility: CLINIC | Age: 80
End: 2025-08-14
Payer: MEDICARE

## 2025-08-14 DIAGNOSIS — R97.20 ELEVATED PSA: Primary | ICD-10-CM

## 2025-08-19 RX ORDER — WARFARIN SODIUM 2 MG/1
TABLET ORAL
Qty: 80 TABLET | Refills: 1 | Status: SHIPPED | OUTPATIENT
Start: 2025-08-19

## (undated) DEVICE — 3M™ IOBAN™ 2 ANTIMICROBIAL INCISE DRAPE 6640EZ: Brand: IOBAN™ 2

## (undated) DEVICE — 8 FOOT DISPOSABLE EXTENSION CABLE WITH SAFE CONNECT / ALLIGATOR CLIP

## (undated) DEVICE — LOU OPEN HEART DR POLLOCK: Brand: MEDLINE INDUSTRIES, INC.

## (undated) DEVICE — THE SINGLE USE ETRAP – POLYP TRAP IS USED FOR SUCTION RETRIEVAL OF ENDOSCOPICALLY REMOVED POLYPS.: Brand: ETRAP

## (undated) DEVICE — 12 FOOT DISPOSABLE EXTENSION CABLE WITH SAFE CONNECT / SCREW-DOWN

## (undated) DEVICE — BITEBLOCK OMNI BLOC

## (undated) DEVICE — Device

## (undated) DEVICE — NEEDLE, QUINCKE, 20GX3.5": Brand: MEDLINE

## (undated) DEVICE — IMMOB SHLDR PAD2 UNIV LG

## (undated) DEVICE — GLIDESHEATH SLENDER STAINLESS STEEL KIT: Brand: GLIDESHEATH SLENDER

## (undated) DEVICE — TBG INSUFFLATION LUER LOCK: Brand: MEDLINE INDUSTRIES, INC.

## (undated) DEVICE — CANN O2 ETCO2 FITS ALL CONN CO2 SMPL A/ 7IN DISP LF

## (undated) DEVICE — SENSR O2 OXIMAX FNGR A/ 18IN NONSTR

## (undated) DEVICE — RUNTHROUGH NS EXTRA FLOPPY PTCA GUIDEWIRE: Brand: RUNTHROUGH

## (undated) DEVICE — SOL IRR NACL 0.9PCT BT 1000ML

## (undated) DEVICE — DRP SLUSH WARMR MACH CIR 44X44IN

## (undated) DEVICE — DRSNG TELFA PAD NONADH STR 1S 3X8IN

## (undated) DEVICE — CANN VESL FREE FLO 2MM

## (undated) DEVICE — GLV SURG SIGNATURE ESSENTIAL PF LTX SZ7.5

## (undated) DEVICE — COAXIAL FEMORAL CANAL TIP

## (undated) DEVICE — BIT DRL SCRW PERIPH 2.7MM

## (undated) DEVICE — SOL ISO/ALC RUB 70PCT 4OZ

## (undated) DEVICE — SUT VIC 0 CT1 CR8 27IN JJ41G

## (undated) DEVICE — CATH DIAG IMPULSE FR5 5F 100CM

## (undated) DEVICE — CATH DEFLECT SELECTSITE 9F 43CM W/ART HNDL

## (undated) DEVICE — HARMONIC SYNERGY DISSECTING HOOK WITH TORQUE WRENCH. FOR USE WITH BLUE HAND PIECE ONLY: Brand: HARMONIC SYNERGY

## (undated) DEVICE — SUT SILK 2/0 TIES 18IN A185H

## (undated) DEVICE — KT DRN EVAC WND PVC PCH WTROC RND 10F400

## (undated) DEVICE — SYS VASOVIEW HEMOPRO ENDOSCOPIC HARVST VESL

## (undated) DEVICE — ADAPT CLN BIOGUARD AIR/H2O DISP

## (undated) DEVICE — LN SMPL CO2 SHTRM SD STREAM W/M LUER

## (undated) DEVICE — CVR PROB 96IN LF STRL

## (undated) DEVICE — SKIN PREP TRAY W/CHG: Brand: MEDLINE INDUSTRIES, INC.

## (undated) DEVICE — PK ATS CUST W CARDIOTOMY RESEVOIR

## (undated) DEVICE — TBG PENCL TELESCP MEGADYNE SMOKE EVAC 10FT

## (undated) DEVICE — GLV SURG BIOGEL SENSR LTX PF SZ7.5

## (undated) DEVICE — CATH VENT MIV RADL PIG ST TIP 5F 110CM

## (undated) DEVICE — GLV SURG BIOGEL M LTX PF 7 1/2

## (undated) DEVICE — ARM SLING: Brand: DEROYAL

## (undated) DEVICE — KT ORCA ORCAPOD DISP STRL

## (undated) DEVICE — FRCP BX RADJAW4 NDL 2.8 240CM LG OG BX40

## (undated) DEVICE — CORONARY ARTERY BYPASS GRAFT MARKERS, STAINLESS STEEL, DISTAL, WITHOUT HOLDER: Brand: ANASTOMARK CORONARY ARTERY BYPASS GRAFT MARKERS, STAINLESS STEEL, DISTAL

## (undated) DEVICE — PK CATH CARD 40

## (undated) DEVICE — ROTATING SURGICAL PUNCHES, 1 PER POUCH: Brand: A&E MEDICAL / ROTATING SURGICAL PUNCHES

## (undated) DEVICE — IRRIGATOR BULB ASEPTO 60CC STRL

## (undated) DEVICE — HEMOCONCENTRATOR PERFUS LPS06

## (undated) DEVICE — ADHS SKIN DERMABOND TOP ADVANCED

## (undated) DEVICE — ST. SORBAVIEW ULTIMATE IJ SYSTEM A,C: Brand: CENTURION

## (undated) DEVICE — SUT VIC 2/0 CT2 27IN J269H

## (undated) DEVICE — Device: Brand: DEFENDO AIR/WATER/SUCTION AND BIOPSY VALVE

## (undated) DEVICE — INTRO SHEATH PRELUDE SNAP .038 7F 13CM W/SDPRT

## (undated) DEVICE — DRSNG SURESITE WNDW 2.38X2.75

## (undated) DEVICE — Device: Brand: LEVEL 1

## (undated) DEVICE — TUBING, SUCTION, 1/4" X 10', STRAIGHT: Brand: MEDLINE

## (undated) DEVICE — HUMERAL NOZZLE: Brand: REVOLUTION

## (undated) DEVICE — DRSNG SURESITE WNDW 4X4.5

## (undated) DEVICE — GLIDESHEATH BASIC HYDROPHILIC COATED INTRODUCER SHEATH: Brand: GLIDESHEATH

## (undated) DEVICE — DRSNG WND GEL FIBR OPTICELL AG PLS W/SLV LF 4X5IN  STRL

## (undated) DEVICE — GLV SURG TRIUMPH CLASSIC PF LTX 8.5 STRL

## (undated) DEVICE — PK SUT OPN HEART POLLOCK CUST

## (undated) DEVICE — CONN STR 1/2INX3/8IN

## (undated) DEVICE — GW EMR FIX EXCHG J STD .035 3MM 260CM

## (undated) DEVICE — SINGLE-USE POLYPECTOMY SNARE: Brand: CAPTIVATOR II

## (undated) DEVICE — CEMENT MIXING SYSTEM WITH FEMORAL BREAKWAY NOZZLE: Brand: REVOLUTION

## (undated) DEVICE — SOL IRR H2O BTL 1000ML STRL

## (undated) DEVICE — SUT SILK 0 CT1 CR8 18IN C021D

## (undated) DEVICE — MEDICINE CUP, GRADUATED, STER: Brand: MEDLINE

## (undated) DEVICE — PK SHLDR OPN 40

## (undated) DEVICE — INTRO SHEATH PRELUDE SNAP .038 9F 13CM W/SDPRT BLK

## (undated) DEVICE — SENSR CERBRL O2 PK/2

## (undated) DEVICE — SUT PROLN MO.5 7/0 DBLARM BV175 6 2X30 BX/12

## (undated) DEVICE — BLOWER/MISTER AXIOUS OPCAB W/TBG

## (undated) DEVICE — PIN STNMAN 3.2MM 9IN
Type: IMPLANTABLE DEVICE | Site: SHOULDER | Status: NON-FUNCTIONAL
Removed: 2018-03-22

## (undated) DEVICE — CATH DIAG IMPULSE FL3.5 5F 100CM

## (undated) DEVICE — SYR LUERLOK 20CC BX/50

## (undated) DEVICE — INTRO SHEATH ART/FEM ENGAGE .035 5F12CM

## (undated) DEVICE — ADHS SKIN SURG TISS VISC PREMIERPRO EXOFIN HI/VISC FAST/DRY

## (undated) DEVICE — PATIENT RETURN ELECTRODE, SINGLE-USE, CONTACT QUALITY MONITORING, ADULT, WITH 9FT CORD, FOR PATIENTS WEIGING OVER 33LBS. (15KG): Brand: MEGADYNE

## (undated) DEVICE — LOU PACE DEFIB: Brand: MEDLINE INDUSTRIES, INC.

## (undated) DEVICE — BALN PRESS WEDGE 5F 110CM

## (undated) DEVICE — SPNG DISECTOR KTNER XRAY COTN 1/4X9/16IN PK/5

## (undated) DEVICE — CANN ART SOFTFLOW EXT W/SUT/RNG 7MM

## (undated) DEVICE — CLAMP INSERT: Brand: STEALTH® CLAMP INSERT

## (undated) DEVICE — PK PERFUS CUST W/CARDIOPLEGIA

## (undated) DEVICE — GLV SURG BIOGEL LTX PF 8 1/2

## (undated) DEVICE — APPL CHLORAPREP W/TINT 26ML ORNG

## (undated) DEVICE — MSK PROC CURAPLEX O2 2/ADAPT 7FT

## (undated) DEVICE — SINGLE-USE BIOPSY FORCEPS: Brand: RADIAL JAW 4

## (undated) DEVICE — HANDPIECE SET WITH COAXIAL HIGH FLOW TIP AND SUCTION TUBE: Brand: INTERPULSE

## (undated) DEVICE — KT MANIFLD CARDIAC

## (undated) DEVICE — PREP SOL POVIDONE/IODINE BT 4OZ

## (undated) DEVICE — CANN NASL CO2 TRULINK W/O2 A/

## (undated) DEVICE — TOWEL,OR,DSP,ST,WHITE,DLX,4/PK,20PK/CS: Brand: MEDLINE

## (undated) DEVICE — SYS PERFUS SEP PLATLT W TIPS CUST

## (undated) DEVICE — CATHETER,URETHRAL,REDRUBBER,STERILE,20FR: Brand: MEDLINE

## (undated) DEVICE — BIOPATCH™ ANTIMICROBIAL DRESSING WITH CHLORHEXIDINE GLUCONATE IS A HYDROPHILLIC POLYURETHANE ABSORPTIVE FOAM WITH CHLORHEXIDINE GLUCONATE (CHG) WHICH INHIBITS BACTERIAL GROWTH UNDER THE DRESSING. THE DRESSING IS INTENDED TO BE USED TO ABSORB EXUDATE, COVER A WOUND CAUSED BY VASCULAR AND NONVASCULAR PERCUTANEOUS MEDICAL DEVICES DURING SURGERY, AS WELL AS REDUCE LOCAL INFECTION AND COLONIZATION OF MICROORGANISMS.: Brand: BIOPATCH

## (undated) DEVICE — PK HEART OPN 40

## (undated) DEVICE — MAT FLR ABSORBENT LG 4FT 10 2.5FT

## (undated) DEVICE — DRAPE,U/ SHT,SPLIT,PLAS,STERIL: Brand: MEDLINE

## (undated) DEVICE — 2108 SERIES SAGITTAL BLADE (19.5 X 1.27 X 81.0MM)